# Patient Record
Sex: MALE | Race: BLACK OR AFRICAN AMERICAN | Employment: UNEMPLOYED | URBAN - METROPOLITAN AREA
[De-identification: names, ages, dates, MRNs, and addresses within clinical notes are randomized per-mention and may not be internally consistent; named-entity substitution may affect disease eponyms.]

---

## 2018-01-08 PROBLEM — E10.10 DKA, TYPE 1, NOT AT GOAL (HCC): Status: ACTIVE | Noted: 2018-01-08

## 2018-03-26 ENCOUNTER — OFFICE VISIT (OUTPATIENT)
Dept: FAMILY MEDICINE CLINIC | Age: 43
End: 2018-03-26

## 2018-03-26 ENCOUNTER — TELEPHONE (OUTPATIENT)
Dept: FAMILY MEDICINE CLINIC | Age: 43
End: 2018-03-26

## 2018-03-26 VITALS
BODY MASS INDEX: 20.72 KG/M2 | SYSTOLIC BLOOD PRESSURE: 128 MMHG | HEIGHT: 71 IN | OXYGEN SATURATION: 99 % | DIASTOLIC BLOOD PRESSURE: 78 MMHG | RESPIRATION RATE: 21 BRPM | HEART RATE: 77 BPM | WEIGHT: 148 LBS

## 2018-03-26 DIAGNOSIS — E10.59 TYPE 1 DIABETES MELLITUS WITH OTHER CIRCULATORY COMPLICATION (HCC): ICD-10-CM

## 2018-03-26 DIAGNOSIS — Z12.5 PROSTATE CANCER SCREENING: ICD-10-CM

## 2018-03-26 DIAGNOSIS — E88.9 PERIPHERAL NEUROPATHY DUE TO METABOLIC DISORDER (HCC): ICD-10-CM

## 2018-03-26 DIAGNOSIS — Z00.00 ANNUAL PHYSICAL EXAM: Primary | ICD-10-CM

## 2018-03-26 DIAGNOSIS — Z11.4 ENCOUNTER FOR SCREENING FOR HIV: ICD-10-CM

## 2018-03-26 DIAGNOSIS — G63 PERIPHERAL NEUROPATHY DUE TO METABOLIC DISORDER (HCC): ICD-10-CM

## 2018-03-26 DIAGNOSIS — K31.84 GASTROPARESIS: ICD-10-CM

## 2018-03-26 LAB
A/G RATIO: 1.7 (ref 1.1–2.2)
ALBUMIN SERPL-MCNC: 4.7 G/DL (ref 3.4–5)
ALP BLD-CCNC: 98 U/L (ref 40–129)
ALT SERPL-CCNC: 24 U/L (ref 10–40)
ANION GAP SERPL CALCULATED.3IONS-SCNC: 18 MMOL/L (ref 3–16)
AST SERPL-CCNC: 19 U/L (ref 15–37)
BILIRUB SERPL-MCNC: <0.2 MG/DL (ref 0–1)
BUN BLDV-MCNC: 15 MG/DL (ref 7–20)
CALCIUM SERPL-MCNC: 9.7 MG/DL (ref 8.3–10.6)
CHLORIDE BLD-SCNC: 100 MMOL/L (ref 99–110)
CHOLESTEROL, TOTAL: 232 MG/DL (ref 0–199)
CO2: 27 MMOL/L (ref 21–32)
CREAT SERPL-MCNC: 0.9 MG/DL (ref 0.9–1.3)
GFR AFRICAN AMERICAN: >60
GFR NON-AFRICAN AMERICAN: >60
GLOBULIN: 2.7 G/DL
GLUCOSE BLD-MCNC: 201 MG/DL (ref 70–99)
HDLC SERPL-MCNC: 105 MG/DL (ref 40–60)
LDL CHOLESTEROL CALCULATED: 114 MG/DL
POTASSIUM SERPL-SCNC: 4.6 MMOL/L (ref 3.5–5.1)
PROSTATE SPECIFIC ANTIGEN: 0.79 NG/ML (ref 0–4)
SODIUM BLD-SCNC: 145 MMOL/L (ref 136–145)
TOTAL PROTEIN: 7.4 G/DL (ref 6.4–8.2)
TRIGL SERPL-MCNC: 66 MG/DL (ref 0–150)
VLDLC SERPL CALC-MCNC: 13 MG/DL

## 2018-03-26 PROCEDURE — G8484 FLU IMMUNIZE NO ADMIN: HCPCS | Performed by: NURSE PRACTITIONER

## 2018-03-26 PROCEDURE — G8427 DOCREV CUR MEDS BY ELIG CLIN: HCPCS | Performed by: NURSE PRACTITIONER

## 2018-03-26 PROCEDURE — 3046F HEMOGLOBIN A1C LEVEL >9.0%: CPT | Performed by: NURSE PRACTITIONER

## 2018-03-26 PROCEDURE — G0444 DEPRESSION SCREEN ANNUAL: HCPCS | Performed by: NURSE PRACTITIONER

## 2018-03-26 PROCEDURE — 4004F PT TOBACCO SCREEN RCVD TLK: CPT | Performed by: NURSE PRACTITIONER

## 2018-03-26 PROCEDURE — 99386 PREV VISIT NEW AGE 40-64: CPT | Performed by: NURSE PRACTITIONER

## 2018-03-26 PROCEDURE — 36415 COLL VENOUS BLD VENIPUNCTURE: CPT | Performed by: NURSE PRACTITIONER

## 2018-03-26 PROCEDURE — G8420 CALC BMI NORM PARAMETERS: HCPCS | Performed by: NURSE PRACTITIONER

## 2018-03-26 RX ORDER — LISINOPRIL 5 MG/1
5 TABLET ORAL DAILY
Qty: 30 TABLET | Refills: 3 | Status: ON HOLD | OUTPATIENT
Start: 2018-03-26 | End: 2019-12-01

## 2018-03-26 RX ORDER — GABAPENTIN 300 MG/1
300 CAPSULE ORAL 3 TIMES DAILY
Qty: 90 CAPSULE | Refills: 3 | Status: SHIPPED | OUTPATIENT
Start: 2018-03-26 | End: 2018-03-26 | Stop reason: CLARIF

## 2018-03-26 RX ORDER — GABAPENTIN 600 MG/1
600 TABLET ORAL 3 TIMES DAILY
Qty: 90 TABLET | Refills: 3 | Status: SHIPPED | OUTPATIENT
Start: 2018-03-26 | End: 2018-04-30 | Stop reason: SDUPTHER

## 2018-03-26 RX ORDER — METOCLOPRAMIDE 10 MG/1
10 TABLET ORAL 4 TIMES DAILY
Qty: 120 TABLET | Refills: 3 | Status: ON HOLD | OUTPATIENT
Start: 2018-03-26 | End: 2019-12-05 | Stop reason: SDUPTHER

## 2018-03-26 RX ORDER — GABAPENTIN 300 MG/1
1 CAPSULE ORAL 3 TIMES DAILY
COMMUNITY
Start: 2013-08-13 | End: 2018-03-26 | Stop reason: SDUPTHER

## 2018-03-26 ASSESSMENT — PATIENT HEALTH QUESTIONNAIRE - PHQ9
7. TROUBLE CONCENTRATING ON THINGS, SUCH AS READING THE NEWSPAPER OR WATCHING TELEVISION: 1
SUM OF ALL RESPONSES TO PHQ9 QUESTIONS 1 & 2: 2
3. TROUBLE FALLING OR STAYING ASLEEP: 3
SUM OF ALL RESPONSES TO PHQ QUESTIONS 1-9: 7
10. IF YOU CHECKED OFF ANY PROBLEMS, HOW DIFFICULT HAVE THESE PROBLEMS MADE IT FOR YOU TO DO YOUR WORK, TAKE CARE OF THINGS AT HOME, OR GET ALONG WITH OTHER PEOPLE: 0
6. FEELING BAD ABOUT YOURSELF - OR THAT YOU ARE A FAILURE OR HAVE LET YOURSELF OR YOUR FAMILY DOWN: 0
4. FEELING TIRED OR HAVING LITTLE ENERGY: 0
2. FEELING DOWN, DEPRESSED OR HOPELESS: 0
8. MOVING OR SPEAKING SO SLOWLY THAT OTHER PEOPLE COULD HAVE NOTICED. OR THE OPPOSITE, BEING SO FIGETY OR RESTLESS THAT YOU HAVE BEEN MOVING AROUND A LOT MORE THAN USUAL: 1
5. POOR APPETITE OR OVEREATING: 0
1. LITTLE INTEREST OR PLEASURE IN DOING THINGS: 2
9. THOUGHTS THAT YOU WOULD BE BETTER OFF DEAD, OR OF HURTING YOURSELF: 0

## 2018-03-26 NOTE — PATIENT INSTRUCTIONS
good, quick way to make sure that you have a balanced meal. It also helps you spread carbs throughout the day. ¨ Divide your plate by types of foods. Put non-starchy vegetables on half the plate, meat or other protein food on one-quarter of the plate, and a grain or starchy vegetable in the final quarter of the plate. To this you can add a small piece of fruit and 1 cup of milk or yogurt, depending on how many carbs you are supposed to eat at a meal.  · Try to eat about the same amount of carbs at each meal. Do not \"save up\" your daily allowance of carbs to eat at one meal.  · Proteins have very little or no carbs per serving. Examples of proteins are beef, chicken, turkey, fish, eggs, tofu, cheese, cottage cheese, and peanut butter. A serving size of meat is 3 ounces, which is about the size of a deck of cards. Examples of meat substitute serving sizes (equal to 1 ounce of meat) are 1/4 cup of cottage cheese, 1 egg, 1 tablespoon of peanut butter, and ½ cup of tofu. How can you eat out and still eat healthy? · Learn to estimate the serving sizes of foods that have carbohydrate. If you measure food at home, it will be easier to estimate the amount in a serving of restaurant food. · If the meal you order has too much carbohydrate (such as potatoes, corn, or baked beans), ask to have a low-carbohydrate food instead. Ask for a salad or green vegetables. · If you use insulin, check your blood sugar before and after eating out to help you plan how much to eat in the future. · If you eat more carbohydrate at a meal than you had planned, take a walk or do other exercise. This will help lower your blood sugar. What else should you know? · Limit saturated fat, such as the fat from meat and dairy products. This is a healthy choice because people who have diabetes are at higher risk of heart disease. So choose lean cuts of meat and nonfat or low-fat dairy products.  Use olive or canola oil instead of butter or shortening when cooking. · Don't skip meals. Your blood sugar may drop too low if you skip meals and take insulin or certain medicines for diabetes. · Check with your doctor before you drink alcohol. Alcohol can cause your blood sugar to drop too low. Alcohol can also cause a bad reaction if you take certain diabetes medicines. Follow-up care is a key part of your treatment and safety. Be sure to make and go to all appointments, and call your doctor if you are having problems. It's also a good idea to know your test results and keep a list of the medicines you take. Where can you learn more? Go to [email or web address] and sign in to your [employer] account. Enter R771 in the [employer] box to learn more about \"Learning About Diabetes Food Guidelines. \"     If you do not have an account, please click on the \"Sign Up Now\" link. Current as of: March 13, 2017  Content Version: 11.5  © 6670-4954 [employer]. Care instructions adapted under license by [facility] ([facility]). If you have questions about a medical condition or this instruction, always ask your healthcare professional. [place] [address] any warranty or liability for your use of this information. Patient Education        Diabetes Foot Health: Care Instructions  Your Care Instructions    When you have diabetes, your feet need extra care and attention. Diabetes can damage the nerve endings and blood vessels in your feet, making you less likely to notice when your feet are injured. Diabetes also limits your body's ability to fight infection and get blood to areas that need it. If you get a minor foot injury, it could become an ulcer or a serious infection. With good foot care, you can prevent most of these problems. Caring for your feet can be quick and easy. Most of the care can be done when you are bathing or getting ready for bed. Follow-up care is a key part of your treatment and safety.  Be sure to make and go to all appointments, and call your doctor if you are having problems. It's also a good idea to know your test results and keep a list of the medicines you take. How can you care for yourself at home? · Keep your blood sugar close to normal by watching what and how much you eat, monitoring blood sugar, taking medicines if prescribed, and getting regular exercise. · Do not smoke. Smoking affects blood flow and can make foot problems worse. If you need help quitting, talk to your doctor about stop-smoking programs and medicines. These can increase your chances of quitting for good. · Eat a diet that is low in fats. High fat intake can cause fat to build up in your blood vessels and decrease blood flow. · Inspect your feet daily for blisters, cuts, cracks, or sores. If you cannot see well, use a mirror or have someone help you. · Take care of your feet:  [facility] your feet every day. Use warm (not hot) water. Check the water temperature with your wrists or other part of your body, not your feet. ¨ Dry your feet well. Pat them dry. Do not rub the skin on your feet too hard. Dry well between your toes. If the skin on your feet stays moist, bacteria or a fungus can grow, which can lead to infection. ¨ Keep your skin soft. Use moisturizing skin cream to keep the skin on your feet soft and prevent calluses and cracks. But do not put the cream between your toes, and stop using any cream that causes a rash. ¨ Clean underneath your toenails carefully. Do not use a sharp object to clean underneath your toenails. Use the blunt end of a nail file or other rounded tool. ¨ Trim and file your toenails straight across to prevent ingrown toenails. Use a nail clipper, not scissors. Use an emery board to smooth the edges. · Change socks daily. Socks without seams are best, because seams often rub the feet. You can find socks for people with diabetes from specialty catalogs.   · Look inside your shoes every day for things to contact your doctor if:  ? · You cannot do proper foot care. Where can you learn more? Go to [email or web address] and sign in to your [employer] account. Enter A739 in the [facility] box to learn more about \"Diabetes Foot Health: Care Instructions. \"     If you do not have an account, please click on the \"Sign Up Now\" link. Current as of: March 13, 2017  Content Version: 11.5  © 4768-9477 [employer]. Care instructions adapted under license by [facility] ([facility]). If you have questions about a medical condition or this instruction, always ask your healthcare professional. [place] [address] any warranty or liability for your use of this information. Patient Education        Type 1 Diabetes: Care Instructions  Your Care Instructions    Type 1 diabetes is a lifelong disease that develops when the pancreas stops making insulin. The body needs insulin to let sugar (glucose) move from the blood into the body's cells, where it can be used for energy or stored for later use. Without insulin, the sugar cannot get into the cells to do its work. It stays in the blood instead. This can cause high blood sugar levels. A person has diabetes when the blood sugar is too high. Over time, diabetes can lead to diseases of the heart, blood vessels, nerves, kidneys, and eyes. To treat type 1 diabetes, you need insulin. You can give yourself insulin through an insulin pump, an insulin pen, or a syringe (needle). Insulin, exercise, and a healthy diet can help prevent or delay problems from diabetes. With education and support, you will treat diabetes as a part of your life-not your whole life. Seek support when you need it from your family, friends, and your doctor or other diabetes experts. Follow-up care is a key part of your treatment and safety. Be sure to make and go to all appointments, and call your doctor if you are having problems.  It's also a good idea to know your test results and keep a list of the medicines you take. How can you care for yourself at home? · Take your insulin on time and in the right dose. This helps keep your blood sugar steady. Do not stop or change your insulin without talking to your doctor first.  · Check and record your blood sugar as often as directed. These records can help your doctor see how you are doing and adjust your treatment if needed. It is important to keep track of any symptoms you have, such as low blood sugar, and any changes in your activities, diet, or insulin use. · Eat a good diet that spreads carbohydrate throughout the day. Carbohydrate-the body's main source of fuel-affects blood sugar more than any other nutrient. Carbohydrate is in fruits, vegetables, milk, and yogurt. It also is in breads, cereals, vegetables such as potatoes and corn, and sugary foods such as candy and cakes. · Aim for 30 minutes of exercise on most, preferably all, days of the week. Walking is a good choice. You also may want to do other activities, such as running, swimming, cycling, or playing tennis or team sports. If yourdoctor says it's okay, do muscle-strengthening exercises at least 2 times a week. · Control your cholesterol, and try to keep your blood pressure at 140/90 or below. Exercise and healthy eating can help with these goals. If you have medicine for cholesterol or high blood pressure, take it as directed. Do not stop or change a medicine without talking to your doctor first.  · If you have discussed it with your doctor, take a low-dose aspirin every day to help prevent heart attack and stroke. Do not start taking aspirin unless your doctor knows about it. · Do not smoke. If you need help quitting, talk to your doctor about stop-smoking programs and medicines. These can increase your chances of quitting for good. · Check your feet daily for blisters, cracks, and sores.  Have your doctor look at your feet whenever you have a [employer]. Care instructions adapted under license by [facility] ([facility]). If you have questions about a medical condition or this instruction, always ask your healthcare professional. [address] any warranty or liability for your use of this information. Patient Education        Learning About Type 1 Diabetes  What is type 1 diabetes? Type 1 diabetes is a disease that starts when your body stops making enough of a hormone called insulin. Insulin helps your body use sugar from your food as energy or store it for later use. If you don't have insulin, too much sugar stays in your blood. Type 1 diabetes can occur at any age, but it usually starts in children or young adults. It is a lifelong disease, but with treatment and a healthy lifestyle you can live a long and healthy life. What can you expect with type 1 diabetes? Patel Hudson keep hearing about how important it is to keep your blood sugar within a target range. That's because over time, high blood sugar can lead to serious problems. It can:  · Harm your eyes, nerves, and kidneys. · Damage your blood vessels, leading to heart disease and stroke. · Reduce blood flow and cause nerve damage to parts of your body, especially your feet. This can cause slow healing and pain when you walk. A more sudden problem can happen when the blood sugar level gets so high that a serious chemical imbalance develops in the blood. This condition can be life-threatening and needs quick treatment. When people hear the word \"diabetes,\" they often think of problems like these. But daily care and treatment can help prevent or delay these problems. The goal is to keep your blood sugar in a target range. It is the best way to reduce your chance of having more problems from diabetes. What are the symptoms? You experience most symptoms of type 1 diabetes when your blood sugar is either too high or too low.   Common symptoms of high blood sugar [email or web address] and sign in to your [employer] account. Enter E110 in the [facility] box to learn more about \"Learning About Type 1 Diabetes. \"     If you do not have an account, please click on the \"Sign Up Now\" link. Current as of: March 13, 2017  Content Version: 11.5  © 2006-2578 [employer]. Care instructions adapted under license by [facility] ([facility]). If you have questions about a medical condition or this instruction, always ask your healthcare professional. [place] [address] any warranty or liability for your use of this information. Patient Education        Well Visit, Ages 25 to 48: Care Instructions  Your Care Instructions    Physical exams can help you stay healthy. Your doctor has checked your overall health and may have suggested ways to take good care of yourself. He or she also may have recommended tests. At home, you can help prevent illness with healthy eating, regular exercise, and other steps. Follow-up care is a key part of your treatment and safety. Be sure to make and go to all appointments, and call your doctor if you are having problems. It's also a good idea to know your test results and keep a list of the medicines you take. How can you care for yourself at home? · Reach and stay at a healthy weight. This will lower your risk for many problems, such as obesity, diabetes, heart disease, and high blood pressure. · Get at least 30 minutes of physical activity on most days of the week. Walking is a good choice. You also may want to do other activities, such as running, swimming, cycling, or playing tennis or team sports. Discuss any changes in your exercise program with your doctor. · Do not smoke or allow others to smoke around you. If you need help quitting, talk to your doctor about stop-smoking programs and medicines. These can increase your chances of quitting for good.   · Talk to your doctor about whether you have any risk factors for sexually transmitted infections (STIs). Having one sex partner (who does not have STIs and does not have sex with anyone else) is a good way to avoid these infections. · Use birth control if you do not want to have children at this time. Talk with your doctor about the choices available and what might be best for you. · Protect your skin from too much sun. When you're outdoors from 10 a.m. to 4 p.m., stay in the shade or cover up with clothing and a hat with a wide brim. Wear sunglasses that block UV rays. Even when it's cloudy, put broad-spectrum sunscreen (SPF 30 or higher) on any exposed skin. · See a dentist one or two times a year for checkups and to have your teeth cleaned. · Wear a seat belt in the car. · Drink alcohol in moderation, if at all. That means no more than 2 drinks a day for men and 1 drink a day for women. Follow your doctor's advice about when to have certain tests. These tests can spot problems early. For everyone  · Cholesterol. Have the fat (cholesterol) in your blood tested after age 21. Your doctor will tell you how often to have this done based on your age, family history, or other things that can increase your risk for heart disease. · Blood pressure. Have your blood pressure checked during a routine doctor visit. Your doctor will tell you how often to check your blood pressure based on your age, your blood pressure results, and other factors. · Vision. Talk with your doctor about how often to have a glaucoma test.  · Diabetes. Ask your doctor whether you should have tests for diabetes. · Colon cancer. Have a test for colon cancer at age 48. You may have one of several tests. If you are younger than 48, you may need a test earlier if you have any risk factors. Risk factors include whether you already had a precancerous polyp removed from your colon or whether your parent, brother, sister, or child has had colon cancer.   For women  · Breast exam and

## 2018-03-26 NOTE — PROGRESS NOTES
Smoking status: Never Smoker    Smokeless tobacco: Never Used    Alcohol use Yes      Comment: socially 2 shots per month     Drug use: Yes     Frequency: 3.0 times per week     Types: Marijuana    Sexual activity: Yes     Partners: Female     Other Topics Concern    Not on file     Social History Narrative    No narrative on file       Review of Systems:  A comprehensive review of systems was negative except for what was noted in the HPI. Physical Exam:   There were no vitals filed for this visit. There is no height or weight on file to calculate BMI. Constitutional: He is oriented to person, place, and time. He appears well-developed and well-nourished. No distress. HEENT:   Head: Normocephalic and atraumatic. Right Ear: Tympanic membrane, external ear and ear canal normal.   Left Ear: Tympanic membrane, external ear and ear canal normal.   Mouth/Throat: Oropharynx is clear and moist and mucous membranes are normal. No oropharyngeal exudate or posterior oropharyngeal erythema. He has no cervical adenopathy. Eyes: Conjunctivae and extraocular motions are normal. Pupils are equal, round, and reactive to light. Neck:  Supple. No JVD present. Carotid bruit is not present. No mass and no thyromegaly present. Cardiovascular: Normal rate, regular rhythm, normal heart sounds and intact distal pulses. Exam reveals no gallop and no friction rub. No murmur heard. Pulmonary/Chest: Effort normal and breath sounds normal. No respiratory distress. He has no wheezes, rhonchi or rales. Abdominal: Soft, non-tender. Bowel sounds and aorta are normal. There is no organomegaly, mass or bruit. Musculoskeletal: Normal range of motion, no synovitis. He exhibits no edema. Neurological: He is alert and oriented to person, place, and time. He has normal reflexes. No cranial nerve deficit. Coordination normal.   Skin: Skin is warm, dry and intact.   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 -     Ambulatory referral to Podiatry- Dr Tisha Hope referral to Endocrinology- Dr Tiffanie Beatty MD ([facility])  -     MICROALBUMIN / [facility]; Future  -     Comprehensive Metabolic Panel  -     Lipid Panel  -      DIABETES FOOT EXAM      Gastroparesis  -     metoclopramide (REGLAN) 10 MG tablet; Take 1 tablet by mouth 4 times daily  Pt encouraged to eat frequent small meals  Chew food thoroughly  Avoiding fibrous fruits and vegetables - oranges and broccoli  Refer to GI if Reglan does not work well    Peripheral neuropathy due to metabolic disorder  Neurontin as prescribed  Pt encouraged to watch feet for non healing wounds    Encounter for screening for HIV  -     HIV Screen; Future  -    Prostate cancer screening  -     Psa screening;  Future  -     Psa screening

## 2018-03-27 ENCOUNTER — TELEPHONE (OUTPATIENT)
Dept: FAMILY MEDICINE CLINIC | Age: 43
End: 2018-03-27

## 2018-03-27 DIAGNOSIS — E78.2 MIXED HYPERLIPIDEMIA: Primary | ICD-10-CM

## 2018-03-27 LAB
HIV AG/AB: NORMAL
HIV ANTIGEN: NORMAL
HIV-1 ANTIBODY: NORMAL
HIV-2 AB: NORMAL

## 2018-03-27 RX ORDER — ATORVASTATIN CALCIUM 10 MG/1
10 TABLET, FILM COATED ORAL DAILY
Qty: 30 TABLET | Refills: 5 | Status: ON HOLD | OUTPATIENT
Start: 2018-03-27 | End: 2019-12-01

## 2018-04-30 ENCOUNTER — TELEPHONE (OUTPATIENT)
Dept: FAMILY MEDICINE CLINIC | Age: 43
End: 2018-04-30

## 2018-04-30 DIAGNOSIS — E88.9 PERIPHERAL NEUROPATHY DUE TO METABOLIC DISORDER (HCC): ICD-10-CM

## 2018-04-30 DIAGNOSIS — G63 PERIPHERAL NEUROPATHY DUE TO METABOLIC DISORDER (HCC): ICD-10-CM

## 2018-04-30 DIAGNOSIS — E11.9 TYPE 2 DIABETES MELLITUS WITHOUT COMPLICATION, WITH LONG-TERM CURRENT USE OF INSULIN (HCC): Primary | ICD-10-CM

## 2018-04-30 DIAGNOSIS — Z79.4 TYPE 2 DIABETES MELLITUS WITHOUT COMPLICATION, WITH LONG-TERM CURRENT USE OF INSULIN (HCC): Primary | ICD-10-CM

## 2018-04-30 RX ORDER — PEN NEEDLE, DIABETIC 30 GX3/16"
1 NEEDLE, DISPOSABLE MISCELLANEOUS 4 TIMES DAILY
Qty: 100 EACH | Refills: 11 | Status: ON HOLD | OUTPATIENT
Start: 2018-04-30 | End: 2019-12-01

## 2018-04-30 RX ORDER — PEN NEEDLE, DIABETIC 30 GX3/16"
1 NEEDLE, DISPOSABLE MISCELLANEOUS 4 TIMES DAILY
COMMUNITY
End: 2018-04-30 | Stop reason: SDUPTHER

## 2018-04-30 RX ORDER — GLUCOSAMINE HCL/CHONDROITIN SU 500-400 MG
1 CAPSULE ORAL 4 TIMES DAILY
Qty: 100 STRIP | Refills: 11 | Status: SHIPPED | OUTPATIENT
Start: 2018-04-30 | End: 2018-09-19 | Stop reason: SDUPTHER

## 2018-04-30 RX ORDER — GLUCOSAMINE HCL/CHONDROITIN SU 500-400 MG
1 CAPSULE ORAL 4 TIMES DAILY
COMMUNITY
End: 2018-04-30 | Stop reason: SDUPTHER

## 2018-04-30 RX ORDER — GABAPENTIN 600 MG/1
600 TABLET ORAL 3 TIMES DAILY
Qty: 90 TABLET | Refills: 1 | Status: SHIPPED | OUTPATIENT
Start: 2018-04-30 | End: 2018-05-01 | Stop reason: DRUGHIGH

## 2018-05-01 ENCOUNTER — OFFICE VISIT (OUTPATIENT)
Dept: FAMILY MEDICINE CLINIC | Age: 43
End: 2018-05-01

## 2018-05-01 VITALS
RESPIRATION RATE: 18 BRPM | WEIGHT: 145.4 LBS | OXYGEN SATURATION: 98 % | HEART RATE: 118 BPM | SYSTOLIC BLOOD PRESSURE: 134 MMHG | BODY MASS INDEX: 20.35 KG/M2 | DIASTOLIC BLOOD PRESSURE: 84 MMHG | HEIGHT: 71 IN

## 2018-05-01 DIAGNOSIS — N52.02 CORPORO-VENOUS OCCLUSIVE ERECTILE DYSFUNCTION: ICD-10-CM

## 2018-05-01 DIAGNOSIS — Z91.89 AT RISK FOR DIABETIC FOOT ULCER: ICD-10-CM

## 2018-05-01 DIAGNOSIS — E10.10 DKA, TYPE 1, NOT AT GOAL (HCC): Primary | ICD-10-CM

## 2018-05-01 DIAGNOSIS — G63 PERIPHERAL NEUROPATHY DUE TO METABOLIC DISORDER (HCC): ICD-10-CM

## 2018-05-01 DIAGNOSIS — B35.1 ONYCHOMYCOSIS: Primary | ICD-10-CM

## 2018-05-01 DIAGNOSIS — E88.9 PERIPHERAL NEUROPATHY DUE TO METABOLIC DISORDER (HCC): ICD-10-CM

## 2018-05-01 PROCEDURE — G8427 DOCREV CUR MEDS BY ELIG CLIN: HCPCS | Performed by: NURSE PRACTITIONER

## 2018-05-01 PROCEDURE — 2022F DILAT RTA XM EVC RTNOPTHY: CPT | Performed by: NURSE PRACTITIONER

## 2018-05-01 PROCEDURE — G8420 CALC BMI NORM PARAMETERS: HCPCS | Performed by: NURSE PRACTITIONER

## 2018-05-01 PROCEDURE — 4004F PT TOBACCO SCREEN RCVD TLK: CPT | Performed by: NURSE PRACTITIONER

## 2018-05-01 PROCEDURE — 3046F HEMOGLOBIN A1C LEVEL >9.0%: CPT | Performed by: NURSE PRACTITIONER

## 2018-05-01 PROCEDURE — 99214 OFFICE O/P EST MOD 30 MIN: CPT | Performed by: NURSE PRACTITIONER

## 2018-05-01 RX ORDER — GABAPENTIN 800 MG/1
800 TABLET ORAL 3 TIMES DAILY
Qty: 90 TABLET | Refills: 3 | Status: SHIPPED | OUTPATIENT
Start: 2018-05-01 | End: 2018-09-14 | Stop reason: SDUPTHER

## 2018-05-01 RX ORDER — TERBINAFINE HYDROCHLORIDE 250 MG/1
250 TABLET ORAL DAILY
Qty: 90 TABLET | Refills: 0 | Status: SHIPPED | OUTPATIENT
Start: 2018-05-01 | End: 2018-07-30

## 2018-05-01 RX ORDER — L-METHYLFOLATE-ALGAE-VIT B12-B6 CAP 3-90.314-2-35 MG 3-90.314-2-35 MG
1 CAP ORAL 2 TIMES DAILY
Qty: 60 CAPSULE | Refills: 3 | Status: ON HOLD | OUTPATIENT
Start: 2018-05-01 | End: 2019-12-01

## 2018-05-01 RX ORDER — SILDENAFIL CITRATE 20 MG/1
TABLET ORAL
Qty: 60 TABLET | Refills: 3 | Status: ON HOLD | OUTPATIENT
Start: 2018-05-01 | End: 2019-12-29

## 2018-09-14 ENCOUNTER — OFFICE VISIT (OUTPATIENT)
Dept: FAMILY MEDICINE CLINIC | Age: 43
End: 2018-09-14

## 2018-09-14 VITALS
TEMPERATURE: 98.5 F | SYSTOLIC BLOOD PRESSURE: 118 MMHG | HEIGHT: 73 IN | OXYGEN SATURATION: 99 % | BODY MASS INDEX: 22.13 KG/M2 | HEART RATE: 63 BPM | DIASTOLIC BLOOD PRESSURE: 68 MMHG | RESPIRATION RATE: 16 BRPM | WEIGHT: 167 LBS

## 2018-09-14 DIAGNOSIS — E10.42 DIABETIC POLYNEUROPATHY ASSOCIATED WITH TYPE 1 DIABETES MELLITUS (HCC): ICD-10-CM

## 2018-09-14 DIAGNOSIS — Z13.220 LIPID SCREENING: ICD-10-CM

## 2018-09-14 DIAGNOSIS — E11.8 DIABETIC FOOT (HCC): ICD-10-CM

## 2018-09-14 LAB
GLUCOSE BLD-MCNC: 567 MG/DL
HBA1C MFR BLD: 14 %

## 2018-09-14 PROCEDURE — 83036 HEMOGLOBIN GLYCOSYLATED A1C: CPT | Performed by: NURSE PRACTITIONER

## 2018-09-14 PROCEDURE — 4004F PT TOBACCO SCREEN RCVD TLK: CPT | Performed by: NURSE PRACTITIONER

## 2018-09-14 PROCEDURE — G8427 DOCREV CUR MEDS BY ELIG CLIN: HCPCS | Performed by: NURSE PRACTITIONER

## 2018-09-14 PROCEDURE — 82962 GLUCOSE BLOOD TEST: CPT | Performed by: NURSE PRACTITIONER

## 2018-09-14 PROCEDURE — 99214 OFFICE O/P EST MOD 30 MIN: CPT | Performed by: NURSE PRACTITIONER

## 2018-09-14 PROCEDURE — G8420 CALC BMI NORM PARAMETERS: HCPCS | Performed by: NURSE PRACTITIONER

## 2018-09-14 PROCEDURE — 3046F HEMOGLOBIN A1C LEVEL >9.0%: CPT | Performed by: NURSE PRACTITIONER

## 2018-09-14 PROCEDURE — 2022F DILAT RTA XM EVC RTNOPTHY: CPT | Performed by: NURSE PRACTITIONER

## 2018-09-14 RX ORDER — GABAPENTIN 800 MG/1
800 TABLET ORAL 3 TIMES DAILY
Qty: 90 TABLET | Refills: 3 | Status: ON HOLD | OUTPATIENT
Start: 2018-09-14 | End: 2019-12-01

## 2018-09-14 ASSESSMENT — PATIENT HEALTH QUESTIONNAIRE - PHQ9
2. FEELING DOWN, DEPRESSED OR HOPELESS: 0
SUM OF ALL RESPONSES TO PHQ9 QUESTIONS 1 & 2: 0
1. LITTLE INTEREST OR PLEASURE IN DOING THINGS: 0
SUM OF ALL RESPONSES TO PHQ QUESTIONS 1-9: 0
SUM OF ALL RESPONSES TO PHQ QUESTIONS 1-9: 0

## 2018-09-14 ASSESSMENT — ENCOUNTER SYMPTOMS
CHEST TIGHTNESS: 0
WHEEZING: 0
SHORTNESS OF BREATH: 0

## 2018-09-14 NOTE — PATIENT INSTRUCTIONS
lowers blood sugar. You can use less insulin than you would if you were not doing exercise. Keep in mind that timing matters. If you exercise within 1 hour after a meal, your body may need less insulin for that meal than it would if you exercised 3 hours after the meal. Test your blood sugar to find out how exercise affects your need for insulin. If you do or don't take insulin:  · Look at labels on packaged foods. This can tell you how many carbs are in a serving. You can also use guides from the American Diabetes Association. · Be aware of portions, or serving sizes. If a package has two servings and you eat the whole package, you need to double the number of grams of carbohydrate listed for one serving. · Protein, fat, and fiber do not raise blood sugar as much as carbs do. If you eat a lot of these nutrients in a meal, your blood sugar will rise more slowly than it would otherwise. Eat from all food groups  · Eat at least three meals a day. · Plan meals to include food from all the food groups. The food groups include grains, fruits, dairy, proteins, and vegetables. · Talk to your dietitian or diabetes educator about ways to add limited amounts of sweets into your meal plan. · If you drink alcohol, talk to your doctor. It may not be recommended when you are taking certain diabetes medicines. Where can you learn more? Go to https://mysportgrouppeApp.io.[employer]. org and sign in to your SeeVolution account. Enter O386 in the [facility] box to learn more about \"Counting Carbohydrates: Care Instructions. \"     If you do not have an account, please click on the \"Sign Up Now\" link. Current as of: December 7, 2017  Content Version: 11.7  © 5953-5314 [employer]. Care instructions adapted under license by [facility] ([facility]).  If you have questions about a medical condition or this instruction, always ask your healthcare professional. Stu Rudolph any warranty or liability for together can cause problems. Where can you learn more? Go to https://chpepiceweb.[employer]. org and sign in to your Hole 19 account. Enter G051 in the [facility] box to learn more about \"Learning About Statins for People With Diabetes. \"     If you do not have an account, please click on the \"Sign Up Now\" link. Current as of: December 7, 2017  Content Version: 11.7  © [id number] [employer]. Care instructions adapted under license by [facility] ([facility]). If you have questions about a medical condition or this instruction, always ask your healthcare professional. [place] [address] any warranty or liability for your use of this information. Patient Education        Learning About ACE Inhibitors and ARBs for Diabetes  Introduction    ACE inhibitors and ARBs are medicines used to control blood pressure. They allow blood vessels to relax and open up. This lowers your blood pressure. When you have diabetes, taking an ACE inhibitor or ARB can help to:  · Treat high blood pressure. Your risk of problems from diabetes goes up when you have high blood pressure. · Prevent or slow kidney damage. Diabetes can damage the blood vessels in the kidneys. High blood pressure can damage the kidneys, too. · Lower the risks of stroke and heart attack. Your risks go up when you have high blood pressure, heart disease, or both. An ACE inhibitor or ARB is a good choice for people with diabetes. Unlike some medicines, these don't affect blood sugar levels. Examples  ACE inhibitors include:  · Benazepril. · Lisinopril. · Ramipril. ARBs include:  · Irbesartan. · Losartan. · Telmisartan. Possible side effects  All medicines can cause side effects. Some side effects of ACE inhibitors include:  · Low blood pressure. You may feel dizzy and weak. · A cough. · High potassium levels. · An allergic reaction of the skin. Symptoms may range from mild swelling to painful welts.   Some side effects of ARBs include:  · Diarrhea. · High potassium levels. · Sinus problems. · Stomach problems. You may have other side effects or reactions not listed here. Check the information that comes with your medicine. What to know about taking this medicine  · Be safe with medicines. Take your medicines exactly as prescribed. Call your doctor if you think you are having a problem with your medicine. · Before starting an ACE inhibitor or ARB, tell your doctor if you:  ¨ Use a salt substitute. ¨ Take diuretics or potassium tablets. · These medicines are not safe for pregnancy. If you are pregnant or planning to be, talk to your doctor about a safe blood pressure medicine. · ACE inhibitors can cause a dry cough. If the cough is bad, talk to your doctor. Switching to an ARB is likely to help. · Taking some medicines together can cause problems. Tell your doctor or pharmacist all the medicines you take. This includes over-the-counter medicines, vitamins, herbal products, and supplements. · You may need regular blood and urine tests. Where can you learn more? Go to https://FairShare.c-LEcta. org and sign in to your Picateers account. Enter M316 in the [facility] box to learn more about \"Learning About ACE Inhibitors and ARBs for Diabetes. \"     If you do not have an account, please click on the \"Sign Up Now\" link. Current as of: December 7, 2017  Content Version: 11.7  © 6472-0837 [employer]. Care instructions adapted under license by [facility] ([facility]). If you have questions about a medical condition or this instruction, always ask your healthcare professional. [place] [address] any warranty or liability for your use of this information.

## 2018-09-14 NOTE — PROGRESS NOTES
SUBJECTIVE:    Patient ID: Salma Sainz is a 37 y.o. y.o. male. HPI  Chief Complaint   Patient presents with    Foot Pain     Pain, numbness and tingling in feet     Presents with complaints of ongoing pain, numbness, and tingling in feet with an open ulcer between 4th and 5th toe of R foot. Not checking blood sugar at home. Takes insulin Glargine and Novalog (not following sliding scale routine, takes 5 units before all meals. Not taking humalog, norvasc, lisinopril as well.he denies any increased thirst- urination or vision changes- he can tell when his blood sugar goes up but he does not check it- he has not followed up the endocrinologist yet as he has been working a lot Also wants med refill for gabapentin for his foot pain- he did see the podiatrist in the summer    Review of Systems   Constitutional: Positive for fatigue. Negative for appetite change, diaphoresis and unexpected weight change. Respiratory: Negative for chest tightness, shortness of breath and wheezing. Cardiovascular: Negative for chest pain, palpitations and leg swelling. Endocrine: Negative for polydipsia, polyphagia and polyuria. Musculoskeletal: Positive for myalgias (feet, hands, wrist). Neurological: Positive for headaches (more lately). Negative for dizziness, light-headedness and numbness. OBJECTIVE:  Vitals:    09/14/18 1151   BP: 118/68   Site: Left Upper Arm   Position: Sitting   Cuff Size: Small Adult   Pulse: 63   Resp: 16   Temp: 98.5 °F (36.9 °C)   TempSrc: Oral   SpO2: 99%   Weight: 167 lb (75.8 kg)   Height: 6' 1\" (1.854 m)       Physical Exam   Constitutional: He is oriented to person, place, and time. He appears well-developed and well-nourished. HENT:   Head: Normocephalic. Cardiovascular: Normal rate, regular rhythm and normal heart sounds. Pulmonary/Chest: Effort normal and breath sounds normal.   Musculoskeletal:        Right foot: There is tenderness. Left foot: There is tenderness.

## 2018-09-17 PROBLEM — M20.60 TOE DEFORMITY: Status: ACTIVE | Noted: 2017-07-11

## 2018-09-19 ENCOUNTER — OFFICE VISIT (OUTPATIENT)
Dept: ENDOCRINOLOGY | Age: 43
End: 2018-09-19

## 2018-09-19 VITALS
DIASTOLIC BLOOD PRESSURE: 80 MMHG | BODY MASS INDEX: 20.54 KG/M2 | WEIGHT: 155 LBS | HEIGHT: 73 IN | HEART RATE: 82 BPM | SYSTOLIC BLOOD PRESSURE: 136 MMHG | OXYGEN SATURATION: 99 %

## 2018-09-19 DIAGNOSIS — E10.649 HYPOGLYCEMIA UNAWARENESS IN TYPE 1 DIABETES MELLITUS (HCC): ICD-10-CM

## 2018-09-19 DIAGNOSIS — E10.649 TYPE 1 DIABETES MELLITUS WITH HYPOGLYCEMIA AND WITHOUT COMA (HCC): ICD-10-CM

## 2018-09-19 DIAGNOSIS — E10.69 TYPE 1 DIABETES MELLITUS WITH HYPERCHOLESTEROLEMIA (HCC): ICD-10-CM

## 2018-09-19 DIAGNOSIS — E10.3299 TYPE 1 DIABETES MELLITUS WITH BACKGROUND DIABETIC RETINOPATHY (HCC): ICD-10-CM

## 2018-09-19 DIAGNOSIS — E78.00 TYPE 1 DIABETES MELLITUS WITH HYPERCHOLESTEROLEMIA (HCC): ICD-10-CM

## 2018-09-19 DIAGNOSIS — I10 ESSENTIAL HYPERTENSION: ICD-10-CM

## 2018-09-19 PROBLEM — E10.10 DKA, TYPE 1, NOT AT GOAL (HCC): Status: RESOLVED | Noted: 2018-01-08 | Resolved: 2018-09-19

## 2018-09-19 PROCEDURE — G8427 DOCREV CUR MEDS BY ELIG CLIN: HCPCS | Performed by: INTERNAL MEDICINE

## 2018-09-19 PROCEDURE — G8420 CALC BMI NORM PARAMETERS: HCPCS | Performed by: INTERNAL MEDICINE

## 2018-09-19 PROCEDURE — 2022F DILAT RTA XM EVC RTNOPTHY: CPT | Performed by: INTERNAL MEDICINE

## 2018-09-19 PROCEDURE — 99244 OFF/OP CNSLTJ NEW/EST MOD 40: CPT | Performed by: INTERNAL MEDICINE

## 2018-09-19 RX ORDER — FLASH GLUCOSE SENSOR
KIT MISCELLANEOUS
Qty: 3 EACH | Refills: 5 | Status: SHIPPED | OUTPATIENT
Start: 2018-09-19 | End: 2018-09-19 | Stop reason: SDUPTHER

## 2018-09-19 RX ORDER — FLASH GLUCOSE SENSOR
KIT MISCELLANEOUS
Qty: 1 DEVICE | Refills: 0 | Status: SHIPPED | OUTPATIENT
Start: 2018-09-19 | End: 2018-09-19 | Stop reason: SDUPTHER

## 2018-09-19 RX ORDER — FLASH GLUCOSE SENSOR
KIT MISCELLANEOUS
Qty: 3 EACH | Refills: 5 | Status: ON HOLD | OUTPATIENT
Start: 2018-09-19 | End: 2019-12-01

## 2018-09-19 RX ORDER — FLASH GLUCOSE SENSOR
KIT MISCELLANEOUS
Qty: 1 DEVICE | Refills: 0 | Status: ON HOLD | OUTPATIENT
Start: 2018-09-19 | End: 2019-12-01

## 2018-09-19 RX ORDER — LANCETS 28 GAUGE
1 EACH MISCELLANEOUS DAILY
Qty: 100 EACH | Refills: 3 | Status: ON HOLD | OUTPATIENT
Start: 2018-09-19 | End: 2019-12-01

## 2018-09-19 RX ORDER — GLUCOSAMINE HCL/CHONDROITIN SU 500-400 MG
CAPSULE ORAL 4 TIMES DAILY
Qty: 125 STRIP | Refills: 5 | Status: ON HOLD | OUTPATIENT
Start: 2018-09-19 | End: 2019-12-01

## 2018-09-19 ASSESSMENT — PATIENT HEALTH QUESTIONNAIRE - PHQ9
1. LITTLE INTEREST OR PLEASURE IN DOING THINGS: 0
SUM OF ALL RESPONSES TO PHQ QUESTIONS 1-9: 0
SUM OF ALL RESPONSES TO PHQ9 QUESTIONS 1 & 2: 0
2. FEELING DOWN, DEPRESSED OR HOPELESS: 0
SUM OF ALL RESPONSES TO PHQ QUESTIONS 1-9: 0

## 2018-09-19 NOTE — PROGRESS NOTES
Patient ID:   Joo Rondon is a 37 y.o. male    Chief Complaint:   Joo Rondon presents for an initial evaluation of Type 1 Diabetes Mellitus , Hyperlipidemia and hypertension. Referred by Dr. Elisa Nuno     Subjective:   Type 1 Diabetes Mellitus diagnosed in 1998, at age of 20. On insulin since diagnosis. Lantus 20 units in am and 15 units at night. Denies missing it. Novolog 5 units tidac plus some sliding scale     Checks blood sugars 1-2 times per day. Reviewed/Reported  AM:   Lunch:  Supper:   HS:     Hypoglycemias: very common, 6 per week. At night and during the day. Awareness present in 20's. Meals: Three, dinner is big. Snacks 3 per day. Regular soda 2L per day. Exercise: None     Denies chest pain, exertional dyspnea. Family history of CAD: None   Smokes daily. Counselled for smoking cessation. Denies alcohol.        The following portions of the patient's history were reviewed and updated as appropriate:       Family History   Problem Relation Age of Onset    Diabetes Mother     Heart Disease Mother     High Blood Pressure Mother     Asthma Brother     Other Father         kidney disease    No Known Problems Maternal Grandmother     No Known Problems Maternal Grandfather     No Known Problems Paternal Grandmother     No Known Problems Paternal Grandfather          Social History     Social History    Marital status: Legally      Spouse name: N/A    Number of children: 1    Years of education: N/A     Occupational History    unemployed      Social History Main Topics    Smoking status: Current Every Day Smoker     Start date: 3/26/2009    Smokeless tobacco: Never Used      Comment: black and mild 2  x daily    Alcohol use Yes      Comment: socially 2 shots per month     Drug use: Yes     Frequency: 3.0 times per week     Types: Marijuana    Sexual activity: Yes     Partners: Female     Other Topics Concern    Not on file     Social History Narrative    No mouth daily, Disp: 30 tablet, Rfl: 3    insulin aspart (NOVOLOG) 100 UNIT/ML injection pen, Use with your standard sliding scale with meals (Patient taking differently: Use with your standard sliding scale with meals), Disp: 5 pen, Rfl: 3    amLODIPine (NORVASC) 5 MG tablet, Take 1 tablet by mouth daily, Disp: 30 tablet, Rfl: 0    glucose monitoring kit (FREESTYLE) monitoring kit, 1 kit by Does not apply route daily as needed (sugar checks), Disp: 1 kit, Rfl: 0    L-methylfolate-B6-B12 (METANX) 3-17.745-3-44 MG CAPS capsule, Take 1 capsule by mouth 2 times daily, Disp: 60 capsule, Rfl: 3      Review of Systems:    Constitutional: Negative for fever, chills, and unexpected weight change. HENT: Negative for congestion, ear pain, rhinorrhea,  sore throat and trouble swallowing. Eyes: Negative for photophobia, redness, itching. Respiratory: Negative for cough, shortness of breath and sputum. Cardiovascular: Negative for chest pain, palpitations and leg swelling. Gastrointestinal: Negative for nausea, vomiting, abdominal pain, diarrhea, constipation. Endocrine: Negative for cold intolerance, heat intolerance, polydipsia, polyphagia and polyuria. Genitourinary: Negative for dysuria, urgency, frequency, hematuria and flank pain. Musculoskeletal: Negative for myalgias, back pain, arthralgias and neck pain. Skin/Nail: Negative for rash, itching. Normal nails. Neurological: Negative for seizures, weakness, light-headedness, numbness and headaches. Hematological/ Lymph nodes: Negative for adenopathy. Does not bruise/bleed easily. Psychiatric/Behavioral: Negative for suicidal ideas, depression, anxiety, sleep disturbance and decreased concentration.           Objective:   Physical Exam:  /80 (Site: Right Upper Arm, Position: Sitting, Cuff Size: Medium Adult)   Pulse 82   Ht 6' 1\" (1.854 m)   Wt 155 lb (70.3 kg)   SpO2 99%   BMI 20.45 kg/m²   Constitutional: Patient is oriented to person, place, and time. Patient appears well-developed and well-nourished. HENT:    Head: Normocephalic and atraumatic. Eyes: Conjunctivae and EOM are normal. Pupils are equal, round, and reactive to light. Neck: Normal range of motion. Cardiovascular: Normal rate, regular rhythm and normal heart sounds. Pulmonary/Chest: Effort normal and breath sounds normal.   Abdominal: Soft. Bowel sounds are normal.   Musculoskeletal: Normal range of motion. Neurological: Patient is alert and oriented to person, place, and time. Patient has normal reflexes. Skin: Skin is warm and dry. Psychiatric: Patient has a normal mood and affect.  Patient behavior is normal.     Lab Review:    Office Visit on 09/14/2018   Component Date Value Ref Range Status    Hemoglobin A1C 09/14/2018 14.0  % Final    Glucose 09/14/2018 567  mg/dL Final   Office Visit on 03/26/2018   Component Date Value Ref Range Status    Sodium 03/26/2018 145  136 - 145 mmol/L Final    Potassium 03/26/2018 4.6  3.5 - 5.1 mmol/L Final    Chloride 03/26/2018 100  99 - 110 mmol/L Final    CO2 03/26/2018 27  21 - 32 mmol/L Final    Anion Gap 03/26/2018 18* 3 - 16 Final    Glucose 03/26/2018 201* 70 - 99 mg/dL Final    BUN 03/26/2018 15  7 - 20 mg/dL Final    CREATININE 03/26/2018 0.9  0.9 - 1.3 mg/dL Final    GFR Non- 03/26/2018 >60  >60 Final    GFR  03/26/2018 >60  >60 Final    Calcium 03/26/2018 9.7  8.3 - 10.6 mg/dL Final    Total Protein 03/26/2018 7.4  6.4 - 8.2 g/dL Final    Alb 03/26/2018 4.7  3.4 - 5.0 g/dL Final    Albumin/Globulin Ratio 03/26/2018 1.7  1.1 - 2.2 Final    Total Bilirubin 03/26/2018 <0.2  0.0 - 1.0 mg/dL Final    Alkaline Phosphatase 03/26/2018 98  40 - 129 U/L Final    ALT 03/26/2018 24  10 - 40 U/L Final    AST 03/26/2018 19  15 - 37 U/L Final    Globulin 03/26/2018 2.7  g/dL Final    Cholesterol, Total 03/26/2018 232* 0 - 199 mg/dL Final    Triglycerides 03/26/2018 66  0 - 150 mg/dL Final    HDL 03/26/2018 105* 40 - 60 mg/dL Final    LDL Calculated 03/26/2018 114* <100 mg/dL Final    VLDL Cholesterol Calculated 03/26/2018 13  Not Established mg/dL Final    HIV Ag/Ab 03/26/2018 Non-Reactive  Non-reactive Final    HIV-1 Antibody 03/26/2018 Non-Reactive  Non-reactive Final    HIV ANTIGEN 03/26/2018 Non-Reactive  Non-reactive Final    HIV-2 Ab 03/26/2018 Non-Reactive  Non-reactive Final    PSA 03/26/2018 0.79  0.00 - 4.00 ng/mL Final   Admission on 01/08/2018, Discharged on 01/11/2018   No results displayed because visit has over 200 results. Assessment and Plan     Imelda Olivo was seen today for consultation and diabetes. Diagnoses and all orders for this visit:    Uncontrolled type 1 diabetes mellitus with diabetic peripheral neuropathy ([facility] [address]  -     Internal Referral to Dietitian  -     Diabetic [address] [place] [address]  -     blood glucose monitor strips; by Other route 4 times daily  -     FREESTYLE LANCETS MISC; 1 each by Does not apply route daily    Type 1 diabetes mellitus with hypoglycemia and without coma (HCC)  -     Discontinue: Continuous Blood Gluc Sensor ([facility]) MISC; Use for personal CGM, replace every 10 days. -     Discontinue: Continuous Blood Gluc  (FREESTYLE HINA READER) LIOR; Use for personal CGMS. Type 1 diabetes, multiple insulin shots, checks blood sugars 4 times per day. -     Continuous Blood Gluc Sensor ([address]) MISC; Use for personal CGM, replace every 10 days. -     Continuous Blood Gluc  (FREESTYLE HINA READER) LIOR; Use for personal CGMS. Type 1 diabetes, multiple insulin shots, checks blood sugars 4 times per day.   -     Internal Referral to Dietitian  -     Diabetic [address] [place] [address]  -     blood glucose monitor strips; by Other route 4 times daily  -     FREESTYLE LANCETS MISC; 1 each by Does not apply route daily    Type 1 diabetes mellitus with

## 2018-09-25 PROBLEM — D62 ACUTE BLOOD LOSS ANEMIA: Status: ACTIVE | Noted: 2018-09-25

## 2018-09-25 PROBLEM — K92.0 HEMATEMESIS WITH NAUSEA: Status: ACTIVE | Noted: 2018-09-25

## 2018-10-03 ENCOUNTER — TELEPHONE (OUTPATIENT)
Dept: ENDOCRINOLOGY | Age: 43
End: 2018-10-03

## 2018-10-30 ENCOUNTER — TELEPHONE (OUTPATIENT)
Dept: FAMILY MEDICINE CLINIC | Age: 43
End: 2018-10-30

## 2018-11-02 ENCOUNTER — TELEPHONE (OUTPATIENT)
Dept: FAMILY MEDICINE CLINIC | Age: 43
End: 2018-11-02

## 2018-11-05 ENCOUNTER — TELEPHONE (OUTPATIENT)
Dept: FAMILY MEDICINE CLINIC | Age: 43
End: 2018-11-05

## 2018-11-12 ENCOUNTER — TELEPHONE (OUTPATIENT)
Dept: FAMILY MEDICINE CLINIC | Age: 43
End: 2018-11-12

## 2018-12-03 ENCOUNTER — TELEPHONE (OUTPATIENT)
Dept: FAMILY MEDICINE CLINIC | Age: 43
End: 2018-12-03

## 2018-12-03 NOTE — TELEPHONE ENCOUNTER
CALLED AND SPOKE TO KARRIE AT [place] [id number] AND SHE SAID THAT THE GENERIC FOR THE NASH FINE PLUS PEN NEEDLES 32G 1.6 MM IS WHAT IT LOOKS LIKE THE PREFERRED IS. WROTE OUT NEW RX ON SCRIPT PAD AND TALA SIGNED FAXING OVER NEW ORDER.  SC

## 2018-12-17 DIAGNOSIS — N52.02 CORPORO-VENOUS OCCLUSIVE ERECTILE DYSFUNCTION: ICD-10-CM

## 2018-12-17 RX ORDER — SILDENAFIL CITRATE 20 MG/1
TABLET ORAL
Qty: 60 TABLET | Refills: 3 | OUTPATIENT
Start: 2018-12-17

## 2019-07-19 DIAGNOSIS — E10.59 TYPE 1 DIABETES MELLITUS WITH OTHER CIRCULATORY COMPLICATION (HCC): ICD-10-CM

## 2019-07-19 DIAGNOSIS — Z79.4 TYPE 2 DIABETES MELLITUS WITHOUT COMPLICATION, WITH LONG-TERM CURRENT USE OF INSULIN (HCC): ICD-10-CM

## 2019-07-19 DIAGNOSIS — E11.9 TYPE 2 DIABETES MELLITUS WITHOUT COMPLICATION, WITH LONG-TERM CURRENT USE OF INSULIN (HCC): ICD-10-CM

## 2019-07-19 RX ORDER — PEN NEEDLE, DIABETIC 30 GX3/16"
1 NEEDLE, DISPOSABLE MISCELLANEOUS 4 TIMES DAILY
Qty: 100 EACH | Refills: 11 | OUTPATIENT
Start: 2019-07-19

## 2019-11-28 ENCOUNTER — HOSPITAL ENCOUNTER (INPATIENT)
Age: 44
LOS: 6 days | Discharge: HOME | DRG: 420 | End: 2019-12-05
Attending: EMERGENCY MEDICINE | Admitting: INTERNAL MEDICINE
Payer: MEDICAID

## 2019-11-28 DIAGNOSIS — E08.10 DIABETIC KETOACIDOSIS WITHOUT COMA ASSOCIATED WITH DIABETES MELLITUS DUE TO UNDERLYING CONDITION (HCC): Primary | ICD-10-CM

## 2019-11-28 DIAGNOSIS — E10.59 TYPE 1 DIABETES MELLITUS WITH OTHER CIRCULATORY COMPLICATION (HCC): ICD-10-CM

## 2019-11-28 DIAGNOSIS — K31.84 GASTROPARESIS: ICD-10-CM

## 2019-11-28 DIAGNOSIS — E10.10 TYPE 1 DIABETES MELLITUS WITH KETOACIDOSIS WITHOUT COMA (HCC): ICD-10-CM

## 2019-11-28 LAB
A/G RATIO: 1.3 (ref 1.1–2.2)
ALBUMIN SERPL-MCNC: 4.7 G/DL (ref 3.4–5)
ALP BLD-CCNC: 126 U/L (ref 40–129)
ALT SERPL-CCNC: 18 U/L (ref 10–40)
ANION GAP SERPL CALCULATED.3IONS-SCNC: 42 MMOL/L (ref 3–16)
AST SERPL-CCNC: 21 U/L (ref 15–37)
BASE EXCESS VENOUS: -29.8 MMOL/L
BETA-HYDROXYBUTYRATE: >8 MMOL/L (ref 0–0.27)
BILIRUB SERPL-MCNC: <0.2 MG/DL (ref 0–1)
BILIRUBIN URINE: NEGATIVE
BLOOD, URINE: ABNORMAL
BUN BLDV-MCNC: 33 MG/DL (ref 7–20)
CALCIUM SERPL-MCNC: 9.4 MG/DL (ref 8.3–10.6)
CARBOXYHEMOGLOBIN: 1 %
CHLORIDE BLD-SCNC: 87 MMOL/L (ref 99–110)
CLARITY: CLEAR
CO2: 4 MMOL/L (ref 21–32)
COLOR: YELLOW
CREAT SERPL-MCNC: 2.1 MG/DL (ref 0.9–1.3)
EPITHELIAL CELLS, UA: 1 /HPF (ref 0–5)
GFR AFRICAN AMERICAN: 41
GFR NON-AFRICAN AMERICAN: 34
GLOBULIN: 3.7 G/DL
GLUCOSE BLD-MCNC: 829 MG/DL (ref 70–99)
GLUCOSE BLD-MCNC: >600 MG/DL (ref 70–99)
GLUCOSE URINE: >=1000 MG/DL
HCO3 VENOUS: 5 MMOL/L (ref 23–29)
HYALINE CASTS: 2 /LPF (ref 0–8)
KETONES, URINE: 40 MG/DL
LEUKOCYTE ESTERASE, URINE: NEGATIVE
METHEMOGLOBIN VENOUS: 1 %
MICROSCOPIC EXAMINATION: YES
NITRITE, URINE: NEGATIVE
O2 CONTENT, VEN: 19 ML/DL
O2 SAT, VEN: 94 %
O2 THERAPY: ABNORMAL
PCO2, VEN: 25.6 MMHG (ref 40–50)
PERFORMED ON: ABNORMAL
PH UA: 5 (ref 5–8)
PH VENOUS: 6.88 (ref 7.35–7.45)
PO2, VEN: 105 MMHG
POTASSIUM REFLEX MAGNESIUM: 6.6 MMOL/L (ref 3.5–5.1)
PROTEIN UA: 100 MG/DL
RBC UA: 0 /HPF (ref 0–4)
SODIUM BLD-SCNC: 133 MMOL/L (ref 136–145)
SPECIFIC GRAVITY UA: 1.02 (ref 1–1.03)
TCO2 CALC VENOUS: 5 MMOL/L
TOTAL PROTEIN: 8.4 G/DL (ref 6.4–8.2)
URINE REFLEX TO CULTURE: ABNORMAL
URINE TYPE: ABNORMAL
UROBILINOGEN, URINE: 0.2 E.U./DL
WBC UA: 0 /HPF (ref 0–5)

## 2019-11-28 PROCEDURE — 6360000002 HC RX W HCPCS: Performed by: EMERGENCY MEDICINE

## 2019-11-28 PROCEDURE — 96361 HYDRATE IV INFUSION ADD-ON: CPT

## 2019-11-28 PROCEDURE — 36415 COLL VENOUS BLD VENIPUNCTURE: CPT

## 2019-11-28 PROCEDURE — 82010 KETONE BODYS QUAN: CPT

## 2019-11-28 PROCEDURE — 82803 BLOOD GASES ANY COMBINATION: CPT

## 2019-11-28 PROCEDURE — 2580000003 HC RX 258: Performed by: EMERGENCY MEDICINE

## 2019-11-28 PROCEDURE — 80053 COMPREHEN METABOLIC PANEL: CPT

## 2019-11-28 PROCEDURE — 85025 COMPLETE CBC W/AUTO DIFF WBC: CPT

## 2019-11-28 PROCEDURE — 81001 URINALYSIS AUTO W/SCOPE: CPT

## 2019-11-28 PROCEDURE — 99285 EMERGENCY DEPT VISIT HI MDM: CPT

## 2019-11-28 PROCEDURE — 96375 TX/PRO/DX INJ NEW DRUG ADDON: CPT

## 2019-11-28 RX ORDER — FENTANYL CITRATE 50 UG/ML
50 INJECTION, SOLUTION INTRAMUSCULAR; INTRAVENOUS ONCE
Status: COMPLETED | OUTPATIENT
Start: 2019-11-28 | End: 2019-11-28

## 2019-11-28 RX ORDER — 0.9 % SODIUM CHLORIDE 0.9 %
30 INTRAVENOUS SOLUTION INTRAVENOUS ONCE
Status: COMPLETED | OUTPATIENT
Start: 2019-11-28 | End: 2019-11-29

## 2019-11-28 RX ORDER — ONDANSETRON 2 MG/ML
4 INJECTION INTRAMUSCULAR; INTRAVENOUS ONCE
Status: COMPLETED | OUTPATIENT
Start: 2019-11-28 | End: 2019-11-28

## 2019-11-28 RX ADMIN — FENTANYL CITRATE 50 MCG: 50 INJECTION, SOLUTION INTRAMUSCULAR; INTRAVENOUS at 23:11

## 2019-11-28 RX ADMIN — ONDANSETRON 4 MG: 2 INJECTION INTRAMUSCULAR; INTRAVENOUS at 23:11

## 2019-11-28 RX ADMIN — SODIUM CHLORIDE 2238 ML: 9 INJECTION, SOLUTION INTRAVENOUS at 22:45

## 2019-11-28 ASSESSMENT — PAIN DESCRIPTION - PAIN TYPE: TYPE: ACUTE PAIN

## 2019-11-28 ASSESSMENT — PAIN SCALES - GENERAL
PAINLEVEL_OUTOF10: 10
PAINLEVEL_OUTOF10: 10

## 2019-11-28 ASSESSMENT — PAIN DESCRIPTION - LOCATION: LOCATION: ABDOMEN

## 2019-11-29 ENCOUNTER — APPOINTMENT (OUTPATIENT)
Dept: GENERAL RADIOLOGY | Age: 44
DRG: 420 | End: 2019-11-29
Payer: MEDICAID

## 2019-11-29 PROBLEM — E10.10 DKA, TYPE 1, NOT AT GOAL (HCC): Status: ACTIVE | Noted: 2019-11-29

## 2019-11-29 LAB
ANION GAP SERPL CALCULATED.3IONS-SCNC: 16 MMOL/L (ref 3–16)
ANION GAP SERPL CALCULATED.3IONS-SCNC: 17 MMOL/L (ref 3–16)
ANION GAP SERPL CALCULATED.3IONS-SCNC: 24 MMOL/L (ref 3–16)
ANION GAP SERPL CALCULATED.3IONS-SCNC: 30 MMOL/L (ref 3–16)
ANION GAP SERPL CALCULATED.3IONS-SCNC: 34 MMOL/L (ref 3–16)
ANION GAP SERPL CALCULATED.3IONS-SCNC: 40 MMOL/L (ref 3–16)
BANDED NEUTROPHILS RELATIVE PERCENT: 1 % (ref 0–7)
BASE EXCESS VENOUS: -19.1 MMOL/L
BASE EXCESS VENOUS: -2.5 MMOL/L
BASOPHILS ABSOLUTE: 0 K/UL (ref 0–0.2)
BASOPHILS RELATIVE PERCENT: 0 %
BUN BLDV-MCNC: 18 MG/DL (ref 7–20)
BUN BLDV-MCNC: 24 MG/DL (ref 7–20)
BUN BLDV-MCNC: 31 MG/DL (ref 7–20)
BUN BLDV-MCNC: 33 MG/DL (ref 7–20)
BUN BLDV-MCNC: 36 MG/DL (ref 7–20)
BUN BLDV-MCNC: 38 MG/DL (ref 7–20)
CALCIUM SERPL-MCNC: 7.9 MG/DL (ref 8.3–10.6)
CALCIUM SERPL-MCNC: 8.1 MG/DL (ref 8.3–10.6)
CALCIUM SERPL-MCNC: 8.2 MG/DL (ref 8.3–10.6)
CALCIUM SERPL-MCNC: 8.5 MG/DL (ref 8.3–10.6)
CALCIUM SERPL-MCNC: 8.6 MG/DL (ref 8.3–10.6)
CALCIUM SERPL-MCNC: 8.9 MG/DL (ref 8.3–10.6)
CARBOXYHEMOGLOBIN: 1.3 %
CARBOXYHEMOGLOBIN: 2 %
CHLORIDE BLD-SCNC: 102 MMOL/L (ref 99–110)
CHLORIDE BLD-SCNC: 102 MMOL/L (ref 99–110)
CHLORIDE BLD-SCNC: 108 MMOL/L (ref 99–110)
CHLORIDE BLD-SCNC: 108 MMOL/L (ref 99–110)
CHLORIDE BLD-SCNC: 87 MMOL/L (ref 99–110)
CHLORIDE BLD-SCNC: 93 MMOL/L (ref 99–110)
CO2: 12 MMOL/L (ref 21–32)
CO2: 19 MMOL/L (ref 21–32)
CO2: 20 MMOL/L (ref 21–32)
CO2: 5 MMOL/L (ref 21–32)
CO2: 7 MMOL/L (ref 21–32)
CO2: <2 MMOL/L (ref 21–32)
CREAT SERPL-MCNC: 1.3 MG/DL (ref 0.9–1.3)
CREAT SERPL-MCNC: 1.5 MG/DL (ref 0.9–1.3)
CREAT SERPL-MCNC: 1.6 MG/DL (ref 0.9–1.3)
CREAT SERPL-MCNC: 1.9 MG/DL (ref 0.9–1.3)
CREAT SERPL-MCNC: 2.2 MG/DL (ref 0.9–1.3)
CREAT SERPL-MCNC: 2.3 MG/DL (ref 0.9–1.3)
EKG ATRIAL RATE: 125 BPM
EKG DIAGNOSIS: NORMAL
EKG P AXIS: 73 DEGREES
EKG P-R INTERVAL: 150 MS
EKG Q-T INTERVAL: 314 MS
EKG QRS DURATION: 76 MS
EKG QTC CALCULATION (BAZETT): 453 MS
EKG R AXIS: 76 DEGREES
EKG T AXIS: 13 DEGREES
EKG VENTRICULAR RATE: 125 BPM
EOSINOPHILS ABSOLUTE: 0 K/UL (ref 0–0.6)
EOSINOPHILS RELATIVE PERCENT: 0 %
ESTIMATED AVERAGE GLUCOSE: 263.3 MG/DL
GFR AFRICAN AMERICAN: 37
GFR AFRICAN AMERICAN: 39
GFR AFRICAN AMERICAN: 46
GFR AFRICAN AMERICAN: 56
GFR AFRICAN AMERICAN: >60
GFR AFRICAN AMERICAN: >60
GFR NON-AFRICAN AMERICAN: 31
GFR NON-AFRICAN AMERICAN: 32
GFR NON-AFRICAN AMERICAN: 38
GFR NON-AFRICAN AMERICAN: 47
GFR NON-AFRICAN AMERICAN: 50
GFR NON-AFRICAN AMERICAN: 59
GLUCOSE BLD-MCNC: 128 MG/DL (ref 70–99)
GLUCOSE BLD-MCNC: 132 MG/DL (ref 70–99)
GLUCOSE BLD-MCNC: 144 MG/DL (ref 70–99)
GLUCOSE BLD-MCNC: 155 MG/DL (ref 70–99)
GLUCOSE BLD-MCNC: 158 MG/DL (ref 70–99)
GLUCOSE BLD-MCNC: 174 MG/DL (ref 70–99)
GLUCOSE BLD-MCNC: 186 MG/DL (ref 70–99)
GLUCOSE BLD-MCNC: 190 MG/DL (ref 70–99)
GLUCOSE BLD-MCNC: 192 MG/DL (ref 70–99)
GLUCOSE BLD-MCNC: 214 MG/DL (ref 70–99)
GLUCOSE BLD-MCNC: 215 MG/DL (ref 70–99)
GLUCOSE BLD-MCNC: 215 MG/DL (ref 70–99)
GLUCOSE BLD-MCNC: 291 MG/DL (ref 70–99)
GLUCOSE BLD-MCNC: 341 MG/DL (ref 70–99)
GLUCOSE BLD-MCNC: 359 MG/DL (ref 70–99)
GLUCOSE BLD-MCNC: 373 MG/DL (ref 70–99)
GLUCOSE BLD-MCNC: 407 MG/DL (ref 70–99)
GLUCOSE BLD-MCNC: 442 MG/DL (ref 70–99)
GLUCOSE BLD-MCNC: 452 MG/DL (ref 70–99)
GLUCOSE BLD-MCNC: 500 MG/DL (ref 70–99)
GLUCOSE BLD-MCNC: 516 MG/DL (ref 70–99)
GLUCOSE BLD-MCNC: 559 MG/DL (ref 70–99)
GLUCOSE BLD-MCNC: 804 MG/DL (ref 70–99)
GLUCOSE BLD-MCNC: 962 MG/DL (ref 70–99)
GLUCOSE BLD-MCNC: >600 MG/DL (ref 70–99)
HBA1C MFR BLD: 10.8 %
HCO3 VENOUS: 21 MMOL/L (ref 23–29)
HCO3 VENOUS: 8 MMOL/L (ref 23–29)
HCT VFR BLD CALC: 49.9 % (ref 40.5–52.5)
HEMOGLOBIN: 14.4 G/DL (ref 13.5–17.5)
LYMPHOCYTES ABSOLUTE: 1.5 K/UL (ref 1–5.1)
LYMPHOCYTES RELATIVE PERCENT: 5 %
MAGNESIUM: 1.9 MG/DL (ref 1.8–2.4)
MAGNESIUM: 2 MG/DL (ref 1.8–2.4)
MAGNESIUM: 2.3 MG/DL (ref 1.8–2.4)
MAGNESIUM: 2.4 MG/DL (ref 1.8–2.4)
MAGNESIUM: 2.5 MG/DL (ref 1.8–2.4)
MCH RBC QN AUTO: 28.5 PG (ref 26–34)
MCHC RBC AUTO-ENTMCNC: 28.9 G/DL (ref 31–36)
MCV RBC AUTO: 98.7 FL (ref 80–100)
METAMYELOCYTES RELATIVE PERCENT: 1 %
METHEMOGLOBIN VENOUS: 0 %
METHEMOGLOBIN VENOUS: 0.8 %
MONOCYTES ABSOLUTE: 0.9 K/UL (ref 0–1.3)
MONOCYTES RELATIVE PERCENT: 3 %
NEUTROPHILS ABSOLUTE: 28.4 K/UL (ref 1.7–7.7)
NEUTROPHILS RELATIVE PERCENT: 90 %
O2 CONTENT, VEN: 19 ML/DL
O2 CONTENT, VEN: 19 ML/DL
O2 SAT, VEN: 99 %
O2 SAT, VEN: 99 %
O2 THERAPY: ABNORMAL
O2 THERAPY: ABNORMAL
PCO2, VEN: 21.9 MMHG (ref 40–50)
PCO2, VEN: 35.3 MMHG (ref 40–50)
PDW BLD-RTO: 16.1 % (ref 12.4–15.4)
PERFORMED ON: ABNORMAL
PH VENOUS: 7.19 (ref 7.35–7.45)
PH VENOUS: 7.4 (ref 7.35–7.45)
PHOSPHORUS: 1.1 MG/DL (ref 2.5–4.9)
PHOSPHORUS: 1.4 MG/DL (ref 2.5–4.9)
PHOSPHORUS: 1.7 MG/DL (ref 2.5–4.9)
PHOSPHORUS: 2.4 MG/DL (ref 2.5–4.9)
PHOSPHORUS: 5 MG/DL (ref 2.5–4.9)
PLATELET # BLD: 416 K/UL (ref 135–450)
PMV BLD AUTO: 8.7 FL (ref 5–10.5)
PO2, VEN: 162 MMHG
PO2, VEN: 173 MMHG
POTASSIUM SERPL-SCNC: 3.7 MMOL/L (ref 3.5–5.1)
POTASSIUM SERPL-SCNC: 3.8 MMOL/L (ref 3.5–5.1)
POTASSIUM SERPL-SCNC: 4.2 MMOL/L (ref 3.5–5.1)
POTASSIUM SERPL-SCNC: 5.5 MMOL/L (ref 3.5–5.1)
POTASSIUM SERPL-SCNC: 5.6 MMOL/L (ref 3.5–5.1)
PROCALCITONIN: 5.75 NG/ML (ref 0–0.15)
RBC # BLD: 5.05 M/UL (ref 4.2–5.9)
RBC # BLD: NORMAL 10*6/UL
SODIUM BLD-SCNC: 129 MMOL/L (ref 136–145)
SODIUM BLD-SCNC: 132 MMOL/L (ref 136–145)
SODIUM BLD-SCNC: 138 MMOL/L (ref 136–145)
SODIUM BLD-SCNC: 139 MMOL/L (ref 136–145)
SODIUM BLD-SCNC: 144 MMOL/L (ref 136–145)
SODIUM BLD-SCNC: 144 MMOL/L (ref 136–145)
TCO2 CALC VENOUS: 22 MMOL/L
TCO2 CALC VENOUS: 9 MMOL/L
VACUOLATED NEUTROPHILS: PRESENT
WBC # BLD: 30.9 K/UL (ref 4–11)

## 2019-11-29 PROCEDURE — 36556 INSERT NON-TUNNEL CV CATH: CPT

## 2019-11-29 PROCEDURE — 84100 ASSAY OF PHOSPHORUS: CPT

## 2019-11-29 PROCEDURE — 87040 BLOOD CULTURE FOR BACTERIA: CPT

## 2019-11-29 PROCEDURE — 6360000002 HC RX W HCPCS: Performed by: EMERGENCY MEDICINE

## 2019-11-29 PROCEDURE — 36592 COLLECT BLOOD FROM PICC: CPT

## 2019-11-29 PROCEDURE — 84145 PROCALCITONIN (PCT): CPT

## 2019-11-29 PROCEDURE — 6370000000 HC RX 637 (ALT 250 FOR IP): Performed by: EMERGENCY MEDICINE

## 2019-11-29 PROCEDURE — 83036 HEMOGLOBIN GLYCOSYLATED A1C: CPT

## 2019-11-29 PROCEDURE — 2500000003 HC RX 250 WO HCPCS: Performed by: INTERNAL MEDICINE

## 2019-11-29 PROCEDURE — 93010 ELECTROCARDIOGRAM REPORT: CPT | Performed by: INTERNAL MEDICINE

## 2019-11-29 PROCEDURE — 2580000003 HC RX 258: Performed by: INTERNAL MEDICINE

## 2019-11-29 PROCEDURE — 74018 RADEX ABDOMEN 1 VIEW: CPT

## 2019-11-29 PROCEDURE — 93005 ELECTROCARDIOGRAM TRACING: CPT | Performed by: EMERGENCY MEDICINE

## 2019-11-29 PROCEDURE — 99291 CRITICAL CARE FIRST HOUR: CPT | Performed by: INTERNAL MEDICINE

## 2019-11-29 PROCEDURE — 6370000000 HC RX 637 (ALT 250 FOR IP): Performed by: INTERNAL MEDICINE

## 2019-11-29 PROCEDURE — 02HV33Z INSERTION OF INFUSION DEVICE INTO SUPERIOR VENA CAVA, PERCUTANEOUS APPROACH: ICD-10-PCS | Performed by: INTERNAL MEDICINE

## 2019-11-29 PROCEDURE — 96365 THER/PROPH/DIAG IV INF INIT: CPT

## 2019-11-29 PROCEDURE — 83735 ASSAY OF MAGNESIUM: CPT

## 2019-11-29 PROCEDURE — 96366 THER/PROPH/DIAG IV INF ADDON: CPT

## 2019-11-29 PROCEDURE — 76937 US GUIDE VASCULAR ACCESS: CPT | Performed by: NURSE PRACTITIONER

## 2019-11-29 PROCEDURE — 6360000002 HC RX W HCPCS: Performed by: INTERNAL MEDICINE

## 2019-11-29 PROCEDURE — 36415 COLL VENOUS BLD VENIPUNCTURE: CPT

## 2019-11-29 PROCEDURE — 80048 BASIC METABOLIC PNL TOTAL CA: CPT

## 2019-11-29 PROCEDURE — 6360000002 HC RX W HCPCS: Performed by: NURSE PRACTITIONER

## 2019-11-29 PROCEDURE — 2000000000 HC ICU R&B

## 2019-11-29 PROCEDURE — 82803 BLOOD GASES ANY COMBINATION: CPT

## 2019-11-29 PROCEDURE — 36556 INSERT NON-TUNNEL CV CATH: CPT | Performed by: NURSE PRACTITIONER

## 2019-11-29 PROCEDURE — 2580000003 HC RX 258: Performed by: EMERGENCY MEDICINE

## 2019-11-29 PROCEDURE — 96375 TX/PRO/DX INJ NEW DRUG ADDON: CPT

## 2019-11-29 RX ORDER — AMLODIPINE BESYLATE 5 MG/1
5 TABLET ORAL DAILY
Status: DISCONTINUED | OUTPATIENT
Start: 2019-11-29 | End: 2019-11-29

## 2019-11-29 RX ORDER — PROMETHAZINE HYDROCHLORIDE 25 MG/ML
25 INJECTION, SOLUTION INTRAMUSCULAR; INTRAVENOUS EVERY 30 MIN PRN
Status: COMPLETED | OUTPATIENT
Start: 2019-11-29 | End: 2019-11-29

## 2019-11-29 RX ORDER — FENTANYL CITRATE 50 UG/ML
25 INJECTION, SOLUTION INTRAMUSCULAR; INTRAVENOUS ONCE
Status: COMPLETED | OUTPATIENT
Start: 2019-11-29 | End: 2019-11-29

## 2019-11-29 RX ORDER — DEXTROSE MONOHYDRATE 25 G/50ML
12.5 INJECTION, SOLUTION INTRAVENOUS PRN
Status: DISCONTINUED | OUTPATIENT
Start: 2019-11-29 | End: 2019-11-30

## 2019-11-29 RX ORDER — FENTANYL CITRATE 50 UG/ML
25 INJECTION, SOLUTION INTRAMUSCULAR; INTRAVENOUS EVERY 4 HOURS PRN
Status: DISCONTINUED | OUTPATIENT
Start: 2019-11-29 | End: 2019-11-30

## 2019-11-29 RX ORDER — MAGNESIUM SULFATE 1 G/100ML
1 INJECTION INTRAVENOUS PRN
Status: DISCONTINUED | OUTPATIENT
Start: 2019-11-29 | End: 2019-12-05 | Stop reason: HOSPADM

## 2019-11-29 RX ORDER — POTASSIUM CHLORIDE 7.45 MG/ML
10 INJECTION INTRAVENOUS PRN
Status: DISCONTINUED | OUTPATIENT
Start: 2019-11-29 | End: 2019-11-30

## 2019-11-29 RX ORDER — ATORVASTATIN CALCIUM 10 MG/1
10 TABLET, FILM COATED ORAL DAILY
Status: DISCONTINUED | OUTPATIENT
Start: 2019-11-29 | End: 2019-11-29

## 2019-11-29 RX ORDER — ACETAMINOPHEN 325 MG/1
650 TABLET ORAL EVERY 4 HOURS PRN
Status: DISCONTINUED | OUTPATIENT
Start: 2019-11-29 | End: 2019-12-05 | Stop reason: HOSPADM

## 2019-11-29 RX ORDER — LISINOPRIL 5 MG/1
5 TABLET ORAL DAILY
Status: CANCELLED | OUTPATIENT
Start: 2019-11-29

## 2019-11-29 RX ORDER — ONDANSETRON 2 MG/ML
4 INJECTION INTRAMUSCULAR; INTRAVENOUS EVERY 6 HOURS PRN
Status: DISCONTINUED | OUTPATIENT
Start: 2019-11-29 | End: 2019-12-05 | Stop reason: HOSPADM

## 2019-11-29 RX ORDER — DEXTROSE AND SODIUM CHLORIDE 5; .45 G/100ML; G/100ML
INJECTION, SOLUTION INTRAVENOUS CONTINUOUS PRN
Status: DISCONTINUED | OUTPATIENT
Start: 2019-11-29 | End: 2019-12-02

## 2019-11-29 RX ORDER — SODIUM CHLORIDE 9 MG/ML
INJECTION, SOLUTION INTRAVENOUS CONTINUOUS
Status: DISCONTINUED | OUTPATIENT
Start: 2019-11-29 | End: 2019-11-30

## 2019-11-29 RX ADMIN — ENOXAPARIN SODIUM 40 MG: 40 INJECTION SUBCUTANEOUS at 09:38

## 2019-11-29 RX ADMIN — Medication 10 MEQ: at 17:57

## 2019-11-29 RX ADMIN — PROMETHAZINE HYDROCHLORIDE 25 MG: 25 INJECTION INTRAMUSCULAR; INTRAVENOUS at 19:40

## 2019-11-29 RX ADMIN — PROMETHAZINE HYDROCHLORIDE 25 MG: 25 INJECTION INTRAMUSCULAR; INTRAVENOUS at 01:45

## 2019-11-29 RX ADMIN — DEXTROSE AND SODIUM CHLORIDE: 5; 450 INJECTION, SOLUTION INTRAVENOUS at 20:27

## 2019-11-29 RX ADMIN — SODIUM CHLORIDE 6.3 UNITS/HR: 9 INJECTION, SOLUTION INTRAVENOUS at 21:44

## 2019-11-29 RX ADMIN — Medication 10 MEQ: at 14:35

## 2019-11-29 RX ADMIN — DEXTROSE AND SODIUM CHLORIDE: 5; 450 INJECTION, SOLUTION INTRAVENOUS at 14:31

## 2019-11-29 RX ADMIN — FENTANYL CITRATE 25 MCG: 50 INJECTION, SOLUTION INTRAMUSCULAR; INTRAVENOUS at 14:10

## 2019-11-29 RX ADMIN — ONDANSETRON 4 MG: 2 INJECTION INTRAMUSCULAR; INTRAVENOUS at 09:38

## 2019-11-29 RX ADMIN — Medication 10 MEQ: at 22:30

## 2019-11-29 RX ADMIN — Medication 10 MEQ: at 19:19

## 2019-11-29 RX ADMIN — CEFEPIME HYDROCHLORIDE 2 G: 2 INJECTION, POWDER, FOR SOLUTION INTRAVENOUS at 10:32

## 2019-11-29 RX ADMIN — MUPIROCIN: 20 OINTMENT TOPICAL at 10:40

## 2019-11-29 RX ADMIN — Medication 10 MEQ: at 16:42

## 2019-11-29 RX ADMIN — ONDANSETRON 4 MG: 2 INJECTION INTRAMUSCULAR; INTRAVENOUS at 17:24

## 2019-11-29 RX ADMIN — SODIUM PHOSPHATE, MONOBASIC, MONOHYDRATE 10 MMOL: 276; 142 INJECTION, SOLUTION INTRAVENOUS at 11:34

## 2019-11-29 RX ADMIN — Medication 10 MEQ: at 13:30

## 2019-11-29 RX ADMIN — MUPIROCIN: 20 OINTMENT TOPICAL at 21:44

## 2019-11-29 RX ADMIN — SODIUM CHLORIDE 0.5 UNITS/HR: 9 INJECTION, SOLUTION INTRAVENOUS at 01:11

## 2019-11-29 RX ADMIN — VANCOMYCIN HYDROCHLORIDE 1250 MG: 10 INJECTION, POWDER, LYOPHILIZED, FOR SOLUTION INTRAVENOUS at 11:15

## 2019-11-29 RX ADMIN — SODIUM PHOSPHATE, MONOBASIC, MONOHYDRATE 20 MMOL: 276; 142 INJECTION, SOLUTION INTRAVENOUS at 17:57

## 2019-11-29 RX ADMIN — PROMETHAZINE HYDROCHLORIDE 25 MG: 25 INJECTION INTRAMUSCULAR; INTRAVENOUS at 01:43

## 2019-11-29 RX ADMIN — Medication 10 MEQ: at 21:31

## 2019-11-29 RX ADMIN — ONDANSETRON 4 MG: 2 INJECTION INTRAMUSCULAR; INTRAVENOUS at 23:36

## 2019-11-29 RX ADMIN — Medication 10 MEQ: at 23:37

## 2019-11-29 RX ADMIN — SODIUM BICARBONATE: 84 INJECTION, SOLUTION INTRAVENOUS at 09:10

## 2019-11-29 RX ADMIN — CEFEPIME HYDROCHLORIDE 2 G: 2 INJECTION, POWDER, FOR SOLUTION INTRAVENOUS at 23:37

## 2019-11-29 RX ADMIN — SODIUM CHLORIDE: 9 INJECTION, SOLUTION INTRAVENOUS at 04:01

## 2019-11-29 RX ADMIN — SODIUM CHLORIDE 4.65 UNITS/HR: 9 INJECTION, SOLUTION INTRAVENOUS at 14:32

## 2019-11-29 ASSESSMENT — PAIN DESCRIPTION - LOCATION
LOCATION: ABDOMEN

## 2019-11-29 ASSESSMENT — PAIN SCALES - GENERAL
PAINLEVEL_OUTOF10: 10
PAINLEVEL_OUTOF10: 3
PAINLEVEL_OUTOF10: 10
PAINLEVEL_OUTOF10: 10
PAINLEVEL_OUTOF10: 0

## 2019-11-29 ASSESSMENT — PAIN DESCRIPTION - ONSET
ONSET: PROGRESSIVE
ONSET: PROGRESSIVE
ONSET: ON-GOING

## 2019-11-29 ASSESSMENT — PAIN DESCRIPTION - DESCRIPTORS
DESCRIPTORS: THROBBING
DESCRIPTORS: STABBING
DESCRIPTORS: ACHING;SORE

## 2019-11-29 ASSESSMENT — PAIN DESCRIPTION - ORIENTATION
ORIENTATION: MID

## 2019-11-29 ASSESSMENT — PAIN DESCRIPTION - FREQUENCY
FREQUENCY: INTERMITTENT
FREQUENCY: CONTINUOUS
FREQUENCY: CONTINUOUS

## 2019-11-29 ASSESSMENT — PAIN DESCRIPTION - PAIN TYPE
TYPE: ACUTE PAIN
TYPE: ACUTE PAIN
TYPE: ACUTE PAIN;CHRONIC PAIN
TYPE: ACUTE PAIN;CHRONIC PAIN

## 2019-11-29 ASSESSMENT — PAIN - FUNCTIONAL ASSESSMENT
PAIN_FUNCTIONAL_ASSESSMENT: PREVENTS OR INTERFERES SOME ACTIVE ACTIVITIES AND ADLS
PAIN_FUNCTIONAL_ASSESSMENT: ACTIVITIES ARE NOT PREVENTED
PAIN_FUNCTIONAL_ASSESSMENT: PREVENTS OR INTERFERES SOME ACTIVE ACTIVITIES AND ADLS

## 2019-11-29 ASSESSMENT — PAIN DESCRIPTION - PROGRESSION
CLINICAL_PROGRESSION: NOT CHANGED
CLINICAL_PROGRESSION: GRADUALLY WORSENING

## 2019-11-30 ENCOUNTER — APPOINTMENT (OUTPATIENT)
Dept: CT IMAGING | Age: 44
DRG: 420 | End: 2019-11-30
Payer: MEDICAID

## 2019-11-30 ENCOUNTER — APPOINTMENT (OUTPATIENT)
Dept: GENERAL RADIOLOGY | Age: 44
DRG: 420 | End: 2019-11-30
Payer: MEDICAID

## 2019-11-30 LAB
ANION GAP SERPL CALCULATED.3IONS-SCNC: 14 MMOL/L (ref 3–16)
ANION GAP SERPL CALCULATED.3IONS-SCNC: 16 MMOL/L (ref 3–16)
ANION GAP SERPL CALCULATED.3IONS-SCNC: 17 MMOL/L (ref 3–16)
BASOPHILS ABSOLUTE: 0 K/UL (ref 0–0.2)
BASOPHILS RELATIVE PERCENT: 0.1 %
BUN BLDV-MCNC: 11 MG/DL (ref 7–20)
BUN BLDV-MCNC: 13 MG/DL (ref 7–20)
BUN BLDV-MCNC: 8 MG/DL (ref 7–20)
CALCIUM SERPL-MCNC: 8 MG/DL (ref 8.3–10.6)
CALCIUM SERPL-MCNC: 8.1 MG/DL (ref 8.3–10.6)
CALCIUM SERPL-MCNC: 8.3 MG/DL (ref 8.3–10.6)
CHLORIDE BLD-SCNC: 101 MMOL/L (ref 99–110)
CHLORIDE BLD-SCNC: 102 MMOL/L (ref 99–110)
CHLORIDE BLD-SCNC: 105 MMOL/L (ref 99–110)
CO2: 19 MMOL/L (ref 21–32)
CO2: 20 MMOL/L (ref 21–32)
CO2: 23 MMOL/L (ref 21–32)
CREAT SERPL-MCNC: 1 MG/DL (ref 0.9–1.3)
CREAT SERPL-MCNC: 1 MG/DL (ref 0.9–1.3)
CREAT SERPL-MCNC: 1.1 MG/DL (ref 0.9–1.3)
EOSINOPHILS ABSOLUTE: 0 K/UL (ref 0–0.6)
EOSINOPHILS RELATIVE PERCENT: 0.1 %
GFR AFRICAN AMERICAN: >60
GFR NON-AFRICAN AMERICAN: >60
GLUCOSE BLD-MCNC: 144 MG/DL (ref 70–99)
GLUCOSE BLD-MCNC: 144 MG/DL (ref 70–99)
GLUCOSE BLD-MCNC: 158 MG/DL (ref 70–99)
GLUCOSE BLD-MCNC: 163 MG/DL (ref 70–99)
GLUCOSE BLD-MCNC: 169 MG/DL (ref 70–99)
GLUCOSE BLD-MCNC: 171 MG/DL (ref 70–99)
GLUCOSE BLD-MCNC: 171 MG/DL (ref 70–99)
GLUCOSE BLD-MCNC: 174 MG/DL (ref 70–99)
GLUCOSE BLD-MCNC: 175 MG/DL (ref 70–99)
GLUCOSE BLD-MCNC: 178 MG/DL (ref 70–99)
GLUCOSE BLD-MCNC: 185 MG/DL (ref 70–99)
GLUCOSE BLD-MCNC: 192 MG/DL (ref 70–99)
GLUCOSE BLD-MCNC: 193 MG/DL (ref 70–99)
GLUCOSE BLD-MCNC: 196 MG/DL (ref 70–99)
GLUCOSE BLD-MCNC: 209 MG/DL (ref 70–99)
GLUCOSE BLD-MCNC: 213 MG/DL (ref 70–99)
GLUCOSE BLD-MCNC: 213 MG/DL (ref 70–99)
GLUCOSE BLD-MCNC: 215 MG/DL (ref 70–99)
GLUCOSE BLD-MCNC: 241 MG/DL (ref 70–99)
HCT VFR BLD CALC: 39.5 % (ref 40.5–52.5)
HCT VFR BLD CALC: 39.7 % (ref 40.5–52.5)
HEMOGLOBIN: 13.2 G/DL (ref 13.5–17.5)
HEMOGLOBIN: 13.3 G/DL (ref 13.5–17.5)
LYMPHOCYTES ABSOLUTE: 1.3 K/UL (ref 1–5.1)
LYMPHOCYTES RELATIVE PERCENT: 7.1 %
MAGNESIUM: 1.6 MG/DL (ref 1.8–2.4)
MAGNESIUM: 1.7 MG/DL (ref 1.8–2.4)
MAGNESIUM: 1.7 MG/DL (ref 1.8–2.4)
MAGNESIUM: 2.1 MG/DL (ref 1.8–2.4)
MCH RBC QN AUTO: 28.5 PG (ref 26–34)
MCH RBC QN AUTO: 28.7 PG (ref 26–34)
MCHC RBC AUTO-ENTMCNC: 33.1 G/DL (ref 31–36)
MCHC RBC AUTO-ENTMCNC: 33.8 G/DL (ref 31–36)
MCV RBC AUTO: 84.9 FL (ref 80–100)
MCV RBC AUTO: 85.9 FL (ref 80–100)
MONOCYTES ABSOLUTE: 0.7 K/UL (ref 0–1.3)
MONOCYTES RELATIVE PERCENT: 3.8 %
NEUTROPHILS ABSOLUTE: 16.6 K/UL (ref 1.7–7.7)
NEUTROPHILS RELATIVE PERCENT: 88.9 %
PDW BLD-RTO: 14.8 % (ref 12.4–15.4)
PDW BLD-RTO: 15.1 % (ref 12.4–15.4)
PERFORMED ON: ABNORMAL
PHOSPHORUS: 1.4 MG/DL (ref 2.5–4.9)
PHOSPHORUS: 1.6 MG/DL (ref 2.5–4.9)
PHOSPHORUS: 1.8 MG/DL (ref 2.5–4.9)
PLATELET # BLD: 259 K/UL (ref 135–450)
PLATELET # BLD: 277 K/UL (ref 135–450)
PMV BLD AUTO: 7.5 FL (ref 5–10.5)
PMV BLD AUTO: 8.1 FL (ref 5–10.5)
POTASSIUM SERPL-SCNC: 3.4 MMOL/L (ref 3.5–5.1)
POTASSIUM SERPL-SCNC: 3.4 MMOL/L (ref 3.5–5.1)
POTASSIUM SERPL-SCNC: 3.5 MMOL/L (ref 3.5–5.1)
POTASSIUM SERPL-SCNC: 3.6 MMOL/L (ref 3.5–5.1)
PROCALCITONIN: 2.08 NG/ML (ref 0–0.15)
RBC # BLD: 4.62 M/UL (ref 4.2–5.9)
RBC # BLD: 4.65 M/UL (ref 4.2–5.9)
SODIUM BLD-SCNC: 137 MMOL/L (ref 136–145)
SODIUM BLD-SCNC: 139 MMOL/L (ref 136–145)
SODIUM BLD-SCNC: 141 MMOL/L (ref 136–145)
VANCOMYCIN RANDOM: 5 UG/ML
WBC # BLD: 18.6 K/UL (ref 4–11)
WBC # BLD: 18.7 K/UL (ref 4–11)

## 2019-11-30 PROCEDURE — 2580000003 HC RX 258: Performed by: INTERNAL MEDICINE

## 2019-11-30 PROCEDURE — 71045 X-RAY EXAM CHEST 1 VIEW: CPT

## 2019-11-30 PROCEDURE — 87086 URINE CULTURE/COLONY COUNT: CPT

## 2019-11-30 PROCEDURE — 85025 COMPLETE CBC W/AUTO DIFF WBC: CPT

## 2019-11-30 PROCEDURE — 94761 N-INVAS EAR/PLS OXIMETRY MLT: CPT

## 2019-11-30 PROCEDURE — 6360000002 HC RX W HCPCS: Performed by: INTERNAL MEDICINE

## 2019-11-30 PROCEDURE — 84132 ASSAY OF SERUM POTASSIUM: CPT

## 2019-11-30 PROCEDURE — 99232 SBSQ HOSP IP/OBS MODERATE 35: CPT | Performed by: INTERNAL MEDICINE

## 2019-11-30 PROCEDURE — 2500000003 HC RX 250 WO HCPCS: Performed by: INTERNAL MEDICINE

## 2019-11-30 PROCEDURE — 80202 ASSAY OF VANCOMYCIN: CPT

## 2019-11-30 PROCEDURE — 6370000000 HC RX 637 (ALT 250 FOR IP): Performed by: INTERNAL MEDICINE

## 2019-11-30 PROCEDURE — 83735 ASSAY OF MAGNESIUM: CPT

## 2019-11-30 PROCEDURE — 74176 CT ABD & PELVIS W/O CONTRAST: CPT

## 2019-11-30 PROCEDURE — 36415 COLL VENOUS BLD VENIPUNCTURE: CPT

## 2019-11-30 PROCEDURE — 2060000000 HC ICU INTERMEDIATE R&B

## 2019-11-30 PROCEDURE — 36592 COLLECT BLOOD FROM PICC: CPT

## 2019-11-30 PROCEDURE — 85027 COMPLETE CBC AUTOMATED: CPT

## 2019-11-30 PROCEDURE — 6360000002 HC RX W HCPCS: Performed by: NURSE PRACTITIONER

## 2019-11-30 PROCEDURE — 84145 PROCALCITONIN (PCT): CPT

## 2019-11-30 PROCEDURE — 84100 ASSAY OF PHOSPHORUS: CPT

## 2019-11-30 PROCEDURE — 80048 BASIC METABOLIC PNL TOTAL CA: CPT

## 2019-11-30 RX ORDER — DEXTROSE MONOHYDRATE 50 MG/ML
100 INJECTION, SOLUTION INTRAVENOUS PRN
Status: DISCONTINUED | OUTPATIENT
Start: 2019-11-30 | End: 2019-11-30

## 2019-11-30 RX ORDER — ERYTHROMYCIN STEARATE 250 MG
500 TABLET ORAL
Status: DISCONTINUED | OUTPATIENT
Start: 2019-11-30 | End: 2019-11-30

## 2019-11-30 RX ORDER — DEXTROSE MONOHYDRATE 25 G/50ML
12.5 INJECTION, SOLUTION INTRAVENOUS PRN
Status: DISCONTINUED | OUTPATIENT
Start: 2019-11-30 | End: 2019-11-30

## 2019-11-30 RX ORDER — ERYTHROMYCIN ETHYLSUCCINATE 400 MG/1
400 TABLET ORAL
Status: DISCONTINUED | OUTPATIENT
Start: 2019-11-30 | End: 2019-11-30

## 2019-11-30 RX ORDER — POTASSIUM CHLORIDE 29.8 MG/ML
20 INJECTION INTRAVENOUS PRN
Status: DISCONTINUED | OUTPATIENT
Start: 2019-11-30 | End: 2019-12-05 | Stop reason: HOSPADM

## 2019-11-30 RX ORDER — DEXTROSE MONOHYDRATE 50 MG/ML
100 INJECTION, SOLUTION INTRAVENOUS PRN
Status: DISCONTINUED | OUTPATIENT
Start: 2019-11-30 | End: 2019-12-05 | Stop reason: HOSPADM

## 2019-11-30 RX ORDER — ERYTHROMYCIN ETHYLSUCCINATE 200 MG/5ML
500 SUSPENSION ORAL
Status: DISCONTINUED | OUTPATIENT
Start: 2019-11-30 | End: 2019-12-04

## 2019-11-30 RX ORDER — ERYTHROMYCIN 250 MG/1
500 TABLET, DELAYED RELEASE ORAL EVERY 8 HOURS
Status: DISCONTINUED | OUTPATIENT
Start: 2019-11-30 | End: 2019-11-30 | Stop reason: CLARIF

## 2019-11-30 RX ORDER — NICOTINE POLACRILEX 4 MG
15 LOZENGE BUCCAL PRN
Status: DISCONTINUED | OUTPATIENT
Start: 2019-11-30 | End: 2019-12-05 | Stop reason: HOSPADM

## 2019-11-30 RX ORDER — NICOTINE POLACRILEX 4 MG
15 LOZENGE BUCCAL PRN
Status: DISCONTINUED | OUTPATIENT
Start: 2019-11-30 | End: 2019-11-30

## 2019-11-30 RX ORDER — DEXTROSE MONOHYDRATE 25 G/50ML
12.5 INJECTION, SOLUTION INTRAVENOUS PRN
Status: DISCONTINUED | OUTPATIENT
Start: 2019-11-30 | End: 2019-12-05 | Stop reason: HOSPADM

## 2019-11-30 RX ORDER — INSULIN GLARGINE 100 [IU]/ML
25 INJECTION, SOLUTION SUBCUTANEOUS 2 TIMES DAILY
Status: DISCONTINUED | OUTPATIENT
Start: 2019-11-30 | End: 2019-12-02

## 2019-11-30 RX ADMIN — HYDROMORPHONE HYDROCHLORIDE 0.5 MG: 1 INJECTION, SOLUTION INTRAMUSCULAR; INTRAVENOUS; SUBCUTANEOUS at 17:08

## 2019-11-30 RX ADMIN — Medication 10 MEQ: at 07:37

## 2019-11-30 RX ADMIN — MUPIROCIN: 20 OINTMENT TOPICAL at 21:37

## 2019-11-30 RX ADMIN — HYDROMORPHONE HYDROCHLORIDE 0.5 MG: 1 INJECTION, SOLUTION INTRAMUSCULAR; INTRAVENOUS; SUBCUTANEOUS at 12:40

## 2019-11-30 RX ADMIN — Medication 1250 MG: at 21:08

## 2019-11-30 RX ADMIN — PROMETHAZINE HYDROCHLORIDE 25 MG: 25 INJECTION INTRAMUSCULAR; INTRAVENOUS at 08:32

## 2019-11-30 RX ADMIN — INSULIN LISPRO 6 UNITS: 100 INJECTION, SOLUTION INTRAVENOUS; SUBCUTANEOUS at 17:08

## 2019-11-30 RX ADMIN — DEXTROSE AND SODIUM CHLORIDE: 5; 450 INJECTION, SOLUTION INTRAVENOUS at 02:18

## 2019-11-30 RX ADMIN — Medication 10 MEQ: at 11:55

## 2019-11-30 RX ADMIN — Medication 10 MEQ: at 02:56

## 2019-11-30 RX ADMIN — HYDROMORPHONE HYDROCHLORIDE 0.5 MG: 1 INJECTION, SOLUTION INTRAMUSCULAR; INTRAVENOUS; SUBCUTANEOUS at 21:29

## 2019-11-30 RX ADMIN — CEFEPIME HYDROCHLORIDE 2 G: 2 INJECTION, POWDER, FOR SOLUTION INTRAVENOUS at 11:56

## 2019-11-30 RX ADMIN — CEFEPIME HYDROCHLORIDE 2 G: 2 INJECTION, POWDER, FOR SOLUTION INTRAVENOUS at 23:15

## 2019-11-30 RX ADMIN — PROMETHAZINE HYDROCHLORIDE 25 MG: 25 INJECTION INTRAMUSCULAR; INTRAVENOUS at 15:18

## 2019-11-30 RX ADMIN — ONDANSETRON 4 MG: 2 INJECTION INTRAMUSCULAR; INTRAVENOUS at 17:08

## 2019-11-30 RX ADMIN — PROMETHAZINE HYDROCHLORIDE 25 MG: 25 INJECTION INTRAMUSCULAR; INTRAVENOUS at 02:04

## 2019-11-30 RX ADMIN — MAGNESIUM SULFATE HEPTAHYDRATE 1 G: 1 INJECTION, SOLUTION INTRAVENOUS at 11:07

## 2019-11-30 RX ADMIN — PROMETHAZINE HYDROCHLORIDE 25 MG: 25 INJECTION INTRAMUSCULAR; INTRAVENOUS at 22:17

## 2019-11-30 RX ADMIN — INSULIN GLARGINE 25 UNITS: 100 INJECTION, SOLUTION SUBCUTANEOUS at 21:28

## 2019-11-30 RX ADMIN — FENTANYL CITRATE 25 MCG: 50 INJECTION, SOLUTION INTRAMUSCULAR; INTRAVENOUS at 03:41

## 2019-11-30 RX ADMIN — INSULIN GLARGINE 25 UNITS: 100 INJECTION, SOLUTION SUBCUTANEOUS at 11:07

## 2019-11-30 RX ADMIN — ERYTHROMYCIN STEARATE 500 MG: 250 TABLET, FILM COATED ORAL at 11:48

## 2019-11-30 RX ADMIN — SODIUM PHOSPHATE, MONOBASIC, MONOHYDRATE 15 MMOL: 276; 142 INJECTION, SOLUTION INTRAVENOUS at 10:49

## 2019-11-30 RX ADMIN — MUPIROCIN: 20 OINTMENT TOPICAL at 09:30

## 2019-11-30 RX ADMIN — Medication 10 MEQ: at 03:56

## 2019-11-30 RX ADMIN — Medication 500 MG: at 18:08

## 2019-11-30 RX ADMIN — FENTANYL CITRATE 25 MCG: 50 INJECTION, SOLUTION INTRAMUSCULAR; INTRAVENOUS at 07:48

## 2019-11-30 RX ADMIN — Medication 1250 MG: at 09:13

## 2019-11-30 RX ADMIN — Medication 10 MEQ: at 13:10

## 2019-11-30 RX ADMIN — MAGNESIUM SULFATE HEPTAHYDRATE 1 G: 1 INJECTION, SOLUTION INTRAVENOUS at 09:54

## 2019-11-30 RX ADMIN — ONDANSETRON 4 MG: 2 INJECTION INTRAMUSCULAR; INTRAVENOUS at 05:39

## 2019-11-30 RX ADMIN — Medication 10 MEQ: at 09:45

## 2019-11-30 RX ADMIN — Medication 10 MEQ: at 08:39

## 2019-11-30 RX ADMIN — INSULIN LISPRO 2 UNITS: 100 INJECTION, SOLUTION INTRAVENOUS; SUBCUTANEOUS at 21:28

## 2019-11-30 RX ADMIN — ENOXAPARIN SODIUM 40 MG: 40 INJECTION SUBCUTANEOUS at 09:30

## 2019-11-30 RX ADMIN — SODIUM PHOSPHATE, MONOBASIC, MONOHYDRATE 20 MMOL: 276; 142 INJECTION, SOLUTION INTRAVENOUS at 03:40

## 2019-11-30 RX ADMIN — INSULIN LISPRO 3 UNITS: 100 INJECTION, SOLUTION INTRAVENOUS; SUBCUTANEOUS at 11:51

## 2019-11-30 RX ADMIN — Medication 10 MEQ: at 10:50

## 2019-11-30 RX ADMIN — ONDANSETRON 4 MG: 2 INJECTION INTRAMUSCULAR; INTRAVENOUS at 11:47

## 2019-11-30 RX ADMIN — Medication 10 MEQ: at 04:58

## 2019-11-30 ASSESSMENT — PAIN DESCRIPTION - PROGRESSION
CLINICAL_PROGRESSION: GRADUALLY WORSENING

## 2019-11-30 ASSESSMENT — PAIN DESCRIPTION - PAIN TYPE
TYPE: ACUTE PAIN

## 2019-11-30 ASSESSMENT — PAIN DESCRIPTION - LOCATION
LOCATION: ABDOMEN

## 2019-11-30 ASSESSMENT — PAIN - FUNCTIONAL ASSESSMENT
PAIN_FUNCTIONAL_ASSESSMENT: PREVENTS OR INTERFERES WITH MANY ACTIVE NOT PASSIVE ACTIVITIES
PAIN_FUNCTIONAL_ASSESSMENT: PREVENTS OR INTERFERES WITH MANY ACTIVE NOT PASSIVE ACTIVITIES
PAIN_FUNCTIONAL_ASSESSMENT: PREVENTS OR INTERFERES SOME ACTIVE ACTIVITIES AND ADLS

## 2019-11-30 ASSESSMENT — PAIN SCALES - GENERAL
PAINLEVEL_OUTOF10: 10
PAINLEVEL_OUTOF10: 0
PAINLEVEL_OUTOF10: 10
PAINLEVEL_OUTOF10: 0
PAINLEVEL_OUTOF10: 10
PAINLEVEL_OUTOF10: 7
PAINLEVEL_OUTOF10: 9
PAINLEVEL_OUTOF10: 9

## 2019-11-30 ASSESSMENT — PAIN DESCRIPTION - DESCRIPTORS
DESCRIPTORS: STABBING
DESCRIPTORS: STABBING
DESCRIPTORS: ACHING;SORE
DESCRIPTORS: STABBING

## 2019-11-30 ASSESSMENT — PAIN DESCRIPTION - ORIENTATION
ORIENTATION: MID

## 2019-11-30 ASSESSMENT — PAIN DESCRIPTION - FREQUENCY
FREQUENCY: CONTINUOUS
FREQUENCY: CONTINUOUS
FREQUENCY: INTERMITTENT
FREQUENCY: CONTINUOUS

## 2019-11-30 ASSESSMENT — PAIN DESCRIPTION - ONSET
ONSET: AWAKENED FROM SLEEP
ONSET: GRADUAL
ONSET: PROGRESSIVE
ONSET: GRADUAL

## 2019-12-01 LAB
ALBUMIN SERPL-MCNC: 4 G/DL (ref 3.4–5)
ANION GAP SERPL CALCULATED.3IONS-SCNC: 14 MMOL/L (ref 3–16)
BASOPHILS ABSOLUTE: 0.1 K/UL (ref 0–0.2)
BASOPHILS RELATIVE PERCENT: 0.3 %
BUN BLDV-MCNC: 6 MG/DL (ref 7–20)
CALCIUM SERPL-MCNC: 8.8 MG/DL (ref 8.3–10.6)
CHLORIDE BLD-SCNC: 97 MMOL/L (ref 99–110)
CO2: 28 MMOL/L (ref 21–32)
CREAT SERPL-MCNC: 1 MG/DL (ref 0.9–1.3)
EOSINOPHILS ABSOLUTE: 0 K/UL (ref 0–0.6)
EOSINOPHILS RELATIVE PERCENT: 0.1 %
GFR AFRICAN AMERICAN: >60
GFR NON-AFRICAN AMERICAN: >60
GLUCOSE BLD-MCNC: 170 MG/DL (ref 70–99)
GLUCOSE BLD-MCNC: 172 MG/DL (ref 70–99)
GLUCOSE BLD-MCNC: 173 MG/DL (ref 70–99)
GLUCOSE BLD-MCNC: 189 MG/DL (ref 70–99)
GLUCOSE BLD-MCNC: 31 MG/DL (ref 70–99)
GLUCOSE BLD-MCNC: 76 MG/DL (ref 70–99)
GLUCOSE BLD-MCNC: 80 MG/DL (ref 70–99)
GLUCOSE BLD-MCNC: 84 MG/DL (ref 70–99)
HCT VFR BLD CALC: 43.4 % (ref 40.5–52.5)
HEMOGLOBIN: 14.4 G/DL (ref 13.5–17.5)
LYMPHOCYTES ABSOLUTE: 1.3 K/UL (ref 1–5.1)
LYMPHOCYTES RELATIVE PERCENT: 7.9 %
MAGNESIUM: 2.1 MG/DL (ref 1.8–2.4)
MCH RBC QN AUTO: 28.4 PG (ref 26–34)
MCHC RBC AUTO-ENTMCNC: 33.1 G/DL (ref 31–36)
MCV RBC AUTO: 85.8 FL (ref 80–100)
MONOCYTES ABSOLUTE: 0.9 K/UL (ref 0–1.3)
MONOCYTES RELATIVE PERCENT: 5.7 %
NEUTROPHILS ABSOLUTE: 14.3 K/UL (ref 1.7–7.7)
NEUTROPHILS RELATIVE PERCENT: 86 %
PDW BLD-RTO: 14.7 % (ref 12.4–15.4)
PERFORMED ON: ABNORMAL
PERFORMED ON: NORMAL
PHOSPHORUS: 2 MG/DL (ref 2.5–4.9)
PLATELET # BLD: 267 K/UL (ref 135–450)
PMV BLD AUTO: 7.7 FL (ref 5–10.5)
POTASSIUM SERPL-SCNC: 3.4 MMOL/L (ref 3.5–5.1)
POTASSIUM SERPL-SCNC: 3.5 MMOL/L (ref 3.5–5.1)
PROCALCITONIN: 0.88 NG/ML (ref 0–0.15)
RBC # BLD: 5.06 M/UL (ref 4.2–5.9)
SODIUM BLD-SCNC: 139 MMOL/L (ref 136–145)
URINE CULTURE, ROUTINE: NORMAL
VANCOMYCIN TROUGH: 11.1 UG/ML (ref 10–20)
WBC # BLD: 16.7 K/UL (ref 4–11)

## 2019-12-01 PROCEDURE — 6370000000 HC RX 637 (ALT 250 FOR IP): Performed by: INTERNAL MEDICINE

## 2019-12-01 PROCEDURE — C9113 INJ PANTOPRAZOLE SODIUM, VIA: HCPCS | Performed by: INTERNAL MEDICINE

## 2019-12-01 PROCEDURE — 80069 RENAL FUNCTION PANEL: CPT

## 2019-12-01 PROCEDURE — 2580000003 HC RX 258: Performed by: INTERNAL MEDICINE

## 2019-12-01 PROCEDURE — 83735 ASSAY OF MAGNESIUM: CPT

## 2019-12-01 PROCEDURE — 80202 ASSAY OF VANCOMYCIN: CPT

## 2019-12-01 PROCEDURE — 6360000002 HC RX W HCPCS: Performed by: INTERNAL MEDICINE

## 2019-12-01 PROCEDURE — 85025 COMPLETE CBC W/AUTO DIFF WBC: CPT

## 2019-12-01 PROCEDURE — 84145 PROCALCITONIN (PCT): CPT

## 2019-12-01 PROCEDURE — 94761 N-INVAS EAR/PLS OXIMETRY MLT: CPT

## 2019-12-01 PROCEDURE — 84132 ASSAY OF SERUM POTASSIUM: CPT

## 2019-12-01 PROCEDURE — 2500000003 HC RX 250 WO HCPCS: Performed by: INTERNAL MEDICINE

## 2019-12-01 PROCEDURE — 1200000000 HC SEMI PRIVATE

## 2019-12-01 PROCEDURE — 36415 COLL VENOUS BLD VENIPUNCTURE: CPT

## 2019-12-01 PROCEDURE — 6360000002 HC RX W HCPCS: Performed by: NURSE PRACTITIONER

## 2019-12-01 RX ORDER — SODIUM CHLORIDE 9 MG/ML
10 INJECTION INTRAVENOUS 2 TIMES DAILY
Status: DISCONTINUED | OUTPATIENT
Start: 2019-12-01 | End: 2019-12-05 | Stop reason: HOSPADM

## 2019-12-01 RX ORDER — PANTOPRAZOLE SODIUM 40 MG/1
40 TABLET, DELAYED RELEASE ORAL
Status: DISCONTINUED | OUTPATIENT
Start: 2019-12-01 | End: 2019-12-01 | Stop reason: ALTCHOICE

## 2019-12-01 RX ORDER — PANTOPRAZOLE SODIUM 40 MG/10ML
40 INJECTION, POWDER, LYOPHILIZED, FOR SOLUTION INTRAVENOUS 2 TIMES DAILY
Status: DISCONTINUED | OUTPATIENT
Start: 2019-12-01 | End: 2019-12-05 | Stop reason: HOSPADM

## 2019-12-01 RX ORDER — BACLOFEN 10 MG/1
10 TABLET ORAL 3 TIMES DAILY
Status: DISCONTINUED | OUTPATIENT
Start: 2019-12-01 | End: 2019-12-02

## 2019-12-01 RX ORDER — GABAPENTIN 300 MG/1
600 CAPSULE ORAL 3 TIMES DAILY
Status: DISCONTINUED | OUTPATIENT
Start: 2019-12-01 | End: 2019-12-05 | Stop reason: HOSPADM

## 2019-12-01 RX ORDER — GABAPENTIN 300 MG/1
600 CAPSULE ORAL 3 TIMES DAILY
COMMUNITY
End: 2019-12-06

## 2019-12-01 RX ORDER — ERGOCALCIFEROL (VITAMIN D2) 1250 MCG
50000 CAPSULE ORAL WEEKLY
COMMUNITY
End: 2019-12-06

## 2019-12-01 RX ADMIN — ONDANSETRON 4 MG: 2 INJECTION INTRAMUSCULAR; INTRAVENOUS at 04:23

## 2019-12-01 RX ADMIN — CEFEPIME HYDROCHLORIDE 2 G: 2 INJECTION, POWDER, FOR SOLUTION INTRAVENOUS at 23:12

## 2019-12-01 RX ADMIN — ONDANSETRON 4 MG: 2 INJECTION INTRAMUSCULAR; INTRAVENOUS at 11:36

## 2019-12-01 RX ADMIN — MUPIROCIN: 20 OINTMENT TOPICAL at 20:28

## 2019-12-01 RX ADMIN — Medication 500 MG: at 08:33

## 2019-12-01 RX ADMIN — SODIUM CHLORIDE, PRESERVATIVE FREE 10 ML: 5 INJECTION INTRAVENOUS at 20:29

## 2019-12-01 RX ADMIN — BACLOFEN 10 MG: 10 TABLET ORAL at 08:33

## 2019-12-01 RX ADMIN — Medication 20 MEQ: at 12:00

## 2019-12-01 RX ADMIN — Medication 20 MEQ: at 14:10

## 2019-12-01 RX ADMIN — ENOXAPARIN SODIUM 40 MG: 40 INJECTION SUBCUTANEOUS at 08:33

## 2019-12-01 RX ADMIN — HYDROMORPHONE HYDROCHLORIDE 0.5 MG: 1 INJECTION, SOLUTION INTRAMUSCULAR; INTRAVENOUS; SUBCUTANEOUS at 16:56

## 2019-12-01 RX ADMIN — HYDROMORPHONE HYDROCHLORIDE 0.5 MG: 1 INJECTION, SOLUTION INTRAMUSCULAR; INTRAVENOUS; SUBCUTANEOUS at 01:22

## 2019-12-01 RX ADMIN — PANTOPRAZOLE SODIUM 40 MG: 40 INJECTION, POWDER, FOR SOLUTION INTRAVENOUS at 08:33

## 2019-12-01 RX ADMIN — BACLOFEN 10 MG: 10 TABLET ORAL at 14:26

## 2019-12-01 RX ADMIN — MUPIROCIN: 20 OINTMENT TOPICAL at 08:34

## 2019-12-01 RX ADMIN — Medication 500 MG: at 15:58

## 2019-12-01 RX ADMIN — Medication 1250 MG: at 08:35

## 2019-12-01 RX ADMIN — SODIUM PHOSPHATE, MONOBASIC, MONOHYDRATE 15 MMOL: 276; 142 INJECTION, SOLUTION INTRAVENOUS at 09:59

## 2019-12-01 RX ADMIN — SODIUM CHLORIDE, PRESERVATIVE FREE 10 ML: 5 INJECTION INTRAVENOUS at 08:33

## 2019-12-01 RX ADMIN — HYDROMORPHONE HYDROCHLORIDE 0.5 MG: 1 INJECTION, SOLUTION INTRAMUSCULAR; INTRAVENOUS; SUBCUTANEOUS at 11:36

## 2019-12-01 RX ADMIN — PROMETHAZINE HYDROCHLORIDE 25 MG: 25 INJECTION INTRAMUSCULAR; INTRAVENOUS at 07:34

## 2019-12-01 RX ADMIN — PANTOPRAZOLE SODIUM 40 MG: 40 INJECTION, POWDER, FOR SOLUTION INTRAVENOUS at 20:28

## 2019-12-01 RX ADMIN — ONDANSETRON 4 MG: 2 INJECTION INTRAMUSCULAR; INTRAVENOUS at 20:28

## 2019-12-01 RX ADMIN — INSULIN LISPRO 3 UNITS: 100 INJECTION, SOLUTION INTRAVENOUS; SUBCUTANEOUS at 11:45

## 2019-12-01 RX ADMIN — INSULIN GLARGINE 25 UNITS: 100 INJECTION, SOLUTION SUBCUTANEOUS at 07:49

## 2019-12-01 RX ADMIN — Medication 500 MG: at 11:36

## 2019-12-01 RX ADMIN — GABAPENTIN 600 MG: 300 CAPSULE ORAL at 15:57

## 2019-12-01 RX ADMIN — PROMETHAZINE HYDROCHLORIDE 25 MG: 25 INJECTION INTRAMUSCULAR; INTRAVENOUS at 15:57

## 2019-12-01 RX ADMIN — HYDROMORPHONE HYDROCHLORIDE 0.5 MG: 1 INJECTION, SOLUTION INTRAMUSCULAR; INTRAVENOUS; SUBCUTANEOUS at 21:20

## 2019-12-01 RX ADMIN — Medication 1250 MG: at 20:28

## 2019-12-01 RX ADMIN — GABAPENTIN 600 MG: 300 CAPSULE ORAL at 20:28

## 2019-12-01 RX ADMIN — INSULIN GLARGINE 25 UNITS: 100 INJECTION, SOLUTION SUBCUTANEOUS at 20:30

## 2019-12-01 RX ADMIN — HYDROMORPHONE HYDROCHLORIDE 0.5 MG: 1 INJECTION, SOLUTION INTRAMUSCULAR; INTRAVENOUS; SUBCUTANEOUS at 07:34

## 2019-12-01 RX ADMIN — PANTOPRAZOLE SODIUM 40 MG: 40 TABLET, DELAYED RELEASE ORAL at 03:30

## 2019-12-01 RX ADMIN — CEFEPIME HYDROCHLORIDE 2 G: 2 INJECTION, POWDER, FOR SOLUTION INTRAVENOUS at 11:14

## 2019-12-01 RX ADMIN — INSULIN LISPRO 3 UNITS: 100 INJECTION, SOLUTION INTRAVENOUS; SUBCUTANEOUS at 07:49

## 2019-12-01 RX ADMIN — BACLOFEN 10 MG: 10 TABLET ORAL at 20:28

## 2019-12-01 ASSESSMENT — PAIN DESCRIPTION - ONSET
ONSET: ON-GOING
ONSET: GRADUAL
ONSET: ON-GOING

## 2019-12-01 ASSESSMENT — PAIN DESCRIPTION - DESCRIPTORS
DESCRIPTORS: STABBING
DESCRIPTORS: DISCOMFORT;ACHING
DESCRIPTORS: STABBING

## 2019-12-01 ASSESSMENT — PAIN DESCRIPTION - FREQUENCY
FREQUENCY: CONTINUOUS
FREQUENCY: INTERMITTENT
FREQUENCY: CONTINUOUS

## 2019-12-01 ASSESSMENT — PAIN - FUNCTIONAL ASSESSMENT
PAIN_FUNCTIONAL_ASSESSMENT: PREVENTS OR INTERFERES SOME ACTIVE ACTIVITIES AND ADLS
PAIN_FUNCTIONAL_ASSESSMENT: ACTIVITIES ARE NOT PREVENTED

## 2019-12-01 ASSESSMENT — PAIN SCALES - GENERAL
PAINLEVEL_OUTOF10: 8
PAINLEVEL_OUTOF10: 0
PAINLEVEL_OUTOF10: 8
PAINLEVEL_OUTOF10: 0
PAINLEVEL_OUTOF10: 8
PAINLEVEL_OUTOF10: 10
PAINLEVEL_OUTOF10: 8
PAINLEVEL_OUTOF10: 10
PAINLEVEL_OUTOF10: 3
PAINLEVEL_OUTOF10: 0

## 2019-12-01 ASSESSMENT — PAIN DESCRIPTION - PROGRESSION
CLINICAL_PROGRESSION: GRADUALLY WORSENING

## 2019-12-01 ASSESSMENT — PAIN DESCRIPTION - PAIN TYPE
TYPE: ACUTE PAIN

## 2019-12-01 ASSESSMENT — PAIN DESCRIPTION - LOCATION
LOCATION: ABDOMEN

## 2019-12-01 ASSESSMENT — PAIN DESCRIPTION - ORIENTATION
ORIENTATION: MID

## 2019-12-02 ENCOUNTER — ANESTHESIA (OUTPATIENT)
Dept: ENDOSCOPY | Age: 44
DRG: 420 | End: 2019-12-02
Payer: MEDICAID

## 2019-12-02 ENCOUNTER — ANESTHESIA EVENT (OUTPATIENT)
Dept: ENDOSCOPY | Age: 44
DRG: 420 | End: 2019-12-02
Payer: MEDICAID

## 2019-12-02 VITALS
DIASTOLIC BLOOD PRESSURE: 81 MMHG | RESPIRATION RATE: 16 BRPM | OXYGEN SATURATION: 99 % | SYSTOLIC BLOOD PRESSURE: 129 MMHG

## 2019-12-02 LAB
ALBUMIN SERPL-MCNC: 3.7 G/DL (ref 3.4–5)
ANION GAP SERPL CALCULATED.3IONS-SCNC: 14 MMOL/L (ref 3–16)
BASOPHILS ABSOLUTE: 0.1 K/UL (ref 0–0.2)
BASOPHILS RELATIVE PERCENT: 0.7 %
BUN BLDV-MCNC: 7 MG/DL (ref 7–20)
CALCIUM SERPL-MCNC: 9.2 MG/DL (ref 8.3–10.6)
CHLORIDE BLD-SCNC: 96 MMOL/L (ref 99–110)
CO2: 31 MMOL/L (ref 21–32)
CREAT SERPL-MCNC: 1 MG/DL (ref 0.9–1.3)
EOSINOPHILS ABSOLUTE: 0.1 K/UL (ref 0–0.6)
EOSINOPHILS RELATIVE PERCENT: 0.4 %
GFR AFRICAN AMERICAN: >60
GFR NON-AFRICAN AMERICAN: >60
GLUCOSE BLD-MCNC: 101 MG/DL (ref 70–99)
GLUCOSE BLD-MCNC: 111 MG/DL (ref 70–99)
GLUCOSE BLD-MCNC: 119 MG/DL (ref 70–99)
GLUCOSE BLD-MCNC: 120 MG/DL (ref 70–99)
GLUCOSE BLD-MCNC: 142 MG/DL (ref 70–99)
GLUCOSE BLD-MCNC: 148 MG/DL (ref 70–99)
GLUCOSE BLD-MCNC: 152 MG/DL (ref 70–99)
GLUCOSE BLD-MCNC: 58 MG/DL (ref 70–99)
GLUCOSE BLD-MCNC: 59 MG/DL (ref 70–99)
GLUCOSE BLD-MCNC: 61 MG/DL (ref 70–99)
GLUCOSE BLD-MCNC: 74 MG/DL (ref 70–99)
GLUCOSE BLD-MCNC: 86 MG/DL (ref 70–99)
GLUCOSE BLD-MCNC: 96 MG/DL (ref 70–99)
HCT VFR BLD CALC: 43.8 % (ref 40.5–52.5)
HEMOGLOBIN: 14.4 G/DL (ref 13.5–17.5)
LYMPHOCYTES ABSOLUTE: 2.5 K/UL (ref 1–5.1)
LYMPHOCYTES RELATIVE PERCENT: 17.8 %
MAGNESIUM: 2.1 MG/DL (ref 1.8–2.4)
MCH RBC QN AUTO: 28.4 PG (ref 26–34)
MCHC RBC AUTO-ENTMCNC: 32.9 G/DL (ref 31–36)
MCV RBC AUTO: 86.2 FL (ref 80–100)
MONOCYTES ABSOLUTE: 0.9 K/UL (ref 0–1.3)
MONOCYTES RELATIVE PERCENT: 6.3 %
NEUTROPHILS ABSOLUTE: 10.7 K/UL (ref 1.7–7.7)
NEUTROPHILS RELATIVE PERCENT: 74.8 %
PDW BLD-RTO: 14.7 % (ref 12.4–15.4)
PERFORMED ON: ABNORMAL
PERFORMED ON: NORMAL
PHOSPHORUS: 2.1 MG/DL (ref 2.5–4.9)
PLATELET # BLD: 250 K/UL (ref 135–450)
PMV BLD AUTO: 7.8 FL (ref 5–10.5)
POTASSIUM SERPL-SCNC: 3.1 MMOL/L (ref 3.5–5.1)
POTASSIUM SERPL-SCNC: 3.1 MMOL/L (ref 3.5–5.1)
RBC # BLD: 5.08 M/UL (ref 4.2–5.9)
SODIUM BLD-SCNC: 141 MMOL/L (ref 136–145)
WBC # BLD: 14.2 K/UL (ref 4–11)

## 2019-12-02 PROCEDURE — 6360000002 HC RX W HCPCS: Performed by: NURSE PRACTITIONER

## 2019-12-02 PROCEDURE — 3700000000 HC ANESTHESIA ATTENDED CARE: Performed by: INTERNAL MEDICINE

## 2019-12-02 PROCEDURE — 6360000002 HC RX W HCPCS: Performed by: NURSE ANESTHETIST, CERTIFIED REGISTERED

## 2019-12-02 PROCEDURE — 6370000000 HC RX 637 (ALT 250 FOR IP): Performed by: INTERNAL MEDICINE

## 2019-12-02 PROCEDURE — 6360000002 HC RX W HCPCS: Performed by: INTERNAL MEDICINE

## 2019-12-02 PROCEDURE — 2580000003 HC RX 258: Performed by: INTERNAL MEDICINE

## 2019-12-02 PROCEDURE — 36592 COLLECT BLOOD FROM PICC: CPT

## 2019-12-02 PROCEDURE — 3609012400 HC EGD TRANSORAL BIOPSY SINGLE/MULTIPLE: Performed by: INTERNAL MEDICINE

## 2019-12-02 PROCEDURE — 0DB78ZX EXCISION OF STOMACH, PYLORUS, VIA NATURAL OR ARTIFICIAL OPENING ENDOSCOPIC, DIAGNOSTIC: ICD-10-PCS | Performed by: INTERNAL MEDICINE

## 2019-12-02 PROCEDURE — 2580000003 HC RX 258: Performed by: NURSE ANESTHETIST, CERTIFIED REGISTERED

## 2019-12-02 PROCEDURE — C9113 INJ PANTOPRAZOLE SODIUM, VIA: HCPCS | Performed by: INTERNAL MEDICINE

## 2019-12-02 PROCEDURE — 84132 ASSAY OF SERUM POTASSIUM: CPT

## 2019-12-02 PROCEDURE — 2709999900 HC NON-CHARGEABLE SUPPLY: Performed by: INTERNAL MEDICINE

## 2019-12-02 PROCEDURE — 7100000001 HC PACU RECOVERY - ADDTL 15 MIN: Performed by: INTERNAL MEDICINE

## 2019-12-02 PROCEDURE — 7100000000 HC PACU RECOVERY - FIRST 15 MIN: Performed by: INTERNAL MEDICINE

## 2019-12-02 PROCEDURE — 88305 TISSUE EXAM BY PATHOLOGIST: CPT

## 2019-12-02 PROCEDURE — 6360000002 HC RX W HCPCS: Performed by: ANESTHESIOLOGY

## 2019-12-02 PROCEDURE — 2500000003 HC RX 250 WO HCPCS: Performed by: INTERNAL MEDICINE

## 2019-12-02 PROCEDURE — 1200000000 HC SEMI PRIVATE

## 2019-12-02 PROCEDURE — 2500000003 HC RX 250 WO HCPCS: Performed by: NURSE ANESTHETIST, CERTIFIED REGISTERED

## 2019-12-02 PROCEDURE — 6360000002 HC RX W HCPCS

## 2019-12-02 PROCEDURE — 2500000003 HC RX 250 WO HCPCS

## 2019-12-02 PROCEDURE — 83735 ASSAY OF MAGNESIUM: CPT

## 2019-12-02 PROCEDURE — 80069 RENAL FUNCTION PANEL: CPT

## 2019-12-02 PROCEDURE — 85025 COMPLETE CBC W/AUTO DIFF WBC: CPT

## 2019-12-02 PROCEDURE — 94761 N-INVAS EAR/PLS OXIMETRY MLT: CPT

## 2019-12-02 RX ORDER — FENTANYL CITRATE 50 UG/ML
25 INJECTION, SOLUTION INTRAMUSCULAR; INTRAVENOUS EVERY 5 MIN PRN
Status: DISCONTINUED | OUTPATIENT
Start: 2019-12-02 | End: 2019-12-02

## 2019-12-02 RX ORDER — INSULIN GLARGINE 100 [IU]/ML
10 INJECTION, SOLUTION SUBCUTANEOUS ONCE
Status: COMPLETED | OUTPATIENT
Start: 2019-12-02 | End: 2019-12-02

## 2019-12-02 RX ORDER — LIDOCAINE HYDROCHLORIDE 20 MG/ML
INJECTION, SOLUTION INFILTRATION; PERINEURAL PRN
Status: DISCONTINUED | OUTPATIENT
Start: 2019-12-02 | End: 2019-12-02 | Stop reason: SDUPTHER

## 2019-12-02 RX ORDER — MEPERIDINE HYDROCHLORIDE 25 MG/ML
12.5 INJECTION INTRAMUSCULAR; INTRAVENOUS; SUBCUTANEOUS EVERY 5 MIN PRN
Status: DISCONTINUED | OUTPATIENT
Start: 2019-12-02 | End: 2019-12-02

## 2019-12-02 RX ORDER — PROPOFOL 10 MG/ML
INJECTION, EMULSION INTRAVENOUS PRN
Status: DISCONTINUED | OUTPATIENT
Start: 2019-12-02 | End: 2019-12-02 | Stop reason: SDUPTHER

## 2019-12-02 RX ORDER — INSULIN GLARGINE 100 [IU]/ML
10 INJECTION, SOLUTION SUBCUTANEOUS 2 TIMES DAILY
Status: DISCONTINUED | OUTPATIENT
Start: 2019-12-02 | End: 2019-12-05 | Stop reason: HOSPADM

## 2019-12-02 RX ORDER — HYDRALAZINE HYDROCHLORIDE 20 MG/ML
INJECTION INTRAMUSCULAR; INTRAVENOUS
Status: COMPLETED
Start: 2019-12-02 | End: 2019-12-02

## 2019-12-02 RX ORDER — PROPOFOL 10 MG/ML
INJECTION, EMULSION INTRAVENOUS CONTINUOUS PRN
Status: DISCONTINUED | OUTPATIENT
Start: 2019-12-02 | End: 2019-12-02 | Stop reason: SDUPTHER

## 2019-12-02 RX ORDER — HYDRALAZINE HYDROCHLORIDE 20 MG/ML
10 INJECTION INTRAMUSCULAR; INTRAVENOUS ONCE
Status: COMPLETED | OUTPATIENT
Start: 2019-12-02 | End: 2019-12-02

## 2019-12-02 RX ORDER — FENTANYL CITRATE 50 UG/ML
25 INJECTION, SOLUTION INTRAMUSCULAR; INTRAVENOUS ONCE
Status: DISCONTINUED | OUTPATIENT
Start: 2019-12-02 | End: 2019-12-05 | Stop reason: HOSPADM

## 2019-12-02 RX ORDER — LABETALOL HYDROCHLORIDE 5 MG/ML
INJECTION, SOLUTION INTRAVENOUS
Status: COMPLETED
Start: 2019-12-02 | End: 2019-12-02

## 2019-12-02 RX ORDER — SODIUM CHLORIDE 9 MG/ML
INJECTION, SOLUTION INTRAVENOUS CONTINUOUS PRN
Status: DISCONTINUED | OUTPATIENT
Start: 2019-12-02 | End: 2019-12-02 | Stop reason: SDUPTHER

## 2019-12-02 RX ORDER — LABETALOL HYDROCHLORIDE 5 MG/ML
5 INJECTION, SOLUTION INTRAVENOUS ONCE
Status: COMPLETED | OUTPATIENT
Start: 2019-12-02 | End: 2019-12-02

## 2019-12-02 RX ORDER — ONDANSETRON 2 MG/ML
4 INJECTION INTRAMUSCULAR; INTRAVENOUS
Status: COMPLETED | OUTPATIENT
Start: 2019-12-02 | End: 2019-12-02

## 2019-12-02 RX ORDER — FENTANYL CITRATE 50 UG/ML
50 INJECTION, SOLUTION INTRAMUSCULAR; INTRAVENOUS EVERY 5 MIN PRN
Status: DISCONTINUED | OUTPATIENT
Start: 2019-12-02 | End: 2019-12-02

## 2019-12-02 RX ORDER — SODIUM CHLORIDE 9 MG/ML
INJECTION, SOLUTION INTRAVENOUS CONTINUOUS
Status: DISCONTINUED | OUTPATIENT
Start: 2019-12-02 | End: 2019-12-04

## 2019-12-02 RX ORDER — ONDANSETRON 2 MG/ML
4 INJECTION INTRAMUSCULAR; INTRAVENOUS
Status: DISCONTINUED | OUTPATIENT
Start: 2019-12-02 | End: 2019-12-02

## 2019-12-02 RX ADMIN — SODIUM CHLORIDE: 9 INJECTION, SOLUTION INTRAVENOUS at 14:08

## 2019-12-02 RX ADMIN — Medication 12.5 G: at 02:13

## 2019-12-02 RX ADMIN — PROPOFOL 150 MG: 10 INJECTION, EMULSION INTRAVENOUS at 14:09

## 2019-12-02 RX ADMIN — INSULIN GLARGINE 10 UNITS: 100 INJECTION, SOLUTION SUBCUTANEOUS at 09:25

## 2019-12-02 RX ADMIN — SODIUM CHLORIDE: 9 INJECTION, SOLUTION INTRAVENOUS at 09:23

## 2019-12-02 RX ADMIN — FENTANYL CITRATE 50 MCG: 50 INJECTION, SOLUTION INTRAMUSCULAR; INTRAVENOUS at 14:45

## 2019-12-02 RX ADMIN — LABETALOL HYDROCHLORIDE 5 MG: 5 INJECTION INTRAVENOUS at 15:16

## 2019-12-02 RX ADMIN — SODIUM CHLORIDE, PRESERVATIVE FREE 10 ML: 5 INJECTION INTRAVENOUS at 22:32

## 2019-12-02 RX ADMIN — Medication 12.5 G: at 06:00

## 2019-12-02 RX ADMIN — PROPOFOL 180 MCG/KG/MIN: 10 INJECTION, EMULSION INTRAVENOUS at 14:09

## 2019-12-02 RX ADMIN — HYDRALAZINE HYDROCHLORIDE 10 MG: 20 INJECTION INTRAMUSCULAR; INTRAVENOUS at 15:19

## 2019-12-02 RX ADMIN — HYDROMORPHONE HYDROCHLORIDE 0.5 MG: 1 INJECTION, SOLUTION INTRAMUSCULAR; INTRAVENOUS; SUBCUTANEOUS at 21:56

## 2019-12-02 RX ADMIN — GABAPENTIN 600 MG: 300 CAPSULE ORAL at 21:59

## 2019-12-02 RX ADMIN — CEFEPIME HYDROCHLORIDE 2 G: 2 INJECTION, POWDER, FOR SOLUTION INTRAVENOUS at 10:35

## 2019-12-02 RX ADMIN — ENOXAPARIN SODIUM 40 MG: 40 INJECTION SUBCUTANEOUS at 08:01

## 2019-12-02 RX ADMIN — BACLOFEN 10 MG: 10 TABLET ORAL at 08:04

## 2019-12-02 RX ADMIN — HYDROMORPHONE HYDROCHLORIDE 0.5 MG: 1 INJECTION, SOLUTION INTRAMUSCULAR; INTRAVENOUS; SUBCUTANEOUS at 10:31

## 2019-12-02 RX ADMIN — MUPIROCIN: 20 OINTMENT TOPICAL at 08:09

## 2019-12-02 RX ADMIN — PANTOPRAZOLE SODIUM 40 MG: 40 INJECTION, POWDER, FOR SOLUTION INTRAVENOUS at 22:00

## 2019-12-02 RX ADMIN — SODIUM CHLORIDE, PRESERVATIVE FREE 10 ML: 5 INJECTION INTRAVENOUS at 08:01

## 2019-12-02 RX ADMIN — PROMETHAZINE HYDROCHLORIDE 25 MG: 25 INJECTION INTRAMUSCULAR; INTRAVENOUS at 01:10

## 2019-12-02 RX ADMIN — HYDROMORPHONE HYDROCHLORIDE 0.5 MG: 1 INJECTION, SOLUTION INTRAMUSCULAR; INTRAVENOUS; SUBCUTANEOUS at 17:12

## 2019-12-02 RX ADMIN — Medication 1250 MG: at 22:23

## 2019-12-02 RX ADMIN — HYDROMORPHONE HYDROCHLORIDE 0.5 MG: 1 INJECTION, SOLUTION INTRAMUSCULAR; INTRAVENOUS; SUBCUTANEOUS at 01:29

## 2019-12-02 RX ADMIN — PANTOPRAZOLE SODIUM 40 MG: 40 INJECTION, POWDER, FOR SOLUTION INTRAVENOUS at 08:01

## 2019-12-02 RX ADMIN — SODIUM PHOSPHATE, MONOBASIC, MONOHYDRATE 15 MMOL: 276; 142 INJECTION, SOLUTION INTRAVENOUS at 09:22

## 2019-12-02 RX ADMIN — ONDANSETRON 4 MG: 2 INJECTION INTRAMUSCULAR; INTRAVENOUS at 14:43

## 2019-12-02 RX ADMIN — MUPIROCIN: 20 OINTMENT TOPICAL at 22:31

## 2019-12-02 RX ADMIN — GABAPENTIN 600 MG: 300 CAPSULE ORAL at 16:44

## 2019-12-02 RX ADMIN — ONDANSETRON 4 MG: 2 INJECTION INTRAMUSCULAR; INTRAVENOUS at 06:29

## 2019-12-02 RX ADMIN — Medication 200 MG: at 17:10

## 2019-12-02 RX ADMIN — INSULIN GLARGINE 10 UNITS: 100 INJECTION, SOLUTION SUBCUTANEOUS at 22:00

## 2019-12-02 RX ADMIN — HYDROMORPHONE HYDROCHLORIDE 0.5 MG: 1 INJECTION, SOLUTION INTRAMUSCULAR; INTRAVENOUS; SUBCUTANEOUS at 06:29

## 2019-12-02 RX ADMIN — INSULIN LISPRO 1 UNITS: 100 INJECTION, SOLUTION INTRAVENOUS; SUBCUTANEOUS at 13:13

## 2019-12-02 RX ADMIN — GABAPENTIN 600 MG: 300 CAPSULE ORAL at 08:04

## 2019-12-02 RX ADMIN — Medication 20 MEQ: at 08:05

## 2019-12-02 RX ADMIN — LABETALOL HYDROCHLORIDE 5 MG: 5 INJECTION, SOLUTION INTRAVENOUS at 15:16

## 2019-12-02 RX ADMIN — Medication 20 MEQ: at 07:02

## 2019-12-02 RX ADMIN — LIDOCAINE HYDROCHLORIDE 50 ML: 20 INJECTION, SOLUTION INFILTRATION; PERINEURAL at 14:08

## 2019-12-02 RX ADMIN — ONDANSETRON 4 MG: 2 INJECTION INTRAMUSCULAR; INTRAVENOUS at 17:02

## 2019-12-02 RX ADMIN — Medication 1250 MG: at 07:58

## 2019-12-02 ASSESSMENT — PAIN - FUNCTIONAL ASSESSMENT
PAIN_FUNCTIONAL_ASSESSMENT: PREVENTS OR INTERFERES SOME ACTIVE ACTIVITIES AND ADLS
PAIN_FUNCTIONAL_ASSESSMENT: PREVENTS OR INTERFERES SOME ACTIVE ACTIVITIES AND ADLS
PAIN_FUNCTIONAL_ASSESSMENT: ACTIVITIES ARE NOT PREVENTED
PAIN_FUNCTIONAL_ASSESSMENT: 0-10
PAIN_FUNCTIONAL_ASSESSMENT: ACTIVITIES ARE NOT PREVENTED
PAIN_FUNCTIONAL_ASSESSMENT: ACTIVITIES ARE NOT PREVENTED
PAIN_FUNCTIONAL_ASSESSMENT: PREVENTS OR INTERFERES SOME ACTIVE ACTIVITIES AND ADLS
PAIN_FUNCTIONAL_ASSESSMENT: PREVENTS OR INTERFERES WITH ALL ACTIVE AND SOME PASSIVE ACTIVITIES
PAIN_FUNCTIONAL_ASSESSMENT: ACTIVITIES ARE NOT PREVENTED

## 2019-12-02 ASSESSMENT — PAIN DESCRIPTION - FREQUENCY
FREQUENCY: CONTINUOUS

## 2019-12-02 ASSESSMENT — PAIN DESCRIPTION - DESCRIPTORS
DESCRIPTORS: ACHING
DESCRIPTORS: BURNING
DESCRIPTORS: ACHING;CRAMPING;DISCOMFORT
DESCRIPTORS: ACHING;CRAMPING;DISCOMFORT
DESCRIPTORS: BURNING
DESCRIPTORS: BURNING
DESCRIPTORS: ACHING;DISCOMFORT

## 2019-12-02 ASSESSMENT — PAIN DESCRIPTION - ORIENTATION
ORIENTATION: MID
ORIENTATION: MID
ORIENTATION: MID;LOWER
ORIENTATION: MID

## 2019-12-02 ASSESSMENT — PAIN DESCRIPTION - ONSET
ONSET: ON-GOING

## 2019-12-02 ASSESSMENT — PAIN DESCRIPTION - PAIN TYPE
TYPE: ACUTE PAIN

## 2019-12-02 ASSESSMENT — PAIN SCALES - GENERAL
PAINLEVEL_OUTOF10: 0
PAINLEVEL_OUTOF10: 4
PAINLEVEL_OUTOF10: 0
PAINLEVEL_OUTOF10: 2
PAINLEVEL_OUTOF10: 0
PAINLEVEL_OUTOF10: 8
PAINLEVEL_OUTOF10: 6
PAINLEVEL_OUTOF10: 10
PAINLEVEL_OUTOF10: 0
PAINLEVEL_OUTOF10: 6
PAINLEVEL_OUTOF10: 10
PAINLEVEL_OUTOF10: 10

## 2019-12-02 ASSESSMENT — PAIN DESCRIPTION - LOCATION
LOCATION: ABDOMEN
LOCATION: CHEST;ABDOMEN
LOCATION: ABDOMEN

## 2019-12-02 ASSESSMENT — PAIN DESCRIPTION - PROGRESSION
CLINICAL_PROGRESSION: GRADUALLY WORSENING
CLINICAL_PROGRESSION: NOT CHANGED
CLINICAL_PROGRESSION: GRADUALLY WORSENING

## 2019-12-02 ASSESSMENT — PULMONARY FUNCTION TESTS
PIF_VALUE: 0

## 2019-12-02 ASSESSMENT — PAIN DESCRIPTION - DIRECTION
RADIATING_TOWARDS: BAC
RADIATING_TOWARDS: BACK
RADIATING_TOWARDS: BACK

## 2019-12-02 ASSESSMENT — ENCOUNTER SYMPTOMS: SHORTNESS OF BREATH: 0

## 2019-12-03 LAB
ALBUMIN SERPL-MCNC: 3.7 G/DL (ref 3.4–5)
ANION GAP SERPL CALCULATED.3IONS-SCNC: 14 MMOL/L (ref 3–16)
BASOPHILS ABSOLUTE: 0 K/UL (ref 0–0.2)
BASOPHILS RELATIVE PERCENT: 0.4 %
BLOOD CULTURE, ROUTINE: NORMAL
BLOOD CULTURE, ROUTINE: NORMAL
BUN BLDV-MCNC: 8 MG/DL (ref 7–20)
CALCIUM SERPL-MCNC: 8.7 MG/DL (ref 8.3–10.6)
CHLORIDE BLD-SCNC: 97 MMOL/L (ref 99–110)
CO2: 26 MMOL/L (ref 21–32)
CREAT SERPL-MCNC: 0.8 MG/DL (ref 0.9–1.3)
EOSINOPHILS ABSOLUTE: 0.1 K/UL (ref 0–0.6)
EOSINOPHILS RELATIVE PERCENT: 1.1 %
GFR AFRICAN AMERICAN: >60
GFR NON-AFRICAN AMERICAN: >60
GLUCOSE BLD-MCNC: 136 MG/DL (ref 70–99)
GLUCOSE BLD-MCNC: 155 MG/DL (ref 70–99)
GLUCOSE BLD-MCNC: 165 MG/DL (ref 70–99)
GLUCOSE BLD-MCNC: 169 MG/DL (ref 70–99)
GLUCOSE BLD-MCNC: 171 MG/DL (ref 70–99)
GLUCOSE BLD-MCNC: 180 MG/DL (ref 70–99)
GLUCOSE BLD-MCNC: 197 MG/DL (ref 70–99)
GLUCOSE BLD-MCNC: 235 MG/DL (ref 70–99)
HCT VFR BLD CALC: 37.8 % (ref 40.5–52.5)
HEMOGLOBIN: 12.6 G/DL (ref 13.5–17.5)
LYMPHOCYTES ABSOLUTE: 2.1 K/UL (ref 1–5.1)
LYMPHOCYTES RELATIVE PERCENT: 19.7 %
MAGNESIUM: 2 MG/DL (ref 1.8–2.4)
MCH RBC QN AUTO: 28.8 PG (ref 26–34)
MCHC RBC AUTO-ENTMCNC: 33.4 G/DL (ref 31–36)
MCV RBC AUTO: 86.1 FL (ref 80–100)
MONOCYTES ABSOLUTE: 0.8 K/UL (ref 0–1.3)
MONOCYTES RELATIVE PERCENT: 7.2 %
NEUTROPHILS ABSOLUTE: 7.6 K/UL (ref 1.7–7.7)
NEUTROPHILS RELATIVE PERCENT: 71.6 %
PDW BLD-RTO: 14.2 % (ref 12.4–15.4)
PERFORMED ON: ABNORMAL
PHOSPHORUS: 2.5 MG/DL (ref 2.5–4.9)
PLATELET # BLD: 201 K/UL (ref 135–450)
PMV BLD AUTO: 8.1 FL (ref 5–10.5)
POTASSIUM SERPL-SCNC: 3.2 MMOL/L (ref 3.5–5.1)
RBC # BLD: 4.39 M/UL (ref 4.2–5.9)
SODIUM BLD-SCNC: 137 MMOL/L (ref 136–145)
VANCOMYCIN TROUGH: 8.5 UG/ML (ref 10–20)
WBC # BLD: 10.6 K/UL (ref 4–11)

## 2019-12-03 PROCEDURE — 6370000000 HC RX 637 (ALT 250 FOR IP): Performed by: PHYSICIAN ASSISTANT

## 2019-12-03 PROCEDURE — 6370000000 HC RX 637 (ALT 250 FOR IP): Performed by: INTERNAL MEDICINE

## 2019-12-03 PROCEDURE — 2580000003 HC RX 258: Performed by: INTERNAL MEDICINE

## 2019-12-03 PROCEDURE — 85025 COMPLETE CBC W/AUTO DIFF WBC: CPT

## 2019-12-03 PROCEDURE — 80069 RENAL FUNCTION PANEL: CPT

## 2019-12-03 PROCEDURE — 6360000002 HC RX W HCPCS: Performed by: INTERNAL MEDICINE

## 2019-12-03 PROCEDURE — 36592 COLLECT BLOOD FROM PICC: CPT

## 2019-12-03 PROCEDURE — C9113 INJ PANTOPRAZOLE SODIUM, VIA: HCPCS | Performed by: INTERNAL MEDICINE

## 2019-12-03 PROCEDURE — 80202 ASSAY OF VANCOMYCIN: CPT

## 2019-12-03 PROCEDURE — 83735 ASSAY OF MAGNESIUM: CPT

## 2019-12-03 PROCEDURE — 6360000002 HC RX W HCPCS: Performed by: NURSE PRACTITIONER

## 2019-12-03 PROCEDURE — 1200000000 HC SEMI PRIVATE

## 2019-12-03 PROCEDURE — 6370000000 HC RX 637 (ALT 250 FOR IP): Performed by: NURSE PRACTITIONER

## 2019-12-03 RX ORDER — DIPHENHYDRAMINE HCL 25 MG
25 TABLET ORAL EVERY 6 HOURS PRN
Status: DISCONTINUED | OUTPATIENT
Start: 2019-12-03 | End: 2019-12-05 | Stop reason: HOSPADM

## 2019-12-03 RX ORDER — SODIUM CHLORIDE 0.9 % (FLUSH) 0.9 %
10 SYRINGE (ML) INJECTION 2 TIMES DAILY
Status: DISCONTINUED | OUTPATIENT
Start: 2019-12-03 | End: 2019-12-05 | Stop reason: HOSPADM

## 2019-12-03 RX ORDER — SODIUM CHLORIDE 0.9 % (FLUSH) 0.9 %
10 SYRINGE (ML) INJECTION PRN
Status: DISCONTINUED | OUTPATIENT
Start: 2019-12-03 | End: 2019-12-05 | Stop reason: HOSPADM

## 2019-12-03 RX ORDER — LIDOCAINE HYDROCHLORIDE 20 MG/ML
15 SOLUTION OROPHARYNGEAL
Status: DISCONTINUED | OUTPATIENT
Start: 2019-12-03 | End: 2019-12-05 | Stop reason: HOSPADM

## 2019-12-03 RX ORDER — HYDRALAZINE HYDROCHLORIDE 20 MG/ML
10 INJECTION INTRAMUSCULAR; INTRAVENOUS EVERY 6 HOURS PRN
Status: DISCONTINUED | OUTPATIENT
Start: 2019-12-03 | End: 2019-12-05 | Stop reason: HOSPADM

## 2019-12-03 RX ADMIN — VANCOMYCIN HYDROCHLORIDE 1000 MG: 1 INJECTION, POWDER, LYOPHILIZED, FOR SOLUTION INTRAVENOUS at 22:24

## 2019-12-03 RX ADMIN — SODIUM CHLORIDE: 9 INJECTION, SOLUTION INTRAVENOUS at 02:05

## 2019-12-03 RX ADMIN — PANTOPRAZOLE SODIUM 40 MG: 40 INJECTION, POWDER, FOR SOLUTION INTRAVENOUS at 08:02

## 2019-12-03 RX ADMIN — Medication 20 MEQ: at 09:37

## 2019-12-03 RX ADMIN — HYDROMORPHONE HYDROCHLORIDE 0.5 MG: 1 INJECTION, SOLUTION INTRAMUSCULAR; INTRAVENOUS; SUBCUTANEOUS at 09:36

## 2019-12-03 RX ADMIN — INSULIN LISPRO 1 UNITS: 100 INJECTION, SOLUTION INTRAVENOUS; SUBCUTANEOUS at 22:24

## 2019-12-03 RX ADMIN — HYDROMORPHONE HYDROCHLORIDE 0.5 MG: 1 INJECTION, SOLUTION INTRAMUSCULAR; INTRAVENOUS; SUBCUTANEOUS at 06:00

## 2019-12-03 RX ADMIN — ONDANSETRON 4 MG: 2 INJECTION INTRAMUSCULAR; INTRAVENOUS at 06:11

## 2019-12-03 RX ADMIN — MUPIROCIN: 20 OINTMENT TOPICAL at 08:05

## 2019-12-03 RX ADMIN — HYDROMORPHONE HYDROCHLORIDE 0.5 MG: 1 INJECTION, SOLUTION INTRAMUSCULAR; INTRAVENOUS; SUBCUTANEOUS at 15:36

## 2019-12-03 RX ADMIN — MUPIROCIN: 20 OINTMENT TOPICAL at 22:32

## 2019-12-03 RX ADMIN — ONDANSETRON 4 MG: 2 INJECTION INTRAMUSCULAR; INTRAVENOUS at 20:53

## 2019-12-03 RX ADMIN — HYDROMORPHONE HYDROCHLORIDE 0.5 MG: 1 INJECTION, SOLUTION INTRAMUSCULAR; INTRAVENOUS; SUBCUTANEOUS at 20:53

## 2019-12-03 RX ADMIN — Medication 10 ML: at 22:28

## 2019-12-03 RX ADMIN — INSULIN GLARGINE 10 UNITS: 100 INJECTION, SOLUTION SUBCUTANEOUS at 22:25

## 2019-12-03 RX ADMIN — HYDROMORPHONE HYDROCHLORIDE 0.5 MG: 1 INJECTION, SOLUTION INTRAMUSCULAR; INTRAVENOUS; SUBCUTANEOUS at 17:47

## 2019-12-03 RX ADMIN — CEFEPIME HYDROCHLORIDE 2 G: 2 INJECTION, POWDER, FOR SOLUTION INTRAVENOUS at 09:45

## 2019-12-03 RX ADMIN — DIPHENHYDRAMINE HCL 25 MG: 25 TABLET ORAL at 22:27

## 2019-12-03 RX ADMIN — LIDOCAINE HYDROCHLORIDE 15 ML: 20 SOLUTION ORAL; TOPICAL at 09:37

## 2019-12-03 RX ADMIN — LIDOCAINE HYDROCHLORIDE 15 ML: 20 SOLUTION ORAL; TOPICAL at 16:11

## 2019-12-03 RX ADMIN — ENOXAPARIN SODIUM 40 MG: 40 INJECTION SUBCUTANEOUS at 08:02

## 2019-12-03 RX ADMIN — HYDROMORPHONE HYDROCHLORIDE 0.5 MG: 1 INJECTION, SOLUTION INTRAMUSCULAR; INTRAVENOUS; SUBCUTANEOUS at 02:03

## 2019-12-03 RX ADMIN — SODIUM CHLORIDE, PRESERVATIVE FREE 10 ML: 5 INJECTION INTRAVENOUS at 20:53

## 2019-12-03 RX ADMIN — INSULIN LISPRO 1 UNITS: 100 INJECTION, SOLUTION INTRAVENOUS; SUBCUTANEOUS at 08:02

## 2019-12-03 RX ADMIN — SODIUM CHLORIDE, PRESERVATIVE FREE 10 ML: 5 INJECTION INTRAVENOUS at 08:11

## 2019-12-03 RX ADMIN — Medication 1250 MG: at 08:02

## 2019-12-03 RX ADMIN — INSULIN LISPRO 1 UNITS: 100 INJECTION, SOLUTION INTRAVENOUS; SUBCUTANEOUS at 06:01

## 2019-12-03 RX ADMIN — ONDANSETRON 4 MG: 2 INJECTION INTRAMUSCULAR; INTRAVENOUS at 13:25

## 2019-12-03 RX ADMIN — PROMETHAZINE HYDROCHLORIDE 25 MG: 25 INJECTION INTRAMUSCULAR; INTRAVENOUS at 04:17

## 2019-12-03 RX ADMIN — PANTOPRAZOLE SODIUM 40 MG: 40 INJECTION, POWDER, FOR SOLUTION INTRAVENOUS at 22:24

## 2019-12-03 RX ADMIN — GABAPENTIN 600 MG: 300 CAPSULE ORAL at 22:24

## 2019-12-03 RX ADMIN — CEFEPIME HYDROCHLORIDE 2 G: 2 INJECTION, POWDER, FOR SOLUTION INTRAVENOUS at 00:27

## 2019-12-03 RX ADMIN — INSULIN GLARGINE 10 UNITS: 100 INJECTION, SOLUTION SUBCUTANEOUS at 08:03

## 2019-12-03 RX ADMIN — LIDOCAINE HYDROCHLORIDE 15 ML: 20 SOLUTION ORAL; TOPICAL at 22:35

## 2019-12-03 RX ADMIN — LIDOCAINE HYDROCHLORIDE 15 ML: 20 SOLUTION ORAL; TOPICAL at 19:47

## 2019-12-03 RX ADMIN — INSULIN LISPRO 2 UNITS: 100 INJECTION, SOLUTION INTRAVENOUS; SUBCUTANEOUS at 02:03

## 2019-12-03 RX ADMIN — Medication 20 MEQ: at 11:41

## 2019-12-03 ASSESSMENT — PAIN SCALES - GENERAL
PAINLEVEL_OUTOF10: 10
PAINLEVEL_OUTOF10: 0
PAINLEVEL_OUTOF10: 0
PAINLEVEL_OUTOF10: 2
PAINLEVEL_OUTOF10: 10
PAINLEVEL_OUTOF10: 0
PAINLEVEL_OUTOF10: 10
PAINLEVEL_OUTOF10: 0
PAINLEVEL_OUTOF10: 10
PAINLEVEL_OUTOF10: 7
PAINLEVEL_OUTOF10: 3
PAINLEVEL_OUTOF10: 9
PAINLEVEL_OUTOF10: 9
PAINLEVEL_OUTOF10: 5

## 2019-12-03 ASSESSMENT — PAIN DESCRIPTION - ORIENTATION
ORIENTATION: MID;UPPER
ORIENTATION: MID
ORIENTATION: MID;UPPER
ORIENTATION: MID

## 2019-12-03 ASSESSMENT — PAIN DESCRIPTION - ONSET
ONSET: ON-GOING

## 2019-12-03 ASSESSMENT — PAIN DESCRIPTION - LOCATION
LOCATION: ABDOMEN

## 2019-12-03 ASSESSMENT — PAIN DESCRIPTION - PROGRESSION
CLINICAL_PROGRESSION: NOT CHANGED
CLINICAL_PROGRESSION: GRADUALLY WORSENING
CLINICAL_PROGRESSION: NOT CHANGED

## 2019-12-03 ASSESSMENT — PAIN DESCRIPTION - PAIN TYPE
TYPE: ACUTE PAIN

## 2019-12-03 ASSESSMENT — PAIN DESCRIPTION - DIRECTION
RADIATING_TOWARDS: BACK

## 2019-12-03 ASSESSMENT — PAIN DESCRIPTION - DESCRIPTORS
DESCRIPTORS: BURNING
DESCRIPTORS: BURNING;STABBING

## 2019-12-03 ASSESSMENT — PAIN DESCRIPTION - FREQUENCY
FREQUENCY: CONTINUOUS

## 2019-12-04 LAB
ALBUMIN SERPL-MCNC: 3.6 G/DL (ref 3.4–5)
ANION GAP SERPL CALCULATED.3IONS-SCNC: 13 MMOL/L (ref 3–16)
BASOPHILS ABSOLUTE: 0.1 K/UL (ref 0–0.2)
BASOPHILS RELATIVE PERCENT: 0.8 %
BUN BLDV-MCNC: 8 MG/DL (ref 7–20)
CALCIUM SERPL-MCNC: 8.8 MG/DL (ref 8.3–10.6)
CHLORIDE BLD-SCNC: 98 MMOL/L (ref 99–110)
CO2: 27 MMOL/L (ref 21–32)
CREAT SERPL-MCNC: 0.9 MG/DL (ref 0.9–1.3)
EOSINOPHILS ABSOLUTE: 0.4 K/UL (ref 0–0.6)
EOSINOPHILS RELATIVE PERCENT: 3.3 %
GFR AFRICAN AMERICAN: >60
GFR NON-AFRICAN AMERICAN: >60
GLUCOSE BLD-MCNC: 170 MG/DL (ref 70–99)
GLUCOSE BLD-MCNC: 196 MG/DL (ref 70–99)
GLUCOSE BLD-MCNC: 207 MG/DL (ref 70–99)
GLUCOSE BLD-MCNC: 224 MG/DL (ref 70–99)
GLUCOSE BLD-MCNC: 309 MG/DL (ref 70–99)
GLUCOSE BLD-MCNC: 313 MG/DL (ref 70–99)
HCT VFR BLD CALC: 38.5 % (ref 40.5–52.5)
HEMOGLOBIN: 12.7 G/DL (ref 13.5–17.5)
LYMPHOCYTES ABSOLUTE: 2.5 K/UL (ref 1–5.1)
LYMPHOCYTES RELATIVE PERCENT: 22.2 %
MAGNESIUM: 1.8 MG/DL (ref 1.8–2.4)
MCH RBC QN AUTO: 28.6 PG (ref 26–34)
MCHC RBC AUTO-ENTMCNC: 32.9 G/DL (ref 31–36)
MCV RBC AUTO: 86.7 FL (ref 80–100)
MONOCYTES ABSOLUTE: 0.9 K/UL (ref 0–1.3)
MONOCYTES RELATIVE PERCENT: 7.8 %
NEUTROPHILS ABSOLUTE: 7.5 K/UL (ref 1.7–7.7)
NEUTROPHILS RELATIVE PERCENT: 65.9 %
PDW BLD-RTO: 14.1 % (ref 12.4–15.4)
PERFORMED ON: ABNORMAL
PHOSPHORUS: 2.5 MG/DL (ref 2.5–4.9)
PLATELET # BLD: 247 K/UL (ref 135–450)
PMV BLD AUTO: 8.1 FL (ref 5–10.5)
POTASSIUM SERPL-SCNC: 3.7 MMOL/L (ref 3.5–5.1)
PROCALCITONIN: 0.17 NG/ML (ref 0–0.15)
RBC # BLD: 4.44 M/UL (ref 4.2–5.9)
SODIUM BLD-SCNC: 138 MMOL/L (ref 136–145)
VANCOMYCIN TROUGH: 19.2 UG/ML (ref 10–20)
WBC # BLD: 11.4 K/UL (ref 4–11)

## 2019-12-04 PROCEDURE — 6370000000 HC RX 637 (ALT 250 FOR IP): Performed by: INTERNAL MEDICINE

## 2019-12-04 PROCEDURE — 2580000003 HC RX 258: Performed by: INTERNAL MEDICINE

## 2019-12-04 PROCEDURE — 6360000002 HC RX W HCPCS: Performed by: INTERNAL MEDICINE

## 2019-12-04 PROCEDURE — 36592 COLLECT BLOOD FROM PICC: CPT

## 2019-12-04 PROCEDURE — 83735 ASSAY OF MAGNESIUM: CPT

## 2019-12-04 PROCEDURE — 84145 PROCALCITONIN (PCT): CPT

## 2019-12-04 PROCEDURE — 6360000002 HC RX W HCPCS: Performed by: NURSE PRACTITIONER

## 2019-12-04 PROCEDURE — 6370000000 HC RX 637 (ALT 250 FOR IP): Performed by: PHYSICIAN ASSISTANT

## 2019-12-04 PROCEDURE — 80202 ASSAY OF VANCOMYCIN: CPT

## 2019-12-04 PROCEDURE — 1200000000 HC SEMI PRIVATE

## 2019-12-04 PROCEDURE — 80069 RENAL FUNCTION PANEL: CPT

## 2019-12-04 PROCEDURE — 85025 COMPLETE CBC W/AUTO DIFF WBC: CPT

## 2019-12-04 PROCEDURE — C9113 INJ PANTOPRAZOLE SODIUM, VIA: HCPCS | Performed by: INTERNAL MEDICINE

## 2019-12-04 PROCEDURE — 36415 COLL VENOUS BLD VENIPUNCTURE: CPT

## 2019-12-04 RX ORDER — METOCLOPRAMIDE HYDROCHLORIDE 5 MG/ML
10 INJECTION INTRAMUSCULAR; INTRAVENOUS
Status: DISCONTINUED | OUTPATIENT
Start: 2019-12-04 | End: 2019-12-05 | Stop reason: HOSPADM

## 2019-12-04 RX ADMIN — CEFEPIME HYDROCHLORIDE 2 G: 2 INJECTION, POWDER, FOR SOLUTION INTRAVENOUS at 00:02

## 2019-12-04 RX ADMIN — INSULIN GLARGINE 10 UNITS: 100 INJECTION, SOLUTION SUBCUTANEOUS at 08:34

## 2019-12-04 RX ADMIN — HYDROMORPHONE HYDROCHLORIDE 0.5 MG: 1 INJECTION, SOLUTION INTRAMUSCULAR; INTRAVENOUS; SUBCUTANEOUS at 17:03

## 2019-12-04 RX ADMIN — Medication 10 ML: at 08:41

## 2019-12-04 RX ADMIN — INSULIN GLARGINE 10 UNITS: 100 INJECTION, SOLUTION SUBCUTANEOUS at 22:03

## 2019-12-04 RX ADMIN — VANCOMYCIN HYDROCHLORIDE 1000 MG: 1 INJECTION, POWDER, LYOPHILIZED, FOR SOLUTION INTRAVENOUS at 06:42

## 2019-12-04 RX ADMIN — INSULIN LISPRO 2 UNITS: 100 INJECTION, SOLUTION INTRAVENOUS; SUBCUTANEOUS at 12:09

## 2019-12-04 RX ADMIN — PANTOPRAZOLE SODIUM 40 MG: 40 INJECTION, POWDER, FOR SOLUTION INTRAVENOUS at 08:34

## 2019-12-04 RX ADMIN — GABAPENTIN 600 MG: 300 CAPSULE ORAL at 22:03

## 2019-12-04 RX ADMIN — SODIUM CHLORIDE, PRESERVATIVE FREE 10 ML: 5 INJECTION INTRAVENOUS at 08:41

## 2019-12-04 RX ADMIN — HYDROMORPHONE HYDROCHLORIDE 0.5 MG: 1 INJECTION, SOLUTION INTRAMUSCULAR; INTRAVENOUS; SUBCUTANEOUS at 02:22

## 2019-12-04 RX ADMIN — HYDROMORPHONE HYDROCHLORIDE 0.5 MG: 1 INJECTION, SOLUTION INTRAMUSCULAR; INTRAVENOUS; SUBCUTANEOUS at 22:03

## 2019-12-04 RX ADMIN — INSULIN LISPRO 4 UNITS: 100 INJECTION, SOLUTION INTRAVENOUS; SUBCUTANEOUS at 22:03

## 2019-12-04 RX ADMIN — CEFEPIME HYDROCHLORIDE 2 G: 2 INJECTION, POWDER, FOR SOLUTION INTRAVENOUS at 10:41

## 2019-12-04 RX ADMIN — METOCLOPRAMIDE 10 MG: 5 INJECTION, SOLUTION INTRAMUSCULAR; INTRAVENOUS at 22:02

## 2019-12-04 RX ADMIN — VANCOMYCIN HYDROCHLORIDE 1000 MG: 1 INJECTION, POWDER, LYOPHILIZED, FOR SOLUTION INTRAVENOUS at 13:27

## 2019-12-04 RX ADMIN — HYDROMORPHONE HYDROCHLORIDE 0.5 MG: 1 INJECTION, SOLUTION INTRAMUSCULAR; INTRAVENOUS; SUBCUTANEOUS at 06:41

## 2019-12-04 RX ADMIN — INSULIN LISPRO 1 UNITS: 100 INJECTION, SOLUTION INTRAVENOUS; SUBCUTANEOUS at 00:08

## 2019-12-04 RX ADMIN — PANTOPRAZOLE SODIUM 40 MG: 40 INJECTION, POWDER, FOR SOLUTION INTRAVENOUS at 22:01

## 2019-12-04 RX ADMIN — ENOXAPARIN SODIUM 40 MG: 40 INJECTION SUBCUTANEOUS at 08:34

## 2019-12-04 RX ADMIN — LIDOCAINE HYDROCHLORIDE 15 ML: 20 SOLUTION ORAL; TOPICAL at 22:11

## 2019-12-04 RX ADMIN — CEFEPIME HYDROCHLORIDE 2 G: 2 INJECTION, POWDER, FOR SOLUTION INTRAVENOUS at 22:04

## 2019-12-04 RX ADMIN — LIDOCAINE HYDROCHLORIDE 15 ML: 20 SOLUTION ORAL; TOPICAL at 12:09

## 2019-12-04 RX ADMIN — Medication 10 ML: at 20:15

## 2019-12-04 RX ADMIN — METOCLOPRAMIDE 10 MG: 5 INJECTION, SOLUTION INTRAMUSCULAR; INTRAVENOUS at 17:04

## 2019-12-04 RX ADMIN — INSULIN LISPRO 2 UNITS: 100 INJECTION, SOLUTION INTRAVENOUS; SUBCUTANEOUS at 17:04

## 2019-12-04 RX ADMIN — SODIUM CHLORIDE, PRESERVATIVE FREE 10 ML: 5 INJECTION INTRAVENOUS at 19:50

## 2019-12-04 RX ADMIN — INSULIN LISPRO 4 UNITS: 100 INJECTION, SOLUTION INTRAVENOUS; SUBCUTANEOUS at 08:34

## 2019-12-04 RX ADMIN — LIDOCAINE HYDROCHLORIDE 15 ML: 20 SOLUTION ORAL; TOPICAL at 17:03

## 2019-12-04 RX ADMIN — HYDROMORPHONE HYDROCHLORIDE 0.5 MG: 1 INJECTION, SOLUTION INTRAMUSCULAR; INTRAVENOUS; SUBCUTANEOUS at 11:17

## 2019-12-04 RX ADMIN — GABAPENTIN 600 MG: 300 CAPSULE ORAL at 13:27

## 2019-12-04 RX ADMIN — LIDOCAINE HYDROCHLORIDE 15 ML: 20 SOLUTION ORAL; TOPICAL at 05:09

## 2019-12-04 RX ADMIN — VANCOMYCIN HYDROCHLORIDE 1000 MG: 1 INJECTION, POWDER, LYOPHILIZED, FOR SOLUTION INTRAVENOUS at 22:55

## 2019-12-04 RX ADMIN — ONDANSETRON 4 MG: 2 INJECTION INTRAMUSCULAR; INTRAVENOUS at 19:50

## 2019-12-04 RX ADMIN — GABAPENTIN 600 MG: 300 CAPSULE ORAL at 08:34

## 2019-12-04 RX ADMIN — LIDOCAINE HYDROCHLORIDE 15 ML: 20 SOLUTION ORAL; TOPICAL at 08:33

## 2019-12-04 ASSESSMENT — PAIN SCALES - GENERAL
PAINLEVEL_OUTOF10: 3
PAINLEVEL_OUTOF10: 0
PAINLEVEL_OUTOF10: 6
PAINLEVEL_OUTOF10: 7
PAINLEVEL_OUTOF10: 7
PAINLEVEL_OUTOF10: 8
PAINLEVEL_OUTOF10: 5
PAINLEVEL_OUTOF10: 4
PAINLEVEL_OUTOF10: 3
PAINLEVEL_OUTOF10: 0
PAINLEVEL_OUTOF10: 10
PAINLEVEL_OUTOF10: 6

## 2019-12-04 ASSESSMENT — PAIN DESCRIPTION - LOCATION
LOCATION: ABDOMEN

## 2019-12-04 ASSESSMENT — PAIN DESCRIPTION - FREQUENCY
FREQUENCY: CONTINUOUS
FREQUENCY: INTERMITTENT

## 2019-12-04 ASSESSMENT — PAIN DESCRIPTION - PAIN TYPE
TYPE: ACUTE PAIN

## 2019-12-04 ASSESSMENT — PAIN DESCRIPTION - ORIENTATION
ORIENTATION: UPPER
ORIENTATION: MID;UPPER
ORIENTATION: UPPER
ORIENTATION: MID;UPPER
ORIENTATION: UPPER

## 2019-12-04 ASSESSMENT — PAIN DESCRIPTION - PROGRESSION
CLINICAL_PROGRESSION: NOT CHANGED
CLINICAL_PROGRESSION: NOT CHANGED
CLINICAL_PROGRESSION: GRADUALLY IMPROVING
CLINICAL_PROGRESSION: GRADUALLY IMPROVING

## 2019-12-04 ASSESSMENT — PAIN DESCRIPTION - DESCRIPTORS
DESCRIPTORS: BURNING;STABBING
DESCRIPTORS: BURNING
DESCRIPTORS: BURNING;STABBING

## 2019-12-04 ASSESSMENT — PAIN DESCRIPTION - ONSET
ONSET: ON-GOING
ONSET: GRADUAL
ONSET: ON-GOING

## 2019-12-04 ASSESSMENT — PAIN - FUNCTIONAL ASSESSMENT
PAIN_FUNCTIONAL_ASSESSMENT: ACTIVITIES ARE NOT PREVENTED

## 2019-12-05 VITALS
DIASTOLIC BLOOD PRESSURE: 93 MMHG | HEIGHT: 73 IN | OXYGEN SATURATION: 97 % | TEMPERATURE: 99 F | WEIGHT: 159.83 LBS | RESPIRATION RATE: 16 BRPM | SYSTOLIC BLOOD PRESSURE: 167 MMHG | HEART RATE: 88 BPM | BODY MASS INDEX: 21.18 KG/M2

## 2019-12-05 PROBLEM — L97.509 DIABETIC FOOT ULCER (HCC): Status: ACTIVE | Noted: 2019-05-04

## 2019-12-05 PROBLEM — R01.1 HEART MURMUR: Status: ACTIVE | Noted: 2019-08-29

## 2019-12-05 PROBLEM — E11.621 DIABETIC FOOT ULCER (HCC): Status: ACTIVE | Noted: 2019-05-04

## 2019-12-05 LAB
ALBUMIN SERPL-MCNC: 4.2 G/DL (ref 3.4–5)
ANION GAP SERPL CALCULATED.3IONS-SCNC: 17 MMOL/L (ref 3–16)
BASOPHILS ABSOLUTE: 0 K/UL (ref 0–0.2)
BASOPHILS RELATIVE PERCENT: 0.4 %
BUN BLDV-MCNC: 11 MG/DL (ref 7–20)
CALCIUM SERPL-MCNC: 9.4 MG/DL (ref 8.3–10.6)
CHLORIDE BLD-SCNC: 95 MMOL/L (ref 99–110)
CO2: 26 MMOL/L (ref 21–32)
CREAT SERPL-MCNC: 1 MG/DL (ref 0.9–1.3)
EOSINOPHILS ABSOLUTE: 0.2 K/UL (ref 0–0.6)
EOSINOPHILS RELATIVE PERCENT: 2.7 %
GFR AFRICAN AMERICAN: >60
GFR NON-AFRICAN AMERICAN: >60
GLUCOSE BLD-MCNC: 266 MG/DL (ref 70–99)
GLUCOSE BLD-MCNC: 269 MG/DL (ref 70–99)
GLUCOSE BLD-MCNC: 64 MG/DL (ref 70–99)
GLUCOSE BLD-MCNC: 80 MG/DL (ref 70–99)
HCT VFR BLD CALC: 38 % (ref 40.5–52.5)
HEMOGLOBIN: 12.6 G/DL (ref 13.5–17.5)
LYMPHOCYTES ABSOLUTE: 1.9 K/UL (ref 1–5.1)
LYMPHOCYTES RELATIVE PERCENT: 25.1 %
MAGNESIUM: 1.9 MG/DL (ref 1.8–2.4)
MCH RBC QN AUTO: 28.7 PG (ref 26–34)
MCHC RBC AUTO-ENTMCNC: 33.2 G/DL (ref 31–36)
MCV RBC AUTO: 86.4 FL (ref 80–100)
MONOCYTES ABSOLUTE: 0.8 K/UL (ref 0–1.3)
MONOCYTES RELATIVE PERCENT: 10.4 %
NEUTROPHILS ABSOLUTE: 4.8 K/UL (ref 1.7–7.7)
NEUTROPHILS RELATIVE PERCENT: 61.4 %
PDW BLD-RTO: 14.1 % (ref 12.4–15.4)
PERFORMED ON: ABNORMAL
PERFORMED ON: ABNORMAL
PERFORMED ON: NORMAL
PHOSPHORUS: 3.3 MG/DL (ref 2.5–4.9)
PLATELET # BLD: 272 K/UL (ref 135–450)
PMV BLD AUTO: 7.6 FL (ref 5–10.5)
POTASSIUM SERPL-SCNC: 3.4 MMOL/L (ref 3.5–5.1)
RBC # BLD: 4.4 M/UL (ref 4.2–5.9)
SODIUM BLD-SCNC: 138 MMOL/L (ref 136–145)
VANCOMYCIN TROUGH: 13.9 UG/ML (ref 10–20)
WBC # BLD: 7.8 K/UL (ref 4–11)

## 2019-12-05 PROCEDURE — C9113 INJ PANTOPRAZOLE SODIUM, VIA: HCPCS | Performed by: INTERNAL MEDICINE

## 2019-12-05 PROCEDURE — 6370000000 HC RX 637 (ALT 250 FOR IP): Performed by: INTERNAL MEDICINE

## 2019-12-05 PROCEDURE — 6360000002 HC RX W HCPCS: Performed by: INTERNAL MEDICINE

## 2019-12-05 PROCEDURE — 6370000000 HC RX 637 (ALT 250 FOR IP): Performed by: PHYSICIAN ASSISTANT

## 2019-12-05 PROCEDURE — 6360000002 HC RX W HCPCS: Performed by: NURSE PRACTITIONER

## 2019-12-05 PROCEDURE — 80202 ASSAY OF VANCOMYCIN: CPT

## 2019-12-05 PROCEDURE — 85025 COMPLETE CBC W/AUTO DIFF WBC: CPT

## 2019-12-05 PROCEDURE — 2580000003 HC RX 258: Performed by: INTERNAL MEDICINE

## 2019-12-05 PROCEDURE — 36415 COLL VENOUS BLD VENIPUNCTURE: CPT

## 2019-12-05 PROCEDURE — 83735 ASSAY OF MAGNESIUM: CPT

## 2019-12-05 PROCEDURE — 6370000000 HC RX 637 (ALT 250 FOR IP): Performed by: NURSE PRACTITIONER

## 2019-12-05 PROCEDURE — 80069 RENAL FUNCTION PANEL: CPT

## 2019-12-05 RX ORDER — LISINOPRIL 5 MG/1
5 TABLET ORAL DAILY
Qty: 30 TABLET | Refills: 0 | Status: ON HOLD | OUTPATIENT
Start: 2019-12-05 | End: 2020-01-02 | Stop reason: SDUPTHER

## 2019-12-05 RX ORDER — PANTOPRAZOLE SODIUM 40 MG/1
40 TABLET, DELAYED RELEASE ORAL
Qty: 60 TABLET | Refills: 1 | Status: ON HOLD | OUTPATIENT
Start: 2019-12-05 | End: 2020-01-02 | Stop reason: SDUPTHER

## 2019-12-05 RX ORDER — LISINOPRIL 5 MG/1
5 TABLET ORAL DAILY
Status: DISCONTINUED | OUTPATIENT
Start: 2019-12-05 | End: 2019-12-05 | Stop reason: HOSPADM

## 2019-12-05 RX ORDER — METOCLOPRAMIDE 10 MG/1
10 TABLET ORAL 4 TIMES DAILY
Qty: 120 TABLET | Refills: 0 | Status: ON HOLD | OUTPATIENT
Start: 2019-12-05 | End: 2020-01-02 | Stop reason: SDUPTHER

## 2019-12-05 RX ORDER — LIDOCAINE HYDROCHLORIDE 20 MG/ML
15 SOLUTION OROPHARYNGEAL
Qty: 100 ML | Refills: 1 | Status: ON HOLD | OUTPATIENT
Start: 2019-12-05 | End: 2019-12-30

## 2019-12-05 RX ORDER — POTASSIUM CHLORIDE 20 MEQ/1
20 TABLET, EXTENDED RELEASE ORAL
Status: DISCONTINUED | OUTPATIENT
Start: 2019-12-05 | End: 2019-12-05 | Stop reason: HOSPADM

## 2019-12-05 RX ORDER — LIDOCAINE HYDROCHLORIDE 20 MG/ML
15 SOLUTION OROPHARYNGEAL ONCE
Status: COMPLETED | OUTPATIENT
Start: 2019-12-05 | End: 2019-12-05

## 2019-12-05 RX ADMIN — INSULIN GLARGINE 10 UNITS: 100 INJECTION, SOLUTION SUBCUTANEOUS at 08:09

## 2019-12-05 RX ADMIN — PANTOPRAZOLE SODIUM 40 MG: 40 INJECTION, POWDER, FOR SOLUTION INTRAVENOUS at 08:08

## 2019-12-05 RX ADMIN — SODIUM CHLORIDE, PRESERVATIVE FREE 10 ML: 5 INJECTION INTRAVENOUS at 08:11

## 2019-12-05 RX ADMIN — LIDOCAINE HYDROCHLORIDE 15 ML: 20 SOLUTION ORAL; TOPICAL at 04:09

## 2019-12-05 RX ADMIN — Medication 10 ML: at 08:11

## 2019-12-05 RX ADMIN — ENOXAPARIN SODIUM 40 MG: 40 INJECTION SUBCUTANEOUS at 08:10

## 2019-12-05 RX ADMIN — Medication 10 ML: at 06:33

## 2019-12-05 RX ADMIN — INSULIN LISPRO 5 UNITS: 100 INJECTION, SOLUTION INTRAVENOUS; SUBCUTANEOUS at 08:09

## 2019-12-05 RX ADMIN — GABAPENTIN 600 MG: 300 CAPSULE ORAL at 08:35

## 2019-12-05 RX ADMIN — POTASSIUM CHLORIDE 20 MEQ: 1500 TABLET, EXTENDED RELEASE ORAL at 11:19

## 2019-12-05 RX ADMIN — LIDOCAINE HYDROCHLORIDE 15 ML: 20 SOLUTION ORAL; TOPICAL at 06:32

## 2019-12-05 RX ADMIN — HYDROMORPHONE HYDROCHLORIDE 0.5 MG: 1 INJECTION, SOLUTION INTRAMUSCULAR; INTRAVENOUS; SUBCUTANEOUS at 04:07

## 2019-12-05 RX ADMIN — LIDOCAINE HYDROCHLORIDE 15 ML: 20 SOLUTION ORAL; TOPICAL at 08:35

## 2019-12-05 RX ADMIN — Medication 10 ML: at 04:07

## 2019-12-05 RX ADMIN — INSULIN LISPRO 3 UNITS: 100 INJECTION, SOLUTION INTRAVENOUS; SUBCUTANEOUS at 08:09

## 2019-12-05 RX ADMIN — METOCLOPRAMIDE 10 MG: 5 INJECTION, SOLUTION INTRAMUSCULAR; INTRAVENOUS at 11:19

## 2019-12-05 RX ADMIN — METOCLOPRAMIDE 10 MG: 5 INJECTION, SOLUTION INTRAMUSCULAR; INTRAVENOUS at 06:32

## 2019-12-05 RX ADMIN — LISINOPRIL 5 MG: 5 TABLET ORAL at 11:19

## 2019-12-05 RX ADMIN — HYDROMORPHONE HYDROCHLORIDE 0.5 MG: 1 INJECTION, SOLUTION INTRAMUSCULAR; INTRAVENOUS; SUBCUTANEOUS at 12:17

## 2019-12-05 ASSESSMENT — PAIN - FUNCTIONAL ASSESSMENT
PAIN_FUNCTIONAL_ASSESSMENT: ACTIVITIES ARE NOT PREVENTED
PAIN_FUNCTIONAL_ASSESSMENT: ACTIVITIES ARE NOT PREVENTED

## 2019-12-05 ASSESSMENT — PAIN DESCRIPTION - ONSET
ONSET: GRADUAL
ONSET: GRADUAL

## 2019-12-05 ASSESSMENT — PAIN DESCRIPTION - ORIENTATION
ORIENTATION: UPPER
ORIENTATION: UPPER

## 2019-12-05 ASSESSMENT — PAIN DESCRIPTION - PROGRESSION
CLINICAL_PROGRESSION: GRADUALLY IMPROVING
CLINICAL_PROGRESSION: GRADUALLY IMPROVING

## 2019-12-05 ASSESSMENT — PAIN DESCRIPTION - LOCATION
LOCATION: ABDOMEN
LOCATION: ABDOMEN

## 2019-12-05 ASSESSMENT — PAIN SCALES - GENERAL
PAINLEVEL_OUTOF10: 4
PAINLEVEL_OUTOF10: 5
PAINLEVEL_OUTOF10: 8
PAINLEVEL_OUTOF10: 6
PAINLEVEL_OUTOF10: 5

## 2019-12-05 ASSESSMENT — PAIN DESCRIPTION - DESCRIPTORS
DESCRIPTORS: THROBBING
DESCRIPTORS: BURNING

## 2019-12-05 ASSESSMENT — PAIN DESCRIPTION - FREQUENCY
FREQUENCY: INTERMITTENT
FREQUENCY: INTERMITTENT

## 2019-12-05 ASSESSMENT — PAIN DESCRIPTION - PAIN TYPE
TYPE: ACUTE PAIN
TYPE: ACUTE PAIN

## 2019-12-06 ENCOUNTER — OFFICE VISIT (OUTPATIENT)
Dept: ENDOCRINOLOGY | Age: 44
End: 2019-12-06
Payer: MEDICAID

## 2019-12-06 VITALS
HEIGHT: 74 IN | OXYGEN SATURATION: 98 % | DIASTOLIC BLOOD PRESSURE: 60 MMHG | WEIGHT: 163.6 LBS | SYSTOLIC BLOOD PRESSURE: 103 MMHG | BODY MASS INDEX: 21 KG/M2 | HEART RATE: 88 BPM

## 2019-12-06 DIAGNOSIS — E10.649 HYPOGLYCEMIA UNAWARENESS IN TYPE 1 DIABETES MELLITUS (HCC): ICD-10-CM

## 2019-12-06 DIAGNOSIS — E78.5 DYSLIPIDEMIA DUE TO TYPE 1 DIABETES MELLITUS (HCC): ICD-10-CM

## 2019-12-06 DIAGNOSIS — E10.649 TYPE 1 DIABETES MELLITUS WITH HYPOGLYCEMIA AND WITHOUT COMA (HCC): ICD-10-CM

## 2019-12-06 DIAGNOSIS — E10.69 DYSLIPIDEMIA DUE TO TYPE 1 DIABETES MELLITUS (HCC): ICD-10-CM

## 2019-12-06 DIAGNOSIS — I10 ESSENTIAL HYPERTENSION: ICD-10-CM

## 2019-12-06 PROCEDURE — 1111F DSCHRG MED/CURRENT MED MERGE: CPT | Performed by: INTERNAL MEDICINE

## 2019-12-06 PROCEDURE — 99214 OFFICE O/P EST MOD 30 MIN: CPT | Performed by: INTERNAL MEDICINE

## 2019-12-06 PROCEDURE — 4004F PT TOBACCO SCREEN RCVD TLK: CPT | Performed by: INTERNAL MEDICINE

## 2019-12-06 PROCEDURE — G8598 ASA/ANTIPLAT THER USED: HCPCS | Performed by: INTERNAL MEDICINE

## 2019-12-06 PROCEDURE — G8484 FLU IMMUNIZE NO ADMIN: HCPCS | Performed by: INTERNAL MEDICINE

## 2019-12-06 PROCEDURE — 2022F DILAT RTA XM EVC RTNOPTHY: CPT | Performed by: INTERNAL MEDICINE

## 2019-12-06 PROCEDURE — G8420 CALC BMI NORM PARAMETERS: HCPCS | Performed by: INTERNAL MEDICINE

## 2019-12-06 PROCEDURE — G8427 DOCREV CUR MEDS BY ELIG CLIN: HCPCS | Performed by: INTERNAL MEDICINE

## 2019-12-06 PROCEDURE — 3046F HEMOGLOBIN A1C LEVEL >9.0%: CPT | Performed by: INTERNAL MEDICINE

## 2019-12-06 RX ORDER — INSULIN LISPRO 100 [IU]/ML
INJECTION, SOLUTION INTRAVENOUS; SUBCUTANEOUS
Qty: 15 ML | Refills: 1 | Status: ON HOLD | OUTPATIENT
Start: 2019-12-06 | End: 2019-12-29

## 2019-12-06 RX ORDER — FLASH GLUCOSE SENSOR
KIT MISCELLANEOUS
Qty: 2 EACH | Refills: 5 | Status: ON HOLD | OUTPATIENT
Start: 2019-12-06 | End: 2020-01-02 | Stop reason: SDUPTHER

## 2019-12-06 RX ORDER — ATORVASTATIN CALCIUM 10 MG/1
10 TABLET, FILM COATED ORAL DAILY
Qty: 30 TABLET | Refills: 5 | Status: ON HOLD | OUTPATIENT
Start: 2019-12-06 | End: 2020-01-02 | Stop reason: SDUPTHER

## 2019-12-06 RX ORDER — GABAPENTIN 800 MG/1
800 TABLET ORAL 3 TIMES DAILY
Status: ON HOLD | COMMUNITY
End: 2019-12-29

## 2019-12-06 RX ORDER — FLASH GLUCOSE SCANNING READER
EACH MISCELLANEOUS
Qty: 1 DEVICE | Refills: 0 | Status: ON HOLD | OUTPATIENT
Start: 2019-12-06 | End: 2020-01-02 | Stop reason: SDUPTHER

## 2019-12-06 RX ORDER — GLUCOSAMINE HCL/CHONDROITIN SU 500-400 MG
CAPSULE ORAL
Qty: 150 STRIP | Refills: 3 | Status: ON HOLD | OUTPATIENT
Start: 2019-12-06 | End: 2020-01-02 | Stop reason: SDUPTHER

## 2019-12-06 RX ORDER — BLOOD-GLUCOSE METER
1 KIT MISCELLANEOUS DAILY
Qty: 1 KIT | Refills: 0 | Status: ON HOLD | OUTPATIENT
Start: 2019-12-06 | End: 2020-01-02 | Stop reason: SDUPTHER

## 2019-12-06 RX ORDER — LANCETS 30 GAUGE
1 EACH MISCELLANEOUS DAILY
Qty: 150 EACH | Refills: 11 | Status: ON HOLD | OUTPATIENT
Start: 2019-12-06 | End: 2020-01-02 | Stop reason: SDUPTHER

## 2019-12-09 DIAGNOSIS — E10.69 DYSLIPIDEMIA DUE TO TYPE 1 DIABETES MELLITUS (HCC): ICD-10-CM

## 2019-12-09 DIAGNOSIS — I10 ESSENTIAL HYPERTENSION: ICD-10-CM

## 2019-12-09 DIAGNOSIS — E10.649 TYPE 1 DIABETES MELLITUS WITH HYPOGLYCEMIA AND WITHOUT COMA (HCC): ICD-10-CM

## 2019-12-09 DIAGNOSIS — E10.649 HYPOGLYCEMIA UNAWARENESS IN TYPE 1 DIABETES MELLITUS (HCC): ICD-10-CM

## 2019-12-09 DIAGNOSIS — E78.5 DYSLIPIDEMIA DUE TO TYPE 1 DIABETES MELLITUS (HCC): ICD-10-CM

## 2019-12-29 ENCOUNTER — HOSPITAL ENCOUNTER (INPATIENT)
Age: 44
LOS: 1 days | Discharge: LEFT BEFORE DISCHARGE | DRG: 420 | End: 2019-12-29
Attending: EMERGENCY MEDICINE | Admitting: INTERNAL MEDICINE
Payer: MEDICAID

## 2019-12-29 ENCOUNTER — HOSPITAL ENCOUNTER (INPATIENT)
Age: 44
LOS: 3 days | Discharge: HOME | DRG: 420 | End: 2020-01-02
Attending: EMERGENCY MEDICINE | Admitting: INTERNAL MEDICINE
Payer: MEDICAID

## 2019-12-29 ENCOUNTER — APPOINTMENT (OUTPATIENT)
Dept: CT IMAGING | Age: 44
DRG: 420 | End: 2019-12-29
Payer: MEDICAID

## 2019-12-29 ENCOUNTER — APPOINTMENT (OUTPATIENT)
Dept: GENERAL RADIOLOGY | Age: 44
DRG: 420 | End: 2019-12-29
Payer: MEDICAID

## 2019-12-29 VITALS
HEART RATE: 94 BPM | RESPIRATION RATE: 16 BRPM | DIASTOLIC BLOOD PRESSURE: 67 MMHG | OXYGEN SATURATION: 100 % | SYSTOLIC BLOOD PRESSURE: 131 MMHG | WEIGHT: 165 LBS | TEMPERATURE: 97.9 F | HEIGHT: 72 IN | BODY MASS INDEX: 22.35 KG/M2

## 2019-12-29 LAB
A/G RATIO: 1.2 (ref 1.1–2.2)
ACETAMINOPHEN LEVEL: <5 UG/ML (ref 10–30)
ACETAMINOPHEN LEVEL: <5 UG/ML (ref 10–30)
ALBUMIN SERPL-MCNC: 4.4 G/DL (ref 3.4–5)
ALBUMIN SERPL-MCNC: 4.8 G/DL (ref 3.4–5)
ALP BLD-CCNC: 89 U/L (ref 40–129)
ALP BLD-CCNC: 99 U/L (ref 40–129)
ALT SERPL-CCNC: 22 U/L (ref 10–40)
ALT SERPL-CCNC: 29 U/L (ref 10–40)
AMPHETAMINE SCREEN, URINE: ABNORMAL
ANION GAP SERPL CALCULATED.3IONS-SCNC: 22 MMOL/L (ref 3–16)
ANION GAP SERPL CALCULATED.3IONS-SCNC: 28 MMOL/L (ref 3–16)
ANION GAP SERPL CALCULATED.3IONS-SCNC: 29 MMOL/L (ref 3–16)
ANION GAP SERPL CALCULATED.3IONS-SCNC: 29 MMOL/L (ref 3–16)
AST SERPL-CCNC: 21 U/L (ref 15–37)
AST SERPL-CCNC: 47 U/L (ref 15–37)
BARBITURATE SCREEN URINE: ABNORMAL
BASE EXCESS VENOUS: -11.7 MMOL/L
BASE EXCESS VENOUS: -8.8 MMOL/L
BASOPHILS ABSOLUTE: 0 K/UL (ref 0–0.2)
BASOPHILS ABSOLUTE: 0.1 K/UL (ref 0–0.2)
BASOPHILS RELATIVE PERCENT: 0 %
BASOPHILS RELATIVE PERCENT: 0.7 %
BENZODIAZEPINE SCREEN, URINE: ABNORMAL
BETA-HYDROXYBUTYRATE: 6.28 MMOL/L (ref 0–0.27)
BETA-HYDROXYBUTYRATE: 6.68 MMOL/L (ref 0–0.27)
BILIRUB SERPL-MCNC: 0.3 MG/DL (ref 0–1)
BILIRUB SERPL-MCNC: 0.4 MG/DL (ref 0–1)
BILIRUBIN DIRECT: <0.2 MG/DL (ref 0–0.3)
BILIRUBIN URINE: NEGATIVE
BILIRUBIN, INDIRECT: NORMAL MG/DL (ref 0–1)
BLOOD, URINE: NEGATIVE
BUN BLDV-MCNC: 13 MG/DL (ref 7–20)
BUN BLDV-MCNC: 15 MG/DL (ref 7–20)
BUN BLDV-MCNC: 16 MG/DL (ref 7–20)
BUN BLDV-MCNC: 19 MG/DL (ref 7–20)
CALCIUM SERPL-MCNC: 10.1 MG/DL (ref 8.3–10.6)
CALCIUM SERPL-MCNC: 9.1 MG/DL (ref 8.3–10.6)
CANNABINOID SCREEN URINE: POSITIVE
CARBOXYHEMOGLOBIN: 1.9 %
CARBOXYHEMOGLOBIN: 3.6 %
CHLORIDE BLD-SCNC: 101 MMOL/L (ref 99–110)
CHLORIDE BLD-SCNC: 104 MMOL/L (ref 99–110)
CHLORIDE BLD-SCNC: 105 MMOL/L (ref 99–110)
CHLORIDE BLD-SCNC: 91 MMOL/L (ref 99–110)
CHP ED QC CHECK: YES
CLARITY: CLEAR
CO2: 11 MMOL/L (ref 21–32)
CO2: 12 MMOL/L (ref 21–32)
CO2: 15 MMOL/L (ref 21–32)
CO2: 16 MMOL/L (ref 21–32)
COCAINE METABOLITE SCREEN URINE: ABNORMAL
COLOR: YELLOW
CREAT SERPL-MCNC: 1.1 MG/DL (ref 0.9–1.3)
CREAT SERPL-MCNC: 1.1 MG/DL (ref 0.9–1.3)
CREAT SERPL-MCNC: 1.2 MG/DL (ref 0.9–1.3)
CREAT SERPL-MCNC: 1.2 MG/DL (ref 0.9–1.3)
D DIMER: 338 NG/ML DDU (ref 0–229)
EOSINOPHILS ABSOLUTE: 0 K/UL (ref 0–0.6)
EOSINOPHILS ABSOLUTE: 0.3 K/UL (ref 0–0.6)
EOSINOPHILS RELATIVE PERCENT: 0 %
EOSINOPHILS RELATIVE PERCENT: 1.8 %
ETHANOL: NORMAL MG/DL (ref 0–0.08)
ETHANOL: NORMAL MG/DL (ref 0–0.08)
GFR AFRICAN AMERICAN: >60
GFR NON-AFRICAN AMERICAN: >60
GLOBULIN: 4 G/DL
GLUCOSE BLD-MCNC: 192 MG/DL (ref 70–99)
GLUCOSE BLD-MCNC: 195 MG/DL (ref 70–99)
GLUCOSE BLD-MCNC: 214 MG/DL (ref 70–99)
GLUCOSE BLD-MCNC: 217 MG/DL (ref 70–99)
GLUCOSE BLD-MCNC: 226 MG/DL (ref 70–99)
GLUCOSE BLD-MCNC: 231 MG/DL
GLUCOSE BLD-MCNC: 231 MG/DL (ref 70–99)
GLUCOSE BLD-MCNC: 239 MG/DL (ref 70–99)
GLUCOSE BLD-MCNC: 270 MG/DL (ref 70–99)
GLUCOSE BLD-MCNC: 303 MG/DL (ref 70–99)
GLUCOSE BLD-MCNC: 331 MG/DL (ref 70–99)
GLUCOSE BLD-MCNC: 336 MG/DL (ref 70–99)
GLUCOSE BLD-MCNC: 340 MG/DL (ref 70–99)
GLUCOSE BLD-MCNC: 347 MG/DL (ref 70–99)
GLUCOSE BLD-MCNC: 430 MG/DL (ref 70–99)
GLUCOSE BLD-MCNC: 445 MG/DL (ref 70–99)
GLUCOSE BLD-MCNC: 503 MG/DL (ref 70–99)
GLUCOSE BLD-MCNC: 573 MG/DL (ref 70–99)
GLUCOSE URINE: >=1000 MG/DL
HCO3 VENOUS: 13 MMOL/L (ref 23–29)
HCO3 VENOUS: 17 MMOL/L (ref 23–29)
HCT VFR BLD CALC: 39.9 % (ref 40.5–52.5)
HCT VFR BLD CALC: 44 % (ref 40.5–52.5)
HEMOGLOBIN: 12.4 G/DL (ref 13.5–17.5)
HEMOGLOBIN: 14 G/DL (ref 13.5–17.5)
KETONES, URINE: >=80 MG/DL
LACTIC ACID: 1.4 MMOL/L (ref 0.4–2)
LACTIC ACID: 2 MMOL/L (ref 0.4–2)
LACTIC ACID: 2.2 MMOL/L (ref 0.4–2)
LEUKOCYTE ESTERASE, URINE: NEGATIVE
LIPASE: 4 U/L (ref 13–60)
LYMPHOCYTES ABSOLUTE: 1 K/UL (ref 1–5.1)
LYMPHOCYTES ABSOLUTE: 1.7 K/UL (ref 1–5.1)
LYMPHOCYTES RELATIVE PERCENT: 11.5 %
LYMPHOCYTES RELATIVE PERCENT: 3 %
Lab: ABNORMAL
MAGNESIUM: 1.9 MG/DL (ref 1.8–2.4)
MAGNESIUM: 2.1 MG/DL (ref 1.8–2.4)
MCH RBC QN AUTO: 27.4 PG (ref 26–34)
MCH RBC QN AUTO: 28.4 PG (ref 26–34)
MCHC RBC AUTO-ENTMCNC: 31 G/DL (ref 31–36)
MCHC RBC AUTO-ENTMCNC: 31.9 G/DL (ref 31–36)
MCV RBC AUTO: 88.3 FL (ref 80–100)
MCV RBC AUTO: 88.8 FL (ref 80–100)
METHADONE SCREEN, URINE: ABNORMAL
METHEMOGLOBIN VENOUS: 0.9 %
METHEMOGLOBIN VENOUS: 1.1 %
MICROSCOPIC EXAMINATION: ABNORMAL
MONOCYTES ABSOLUTE: 0.3 K/UL (ref 0–1.3)
MONOCYTES ABSOLUTE: 1.4 K/UL (ref 0–1.3)
MONOCYTES RELATIVE PERCENT: 1.7 %
MONOCYTES RELATIVE PERCENT: 4.3 %
NEUTROPHILS ABSOLUTE: 12.2 K/UL (ref 1.7–7.7)
NEUTROPHILS ABSOLUTE: 30.1 K/UL (ref 1.7–7.7)
NEUTROPHILS RELATIVE PERCENT: 84.3 %
NEUTROPHILS RELATIVE PERCENT: 92.7 %
NITRITE, URINE: NEGATIVE
O2 CONTENT, VEN: 17 ML/DL
O2 CONTENT, VEN: 19 ML/DL
O2 SAT, VEN: 98 %
O2 SAT, VEN: 98 %
O2 THERAPY: ABNORMAL
O2 THERAPY: ABNORMAL
OPIATE SCREEN URINE: ABNORMAL
OXYCODONE URINE: ABNORMAL
PCO2, VEN: 27.9 MMHG (ref 40–50)
PCO2, VEN: 39.8 MMHG (ref 40–50)
PDW BLD-RTO: 15.2 % (ref 12.4–15.4)
PDW BLD-RTO: 15.2 % (ref 12.4–15.4)
PERFORMED ON: ABNORMAL
PH UA: 5.5
PH UA: 5.5 (ref 5–8)
PH VENOUS: 7.26 (ref 7.35–7.45)
PH VENOUS: 7.3 (ref 7.35–7.45)
PHENCYCLIDINE SCREEN URINE: ABNORMAL
PHOSPHORUS: 1.5 MG/DL (ref 2.5–4.9)
PHOSPHORUS: 3.6 MG/DL (ref 2.5–4.9)
PLATELET # BLD: 306 K/UL (ref 135–450)
PLATELET # BLD: 319 K/UL (ref 135–450)
PMV BLD AUTO: 8.1 FL (ref 5–10.5)
PMV BLD AUTO: 8.5 FL (ref 5–10.5)
PO2, VEN: 118 MMHG
PO2, VEN: 121 MMHG
POTASSIUM REFLEX MAGNESIUM: 4.6 MMOL/L (ref 3.5–5.1)
POTASSIUM REFLEX MAGNESIUM: 4.9 MMOL/L (ref 3.5–5.1)
POTASSIUM SERPL-SCNC: 4.1 MMOL/L (ref 3.5–5.1)
POTASSIUM SERPL-SCNC: 4.3 MMOL/L (ref 3.5–5.1)
PROPOXYPHENE SCREEN: ABNORMAL
PROTEIN UA: NEGATIVE MG/DL
RBC # BLD: 4.53 M/UL (ref 4.2–5.9)
RBC # BLD: 4.95 M/UL (ref 4.2–5.9)
REASON FOR REJECTION: NORMAL
REJECTED TEST: NORMAL
SALICYLATE, SERUM: <0.3 MG/DL (ref 15–30)
SODIUM BLD-SCNC: 134 MMOL/L (ref 136–145)
SODIUM BLD-SCNC: 141 MMOL/L (ref 136–145)
SODIUM BLD-SCNC: 143 MMOL/L (ref 136–145)
SODIUM BLD-SCNC: 145 MMOL/L (ref 136–145)
SPECIFIC GRAVITY UA: 1.03 (ref 1–1.03)
TCO2 CALC VENOUS: 14 MMOL/L
TCO2 CALC VENOUS: 19 MMOL/L
TOTAL PROTEIN: 7.6 G/DL (ref 6.4–8.2)
TOTAL PROTEIN: 8.8 G/DL (ref 6.4–8.2)
TROPONIN: <0.01 NG/ML
TROPONIN: <0.01 NG/ML
TSH SERPL DL<=0.05 MIU/L-ACNC: 0.31 UIU/ML (ref 0.27–4.2)
URINE REFLEX TO CULTURE: ABNORMAL
URINE TYPE: ABNORMAL
UROBILINOGEN, URINE: 0.2 E.U./DL
WBC # BLD: 14.5 K/UL (ref 4–11)
WBC # BLD: 32.5 K/UL (ref 4–11)

## 2019-12-29 PROCEDURE — 83735 ASSAY OF MAGNESIUM: CPT

## 2019-12-29 PROCEDURE — G0480 DRUG TEST DEF 1-7 CLASSES: HCPCS

## 2019-12-29 PROCEDURE — 82803 BLOOD GASES ANY COMBINATION: CPT

## 2019-12-29 PROCEDURE — 84484 ASSAY OF TROPONIN QUANT: CPT

## 2019-12-29 PROCEDURE — 02HV33Z INSERTION OF INFUSION DEVICE INTO SUPERIOR VENA CAVA, PERCUTANEOUS APPROACH: ICD-10-PCS | Performed by: INTERNAL MEDICINE

## 2019-12-29 PROCEDURE — 6360000002 HC RX W HCPCS: Performed by: EMERGENCY MEDICINE

## 2019-12-29 PROCEDURE — 85025 COMPLETE CBC W/AUTO DIFF WBC: CPT

## 2019-12-29 PROCEDURE — 80307 DRUG TEST PRSMV CHEM ANLYZR: CPT

## 2019-12-29 PROCEDURE — 2580000003 HC RX 258: Performed by: EMERGENCY MEDICINE

## 2019-12-29 PROCEDURE — 76937 US GUIDE VASCULAR ACCESS: CPT | Performed by: INTERNAL MEDICINE

## 2019-12-29 PROCEDURE — 93005 ELECTROCARDIOGRAM TRACING: CPT | Performed by: EMERGENCY MEDICINE

## 2019-12-29 PROCEDURE — 6370000000 HC RX 637 (ALT 250 FOR IP): Performed by: INTERNAL MEDICINE

## 2019-12-29 PROCEDURE — 6370000000 HC RX 637 (ALT 250 FOR IP): Performed by: PHARMACIST

## 2019-12-29 PROCEDURE — 2500000003 HC RX 250 WO HCPCS: Performed by: EMERGENCY MEDICINE

## 2019-12-29 PROCEDURE — 2580000003 HC RX 258: Performed by: INTERNAL MEDICINE

## 2019-12-29 PROCEDURE — 96372 THER/PROPH/DIAG INJ SC/IM: CPT

## 2019-12-29 PROCEDURE — 6360000002 HC RX W HCPCS

## 2019-12-29 PROCEDURE — 83036 HEMOGLOBIN GLYCOSYLATED A1C: CPT

## 2019-12-29 PROCEDURE — 82010 KETONE BODYS QUAN: CPT

## 2019-12-29 PROCEDURE — 99285 EMERGENCY DEPT VISIT HI MDM: CPT

## 2019-12-29 PROCEDURE — 36556 INSERT NON-TUNNEL CV CATH: CPT | Performed by: REGISTERED NURSE

## 2019-12-29 PROCEDURE — 6360000002 HC RX W HCPCS: Performed by: INTERNAL MEDICINE

## 2019-12-29 PROCEDURE — 85379 FIBRIN DEGRADATION QUANT: CPT

## 2019-12-29 PROCEDURE — 81003 URINALYSIS AUTO W/O SCOPE: CPT

## 2019-12-29 PROCEDURE — 83605 ASSAY OF LACTIC ACID: CPT

## 2019-12-29 PROCEDURE — 02HV33Z INSERTION OF INFUSION DEVICE INTO SUPERIOR VENA CAVA, PERCUTANEOUS APPROACH: ICD-10-PCS | Performed by: EMERGENCY MEDICINE

## 2019-12-29 PROCEDURE — 70450 CT HEAD/BRAIN W/O DYE: CPT

## 2019-12-29 PROCEDURE — 4500000025 HC ED LEVEL 5 PROCEDURE

## 2019-12-29 PROCEDURE — 96365 THER/PROPH/DIAG IV INF INIT: CPT

## 2019-12-29 PROCEDURE — 36415 COLL VENOUS BLD VENIPUNCTURE: CPT

## 2019-12-29 PROCEDURE — 6370000000 HC RX 637 (ALT 250 FOR IP): Performed by: EMERGENCY MEDICINE

## 2019-12-29 PROCEDURE — 71260 CT THORAX DX C+: CPT

## 2019-12-29 PROCEDURE — 74177 CT ABD & PELVIS W/CONTRAST: CPT

## 2019-12-29 PROCEDURE — 6360000004 HC RX CONTRAST MEDICATION: Performed by: EMERGENCY MEDICINE

## 2019-12-29 PROCEDURE — 84443 ASSAY THYROID STIM HORMONE: CPT

## 2019-12-29 PROCEDURE — 96374 THER/PROPH/DIAG INJ IV PUSH: CPT

## 2019-12-29 PROCEDURE — 96361 HYDRATE IV INFUSION ADD-ON: CPT

## 2019-12-29 PROCEDURE — 2000000000 HC ICU R&B

## 2019-12-29 PROCEDURE — 71045 X-RAY EXAM CHEST 1 VIEW: CPT

## 2019-12-29 PROCEDURE — 96375 TX/PRO/DX INJ NEW DRUG ADDON: CPT

## 2019-12-29 PROCEDURE — 84100 ASSAY OF PHOSPHORUS: CPT

## 2019-12-29 PROCEDURE — 36556 INSERT NON-TUNNEL CV CATH: CPT | Performed by: INTERNAL MEDICINE

## 2019-12-29 PROCEDURE — 83690 ASSAY OF LIPASE: CPT

## 2019-12-29 PROCEDURE — 80053 COMPREHEN METABOLIC PANEL: CPT

## 2019-12-29 PROCEDURE — 71046 X-RAY EXAM CHEST 2 VIEWS: CPT

## 2019-12-29 RX ORDER — 0.9 % SODIUM CHLORIDE 0.9 %
1000 INTRAVENOUS SOLUTION INTRAVENOUS ONCE
Status: DISCONTINUED | OUTPATIENT
Start: 2019-12-29 | End: 2019-12-29

## 2019-12-29 RX ORDER — HYDRALAZINE HYDROCHLORIDE 20 MG/ML
10 INJECTION INTRAMUSCULAR; INTRAVENOUS EVERY 6 HOURS PRN
Status: DISCONTINUED | OUTPATIENT
Start: 2019-12-29 | End: 2019-12-29 | Stop reason: HOSPADM

## 2019-12-29 RX ORDER — 0.9 % SODIUM CHLORIDE 0.9 %
2000 INTRAVENOUS SOLUTION INTRAVENOUS ONCE
Status: COMPLETED | OUTPATIENT
Start: 2019-12-29 | End: 2019-12-29

## 2019-12-29 RX ORDER — DEXTROSE MONOHYDRATE 25 G/50ML
12.5 INJECTION, SOLUTION INTRAVENOUS PRN
Status: DISCONTINUED | OUTPATIENT
Start: 2019-12-29 | End: 2020-01-02 | Stop reason: HOSPADM

## 2019-12-29 RX ORDER — ATORVASTATIN CALCIUM 10 MG/1
10 TABLET, FILM COATED ORAL DAILY
Status: DISCONTINUED | OUTPATIENT
Start: 2019-12-29 | End: 2019-12-29 | Stop reason: HOSPADM

## 2019-12-29 RX ORDER — SODIUM CHLORIDE 450 MG/100ML
INJECTION, SOLUTION INTRAVENOUS CONTINUOUS
Status: DISCONTINUED | OUTPATIENT
Start: 2019-12-29 | End: 2019-12-29 | Stop reason: HOSPADM

## 2019-12-29 RX ORDER — LISINOPRIL 5 MG/1
5 TABLET ORAL DAILY
Status: DISCONTINUED | OUTPATIENT
Start: 2019-12-29 | End: 2019-12-29 | Stop reason: HOSPADM

## 2019-12-29 RX ORDER — OXYCODONE HYDROCHLORIDE 5 MG/1
5 TABLET ORAL ONCE
Status: COMPLETED | OUTPATIENT
Start: 2019-12-29 | End: 2019-12-29

## 2019-12-29 RX ORDER — 0.9 % SODIUM CHLORIDE 0.9 %
1000 INTRAVENOUS SOLUTION INTRAVENOUS ONCE
Status: COMPLETED | OUTPATIENT
Start: 2019-12-29 | End: 2019-12-30

## 2019-12-29 RX ORDER — POTASSIUM CHLORIDE 7.45 MG/ML
10 INJECTION INTRAVENOUS PRN
Status: DISCONTINUED | OUTPATIENT
Start: 2019-12-29 | End: 2019-12-29

## 2019-12-29 RX ORDER — POTASSIUM CHLORIDE 29.8 MG/ML
20 INJECTION INTRAVENOUS PRN
Status: DISCONTINUED | OUTPATIENT
Start: 2019-12-29 | End: 2019-12-29 | Stop reason: HOSPADM

## 2019-12-29 RX ORDER — 0.9 % SODIUM CHLORIDE 0.9 %
15 INTRAVENOUS SOLUTION INTRAVENOUS ONCE
Status: COMPLETED | OUTPATIENT
Start: 2019-12-29 | End: 2019-12-29

## 2019-12-29 RX ORDER — GABAPENTIN 800 MG/1
800 TABLET ORAL 3 TIMES DAILY
Status: DISCONTINUED | OUTPATIENT
Start: 2019-12-29 | End: 2019-12-29

## 2019-12-29 RX ORDER — 0.9 % SODIUM CHLORIDE 0.9 %
1000 INTRAVENOUS SOLUTION INTRAVENOUS ONCE
Status: COMPLETED | OUTPATIENT
Start: 2019-12-29 | End: 2019-12-29

## 2019-12-29 RX ORDER — DICYCLOMINE HYDROCHLORIDE 10 MG/ML
20 INJECTION INTRAMUSCULAR ONCE
Status: COMPLETED | OUTPATIENT
Start: 2019-12-29 | End: 2019-12-29

## 2019-12-29 RX ORDER — DEXTROSE MONOHYDRATE 25 G/50ML
12.5 INJECTION, SOLUTION INTRAVENOUS PRN
Status: DISCONTINUED | OUTPATIENT
Start: 2019-12-29 | End: 2019-12-29 | Stop reason: HOSPADM

## 2019-12-29 RX ORDER — NALOXONE HYDROCHLORIDE 1 MG/ML
2 INJECTION INTRAMUSCULAR; INTRAVENOUS; SUBCUTANEOUS PRN
Status: DISCONTINUED | OUTPATIENT
Start: 2019-12-29 | End: 2019-12-29 | Stop reason: HOSPADM

## 2019-12-29 RX ORDER — MAGNESIUM SULFATE 1 G/100ML
1 INJECTION INTRAVENOUS PRN
Status: DISCONTINUED | OUTPATIENT
Start: 2019-12-29 | End: 2019-12-29 | Stop reason: HOSPADM

## 2019-12-29 RX ORDER — DEXTROSE AND SODIUM CHLORIDE 5; .45 G/100ML; G/100ML
INJECTION, SOLUTION INTRAVENOUS CONTINUOUS PRN
Status: DISCONTINUED | OUTPATIENT
Start: 2019-12-29 | End: 2019-12-29 | Stop reason: HOSPADM

## 2019-12-29 RX ORDER — GABAPENTIN 600 MG/1
TABLET ORAL 3 TIMES DAILY
Status: ON HOLD | COMMUNITY
End: 2020-03-26

## 2019-12-29 RX ORDER — GABAPENTIN 400 MG/1
400 CAPSULE ORAL 3 TIMES DAILY
Status: DISCONTINUED | OUTPATIENT
Start: 2019-12-29 | End: 2019-12-29 | Stop reason: HOSPADM

## 2019-12-29 RX ORDER — DEXTROSE MONOHYDRATE 50 MG/ML
100 INJECTION, SOLUTION INTRAVENOUS PRN
Status: DISCONTINUED | OUTPATIENT
Start: 2019-12-29 | End: 2020-01-02 | Stop reason: HOSPADM

## 2019-12-29 RX ORDER — NICOTINE POLACRILEX 4 MG
15 LOZENGE BUCCAL PRN
Status: DISCONTINUED | OUTPATIENT
Start: 2019-12-29 | End: 2020-01-02 | Stop reason: HOSPADM

## 2019-12-29 RX ORDER — ONDANSETRON 2 MG/ML
8 INJECTION INTRAMUSCULAR; INTRAVENOUS ONCE
Status: COMPLETED | OUTPATIENT
Start: 2019-12-29 | End: 2019-12-29

## 2019-12-29 RX ORDER — PANTOPRAZOLE SODIUM 40 MG/1
40 TABLET, DELAYED RELEASE ORAL
Status: DISCONTINUED | OUTPATIENT
Start: 2019-12-29 | End: 2019-12-29 | Stop reason: HOSPADM

## 2019-12-29 RX ORDER — METOCLOPRAMIDE HYDROCHLORIDE 5 MG/ML
10 INJECTION INTRAMUSCULAR; INTRAVENOUS EVERY 6 HOURS
Status: DISCONTINUED | OUTPATIENT
Start: 2019-12-29 | End: 2019-12-29 | Stop reason: HOSPADM

## 2019-12-29 RX ORDER — GABAPENTIN 300 MG/1
600 CAPSULE ORAL 3 TIMES DAILY
Status: DISCONTINUED | OUTPATIENT
Start: 2020-01-01 | End: 2019-12-29 | Stop reason: HOSPADM

## 2019-12-29 RX ADMIN — POTASSIUM CHLORIDE 20 MEQ: 29.8 INJECTION, SOLUTION INTRAVENOUS at 14:49

## 2019-12-29 RX ADMIN — METOCLOPRAMIDE 10 MG: 5 INJECTION, SOLUTION INTRAMUSCULAR; INTRAVENOUS at 12:39

## 2019-12-29 RX ADMIN — PIPERACILLIN SODIUM AND TAZOBACTAM SODIUM 3.38 G: 3; .375 INJECTION, POWDER, FOR SOLUTION INTRAVENOUS at 22:57

## 2019-12-29 RX ADMIN — ATORVASTATIN CALCIUM 10 MG: 10 TABLET, FILM COATED ORAL at 12:42

## 2019-12-29 RX ADMIN — HYDRALAZINE HYDROCHLORIDE 10 MG: 20 INJECTION INTRAMUSCULAR; INTRAVENOUS at 13:18

## 2019-12-29 RX ADMIN — SODIUM CHLORIDE 1000 ML: 9 INJECTION, SOLUTION INTRAVENOUS at 22:20

## 2019-12-29 RX ADMIN — DEXTROSE AND SODIUM CHLORIDE: 5; 450 INJECTION, SOLUTION INTRAVENOUS at 15:38

## 2019-12-29 RX ADMIN — OXYCODONE 5 MG: 5 TABLET ORAL at 17:29

## 2019-12-29 RX ADMIN — IOPAMIDOL 75 ML: 755 INJECTION, SOLUTION INTRAVENOUS at 23:10

## 2019-12-29 RX ADMIN — METOCLOPRAMIDE 10 MG: 5 INJECTION, SOLUTION INTRAMUSCULAR; INTRAVENOUS at 17:29

## 2019-12-29 RX ADMIN — SODIUM CHLORIDE 1122 ML: 9 INJECTION, SOLUTION INTRAVENOUS at 10:14

## 2019-12-29 RX ADMIN — ENOXAPARIN SODIUM 40 MG: 40 INJECTION SUBCUTANEOUS at 17:37

## 2019-12-29 RX ADMIN — SODIUM CHLORIDE 2000 ML: 9 INJECTION, SOLUTION INTRAVENOUS at 06:24

## 2019-12-29 RX ADMIN — POTASSIUM CHLORIDE 20 MEQ: 29.8 INJECTION, SOLUTION INTRAVENOUS at 16:00

## 2019-12-29 RX ADMIN — LISINOPRIL 5 MG: 5 TABLET ORAL at 11:50

## 2019-12-29 RX ADMIN — Medication 20 MG: at 23:58

## 2019-12-29 RX ADMIN — DICYCLOMINE HYDROCHLORIDE 20 MG: 20 INJECTION, SOLUTION INTRAMUSCULAR at 20:45

## 2019-12-29 RX ADMIN — SODIUM CHLORIDE 1000 ML: 9 INJECTION, SOLUTION INTRAVENOUS at 23:59

## 2019-12-29 RX ADMIN — ONDANSETRON 8 MG: 2 INJECTION INTRAMUSCULAR; INTRAVENOUS at 06:24

## 2019-12-29 RX ADMIN — GABAPENTIN 400 MG: 400 CAPSULE ORAL at 14:29

## 2019-12-29 RX ADMIN — PANTOPRAZOLE SODIUM 40 MG: 40 TABLET, DELAYED RELEASE ORAL at 15:39

## 2019-12-29 RX ADMIN — INSULIN HUMAN 20 UNITS: 100 INJECTION, SOLUTION PARENTERAL at 06:24

## 2019-12-29 RX ADMIN — SODIUM CHLORIDE 7.48 UNITS/HR: 9 INJECTION, SOLUTION INTRAVENOUS at 12:23

## 2019-12-29 RX ADMIN — HYDROMORPHONE HYDROCHLORIDE 0.5 MG: 1 INJECTION, SOLUTION INTRAMUSCULAR; INTRAVENOUS; SUBCUTANEOUS at 11:50

## 2019-12-29 RX ADMIN — SODIUM CHLORIDE: 4.5 INJECTION, SOLUTION INTRAVENOUS at 13:18

## 2019-12-29 ASSESSMENT — PAIN DESCRIPTION - PAIN TYPE
TYPE: ACUTE PAIN
TYPE: ACUTE PAIN;CHRONIC PAIN
TYPE: ACUTE PAIN
TYPE: ACUTE PAIN;CHRONIC PAIN

## 2019-12-29 ASSESSMENT — ENCOUNTER SYMPTOMS
VOMITING: 0
ABDOMINAL DISTENTION: 0
PHOTOPHOBIA: 0
BACK PAIN: 0
WHEEZING: 0
ANAL BLEEDING: 0
COLOR CHANGE: 0
STRIDOR: 0
EYE PAIN: 0
DIARRHEA: 0
EYE REDNESS: 0
EYE DISCHARGE: 0
CHOKING: 0
CHEST TIGHTNESS: 0
APNEA: 0
BLOOD IN STOOL: 0
SHORTNESS OF BREATH: 0
CONSTIPATION: 0
EYE ITCHING: 0
ABDOMINAL PAIN: 1
NAUSEA: 0
COUGH: 0
RECTAL PAIN: 0

## 2019-12-29 ASSESSMENT — PAIN DESCRIPTION - PROGRESSION
CLINICAL_PROGRESSION: NOT CHANGED
CLINICAL_PROGRESSION: GRADUALLY WORSENING
CLINICAL_PROGRESSION: NOT CHANGED
CLINICAL_PROGRESSION: GRADUALLY WORSENING
CLINICAL_PROGRESSION: GRADUALLY WORSENING

## 2019-12-29 ASSESSMENT — PAIN SCALES - GENERAL
PAINLEVEL_OUTOF10: 10
PAINLEVEL_OUTOF10: 4
PAINLEVEL_OUTOF10: 4
PAINLEVEL_OUTOF10: 9
PAINLEVEL_OUTOF10: 7
PAINLEVEL_OUTOF10: 0
PAINLEVEL_OUTOF10: 4
PAINLEVEL_OUTOF10: 8
PAINLEVEL_OUTOF10: 4

## 2019-12-29 ASSESSMENT — PAIN DESCRIPTION - ONSET
ONSET: ON-GOING

## 2019-12-29 ASSESSMENT — PAIN DESCRIPTION - LOCATION
LOCATION: ABDOMEN
LOCATION: OTHER (COMMENT)
LOCATION: ABDOMEN
LOCATION: GENERALIZED
LOCATION: GENERALIZED
LOCATION: ABDOMEN

## 2019-12-29 ASSESSMENT — PAIN DESCRIPTION - FREQUENCY
FREQUENCY: CONTINUOUS
FREQUENCY: INTERMITTENT

## 2019-12-29 ASSESSMENT — PAIN SCALES - WONG BAKER: WONGBAKER_NUMERICALRESPONSE: 0

## 2019-12-29 ASSESSMENT — PAIN DESCRIPTION - DESCRIPTORS
DESCRIPTORS: DISCOMFORT
DESCRIPTORS: DISCOMFORT
DESCRIPTORS: ACHING
DESCRIPTORS: DISCOMFORT
DESCRIPTORS: ACHING

## 2019-12-29 ASSESSMENT — PAIN DESCRIPTION - ORIENTATION: ORIENTATION: MID

## 2019-12-29 ASSESSMENT — PAIN DESCRIPTION - DIRECTION: RADIATING_TOWARDS: NO

## 2019-12-29 NOTE — PROGRESS NOTES
1000- Pt admitted from ED to #[id number]. Oriented pt to  and surroundings, NSR on the monitor,VSS on RA, C/O generalized pain 7/10. No IV access. Unsuccessful PIV insertion x3 attempts  by different RNs. Dr. Aisf Calle notified and new orders noted for TLC. Dr. Hernandez Courser notified. 1119- Pt's BP is high in the 180s-200s,  notified and new orders rec'd to resume his home 5mg lisinopril po daily. 1250- Pt's BP remains high, Dr. Asif Calle notified and new orders noted for prn Hydralazine. See new orders  1318- 10mg IV hydralazine PRN given for SBP >160  1600- Pt is c/o abd pain 6/10,  PerfectServed and notified. 1620-  Pt is sitting up on the side of the bed and states \" I'm leaving now if you don't give me some pain medication\"  Educated pt on the importance of getting treated and leaving AMA can result in major complications including death. Pt verbalizes understanding and states \" call the DR and tell them to give me something for my pain\"   1700- A call back rec'd from Dr. Asif Calle and new orders noted for 5mg oxycodone po once. See orders   1729- 5 mg Oxycodone given  1800- Pt resting quietly in bed with eyes closed, VSS on RA. Respirations >12, no s/s of distress noted. Pt's wife at bedside, updated on pt's status and poc.    26-  Handoff completed with SKYLAR Ceja  Electronically signed by Issa Webster RN on 12/29/2019 at 7:20 PM

## 2019-12-29 NOTE — ED PROVIDER NOTES
[address] [facility]      Pt Name: Talha Byrd  MRN: [id number]  [place] 1975  Date of evaluation: 12/29/2019  Provider: Jessica Gutierrez [address]  Chief Complaint   Patient presents with    Emesis     Brought in by friend who states pt has been vomiting all day. Hx of DM. Hard to arouse. HPI  Talha Byrd is a 53 y.o. male who presents with nausea and vomiting all day. He is diabetic. No other history is available. Patient is lethargic and cannot answer questions. REVIEW OF SYSTEMS    All systems negative except as noted in the HPI. Reviewed Nurses' notes and concur. History limited due to obtunded and nonverbal and noncommunicating. No LMP for male patient. PAST MEDICAL HISTORY  Past Medical History:   Diagnosis Date    Diabetes mellitus ([address]     Gastroparesis     Hypertension        FAMILY HISTORY  Family History   Problem Relation Age of Onset    Diabetes Mother     Heart Disease Mother     High Blood Pressure Mother     Asthma Brother     Other Father         kidney disease    No Known Problems Maternal Grandmother     No Known Problems Maternal Grandfather     No Known Problems Paternal Grandmother     No Known Problems Paternal Grandfather        SOCIAL HISTORY   reports that he has been smoking. He started smoking about 10 years ago. He has never used smokeless tobacco. He reports current alcohol use. He reports current drug use. Frequency: 3.00 times per week. Drug: Marijuana.     SURGICAL HISTORY  Past Surgical History:   Procedure Laterality Date    BONY PELVIS SURGERY      HIP SURGERY Left 2006    pins and rods- plate    UPPER GASTROINTESTINAL ENDOSCOPY N/A 12/2/2019    EGD BIOPSY performed by Lulu Myers MD at [address] [facility]  Current Outpatient Rx   Medication Sig Dispense Refill    insulin lispro (ADMELOG) 100 UNIT/ML injection vial 5 units for failure     Vicodin [Hydrocodone-Acetaminophen]        Tetanus vaccination status reviewed: tetanus status unknown to the patient. PHYSICAL EXAM  VITAL SIGNS: BP (!) 199/100   Pulse 79   Temp 98.8 °F (37.1 °C) (Oral)   Resp 16   SpO2 100%   Constitutional: Well-developed, well-nourished, appears obtunded and nonverbal, nontoxic, activity: Lying on the cart, vomitus on his face, unresponsive to verbal stimulus or noxious stimulus  HENT: Normocephalic, Atraumatic, Bilateral external ears normal, TM's were normal, Mucus membranes are moist and oropharynx is patent and clear, No oral exudates, Nose normal.  Eyes: PERRLA, EOMI, Conjunctiva normal, No discharge. No scleral icterus. Neck: Normal range of motion, No tenderness, Supple. Lymphatic: No lymphadenopathy noted. Cardiovascular: Normal heart rate, Normal rhythm, no murmurs, no gallops, no rubs. Thorax & Lungs: Normal breath sounds, no respiratory distress, no wheezing, no rales, no rhonchi  Abdomen: Soft, Nontender, No hepatosplenomegaly, No masses, No pulsatile masses, No distension, normal bowel sounds  Skin: Warm, Dry, No erythema, No rash. Back: No tenderness, Full range of motion, No scoliosis. Extremities: No edema, No tenderness, No cyanosis, No clubbing. No amputations, capillary refill less than 2 seconds. Musculoskeletal: Good range of motion in all major joints, No tenderness to palpation, no major deformities noted. Neurologic: Obtunded, nonverbal, noncommunicating, CN II through XII are grossly intact, no focal deficits noted, no clonus, no tremors.     LABORATORY  Labs Reviewed   ACETAMINOPHEN LEVEL - Abnormal; Notable for the following components:       Result Value    Acetaminophen Level <5 (*)     All other components within normal limits    Narrative:     Fadi Benitez tel. [phone],  Rejected Test Name/Called to: hollie casey, 12/29/2019 06:59, by [facility]  Performed at:  [facility]  [address] [place] [address] QUANTITATIVE       ? EKG  EKG Interpretation  Interpreted by emergency department physician  Time read: 9375    Rhythm: Sinus  Ventricular Rate: 79  QRS Axis: 16  Ectopy: None  Conduction: Normal sinus rhythm  ST Segments: normal  T Waves: normal  Q Waves: None    Other findings: Poor R wave progression noted in the septal leads indicating a remote septal infarct age-indeterminate    Compared to EKG on: None to compare    Clinical Impression: Normal sinus rhythm with poor R wave progression indicating remote septal infarct but otherwise normal EKG    TRINH COOPER    RADIOLOGY/PROCEDURES  I personally reviewed the images for this case. XR CHEST PORTABLE   Final Result   No acute findings. CT head without contrast   Final Result   No acute intracranial abnormality. COURSE & MEDICAL DECISION MAKING  Pertinent Labs, EKG, & Imaging studies reviewed. (See chart for details)    Vitals:    12/29/19 0438 12/29/19 0454 12/29/19 0647 12/29/19 0700   BP: (!) 188/94  (!) 184/98 (!) 199/100   Pulse: 85  89 79   Resp: 16      Temp:  98.8 °F (37.1 °C)     TempSrc:  Oral     SpO2: 100%  100% 100%       Medications   naloxone (NARCAN) injection 2 mg (has no administration in time range)   0.9 % sodium chloride bolus (2,000 mLs Intravenous New Bag 12/29/19 0624)   ondansetron (ZOFRAN) injection 8 mg (8 mg Intravenous Given 12/29/19 0624)   insulin regular (HUMULIN R;NOVOLIN R) injection 20 Units (20 Units Intravenous Given 12/29/19 0624)       New Prescriptions    No medications on file       Patient remained stable in the ED. this case was turned over to Dr. Surendra Pat at 0700 hrs. pending labs    The patient's blood pressure was found to be elevated according to CMS/Medicare and the Affordable Care Act/ObamaCare criteria.  Elevated blood pressure could occur because of pain or anxiety or other reasons and does not mean that they need to have their blood pressure treated or medications otherwise adjusted. However, this could also be a sign that they will need to have their blood pressure treated or medications changed. The patient was instructed to follow up closely with their personal physician to have their blood pressure rechecked. The patient was instructed to take a list of recent blood pressure readings to their next visit with their personal physician. See discharge instructions for specific medications, discharge information, and treatments. They were verbally instructed to return to emergency if any problems. (This chart has been completed using [address]. Although attempts have been made to ensure accuracy, words and/or phrases may not be transcribed as intended.)    Patient refused pain medicines at the time of his exam.    IMPRESSION(S):  No diagnosis found.     ?  Recheck Times: [address], DO  12/29/19 5408

## 2019-12-29 NOTE — PROGRESS NOTES
4 Eyes Skin Assessment     The patient is being assess for  Admission    I agree that 2 RN's have performed a thorough Head to Toe Skin Assessment on the patient. ALL assessment sites listed below have been assessed. Areas assessed by both nurses:   [x]   Head, Face, and Ears   [x]   Shoulders, Back, and Chest  [x]   Arms, Elbows, and Hands   [x]   Coccyx, Sacrum, and IschIum  [x]   Legs, Feet, and Heels        Does the Patient have Skin Breakdown?   No         Elmer Prevention initiated:  Yes   Wound Care Orders initiated:  NA      [facility] nurse consulted for Pressure Injury (Stage 3,4, Unstageable, DTI, NWPT, and Complex wounds), New and Established Ostomies:  NA      Nurse 1 eSignature: Electronically signed by Rebecca Arthur RN on 12/29/19 at 3:20 PM    **SHARE this note so that the co-signing nurse is able to place an eSignature**    Nurse 2 eSignature: Electronically signed by Cedric Bernardo RN on 12/29/19 at 3:51 PM

## 2019-12-29 NOTE — PROCEDURES
Central Venous Catheter Insertion Procedure Note    Indication: Unable to obtain peripheral venous access    Procedure:    After consent was obtained the patient was prepped and draped in the usual sterile fashion. Lidocaine was injected into the skin and subcutaneous tissues. Under ultrasound guidance the right internal jugular vein was cannulated. The guidewire was passed into the vein and the tract dilated. The catheter was then passed into the vessel and the guidewire removed. The catheter was secured with sutures and tegarderm. The patient tolerated the procedure well and there were no known complications. Post-procedure chest-xray showed the central venous catheter in good position without evidence of pneumothorax.         Corrie Quintero MD  [facility]
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
## 2019-12-30 PROBLEM — E11.10 DKA, TYPE 2, NOT AT GOAL (HCC): Status: ACTIVE | Noted: 2019-12-30

## 2019-12-30 LAB
ANION GAP SERPL CALCULATED.3IONS-SCNC: 15 MMOL/L (ref 3–16)
ANION GAP SERPL CALCULATED.3IONS-SCNC: 15 MMOL/L (ref 3–16)
ANION GAP SERPL CALCULATED.3IONS-SCNC: 17 MMOL/L (ref 3–16)
ANION GAP SERPL CALCULATED.3IONS-SCNC: 20 MMOL/L (ref 3–16)
ANION GAP SERPL CALCULATED.3IONS-SCNC: 21 MMOL/L (ref 3–16)
BILIRUBIN URINE: NEGATIVE
BLOOD, URINE: NEGATIVE
BUN BLDV-MCNC: 12 MG/DL (ref 7–20)
BUN BLDV-MCNC: 13 MG/DL (ref 7–20)
BUN BLDV-MCNC: 6 MG/DL (ref 7–20)
BUN BLDV-MCNC: 7 MG/DL (ref 7–20)
BUN BLDV-MCNC: 9 MG/DL (ref 7–20)
CALCIUM SERPL-MCNC: 8.5 MG/DL (ref 8.3–10.6)
CALCIUM SERPL-MCNC: 8.8 MG/DL (ref 8.3–10.6)
CALCIUM SERPL-MCNC: 9 MG/DL (ref 8.3–10.6)
CHLORIDE BLD-SCNC: 100 MMOL/L (ref 99–110)
CHLORIDE BLD-SCNC: 106 MMOL/L (ref 99–110)
CHLORIDE BLD-SCNC: 107 MMOL/L (ref 99–110)
CHLORIDE BLD-SCNC: 108 MMOL/L (ref 99–110)
CHLORIDE BLD-SCNC: 110 MMOL/L (ref 99–110)
CHP ED QC CHECK: YES
CLARITY: CLEAR
CO2: 14 MMOL/L (ref 21–32)
CO2: 15 MMOL/L (ref 21–32)
CO2: 17 MMOL/L (ref 21–32)
CO2: 20 MMOL/L (ref 21–32)
CO2: 21 MMOL/L (ref 21–32)
COLOR: YELLOW
CREAT SERPL-MCNC: 1 MG/DL (ref 0.9–1.3)
CREAT SERPL-MCNC: 1 MG/DL (ref 0.9–1.3)
CREAT SERPL-MCNC: 1.1 MG/DL (ref 0.9–1.3)
EKG ATRIAL RATE: 106 BPM
EKG ATRIAL RATE: 79 BPM
EKG DIAGNOSIS: NORMAL
EKG DIAGNOSIS: NORMAL
EKG P AXIS: 78 DEGREES
EKG P AXIS: 78 DEGREES
EKG P-R INTERVAL: 132 MS
EKG P-R INTERVAL: 144 MS
EKG Q-T INTERVAL: 356 MS
EKG Q-T INTERVAL: 408 MS
EKG QRS DURATION: 76 MS
EKG QRS DURATION: 78 MS
EKG QTC CALCULATION (BAZETT): 467 MS
EKG QTC CALCULATION (BAZETT): 472 MS
EKG R AXIS: 16 DEGREES
EKG R AXIS: 59 DEGREES
EKG T AXIS: 48 DEGREES
EKG T AXIS: 59 DEGREES
EKG VENTRICULAR RATE: 106 BPM
EKG VENTRICULAR RATE: 79 BPM
ESTIMATED AVERAGE GLUCOSE: 271.9 MG/DL
GFR AFRICAN AMERICAN: >60
GFR NON-AFRICAN AMERICAN: >60
GLUCOSE BLD-MCNC: 130 MG/DL (ref 70–99)
GLUCOSE BLD-MCNC: 134 MG/DL (ref 70–99)
GLUCOSE BLD-MCNC: 141 MG/DL
GLUCOSE BLD-MCNC: 141 MG/DL (ref 70–99)
GLUCOSE BLD-MCNC: 142 MG/DL (ref 70–99)
GLUCOSE BLD-MCNC: 155 MG/DL
GLUCOSE BLD-MCNC: 155 MG/DL (ref 70–99)
GLUCOSE BLD-MCNC: 159 MG/DL (ref 70–99)
GLUCOSE BLD-MCNC: 160 MG/DL (ref 70–99)
GLUCOSE BLD-MCNC: 161 MG/DL (ref 70–99)
GLUCOSE BLD-MCNC: 166 MG/DL (ref 70–99)
GLUCOSE BLD-MCNC: 167 MG/DL (ref 70–99)
GLUCOSE BLD-MCNC: 172 MG/DL (ref 70–99)
GLUCOSE BLD-MCNC: 174 MG/DL (ref 70–99)
GLUCOSE BLD-MCNC: 177 MG/DL (ref 70–99)
GLUCOSE BLD-MCNC: 181 MG/DL (ref 70–99)
GLUCOSE BLD-MCNC: 192 MG/DL (ref 70–99)
GLUCOSE BLD-MCNC: 194 MG/DL (ref 70–99)
GLUCOSE BLD-MCNC: 196 MG/DL (ref 70–99)
GLUCOSE BLD-MCNC: 198 MG/DL
GLUCOSE BLD-MCNC: 198 MG/DL (ref 70–99)
GLUCOSE BLD-MCNC: 211 MG/DL (ref 70–99)
GLUCOSE BLD-MCNC: 218 MG/DL (ref 70–99)
GLUCOSE BLD-MCNC: 228 MG/DL (ref 70–99)
GLUCOSE BLD-MCNC: 242 MG/DL (ref 70–99)
GLUCOSE BLD-MCNC: 274 MG/DL
GLUCOSE BLD-MCNC: 274 MG/DL (ref 70–99)
GLUCOSE BLD-MCNC: 346 MG/DL
GLUCOSE BLD-MCNC: 346 MG/DL (ref 70–99)
GLUCOSE BLD-MCNC: 373 MG/DL
GLUCOSE BLD-MCNC: 373 MG/DL (ref 70–99)
GLUCOSE URINE: >=1000 MG/DL
HBA1C MFR BLD: 11.1 %
HCT VFR BLD CALC: 38.4 % (ref 40.5–52.5)
HEMOGLOBIN: 12.4 G/DL (ref 13.5–17.5)
KETONES, URINE: >=80 MG/DL
LEUKOCYTE ESTERASE, URINE: NEGATIVE
MAGNESIUM: 1.7 MG/DL (ref 1.8–2.4)
MAGNESIUM: 2 MG/DL (ref 1.8–2.4)
MAGNESIUM: 2.1 MG/DL (ref 1.8–2.4)
MAGNESIUM: 2.1 MG/DL (ref 1.8–2.4)
MCH RBC QN AUTO: 28.1 PG (ref 26–34)
MCHC RBC AUTO-ENTMCNC: 32.4 G/DL (ref 31–36)
MCV RBC AUTO: 86.8 FL (ref 80–100)
MICROSCOPIC EXAMINATION: ABNORMAL
NITRITE, URINE: NEGATIVE
PDW BLD-RTO: 14.9 % (ref 12.4–15.4)
PERFORMED ON: ABNORMAL
PH UA: 5.5 (ref 5–8)
PHOSPHORUS: 1.6 MG/DL (ref 2.5–4.9)
PHOSPHORUS: 1.7 MG/DL (ref 2.5–4.9)
PHOSPHORUS: 1.8 MG/DL (ref 2.5–4.9)
PHOSPHORUS: 2.6 MG/DL (ref 2.5–4.9)
PLATELET # BLD: 300 K/UL (ref 135–450)
PMV BLD AUTO: 7.7 FL (ref 5–10.5)
POTASSIUM REFLEX MAGNESIUM: 4 MMOL/L (ref 3.5–5.1)
POTASSIUM REFLEX MAGNESIUM: 4.2 MMOL/L (ref 3.5–5.1)
POTASSIUM SERPL-SCNC: 3.5 MMOL/L (ref 3.5–5.1)
POTASSIUM SERPL-SCNC: 3.8 MMOL/L (ref 3.5–5.1)
POTASSIUM SERPL-SCNC: 4.2 MMOL/L (ref 3.5–5.1)
POTASSIUM SERPL-SCNC: 4.2 MMOL/L (ref 3.5–5.1)
PROTEIN UA: NEGATIVE MG/DL
RBC # BLD: 4.43 M/UL (ref 4.2–5.9)
SODIUM BLD-SCNC: 136 MMOL/L (ref 136–145)
SODIUM BLD-SCNC: 141 MMOL/L (ref 136–145)
SODIUM BLD-SCNC: 142 MMOL/L (ref 136–145)
SODIUM BLD-SCNC: 142 MMOL/L (ref 136–145)
SODIUM BLD-SCNC: 145 MMOL/L (ref 136–145)
SPECIFIC GRAVITY UA: 1.03 (ref 1–1.03)
URINE REFLEX TO CULTURE: ABNORMAL
URINE TYPE: ABNORMAL
UROBILINOGEN, URINE: 0.2 E.U./DL
WBC # BLD: 25 K/UL (ref 4–11)

## 2019-12-30 PROCEDURE — 6360000002 HC RX W HCPCS: Performed by: INTERNAL MEDICINE

## 2019-12-30 PROCEDURE — 36415 COLL VENOUS BLD VENIPUNCTURE: CPT

## 2019-12-30 PROCEDURE — 94761 N-INVAS EAR/PLS OXIMETRY MLT: CPT

## 2019-12-30 PROCEDURE — 2580000003 HC RX 258: Performed by: HOSPITALIST

## 2019-12-30 PROCEDURE — 2500000003 HC RX 250 WO HCPCS: Performed by: HOSPITALIST

## 2019-12-30 PROCEDURE — 6360000002 HC RX W HCPCS: Performed by: HOSPITALIST

## 2019-12-30 PROCEDURE — 36592 COLLECT BLOOD FROM PICC: CPT

## 2019-12-30 PROCEDURE — 84100 ASSAY OF PHOSPHORUS: CPT

## 2019-12-30 PROCEDURE — C9113 INJ PANTOPRAZOLE SODIUM, VIA: HCPCS | Performed by: HOSPITALIST

## 2019-12-30 PROCEDURE — 85027 COMPLETE CBC AUTOMATED: CPT

## 2019-12-30 PROCEDURE — 6370000000 HC RX 637 (ALT 250 FOR IP): Performed by: EMERGENCY MEDICINE

## 2019-12-30 PROCEDURE — 96367 TX/PROPH/DG ADDL SEQ IV INF: CPT

## 2019-12-30 PROCEDURE — 87040 BLOOD CULTURE FOR BACTERIA: CPT

## 2019-12-30 PROCEDURE — 2580000003 HC RX 258: Performed by: EMERGENCY MEDICINE

## 2019-12-30 PROCEDURE — 93010 ELECTROCARDIOGRAM REPORT: CPT | Performed by: INTERNAL MEDICINE

## 2019-12-30 PROCEDURE — 87150 DNA/RNA AMPLIFIED PROBE: CPT

## 2019-12-30 PROCEDURE — 2000000000 HC ICU R&B

## 2019-12-30 PROCEDURE — 2580000003 HC RX 258: Performed by: INTERNAL MEDICINE

## 2019-12-30 PROCEDURE — 80048 BASIC METABOLIC PNL TOTAL CA: CPT

## 2019-12-30 PROCEDURE — 83735 ASSAY OF MAGNESIUM: CPT

## 2019-12-30 RX ORDER — ONDANSETRON 2 MG/ML
4 INJECTION INTRAMUSCULAR; INTRAVENOUS ONCE
Status: COMPLETED | OUTPATIENT
Start: 2019-12-30 | End: 2019-12-30

## 2019-12-30 RX ORDER — MAGNESIUM SULFATE 1 G/100ML
1 INJECTION INTRAVENOUS PRN
Status: DISCONTINUED | OUTPATIENT
Start: 2019-12-30 | End: 2020-01-02 | Stop reason: HOSPADM

## 2019-12-30 RX ORDER — DICYCLOMINE HCL 20 MG
20 TABLET ORAL EVERY 6 HOURS PRN
Status: ON HOLD | COMMUNITY
End: 2020-01-02 | Stop reason: SDUPTHER

## 2019-12-30 RX ORDER — PROMETHAZINE HYDROCHLORIDE 25 MG/1
25 SUPPOSITORY RECTAL EVERY 6 HOURS PRN
Status: ON HOLD | COMMUNITY
End: 2020-01-02 | Stop reason: SDUPTHER

## 2019-12-30 RX ORDER — DICYCLOMINE HYDROCHLORIDE 10 MG/ML
10 INJECTION INTRAMUSCULAR ONCE
Status: COMPLETED | OUTPATIENT
Start: 2019-12-30 | End: 2019-12-30

## 2019-12-30 RX ORDER — SODIUM CHLORIDE 0.9 % (FLUSH) 0.9 %
10 SYRINGE (ML) INJECTION EVERY 12 HOURS SCHEDULED
Status: DISCONTINUED | OUTPATIENT
Start: 2019-12-30 | End: 2020-01-02 | Stop reason: HOSPADM

## 2019-12-30 RX ORDER — POTASSIUM CHLORIDE 7.45 MG/ML
10 INJECTION INTRAVENOUS PRN
Status: DISCONTINUED | OUTPATIENT
Start: 2019-12-30 | End: 2020-01-02 | Stop reason: HOSPADM

## 2019-12-30 RX ORDER — CEPHALEXIN 250 MG/1
500 CAPSULE ORAL 2 TIMES DAILY
Status: ON HOLD | COMMUNITY
Start: 2019-12-22 | End: 2019-12-30

## 2019-12-30 RX ORDER — ATORVASTATIN CALCIUM 10 MG/1
10 TABLET, FILM COATED ORAL DAILY
Status: DISCONTINUED | OUTPATIENT
Start: 2019-12-30 | End: 2020-01-02 | Stop reason: HOSPADM

## 2019-12-30 RX ORDER — OXYCODONE HYDROCHLORIDE 5 MG/1
5 TABLET ORAL EVERY 8 HOURS PRN
COMMUNITY
End: 2020-01-08 | Stop reason: ALTCHOICE

## 2019-12-30 RX ORDER — HYDRALAZINE HYDROCHLORIDE 20 MG/ML
10 INJECTION INTRAMUSCULAR; INTRAVENOUS EVERY 6 HOURS PRN
Status: DISCONTINUED | OUTPATIENT
Start: 2019-12-30 | End: 2020-01-02 | Stop reason: HOSPADM

## 2019-12-30 RX ORDER — PANTOPRAZOLE SODIUM 40 MG/1
40 TABLET, DELAYED RELEASE ORAL
Status: DISCONTINUED | OUTPATIENT
Start: 2019-12-30 | End: 2019-12-30

## 2019-12-30 RX ORDER — OXYCODONE HYDROCHLORIDE AND ACETAMINOPHEN 5; 325 MG/1; MG/1
1 TABLET ORAL EVERY 4 HOURS PRN
Status: ON HOLD | COMMUNITY
End: 2019-12-30

## 2019-12-30 RX ORDER — DEXTROSE MONOHYDRATE 25 G/50ML
12.5 INJECTION, SOLUTION INTRAVENOUS PRN
Status: DISCONTINUED | OUTPATIENT
Start: 2019-12-30 | End: 2020-01-02 | Stop reason: HOSPADM

## 2019-12-30 RX ORDER — ONDANSETRON 2 MG/ML
4 INJECTION INTRAMUSCULAR; INTRAVENOUS EVERY 6 HOURS PRN
Status: DISCONTINUED | OUTPATIENT
Start: 2019-12-30 | End: 2020-01-02 | Stop reason: HOSPADM

## 2019-12-30 RX ORDER — GABAPENTIN 300 MG/1
300 CAPSULE ORAL 3 TIMES DAILY
Status: DISCONTINUED | OUTPATIENT
Start: 2019-12-30 | End: 2019-12-30

## 2019-12-30 RX ORDER — METOCLOPRAMIDE HYDROCHLORIDE 5 MG/ML
10 INJECTION INTRAMUSCULAR; INTRAVENOUS EVERY 6 HOURS PRN
Status: DISCONTINUED | OUTPATIENT
Start: 2019-12-30 | End: 2019-12-30

## 2019-12-30 RX ORDER — SULFAMETHOXAZOLE AND TRIMETHOPRIM 800; 160 MG/1; MG/1
1 TABLET ORAL 2 TIMES DAILY
Status: ON HOLD | COMMUNITY
End: 2020-01-02 | Stop reason: HOSPADM

## 2019-12-30 RX ORDER — SODIUM CHLORIDE 9 MG/ML
10 INJECTION INTRAVENOUS 2 TIMES DAILY
Status: DISCONTINUED | OUTPATIENT
Start: 2019-12-30 | End: 2020-01-02 | Stop reason: HOSPADM

## 2019-12-30 RX ORDER — PANTOPRAZOLE SODIUM 40 MG/10ML
40 INJECTION, POWDER, LYOPHILIZED, FOR SOLUTION INTRAVENOUS 2 TIMES DAILY
Status: DISCONTINUED | OUTPATIENT
Start: 2019-12-30 | End: 2020-01-02 | Stop reason: HOSPADM

## 2019-12-30 RX ORDER — GABAPENTIN 300 MG/1
300 CAPSULE ORAL 3 TIMES DAILY
Status: DISCONTINUED | OUTPATIENT
Start: 2019-12-30 | End: 2020-01-02 | Stop reason: HOSPADM

## 2019-12-30 RX ORDER — METOCLOPRAMIDE 10 MG/1
10 TABLET ORAL 4 TIMES DAILY
Status: DISCONTINUED | OUTPATIENT
Start: 2019-12-30 | End: 2019-12-30

## 2019-12-30 RX ORDER — LISINOPRIL 5 MG/1
5 TABLET ORAL DAILY
Status: DISCONTINUED | OUTPATIENT
Start: 2019-12-30 | End: 2020-01-02 | Stop reason: HOSPADM

## 2019-12-30 RX ORDER — METOCLOPRAMIDE HYDROCHLORIDE 5 MG/ML
10 INJECTION INTRAMUSCULAR; INTRAVENOUS EVERY 6 HOURS
Status: DISCONTINUED | OUTPATIENT
Start: 2019-12-30 | End: 2020-01-02 | Stop reason: HOSPADM

## 2019-12-30 RX ORDER — DEXTROSE AND SODIUM CHLORIDE 5; .45 G/100ML; G/100ML
INJECTION, SOLUTION INTRAVENOUS CONTINUOUS PRN
Status: DISCONTINUED | OUTPATIENT
Start: 2019-12-30 | End: 2020-01-02 | Stop reason: HOSPADM

## 2019-12-30 RX ORDER — SODIUM CHLORIDE 0.9 % (FLUSH) 0.9 %
10 SYRINGE (ML) INJECTION PRN
Status: DISCONTINUED | OUTPATIENT
Start: 2019-12-30 | End: 2020-01-02 | Stop reason: HOSPADM

## 2019-12-30 RX ORDER — CEPHALEXIN 500 MG/1
500 CAPSULE ORAL 3 TIMES DAILY
Status: ON HOLD | COMMUNITY
End: 2020-01-02 | Stop reason: HOSPADM

## 2019-12-30 RX ORDER — NAPROXEN 500 MG/1
500 TABLET ORAL 2 TIMES DAILY WITH MEALS
Status: ON HOLD | COMMUNITY
End: 2020-01-02 | Stop reason: HOSPADM

## 2019-12-30 RX ORDER — SODIUM CHLORIDE 450 MG/100ML
INJECTION, SOLUTION INTRAVENOUS CONTINUOUS
Status: DISCONTINUED | OUTPATIENT
Start: 2019-12-30 | End: 2019-12-30

## 2019-12-30 RX ADMIN — POTASSIUM CHLORIDE 10 MEQ: 10 INJECTION, SOLUTION INTRAVENOUS at 13:56

## 2019-12-30 RX ADMIN — DEXTROSE AND SODIUM CHLORIDE: 5; 450 INJECTION, SOLUTION INTRAVENOUS at 07:33

## 2019-12-30 RX ADMIN — POTASSIUM CHLORIDE 10 MEQ: 10 INJECTION, SOLUTION INTRAVENOUS at 21:26

## 2019-12-30 RX ADMIN — PANTOPRAZOLE SODIUM 40 MG: 40 INJECTION, POWDER, FOR SOLUTION INTRAVENOUS at 09:19

## 2019-12-30 RX ADMIN — POTASSIUM CHLORIDE 10 MEQ: 10 INJECTION, SOLUTION INTRAVENOUS at 14:31

## 2019-12-30 RX ADMIN — PIPERACILLIN AND TAZOBACTAM 3.38 G: 3; .375 INJECTION, POWDER, LYOPHILIZED, FOR SOLUTION INTRAVENOUS at 21:26

## 2019-12-30 RX ADMIN — POTASSIUM CHLORIDE 10 MEQ: 10 INJECTION, SOLUTION INTRAVENOUS at 18:45

## 2019-12-30 RX ADMIN — ONDANSETRON 4 MG: 2 INJECTION INTRAMUSCULAR; INTRAVENOUS at 06:20

## 2019-12-30 RX ADMIN — Medication 10 ML: at 20:00

## 2019-12-30 RX ADMIN — PANTOPRAZOLE SODIUM 40 MG: 40 INJECTION, POWDER, FOR SOLUTION INTRAVENOUS at 19:59

## 2019-12-30 RX ADMIN — SODIUM PHOSPHATE, MONOBASIC, MONOHYDRATE 15 MMOL: 276; 142 INJECTION, SOLUTION INTRAVENOUS at 18:13

## 2019-12-30 RX ADMIN — HYDRALAZINE HYDROCHLORIDE 10 MG: 20 INJECTION INTRAMUSCULAR; INTRAVENOUS at 21:27

## 2019-12-30 RX ADMIN — SODIUM CHLORIDE 0.1 UNITS/KG/HR: 9 INJECTION, SOLUTION INTRAVENOUS at 00:34

## 2019-12-30 RX ADMIN — DEXTROSE AND SODIUM CHLORIDE: 5; 450 INJECTION, SOLUTION INTRAVENOUS at 13:55

## 2019-12-30 RX ADMIN — POTASSIUM CHLORIDE 10 MEQ: 10 INJECTION, SOLUTION INTRAVENOUS at 08:53

## 2019-12-30 RX ADMIN — SODIUM CHLORIDE, PRESERVATIVE FREE 10 ML: 5 INJECTION INTRAVENOUS at 08:01

## 2019-12-30 RX ADMIN — ONDANSETRON 4 MG: 2 INJECTION INTRAMUSCULAR; INTRAVENOUS at 14:18

## 2019-12-30 RX ADMIN — METOCLOPRAMIDE 10 MG: 5 INJECTION, SOLUTION INTRAMUSCULAR; INTRAVENOUS at 09:19

## 2019-12-30 RX ADMIN — SODIUM PHOSPHATE, MONOBASIC, MONOHYDRATE 15 MMOL: 276; 142 INJECTION, SOLUTION INTRAVENOUS at 23:01

## 2019-12-30 RX ADMIN — POTASSIUM CHLORIDE 10 MEQ: 10 INJECTION, SOLUTION INTRAVENOUS at 17:44

## 2019-12-30 RX ADMIN — SODIUM PHOSPHATE, MONOBASIC, MONOHYDRATE 10 MMOL: 276; 142 INJECTION, SOLUTION INTRAVENOUS at 10:41

## 2019-12-30 RX ADMIN — POTASSIUM CHLORIDE 10 MEQ: 10 INJECTION, SOLUTION INTRAVENOUS at 22:29

## 2019-12-30 RX ADMIN — METOCLOPRAMIDE 10 MG: 5 INJECTION, SOLUTION INTRAMUSCULAR; INTRAVENOUS at 15:07

## 2019-12-30 RX ADMIN — SODIUM CHLORIDE 4 UNITS/HR: 9 INJECTION, SOLUTION INTRAVENOUS at 14:23

## 2019-12-30 RX ADMIN — POTASSIUM CHLORIDE 10 MEQ: 10 INJECTION, SOLUTION INTRAVENOUS at 23:35

## 2019-12-30 RX ADMIN — ENOXAPARIN SODIUM 40 MG: 40 INJECTION SUBCUTANEOUS at 10:43

## 2019-12-30 RX ADMIN — POTASSIUM CHLORIDE 10 MEQ: 10 INJECTION, SOLUTION INTRAVENOUS at 09:54

## 2019-12-30 RX ADMIN — SODIUM PHOSPHATE, MONOBASIC, MONOHYDRATE 15 MMOL: 276; 142 INJECTION, SOLUTION INTRAVENOUS at 13:49

## 2019-12-30 RX ADMIN — POTASSIUM CHLORIDE 10 MEQ: 10 INJECTION, SOLUTION INTRAVENOUS at 08:01

## 2019-12-30 RX ADMIN — DICYCLOMINE HYDROCHLORIDE 10 MG: 20 INJECTION, SOLUTION INTRAMUSCULAR at 15:50

## 2019-12-30 RX ADMIN — ONDANSETRON 4 MG: 2 INJECTION INTRAMUSCULAR; INTRAVENOUS at 08:00

## 2019-12-30 RX ADMIN — PIPERACILLIN AND TAZOBACTAM 3.38 G: 3; .375 INJECTION, POWDER, LYOPHILIZED, FOR SOLUTION INTRAVENOUS at 14:31

## 2019-12-30 RX ADMIN — ONDANSETRON 4 MG: 2 INJECTION INTRAMUSCULAR; INTRAVENOUS at 23:12

## 2019-12-30 RX ADMIN — POTASSIUM CHLORIDE 10 MEQ: 10 INJECTION, SOLUTION INTRAVENOUS at 13:00

## 2019-12-30 RX ADMIN — POTASSIUM CHLORIDE 10 MEQ: 10 INJECTION, SOLUTION INTRAVENOUS at 18:13

## 2019-12-30 RX ADMIN — SODIUM CHLORIDE, PRESERVATIVE FREE 10 ML: 5 INJECTION INTRAVENOUS at 19:59

## 2019-12-30 RX ADMIN — DICYCLOMINE HYDROCHLORIDE 10 MG: 20 INJECTION, SOLUTION INTRAMUSCULAR at 21:41

## 2019-12-30 RX ADMIN — METOCLOPRAMIDE 10 MG: 5 INJECTION, SOLUTION INTRAMUSCULAR; INTRAVENOUS at 19:59

## 2019-12-30 ASSESSMENT — PAIN DESCRIPTION - ORIENTATION: ORIENTATION: RIGHT;LEFT

## 2019-12-30 ASSESSMENT — PAIN DESCRIPTION - LOCATION: LOCATION: ABDOMEN

## 2019-12-30 ASSESSMENT — PAIN DESCRIPTION - PROGRESSION: CLINICAL_PROGRESSION: RESOLVED

## 2019-12-30 ASSESSMENT — PAIN SCALES - GENERAL
PAINLEVEL_OUTOF10: 0
PAINLEVEL_OUTOF10: 4
PAINLEVEL_OUTOF10: 0

## 2019-12-30 ASSESSMENT — PAIN DESCRIPTION - ONSET: ONSET: ON-GOING

## 2019-12-30 ASSESSMENT — PAIN DESCRIPTION - PAIN TYPE: TYPE: ACUTE PAIN;CHRONIC PAIN

## 2019-12-30 ASSESSMENT — PAIN DESCRIPTION - DESCRIPTORS: DESCRIPTORS: DISCOMFORT

## 2019-12-30 ASSESSMENT — PAIN DESCRIPTION - FREQUENCY: FREQUENCY: INTERMITTENT

## 2019-12-30 NOTE — ED NOTES
Lab called for assistance with blood collection. MD Jerold Jeans made aware.       Debi Quevedo RN  12/29/19 2123

## 2019-12-30 NOTE — ED NOTES
Pt has repeatedly called out demending ice and water. Pt redirected that he's NPO than threatened to leave AMA again. MD made aware.       Severo Lan RN  12/30/19 5518

## 2019-12-30 NOTE — ED NOTES
Pt has continued to get OOB drinking water from sink. Pt redirected that he is NPO but states \"my mouth is dry and I do what I want\". MD made aware.       Philip Payan RN  12/30/19 4839

## 2019-12-30 NOTE — ED NOTES
Pt requesting clean urinal. Pt made aware that he cannot have one due to him drinking out of it before per RN. Soiled urinal emptied and given back for pt to use.       Norrine Epley  12/30/19 0149

## 2019-12-30 NOTE — ED NOTES
RN at bedside pt refusing to wear hospital gown stating \"I cant sleep with anything on, I sweat a lot\".       Rena Nichols RN  12/30/19 5758
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
## 2019-12-30 NOTE — ED NOTES
RN at bedside pt states \"I left AMA because they were not giving me my pain meds. 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 So I will be leaving\". MD Fallon Dates made aware.       Kingston Sadler RN  12/30/19 4421

## 2019-12-30 NOTE — ED NOTES
Bed: A-15  Expected date:   Expected time:   Means of arrival:   Comments:  [facility]  12/29/19 2019

## 2019-12-30 NOTE — PROGRESS NOTES
Chest: air entry equal bilaterally, no wheezing or crepitation  Heart: S1 and S2 heard, no murmur, no gallop or rub, regular rate  Abdomen: soft, ND , Nt, +BS  Extremities: no cyanosis, tenderness or erythema, peripheral pulses audible  Neurology: alert, oriented x3, able to move 4 limbs    Medications:   Medications:    sodium chloride flush  10 mL Intravenous 2 times per day    enoxaparin  40 mg Subcutaneous Daily    atorvastatin  10 mg Oral Daily    lisinopril  5 mg Oral Daily    pantoprazole  40 mg Intravenous BID    And    sodium chloride (PF)  10 mL Intravenous BID    gabapentin  300 mg Oral TID    piperacillin-tazobactam  3.375 g Intravenous Q8H      Infusions:    dextrose 5 % and 0.45 % NaCl 150 mL/hr at 12/30/19 1355    dextrose      insulin 6.32 Units/hr (12/30/19 1308)     PRN Meds: sodium chloride flush, 10 mL, PRN  magnesium hydroxide, 30 mL, Daily PRN  ondansetron, 4 mg, Q6H PRN  dextrose, 12.5 g, PRN  potassium chloride, 10 mEq, PRN  magnesium sulfate, 1 g, PRN  sodium phosphate IVPB, 10 mmol, PRN    Or  sodium phosphate IVPB, 15 mmol, PRN    Or  sodium phosphate IVPB, 20 mmol, PRN  dextrose 5 % and 0.45 % NaCl, , Continuous PRN  metoclopramide, 10 mg, Q6H PRN  glucose, 15 g, PRN  dextrose, 12.5 g, PRN  glucagon (rDNA), 1 mg, PRN  dextrose, 100 mL/hr, PRN          Electronically signed by Saeid Chauhan MD on 12/30/2019 at 1:55 PM

## 2019-12-30 NOTE — ED NOTES
Bedside report given to RN Wash Delay. Handoff of pts pain score and plan of care.       Vasquez Mcgee, RN  12/30/19 6849

## 2019-12-30 NOTE — ED NOTES
RN at bedside pt walking around room after being redirected by MD Triston Causey that he's on bedrest due to cental line placement in left groin. Pt became verbally aggressive towards staff stating \"I do what the fuck I want, I have been doing this all my life\". MD made aware.       Lexi Fields RN  12/30/19 6248

## 2019-12-30 NOTE — ED NOTES
Verbal order received for a one time dose Zofran 4mg IV from MD Moreno.       Mikell Kehr, RN  12/30/19 1630

## 2019-12-30 NOTE — ED PROVIDER NOTES
Does not apply route daily Check blood sugars 4-5 times per day    LIDOCAINE VISCOUS HCL (XYLOCAINE) 2 % SOLN SOLUTION    Take 15 mLs by mouth every 3 hours as needed for Irritation    LISINOPRIL (PRINIVIL;ZESTRIL) 5 MG TABLET    Take 1 tablet by mouth daily    METOCLOPRAMIDE (REGLAN) 10 MG TABLET    Take 1 tablet by mouth 4 times daily    PANTOPRAZOLE (PROTONIX) 40 MG TABLET    Take 1 tablet by mouth 2 times daily (before meals)       ALLERGIES     Ketorolac; Morphine; Morphine and related; Toradol [ketorolac tromethamine];  Tramadol; and Vicodin [hydrocodone-acetaminophen]    FAMILY HISTORY        Family History   Problem Relation Age of Onset    Diabetes Mother     Heart Disease Mother     High Blood Pressure Mother     Asthma Brother     Other Father         kidney disease    No Known Problems Maternal Grandmother     No Known Problems Maternal Grandfather     No Known Problems Paternal Grandmother     No Known Problems Paternal Grandfather        SOCIAL HISTORY       Social History     Socioeconomic History    Marital status: Legally      Spouse name: Not on file    Number of children: 1    Years of education: Not on file    Highest education level: Not on file   Occupational History    Occupation: unemployed   Social Needs    Financial resource strain: Not on file    Food insecurity:     Worry: Not on file     Inability: Not on file   [employer] needs:     Medical: Not on file     Non-medical: Not on file   Tobacco Use    Smoking status: Current Every Day Smoker     Start date: 3/26/2009    Smokeless tobacco: Never Used    Tobacco comment: black and mild 2  x daily   Substance and Sexual Activity    Alcohol use: Yes     Comment: socially 2 shots per month     Drug use: Yes     Frequency: 3.0 times per week     Types: Marijuana    Sexual activity: Yes     Partners: Female   Lifestyle    Physical activity:     Days per week: Not on file     Minutes per session: Not on file  Stress: Not on file   Relationships    Social connections:     Talks on phone: Not on file     Gets together: Not on file     Attends [employer] service: Not on file     Active member of club or organization: Not on file     Attends meetings of clubs or organizations: Not on file     Relationship status: Not on file    Intimate partner violence:     Fear of current or ex partner: Not on file     Emotionally abused: Not on file     Physically abused: Not on file     Forced sexual activity: Not on file   Other Topics Concern    Not on file   Social History Narrative    Not on file       PHYSICAL EXAM       ED Triage Vitals [12/29/19 2022]   BP Temp Temp Source Pulse Resp SpO2 Height Weight   (!) 189/85 98.1 °F (36.7 °C) Oral 108 (!) 33 100 % 6' 2\" (1.88 m) 151 lb 10.8 oz (68.8 kg)       Physical Exam  Constitutional:       General: He is not in acute distress. Appearance: He is well-developed. He is not diaphoretic. HENT:      Head: Normocephalic and atraumatic. Eyes:      General:         Right eye: No discharge. Left eye: No discharge. Pupils: Pupils are equal, round, and reactive to light. Neck:      Musculoskeletal: Normal range of motion. Thyroid: No thyromegaly. Trachea: No tracheal deviation. Cardiovascular:      Rate and Rhythm: Normal rate and regular rhythm. Heart sounds: No murmur. Pulmonary:      Breath sounds: No wheezing or rales. Chest:      Chest wall: No tenderness. Abdominal:      General: There is no distension. Palpations: Abdomen is soft. There is no mass. Tenderness: There is no tenderness. There is no guarding or rebound. Musculoskeletal: Normal range of motion. General: No tenderness or deformity. Skin:     General: Skin is warm. Coloration: Skin is not pale. Findings: No erythema or rash. Neurological:      Mental Status: He is alert. Cranial Nerves: No cranial nerve deficit.       Motor: No abnormal following components:    Beta-Hydroxybutyrate 6.68 (*)     All other components within normal limits    Narrative:     Mick Kennedy tel. [phone],  Chemistry results called to and read back by Delio Elliott rn, 12/29/2019  23:12, by Rena [place]  Performed at:  [place] [address]   Phone [phone]   BLOOD GAS, VENOUS - Abnormal; Notable for the following components:    pH, Ronan 7.296 (*)     pCO2, Ronan 27.9 (*)     HCO3, Venous 13 (*)     All other components within normal limits    Narrative:     Performed at:  [place] [address]   Phone [phone]   ACETAMINOPHEN LEVEL - Abnormal; Notable for the following components:    Acetaminophen Level <5 (*)     All other components within normal limits    Narrative:     [address],  Chemistry results called to and read back by Delio Elliott rn, 12/29/2019  23:12, by Rena [place]  Performed at:  [place] [address]   Phone [phone]   SALICYLATE LEVEL - Abnormal; Notable for the following components:    Salicylate, Serum <8.8 (*)     All other components within normal limits    Narrative:     Mick Kennedy tel. [phone],  Chemistry results called to and read back by Delio Elliott rn, 12/29/2019  23:12, by Rena [place]  Performed at:  [place] [address]   Phone [phone]   POCT GLUCOSE - Abnormal; Notable for the following components:    POC Glucose 231 (*)     All other components within normal limits    Narrative:     Performed at:  [address] [address]   Phone [phone]   POCT GLUCOSE - Normal   HEPATIC FUNCTION PANEL    Narrative:     Mick Kennedy tel. [phone],  Chemistry results called to significant/life threatening deterioration in the patient's condition which required my urgent intervention. PROCEDURES:  Central Line  Date/Time: 12/29/2019 11:20 PM  Performed by: Saeid Mcintyre MD  Authorized by: Saeid Mcintyre MD     Consent:     Consent obtained:  Written    Consent given by:  Patient    Risks discussed:  Arterial puncture, bleeding, infection, incorrect placement, pneumothorax and nerve damage    Alternatives discussed:  No treatment  Pre-procedure details:     Hand hygiene: Hand hygiene performed prior to insertion      Sterile barrier technique: All elements of maximal sterile technique followed      Skin preparation:  2% chlorhexidine    Skin preparation agent: Skin preparation agent completely dried prior to procedure    Anesthesia (see MAR for exact dosages): Anesthesia method:  Local infiltration    Local anesthetic:  Lidocaine 1% w/o epi  Procedure details:     Location:  L femoral    Landmarks identified: yes      Ultrasound guidance: yes      Sterile ultrasound techniques: Sterile gel and sterile probe covers were used      Number of attempts:  2    Successful placement: yes    Post-procedure details:     Post-procedure:  Dressing applied    Assessment:  Blood return through all ports and free fluid flow    Patient tolerance of procedure: Tolerated well, no immediate complications        FINAL IMPRESSION      1. Diabetic ketoacidosis without coma associated with diabetes mellitus due to underlying condition ([address]    2. Esophagitis    3. Granulomatous disease, chronic ([address]    4. Gallstones    5.  Hepatic steatosis          DISPOSITION/PLAN   DISPOSITION      Admission to ICU    (Please note that portions ofthis note were completed with a voice recognition program.  Efforts were made to edit the dictations but occasionally words are mis-transcribed.)    Saeid Mcintyre MD(electronically signed)  Attending Emergency Physician        Saeid Mcintyre MD  12/29/19 5878

## 2019-12-30 NOTE — ED NOTES
RN at bedside educating pt on requesting bath wipes for taqueria care. Pt than stated \"I do what I want\". MD made aware.       Severo Lan, RN  12/30/19 4434

## 2019-12-30 NOTE — ED NOTES
RN at bedside updating pt on plan of care regarding qh blood sugars. Pt than states \"dont wake me up unless you have too\". RN than tried to educate pt per MD order its required  To wake him up every hour. Than stated \"whatever\".       Yudelka Rogel RN  12/30/19 8238

## 2019-12-30 NOTE — ED NOTES
RN at bedside pt demanding IV pain meds. RN made several attempts to redirect pts behavior. Pt threatening to leave AMA stating \"if yall aint go give me what I need then I can go to [facility]\". MD Triston Causey made aware.       Lexi Fields RN  12/29/19 2050
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
## 2019-12-30 NOTE — H&P
Hospital Medicine History & Physical      PCP: No primary care provider on file. Date of Admission: 12/29/2019    Date of Service: Pt seen/examined on 12/30/19 and Admitted to Inpatient with expected LOS greater than two midnights due to medical therapy. Chief Complaint:  Abdominal pain       History Of Present Illness:      Rafael Chavez is 53 y.o. male who presented with complaint of abdominal pain. Symptom onset was acute for a time period of 1 day. The severity is described as severe. The course of his symptoms over time is constant. The symptoms improved with pain med and worsened with none. The patient's symptom is associated with DKA. Rafael Chavez is 53 y.o. male with history of DM type 1, non compliance. He just signed out AMA from the ICU at the beginning of this shift  He is now seen in the ED. He has changed his mind and would like to continue treatment for DKA. He says his entire body hurts including the abdomen. In the ED, he is started on the DKA protocol       Past Medical History:          Diagnosis Date    Diabetes mellitus ([address]     Gastroparesis     Hypertension        Past Surgical History:          Procedure Laterality Date    BONY PELVIS SURGERY      HIP SURGERY Left 2006    pins and rods- plate    UPPER GASTROINTESTINAL ENDOSCOPY N/A 12/2/2019    EGD BIOPSY performed by Hebert White MD at [facility]       Medications Prior to Admission:      Prior to Admission medications    Medication Sig Start Date End Date Taking? Authorizing Provider   gabapentin (NEURONTIN) 600 MG tablet Take by mouth 3 times daily.  gabapentin (NEURONTIN) 300 MG capsule   300mg PO daily x 1 day (12/16/19), then 300mg PO BID x 1 day (12/17/19), then 300mg PO TID x 7 days (12/18/19-12/24/19), then 450mg TID x 7 days (12/25/19-12/31/19), then 600mg PO TID (starting 1/1/20)    Historical Provider, MD   insulin lispro (ADMELOG) 100 UNIT/ML injection vial 5 units for meals and 2 units oriented, thought content appropriate, normal insight  Capillary Refill: Brisk,< 3 seconds   Peripheral Pulses: +2 palpable, equal bilaterally       Labs:     Recent Labs     12/29/19  0617 12/29/19  2205   WBC 14.5* 32.5*   HGB 14.0 12.4*   HCT 44.0 39.9*    319     Recent Labs     12/29/19  1330 12/29/19  1741 12/29/19  2205    143 141   K 4.1 4.3 4.6    105 101   CO2 12* 16* 11*   BUN 16 13 15   CREATININE 1.1 1.1 1.2   CALCIUM 9.1 9.1 9.1   PHOS 3.6 1.5*  --      Recent Labs     12/29/19  0617 12/29/19 2205   AST 47* 21   ALT 29 22   BILIDIR  --  <0.2   BILITOT 0.4 0.3   ALKPHOS 99 89     No results for input(s): INR in the last 72 hours. Recent Labs     12/29/19  0617 12/29/19 2205   TROPONINI <0.01 <0.01       Urinalysis:      Lab Results   Component Value Date    NITRU Negative 12/29/2019    WBCUA 0 11/28/2019    BACTERIA 1+ 12/15/2010    RBCUA 0 11/28/2019    BLOODU Negative 12/29/2019    SPECGRAV 1.028 12/29/2019    GLUCOSEU >=1000 12/29/2019    GLUCOSEU >=1000 12/15/2010       Radiology:     CXR: I have reviewed the CXR with the following interpretation: Unremarkable       CT ABDOMEN PELVIS W IV CONTRAST Additional Contrast? None   Preliminary Result   1. No acute findings within the abdomen or pelvis. 2. Stable distal esophageal wall thickening suggesting an esophagitis. Findings are similar on the previous CT of the abdomen. 3. Mild hepatic steatosis. 4. Cholelithiasis with no acute features. 5. Poorly defined soft tissue density within the bladder lumen, likely   related to contrast.  Correlate for blood in the urinalysis. CT CHEST PULMONARY EMBOLISM W CONTRAST   Preliminary Result   1. No evidence of pulmonary embolic disease. 2. No acute pulmonary findings. Evidence of old granulomatous disease. 3. Esophageal wall thickening, most pronounced distally consistent with an   esophagitis.          XR CHEST STANDARD (2 VW)   Final Result   No acute cardiopulmonary

## 2019-12-30 NOTE — PROGRESS NOTES
53years old male with history of type 1 diabetes mellitus admitted because of DKA secondary to noncompliance with insulin. Patient continued to have upper abdominal pain and reported nausea.   Evidence of esophagitis on CT abdomen  Did consult GI for possible endoscopy  Switch to IV pantoprazole twice a day and switch to IV Reglan  Continue on insulin drip protocol for DKA  Continue on IV antibiotic empirically for severe leukocytosis follow-up panculture culture  Replace serum electrolytes

## 2019-12-30 NOTE — CONSULTS
[place]  Heart: regular rate and rhythm, normal S1 and S2, no murmurs or rubs  Abdomen: soft, non-distended, non-tender. Bowel sounds normal. No masses,  no organomegaly. Extremities: atraumatic, no cyanosis or edema  Skin: warm and dry, no jaundice  Neuro: Grossly intact, A&OX3      LABS AND IMAGING   Laboratory   Recent Labs     12/29/19  0617 12/29/19 2205 12/30/19  0620   WBC 14.5* 32.5* 25.0*   HGB 14.0 12.4* 12.4*   HCT 44.0 39.9* 38.4*   MCV 88.8 88.3 86.8    319 300     Recent Labs     12/29/19  1330 12/29/19  1741 12/29/19  2205 12/30/19  0620 12/30/19  0846    143 141 145 142   K 4.1 4.3 4.6 4.0 4.2  4.2    105 101 110 107   CO2 12* 16* 11* 15* 14*   PHOS 3.6 1.5*  --   --  2.6   BUN 16 13 15 12 13   CREATININE 1.1 1.1 1.2 1.1 1.1     Recent Labs     12/29/19  0617 12/29/19  2205   AST 47* 21   ALT 29 22   BILIDIR  --  <0.2   BILITOT 0.4 0.3   ALKPHOS 99 89     Recent Labs     12/29/19  2205   LIPASE 4.0*     No results for input(s): PROTIME, INR in the last 72 hours. Imaging  CT ABDOMEN PELVIS W IV CONTRAST Additional Contrast? None   Preliminary Result   1. No acute findings within the abdomen or pelvis. 2. Stable distal esophageal wall thickening suggesting an esophagitis. Findings are similar on the previous CT of the abdomen. 3. Mild hepatic steatosis. 4. Cholelithiasis with no acute features. 5. Poorly defined soft tissue density within the bladder lumen, likely   related to contrast.  Correlate for blood in the urinalysis. CT CHEST PULMONARY EMBOLISM W CONTRAST   Preliminary Result   1. No evidence of pulmonary embolic disease. 2. No acute pulmonary findings. Evidence of old granulomatous disease. 3. Esophageal wall thickening, most pronounced distally consistent with an   esophagitis. XR CHEST STANDARD (2 VW)   Final Result   No acute cardiopulmonary disease.                ASSESSMENT AND RECOMMENDATIONS   53 y.o. male with a PMH of  PMH of DM,

## 2019-12-31 LAB
A/G RATIO: 1.4 (ref 1.1–2.2)
ALBUMIN SERPL-MCNC: 3.8 G/DL (ref 3.4–5)
ALP BLD-CCNC: 77 U/L (ref 40–129)
ALT SERPL-CCNC: 20 U/L (ref 10–40)
ANION GAP SERPL CALCULATED.3IONS-SCNC: 15 MMOL/L (ref 3–16)
ANION GAP SERPL CALCULATED.3IONS-SCNC: 16 MMOL/L (ref 3–16)
ANION GAP SERPL CALCULATED.3IONS-SCNC: 16 MMOL/L (ref 3–16)
ANION GAP SERPL CALCULATED.3IONS-SCNC: 17 MMOL/L (ref 3–16)
ANION GAP SERPL CALCULATED.3IONS-SCNC: 19 MMOL/L (ref 3–16)
ANION GAP SERPL CALCULATED.3IONS-SCNC: 20 MMOL/L (ref 3–16)
AST SERPL-CCNC: 28 U/L (ref 15–37)
BILIRUB SERPL-MCNC: <0.2 MG/DL (ref 0–1)
BUN BLDV-MCNC: 2 MG/DL (ref 7–20)
BUN BLDV-MCNC: 3 MG/DL (ref 7–20)
BUN BLDV-MCNC: 3 MG/DL (ref 7–20)
BUN BLDV-MCNC: 5 MG/DL (ref 7–20)
BUN BLDV-MCNC: <2 MG/DL (ref 7–20)
BUN BLDV-MCNC: <2 MG/DL (ref 7–20)
CALCIUM SERPL-MCNC: 8.2 MG/DL (ref 8.3–10.6)
CALCIUM SERPL-MCNC: 8.2 MG/DL (ref 8.3–10.6)
CALCIUM SERPL-MCNC: 8.3 MG/DL (ref 8.3–10.6)
CALCIUM SERPL-MCNC: 8.4 MG/DL (ref 8.3–10.6)
CALCIUM SERPL-MCNC: 8.5 MG/DL (ref 8.3–10.6)
CALCIUM SERPL-MCNC: 8.8 MG/DL (ref 8.3–10.6)
CHLORIDE BLD-SCNC: 100 MMOL/L (ref 99–110)
CHLORIDE BLD-SCNC: 101 MMOL/L (ref 99–110)
CHLORIDE BLD-SCNC: 101 MMOL/L (ref 99–110)
CHLORIDE BLD-SCNC: 96 MMOL/L (ref 99–110)
CHLORIDE BLD-SCNC: 98 MMOL/L (ref 99–110)
CHLORIDE BLD-SCNC: 99 MMOL/L (ref 99–110)
CO2: 18 MMOL/L (ref 21–32)
CO2: 18 MMOL/L (ref 21–32)
CO2: 19 MMOL/L (ref 21–32)
CO2: 21 MMOL/L (ref 21–32)
CREAT SERPL-MCNC: 0.8 MG/DL (ref 0.9–1.3)
CREAT SERPL-MCNC: 0.9 MG/DL (ref 0.9–1.3)
CREAT SERPL-MCNC: 0.9 MG/DL (ref 0.9–1.3)
GFR AFRICAN AMERICAN: >60
GFR NON-AFRICAN AMERICAN: >60
GLOBULIN: 2.8 G/DL
GLUCOSE BLD-MCNC: 124 MG/DL (ref 70–99)
GLUCOSE BLD-MCNC: 127 MG/DL (ref 70–99)
GLUCOSE BLD-MCNC: 131 MG/DL (ref 70–99)
GLUCOSE BLD-MCNC: 145 MG/DL (ref 70–99)
GLUCOSE BLD-MCNC: 148 MG/DL (ref 70–99)
GLUCOSE BLD-MCNC: 154 MG/DL (ref 70–99)
GLUCOSE BLD-MCNC: 155 MG/DL (ref 70–99)
GLUCOSE BLD-MCNC: 163 MG/DL (ref 70–99)
GLUCOSE BLD-MCNC: 165 MG/DL (ref 70–99)
GLUCOSE BLD-MCNC: 169 MG/DL (ref 70–99)
GLUCOSE BLD-MCNC: 172 MG/DL (ref 70–99)
GLUCOSE BLD-MCNC: 173 MG/DL (ref 70–99)
GLUCOSE BLD-MCNC: 174 MG/DL (ref 70–99)
GLUCOSE BLD-MCNC: 182 MG/DL (ref 70–99)
GLUCOSE BLD-MCNC: 185 MG/DL (ref 70–99)
GLUCOSE BLD-MCNC: 196 MG/DL (ref 70–99)
GLUCOSE BLD-MCNC: 200 MG/DL (ref 70–99)
GLUCOSE BLD-MCNC: 219 MG/DL (ref 70–99)
GLUCOSE BLD-MCNC: 223 MG/DL (ref 70–99)
GLUCOSE BLD-MCNC: 226 MG/DL (ref 70–99)
GLUCOSE BLD-MCNC: 232 MG/DL (ref 70–99)
GLUCOSE BLD-MCNC: 238 MG/DL (ref 70–99)
GLUCOSE BLD-MCNC: 253 MG/DL (ref 70–99)
GLUCOSE BLD-MCNC: 256 MG/DL (ref 70–99)
GLUCOSE BLD-MCNC: 266 MG/DL (ref 70–99)
HCT VFR BLD CALC: 37.8 % (ref 40.5–52.5)
HEMOGLOBIN: 12.3 G/DL (ref 13.5–17.5)
MAGNESIUM: 1.5 MG/DL (ref 1.8–2.4)
MAGNESIUM: 2 MG/DL (ref 1.8–2.4)
MAGNESIUM: 2.1 MG/DL (ref 1.8–2.4)
MAGNESIUM: 2.2 MG/DL (ref 1.8–2.4)
MCH RBC QN AUTO: 28.2 PG (ref 26–34)
MCHC RBC AUTO-ENTMCNC: 32.6 G/DL (ref 31–36)
MCV RBC AUTO: 86.3 FL (ref 80–100)
PDW BLD-RTO: 15.1 % (ref 12.4–15.4)
PERFORMED ON: ABNORMAL
PHOSPHORUS: 1.6 MG/DL (ref 2.5–4.9)
PHOSPHORUS: 2 MG/DL (ref 2.5–4.9)
PHOSPHORUS: 2.1 MG/DL (ref 2.5–4.9)
PHOSPHORUS: 2.2 MG/DL (ref 2.5–4.9)
PHOSPHORUS: 2.2 MG/DL (ref 2.5–4.9)
PHOSPHORUS: 2.9 MG/DL (ref 2.5–4.9)
PLATELET # BLD: 255 K/UL (ref 135–450)
PMV BLD AUTO: 7.4 FL (ref 5–10.5)
POTASSIUM SERPL-SCNC: 3.1 MMOL/L (ref 3.5–5.1)
POTASSIUM SERPL-SCNC: 3.2 MMOL/L (ref 3.5–5.1)
POTASSIUM SERPL-SCNC: 3.6 MMOL/L (ref 3.5–5.1)
POTASSIUM SERPL-SCNC: 3.7 MMOL/L (ref 3.5–5.1)
PROCALCITONIN: 1.17 NG/ML (ref 0–0.15)
RBC # BLD: 4.38 M/UL (ref 4.2–5.9)
REPORT: NORMAL
SODIUM BLD-SCNC: 134 MMOL/L (ref 136–145)
SODIUM BLD-SCNC: 134 MMOL/L (ref 136–145)
SODIUM BLD-SCNC: 136 MMOL/L (ref 136–145)
SODIUM BLD-SCNC: 136 MMOL/L (ref 136–145)
SODIUM BLD-SCNC: 138 MMOL/L (ref 136–145)
SODIUM BLD-SCNC: 138 MMOL/L (ref 136–145)
TOTAL PROTEIN: 6.6 G/DL (ref 6.4–8.2)
VANCOMYCIN RANDOM: 32.2 UG/ML
WBC # BLD: 15.3 K/UL (ref 4–11)

## 2019-12-31 PROCEDURE — 80202 ASSAY OF VANCOMYCIN: CPT

## 2019-12-31 PROCEDURE — 2580000003 HC RX 258: Performed by: HOSPITALIST

## 2019-12-31 PROCEDURE — 2580000003 HC RX 258: Performed by: EMERGENCY MEDICINE

## 2019-12-31 PROCEDURE — 6360000002 HC RX W HCPCS: Performed by: INTERNAL MEDICINE

## 2019-12-31 PROCEDURE — 36415 COLL VENOUS BLD VENIPUNCTURE: CPT

## 2019-12-31 PROCEDURE — C9113 INJ PANTOPRAZOLE SODIUM, VIA: HCPCS | Performed by: HOSPITALIST

## 2019-12-31 PROCEDURE — 2000000000 HC ICU R&B

## 2019-12-31 PROCEDURE — 83735 ASSAY OF MAGNESIUM: CPT

## 2019-12-31 PROCEDURE — 84145 PROCALCITONIN (PCT): CPT

## 2019-12-31 PROCEDURE — 6370000000 HC RX 637 (ALT 250 FOR IP): Performed by: EMERGENCY MEDICINE

## 2019-12-31 PROCEDURE — 6360000002 HC RX W HCPCS: Performed by: HOSPITALIST

## 2019-12-31 PROCEDURE — 6370000000 HC RX 637 (ALT 250 FOR IP): Performed by: INTERNAL MEDICINE

## 2019-12-31 PROCEDURE — 84100 ASSAY OF PHOSPHORUS: CPT

## 2019-12-31 PROCEDURE — 87040 BLOOD CULTURE FOR BACTERIA: CPT

## 2019-12-31 PROCEDURE — 80053 COMPREHEN METABOLIC PANEL: CPT

## 2019-12-31 PROCEDURE — 2580000003 HC RX 258: Performed by: INTERNAL MEDICINE

## 2019-12-31 PROCEDURE — 85027 COMPLETE CBC AUTOMATED: CPT

## 2019-12-31 PROCEDURE — 94760 N-INVAS EAR/PLS OXIMETRY 1: CPT

## 2019-12-31 PROCEDURE — 2500000003 HC RX 250 WO HCPCS: Performed by: HOSPITALIST

## 2019-12-31 PROCEDURE — 36592 COLLECT BLOOD FROM PICC: CPT

## 2019-12-31 RX ORDER — DICYCLOMINE HYDROCHLORIDE 10 MG/1
10 CAPSULE ORAL
Status: DISCONTINUED | OUTPATIENT
Start: 2019-12-31 | End: 2020-01-02 | Stop reason: HOSPADM

## 2019-12-31 RX ADMIN — POTASSIUM CHLORIDE 10 MEQ: 10 INJECTION, SOLUTION INTRAVENOUS at 07:10

## 2019-12-31 RX ADMIN — METOCLOPRAMIDE 10 MG: 5 INJECTION, SOLUTION INTRAMUSCULAR; INTRAVENOUS at 03:09

## 2019-12-31 RX ADMIN — POTASSIUM CHLORIDE 10 MEQ: 10 INJECTION, SOLUTION INTRAVENOUS at 14:23

## 2019-12-31 RX ADMIN — Medication 6.25 MG: at 12:47

## 2019-12-31 RX ADMIN — PIPERACILLIN AND TAZOBACTAM 3.38 G: 3; .375 INJECTION, POWDER, LYOPHILIZED, FOR SOLUTION INTRAVENOUS at 15:05

## 2019-12-31 RX ADMIN — PIPERACILLIN AND TAZOBACTAM 3.38 G: 3; .375 INJECTION, POWDER, LYOPHILIZED, FOR SOLUTION INTRAVENOUS at 20:59

## 2019-12-31 RX ADMIN — DEXTROSE AND SODIUM CHLORIDE: 5; 450 INJECTION, SOLUTION INTRAVENOUS at 22:36

## 2019-12-31 RX ADMIN — SODIUM PHOSPHATE, MONOBASIC, MONOHYDRATE 15 MMOL: 276; 142 INJECTION, SOLUTION INTRAVENOUS at 06:05

## 2019-12-31 RX ADMIN — SODIUM CHLORIDE, PRESERVATIVE FREE 10 ML: 5 INJECTION INTRAVENOUS at 08:28

## 2019-12-31 RX ADMIN — DEXTROSE AND SODIUM CHLORIDE: 5; 450 INJECTION, SOLUTION INTRAVENOUS at 09:04

## 2019-12-31 RX ADMIN — SODIUM CHLORIDE, PRESERVATIVE FREE 10 ML: 5 INJECTION INTRAVENOUS at 20:59

## 2019-12-31 RX ADMIN — GABAPENTIN 300 MG: 300 CAPSULE ORAL at 20:59

## 2019-12-31 RX ADMIN — HYDRALAZINE HYDROCHLORIDE 10 MG: 20 INJECTION INTRAMUSCULAR; INTRAVENOUS at 18:36

## 2019-12-31 RX ADMIN — DEXTROSE AND SODIUM CHLORIDE: 5; 450 INJECTION, SOLUTION INTRAVENOUS at 02:07

## 2019-12-31 RX ADMIN — METOCLOPRAMIDE 10 MG: 5 INJECTION, SOLUTION INTRAMUSCULAR; INTRAVENOUS at 14:24

## 2019-12-31 RX ADMIN — POTASSIUM CHLORIDE 10 MEQ: 10 INJECTION, SOLUTION INTRAVENOUS at 03:59

## 2019-12-31 RX ADMIN — HYDRALAZINE HYDROCHLORIDE 10 MG: 20 INJECTION INTRAMUSCULAR; INTRAVENOUS at 12:34

## 2019-12-31 RX ADMIN — METOCLOPRAMIDE 10 MG: 5 INJECTION, SOLUTION INTRAMUSCULAR; INTRAVENOUS at 20:59

## 2019-12-31 RX ADMIN — POTASSIUM CHLORIDE 10 MEQ: 10 INJECTION, SOLUTION INTRAVENOUS at 13:12

## 2019-12-31 RX ADMIN — POTASSIUM CHLORIDE 10 MEQ: 10 INJECTION, SOLUTION INTRAVENOUS at 06:06

## 2019-12-31 RX ADMIN — POTASSIUM CHLORIDE 10 MEQ: 10 INJECTION, SOLUTION INTRAVENOUS at 01:27

## 2019-12-31 RX ADMIN — PIPERACILLIN AND TAZOBACTAM 3.38 G: 3; .375 INJECTION, POWDER, LYOPHILIZED, FOR SOLUTION INTRAVENOUS at 06:29

## 2019-12-31 RX ADMIN — POTASSIUM CHLORIDE 10 MEQ: 10 INJECTION, SOLUTION INTRAVENOUS at 08:15

## 2019-12-31 RX ADMIN — SODIUM PHOSPHATE, MONOBASIC, MONOHYDRATE 15 MMOL: 276; 142 INJECTION, SOLUTION INTRAVENOUS at 21:37

## 2019-12-31 RX ADMIN — POTASSIUM CHLORIDE 10 MEQ: 10 INJECTION, SOLUTION INTRAVENOUS at 05:08

## 2019-12-31 RX ADMIN — POTASSIUM CHLORIDE 10 MEQ: 10 INJECTION, SOLUTION INTRAVENOUS at 18:06

## 2019-12-31 RX ADMIN — ONDANSETRON 4 MG: 2 INJECTION INTRAMUSCULAR; INTRAVENOUS at 18:36

## 2019-12-31 RX ADMIN — DICYCLOMINE HYDROCHLORIDE 10 MG: 10 CAPSULE ORAL at 16:35

## 2019-12-31 RX ADMIN — PANTOPRAZOLE SODIUM 40 MG: 40 INJECTION, POWDER, FOR SOLUTION INTRAVENOUS at 20:59

## 2019-12-31 RX ADMIN — MAGNESIUM SULFATE HEPTAHYDRATE 1 G: 1 INJECTION, SOLUTION INTRAVENOUS at 01:30

## 2019-12-31 RX ADMIN — PANTOPRAZOLE SODIUM 40 MG: 40 INJECTION, POWDER, FOR SOLUTION INTRAVENOUS at 08:27

## 2019-12-31 RX ADMIN — Medication 10 ML: at 08:28

## 2019-12-31 RX ADMIN — POTASSIUM CHLORIDE 10 MEQ: 10 INJECTION, SOLUTION INTRAVENOUS at 04:03

## 2019-12-31 RX ADMIN — SODIUM CHLORIDE 2.01 UNITS/HR: 9 INJECTION, SOLUTION INTRAVENOUS at 18:34

## 2019-12-31 RX ADMIN — Medication 1500 MG: at 14:31

## 2019-12-31 RX ADMIN — SODIUM PHOSPHATE, MONOBASIC, MONOHYDRATE 15 MMOL: 276; 142 INJECTION, SOLUTION INTRAVENOUS at 15:42

## 2019-12-31 RX ADMIN — ONDANSETRON 4 MG: 2 INJECTION INTRAMUSCULAR; INTRAVENOUS at 12:29

## 2019-12-31 RX ADMIN — MAGNESIUM SULFATE HEPTAHYDRATE 1 G: 1 INJECTION, SOLUTION INTRAVENOUS at 03:06

## 2019-12-31 RX ADMIN — POTASSIUM CHLORIDE 10 MEQ: 10 INJECTION, SOLUTION INTRAVENOUS at 22:33

## 2019-12-31 RX ADMIN — POTASSIUM CHLORIDE 10 MEQ: 10 INJECTION, SOLUTION INTRAVENOUS at 12:05

## 2019-12-31 RX ADMIN — POTASSIUM CHLORIDE 10 MEQ: 10 INJECTION, SOLUTION INTRAVENOUS at 02:54

## 2019-12-31 RX ADMIN — POTASSIUM CHLORIDE 10 MEQ: 10 INJECTION, SOLUTION INTRAVENOUS at 23:28

## 2019-12-31 RX ADMIN — DEXTROSE AND SODIUM CHLORIDE: 5; 450 INJECTION, SOLUTION INTRAVENOUS at 15:44

## 2019-12-31 RX ADMIN — POTASSIUM CHLORIDE 10 MEQ: 10 INJECTION, SOLUTION INTRAVENOUS at 18:37

## 2019-12-31 RX ADMIN — POTASSIUM CHLORIDE 10 MEQ: 10 INJECTION, SOLUTION INTRAVENOUS at 09:16

## 2019-12-31 RX ADMIN — POTASSIUM CHLORIDE 10 MEQ: 10 INJECTION, SOLUTION INTRAVENOUS at 17:26

## 2019-12-31 RX ADMIN — Medication 6.25 MG: at 19:45

## 2019-12-31 RX ADMIN — POTASSIUM CHLORIDE 10 MEQ: 10 INJECTION, SOLUTION INTRAVENOUS at 21:12

## 2019-12-31 RX ADMIN — LIDOCAINE HYDROCHLORIDE: 20 SOLUTION ORAL; TOPICAL at 16:35

## 2019-12-31 RX ADMIN — ENOXAPARIN SODIUM 40 MG: 40 INJECTION SUBCUTANEOUS at 08:27

## 2019-12-31 RX ADMIN — Medication 10 ML: at 20:59

## 2019-12-31 RX ADMIN — ONDANSETRON 4 MG: 2 INJECTION INTRAMUSCULAR; INTRAVENOUS at 06:25

## 2019-12-31 RX ADMIN — METOCLOPRAMIDE 10 MG: 5 INJECTION, SOLUTION INTRAMUSCULAR; INTRAVENOUS at 08:27

## 2019-12-31 RX ADMIN — POTASSIUM CHLORIDE 10 MEQ: 10 INJECTION, SOLUTION INTRAVENOUS at 10:14

## 2019-12-31 RX ADMIN — POTASSIUM CHLORIDE 10 MEQ: 10 INJECTION, SOLUTION INTRAVENOUS at 15:00

## 2019-12-31 ASSESSMENT — PAIN DESCRIPTION - ONSET: ONSET: ON-GOING

## 2019-12-31 ASSESSMENT — PAIN DESCRIPTION - LOCATION: LOCATION: ABDOMEN

## 2019-12-31 ASSESSMENT — PAIN DESCRIPTION - DESCRIPTORS: DESCRIPTORS: DISCOMFORT

## 2019-12-31 ASSESSMENT — PAIN SCALES - GENERAL
PAINLEVEL_OUTOF10: 9
PAINLEVEL_OUTOF10: 0

## 2019-12-31 ASSESSMENT — PAIN DESCRIPTION - FREQUENCY: FREQUENCY: INTERMITTENT

## 2019-12-31 ASSESSMENT — PAIN DESCRIPTION - PROGRESSION: CLINICAL_PROGRESSION: NOT CHANGED

## 2019-12-31 ASSESSMENT — PAIN - FUNCTIONAL ASSESSMENT: PAIN_FUNCTIONAL_ASSESSMENT: ACTIVITIES ARE NOT PREVENTED

## 2019-12-31 ASSESSMENT — PAIN DESCRIPTION - PAIN TYPE: TYPE: ACUTE PAIN

## 2019-12-31 ASSESSMENT — PAIN DESCRIPTION - ORIENTATION: ORIENTATION: RIGHT;LEFT

## 2019-12-31 NOTE — PROGRESS NOTES
Temp max:  Temp (24hrs), Av.3 °F (36.8 °C), Min:97.8 °F (36.6 °C), Max:98.7 °F (37.1 °C)      Recent Labs     19  2205 19  0620 19  0410   WBC 32.5* 25.0* 15.3*       Recent Labs     19  0410 19  0820 19  1215   BUN 3* 3* 2*   CREATININE 0.9 0.8* 0.8*         Intake/Output Summary (Last 24 hours) at 2019 1450  Last data filed at 2019 1431  Gross per 24 hour   Intake 7130.02 ml   Output 3625 ml   Net 3505.02 ml       Culture Results:      Ht Readings from Last 1 Encounters:   19 6' 2\" (1.88 m)        Wt Readings from Last 1 Encounters:   19 152 lb 8.9 oz (69.2 kg)         Estimated Creatinine Clearance: 113 mL/min (A) (based on SCr of 0.8 mg/dL (L)). Assessment/Plan:  Clinical Pharmacy Note  Vancomycin Consult    Mraicel Leary is a 53 y.o. male ordered Vancomycin for ; consult received from Dr. Saunders Opitz to manage therapy. Also receiving zosyn.     Patient Active Problem List   Diagnosis    DKA, type 1, not at goal [facility])    Diabetic ketoacidosis without coma associated with diabetes mellitus due to underlying condition ([address]    Gastroparesis    GERD (gastroesophageal reflux disease)    Seizure ([address]    Toe deformity    Uncontrolled type 1 diabetes mellitus with diabetic peripheral neuropathy ([address]    Type 1 diabetes mellitus with hypoglycemia and without coma ([address]    Type 1 diabetes mellitus with background diabetic retinopathy ([address]    Hypoglycemia unawareness in type 1 diabetes mellitus ([address]    Type 1 diabetes mellitus with hypercholesterolemia ([address]    Essential hypertension    Accelerated hypertension    Acute blood loss anemia    Acute dyspnea    Acute respiratory failure (HCC)    Candida esophagitis (HCC)    Cholelithiasis    Cocaine abuse, episodic (HCC)    Cerebral infarction ([address]    Diabetic peripheral neuropathy (HCC)    Diabetic polyneuropathy (HCC)    Duodenal ulcer    Elevated blood pressure    Glucosuria    Heart failure, diastolic, acute (HCC)    Hematemesis with nausea    Hyperglycemia    Hypoglycemia    Hypophosphatemia    Hypopotassemia    Hypotension    Hypoxia    Intractable nausea and vomiting    Lactic acidosis    Leg weakness, bilateral    Leucocytosis    Leukocytosis    Cannabis abuse    Metabolic acidosis, increased anion gap (IAG)    Nausea    Nausea and vomiting    Numbness in both legs    [facility]ysubstance abuse [facility]    Pulmonary edema    RUQ abdominal pain    Type 1 diabetes mellitus with ketoacidosis without coma (HCC)    Diabetic foot ulcer (HCC)    Heart murmur       Allergies:  Ketorolac; Morphine; Morphine and related; Toradol [ketorolac tromethamine]; Tramadol; and Vicodin [hydrocodone-acetaminophen]     Temp max:  Temp (24hrs), Av.3 °F (36.8 °C), Min:97.8 °F (36.6 °C), Max:98.7 °F (37.1 °C)      Recent Labs     19  2205 19  0620 19  0410   WBC 32.5* 25.0* 15.3*       Recent Labs     19  0410 19  0820 19  1215   BUN 3* 3* 2*   CREATININE 0.9 0.8* 0.8*         Intake/Output Summary (Last 24 hours) at 2019 1451  Last data filed at 2019 1431  Gross per 24 hour   Intake 7130.02 ml   Output 3625 ml   Net 3505.02 ml       Culture Results:      Ht Readings from Last 1 Encounters:   19 6' 2\" (1.88 m)        Wt Readings from Last 1 Encounters:   19 152 lb 8.9 oz (69.2 kg)         Estimated Creatinine Clearance: 113 mL/min (A) (based on SCr of 0.8 mg/dL (L)). Assessment/Plan:  Will initiate vancomycin 1000 mg IV every 8 hours. Regimen projects a trough level of 15-20 mg/L. Timing of trough level will be determined based on culture results, renal function, and clinical response. Thank you for the consult. Will continue to follow.

## 2019-12-31 NOTE — PROGRESS NOTES
1950: patient with persistent hiccups then N/V. States Bentyl helped earlier when given IM. Sent secured message via [employer] service to hospitalist on duty. Awaiting Dr Frances Bingham response. 2022: Dr Frances Bingham gave verbal new order for Bentyl x1 dose. See MARs.
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
## 2019-12-31 NOTE — PROGRESS NOTES
Measures:  · Ht: 6' 2\" (188 cm)   · Current Body Wt: 153 lb (69.4 kg)  · Admission Body Wt: 152 lb (68.9 kg)  · % Weight Change:  ,  6.7 % loss in less than 1 month with wt on 12/6 at 163# 9.6 oz.   · Ideal Body Wt: 190 lb (86.2 kg), % Ideal Body    · BMI Classification: BMI 18.5 - 24.9 Normal Weight    Nutrition Interventions:   Continue NPO(start nutrition when appropriate)  Continued Inpatient Monitoring, Education not appropriate at this time(Pt with hx of carb control ed)    Nutrition Evaluation:   · Evaluation: Goals set   · Goals: tolerate most appropriate form of nutrition    · Monitoring: Nutrition Progression, Skin Integrity, Weight, Pertinent Labs, Nausea or Vomiting, Diarrhea, Constipation      Electronically signed by Raysa Chi RD, LD on 12/31/19 at 1:14 PM    Contact Number: [phone]
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
## 2020-01-01 LAB
ANION GAP SERPL CALCULATED.3IONS-SCNC: 11 MMOL/L (ref 3–16)
ANION GAP SERPL CALCULATED.3IONS-SCNC: 13 MMOL/L (ref 3–16)
ANION GAP SERPL CALCULATED.3IONS-SCNC: 15 MMOL/L (ref 3–16)
BUN BLDV-MCNC: 5 MG/DL (ref 7–20)
BUN BLDV-MCNC: <2 MG/DL (ref 7–20)
BUN BLDV-MCNC: <2 MG/DL (ref 7–20)
CALCIUM SERPL-MCNC: 8.1 MG/DL (ref 8.3–10.6)
CALCIUM SERPL-MCNC: 8.5 MG/DL (ref 8.3–10.6)
CALCIUM SERPL-MCNC: 8.6 MG/DL (ref 8.3–10.6)
CHLORIDE BLD-SCNC: 100 MMOL/L (ref 99–110)
CHLORIDE BLD-SCNC: 100 MMOL/L (ref 99–110)
CHLORIDE BLD-SCNC: 101 MMOL/L (ref 99–110)
CO2: 20 MMOL/L (ref 21–32)
CO2: 22 MMOL/L (ref 21–32)
CO2: 24 MMOL/L (ref 21–32)
CREAT SERPL-MCNC: 0.9 MG/DL (ref 0.9–1.3)
CREAT SERPL-MCNC: 1 MG/DL (ref 0.9–1.3)
CREAT SERPL-MCNC: 1 MG/DL (ref 0.9–1.3)
GFR AFRICAN AMERICAN: >60
GFR NON-AFRICAN AMERICAN: >60
GLUCOSE BLD-MCNC: 102 MG/DL (ref 70–99)
GLUCOSE BLD-MCNC: 114 MG/DL (ref 70–99)
GLUCOSE BLD-MCNC: 140 MG/DL (ref 70–99)
GLUCOSE BLD-MCNC: 145 MG/DL (ref 70–99)
GLUCOSE BLD-MCNC: 163 MG/DL (ref 70–99)
GLUCOSE BLD-MCNC: 178 MG/DL (ref 70–99)
GLUCOSE BLD-MCNC: 184 MG/DL (ref 70–99)
GLUCOSE BLD-MCNC: 194 MG/DL (ref 70–99)
GLUCOSE BLD-MCNC: 199 MG/DL (ref 70–99)
GLUCOSE BLD-MCNC: 200 MG/DL (ref 70–99)
GLUCOSE BLD-MCNC: 201 MG/DL (ref 70–99)
GLUCOSE BLD-MCNC: 207 MG/DL (ref 70–99)
GLUCOSE BLD-MCNC: 228 MG/DL (ref 70–99)
GLUCOSE BLD-MCNC: 233 MG/DL (ref 70–99)
GLUCOSE BLD-MCNC: 235 MG/DL (ref 70–99)
GLUCOSE BLD-MCNC: 240 MG/DL (ref 70–99)
GLUCOSE BLD-MCNC: 245 MG/DL (ref 70–99)
GLUCOSE BLD-MCNC: 96 MG/DL (ref 70–99)
MAGNESIUM: 1.8 MG/DL (ref 1.8–2.4)
MAGNESIUM: 1.8 MG/DL (ref 1.8–2.4)
MAGNESIUM: 1.9 MG/DL (ref 1.8–2.4)
PERFORMED ON: ABNORMAL
PERFORMED ON: NORMAL
PHOSPHORUS: 2.5 MG/DL (ref 2.5–4.9)
PHOSPHORUS: 2.6 MG/DL (ref 2.5–4.9)
PHOSPHORUS: 2.7 MG/DL (ref 2.5–4.9)
POTASSIUM SERPL-SCNC: 3.7 MMOL/L (ref 3.5–5.1)
POTASSIUM SERPL-SCNC: 3.8 MMOL/L (ref 3.5–5.1)
POTASSIUM SERPL-SCNC: 4.2 MMOL/L (ref 3.5–5.1)
PROCALCITONIN: 0.65 NG/ML (ref 0–0.15)
SODIUM BLD-SCNC: 135 MMOL/L (ref 136–145)
SODIUM BLD-SCNC: 135 MMOL/L (ref 136–145)
SODIUM BLD-SCNC: 136 MMOL/L (ref 136–145)

## 2020-01-01 PROCEDURE — 2580000003 HC RX 258: Performed by: HOSPITALIST

## 2020-01-01 PROCEDURE — 6360000002 HC RX W HCPCS: Performed by: INTERNAL MEDICINE

## 2020-01-01 PROCEDURE — 2580000003 HC RX 258: Performed by: INTERNAL MEDICINE

## 2020-01-01 PROCEDURE — 6370000000 HC RX 637 (ALT 250 FOR IP): Performed by: INTERNAL MEDICINE

## 2020-01-01 PROCEDURE — 6360000002 HC RX W HCPCS: Performed by: HOSPITALIST

## 2020-01-01 PROCEDURE — 36415 COLL VENOUS BLD VENIPUNCTURE: CPT

## 2020-01-01 PROCEDURE — C9113 INJ PANTOPRAZOLE SODIUM, VIA: HCPCS | Performed by: HOSPITALIST

## 2020-01-01 PROCEDURE — 80048 BASIC METABOLIC PNL TOTAL CA: CPT

## 2020-01-01 PROCEDURE — 2500000003 HC RX 250 WO HCPCS: Performed by: HOSPITALIST

## 2020-01-01 PROCEDURE — 83735 ASSAY OF MAGNESIUM: CPT

## 2020-01-01 PROCEDURE — 1200000000 HC SEMI PRIVATE

## 2020-01-01 PROCEDURE — 84100 ASSAY OF PHOSPHORUS: CPT

## 2020-01-01 PROCEDURE — 84145 PROCALCITONIN (PCT): CPT

## 2020-01-01 RX ORDER — DEXTROSE MONOHYDRATE 50 MG/ML
100 INJECTION, SOLUTION INTRAVENOUS PRN
Status: DISCONTINUED | OUTPATIENT
Start: 2020-01-01 | End: 2020-01-01 | Stop reason: SDUPTHER

## 2020-01-01 RX ORDER — DEXTROSE MONOHYDRATE 25 G/50ML
12.5 INJECTION, SOLUTION INTRAVENOUS PRN
Status: DISCONTINUED | OUTPATIENT
Start: 2020-01-01 | End: 2020-01-01 | Stop reason: SDUPTHER

## 2020-01-01 RX ORDER — INSULIN GLARGINE 100 [IU]/ML
12 INJECTION, SOLUTION SUBCUTANEOUS NIGHTLY
Status: DISCONTINUED | OUTPATIENT
Start: 2020-01-01 | End: 2020-01-02

## 2020-01-01 RX ORDER — NICOTINE POLACRILEX 4 MG
15 LOZENGE BUCCAL PRN
Status: DISCONTINUED | OUTPATIENT
Start: 2020-01-01 | End: 2020-01-01 | Stop reason: SDUPTHER

## 2020-01-01 RX ADMIN — DEXTROSE AND SODIUM CHLORIDE: 5; 450 INJECTION, SOLUTION INTRAVENOUS at 05:25

## 2020-01-01 RX ADMIN — INSULIN LISPRO 1 UNITS: 100 INJECTION, SOLUTION INTRAVENOUS; SUBCUTANEOUS at 14:04

## 2020-01-01 RX ADMIN — PIPERACILLIN AND TAZOBACTAM 3.38 G: 3; .375 INJECTION, POWDER, LYOPHILIZED, FOR SOLUTION INTRAVENOUS at 22:23

## 2020-01-01 RX ADMIN — GABAPENTIN 300 MG: 300 CAPSULE ORAL at 22:23

## 2020-01-01 RX ADMIN — POTASSIUM CHLORIDE 10 MEQ: 10 INJECTION, SOLUTION INTRAVENOUS at 06:40

## 2020-01-01 RX ADMIN — PIPERACILLIN AND TAZOBACTAM 3.38 G: 3; .375 INJECTION, POWDER, LYOPHILIZED, FOR SOLUTION INTRAVENOUS at 09:24

## 2020-01-01 RX ADMIN — INSULIN LISPRO 3 UNITS: 100 INJECTION, SOLUTION INTRAVENOUS; SUBCUTANEOUS at 14:04

## 2020-01-01 RX ADMIN — DICYCLOMINE HYDROCHLORIDE 10 MG: 10 CAPSULE ORAL at 10:22

## 2020-01-01 RX ADMIN — DICYCLOMINE HYDROCHLORIDE 10 MG: 10 CAPSULE ORAL at 18:06

## 2020-01-01 RX ADMIN — LIDOCAINE HYDROCHLORIDE: 20 SOLUTION ORAL; TOPICAL at 14:48

## 2020-01-01 RX ADMIN — PANTOPRAZOLE SODIUM 40 MG: 40 INJECTION, POWDER, FOR SOLUTION INTRAVENOUS at 22:23

## 2020-01-01 RX ADMIN — PANTOPRAZOLE SODIUM 40 MG: 40 INJECTION, POWDER, FOR SOLUTION INTRAVENOUS at 08:22

## 2020-01-01 RX ADMIN — POTASSIUM CHLORIDE 10 MEQ: 10 INJECTION, SOLUTION INTRAVENOUS at 05:30

## 2020-01-01 RX ADMIN — Medication 6.25 MG: at 04:16

## 2020-01-01 RX ADMIN — PIPERACILLIN AND TAZOBACTAM 3.38 G: 3; .375 INJECTION, POWDER, LYOPHILIZED, FOR SOLUTION INTRAVENOUS at 14:48

## 2020-01-01 RX ADMIN — VANCOMYCIN HYDROCHLORIDE 1000 MG: 1 INJECTION, POWDER, LYOPHILIZED, FOR SOLUTION INTRAVENOUS at 08:21

## 2020-01-01 RX ADMIN — METOCLOPRAMIDE 10 MG: 5 INJECTION, SOLUTION INTRAMUSCULAR; INTRAVENOUS at 14:48

## 2020-01-01 RX ADMIN — SODIUM CHLORIDE, PRESERVATIVE FREE 10 ML: 5 INJECTION INTRAVENOUS at 08:23

## 2020-01-01 RX ADMIN — SODIUM CHLORIDE, PRESERVATIVE FREE 10 ML: 5 INJECTION INTRAVENOUS at 22:24

## 2020-01-01 RX ADMIN — Medication 6.25 MG: at 11:01

## 2020-01-01 RX ADMIN — SODIUM PHOSPHATE, MONOBASIC, MONOHYDRATE 10 MMOL: 276; 142 INJECTION, SOLUTION INTRAVENOUS at 06:43

## 2020-01-01 RX ADMIN — ONDANSETRON 4 MG: 2 INJECTION INTRAMUSCULAR; INTRAVENOUS at 00:47

## 2020-01-01 RX ADMIN — Medication 10 ML: at 08:23

## 2020-01-01 RX ADMIN — ATORVASTATIN CALCIUM 10 MG: 10 TABLET, FILM COATED ORAL at 09:30

## 2020-01-01 RX ADMIN — METOCLOPRAMIDE 10 MG: 5 INJECTION, SOLUTION INTRAMUSCULAR; INTRAVENOUS at 08:22

## 2020-01-01 RX ADMIN — INSULIN LISPRO 2 UNITS: 100 INJECTION, SOLUTION INTRAVENOUS; SUBCUTANEOUS at 18:07

## 2020-01-01 RX ADMIN — ENOXAPARIN SODIUM 40 MG: 40 INJECTION SUBCUTANEOUS at 08:23

## 2020-01-01 RX ADMIN — INSULIN LISPRO 1 UNITS: 100 INJECTION, SOLUTION INTRAVENOUS; SUBCUTANEOUS at 22:49

## 2020-01-01 RX ADMIN — METOCLOPRAMIDE 10 MG: 5 INJECTION, SOLUTION INTRAMUSCULAR; INTRAVENOUS at 22:23

## 2020-01-01 RX ADMIN — INSULIN LISPRO 3 UNITS: 100 INJECTION, SOLUTION INTRAVENOUS; SUBCUTANEOUS at 18:07

## 2020-01-01 RX ADMIN — INSULIN LISPRO 3 UNITS: 100 INJECTION, SOLUTION INTRAVENOUS; SUBCUTANEOUS at 10:29

## 2020-01-01 RX ADMIN — ONDANSETRON 4 MG: 2 INJECTION INTRAMUSCULAR; INTRAVENOUS at 08:22

## 2020-01-01 RX ADMIN — GABAPENTIN 300 MG: 300 CAPSULE ORAL at 09:30

## 2020-01-01 RX ADMIN — POTASSIUM CHLORIDE 10 MEQ: 10 INJECTION, SOLUTION INTRAVENOUS at 01:18

## 2020-01-01 RX ADMIN — INSULIN LISPRO 2 UNITS: 100 INJECTION, SOLUTION INTRAVENOUS; SUBCUTANEOUS at 10:29

## 2020-01-01 RX ADMIN — DICYCLOMINE HYDROCHLORIDE 10 MG: 10 CAPSULE ORAL at 06:50

## 2020-01-01 RX ADMIN — VANCOMYCIN HYDROCHLORIDE 1000 MG: 1 INJECTION, POWDER, LYOPHILIZED, FOR SOLUTION INTRAVENOUS at 00:15

## 2020-01-01 RX ADMIN — PIPERACILLIN AND TAZOBACTAM 3.38 G: 3; .375 INJECTION, POWDER, LYOPHILIZED, FOR SOLUTION INTRAVENOUS at 03:13

## 2020-01-01 RX ADMIN — Medication 10 ML: at 22:25

## 2020-01-01 RX ADMIN — SODIUM PHOSPHATE, MONOBASIC, MONOHYDRATE 10 MMOL: 276; 142 INJECTION, SOLUTION INTRAVENOUS at 02:15

## 2020-01-01 RX ADMIN — METOCLOPRAMIDE 10 MG: 5 INJECTION, SOLUTION INTRAMUSCULAR; INTRAVENOUS at 03:13

## 2020-01-01 RX ADMIN — Medication 6.25 MG: at 20:30

## 2020-01-01 RX ADMIN — POTASSIUM CHLORIDE 10 MEQ: 10 INJECTION, SOLUTION INTRAVENOUS at 08:10

## 2020-01-01 RX ADMIN — ONDANSETRON 4 MG: 2 INJECTION INTRAMUSCULAR; INTRAVENOUS at 18:20

## 2020-01-01 RX ADMIN — POTASSIUM CHLORIDE 10 MEQ: 10 INJECTION, SOLUTION INTRAVENOUS at 02:18

## 2020-01-01 RX ADMIN — LISINOPRIL 5 MG: 5 TABLET ORAL at 09:30

## 2020-01-01 RX ADMIN — POTASSIUM CHLORIDE 10 MEQ: 10 INJECTION, SOLUTION INTRAVENOUS at 03:15

## 2020-01-01 RX ADMIN — INSULIN GLARGINE 12 UNITS: 100 INJECTION, SOLUTION SUBCUTANEOUS at 09:27

## 2020-01-01 RX ADMIN — GABAPENTIN 300 MG: 300 CAPSULE ORAL at 14:48

## 2020-01-01 ASSESSMENT — PAIN SCALES - WONG BAKER
WONGBAKER_NUMERICALRESPONSE: 0

## 2020-01-01 ASSESSMENT — PAIN SCALES - GENERAL
PAINLEVEL_OUTOF10: 0

## 2020-01-01 NOTE — PLAN OF CARE
Problem: Pain:  Goal: Pain level will decrease  Description  Pain level will decrease  Outcome: Ongoing  Note:   Continuing to monitor pain and discomfort. Monitoring pain level on scale of 0-10. Non- pharmacological measures encouraged to reduce discomfort/pain. PRN pain meds administeration continues when/if applicable as ordered by physician. Problem: Falls - Risk of:  Goal: Will remain free from falls  Description  Will remain free from falls  Outcome: Ongoing  Note:   Falling star program remains in place. Call light and personal belongings within reach. Frequent visual monitoring continues. Toileting program inplace. Patient assisted in turning/repositioning at least once every 2 hours, and on a prn basis.        Problem: Discharge Planning:  Goal: Discharged to appropriate level of care  Description  Discharged to appropriate level of care  Outcome: Ongoing     Problem: Nutrition  Goal: Optimal nutrition therapy  1/1/2020 0111 by Weston Duane, RN  Outcome: Ongoing  12/31/2019 1317 by Francesco Ball RD, LD  Outcome: Ongoing
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
## 2020-01-02 VITALS
DIASTOLIC BLOOD PRESSURE: 88 MMHG | HEART RATE: 99 BPM | SYSTOLIC BLOOD PRESSURE: 157 MMHG | WEIGHT: 155.2 LBS | RESPIRATION RATE: 16 BRPM | OXYGEN SATURATION: 100 % | HEIGHT: 73 IN | TEMPERATURE: 98 F | BODY MASS INDEX: 20.57 KG/M2

## 2020-01-02 LAB
ANION GAP SERPL CALCULATED.3IONS-SCNC: 15 MMOL/L (ref 3–16)
BUN BLDV-MCNC: 11 MG/DL (ref 7–20)
CALCIUM SERPL-MCNC: 9 MG/DL (ref 8.3–10.6)
CHLORIDE BLD-SCNC: 100 MMOL/L (ref 99–110)
CO2: 21 MMOL/L (ref 21–32)
CREAT SERPL-MCNC: 1.1 MG/DL (ref 0.9–1.3)
CULTURE, BLOOD 2: ABNORMAL
CULTURE, BLOOD 2: ABNORMAL
GFR AFRICAN AMERICAN: >60
GFR NON-AFRICAN AMERICAN: >60
GLUCOSE BLD-MCNC: 388 MG/DL (ref 70–99)
GLUCOSE BLD-MCNC: 389 MG/DL (ref 70–99)
GLUCOSE BLD-MCNC: 403 MG/DL (ref 70–99)
MAGNESIUM: 2.1 MG/DL (ref 1.8–2.4)
ORGANISM: ABNORMAL
ORGANISM: ABNORMAL
PERFORMED ON: ABNORMAL
PERFORMED ON: ABNORMAL
PHOSPHORUS: 2.3 MG/DL (ref 2.5–4.9)
POTASSIUM SERPL-SCNC: 5.1 MMOL/L (ref 3.5–5.1)
SODIUM BLD-SCNC: 136 MMOL/L (ref 136–145)

## 2020-01-02 PROCEDURE — 6370000000 HC RX 637 (ALT 250 FOR IP): Performed by: INTERNAL MEDICINE

## 2020-01-02 PROCEDURE — 6360000002 HC RX W HCPCS: Performed by: INTERNAL MEDICINE

## 2020-01-02 PROCEDURE — 80048 BASIC METABOLIC PNL TOTAL CA: CPT

## 2020-01-02 PROCEDURE — 6360000002 HC RX W HCPCS: Performed by: HOSPITALIST

## 2020-01-02 PROCEDURE — C9113 INJ PANTOPRAZOLE SODIUM, VIA: HCPCS | Performed by: HOSPITALIST

## 2020-01-02 PROCEDURE — 2580000003 HC RX 258: Performed by: INTERNAL MEDICINE

## 2020-01-02 PROCEDURE — 2580000003 HC RX 258: Performed by: HOSPITALIST

## 2020-01-02 PROCEDURE — 83735 ASSAY OF MAGNESIUM: CPT

## 2020-01-02 PROCEDURE — 84100 ASSAY OF PHOSPHORUS: CPT

## 2020-01-02 RX ORDER — BLOOD-GLUCOSE METER
1 KIT MISCELLANEOUS DAILY
Qty: 1 KIT | Refills: 0 | Status: ON HOLD | OUTPATIENT
Start: 2020-01-02 | End: 2020-05-15 | Stop reason: SDUPTHER

## 2020-01-02 RX ORDER — METOCLOPRAMIDE 10 MG/1
10 TABLET ORAL 4 TIMES DAILY
Qty: 120 TABLET | Refills: 0 | Status: ON HOLD | OUTPATIENT
Start: 2020-01-02 | End: 2020-03-26

## 2020-01-02 RX ORDER — LANCETS 30 GAUGE
1 EACH MISCELLANEOUS DAILY
Qty: 150 EACH | Refills: 11 | Status: ON HOLD | OUTPATIENT
Start: 2020-01-02 | End: 2020-05-15 | Stop reason: SDUPTHER

## 2020-01-02 RX ORDER — ATORVASTATIN CALCIUM 10 MG/1
10 TABLET, FILM COATED ORAL DAILY
Qty: 30 TABLET | Refills: 5 | Status: ON HOLD | OUTPATIENT
Start: 2020-01-02 | End: 2020-03-26

## 2020-01-02 RX ORDER — INSULIN GLARGINE 100 [IU]/ML
15 INJECTION, SOLUTION SUBCUTANEOUS NIGHTLY
Status: DISCONTINUED | OUTPATIENT
Start: 2020-01-02 | End: 2020-01-02

## 2020-01-02 RX ORDER — LISINOPRIL 5 MG/1
5 TABLET ORAL DAILY
Qty: 30 TABLET | Refills: 0 | Status: ON HOLD | OUTPATIENT
Start: 2020-01-02 | End: 2020-03-26

## 2020-01-02 RX ORDER — DICYCLOMINE HCL 20 MG
20 TABLET ORAL EVERY 6 HOURS PRN
Qty: 120 TABLET | Refills: 3 | Status: ON HOLD | OUTPATIENT
Start: 2020-01-02 | End: 2020-05-13

## 2020-01-02 RX ORDER — INSULIN GLARGINE 100 [IU]/ML
15 INJECTION, SOLUTION SUBCUTANEOUS NIGHTLY
Status: DISCONTINUED | OUTPATIENT
Start: 2020-01-02 | End: 2020-01-02 | Stop reason: HOSPADM

## 2020-01-02 RX ORDER — FLASH GLUCOSE SENSOR
KIT MISCELLANEOUS
Qty: 2 EACH | Refills: 5 | Status: ON HOLD | OUTPATIENT
Start: 2020-01-02 | End: 2020-05-15 | Stop reason: SDUPTHER

## 2020-01-02 RX ORDER — GLUCOSAMINE HCL/CHONDROITIN SU 500-400 MG
CAPSULE ORAL
Qty: 150 STRIP | Refills: 3 | Status: ON HOLD | OUTPATIENT
Start: 2020-01-02 | End: 2020-05-15 | Stop reason: SDUPTHER

## 2020-01-02 RX ORDER — PROMETHAZINE HYDROCHLORIDE 25 MG/1
25 SUPPOSITORY RECTAL EVERY 6 HOURS PRN
Qty: 30 SUPPOSITORY | Refills: 1 | Status: ON HOLD | OUTPATIENT
Start: 2020-01-02 | End: 2020-05-13

## 2020-01-02 RX ORDER — LEVOFLOXACIN 250 MG/1
250 TABLET ORAL DAILY
Qty: 3 TABLET | Refills: 0 | Status: SHIPPED | OUTPATIENT
Start: 2020-01-02 | End: 2020-01-05

## 2020-01-02 RX ORDER — PANTOPRAZOLE SODIUM 40 MG/1
40 TABLET, DELAYED RELEASE ORAL DAILY
Qty: 30 TABLET | Refills: 1 | Status: ON HOLD | OUTPATIENT
Start: 2020-01-02 | End: 2020-03-26

## 2020-01-02 RX ORDER — FLASH GLUCOSE SCANNING READER
EACH MISCELLANEOUS
Qty: 1 DEVICE | Refills: 0 | Status: ON HOLD | OUTPATIENT
Start: 2020-01-02 | End: 2020-05-15 | Stop reason: HOSPADM

## 2020-01-02 RX ADMIN — PANTOPRAZOLE SODIUM 40 MG: 40 INJECTION, POWDER, FOR SOLUTION INTRAVENOUS at 10:39

## 2020-01-02 RX ADMIN — INSULIN GLARGINE 15 UNITS: 100 INJECTION, SOLUTION SUBCUTANEOUS at 10:40

## 2020-01-02 RX ADMIN — DICYCLOMINE HYDROCHLORIDE 10 MG: 10 CAPSULE ORAL at 06:52

## 2020-01-02 RX ADMIN — INSULIN LISPRO 5 UNITS: 100 INJECTION, SOLUTION INTRAVENOUS; SUBCUTANEOUS at 13:07

## 2020-01-02 RX ADMIN — ONDANSETRON 4 MG: 2 INJECTION INTRAMUSCULAR; INTRAVENOUS at 08:16

## 2020-01-02 RX ADMIN — METOCLOPRAMIDE 10 MG: 5 INJECTION, SOLUTION INTRAMUSCULAR; INTRAVENOUS at 03:24

## 2020-01-02 RX ADMIN — GABAPENTIN 300 MG: 300 CAPSULE ORAL at 13:07

## 2020-01-02 RX ADMIN — METOCLOPRAMIDE 10 MG: 5 INJECTION, SOLUTION INTRAMUSCULAR; INTRAVENOUS at 10:39

## 2020-01-02 RX ADMIN — SODIUM CHLORIDE, PRESERVATIVE FREE 10 ML: 5 INJECTION INTRAVENOUS at 10:39

## 2020-01-02 RX ADMIN — INSULIN LISPRO 5 UNITS: 100 INJECTION, SOLUTION INTRAVENOUS; SUBCUTANEOUS at 13:08

## 2020-01-02 RX ADMIN — DICYCLOMINE HYDROCHLORIDE 10 MG: 10 CAPSULE ORAL at 10:38

## 2020-01-02 RX ADMIN — ONDANSETRON 4 MG: 2 INJECTION INTRAMUSCULAR; INTRAVENOUS at 05:01

## 2020-01-02 RX ADMIN — LISINOPRIL 5 MG: 5 TABLET ORAL at 10:38

## 2020-01-02 RX ADMIN — INSULIN LISPRO 5 UNITS: 100 INJECTION, SOLUTION INTRAVENOUS; SUBCUTANEOUS at 10:49

## 2020-01-02 RX ADMIN — Medication 10 ML: at 10:39

## 2020-01-02 RX ADMIN — GABAPENTIN 300 MG: 300 CAPSULE ORAL at 10:38

## 2020-01-02 RX ADMIN — ATORVASTATIN CALCIUM 10 MG: 10 TABLET, FILM COATED ORAL at 10:38

## 2020-01-02 RX ADMIN — PIPERACILLIN AND TAZOBACTAM 3.38 G: 3; .375 INJECTION, POWDER, LYOPHILIZED, FOR SOLUTION INTRAVENOUS at 10:38

## 2020-01-02 RX ADMIN — PIPERACILLIN AND TAZOBACTAM 3.38 G: 3; .375 INJECTION, POWDER, LYOPHILIZED, FOR SOLUTION INTRAVENOUS at 03:25

## 2020-01-02 ASSESSMENT — PAIN SCALES - WONG BAKER
WONGBAKER_NUMERICALRESPONSE: 0
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
## 2020-01-02 NOTE — CARE COORDINATION
[facility]  Diabetes Education   Progress Note       NAME:  Libra Rueda  MEDICAL RECORD NUMBER:  [id number]  AGE: 53 y.o. GENDER: male  : 1975  TODAY'S DATE:  2020    Subjective   Reason for Diabetic Education Evaluation and Assessment: general diabetes support    Eleazar Bustamante is eager to be discharged home.       Visit Type: follow-up      Libra Rueda is a 53 y.o. male referred by:     [] Physician  [] Nursing  [x] Chart Review    [] Other:     PAST MEDICAL HISTORY        Diagnosis Date    Diabetes mellitus ([address]     Gastroparesis     Hypertension        PAST SURGICAL HISTORY    Past Surgical History:   Procedure Laterality Date    BONY PELVIS SURGERY      HIP SURGERY Left 2006    pins and rods- plate    UPPER GASTROINTESTINAL ENDOSCOPY N/A 2019    EGD BIOPSY performed by Alyx Kirkland MD at [address] [facility] History   Problem Relation Age of Onset    Diabetes Mother     Heart Disease Mother     High Blood Pressure Mother     Asthma Brother     Other Father         kidney disease    No Known Problems Maternal Grandmother     No Known Problems Maternal Grandfather     No Known Problems Paternal Grandmother     No Known Problems Paternal Grandfather        SOCIAL HISTORY    Social History     Tobacco Use    Smoking status: Current Every Day Smoker     Start date: 3/26/2009    Smokeless tobacco: Never Used    Tobacco comment: black and mild 2  x daily   Substance Use Topics    Alcohol use: Yes     Comment: socially 2 shots per month     Drug use: Yes     Frequency: 3.0 times per week     Types: Marijuana       ALLERGIES    Allergies   Allergen Reactions    Ketorolac Shortness Of Breath    Morphine Shortness Of Breath    Morphine And Related Hives and Shortness Of Breath    Toradol [Ketorolac Tromethamine]      resp failure    Tramadol      resp failure     Vicodin [Hydrocodone-Acetaminophen]        MEDICATIONS     insulin lispro 5 Units Subcutaneous TID     insulin glargine  15 Units Subcutaneous Nightly    insulin lispro  0-6 Units Subcutaneous TID     insulin lispro  0-3 Units Subcutaneous Nightly    piperacillin-tazobactam  3.375 g Intravenous Q6H    dicyclomine  10 mg Oral TID AC    sodium chloride flush  10 mL Intravenous 2 times per day    enoxaparin  40 mg Subcutaneous Daily    atorvastatin  10 mg Oral Daily    lisinopril  5 mg Oral Daily    pantoprazole  40 mg Intravenous BID    And    sodium chloride (PF)  10 mL Intravenous BID    gabapentin  300 mg Oral TID    metoclopramide  10 mg Intravenous Q6H       Objective        Patient Active Problem List   Diagnosis Code    DKA, type 1, not at goal [facility]) E10.10    Diabetic ketoacidosis without coma associated with diabetes mellitus due to underlying condition ([facility] E08.10    Gastroparesis K31.84    GERD (gastroesophageal reflux disease) K21.9    Seizure ([facility]) R56.9    Toe deformity M20.60    Uncontrolled type 1 diabetes mellitus with diabetic peripheral neuropathy ([facility]) E10.42, E10.65    Type 1 diabetes mellitus with hypoglycemia and without coma ([facility]) E10.649    Type 1 diabetes mellitus with background diabetic retinopathy ([facility] C82.4390    Hypoglycemia unawareness in type 1 diabetes mellitus ([facility]) E10.649    Type 1 diabetes mellitus with hypercholesterolemia ([facility]) E10.69, E78.00    Essential hypertension I10    Accelerated hypertension I10    Acute blood loss anemia D62    Acute dyspnea R06.00    Acute respiratory failure ([facility]) J96.00    Candida esophagitis ([facility]) B37.81    Cholelithiasis K80.20    Cocaine abuse, episodic ([facility]) F14.10    Cerebral infarction ([facility] I63.9    Diabetic peripheral neuropathy (HCC) E11.42    Diabetic polyneuropathy ([facility]) E11.42    Duodenal ulcer K26.9    Elevated blood pressure R03.0    Glucosuria R81    Heart failure, diastolic, acute ([facility]) T38.32    Hematemesis with nausea K92.0    Hyperglycemia R73.9    upon discharge: independent living       Follow up scheduled:      Jan16 Follow Up Appointment 10:20 AM   [place]   [phone]         Teaching Time Diabetes Education:  20 minutes     Electronically signed by Richie Middleton on 1/2/2020 at 10:41 AM

## 2020-01-02 NOTE — PROGRESS NOTES
Discharge orders acknowledged by RN . Discharge teaching completed with pt and family. AVS reviewed and all questions answered. New prescriptions and current medication regimen reviewed and pt understands schedule. Follow up appointments also reviewed with pt and resources given for discharge. Pt was given written prescriptions to be filled and understands schedule. CVC removed, Pressure held for 5 minutes, bleeding controlled- dressing applied. No complications. Telemonitor removed and returned to [facility]. All education completed with pt and pt family. PCP list given to pt. Pt encouraged to find a PCP, denies help regarding scheduling appointments. Required core measures completed. Pt vitals WDL. Pt discharged with all belongings to home with. Pt ambulated off of unit with RN. No complications.        Electronically signed by Giuseppe Magaña RN on 1/2/2020 at 2:32 PM

## 2020-01-03 ENCOUNTER — HOSPITAL ENCOUNTER (EMERGENCY)
Age: 45
Discharge: HOME | End: 2020-01-03
Attending: EMERGENCY MEDICINE
Payer: MEDICAID

## 2020-01-03 VITALS
DIASTOLIC BLOOD PRESSURE: 68 MMHG | OXYGEN SATURATION: 100 % | HEART RATE: 92 BPM | BODY MASS INDEX: 19.78 KG/M2 | HEIGHT: 74 IN | WEIGHT: 154.1 LBS | SYSTOLIC BLOOD PRESSURE: 110 MMHG | RESPIRATION RATE: 14 BRPM | TEMPERATURE: 98 F

## 2020-01-03 LAB
GLUCOSE BLD-MCNC: 505 MG/DL (ref 70–99)
PERFORMED ON: ABNORMAL

## 2020-01-03 PROCEDURE — 99284 EMERGENCY DEPT VISIT MOD MDM: CPT

## 2020-01-03 PROCEDURE — 6370000000 HC RX 637 (ALT 250 FOR IP): Performed by: EMERGENCY MEDICINE

## 2020-01-03 RX ORDER — DOXYCYCLINE HYCLATE 100 MG/1
100 CAPSULE ORAL 2 TIMES DAILY
Qty: 20 CAPSULE | Refills: 0 | Status: SHIPPED | OUTPATIENT
Start: 2020-01-03 | End: 2020-01-13

## 2020-01-03 RX ADMIN — INSULIN HUMAN 5 UNITS: 100 INJECTION, SOLUTION PARENTERAL at 05:52

## 2020-01-03 ASSESSMENT — ENCOUNTER SYMPTOMS
BACK PAIN: 0
EYE PAIN: 0
VOMITING: 0
COUGH: 0
NAUSEA: 0
DIARRHEA: 0
EYE DISCHARGE: 0
WHEEZING: 0
EYE REDNESS: 0
ABDOMINAL PAIN: 0
SORE THROAT: 0
SHORTNESS OF BREATH: 0
RHINORRHEA: 0

## 2020-01-03 NOTE — ED TRIAGE NOTES
Patient requesting prescriptions he forgot after being discharged. Patient also requesting \"shot of insulin\". Patient refusing labs and IV at this time. MD Nona Crespo spoke to patient and a plan of care has been agreed upon.

## 2020-01-03 NOTE — ED PROVIDER NOTES
[facility]  eMERGENCY dEPARTMENT eNCOUnter        Pt Name: Antoinette Etienne  MRN: [id number]  [place]ms[place]fpatel 1975  Date of evaluation: 1/3/2020  Provider: Richard Chopra MD  PCP: No primary care provider on file. CHIEF COMPLAINT       Chief Complaint   Patient presents with    Hyperglycemia     Over 600 at home       HISTORY OFPRESENT ILLNESS   (Location/Symptom, Timing/Onset, Context/Setting, Quality, Duration, Modifying Factors,Severity)  Note limiting factors. Antoinette Etienne is a 53 y.o. male     Presents with elevated blood sugar. Patient is a diabetic and he was just in the hospital.  Over, he states that some type of oversight most of occurred because they did not give him prescriptions for his short acting insulin. He took his Lantus at about 4 AM.  His sugar was 600 and he wanted to get in here to get a prescription for the short acting insulin before he went into DKA. He is asymptomatic. He does not want me to put an IV in him and give fluids and check labs. He just wants the prescription. He is also complaining of his right great toe having a bit of a \"fishy\" smell. He had a recent nail removal.  Nursing Noteswere all reviewed and agreed with or any disagreements were addressed  in the HPI. REVIEW OF SYSTEMS    (2-9 systems for level 4, 10 or more for level 5)     Review of Systems   Constitutional: Negative for chills, fatigue and fever. HENT: Negative for ear pain, rhinorrhea and sore throat. Eyes: Negative for pain, discharge, redness and visual disturbance. Respiratory: Negative for cough, shortness of breath and wheezing. Cardiovascular: Negative for chest pain, palpitations and leg swelling. Gastrointestinal: Negative for abdominal pain, diarrhea, nausea and vomiting. Genitourinary: Negative for difficulty urinating and dysuria. Musculoskeletal: Negative for arthralgias, back pain and myalgias.         Positive per HPI   Skin: Negative for rash. Allergic/Immunologic: Negative for environmental allergies. Neurological: Negative for dizziness, seizures, syncope and headaches. Hematological: Negative for adenopathy. Psychiatric/Behavioral: Negative for suicidal ideas. The patient is not nervous/anxious. PAST MEDICAL HISTORY     Past Medical History:   Diagnosis Date    Diabetes mellitus ([address]     Gastroparesis     Hypertension          SURGICAL HISTORY     Past Surgical History:   Procedure Laterality Date    BONY PELVIS SURGERY      HIP SURGERY Left 2006    pins and rods- plate    UPPER GASTROINTESTINAL ENDOSCOPY N/A 12/2/2019    EGD BIOPSY performed by Donna Newsome MD at [facility]       Previous Medications    ATORVASTATIN (LIPITOR) 10 MG TABLET    Take 1 tablet by mouth daily    BLOOD GLUCOSE MONITOR STRIPS    Check blood sugars 4-5 times per day    CONTINUOUS BLOOD GLUC  (FREESTYLE HINA 14 DAY READER) LIOR    Use for personal CGMS. On Multiple insulin shots, checks blood sugars 4 times per day and requires frequent insulin adjustment. CONTINUOUS BLOOD GLUC SENSOR (FREESTYLE HINA 14 DAY SENSOR) MISC    Use for personal CGMS. Replace every 2 weeks. DICYCLOMINE (BENTYL) 20 MG TABLET    Take 1 tablet by mouth every 6 hours as needed (abd pain)    GABAPENTIN (NEURONTIN) 600 MG TABLET    Take by mouth 3 times daily.  gabapentin (NEURONTIN) 300 MG capsule   300mg PO daily x 1 day (12/16/19), then 300mg PO BID x 1 day (12/17/19), then 300mg PO TID x 7 days (12/18/19-12/24/19), then 450mg TID x 7 days (12/25/19-12/31/19), then 600mg PO TID (starting 1/1/20)    GLUCOSE MONITORING KIT (FREESTYLE) MONITORING KIT    1 kit by Does not apply route daily    INSULIN GLARGINE (LANTUS SOLOSTAR) 100 UNIT/ML INJECTION PEN    Inject 15 Units into the skin nightly    LANCETS MISC    1 each by Does not apply route daily Check blood sugars 4-5 times per day    LEVOFLOXACIN (LEVAQUIN) 250 MG TABLET    Take 1 tablet by mouth daily for 3 days    LISINOPRIL (PRINIVIL;ZESTRIL) 5 MG TABLET    Take 1 tablet by mouth daily    METOCLOPRAMIDE (REGLAN) 10 MG TABLET    Take 1 tablet by mouth 4 times daily    OXYCODONE (ROXICODONE) 5 MG IMMEDIATE RELEASE TABLET    Take 5 mg by mouth every 8 hours as needed for Pain. PANTOPRAZOLE (PROTONIX) 40 MG TABLET    Take 1 tablet by mouth daily    PROMETHAZINE (PHENERGAN) 25 MG SUPPOSITORY    Place 1 suppository rectally every 6 hours as needed for Nausea       ALLERGIES     Ketorolac; Morphine; Morphine and related; Toradol [ketorolac tromethamine];  Tramadol; and Vicodin [hydrocodone-acetaminophen]    FAMILY HISTORY       Family History   Problem Relation Age of Onset    Diabetes Mother     Heart Disease Mother     High Blood Pressure Mother     Asthma Brother     Other Father         kidney disease    No Known Problems Maternal Grandmother     No Known Problems Maternal Grandfather     No Known Problems Paternal Grandmother     No Known Problems Paternal Grandfather           SOCIAL HISTORY       Social History     Socioeconomic History    Marital status: Legally      Spouse name: None    Number of children: 1    Years of education: None    Highest education level: None   Occupational History    Occupation: unemployed   Social Needs    Financial resource strain: None    Food insecurity:     Worry: None     Inability: None    Transportation needs:     Medical: None     Non-medical: None   Tobacco Use    Smoking status: Current Every Day Smoker     Start date: 3/26/2009    Smokeless tobacco: Never Used    Tobacco comment: black and mild 2  x daily   Substance and Sexual Activity    Alcohol use: Yes     Comment: socially 2 shots per month     Drug use: Yes     Frequency: 3.0 times per week     Types: Marijuana    Sexual activity: Yes     Partners: Female   Lifestyle    Physical activity:     Days per week: None     Minutes per General: No tenderness. Comments: Status post a right fourth and fifth toe amputation. He is also status post a recent nail removal of the right great toe. It actually does not look infected and I do not see any drainage. Skin:     General: Skin is warm and dry. Findings: No rash (On exposed body surfaces). Neurological:      Mental Status: He is alert and oriented to person, place, and time. Coordination: Coordination normal.   Psychiatric:         Behavior: Behavior normal.         Thought Content: Thought content normal.         DIAGNOSTIC RESULTS   LABS:    Results for orders placed or performed during the hospital encounter of 01/03/20   POCT Glucose   Result Value Ref Range    POC Glucose 505 (H) 70 - 99 mg/dl    Performed on ACCU-CHEK        All other labs were within normal range or not returned as of this dictation. EKG: All EKG's are interpreted by the Emergency Department Physician who either signs orCo-signs this chart in the absence of a cardiologist.    None    RADIOLOGY:   Non-plain film images such as CT, Ultrasound and MRI are read by the radiologist. Plain radiographic images are visualized and preliminarily interpreted by the  EDProvider with the below findings:    None        PROCEDURES   Unless otherwise noted below, none     Procedures    CRITICAL CARE TIME   N/A    CONSULTS:  None    EMERGENCY DEPARTMENT COURSE and DIFFERENTIAL DIAGNOSIS/MDM:   Vitals:    Vitals:    01/03/20 0527   BP: 111/69   Pulse: 107   Resp: 12   Temp: 98.8 °F (37.1 °C)   TempSrc: Oral   SpO2: 100%   Weight: 154 lb 1.6 oz (69.9 kg)   Height: 6' 2\" (1.88 m)       Patient was given the following medications:  Medications   insulin regular (HUMULIN R;NOVOLIN R) injection 5 Units (has no administration in time range)       As mentioned the patient does not want me to put an IV in him or check any labs. He just wants the prescription.   I am going to give him a shot of regular insulin here prior to discharge and put him on doxycycline for his toe      FINAL IMPRESSION      1. Hyperglycemia    2. IDDM (insulin dependent diabetes mellitus) ([facility] [address]    3. Uncontrolled type 1 diabetes mellitus with diabetic peripheral neuropathy ([facility] [address]    4. Type 1 diabetes mellitus with hypoglycemia and without coma ([facility] [address]    5. Hypoglycemia unawareness in type 1 diabetes mellitus ([facility] [address]    6. Essential hypertension    7. Dyslipidemia due to type 1 diabetes mellitus [facility])          DISPOSITION/PLAN    DISPOSITION Decision To Discharge 01/03/2020 05:46:03 AM      PATIENT REFERRED TO:  No follow-up provider specified.     DISCHARGE MEDICATIONS:  New Prescriptions    DOXYCYCLINE HYCLATE (VIBRAMYCIN) 100 MG CAPSULE    Take 1 capsule by mouth 2 times daily for 10 days       DISCONTINUED MEDICATIONS:  Discontinued Medications    No medications on file              (Please note that portions of this note were completed with a voice recognition program.  Efforts were made to editthe dictations but occasionally words are mis-transcribed.)    Lisa Grant MD (electronically signed)            Lisa Grant MD  01/03/20 1944

## 2020-01-04 LAB — BLOOD CULTURE, ROUTINE: NORMAL

## 2020-01-08 ENCOUNTER — APPOINTMENT (OUTPATIENT)
Dept: GENERAL RADIOLOGY | Age: 45
DRG: 420 | End: 2020-01-08
Payer: MEDICAID

## 2020-01-08 ENCOUNTER — APPOINTMENT (OUTPATIENT)
Dept: CT IMAGING | Age: 45
DRG: 420 | End: 2020-01-08
Payer: MEDICAID

## 2020-01-08 ENCOUNTER — HOSPITAL ENCOUNTER (INPATIENT)
Age: 45
LOS: 1 days | Discharge: HOME | DRG: 420 | End: 2020-01-09
Attending: EMERGENCY MEDICINE | Admitting: INTERNAL MEDICINE
Payer: MEDICAID

## 2020-01-08 VITALS
OXYGEN SATURATION: 98 % | HEIGHT: 74 IN | BODY MASS INDEX: 19.76 KG/M2 | WEIGHT: 154 LBS | TEMPERATURE: 98.4 F | HEART RATE: 84 BPM | RESPIRATION RATE: 16 BRPM | DIASTOLIC BLOOD PRESSURE: 78 MMHG | SYSTOLIC BLOOD PRESSURE: 147 MMHG

## 2020-01-08 LAB
A/G RATIO: 1.4 (ref 1.1–2.2)
ACETAMINOPHEN LEVEL: <5 UG/ML (ref 10–30)
ALBUMIN SERPL-MCNC: 4.3 G/DL (ref 3.4–5)
ALP BLD-CCNC: 75 U/L (ref 40–129)
ALT SERPL-CCNC: 16 U/L (ref 10–40)
AMMONIA: 18 UMOL/L (ref 16–60)
AMPHETAMINE SCREEN, URINE: ABNORMAL
ANION GAP SERPL CALCULATED.3IONS-SCNC: 17 MMOL/L (ref 3–16)
AST SERPL-CCNC: 17 U/L (ref 15–37)
BARBITURATE SCREEN URINE: ABNORMAL
BASE EXCESS VENOUS: -2.4 MMOL/L (ref -3–3)
BASOPHILS ABSOLUTE: 0 K/UL (ref 0–0.2)
BASOPHILS RELATIVE PERCENT: 0.4 %
BENZODIAZEPINE SCREEN, URINE: POSITIVE
BETA-HYDROXYBUTYRATE: 0.6 MMOL/L (ref 0–0.27)
BILIRUB SERPL-MCNC: 0.3 MG/DL (ref 0–1)
BILIRUBIN URINE: ABNORMAL
BLOOD, URINE: NEGATIVE
BUN BLDV-MCNC: 23 MG/DL (ref 7–20)
CALCIUM SERPL-MCNC: 10.2 MG/DL (ref 8.3–10.6)
CANNABINOID SCREEN URINE: POSITIVE
CARBOXYHEMOGLOBIN: 8.2 % (ref 0–1.5)
CASTS: ABNORMAL /LPF
CHLORIDE BLD-SCNC: 101 MMOL/L (ref 99–110)
CLARITY: ABNORMAL
CO2: 21 MMOL/L (ref 21–32)
COCAINE METABOLITE SCREEN URINE: ABNORMAL
COLOR: ABNORMAL
CREAT SERPL-MCNC: 1.8 MG/DL (ref 0.9–1.3)
EKG ATRIAL RATE: 75 BPM
EKG DIAGNOSIS: NORMAL
EKG P AXIS: 67 DEGREES
EKG P-R INTERVAL: 132 MS
EKG Q-T INTERVAL: 414 MS
EKG QRS DURATION: 92 MS
EKG QTC CALCULATION (BAZETT): 462 MS
EKG R AXIS: 37 DEGREES
EKG T AXIS: 23 DEGREES
EKG VENTRICULAR RATE: 75 BPM
EOSINOPHILS ABSOLUTE: 0.1 K/UL (ref 0–0.6)
EOSINOPHILS RELATIVE PERCENT: 0.7 %
EPITHELIAL CELLS, UA: 21 /HPF (ref 0–5)
ETHANOL: NORMAL MG/DL (ref 0–0.08)
GFR AFRICAN AMERICAN: 49
GFR NON-AFRICAN AMERICAN: 41
GLOBULIN: 3 G/DL
GLUCOSE BLD-MCNC: 144 MG/DL (ref 70–99)
GLUCOSE BLD-MCNC: 147 MG/DL (ref 70–99)
GLUCOSE BLD-MCNC: 56 MG/DL (ref 70–99)
GLUCOSE URINE: 500 MG/DL
HCO3 VENOUS: 23.2 MMOL/L (ref 23–29)
HCT VFR BLD CALC: 33.9 % (ref 40.5–52.5)
HEMOGLOBIN: 11.2 G/DL (ref 13.5–17.5)
INR BLD: 0.93 (ref 0.86–1.14)
KETONES, URINE: 15 MG/DL
LACTIC ACID: 1.1 MMOL/L (ref 0.4–2)
LACTIC ACID: 2.7 MMOL/L (ref 0.4–2)
LEUKOCYTE ESTERASE, URINE: ABNORMAL
LYMPHOCYTES ABSOLUTE: 1.7 K/UL (ref 1–5.1)
LYMPHOCYTES RELATIVE PERCENT: 18.6 %
Lab: ABNORMAL
MAGNESIUM: 2.4 MG/DL (ref 1.8–2.4)
MCH RBC QN AUTO: 28.7 PG (ref 26–34)
MCHC RBC AUTO-ENTMCNC: 33 G/DL (ref 31–36)
MCV RBC AUTO: 86.8 FL (ref 80–100)
METHADONE SCREEN, URINE: ABNORMAL
METHEMOGLOBIN VENOUS: 0.4 %
MICROSCOPIC EXAMINATION: YES
MONOCYTES ABSOLUTE: 0.5 K/UL (ref 0–1.3)
MONOCYTES RELATIVE PERCENT: 5.8 %
NEUTROPHILS ABSOLUTE: 6.9 K/UL (ref 1.7–7.7)
NEUTROPHILS RELATIVE PERCENT: 74.5 %
NITRITE, URINE: NEGATIVE
O2 CONTENT, VEN: 14 VOL %
O2 SAT, VEN: 99 %
O2 THERAPY: ABNORMAL
OPIATE SCREEN URINE: ABNORMAL
OXYCODONE URINE: ABNORMAL
PCO2, VEN: 41.9 MMHG (ref 40–50)
PDW BLD-RTO: 15 % (ref 12.4–15.4)
PERFORMED ON: ABNORMAL
PERFORMED ON: ABNORMAL
PH UA: 5
PH UA: 5 (ref 5–8)
PH VENOUS: 7.35 (ref 7.35–7.45)
PHENCYCLIDINE SCREEN URINE: ABNORMAL
PLATELET # BLD: 368 K/UL (ref 135–450)
PMV BLD AUTO: 7.6 FL (ref 5–10.5)
PO2, VEN: 120 MMHG (ref 25–40)
POTASSIUM REFLEX MAGNESIUM: 3.4 MMOL/L (ref 3.5–5.1)
PROPOXYPHENE SCREEN: ABNORMAL
PROTEIN UA: 100 MG/DL
PROTHROMBIN TIME: 10.8 SEC (ref 10–13.2)
RBC # BLD: 3.91 M/UL (ref 4.2–5.9)
RBC UA: 3 /HPF (ref 0–4)
SALICYLATE, SERUM: <0.3 MG/DL (ref 15–30)
SODIUM BLD-SCNC: 139 MMOL/L (ref 136–145)
SPECIFIC GRAVITY UA: >1.03 (ref 1–1.03)
TCO2 CALC VENOUS: 55 MMOL/L
TOTAL PROTEIN: 7.3 G/DL (ref 6.4–8.2)
URINE REFLEX TO CULTURE: YES
URINE TYPE: ABNORMAL
UROBILINOGEN, URINE: 1 E.U./DL
WBC # BLD: 9.3 K/UL (ref 4–11)
WBC UA: 12 /HPF (ref 0–5)

## 2020-01-08 PROCEDURE — 87040 BLOOD CULTURE FOR BACTERIA: CPT

## 2020-01-08 PROCEDURE — 6360000002 HC RX W HCPCS: Performed by: INTERNAL MEDICINE

## 2020-01-08 PROCEDURE — 6370000000 HC RX 637 (ALT 250 FOR IP): Performed by: INTERNAL MEDICINE

## 2020-01-08 PROCEDURE — 96365 THER/PROPH/DIAG IV INF INIT: CPT

## 2020-01-08 PROCEDURE — G0378 HOSPITAL OBSERVATION PER HR: HCPCS

## 2020-01-08 PROCEDURE — 2580000003 HC RX 258: Performed by: INTERNAL MEDICINE

## 2020-01-08 PROCEDURE — 85025 COMPLETE CBC W/AUTO DIFF WBC: CPT

## 2020-01-08 PROCEDURE — 05HB33Z INSERTION OF INFUSION DEVICE INTO RIGHT BASILIC VEIN, PERCUTANEOUS APPROACH: ICD-10-PCS | Performed by: INTERNAL MEDICINE

## 2020-01-08 PROCEDURE — 6360000002 HC RX W HCPCS: Performed by: EMERGENCY MEDICINE

## 2020-01-08 PROCEDURE — 93010 ELECTROCARDIOGRAM REPORT: CPT | Performed by: INTERNAL MEDICINE

## 2020-01-08 PROCEDURE — 71250 CT THORAX DX C-: CPT

## 2020-01-08 PROCEDURE — 81001 URINALYSIS AUTO W/SCOPE: CPT

## 2020-01-08 PROCEDURE — 73080 X-RAY EXAM OF ELBOW: CPT

## 2020-01-08 PROCEDURE — 6370000000 HC RX 637 (ALT 250 FOR IP): Performed by: EMERGENCY MEDICINE

## 2020-01-08 PROCEDURE — 80053 COMPREHEN METABOLIC PANEL: CPT

## 2020-01-08 PROCEDURE — 85610 PROTHROMBIN TIME: CPT

## 2020-01-08 PROCEDURE — G0480 DRUG TEST DEF 1-7 CLASSES: HCPCS

## 2020-01-08 PROCEDURE — 82140 ASSAY OF AMMONIA: CPT

## 2020-01-08 PROCEDURE — 82010 KETONE BODYS QUAN: CPT

## 2020-01-08 PROCEDURE — C1751 CATH, INF, PER/CENT/MIDLINE: HCPCS

## 2020-01-08 PROCEDURE — 96375 TX/PRO/DX INJ NEW DRUG ADDON: CPT

## 2020-01-08 PROCEDURE — 82803 BLOOD GASES ANY COMBINATION: CPT

## 2020-01-08 PROCEDURE — 80307 DRUG TEST PRSMV CHEM ANLYZR: CPT

## 2020-01-08 PROCEDURE — 87086 URINE CULTURE/COLONY COUNT: CPT

## 2020-01-08 PROCEDURE — 70450 CT HEAD/BRAIN W/O DYE: CPT

## 2020-01-08 PROCEDURE — 96372 THER/PROPH/DIAG INJ SC/IM: CPT

## 2020-01-08 PROCEDURE — 72125 CT NECK SPINE W/O DYE: CPT

## 2020-01-08 PROCEDURE — 96361 HYDRATE IV INFUSION ADD-ON: CPT

## 2020-01-08 PROCEDURE — 93005 ELECTROCARDIOGRAM TRACING: CPT | Performed by: EMERGENCY MEDICINE

## 2020-01-08 PROCEDURE — 71045 X-RAY EXAM CHEST 1 VIEW: CPT

## 2020-01-08 PROCEDURE — 83036 HEMOGLOBIN GLYCOSYLATED A1C: CPT

## 2020-01-08 PROCEDURE — 83735 ASSAY OF MAGNESIUM: CPT

## 2020-01-08 PROCEDURE — 1200000000 HC SEMI PRIVATE

## 2020-01-08 PROCEDURE — 36569 INSJ PICC 5 YR+ W/O IMAGING: CPT

## 2020-01-08 PROCEDURE — 2580000003 HC RX 258: Performed by: EMERGENCY MEDICINE

## 2020-01-08 PROCEDURE — 83605 ASSAY OF LACTIC ACID: CPT

## 2020-01-08 PROCEDURE — 99285 EMERGENCY DEPT VISIT HI MDM: CPT

## 2020-01-08 RX ORDER — DICYCLOMINE HYDROCHLORIDE 10 MG/1
20 CAPSULE ORAL EVERY 6 HOURS PRN
Status: DISCONTINUED | OUTPATIENT
Start: 2020-01-08 | End: 2020-01-09 | Stop reason: HOSPADM

## 2020-01-08 RX ORDER — ATORVASTATIN CALCIUM 10 MG/1
10 TABLET, FILM COATED ORAL NIGHTLY
Status: DISCONTINUED | OUTPATIENT
Start: 2020-01-08 | End: 2020-01-09 | Stop reason: HOSPADM

## 2020-01-08 RX ORDER — SODIUM CHLORIDE 0.9 % (FLUSH) 0.9 %
10 SYRINGE (ML) INJECTION EVERY 12 HOURS SCHEDULED
Status: DISCONTINUED | OUTPATIENT
Start: 2020-01-08 | End: 2020-01-09 | Stop reason: HOSPADM

## 2020-01-08 RX ORDER — ACETAMINOPHEN 325 MG/1
650 TABLET ORAL EVERY 4 HOURS PRN
Status: DISCONTINUED | OUTPATIENT
Start: 2020-01-08 | End: 2020-01-09 | Stop reason: HOSPADM

## 2020-01-08 RX ORDER — OXYCODONE HYDROCHLORIDE AND ACETAMINOPHEN 5; 325 MG/1; MG/1
1 TABLET ORAL ONCE
Status: COMPLETED | OUTPATIENT
Start: 2020-01-08 | End: 2020-01-08

## 2020-01-08 RX ORDER — SODIUM CHLORIDE 0.9 % (FLUSH) 0.9 %
10 SYRINGE (ML) INJECTION PRN
Status: DISCONTINUED | OUTPATIENT
Start: 2020-01-08 | End: 2020-01-09 | Stop reason: HOSPADM

## 2020-01-08 RX ORDER — INSULIN LISPRO 100 [IU]/ML
0-6 INJECTION, SOLUTION INTRAVENOUS; SUBCUTANEOUS NIGHTLY
Status: DISCONTINUED | OUTPATIENT
Start: 2020-01-08 | End: 2020-01-09 | Stop reason: HOSPADM

## 2020-01-08 RX ORDER — LIDOCAINE HYDROCHLORIDE 10 MG/ML
5 INJECTION, SOLUTION EPIDURAL; INFILTRATION; INTRACAUDAL; PERINEURAL ONCE
Status: COMPLETED | OUTPATIENT
Start: 2020-01-08 | End: 2020-01-08

## 2020-01-08 RX ORDER — SODIUM CHLORIDE 9 MG/ML
INJECTION, SOLUTION INTRAVENOUS CONTINUOUS
Status: DISCONTINUED | OUTPATIENT
Start: 2020-01-08 | End: 2020-01-09 | Stop reason: HOSPADM

## 2020-01-08 RX ORDER — HEPARIN SODIUM 5000 [USP'U]/ML
5000 INJECTION, SOLUTION INTRAVENOUS; SUBCUTANEOUS EVERY 8 HOURS SCHEDULED
Status: DISCONTINUED | OUTPATIENT
Start: 2020-01-08 | End: 2020-01-09 | Stop reason: HOSPADM

## 2020-01-08 RX ORDER — DEXTROSE MONOHYDRATE 50 MG/ML
100 INJECTION, SOLUTION INTRAVENOUS PRN
Status: DISCONTINUED | OUTPATIENT
Start: 2020-01-08 | End: 2020-01-09 | Stop reason: HOSPADM

## 2020-01-08 RX ORDER — DOXYCYCLINE HYCLATE 100 MG
TABLET ORAL
COMMUNITY
Start: 2020-01-03 | End: 2020-01-08 | Stop reason: SDUPTHER

## 2020-01-08 RX ORDER — LEVETIRACETAM 10 MG/ML
1000 INJECTION INTRAVASCULAR ONCE
Status: COMPLETED | OUTPATIENT
Start: 2020-01-08 | End: 2020-01-08

## 2020-01-08 RX ORDER — METOCLOPRAMIDE 10 MG/1
10 TABLET ORAL 4 TIMES DAILY
Status: DISCONTINUED | OUTPATIENT
Start: 2020-01-08 | End: 2020-01-09 | Stop reason: HOSPADM

## 2020-01-08 RX ORDER — PANTOPRAZOLE SODIUM 40 MG/1
40 TABLET, DELAYED RELEASE ORAL DAILY
Status: DISCONTINUED | OUTPATIENT
Start: 2020-01-09 | End: 2020-01-09 | Stop reason: HOSPADM

## 2020-01-08 RX ORDER — DEXTROSE MONOHYDRATE 25 G/50ML
12.5 INJECTION, SOLUTION INTRAVENOUS PRN
Status: DISCONTINUED | OUTPATIENT
Start: 2020-01-08 | End: 2020-01-09 | Stop reason: HOSPADM

## 2020-01-08 RX ORDER — PROMETHAZINE HYDROCHLORIDE 25 MG/1
25 SUPPOSITORY RECTAL EVERY 6 HOURS PRN
Status: DISCONTINUED | OUTPATIENT
Start: 2020-01-08 | End: 2020-01-09 | Stop reason: HOSPADM

## 2020-01-08 RX ORDER — SODIUM CHLORIDE 9 MG/ML
2000 INJECTION, SOLUTION INTRAVENOUS ONCE
Status: COMPLETED | OUTPATIENT
Start: 2020-01-08 | End: 2020-01-08

## 2020-01-08 RX ORDER — DOXYCYCLINE HYCLATE 100 MG
100 TABLET ORAL 2 TIMES DAILY
Status: DISCONTINUED | OUTPATIENT
Start: 2020-01-08 | End: 2020-01-09 | Stop reason: HOSPADM

## 2020-01-08 RX ORDER — INSULIN LISPRO 100 [IU]/ML
0-12 INJECTION, SOLUTION INTRAVENOUS; SUBCUTANEOUS
Status: DISCONTINUED | OUTPATIENT
Start: 2020-01-09 | End: 2020-01-09 | Stop reason: HOSPADM

## 2020-01-08 RX ORDER — LEVETIRACETAM 500 MG/1
500 TABLET ORAL EVERY 12 HOURS
Status: DISCONTINUED | OUTPATIENT
Start: 2020-01-08 | End: 2020-01-09 | Stop reason: HOSPADM

## 2020-01-08 RX ORDER — CYCLOBENZAPRINE HCL 10 MG
10 TABLET ORAL 3 TIMES DAILY PRN
Status: DISCONTINUED | OUTPATIENT
Start: 2020-01-08 | End: 2020-01-09 | Stop reason: HOSPADM

## 2020-01-08 RX ORDER — NICOTINE POLACRILEX 4 MG
15 LOZENGE BUCCAL PRN
Status: DISCONTINUED | OUTPATIENT
Start: 2020-01-08 | End: 2020-01-09 | Stop reason: HOSPADM

## 2020-01-08 RX ORDER — LORAZEPAM 2 MG/ML
2 INJECTION INTRAMUSCULAR EVERY 4 HOURS PRN
Status: DISCONTINUED | OUTPATIENT
Start: 2020-01-08 | End: 2020-01-09 | Stop reason: HOSPADM

## 2020-01-08 RX ORDER — ONDANSETRON 2 MG/ML
4 INJECTION INTRAMUSCULAR; INTRAVENOUS EVERY 6 HOURS PRN
Status: DISCONTINUED | OUTPATIENT
Start: 2020-01-08 | End: 2020-01-09 | Stop reason: HOSPADM

## 2020-01-08 RX ADMIN — METOCLOPRAMIDE HYDROCHLORIDE 10 MG: 10 TABLET ORAL at 22:02

## 2020-01-08 RX ADMIN — LEVETIRACETAM 1000 MG: 10 INJECTION INTRAVENOUS at 13:41

## 2020-01-08 RX ADMIN — INSULIN GLARGINE 15 UNITS: 100 INJECTION, SOLUTION SUBCUTANEOUS at 22:07

## 2020-01-08 RX ADMIN — ONDANSETRON 4 MG: 2 INJECTION INTRAMUSCULAR; INTRAVENOUS at 21:34

## 2020-01-08 RX ADMIN — HEPARIN SODIUM 5000 UNITS: 5000 INJECTION INTRAVENOUS; SUBCUTANEOUS at 22:02

## 2020-01-08 RX ADMIN — OXYCODONE HYDROCHLORIDE AND ACETAMINOPHEN 1 TABLET: 5; 325 TABLET ORAL at 13:41

## 2020-01-08 RX ADMIN — CYCLOBENZAPRINE 10 MG: 10 TABLET, FILM COATED ORAL at 22:02

## 2020-01-08 RX ADMIN — LIDOCAINE HYDROCHLORIDE 5 ML: 10 INJECTION, SOLUTION EPIDURAL; INFILTRATION; INTRACAUDAL; PERINEURAL at 19:14

## 2020-01-08 RX ADMIN — ATORVASTATIN CALCIUM 10 MG: 10 TABLET, FILM COATED ORAL at 22:02

## 2020-01-08 RX ADMIN — DOXYCYCLINE HYCLATE 100 MG: 100 TABLET, COATED ORAL at 22:01

## 2020-01-08 RX ADMIN — SODIUM CHLORIDE 2000 ML: 9 INJECTION, SOLUTION INTRAVENOUS at 12:22

## 2020-01-08 RX ADMIN — LEVETIRACETAM 500 MG: 500 TABLET ORAL at 22:02

## 2020-01-08 RX ADMIN — SODIUM CHLORIDE: 9 INJECTION, SOLUTION INTRAVENOUS at 21:35

## 2020-01-08 ASSESSMENT — PAIN SCALES - WONG BAKER: WONGBAKER_NUMERICALRESPONSE: 0

## 2020-01-08 ASSESSMENT — PAIN DESCRIPTION - PROGRESSION: CLINICAL_PROGRESSION: NOT CHANGED

## 2020-01-08 ASSESSMENT — PAIN SCALES - GENERAL
PAINLEVEL_OUTOF10: 10
PAINLEVEL_OUTOF10: 5

## 2020-01-08 NOTE — ED NOTES
Pharmacy Medication History Note      List of current medications patient is taking is complete. Source of information: [facility], Endo visit    Changes made to medication list:  Medications flagged for removal (include reason, ex. noncompliance):  N/A    Medications removed (include reason, ex. therapy complete or physician discontinued):  Oxycodone- therapy complete  Doxycycline- duplicate    Medications added/doses adjusted:  N/A    Other notes (ex. Recent course of antibiotics, Coumadin dosing):  Doxycycline 100mg BID started 1/2 for 10 days  Denies use of other OTC or herbal medications. Last dose times updated. [facility]    No current facility-administered medications on file prior to encounter. Current Outpatient Medications on File Prior to Encounter   Medication Sig Dispense Refill    doxycycline hyclate (VIBRAMYCIN) 100 MG capsule Take 1 capsule by mouth 2 times daily for 10 days 20 capsule 0    insulin glargine (LANTUS SOLOSTAR) 100 UNIT/ML injection pen Inject 15 Units into the skin nightly 5 pen 3    promethazine (PHENERGAN) 25 MG suppository Place 1 suppository rectally every 6 hours as needed for Nausea 30 suppository 1    atorvastatin (LIPITOR) 10 MG tablet Take 1 tablet by mouth daily 30 tablet 5    lisinopril (PRINIVIL;ZESTRIL) 5 MG tablet Take 1 tablet by mouth daily 30 tablet 0    metoclopramide (REGLAN) 10 MG tablet Take 1 tablet by mouth 4 times daily 120 tablet 0    dicyclomine (BENTYL) 20 MG tablet Take 1 tablet by mouth every 6 hours as needed (abd pain) 120 tablet 3    pantoprazole (PROTONIX) 40 MG tablet Take 1 tablet by mouth daily 30 tablet 1    gabapentin (NEURONTIN) 600 MG tablet Take by mouth 3 times daily.  gabapentin (NEURONTIN) 300 MG capsule   300mg PO daily x 1 day (12/16/19), then 300mg PO BID x 1 day (12/17/19), then 300mg PO TID x 7 days (12/18/19-12/24/19), then 450mg TID x 7 days (12/25/19-12/31/19), then 600mg PO TID (starting 1/1/20)      [DISCONTINUED] doxycycline hyclate (VIBRA-TABS) 100 MG tablet       insulin lispro (ADMELOG) 100 UNIT/ML injection vial 5 units for meals and 2 units for snacks 1 vial 2    blood glucose monitor strips Check blood sugars 4-5 times per day 150 strip 3    Continuous Blood Gluc  (FREESTYLE HINA 14 DAY READER) LIOR Use for personal CGMS. On Multiple insulin shots, checks blood sugars 4 times per day and requires frequent insulin adjustment. 1 Device 0    Continuous Blood Gluc Sensor (FREESTYLE HINA 14 DAY SENSOR) MISC Use for personal CGMS. Replace every 2 weeks. 2 each 5    glucose monitoring kit (FREESTYLE) monitoring kit 1 kit by Does not apply route daily 1 kit 0    Lancets MISC 1 each by Does not apply route daily Check blood sugars 4-5 times per day 150 each 11    [DISCONTINUED] oxyCODONE (ROXICODONE) 5 MG immediate release tablet Take 5 mg by mouth every 8 hours as needed for Pain.
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
## 2020-01-08 NOTE — ED NOTES
Dr Obando Ahr states he is able to place picc line due to patient having acute kidney injury, not chronic.       Doug Stevens RN  01/08/20 [id number]

## 2020-01-08 NOTE — ED NOTES
Pt returned from imagining, placed back on monitors, pt more alert at this time asking for food from [facility]. RN re-oriented pt to surroundings. Pt remains with bed in low position, side rails up x 2, seizure pads in place, call light in reach. Will continue to monitor.       Michaela Bear, RN  01/08/20 0299
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
## 2020-01-08 NOTE — ED NOTES
Dr. Darien Ta bedside to place ultrasound guided IV. Previous ultrasound guided IV was infiltrated and removed. IV placement successful, IV fluids infusing. Meal tray ordered for pt. Pt given water and crackers at this time. Pt denies and n/v at this time. Pt remains lying supine in bed with bed in low position, side rails up x 2, call light in reach, seizure pads in place per protocol.       Terri Hernandez RN  01/08/20 [id number]

## 2020-01-09 LAB
ESTIMATED AVERAGE GLUCOSE: 260.4 MG/DL
HBA1C MFR BLD: 10.7 %
URINE CULTURE, ROUTINE: NORMAL

## 2020-01-09 PROCEDURE — G0378 HOSPITAL OBSERVATION PER HR: HCPCS

## 2020-01-09 NOTE — ED NOTES
Pt placed on monitor and then transported to floor in stable condition.       Jorge Tang RN  01/08/20 0253

## 2020-01-09 NOTE — ED NOTES
Report called to [facility] RN, v/u, awaiting tele monitor from floor prior to transport.  Pt updated on plan of care     Dawn Stoll RN  01/08/20 [id number]

## 2020-01-09 NOTE — PROGRESS NOTES
.4 Eyes Skin Assessment     The patient is being assess for   Admission    I agree that 2 RN's have performed a thorough Head to Toe Skin Assessment on the patient. ALL assessment sites listed below have been assessed. Areas assessed by both nurses:   [x]   Head, Face, and Ears   [x]   Shoulders, Back, and Chest, Abdomen  [x]   Arms, Elbows, and Hands   [x]   Coccyx, Sacrum, and Ischium  [x]   Legs, Feet, and Heels        ****    **SHARE this note so that the co-signing nurse is able to place an eSignature**    Co-signer eSignature: {Esignature:[id number]}    Does the Patient have Skin Breakdown?   No          Elmer Prevention initiated:  Yes   Wound Care Orders initiated:  NA      [facility] nurse consulted for Pressure Injury (Stage 3,4, Unstageable, DTI, NWPT, Complex wounds)and New or Established Ostomies:  NA      Primary Nurse eSignature: Electronically signed by Shivam Oliveros RN on 1/8/20 at 9:59 PM
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
## 2020-01-12 LAB
BLOOD CULTURE, ROUTINE: NORMAL
CULTURE, BLOOD 2: NORMAL

## 2020-01-18 ENCOUNTER — APPOINTMENT (OUTPATIENT)
Dept: GENERAL RADIOLOGY | Age: 45
End: 2020-01-18
Payer: MEDICAID

## 2020-01-18 ENCOUNTER — HOSPITAL ENCOUNTER (EMERGENCY)
Age: 45
Discharge: HOME | End: 2020-01-19
Attending: EMERGENCY MEDICINE
Payer: MEDICAID

## 2020-01-18 ENCOUNTER — APPOINTMENT (OUTPATIENT)
Dept: CT IMAGING | Age: 45
End: 2020-01-18
Payer: MEDICAID

## 2020-01-18 LAB
A/G RATIO: 1.2 (ref 1.1–2.2)
ALBUMIN SERPL-MCNC: 3.2 G/DL (ref 3.4–5)
ALP BLD-CCNC: 56 U/L (ref 40–129)
ALT SERPL-CCNC: 10 U/L (ref 10–40)
ANION GAP SERPL CALCULATED.3IONS-SCNC: 14 MMOL/L (ref 3–16)
AST SERPL-CCNC: 15 U/L (ref 15–37)
BASOPHILS ABSOLUTE: 0 K/UL (ref 0–0.2)
BASOPHILS RELATIVE PERCENT: 0.5 %
BILIRUB SERPL-MCNC: <0.2 MG/DL (ref 0–1)
BUN BLDV-MCNC: 11 MG/DL (ref 7–20)
CALCIUM SERPL-MCNC: 8.5 MG/DL (ref 8.3–10.6)
CHLORIDE BLD-SCNC: 103 MMOL/L (ref 99–110)
CO2: 24 MMOL/L (ref 21–32)
CREAT SERPL-MCNC: 0.8 MG/DL (ref 0.9–1.3)
EOSINOPHILS ABSOLUTE: 0 K/UL (ref 0–0.6)
EOSINOPHILS RELATIVE PERCENT: 0.6 %
GFR AFRICAN AMERICAN: >60
GFR NON-AFRICAN AMERICAN: >60
GLOBULIN: 2.6 G/DL
GLUCOSE BLD-MCNC: 169 MG/DL (ref 70–99)
GLUCOSE BLD-MCNC: 64 MG/DL (ref 70–99)
GLUCOSE BLD-MCNC: 82 MG/DL (ref 70–99)
HCT VFR BLD CALC: 27.2 % (ref 40.5–52.5)
HEMOGLOBIN: 9 G/DL (ref 13.5–17.5)
LACTIC ACID: 0.8 MMOL/L (ref 0.4–2)
LIPASE: 5 U/L (ref 13–60)
LYMPHOCYTES ABSOLUTE: 1.5 K/UL (ref 1–5.1)
LYMPHOCYTES RELATIVE PERCENT: 18.7 %
MAGNESIUM: 1.9 MG/DL (ref 1.8–2.4)
MCH RBC QN AUTO: 28.9 PG (ref 26–34)
MCHC RBC AUTO-ENTMCNC: 33.2 G/DL (ref 31–36)
MCV RBC AUTO: 87 FL (ref 80–100)
MONOCYTES ABSOLUTE: 0.5 K/UL (ref 0–1.3)
MONOCYTES RELATIVE PERCENT: 5.6 %
NEUTROPHILS ABSOLUTE: 6.1 K/UL (ref 1.7–7.7)
NEUTROPHILS RELATIVE PERCENT: 74.6 %
PDW BLD-RTO: 15.7 % (ref 12.4–15.4)
PERFORMED ON: ABNORMAL
PERFORMED ON: NORMAL
PLATELET # BLD: 327 K/UL (ref 135–450)
PMV BLD AUTO: 7 FL (ref 5–10.5)
POTASSIUM REFLEX MAGNESIUM: 3.5 MMOL/L (ref 3.5–5.1)
RBC # BLD: 3.13 M/UL (ref 4.2–5.9)
SODIUM BLD-SCNC: 141 MMOL/L (ref 136–145)
TOTAL PROTEIN: 5.8 G/DL (ref 6.4–8.2)
WBC # BLD: 8.2 K/UL (ref 4–11)

## 2020-01-18 PROCEDURE — 71045 X-RAY EXAM CHEST 1 VIEW: CPT

## 2020-01-18 PROCEDURE — 96374 THER/PROPH/DIAG INJ IV PUSH: CPT

## 2020-01-18 PROCEDURE — 94761 N-INVAS EAR/PLS OXIMETRY MLT: CPT

## 2020-01-18 PROCEDURE — 83735 ASSAY OF MAGNESIUM: CPT

## 2020-01-18 PROCEDURE — 80177 DRUG SCRN QUAN LEVETIRACETAM: CPT

## 2020-01-18 PROCEDURE — 83605 ASSAY OF LACTIC ACID: CPT

## 2020-01-18 PROCEDURE — 99285 EMERGENCY DEPT VISIT HI MDM: CPT

## 2020-01-18 PROCEDURE — 2580000003 HC RX 258: Performed by: EMERGENCY MEDICINE

## 2020-01-18 PROCEDURE — 70450 CT HEAD/BRAIN W/O DYE: CPT

## 2020-01-18 PROCEDURE — 83690 ASSAY OF LIPASE: CPT

## 2020-01-18 PROCEDURE — 2700000000 HC OXYGEN THERAPY PER DAY

## 2020-01-18 PROCEDURE — 36600 WITHDRAWAL OF ARTERIAL BLOOD: CPT

## 2020-01-18 PROCEDURE — 85025 COMPLETE CBC W/AUTO DIFF WBC: CPT

## 2020-01-18 PROCEDURE — 93005 ELECTROCARDIOGRAM TRACING: CPT | Performed by: EMERGENCY MEDICINE

## 2020-01-18 PROCEDURE — 80053 COMPREHEN METABOLIC PANEL: CPT

## 2020-01-18 PROCEDURE — 73630 X-RAY EXAM OF FOOT: CPT

## 2020-01-18 RX ORDER — DEXTROSE MONOHYDRATE 25 G/50ML
25 INJECTION, SOLUTION INTRAVENOUS ONCE
Status: COMPLETED | OUTPATIENT
Start: 2020-01-18 | End: 2020-01-18

## 2020-01-18 RX ADMIN — Medication 25 G: at 22:37

## 2020-01-19 VITALS
TEMPERATURE: 98.1 F | HEART RATE: 89 BPM | WEIGHT: 160.72 LBS | RESPIRATION RATE: 18 BRPM | BODY MASS INDEX: 21.3 KG/M2 | OXYGEN SATURATION: 98 % | DIASTOLIC BLOOD PRESSURE: 62 MMHG | SYSTOLIC BLOOD PRESSURE: 115 MMHG | HEIGHT: 73 IN

## 2020-01-19 LAB
AMPHETAMINE SCREEN, URINE: ABNORMAL
BARBITURATE SCREEN URINE: ABNORMAL
BENZODIAZEPINE SCREEN, URINE: POSITIVE
BILIRUBIN URINE: NEGATIVE
BLOOD, URINE: NEGATIVE
CANNABINOID SCREEN URINE: POSITIVE
CLARITY: CLEAR
COCAINE METABOLITE SCREEN URINE: ABNORMAL
COLOR: YELLOW
EKG ATRIAL RATE: 85 BPM
EKG DIAGNOSIS: NORMAL
EKG P AXIS: 61 DEGREES
EKG P-R INTERVAL: 146 MS
EKG Q-T INTERVAL: 374 MS
EKG QRS DURATION: 82 MS
EKG QTC CALCULATION (BAZETT): 445 MS
EKG R AXIS: 12 DEGREES
EKG T AXIS: 18 DEGREES
EKG VENTRICULAR RATE: 85 BPM
EPITHELIAL CELLS, UA: 2 /HPF (ref 0–5)
GLUCOSE BLD-MCNC: 122 MG/DL (ref 70–99)
GLUCOSE BLD-MCNC: 42 MG/DL (ref 70–99)
GLUCOSE URINE: 100 MG/DL
HYALINE CASTS: 3 /LPF (ref 0–8)
KEPPRA DOSE AMT: ABNORMAL
KEPPRA: <2 UG/ML (ref 6–46)
KETONES, URINE: NEGATIVE MG/DL
LEUKOCYTE ESTERASE, URINE: ABNORMAL
Lab: ABNORMAL
METHADONE SCREEN, URINE: ABNORMAL
MICROSCOPIC EXAMINATION: YES
NITRITE, URINE: NEGATIVE
OPIATE SCREEN URINE: ABNORMAL
OXYCODONE URINE: ABNORMAL
PERFORMED ON: ABNORMAL
PERFORMED ON: ABNORMAL
PH UA: 7
PH UA: 7 (ref 5–8)
PHENCYCLIDINE SCREEN URINE: ABNORMAL
PROPOXYPHENE SCREEN: ABNORMAL
PROTEIN UA: ABNORMAL MG/DL
RBC UA: 1 /HPF (ref 0–4)
SPECIFIC GRAVITY UA: 1.02 (ref 1–1.03)
URINE REFLEX TO CULTURE: YES
URINE TYPE: ABNORMAL
UROBILINOGEN, URINE: 0.2 E.U./DL
WBC UA: 22 /HPF (ref 0–5)

## 2020-01-19 PROCEDURE — 80307 DRUG TEST PRSMV CHEM ANLYZR: CPT

## 2020-01-19 PROCEDURE — 81001 URINALYSIS AUTO W/SCOPE: CPT

## 2020-01-19 PROCEDURE — 93010 ELECTROCARDIOGRAM REPORT: CPT | Performed by: INTERNAL MEDICINE

## 2020-01-19 PROCEDURE — 87086 URINE CULTURE/COLONY COUNT: CPT

## 2020-01-19 RX ORDER — DEXTROSE MONOHYDRATE 25 G/50ML
INJECTION, SOLUTION INTRAVENOUS
Status: DISCONTINUED
Start: 2020-01-19 | End: 2020-01-19 | Stop reason: HOSPADM

## 2020-01-19 NOTE — ED PROVIDER NOTES
[facility]  eMERGENCY dEPARTMENTeNCOUnter      Pt Name: Lidia Cole  MRN: [id number]  [place] 1975  Date ofevaluation: 1/18/2020  Provider: Alistair Melo MD    CHIEF COMPLAINT       Chief Complaint   Patient presents with    Seizures    Hypoglycemia    Toe Pain         HISTORY OF PRESENT ILLNESS   (Location/Symptom, Timing/Onset,Context/Setting, Quality, Duration, Modifying Factors, Severity)  Note limiting factors. Lidia Cole is a 50 y.o. male who presents to the emergency department for evaluation of altered mental status. Patient called EMS due to toe pain. The patient is initially unresponsive after receiving Versed but is accompanied by significant other who reports that the patient had an ingrown toenail removed and developed a blister on his toe recently. Patient is having some bleeding and pain at the site and called EMS. Patient was noted to be hypoglycemic and after receiving oral glucose had 2 witnessed seizures that lasted approximately 4 minutes. Patient was given 2 of Versed and arrival to the ED and the patient is unresponsive. Significant other reports the patient does have seizures when he becomes hypoglycemic. She denies any recent infectious symptoms besides the patient swollen toe. The patient is supposed be taking antibiotics which he states he did not complete the course for treatment of ingrown toenail. Reviewing the patient's medical record he was recently admitted to the hospital for seizure disorder which is thought to be secondary to hypoglycemia. While in the hospital the patient was started on Keppra but currently is not take any antiepileptics. HPI    NursingNotes were reviewed. REVIEW OF SYSTEMS    (2-9 systems for level 4, 10 or more for level 5)     Review of Systems   Unable to perform ROS: Mental status change       Except as noted above the remainder of the review of systems was reviewed and negative.        PAST on file    Intimate partner violence:     Fear of current or ex partner: Not on file     Emotionally abused: Not on file     Physically abused: Not on file     Forced sexual activity: Not on file   Other Topics Concern    Not on file   Social History Narrative    Not on file       SCREENINGS      @JASON([id number])@      PHYSICAL EXAM    (up to 7 for level 4, 8 or more for level 5)     ED Triage Vitals [01/18/20 2121]   BP Temp Temp Source Pulse Resp SpO2 Height Weight   115/62 98.1 °F (36.7 °C) Oral 94 18 98 % 6' 1\" (1.854 m) 160 lb 11.5 oz (72.9 kg)       Physical Exam  Constitutional:       General: He is not in acute distress. Appearance: He is well-developed. He is not ill-appearing or toxic-appearing. HENT:      Head: Normocephalic and atraumatic. Eyes:      Conjunctiva/sclera: Conjunctivae normal.      Pupils: Pupils are equal, round, and reactive to light. Neck:      Musculoskeletal: Normal range of motion. Trachea: No tracheal deviation. Cardiovascular:      Rate and Rhythm: Normal rate and regular rhythm. Pulmonary:      Effort: Pulmonary effort is normal.      Breath sounds: Normal breath sounds. No wheezing or rales. Abdominal:      General: There is no distension. Palpations: Abdomen is soft. Tenderness: There is no tenderness. Musculoskeletal: Normal range of motion. General: No deformity. Skin:     General: Skin is warm and dry. Capillary Refill: Capillary refill takes less than 2 seconds. Findings: Erythema and lesion present. Neurological:      Mental Status: He is disoriented. DIAGNOSTIC RESULTS     EKG: All EKG's are interpreted by the Emergency Department Physician who either signs or Co-signsthis chart in the absence of a cardiologist.    Patient's EKG is sinus rhythm  with a ventricular rate of 85 bpm.  Patient's TN interval and QTc interval within acceptable range. Patient is a normal axis.   There are no significant ST elevations or depressions EKG is nondiagnostic for ACS. Compared to EKG from 1/8/2020 there are nonspecific ST changes in the lateral leads. RADIOLOGY:   Non-plain filmimages such as CT, Ultrasound and MRI are read by the radiologist. Plain radiographic images are visualized and preliminarily interpreted by the emergency physician with the below findings:        Interpretation per the Radiologist below, if available at the time ofthis note:    XR CHEST PORTABLE    (Results Pending)   CT HEAD WO CONTRAST    (Results Pending)   XR FOOT RIGHT (2 VIEWS)    (Results Pending)         ED BEDSIDE ULTRASOUND:   Performed by ED Physician - none    LABS:  Labs Reviewed   POCT GLUCOSE - Abnormal; Notable for the following components:       Result Value    POC Glucose 64 (*)     All other components within normal limits    Narrative:     Performed at:  [place] [address] [place], [place] [address]   Phone [phone]   LEVETIRACETAM LEVEL    Narrative:     Performed at:  [facility]  [address] [place], [place] [address]   Phone [phone]   CBC WITH AUTO DIFFERENTIAL   COMPREHENSIVE METABOLIC PANEL W/ REFLEX TO MG FOR LOW K   LIPASE   LACTIC ACID, PLASMA   URINE RT REFLEX TO CULTURE   URINE DRUG SCREEN       All other labs were within normal range or not returned as of this dictation. EMERGENCY DEPARTMENT COURSE and DIFFERENTIAL DIAGNOSIS/MDM:   Vitals:    Vitals:    01/18/20 2121 01/18/20 2156 01/18/20 2213   BP: 115/62     Pulse: 94 89    Resp: 18 21 21   Temp: 98.1 °F (36.7 °C)     TempSrc: Oral     SpO2: 98% 98% 98%   Weight: 160 lb 11.5 oz (72.9 kg)     Height: 6' 1\" (1.854 m)             MDM  Number of Diagnoses or Management Options  Diagnosis management comments: 78-year-old male with a history of poorly controlled diabetes presents the ED for evaluation out of concern for possible seizure-like activity.   Patient was noted to be

## 2020-01-20 LAB — URINE CULTURE, ROUTINE: NORMAL

## 2020-02-05 ENCOUNTER — HOSPITAL ENCOUNTER (EMERGENCY)
Age: 45
Discharge: LEFT BEFORE DISCHARGE | End: 2020-02-06
Attending: EMERGENCY MEDICINE | Admitting: INTERNAL MEDICINE
Payer: MEDICAID

## 2020-02-05 ENCOUNTER — APPOINTMENT (OUTPATIENT)
Dept: GENERAL RADIOLOGY | Age: 45
End: 2020-02-05
Payer: MEDICAID

## 2020-02-05 LAB
A/G RATIO: 1.3 (ref 1.1–2.2)
ALBUMIN SERPL-MCNC: 4 G/DL (ref 3.4–5)
ALP BLD-CCNC: 79 U/L (ref 40–129)
ALT SERPL-CCNC: 11 U/L (ref 10–40)
ANION GAP SERPL CALCULATED.3IONS-SCNC: 12 MMOL/L (ref 3–16)
AST SERPL-CCNC: 14 U/L (ref 15–37)
BASOPHILS ABSOLUTE: 0 K/UL (ref 0–0.2)
BASOPHILS RELATIVE PERCENT: 0.4 %
BILIRUB SERPL-MCNC: <0.2 MG/DL (ref 0–1)
BUN BLDV-MCNC: 10 MG/DL (ref 7–20)
CALCIUM SERPL-MCNC: 9.2 MG/DL (ref 8.3–10.6)
CHLORIDE BLD-SCNC: 103 MMOL/L (ref 99–110)
CO2: 26 MMOL/L (ref 21–32)
CREAT SERPL-MCNC: 0.8 MG/DL (ref 0.9–1.3)
EOSINOPHILS ABSOLUTE: 0.1 K/UL (ref 0–0.6)
EOSINOPHILS RELATIVE PERCENT: 1.3 %
GFR AFRICAN AMERICAN: >60
GFR NON-AFRICAN AMERICAN: >60
GLOBULIN: 3.2 G/DL
GLUCOSE BLD-MCNC: 104 MG/DL (ref 70–99)
HCT VFR BLD CALC: 28.6 % (ref 40.5–52.5)
HEMOGLOBIN: 9.7 G/DL (ref 13.5–17.5)
LACTIC ACID: 0.7 MMOL/L (ref 0.4–2)
LYMPHOCYTES ABSOLUTE: 1.8 K/UL (ref 1–5.1)
LYMPHOCYTES RELATIVE PERCENT: 16.2 %
MCH RBC QN AUTO: 28.3 PG (ref 26–34)
MCHC RBC AUTO-ENTMCNC: 33.9 G/DL (ref 31–36)
MCV RBC AUTO: 83.7 FL (ref 80–100)
MONOCYTES ABSOLUTE: 0.7 K/UL (ref 0–1.3)
MONOCYTES RELATIVE PERCENT: 5.7 %
NEUTROPHILS ABSOLUTE: 8.7 K/UL (ref 1.7–7.7)
NEUTROPHILS RELATIVE PERCENT: 76.4 %
PDW BLD-RTO: 16.1 % (ref 12.4–15.4)
PLATELET # BLD: 467 K/UL (ref 135–450)
PMV BLD AUTO: 7.1 FL (ref 5–10.5)
POTASSIUM REFLEX MAGNESIUM: 3.6 MMOL/L (ref 3.5–5.1)
RBC # BLD: 3.41 M/UL (ref 4.2–5.9)
SODIUM BLD-SCNC: 141 MMOL/L (ref 136–145)
TOTAL PROTEIN: 7.2 G/DL (ref 6.4–8.2)
TROPONIN: <0.01 NG/ML
WBC # BLD: 11.4 K/UL (ref 4–11)

## 2020-02-05 PROCEDURE — 84484 ASSAY OF TROPONIN QUANT: CPT

## 2020-02-05 PROCEDURE — 99285 EMERGENCY DEPT VISIT HI MDM: CPT

## 2020-02-05 PROCEDURE — 80053 COMPREHEN METABOLIC PANEL: CPT

## 2020-02-05 PROCEDURE — 87040 BLOOD CULTURE FOR BACTERIA: CPT

## 2020-02-05 PROCEDURE — 93005 ELECTROCARDIOGRAM TRACING: CPT | Performed by: EMERGENCY MEDICINE

## 2020-02-05 PROCEDURE — 71046 X-RAY EXAM CHEST 2 VIEWS: CPT

## 2020-02-05 PROCEDURE — 85025 COMPLETE CBC W/AUTO DIFF WBC: CPT

## 2020-02-05 PROCEDURE — 83605 ASSAY OF LACTIC ACID: CPT

## 2020-02-05 ASSESSMENT — PAIN DESCRIPTION - LOCATION: LOCATION: CHEST;LEG

## 2020-02-05 ASSESSMENT — PAIN SCALES - GENERAL: PAINLEVEL_OUTOF10: 10

## 2020-02-05 ASSESSMENT — PAIN DESCRIPTION - PAIN TYPE: TYPE: ACUTE PAIN

## 2020-02-06 ENCOUNTER — APPOINTMENT (OUTPATIENT)
Dept: GENERAL RADIOLOGY | Age: 45
End: 2020-02-06
Payer: MEDICAID

## 2020-02-06 VITALS
OXYGEN SATURATION: 100 % | RESPIRATION RATE: 17 BRPM | WEIGHT: 162.04 LBS | DIASTOLIC BLOOD PRESSURE: 89 MMHG | HEIGHT: 74 IN | HEART RATE: 78 BPM | TEMPERATURE: 99.2 F | SYSTOLIC BLOOD PRESSURE: 153 MMHG | BODY MASS INDEX: 20.8 KG/M2

## 2020-02-06 PROBLEM — L03.119 CELLULITIS OF FOOT: Status: ACTIVE | Noted: 2020-02-06

## 2020-02-06 LAB
EKG ATRIAL RATE: 87 BPM
EKG DIAGNOSIS: NORMAL
EKG P AXIS: 71 DEGREES
EKG P-R INTERVAL: 136 MS
EKG Q-T INTERVAL: 394 MS
EKG QRS DURATION: 78 MS
EKG QTC CALCULATION (BAZETT): 474 MS
EKG R AXIS: 25 DEGREES
EKG T AXIS: 55 DEGREES
EKG VENTRICULAR RATE: 87 BPM

## 2020-02-06 PROCEDURE — 73630 X-RAY EXAM OF FOOT: CPT

## 2020-02-06 PROCEDURE — 87040 BLOOD CULTURE FOR BACTERIA: CPT

## 2020-02-06 PROCEDURE — 93010 ELECTROCARDIOGRAM REPORT: CPT | Performed by: INTERNAL MEDICINE

## 2020-02-06 PROCEDURE — 6370000000 HC RX 637 (ALT 250 FOR IP): Performed by: INTERNAL MEDICINE

## 2020-02-06 PROCEDURE — 6360000002 HC RX W HCPCS: Performed by: INTERNAL MEDICINE

## 2020-02-06 PROCEDURE — 2580000003 HC RX 258: Performed by: INTERNAL MEDICINE

## 2020-02-06 PROCEDURE — 1200000000 HC SEMI PRIVATE

## 2020-02-06 PROCEDURE — 96366 THER/PROPH/DIAG IV INF ADDON: CPT

## 2020-02-06 PROCEDURE — 96365 THER/PROPH/DIAG IV INF INIT: CPT

## 2020-02-06 RX ORDER — ACETAMINOPHEN 325 MG/1
650 TABLET ORAL EVERY 4 HOURS PRN
Status: DISCONTINUED | OUTPATIENT
Start: 2020-02-06 | End: 2020-02-06 | Stop reason: HOSPADM

## 2020-02-06 RX ORDER — ATORVASTATIN CALCIUM 10 MG/1
10 TABLET, FILM COATED ORAL DAILY
Status: DISCONTINUED | OUTPATIENT
Start: 2020-02-06 | End: 2020-02-06 | Stop reason: HOSPADM

## 2020-02-06 RX ORDER — INSULIN GLARGINE 100 [IU]/ML
15 INJECTION, SOLUTION SUBCUTANEOUS NIGHTLY
Status: DISCONTINUED | OUTPATIENT
Start: 2020-02-06 | End: 2020-02-06 | Stop reason: HOSPADM

## 2020-02-06 RX ORDER — PANTOPRAZOLE SODIUM 40 MG/1
40 TABLET, DELAYED RELEASE ORAL DAILY
Status: DISCONTINUED | OUTPATIENT
Start: 2020-02-06 | End: 2020-02-06 | Stop reason: HOSPADM

## 2020-02-06 RX ORDER — GABAPENTIN 300 MG/1
600 CAPSULE ORAL 3 TIMES DAILY
Status: DISCONTINUED | OUTPATIENT
Start: 2020-02-06 | End: 2020-02-06 | Stop reason: HOSPADM

## 2020-02-06 RX ORDER — SODIUM CHLORIDE 9 MG/ML
INJECTION, SOLUTION INTRAVENOUS CONTINUOUS
Status: DISCONTINUED | OUTPATIENT
Start: 2020-02-06 | End: 2020-02-06 | Stop reason: HOSPADM

## 2020-02-06 RX ORDER — GABAPENTIN 300 MG/1
600 CAPSULE ORAL ONCE
Status: COMPLETED | OUTPATIENT
Start: 2020-02-06 | End: 2020-02-06

## 2020-02-06 RX ORDER — ONDANSETRON 2 MG/ML
4 INJECTION INTRAMUSCULAR; INTRAVENOUS EVERY 4 HOURS PRN
Status: DISCONTINUED | OUTPATIENT
Start: 2020-02-06 | End: 2020-02-06 | Stop reason: HOSPADM

## 2020-02-06 RX ORDER — NICOTINE POLACRILEX 4 MG
15 LOZENGE BUCCAL PRN
Status: DISCONTINUED | OUTPATIENT
Start: 2020-02-06 | End: 2020-02-06 | Stop reason: HOSPADM

## 2020-02-06 RX ORDER — DEXTROSE MONOHYDRATE 50 MG/ML
100 INJECTION, SOLUTION INTRAVENOUS PRN
Status: DISCONTINUED | OUTPATIENT
Start: 2020-02-06 | End: 2020-02-06 | Stop reason: HOSPADM

## 2020-02-06 RX ORDER — DEXTROSE MONOHYDRATE 25 G/50ML
12.5 INJECTION, SOLUTION INTRAVENOUS PRN
Status: DISCONTINUED | OUTPATIENT
Start: 2020-02-06 | End: 2020-02-06 | Stop reason: HOSPADM

## 2020-02-06 RX ORDER — SODIUM CHLORIDE 0.9 % (FLUSH) 0.9 %
10 SYRINGE (ML) INJECTION EVERY 12 HOURS SCHEDULED
Status: DISCONTINUED | OUTPATIENT
Start: 2020-02-06 | End: 2020-02-06 | Stop reason: HOSPADM

## 2020-02-06 RX ORDER — DICYCLOMINE HCL 20 MG
20 TABLET ORAL EVERY 6 HOURS PRN
Status: DISCONTINUED | OUTPATIENT
Start: 2020-02-06 | End: 2020-02-06 | Stop reason: HOSPADM

## 2020-02-06 RX ORDER — SODIUM CHLORIDE 0.9 % (FLUSH) 0.9 %
10 SYRINGE (ML) INJECTION PRN
Status: DISCONTINUED | OUTPATIENT
Start: 2020-02-06 | End: 2020-02-06 | Stop reason: HOSPADM

## 2020-02-06 RX ADMIN — ACETAMINOPHEN 650 MG: 325 TABLET ORAL at 04:24

## 2020-02-06 RX ADMIN — GABAPENTIN 600 MG: 300 CAPSULE ORAL at 04:24

## 2020-02-06 RX ADMIN — CEFEPIME HYDROCHLORIDE 2 G: 2 INJECTION, POWDER, FOR SOLUTION INTRAVENOUS at 03:00

## 2020-02-06 ASSESSMENT — PAIN DESCRIPTION - LOCATION: LOCATION: FOOT

## 2020-02-06 ASSESSMENT — PAIN DESCRIPTION - ORIENTATION: ORIENTATION: RIGHT;LEFT

## 2020-02-06 ASSESSMENT — PAIN DESCRIPTION - FREQUENCY: FREQUENCY: CONTINUOUS

## 2020-02-06 ASSESSMENT — PAIN - FUNCTIONAL ASSESSMENT: PAIN_FUNCTIONAL_ASSESSMENT: PREVENTS OR INTERFERES SOME ACTIVE ACTIVITIES AND ADLS

## 2020-02-06 ASSESSMENT — PAIN DESCRIPTION - PAIN TYPE: TYPE: ACUTE PAIN

## 2020-02-06 ASSESSMENT — PAIN SCALES - GENERAL
PAINLEVEL_OUTOF10: 10
PAINLEVEL_OUTOF10: 10

## 2020-02-06 ASSESSMENT — PAIN DESCRIPTION - PROGRESSION: CLINICAL_PROGRESSION: GRADUALLY WORSENING

## 2020-02-06 ASSESSMENT — PAIN DESCRIPTION - DESCRIPTORS: DESCRIPTORS: DISCOMFORT;ACHING

## 2020-02-06 ASSESSMENT — PAIN DESCRIPTION - ONSET: ONSET: ON-GOING

## 2020-02-06 NOTE — ED NOTES
Patient accidentally pulled out IV during xrays. Requesting a another ultrasound IV be placed by Dr. Sara Mendoza.       Delio Wright, RN  02/06/20 0137

## 2020-02-06 NOTE — ED NOTES
Patient requesting medication for pain relief, hospitalist perfect served for request.      Michael Hardwick RN  02/06/20 2932

## 2020-02-06 NOTE — CONSULTS
Clinical Pharmacy Note  Vancomycin Consult    Melva Escobar is a 50 y.o. male ordered Vancomycin; consult received from Dr. Graham DE LA CRUZ [facility] to manage therapy. Patient Active Problem List   Diagnosis    DKA, type 1, not at goal [facility])    Diabetic ketoacidosis without coma associated with diabetes mellitus due to underlying condition ([address]    Gastroparesis    GERD (gastroesophageal reflux disease)    Seizure ([address]    Toe deformity    Uncontrolled type 1 diabetes mellitus with diabetic peripheral neuropathy (HCC)    Type 1 diabetes mellitus with hypoglycemia and without coma (HCC)    Type 1 diabetes mellitus with background diabetic retinopathy ([address]    Hypoglycemia unawareness in type 1 diabetes mellitus (HCC)    Type 1 diabetes mellitus with hypercholesterolemia ([address]    Essential hypertension    Accelerated hypertension    Acute blood loss anemia    Acute dyspnea    Acute respiratory failure (HCC)    Candida esophagitis (HCC)    Cholelithiasis    Cocaine abuse, episodic (HCC)    Cerebral infarction ([address]    Diabetic peripheral neuropathy (HCC)    Diabetic polyneuropathy (HCC)    Duodenal ulcer    Elevated blood pressure    Glucosuria    Heart failure, diastolic, acute (HCC)    Hematemesis with nausea    Hyperglycemia    Hypoglycemia    Hypophosphatemia    Hypopotassemia    Hypotension    Hypoxia    Intractable nausea and vomiting    Lactic acidosis    Leg weakness, bilateral    Leucocytosis    Leukocytosis    Cannabis abuse    Metabolic acidosis, increased anion gap (IAG)    Nausea    Nausea and vomiting    Numbness in both legs    Polysubstance abuse ([address]    Pulmonary edema    RUQ abdominal pain    Type 1 diabetes mellitus with ketoacidosis without coma (HCC)    Diabetic foot ulcer (HCC)    Heart murmur       Allergies:  Ketorolac; Morphine; Morphine and related; Toradol [ketorolac tromethamine];  Tramadol; and Vicodin [hydrocodone-acetaminophen]     Temp max:  Temp (24hrs),

## 2020-02-08 ASSESSMENT — ENCOUNTER SYMPTOMS
CHEST TIGHTNESS: 0
COLOR CHANGE: 1
BACK PAIN: 0
RHINORRHEA: 0
WHEEZING: 0
SHORTNESS OF BREATH: 0
NAUSEA: 0
PHOTOPHOBIA: 0
COUGH: 0
VOMITING: 0

## 2020-02-08 NOTE — ED PROVIDER NOTES
There is no distension. Palpations: Abdomen is soft. Tenderness: There is no abdominal tenderness. Musculoskeletal: Normal range of motion. General: Swelling present. No deformity. Skin:     General: Skin is warm and dry. Capillary Refill: Capillary refill takes 2 to 3 seconds. Findings: Erythema present. Neurological:      Mental Status: He is alert and oriented to person, place, and time. Sensory: Sensory deficit present. Motor: No weakness. DIAGNOSTIC RESULTS     EKG: All EKG's are interpreted by the Emergency Department Physician who either signs or Co-signsthis chart in the absence of a cardiologist.    EKG shows a sinus rhythm with a ventricular rate of 87 bpm.  DE interval and QTc interval within excitable range. Patient has normal axis. There are no significant ST elevations depressions EKG is nondiagnostic for ACS. Compared EKG from 1/18/2020 there are no significant changes. RADIOLOGY:   Non-plain filmimages such as CT, Ultrasound and MRI are read by the radiologist. Plain radiographic images are visualized and preliminarily interpreted by the emergency physician with the below findings:        Interpretation per the Radiologist below, if available at the time ofthis note:    XR FOOT RIGHT (MIN 3 VIEWS)   Final Result   1. Ulceration in the medial portion of the left great toe. There may be   cellulitis in the medial distal tuft of the left 1st distal phalanx, although   the appearance could also be artifact. Consider MRI if there are clinical   findings of osteomyelitis. 2. Soft tissue swelling in each great toe and in the dorsal left forefoot,   likely cellulitis given the clinical concern. No findings of necrotizing   fasciitis. XR FOOT LEFT (MIN 3 VIEWS)   Final Result   1. Ulceration in the medial portion of the left great toe.   There may be   cellulitis in the medial distal tuft of the left 1st distal phalanx, although   the child <=2 yrs old please draw pediatric bottle. ~Blood Culture #2  Performed at:  [facility]  [address] [facility] [address]   Phone [phone]   TROPONIN    Narrative:     Performed at:  [facility]  [address] [facility] [address]   Phone [phone]   LACTIC ACID, PLASMA    Narrative:     Performed at:  [facility]  [address] [place] [facility] [address]   Phone [phone]   POCT GLUCOSE       All other labs were within normal range or not returned as of this dictation. EMERGENCY DEPARTMENT COURSE and DIFFERENTIAL DIAGNOSIS/MDM:   Vitals:    Vitals:    02/05/20 2228 02/06/20 0016 02/06/20 0018 02/06/20 0035   BP: (!) 152/85 (!) 153/89 (!) 153/89    Pulse: 99 83 84 78   Resp: 18 16 18 17   Temp: 98.6 °F (37 °C) 99.2 °F (37.3 °C)     TempSrc: Oral Oral     SpO2: 100% 100% 100%    Weight: 162 lb 0.6 oz (73.5 kg)      Height: 6' 2\" (1.88 m)              MDM  Number of Diagnoses or Management Options  Diagnosis management comments: 49-year-old male presents the ED for evaluation of swelling to bilateral lower extremities diabetic foot ulcers. Patient states that he has been taking oral doxycycline without relief. He does see a podiatrist.  On exam the patient has ulceration to the distal part of the bilateral first toes. There is warmth erythema and edema to the left foot. There is mild edema to the right foot. She has palpable PT and DP pulses and no tenderness to the calf. I suspect the patient is developing a soft tissue infection originating from the ulcers. Will obtain basic laboratory work-up and imaging of the patient's feet. Given that the patient is taking oral antibiotics without improvement he would likely benefit from IV antibiotics.         Amount and/or Complexity of Data Reviewed  Decide to obtain previous medical records or to obtain history from someone other than the patient: yes          REASSESSMENT      Work-up remarkable for leukocytosis. Troponin within normal limits. Lactate not elevated. Blood cultures were obtained. I discussed my concerns about infection with the patient he was amenable to admission to the hospital.  After boarding in the emergency department for a few hours the patient left [address] CRITICAL CARE TIME   Total Critical Care time was 0 minutes, excluding separatelyreportable procedures. There was a high probability ofclinically significant/life threatening deterioration in the patient's condition which required my urgent intervention. CONSULTS:  PHARMACY TO DOSE VANCOMYCIN  PHARMACY TO DOSE VANCOMYCIN  IP CONSULT TO PODIATRY    PROCEDURES:  Unless otherwise noted below, none    PROCEDURE NOTE: PHYSICIAN PLACEMENT OF ULTRASOUND GUIDED PERIPHERAL IV  Indication: difficult to access - nursing staff unable to secure PIV  : ANGY Huizar  Location: R AC  Pt provided verbal consent for IV  Static views used to identify the target vein  Usual prep with chlorhexidine  20g IV placed on first attempt under dynamic US guidance. Dark red flash noted, and catheter advanced smoothly into the vein  NS saline flushed without resistance, witnessed swelling, or patient discomfort  IV secured with I-site and tegaderm  The patient tolerated the procedure well. PROCEDURE NOTE: PHYSICIAN PLACEMENT OF ULTRASOUND GUIDED PERIPHERAL IV  Indication: difficult to access - nursing staff unable to secure PIV  : ANGY huizar  Location: R AC  Pt provided verbal consent for IV  Static views used to identify the target vein  Usual prep with chlorhexidine  20g  IV placed on first attempt under dynamic US guidance. Dark red flash noted, and catheter advanced smoothly into the vein  NS saline flushed without resistance, witnessed swelling, or patient discomfort  IV secured with I-site and tegaderm  The patient tolerated the procedure well.

## 2020-02-10 LAB
BLOOD CULTURE, ROUTINE: NORMAL
CULTURE, BLOOD 2: NORMAL

## 2020-03-24 ENCOUNTER — HOSPITAL ENCOUNTER (INPATIENT)
Age: 45
LOS: 1 days | Discharge: HOME | DRG: 420 | End: 2020-03-26
Attending: EMERGENCY MEDICINE | Admitting: INTERNAL MEDICINE
Payer: MEDICAID

## 2020-03-24 ENCOUNTER — APPOINTMENT (OUTPATIENT)
Dept: GENERAL RADIOLOGY | Age: 45
DRG: 420 | End: 2020-03-24
Payer: MEDICAID

## 2020-03-24 LAB
BASE EXCESS VENOUS: -0.2 MMOL/L
BASOPHILS ABSOLUTE: 0.1 K/UL (ref 0–0.2)
BASOPHILS RELATIVE PERCENT: 0.6 %
CARBOXYHEMOGLOBIN: 0.9 %
EOSINOPHILS ABSOLUTE: 0 K/UL (ref 0–0.6)
EOSINOPHILS RELATIVE PERCENT: 0.2 %
GLUCOSE BLD-MCNC: 347 MG/DL (ref 70–99)
HCO3 VENOUS: 24 MMOL/L (ref 23–29)
HCT VFR BLD CALC: 39.3 % (ref 40.5–52.5)
HEMOGLOBIN: 12.8 G/DL (ref 13.5–17.5)
LYMPHOCYTES ABSOLUTE: 1 K/UL (ref 1–5.1)
LYMPHOCYTES RELATIVE PERCENT: 9.8 %
MCH RBC QN AUTO: 26.7 PG (ref 26–34)
MCHC RBC AUTO-ENTMCNC: 32.7 G/DL (ref 31–36)
MCV RBC AUTO: 81.6 FL (ref 80–100)
METHEMOGLOBIN VENOUS: 0.6 %
MONOCYTES ABSOLUTE: 0.2 K/UL (ref 0–1.3)
MONOCYTES RELATIVE PERCENT: 1.5 %
NEUTROPHILS ABSOLUTE: 9.3 K/UL (ref 1.7–7.7)
NEUTROPHILS RELATIVE PERCENT: 87.9 %
O2 CONTENT, VEN: 14 ML/DL
O2 SAT, VEN: 75 %
O2 THERAPY: ABNORMAL
PCO2, VEN: 37.1 MMHG (ref 40–50)
PDW BLD-RTO: 17.6 % (ref 12.4–15.4)
PERFORMED ON: ABNORMAL
PH VENOUS: 7.42 (ref 7.35–7.45)
PLATELET # BLD: 304 K/UL (ref 135–450)
PMV BLD AUTO: 7.6 FL (ref 5–10.5)
PO2, VEN: 40 MMHG
RBC # BLD: 4.82 M/UL (ref 4.2–5.9)
TCO2 CALC VENOUS: 25 MMOL/L
WBC # BLD: 10.5 K/UL (ref 4–11)

## 2020-03-24 PROCEDURE — 82010 KETONE BODYS QUAN: CPT

## 2020-03-24 PROCEDURE — 83036 HEMOGLOBIN GLYCOSYLATED A1C: CPT

## 2020-03-24 PROCEDURE — 83690 ASSAY OF LIPASE: CPT

## 2020-03-24 PROCEDURE — 36556 INSERT NON-TUNNEL CV CATH: CPT

## 2020-03-24 PROCEDURE — 83880 ASSAY OF NATRIURETIC PEPTIDE: CPT

## 2020-03-24 PROCEDURE — 2580000003 HC RX 258: Performed by: PHYSICIAN ASSISTANT

## 2020-03-24 PROCEDURE — 96375 TX/PRO/DX INJ NEW DRUG ADDON: CPT

## 2020-03-24 PROCEDURE — 85025 COMPLETE CBC W/AUTO DIFF WBC: CPT

## 2020-03-24 PROCEDURE — 96374 THER/PROPH/DIAG INJ IV PUSH: CPT

## 2020-03-24 PROCEDURE — 84484 ASSAY OF TROPONIN QUANT: CPT

## 2020-03-24 PROCEDURE — 82803 BLOOD GASES ANY COMBINATION: CPT

## 2020-03-24 PROCEDURE — 99285 EMERGENCY DEPT VISIT HI MDM: CPT

## 2020-03-24 PROCEDURE — 93005 ELECTROCARDIOGRAM TRACING: CPT | Performed by: PHYSICIAN ASSISTANT

## 2020-03-24 PROCEDURE — 71045 X-RAY EXAM CHEST 1 VIEW: CPT

## 2020-03-24 PROCEDURE — 80053 COMPREHEN METABOLIC PANEL: CPT

## 2020-03-24 PROCEDURE — 6360000002 HC RX W HCPCS: Performed by: PHYSICIAN ASSISTANT

## 2020-03-24 PROCEDURE — 96361 HYDRATE IV INFUSION ADD-ON: CPT

## 2020-03-24 RX ORDER — ONDANSETRON 2 MG/ML
4 INJECTION INTRAMUSCULAR; INTRAVENOUS ONCE
Status: DISCONTINUED | OUTPATIENT
Start: 2020-03-24 | End: 2020-03-25

## 2020-03-24 RX ORDER — PROMETHAZINE HYDROCHLORIDE 25 MG/ML
12.5 INJECTION, SOLUTION INTRAMUSCULAR; INTRAVENOUS ONCE
Status: DISCONTINUED | OUTPATIENT
Start: 2020-03-24 | End: 2020-03-24

## 2020-03-24 RX ORDER — 0.9 % SODIUM CHLORIDE 0.9 %
1000 INTRAVENOUS SOLUTION INTRAVENOUS ONCE
Status: COMPLETED | OUTPATIENT
Start: 2020-03-24 | End: 2020-03-25

## 2020-03-24 RX ADMIN — Medication 12.5 MG: at 23:35

## 2020-03-24 RX ADMIN — SODIUM CHLORIDE 1000 ML: 9 INJECTION, SOLUTION INTRAVENOUS at 23:32

## 2020-03-24 RX ADMIN — HYDROMORPHONE HYDROCHLORIDE 0.5 MG: 1 INJECTION, SOLUTION INTRAMUSCULAR; INTRAVENOUS; SUBCUTANEOUS at 23:35

## 2020-03-24 ASSESSMENT — PAIN DESCRIPTION - PAIN TYPE: TYPE: ACUTE PAIN

## 2020-03-24 ASSESSMENT — PAIN DESCRIPTION - LOCATION: LOCATION: GENERALIZED

## 2020-03-24 ASSESSMENT — PAIN DESCRIPTION - DESCRIPTORS: DESCRIPTORS: ACHING

## 2020-03-24 ASSESSMENT — PAIN SCALES - GENERAL
PAINLEVEL_OUTOF10: 10
PAINLEVEL_OUTOF10: 10

## 2020-03-25 PROBLEM — E11.10 DM (DIABETES MELLITUS) TYPE 2, UNCONTROLLED, WITH KETOACIDOSIS (HCC): Status: ACTIVE | Noted: 2020-03-25

## 2020-03-25 PROBLEM — E78.5 HYPERLIPIDEMIA: Status: ACTIVE | Noted: 2020-03-25

## 2020-03-25 LAB
A/G RATIO: 1.2 (ref 1.1–2.2)
ALBUMIN SERPL-MCNC: 4.6 G/DL (ref 3.4–5)
ALP BLD-CCNC: 106 U/L (ref 40–129)
ALT SERPL-CCNC: 15 U/L (ref 10–40)
ANION GAP SERPL CALCULATED.3IONS-SCNC: 12 MMOL/L (ref 3–16)
ANION GAP SERPL CALCULATED.3IONS-SCNC: 13 MMOL/L (ref 3–16)
ANION GAP SERPL CALCULATED.3IONS-SCNC: 13 MMOL/L (ref 3–16)
ANION GAP SERPL CALCULATED.3IONS-SCNC: 15 MMOL/L (ref 3–16)
ANION GAP SERPL CALCULATED.3IONS-SCNC: 17 MMOL/L (ref 3–16)
ANION GAP SERPL CALCULATED.3IONS-SCNC: 18 MMOL/L (ref 3–16)
AST SERPL-CCNC: 15 U/L (ref 15–37)
BETA-HYDROXYBUTYRATE: 2.73 MMOL/L (ref 0–0.27)
BILIRUB SERPL-MCNC: 0.3 MG/DL (ref 0–1)
BILIRUBIN URINE: NEGATIVE
BLOOD, URINE: ABNORMAL
BUN BLDV-MCNC: 10 MG/DL (ref 7–20)
BUN BLDV-MCNC: 10 MG/DL (ref 7–20)
BUN BLDV-MCNC: 11 MG/DL (ref 7–20)
BUN BLDV-MCNC: 8 MG/DL (ref 7–20)
CALCIUM SERPL-MCNC: 8.9 MG/DL (ref 8.3–10.6)
CALCIUM SERPL-MCNC: 9 MG/DL (ref 8.3–10.6)
CALCIUM SERPL-MCNC: 9.2 MG/DL (ref 8.3–10.6)
CALCIUM SERPL-MCNC: 9.3 MG/DL (ref 8.3–10.6)
CALCIUM SERPL-MCNC: 9.4 MG/DL (ref 8.3–10.6)
CALCIUM SERPL-MCNC: 9.7 MG/DL (ref 8.3–10.6)
CHLORIDE BLD-SCNC: 100 MMOL/L (ref 99–110)
CHLORIDE BLD-SCNC: 101 MMOL/L (ref 99–110)
CHLORIDE BLD-SCNC: 101 MMOL/L (ref 99–110)
CHLORIDE BLD-SCNC: 95 MMOL/L (ref 99–110)
CHLORIDE BLD-SCNC: 99 MMOL/L (ref 99–110)
CHLORIDE BLD-SCNC: 99 MMOL/L (ref 99–110)
CLARITY: CLEAR
CO2: 21 MMOL/L (ref 21–32)
CO2: 21 MMOL/L (ref 21–32)
CO2: 22 MMOL/L (ref 21–32)
CO2: 23 MMOL/L (ref 21–32)
COLOR: YELLOW
CREAT SERPL-MCNC: 0.8 MG/DL (ref 0.9–1.3)
EKG ATRIAL RATE: 52 BPM
EKG DIAGNOSIS: NORMAL
EKG P-R INTERVAL: 140 MS
EKG Q-T INTERVAL: 522 MS
EKG QRS DURATION: 84 MS
EKG QTC CALCULATION (BAZETT): 485 MS
EKG R AXIS: 4 DEGREES
EKG T AXIS: 150 DEGREES
EKG VENTRICULAR RATE: 52 BPM
EPITHELIAL CELLS, UA: 0 /HPF (ref 0–5)
ESTIMATED AVERAGE GLUCOSE: 200.1 MG/DL
GFR AFRICAN AMERICAN: >60
GFR NON-AFRICAN AMERICAN: >60
GLOBULIN: 3.7 G/DL
GLUCOSE BLD-MCNC: 104 MG/DL (ref 70–99)
GLUCOSE BLD-MCNC: 111 MG/DL (ref 70–99)
GLUCOSE BLD-MCNC: 113 MG/DL (ref 70–99)
GLUCOSE BLD-MCNC: 118 MG/DL (ref 70–99)
GLUCOSE BLD-MCNC: 120 MG/DL (ref 70–99)
GLUCOSE BLD-MCNC: 121 MG/DL (ref 70–99)
GLUCOSE BLD-MCNC: 132 MG/DL (ref 70–99)
GLUCOSE BLD-MCNC: 147 MG/DL (ref 70–99)
GLUCOSE BLD-MCNC: 152 MG/DL (ref 70–99)
GLUCOSE BLD-MCNC: 154 MG/DL (ref 70–99)
GLUCOSE BLD-MCNC: 167 MG/DL (ref 70–99)
GLUCOSE BLD-MCNC: 191 MG/DL (ref 70–99)
GLUCOSE BLD-MCNC: 191 MG/DL (ref 70–99)
GLUCOSE BLD-MCNC: 193 MG/DL (ref 70–99)
GLUCOSE BLD-MCNC: 199 MG/DL (ref 70–99)
GLUCOSE BLD-MCNC: 203 MG/DL (ref 70–99)
GLUCOSE BLD-MCNC: 204 MG/DL (ref 70–99)
GLUCOSE BLD-MCNC: 204 MG/DL (ref 70–99)
GLUCOSE BLD-MCNC: 230 MG/DL (ref 70–99)
GLUCOSE BLD-MCNC: 242 MG/DL (ref 70–99)
GLUCOSE BLD-MCNC: 287 MG/DL (ref 70–99)
GLUCOSE BLD-MCNC: 294 MG/DL (ref 70–99)
GLUCOSE BLD-MCNC: 361 MG/DL (ref 70–99)
GLUCOSE URINE: >=1000 MG/DL
HBA1C MFR BLD: 8.6 %
HYALINE CASTS: 0 /LPF (ref 0–8)
KETONES, URINE: 40 MG/DL
LEUKOCYTE ESTERASE, URINE: NEGATIVE
LIPASE: 8 U/L (ref 13–60)
MAGNESIUM: 1.6 MG/DL (ref 1.8–2.4)
MAGNESIUM: 2 MG/DL (ref 1.8–2.4)
MAGNESIUM: 2 MG/DL (ref 1.8–2.4)
MAGNESIUM: 2.1 MG/DL (ref 1.8–2.4)
MAGNESIUM: 2.5 MG/DL (ref 1.8–2.4)
MICROSCOPIC EXAMINATION: YES
NITRITE, URINE: NEGATIVE
PERFORMED ON: ABNORMAL
PH UA: 6 (ref 5–8)
PHOSPHORUS: 1.7 MG/DL (ref 2.5–4.9)
PHOSPHORUS: 1.7 MG/DL (ref 2.5–4.9)
PHOSPHORUS: 2.5 MG/DL (ref 2.5–4.9)
PHOSPHORUS: 3.1 MG/DL (ref 2.5–4.9)
PHOSPHORUS: 3.6 MG/DL (ref 2.5–4.9)
POTASSIUM REFLEX MAGNESIUM: 3.8 MMOL/L (ref 3.5–5.1)
POTASSIUM SERPL-SCNC: 3.8 MMOL/L (ref 3.5–5.1)
POTASSIUM SERPL-SCNC: 3.8 MMOL/L (ref 3.5–5.1)
POTASSIUM SERPL-SCNC: 4 MMOL/L (ref 3.5–5.1)
POTASSIUM SERPL-SCNC: 4.2 MMOL/L (ref 3.5–5.1)
POTASSIUM SERPL-SCNC: 4.2 MMOL/L (ref 3.5–5.1)
PRO-BNP: 388 PG/ML (ref 0–124)
PROTEIN UA: 30 MG/DL
RBC UA: 1 /HPF (ref 0–4)
SODIUM BLD-SCNC: 134 MMOL/L (ref 136–145)
SODIUM BLD-SCNC: 134 MMOL/L (ref 136–145)
SODIUM BLD-SCNC: 136 MMOL/L (ref 136–145)
SODIUM BLD-SCNC: 136 MMOL/L (ref 136–145)
SODIUM BLD-SCNC: 137 MMOL/L (ref 136–145)
SODIUM BLD-SCNC: 139 MMOL/L (ref 136–145)
SPECIFIC GRAVITY UA: 1.02 (ref 1–1.03)
TOTAL PROTEIN: 8.3 G/DL (ref 6.4–8.2)
TROPONIN: <0.01 NG/ML
URINE REFLEX TO CULTURE: ABNORMAL
URINE TYPE: ABNORMAL
UROBILINOGEN, URINE: 0.2 E.U./DL
WBC UA: 3 /HPF (ref 0–5)

## 2020-03-25 PROCEDURE — 96376 TX/PRO/DX INJ SAME DRUG ADON: CPT

## 2020-03-25 PROCEDURE — 6370000000 HC RX 637 (ALT 250 FOR IP): Performed by: INTERNAL MEDICINE

## 2020-03-25 PROCEDURE — 2500000003 HC RX 250 WO HCPCS: Performed by: INTERNAL MEDICINE

## 2020-03-25 PROCEDURE — 84100 ASSAY OF PHOSPHORUS: CPT

## 2020-03-25 PROCEDURE — 2100000000 HC CCU R&B

## 2020-03-25 PROCEDURE — 2500000003 HC RX 250 WO HCPCS: Performed by: PHYSICIAN ASSISTANT

## 2020-03-25 PROCEDURE — 6360000002 HC RX W HCPCS: Performed by: INTERNAL MEDICINE

## 2020-03-25 PROCEDURE — 93010 ELECTROCARDIOGRAM REPORT: CPT | Performed by: INTERNAL MEDICINE

## 2020-03-25 PROCEDURE — 81001 URINALYSIS AUTO W/SCOPE: CPT

## 2020-03-25 PROCEDURE — 80048 BASIC METABOLIC PNL TOTAL CA: CPT

## 2020-03-25 PROCEDURE — 96375 TX/PRO/DX INJ NEW DRUG ADDON: CPT

## 2020-03-25 PROCEDURE — 83735 ASSAY OF MAGNESIUM: CPT

## 2020-03-25 PROCEDURE — 2580000003 HC RX 258: Performed by: INTERNAL MEDICINE

## 2020-03-25 PROCEDURE — 96361 HYDRATE IV INFUSION ADD-ON: CPT

## 2020-03-25 PROCEDURE — 6370000000 HC RX 637 (ALT 250 FOR IP): Performed by: NURSE PRACTITIONER

## 2020-03-25 RX ORDER — PANTOPRAZOLE SODIUM 40 MG/1
40 TABLET, DELAYED RELEASE ORAL
Status: DISCONTINUED | OUTPATIENT
Start: 2020-03-25 | End: 2020-03-26 | Stop reason: HOSPADM

## 2020-03-25 RX ORDER — HYDRALAZINE HYDROCHLORIDE 20 MG/ML
10 INJECTION INTRAMUSCULAR; INTRAVENOUS EVERY 4 HOURS PRN
Status: DISCONTINUED | OUTPATIENT
Start: 2020-03-25 | End: 2020-03-25

## 2020-03-25 RX ORDER — METOCLOPRAMIDE 10 MG/1
10 TABLET ORAL 4 TIMES DAILY
Status: DISCONTINUED | OUTPATIENT
Start: 2020-03-25 | End: 2020-03-25

## 2020-03-25 RX ORDER — GABAPENTIN 300 MG/1
600 CAPSULE ORAL 3 TIMES DAILY
Status: DISCONTINUED | OUTPATIENT
Start: 2020-03-25 | End: 2020-03-26 | Stop reason: HOSPADM

## 2020-03-25 RX ORDER — OXYCODONE HYDROCHLORIDE AND ACETAMINOPHEN 5; 325 MG/1; MG/1
1 TABLET ORAL EVERY 4 HOURS PRN
Status: DISCONTINUED | OUTPATIENT
Start: 2020-03-25 | End: 2020-03-26 | Stop reason: HOSPADM

## 2020-03-25 RX ORDER — NICOTINE POLACRILEX 4 MG
15 LOZENGE BUCCAL PRN
Status: DISCONTINUED | OUTPATIENT
Start: 2020-03-25 | End: 2020-03-25

## 2020-03-25 RX ORDER — DEXTROSE MONOHYDRATE 50 MG/ML
100 INJECTION, SOLUTION INTRAVENOUS PRN
Status: DISCONTINUED | OUTPATIENT
Start: 2020-03-25 | End: 2020-03-25

## 2020-03-25 RX ORDER — MAGNESIUM SULFATE 1 G/100ML
1 INJECTION INTRAVENOUS PRN
Status: DISCONTINUED | OUTPATIENT
Start: 2020-03-25 | End: 2020-03-26

## 2020-03-25 RX ORDER — DICYCLOMINE HCL 20 MG
20 TABLET ORAL EVERY 6 HOURS PRN
Status: DISCONTINUED | OUTPATIENT
Start: 2020-03-25 | End: 2020-03-26 | Stop reason: HOSPADM

## 2020-03-25 RX ORDER — ONDANSETRON 2 MG/ML
4 INJECTION INTRAMUSCULAR; INTRAVENOUS EVERY 4 HOURS PRN
Status: DISCONTINUED | OUTPATIENT
Start: 2020-03-25 | End: 2020-03-26 | Stop reason: HOSPADM

## 2020-03-25 RX ORDER — PROMETHAZINE HYDROCHLORIDE 25 MG/ML
25 INJECTION, SOLUTION INTRAMUSCULAR; INTRAVENOUS EVERY 4 HOURS PRN
Status: DISCONTINUED | OUTPATIENT
Start: 2020-03-25 | End: 2020-03-25

## 2020-03-25 RX ORDER — ACETAMINOPHEN 325 MG/1
650 TABLET ORAL EVERY 4 HOURS PRN
Status: DISCONTINUED | OUTPATIENT
Start: 2020-03-25 | End: 2020-03-26 | Stop reason: HOSPADM

## 2020-03-25 RX ORDER — AMLODIPINE BESYLATE 5 MG/1
5 TABLET ORAL DAILY
Status: DISCONTINUED | OUTPATIENT
Start: 2020-03-25 | End: 2020-03-26 | Stop reason: HOSPADM

## 2020-03-25 RX ORDER — DEXTROSE AND SODIUM CHLORIDE 5; .45 G/100ML; G/100ML
INJECTION, SOLUTION INTRAVENOUS CONTINUOUS PRN
Status: DISCONTINUED | OUTPATIENT
Start: 2020-03-25 | End: 2020-03-26

## 2020-03-25 RX ORDER — HYDRALAZINE HYDROCHLORIDE 20 MG/ML
10 INJECTION INTRAMUSCULAR; INTRAVENOUS EVERY 4 HOURS PRN
Status: DISCONTINUED | OUTPATIENT
Start: 2020-03-25 | End: 2020-03-26 | Stop reason: HOSPADM

## 2020-03-25 RX ORDER — SODIUM CHLORIDE 450 MG/100ML
INJECTION, SOLUTION INTRAVENOUS CONTINUOUS
Status: DISCONTINUED | OUTPATIENT
Start: 2020-03-25 | End: 2020-03-26

## 2020-03-25 RX ORDER — POTASSIUM CHLORIDE 7.45 MG/ML
10 INJECTION INTRAVENOUS PRN
Status: DISCONTINUED | OUTPATIENT
Start: 2020-03-25 | End: 2020-03-26

## 2020-03-25 RX ORDER — DEXTROSE MONOHYDRATE 25 G/50ML
12.5 INJECTION, SOLUTION INTRAVENOUS PRN
Status: DISCONTINUED | OUTPATIENT
Start: 2020-03-25 | End: 2020-03-25

## 2020-03-25 RX ORDER — DEXTROSE MONOHYDRATE 25 G/50ML
12.5 INJECTION, SOLUTION INTRAVENOUS PRN
Status: DISCONTINUED | OUTPATIENT
Start: 2020-03-25 | End: 2020-03-26

## 2020-03-25 RX ORDER — ATORVASTATIN CALCIUM 10 MG/1
10 TABLET, FILM COATED ORAL DAILY
Status: DISCONTINUED | OUTPATIENT
Start: 2020-03-25 | End: 2020-03-26 | Stop reason: HOSPADM

## 2020-03-25 RX ORDER — METOCLOPRAMIDE HYDROCHLORIDE 5 MG/ML
10 INJECTION INTRAMUSCULAR; INTRAVENOUS EVERY 6 HOURS
Status: DISCONTINUED | OUTPATIENT
Start: 2020-03-25 | End: 2020-03-26 | Stop reason: HOSPADM

## 2020-03-25 RX ADMIN — SODIUM PHOSPHATE, MONOBASIC, MONOHYDRATE 10 MMOL: 276; 142 INJECTION, SOLUTION INTRAVENOUS at 23:47

## 2020-03-25 RX ADMIN — ONDANSETRON 4 MG: 2 INJECTION INTRAMUSCULAR; INTRAVENOUS at 10:54

## 2020-03-25 RX ADMIN — GABAPENTIN 600 MG: 300 CAPSULE ORAL at 22:40

## 2020-03-25 RX ADMIN — POTASSIUM CHLORIDE 10 MEQ: 7.46 INJECTION, SOLUTION INTRAVENOUS at 13:51

## 2020-03-25 RX ADMIN — METOCLOPRAMIDE HYDROCHLORIDE 10 MG: 5 INJECTION INTRAMUSCULAR; INTRAVENOUS at 10:54

## 2020-03-25 RX ADMIN — DEXTROSE AND SODIUM CHLORIDE: 5; 450 INJECTION, SOLUTION INTRAVENOUS at 01:54

## 2020-03-25 RX ADMIN — MAGNESIUM SULFATE HEPTAHYDRATE 1 G: 1 INJECTION, SOLUTION INTRAVENOUS at 05:53

## 2020-03-25 RX ADMIN — HYDRALAZINE HYDROCHLORIDE 10 MG: 20 INJECTION INTRAMUSCULAR; INTRAVENOUS at 02:18

## 2020-03-25 RX ADMIN — AMLODIPINE BESYLATE 5 MG: 5 TABLET ORAL at 10:54

## 2020-03-25 RX ADMIN — MAGNESIUM SULFATE HEPTAHYDRATE 1 G: 1 INJECTION, SOLUTION INTRAVENOUS at 07:44

## 2020-03-25 RX ADMIN — SODIUM CHLORIDE 3.21 UNITS/HR: 9 INJECTION, SOLUTION INTRAVENOUS at 15:02

## 2020-03-25 RX ADMIN — PANTOPRAZOLE SODIUM 40 MG: 40 TABLET, DELAYED RELEASE ORAL at 10:54

## 2020-03-25 RX ADMIN — OXYCODONE HYDROCHLORIDE AND ACETAMINOPHEN 1 TABLET: 5; 325 TABLET ORAL at 22:56

## 2020-03-25 RX ADMIN — POTASSIUM CHLORIDE 10 MEQ: 7.46 INJECTION, SOLUTION INTRAVENOUS at 10:53

## 2020-03-25 RX ADMIN — POTASSIUM CHLORIDE 10 MEQ: 7.46 INJECTION, SOLUTION INTRAVENOUS at 05:54

## 2020-03-25 RX ADMIN — FAMOTIDINE 20 MG: 10 INJECTION INTRAVENOUS at 00:28

## 2020-03-25 RX ADMIN — SODIUM CHLORIDE 7.02 UNITS/HR: 9 INJECTION, SOLUTION INTRAVENOUS at 02:13

## 2020-03-25 RX ADMIN — POTASSIUM CHLORIDE 10 MEQ: 7.46 INJECTION, SOLUTION INTRAVENOUS at 02:20

## 2020-03-25 RX ADMIN — DEXTROSE AND SODIUM CHLORIDE: 5; 450 INJECTION, SOLUTION INTRAVENOUS at 22:56

## 2020-03-25 RX ADMIN — POTASSIUM CHLORIDE 10 MEQ: 7.46 INJECTION, SOLUTION INTRAVENOUS at 15:03

## 2020-03-25 RX ADMIN — ENOXAPARIN SODIUM 40 MG: 40 INJECTION SUBCUTANEOUS at 09:22

## 2020-03-25 RX ADMIN — DEXTROSE AND SODIUM CHLORIDE: 5; 450 INJECTION, SOLUTION INTRAVENOUS at 09:10

## 2020-03-25 RX ADMIN — GABAPENTIN 600 MG: 300 CAPSULE ORAL at 09:22

## 2020-03-25 RX ADMIN — PROMETHAZINE HYDROCHLORIDE 25 MG: 25 INJECTION INTRAMUSCULAR; INTRAVENOUS at 03:20

## 2020-03-25 RX ADMIN — POTASSIUM CHLORIDE 10 MEQ: 7.46 INJECTION, SOLUTION INTRAVENOUS at 15:58

## 2020-03-25 RX ADMIN — POTASSIUM CHLORIDE 10 MEQ: 7.46 INJECTION, SOLUTION INTRAVENOUS at 11:56

## 2020-03-25 RX ADMIN — POTASSIUM CHLORIDE 10 MEQ: 7.46 INJECTION, SOLUTION INTRAVENOUS at 18:18

## 2020-03-25 RX ADMIN — DEXTROSE AND SODIUM CHLORIDE: 5; 450 INJECTION, SOLUTION INTRAVENOUS at 01:41

## 2020-03-25 RX ADMIN — POTASSIUM CHLORIDE 10 MEQ: 7.46 INJECTION, SOLUTION INTRAVENOUS at 08:08

## 2020-03-25 RX ADMIN — POTASSIUM CHLORIDE 10 MEQ: 7.46 INJECTION, SOLUTION INTRAVENOUS at 12:49

## 2020-03-25 RX ADMIN — SODIUM PHOSPHATE, MONOBASIC, MONOHYDRATE 15 MMOL: 276; 142 INJECTION, SOLUTION INTRAVENOUS at 12:46

## 2020-03-25 RX ADMIN — ONDANSETRON 4 MG: 2 INJECTION INTRAMUSCULAR; INTRAVENOUS at 20:07

## 2020-03-25 RX ADMIN — POTASSIUM CHLORIDE 10 MEQ: 7.46 INJECTION, SOLUTION INTRAVENOUS at 03:13

## 2020-03-25 RX ADMIN — ONDANSETRON 4 MG: 2 INJECTION INTRAMUSCULAR; INTRAVENOUS at 05:56

## 2020-03-25 RX ADMIN — POTASSIUM CHLORIDE 10 MEQ: 7.46 INJECTION, SOLUTION INTRAVENOUS at 20:07

## 2020-03-25 RX ADMIN — POTASSIUM CHLORIDE 10 MEQ: 7.46 INJECTION, SOLUTION INTRAVENOUS at 19:15

## 2020-03-25 RX ADMIN — POTASSIUM CHLORIDE 10 MEQ: 7.46 INJECTION, SOLUTION INTRAVENOUS at 04:15

## 2020-03-25 RX ADMIN — ATORVASTATIN CALCIUM 10 MG: 10 TABLET, FILM COATED ORAL at 09:22

## 2020-03-25 RX ADMIN — POTASSIUM CHLORIDE 10 MEQ: 7.46 INJECTION, SOLUTION INTRAVENOUS at 23:34

## 2020-03-25 RX ADMIN — METOCLOPRAMIDE HYDROCHLORIDE 10 MG: 5 INJECTION INTRAMUSCULAR; INTRAVENOUS at 22:40

## 2020-03-25 RX ADMIN — DEXTROSE AND SODIUM CHLORIDE: 5; 450 INJECTION, SOLUTION INTRAVENOUS at 16:46

## 2020-03-25 RX ADMIN — POTASSIUM CHLORIDE 10 MEQ: 7.46 INJECTION, SOLUTION INTRAVENOUS at 23:01

## 2020-03-25 RX ADMIN — METOCLOPRAMIDE HYDROCHLORIDE 10 MG: 5 INJECTION INTRAMUSCULAR; INTRAVENOUS at 17:54

## 2020-03-25 RX ADMIN — SODIUM PHOSPHATE, MONOBASIC, MONOHYDRATE 15 MMOL: 276; 142 INJECTION, SOLUTION INTRAVENOUS at 06:42

## 2020-03-25 RX ADMIN — GABAPENTIN 600 MG: 300 CAPSULE ORAL at 13:51

## 2020-03-25 RX ADMIN — HYDRALAZINE HYDROCHLORIDE 10 MG: 20 INJECTION INTRAMUSCULAR; INTRAVENOUS at 23:53

## 2020-03-25 ASSESSMENT — PAIN DESCRIPTION - LOCATION
LOCATION: GENERALIZED
LOCATION: GENERALIZED
LOCATION: ABDOMEN;FOOT
LOCATION: ABDOMEN;GENERALIZED
LOCATION: GENERALIZED

## 2020-03-25 ASSESSMENT — PAIN SCALES - GENERAL
PAINLEVEL_OUTOF10: 6
PAINLEVEL_OUTOF10: 7
PAINLEVEL_OUTOF10: 0
PAINLEVEL_OUTOF10: 10
PAINLEVEL_OUTOF10: 7
PAINLEVEL_OUTOF10: 10
PAINLEVEL_OUTOF10: 0

## 2020-03-25 ASSESSMENT — PAIN DESCRIPTION - PAIN TYPE
TYPE: ACUTE PAIN
TYPE: ACUTE PAIN;NEUROPATHIC PAIN
TYPE: ACUTE PAIN

## 2020-03-25 ASSESSMENT — PAIN DESCRIPTION - ONSET
ONSET: PROGRESSIVE
ONSET: ON-GOING

## 2020-03-25 ASSESSMENT — PAIN DESCRIPTION - PROGRESSION
CLINICAL_PROGRESSION: GRADUALLY WORSENING
CLINICAL_PROGRESSION: GRADUALLY WORSENING
CLINICAL_PROGRESSION: NOT CHANGED

## 2020-03-25 ASSESSMENT — PAIN DESCRIPTION - DESCRIPTORS
DESCRIPTORS: ACHING;CRAMPING;SORE
DESCRIPTORS: ACHING

## 2020-03-25 ASSESSMENT — PAIN DESCRIPTION - FREQUENCY
FREQUENCY: CONTINUOUS
FREQUENCY: CONTINUOUS

## 2020-03-25 NOTE — PROGRESS NOTES
A perfect serve was sent to Dr. Tyrese Lee to tell him about the elevated BP. The systolic was in the 773'F. New orders was taken see MAR.

## 2020-03-25 NOTE — ED PROVIDER NOTES
[address]       Current Discharge Medication List      CONTINUE these medications which have NOT CHANGED    Details   insulin lispro (ADMELOG) 100 UNIT/ML injection vial 5 units for meals and 2 units for snacks  Qty: 1 vial, Refills: 2    Associated Diagnoses: Uncontrolled type 1 diabetes mellitus with diabetic peripheral neuropathy ([facility] Type 1 diabetes mellitus with hypoglycemia and without coma ([facility] Hypoglycemia unawareness in type 1 diabetes mellitus ([facility] Essential hypertension; Dyslipidemia due to type 1 diabetes mellitus (HCC)      insulin glargine (LANTUS SOLOSTAR) 100 UNIT/ML injection pen Inject 15 Units into the skin nightly  Qty: 5 pen, Refills: 3      promethazine (PHENERGAN) 25 MG suppository Place 1 suppository rectally every 6 hours as needed for Nausea  Qty: 30 suppository, Refills: 1      atorvastatin (LIPITOR) 10 MG tablet Take 1 tablet by mouth daily  Qty: 30 tablet, Refills: 5    Associated Diagnoses: Uncontrolled type 1 diabetes mellitus with diabetic peripheral neuropathy ([facility] Type 1 diabetes mellitus with hypoglycemia and without coma ([facility] Hypoglycemia unawareness in type 1 diabetes mellitus ([facility] Essential hypertension; Dyslipidemia due to type 1 diabetes mellitus ([facility])      lisinopril (PRINIVIL;ZESTRIL) 5 MG tablet Take 1 tablet by mouth daily  Qty: 30 tablet, Refills: 0    Associated Diagnoses: Type 1 diabetes mellitus with other circulatory complication ([facility])      blood glucose monitor strips Check blood sugars 4-5 times per day  Qty: 150 strip, Refills: 3    Associated Diagnoses: Uncontrolled type 1 diabetes mellitus with diabetic peripheral neuropathy ([facility] Type 1 diabetes mellitus with hypoglycemia and without coma ([facility] Hypoglycemia unawareness in type 1 diabetes mellitus ([facility] Essential hypertension; Dyslipidemia due to type 1 diabetes mellitus ([facility])      Continuous Blood Gluc  (FREESTYLE HINA 14 DAY READER) LIOR Use for personal CGMS.  On Physical Exam  Vitals signs and nursing note reviewed. Constitutional:       Appearance: He is well-developed and normal weight. He is ill-appearing. HENT:      Head: Normocephalic and atraumatic. Right Ear: External ear normal.      Left Ear: External ear normal.      Mouth/Throat:      Mouth: Mucous membranes are dry. Eyes:      General: No scleral icterus. Right eye: No discharge. Left eye: No discharge. Conjunctiva/sclera: Conjunctivae normal.   Neck:      Musculoskeletal: Normal range of motion and neck supple. Cardiovascular:      Rate and Rhythm: Normal rate and regular rhythm. Heart sounds: Normal heart sounds. Pulmonary:      Effort: Pulmonary effort is normal.      Breath sounds: Normal breath sounds. Abdominal:      General: Abdomen is flat. Bowel sounds are normal.      Palpations: Abdomen is soft. Tenderness: There is abdominal tenderness. Comments: Mild diffuse abdominal tenderness. Patient with active emesis on initial assessment. Musculoskeletal: Normal range of motion. Skin:     General: Skin is warm and dry. Neurological:      General: No focal deficit present. Mental Status: He is alert and oriented to person, place, and time. Mental status is at baseline. Psychiatric:         Mood and Affect: Mood normal.         Behavior: Behavior normal.         Thought Content:  Thought content normal.         Judgment: Judgment normal.         DIAGNOSTIC RESULTS   LABS:    Labs Reviewed   CBC WITH AUTO DIFFERENTIAL - Abnormal; Notable for the following components:       Result Value    Hemoglobin 12.8 (*)     Hematocrit 39.3 (*)     RDW 17.6 (*)     Neutrophils Absolute 9.3 (*)     All other components within normal limits    Narrative:     Performed at:  [address] [address]   Phone [phone]   COMPREHENSIVE METABOLIC PANEL W/ REFLEX TO MG FOR LOW K - Abnormal; (*)     Glucose 242 (*)     CREATININE 0.8 (*)     All other components within normal limits    Narrative:     Performed at:  [address] [facility] [place]   Phone [phone]   BASIC METABOLIC PANEL - Abnormal; Notable for the following components:    Glucose 147 (*)     CREATININE 0.8 (*)     All other components within normal limits    Narrative:     Performed at:  [address] [facility] [place]   Phone [phone]   MAGNESIUM - Abnormal; Notable for the following components:    Magnesium 1.60 (*)     All other components within normal limits    Narrative:     Performed at:  [address] [facility] [place]   Phone [phone]   MAGNESIUM - Abnormal; Notable for the following components:    Magnesium 2.50 (*)     All other components within normal limits    Narrative:     Performed at:  [address] [facility] [place]   Phone [phone]   PHOSPHORUS - Abnormal; Notable for the following components:    Phosphorus 1.7 (*)     All other components within normal limits    Narrative:     Performed at:  [address] [facility] [place]   Phone [phone]   PHOSPHORUS - Abnormal; Notable for the following components:    Phosphorus 1.7 (*)     All other components within normal limits    Narrative:     Performed at:  [address] [employer]   Phone [phone]   POCT GLUCOSE - Abnormal; Notable for the following components:    POC Glucose 347 (*)     All other components within normal limits    Narrative:     Performed at:  [address] [employer]   Phone [phone]   POCT GLUCOSE - Abnormal; required due to patients diagnosis of DKA. Patient with progressive symptoms 24 hours with intractable nausea and vomiting. Patient 25-year diabetic. Patient presenting with difficult IV access. Central line placed in the right femoral.  Ketones greater than 40, beta hydroxybutyrate 2.73 blood sugar 347. Patient required aggressive IV fluid replacement, antiemetic management and DKA protocol initiated. Patient met at ICU.       CONSULTS:  None      EMERGENCY DEPARTMENT COURSE and DIFFERENTIAL DIAGNOSIS/MDM:   Vitals:    Vitals:    03/25/20 0502 03/25/20 0600 03/25/20 0700 03/25/20 0901   BP: (!) 171/87 139/77 (!) 167/91 (!) 176/79   Pulse: 97 105 89 91   Resp: 24 26 22 25   Temp:       TempSrc:       SpO2: 99% 100% 98% 100%   Weight:       Height:           Patient was given the following medications:  Medications   gabapentin (NEURONTIN) capsule 600 mg (600 mg Oral Given 3/25/20 0922)   dicyclomine (BENTYL) tablet 20 mg (20 mg Oral Not Given 3/25/20 0556)   atorvastatin (LIPITOR) tablet 10 mg (10 mg Oral Given 3/25/20 0922)   enoxaparin (LOVENOX) injection 40 mg (40 mg Subcutaneous Given 3/25/20 0922)   dextrose 50 % IV solution (has no administration in time range)   potassium chloride 10 mEq/100 mL IVPB (Peripheral Line) (10 mEq Intravenous New Bag 3/25/20 0808)   magnesium sulfate 1 g in dextrose 5% 100 mL IVPB (0 g Intravenous Stopped 3/25/20 0900)   sodium phosphate 10 mmol in dextrose 5 % 250 mL IVPB ( Intravenous See Alternative 3/25/20 0431)     Or   sodium phosphate 15 mmol in dextrose 5 % 250 mL IVPB (15 mmol Intravenous New Bag 3/25/20 0642)     Or   sodium phosphate 20 mmol in dextrose 5 % 500 mL IVPB ( Intravenous See Alternative 3/25/20 0642)   0.45 % sodium chloride infusion ( Intravenous Not Given 3/25/20 0217)   dextrose 5 % and 0.45 % sodium chloride infusion ( Intravenous New Bag 3/25/20 0910)   insulin regular (HUMULIN R;NOVOLIN R) 100 Units in sodium chloride 0.9 % 100 mL infusion (0.073

## 2020-03-25 NOTE — PLAN OF CARE
Continuing to monitor pain and discomfort. Monitoring pain level on scale of 0-10. Non- pharmacological measures encouraged to reduce discomfort/pain. PRN pain meds administeration continues when/if applicable as ordered by physician. Education on Hyper/hypoglycemia provided to pt. Pt verbalized understanding.

## 2020-03-25 NOTE — PROGRESS NOTES
Shift handoff report received from Lisa Lee RN. Pt in bed sleeping but easily wakes up with voice prompts. DKA protocol still in progress with electrolyte. Dr.Bowers mc Served concerning pt's Anioin Gap still @ 13 from previous one, awaiting response. No acute event occurred during this shift. Pt denies any needs. Shift handoff report given to Marilyn Arora RN. Pt left in a stable condition.    Electronically signed by Katherine Steinberg RN on 3/25/2020 at 7:53 PM

## 2020-03-25 NOTE — PROGRESS NOTES
Hospitalist Progress Note Addendum    The patient was admitted after midnight for   Principal Problem:    Type 1 diabetes mellitus with ketoacidosis without coma ([facility]  Active Problems:    Chronic abdominal pain    Gastroparesis    Essential hypertension    Nausea and vomiting    Polysubstance abuse ([facility]    Hyperlipidemia  Resolved Problems:    * No resolved hospital problems. *  .    Brief update:  53yo AAM with known DMI, HTN and DM gastroparesis who presents with vomiting nonstop on day prior to admission and worsening abd pain. Pt found to have DKA with anion gap of 18 and glucose of 300. Started IV reglan, insulin drip. Pt also with uncontrolled HTN. He does have diabetic foot ulcer with osteomyelitis of the R foot and was recently prescribed abx on 3/23. Perhaps the oral abx contributed to his intense nausea and vomiting. He had 4th and 5th R toe amputations and wound closure in 08/2019. He does have some wound dehiscence. BP (!) 176/79   Pulse 91   Temp 98.6 °F (37 °C) (Oral)   Resp 25   Ht 6' 2\" (1.88 m)   Wt 142 lb 3.2 oz (64.5 kg)   SpO2 100%   BMI 18.26 kg/m²       BMP:    Lab Results   Component Value Date     03/25/2020    K 3.8 03/25/2020    K 3.8 03/24/2020    CL 99 03/25/2020    CO2 22 03/25/2020    BUN 11 03/25/2020    LABALBU 4.6 03/24/2020    CREATININE 0.8 03/25/2020    CALCIUM 9.4 03/25/2020    GFRAA >60 03/25/2020    GFRAA >60 12/02/2010    LABGLOM >60 03/25/2020    GLUCOSE 147 03/25/2020     Magnesium:    Lab Results   Component Value Date    MG 2.50 03/25/2020     Phosphorus:    Lab Results   Component Value Date    PHOS 1.7 03/25/2020       The patient has been seen and examined. He is still feeling very nauseous with some dry heaves. Has 4th and 5th R toe amputation with some wound dehiscence. Will have wound care to see. I have the following recommendations:    1. DMI with DKA - continue with insulin drip for now  2.   DM gastroparesis - adding IV reglan  3. HTN uncontrolled -started norvasc if pt can tolerate and have prn hydralazine - pt apparently has an allergy to lisinopril - his lip swell when he takes it  4.   DM foot ulcer - will restart doxycycline IV and oral augmentin and see if pt has return of nausea and vomiting - both scripts were written for 14 days so will end of 4.6        Electronically signed by Aileen Bella MD on 3/25/2020 at 9:49 AM

## 2020-03-26 VITALS
DIASTOLIC BLOOD PRESSURE: 99 MMHG | SYSTOLIC BLOOD PRESSURE: 176 MMHG | BODY MASS INDEX: 19.13 KG/M2 | HEART RATE: 105 BPM | HEIGHT: 74 IN | RESPIRATION RATE: 14 BRPM | OXYGEN SATURATION: 95 % | WEIGHT: 149.03 LBS | TEMPERATURE: 98.5 F

## 2020-03-26 LAB
ANION GAP SERPL CALCULATED.3IONS-SCNC: 11 MMOL/L (ref 3–16)
BUN BLDV-MCNC: 6 MG/DL (ref 7–20)
CALCIUM SERPL-MCNC: 9.2 MG/DL (ref 8.3–10.6)
CHLORIDE BLD-SCNC: 101 MMOL/L (ref 99–110)
CO2: 23 MMOL/L (ref 21–32)
CREAT SERPL-MCNC: 0.8 MG/DL (ref 0.9–1.3)
GFR AFRICAN AMERICAN: >60
GFR NON-AFRICAN AMERICAN: >60
GLUCOSE BLD-MCNC: 167 MG/DL (ref 70–99)
GLUCOSE BLD-MCNC: 177 MG/DL (ref 70–99)
GLUCOSE BLD-MCNC: 216 MG/DL (ref 70–99)
GLUCOSE BLD-MCNC: 235 MG/DL (ref 70–99)
GLUCOSE BLD-MCNC: 352 MG/DL (ref 70–99)
MAGNESIUM: 1.9 MG/DL (ref 1.8–2.4)
PERFORMED ON: ABNORMAL
PHOSPHORUS: 2 MG/DL (ref 2.5–4.9)
POTASSIUM SERPL-SCNC: 4.4 MMOL/L (ref 3.5–5.1)
SODIUM BLD-SCNC: 135 MMOL/L (ref 136–145)

## 2020-03-26 PROCEDURE — 6370000000 HC RX 637 (ALT 250 FOR IP): Performed by: NURSE PRACTITIONER

## 2020-03-26 PROCEDURE — 2580000003 HC RX 258: Performed by: INTERNAL MEDICINE

## 2020-03-26 PROCEDURE — 80048 BASIC METABOLIC PNL TOTAL CA: CPT

## 2020-03-26 PROCEDURE — 6360000002 HC RX W HCPCS: Performed by: INTERNAL MEDICINE

## 2020-03-26 PROCEDURE — 6370000000 HC RX 637 (ALT 250 FOR IP): Performed by: INTERNAL MEDICINE

## 2020-03-26 PROCEDURE — 36556 INSERT NON-TUNNEL CV CATH: CPT | Performed by: NURSE PRACTITIONER

## 2020-03-26 PROCEDURE — 84100 ASSAY OF PHOSPHORUS: CPT

## 2020-03-26 PROCEDURE — 83735 ASSAY OF MAGNESIUM: CPT

## 2020-03-26 PROCEDURE — 2500000003 HC RX 250 WO HCPCS: Performed by: INTERNAL MEDICINE

## 2020-03-26 RX ORDER — LOSARTAN POTASSIUM 50 MG/1
50 TABLET ORAL DAILY
Status: ON HOLD | COMMUNITY
End: 2020-05-15 | Stop reason: SDUPTHER

## 2020-03-26 RX ORDER — GABAPENTIN 300 MG/1
600 CAPSULE ORAL 3 TIMES DAILY
Status: ON HOLD | COMMUNITY
End: 2020-05-15 | Stop reason: SDUPTHER

## 2020-03-26 RX ORDER — ASPIRIN 81 MG/1
81 TABLET, CHEWABLE ORAL DAILY
Status: ON HOLD | COMMUNITY
End: 2020-05-15 | Stop reason: SDUPTHER

## 2020-03-26 RX ORDER — ATORVASTATIN CALCIUM 40 MG/1
40 TABLET, FILM COATED ORAL DAILY
Status: ON HOLD | COMMUNITY
End: 2020-05-15 | Stop reason: SDUPTHER

## 2020-03-26 RX ORDER — LACTOBACILLUS RHAMNOSUS GG 10B CELL
1 CAPSULE ORAL 2 TIMES DAILY WITH MEALS
Qty: 30 CAPSULE | Refills: 1 | Status: ON HOLD | OUTPATIENT
Start: 2020-03-26 | End: 2020-05-15 | Stop reason: SDUPTHER

## 2020-03-26 RX ORDER — INSULIN GLARGINE 100 [IU]/ML
15 INJECTION, SOLUTION SUBCUTANEOUS ONCE
Status: COMPLETED | OUTPATIENT
Start: 2020-03-26 | End: 2020-03-26

## 2020-03-26 RX ORDER — PANTOPRAZOLE SODIUM 40 MG/1
40 TABLET, DELAYED RELEASE ORAL DAILY
Qty: 30 TABLET | Refills: 1 | Status: ON HOLD | OUTPATIENT
Start: 2020-03-26 | End: 2020-05-15 | Stop reason: SDUPTHER

## 2020-03-26 RX ORDER — NICOTINE POLACRILEX 4 MG
15 LOZENGE BUCCAL PRN
Status: DISCONTINUED | OUTPATIENT
Start: 2020-03-26 | End: 2020-03-26 | Stop reason: HOSPADM

## 2020-03-26 RX ORDER — AMOXICILLIN AND CLAVULANATE POTASSIUM 875; 125 MG/1; MG/1
1 TABLET, FILM COATED ORAL EVERY 12 HOURS SCHEDULED
Status: DISCONTINUED | OUTPATIENT
Start: 2020-03-26 | End: 2020-03-26 | Stop reason: HOSPADM

## 2020-03-26 RX ORDER — METOCLOPRAMIDE 10 MG/1
10 TABLET ORAL 4 TIMES DAILY
Qty: 120 TABLET | Refills: 0 | Status: ON HOLD | OUTPATIENT
Start: 2020-03-26 | End: 2020-05-13

## 2020-03-26 RX ORDER — INSULIN GLARGINE 100 [IU]/ML
15 INJECTION, SOLUTION SUBCUTANEOUS NIGHTLY
Status: DISCONTINUED | OUTPATIENT
Start: 2020-03-26 | End: 2020-03-26 | Stop reason: HOSPADM

## 2020-03-26 RX ORDER — DIVALPROEX SODIUM 500 MG/1
500 TABLET, DELAYED RELEASE ORAL 2 TIMES DAILY
Status: ON HOLD | COMMUNITY
End: 2020-05-15 | Stop reason: HOSPADM

## 2020-03-26 RX ORDER — QUETIAPINE FUMARATE 100 MG/1
100 TABLET, FILM COATED ORAL NIGHTLY
Status: ON HOLD | COMMUNITY
End: 2020-05-13

## 2020-03-26 RX ORDER — LACTOBACILLUS RHAMNOSUS GG 10B CELL
1 CAPSULE ORAL 2 TIMES DAILY WITH MEALS
Status: DISCONTINUED | OUTPATIENT
Start: 2020-03-26 | End: 2020-03-26 | Stop reason: HOSPADM

## 2020-03-26 RX ORDER — DEXTROSE MONOHYDRATE 50 MG/ML
100 INJECTION, SOLUTION INTRAVENOUS PRN
Status: DISCONTINUED | OUTPATIENT
Start: 2020-03-26 | End: 2020-03-26 | Stop reason: HOSPADM

## 2020-03-26 RX ORDER — DEXTROSE MONOHYDRATE 25 G/50ML
12.5 INJECTION, SOLUTION INTRAVENOUS PRN
Status: DISCONTINUED | OUTPATIENT
Start: 2020-03-26 | End: 2020-03-26 | Stop reason: HOSPADM

## 2020-03-26 RX ADMIN — METOCLOPRAMIDE HYDROCHLORIDE 10 MG: 5 INJECTION INTRAMUSCULAR; INTRAVENOUS at 08:11

## 2020-03-26 RX ADMIN — INSULIN GLARGINE 15 UNITS: 100 INJECTION, SOLUTION SUBCUTANEOUS at 02:33

## 2020-03-26 RX ADMIN — OXYCODONE HYDROCHLORIDE AND ACETAMINOPHEN 1 TABLET: 5; 325 TABLET ORAL at 12:31

## 2020-03-26 RX ADMIN — HYDRALAZINE HYDROCHLORIDE 10 MG: 20 INJECTION INTRAMUSCULAR; INTRAVENOUS at 06:36

## 2020-03-26 RX ADMIN — DOXYCYCLINE 100 MG: 100 INJECTION, POWDER, LYOPHILIZED, FOR SOLUTION INTRAVENOUS at 07:05

## 2020-03-26 RX ADMIN — ENOXAPARIN SODIUM 40 MG: 40 INJECTION SUBCUTANEOUS at 08:11

## 2020-03-26 RX ADMIN — PANTOPRAZOLE SODIUM 40 MG: 40 TABLET, DELAYED RELEASE ORAL at 05:28

## 2020-03-26 RX ADMIN — ONDANSETRON 4 MG: 2 INJECTION INTRAMUSCULAR; INTRAVENOUS at 12:37

## 2020-03-26 RX ADMIN — INSULIN LISPRO 10 UNITS: 100 INJECTION, SOLUTION INTRAVENOUS; SUBCUTANEOUS at 08:11

## 2020-03-26 RX ADMIN — INSULIN LISPRO 4 UNITS: 100 INJECTION, SOLUTION INTRAVENOUS; SUBCUTANEOUS at 12:31

## 2020-03-26 RX ADMIN — METOCLOPRAMIDE HYDROCHLORIDE 10 MG: 5 INJECTION INTRAMUSCULAR; INTRAVENOUS at 05:28

## 2020-03-26 RX ADMIN — GABAPENTIN 600 MG: 300 CAPSULE ORAL at 12:31

## 2020-03-26 ASSESSMENT — PAIN DESCRIPTION - PAIN TYPE
TYPE: ACUTE PAIN
TYPE: ACUTE PAIN
TYPE: ACUTE PAIN;CHRONIC PAIN

## 2020-03-26 ASSESSMENT — PAIN SCALES - GENERAL
PAINLEVEL_OUTOF10: 10

## 2020-03-26 ASSESSMENT — PAIN - FUNCTIONAL ASSESSMENT
PAIN_FUNCTIONAL_ASSESSMENT: PREVENTS OR INTERFERES SOME ACTIVE ACTIVITIES AND ADLS

## 2020-03-26 ASSESSMENT — PAIN DESCRIPTION - FREQUENCY
FREQUENCY: CONTINUOUS

## 2020-03-26 ASSESSMENT — PAIN DESCRIPTION - DESCRIPTORS
DESCRIPTORS: ACHING
DESCRIPTORS: ACHING;DISCOMFORT
DESCRIPTORS: ACHING;DISCOMFORT

## 2020-03-26 ASSESSMENT — PAIN DESCRIPTION - LOCATION
LOCATION: ABDOMEN

## 2020-03-26 ASSESSMENT — PAIN DESCRIPTION - PROGRESSION
CLINICAL_PROGRESSION: NOT CHANGED

## 2020-03-26 ASSESSMENT — PAIN DESCRIPTION - ONSET
ONSET: ON-GOING
ONSET: PROGRESSIVE
ONSET: ON-GOING

## 2020-03-26 NOTE — PROGRESS NOTES
Pharmacy Medication Reconciliation Note     List of medications patient is currently taking is complete. Source of information:   1. Conversation with pt over the phone    2. EMR -  admission 2/27/20  3. [facility] pharmacy     Allergies   Allergen Reactions    Ketorolac Shortness Of Breath and Itching    Morphine Shortness Of Breath, Hives and Itching     Tolerates hydromorphone    Morphine And Related Hives and Shortness Of Breath    Tramadol Shortness Of Breath     resp failure   resp failure     Lisinopril Swelling     When he takes lisinopril he gets large lips which go away when he doesn't take lisinopril    Haloperidol Lactate Itching    Toradol [Ketorolac Tromethamine]      resp failure    Vicodin [Hydrocodone-Acetaminophen]        Notes regarding home medications:   1. Pt was able to confirm most of his meds. Does have a psych hx and is supposed to be taking Depakote and Seroquel. 2. Was started on Losartan 50 mg daily and Lipitor dose was increased to 40 mg daily during admit to [facility] 2/27/20  3.  Pt states he injects Long acting insulin = 10 units   Prandial 5 units along with SSI if needed     [facility]  3/26/2020  10:07 AM

## 2020-03-26 NOTE — PROGRESS NOTES
Pt admitted to room 4264 from CVICU, pt is alert and oriented assessment complete and vitals obtained are WNL, pt helped into bed from stretcher orientated to room and call light am meds given by ICU nurse, will continue to monitor.

## 2020-03-26 NOTE — CARE COORDINATION
Met briefly w/ pt  Confirmed address  He lives alone  No DME  He \"couldn't remember because I've got so much going on\" if he went to appt sched [place] w/ Karmen Coyne, but \"probably not\"   States he has a PCP that he cannot remember the name at [address] [place] [address] he has [employer] as health coverage and has no barriers to getting medications  Will have ride home after 8pm tonight  Electronically signed by Toño Heller RN on 3/26/2020 at 12:53 PM
ALLERGIES    Allergies   Allergen Reactions    Ketorolac Shortness Of Breath and Itching    Morphine Shortness Of Breath, Hives and Itching     Tolerates hydromorphone    Morphine And Related Hives and Shortness Of Breath    Tramadol Shortness Of Breath     resp failure   resp failure     Lisinopril Swelling     When he takes lisinopril he gets large lips which go away when he doesn't take lisinopril    Haloperidol Lactate Itching    Toradol [Ketorolac Tromethamine]      resp failure    Vicodin [Hydrocodone-Acetaminophen]        MEDICATIONS    No current facility-administered medications on file prior to encounter. Current Outpatient Medications on File Prior to Encounter   Medication Sig Dispense Refill    divalproex (DEPAKOTE) 500 MG DR tablet Take 500 mg by mouth 2 times daily      QUEtiapine (SEROQUEL) 100 MG tablet Take 100 mg by mouth nightly      gabapentin (NEURONTIN) 300 MG capsule Take 600 mg by mouth 3 times daily.  losartan (COZAAR) 50 MG tablet Take 50 mg by mouth daily      aspirin 81 MG chewable tablet Take 81 mg by mouth daily      atorvastatin (LIPITOR) 40 MG tablet Take 40 mg by mouth daily      insulin lispro (ADMELOG) 100 UNIT/ML injection vial 5 units for meals and 2 units for snacks 1 vial 2    insulin glargine (LANTUS SOLOSTAR) 100 UNIT/ML injection pen Inject 15 Units into the skin nightly (Patient taking differently: Inject 10 Units into the skin nightly ) 5 pen 3    promethazine (PHENERGAN) 25 MG suppository Place 1 suppository rectally every 6 hours as needed for Nausea 30 suppository 1    blood glucose monitor strips Check blood sugars 4-5 times per day 150 strip 3    Continuous Blood Gluc  (FREESTYLE HINA 14 DAY READER) [facility] Use for personal CGMS. On Multiple insulin shots, checks blood sugars 4 times per day and requires frequent insulin adjustment.  1 Device 0    Continuous Blood Gluc Sensor (FREESTYLE HINA 14 DAY SENSOR) [facility] Use for

## 2020-03-26 NOTE — DISCHARGE SUMMARY
Hospitalist Discharge Summary    Patient ID:  Lashay Stewart  [id number]  50 y.o.  1975    Admit date: 3/24/2020    Discharge date: 3/26/2020    Disposition: home    Admission Diagnoses:   Patient Active Problem List   Diagnosis    DKA, type 1, not at goal [facility])    Diabetic ketoacidosis without coma associated with diabetes mellitus due to underlying condition ([address]    Chronic abdominal pain    Gastroparesis    GERD (gastroesophageal reflux disease)    Seizure ([address]    Toe deformity    Uncontrolled type 1 diabetes mellitus with diabetic peripheral neuropathy (HCC)    Type 1 diabetes mellitus with hypoglycemia and without coma (HCC)    Type 1 diabetes mellitus with background diabetic retinopathy ([facility]    Hypoglycemia unawareness in type 1 diabetes mellitus (HCC)    Type 1 diabetes mellitus with hypercholesterolemia ([address]    Essential hypertension    Accelerated hypertension    Acute blood loss anemia    Acute dyspnea    Acute respiratory failure (HCC)    Candida esophagitis (HCC)    Cholelithiasis    Cocaine abuse, episodic (HCC)    Cerebral infarction ([address]    Diabetic peripheral neuropathy (HCC)    Diabetic polyneuropathy (HCC)    Duodenal ulcer    Elevated blood pressure    Glucosuria    Heart failure, diastolic, acute (HCC)    Hematemesis with nausea    Hyperglycemia    Hypoglycemia    Hypophosphatemia    Hypopotassemia    Hypotension    Hypoxia    Intractable nausea and vomiting    Lactic acidosis    Leg weakness, bilateral    Leucocytosis    Leukocytosis    Cannabis abuse    Metabolic acidosis, increased anion gap (IAG)    Nausea    Nausea and vomiting    Numbness in both legs    Polysubstance abuse (HCC)    Pulmonary edema    RUQ abdominal pain    Type 1 diabetes mellitus with ketoacidosis without coma (HCC)    Diabetic foot ulcer ([address]    Heart murmur    Cellulitis of foot    Hyperlipidemia       Discharge Diagnoses: Principal Problem:    Type neuropathy ([facility] Type 1 diabetes mellitus with hypoglycemia and without coma ([facility] [address] [facility] Hypoglycemia unawareness in type 1 diabetes mellitus ([facility] Essential hypertension; Dyslipidemia due to type 1 diabetes mellitus (HCC)      Continuous Blood Gluc Sensor (FREESTYLE HINA 14 DAY SENSOR) [facility] Use for personal CGMS. Replace every 2 weeks. Qty: 2 each, Refills: 5    Associated Diagnoses: Uncontrolled type 1 diabetes mellitus with diabetic peripheral neuropathy ([facility] [address] [facility] Type 1 diabetes mellitus with hypoglycemia and without coma ([address] Hypoglycemia unawareness in type 1 diabetes mellitus ([address] Essential hypertension; Dyslipidemia due to type 1 diabetes mellitus (HCC)      glucose monitoring kit (FREESTYLE) monitoring kit 1 kit by Does not apply route daily  Qty: 1 kit, Refills: 0    Associated Diagnoses: Uncontrolled type 1 diabetes mellitus with diabetic peripheral neuropathy ([facility] [address] Type 1 diabetes mellitus with hypoglycemia and without coma ([facility] [address] Hypoglycemia unawareness in type 1 diabetes mellitus ([address] Essential hypertension; Dyslipidemia due to type 1 diabetes mellitus (HCC)      Lancets MISC 1 each by Does not apply route daily Check blood sugars 4-5 times per day  Qty: 150 each, Refills: 11    Associated Diagnoses: Uncontrolled type 1 diabetes mellitus with diabetic peripheral neuropathy ([address] Type 1 diabetes mellitus with hypoglycemia and without coma ([facility] [address] Hypoglycemia unawareness in type 1 diabetes mellitus ([facility] [address] [facility]  Essential hypertension; Dyslipidemia due to type 1 diabetes mellitus ([facility])      dicyclomine (BENTYL) 20 MG tablet Take 1 tablet by mouth every 6 hours as needed (abd pain)  Qty: 120 tablet, Refills: 3           Current Discharge Medication List      STOP taking these medications       lisinopril (PRINIVIL;ZESTRIL) 5 MG tablet Comments:   Reason for Stopping:         gabapentin (NEURONTIN) 600 MG tablet Comments:   Reason for Stopping:               Procedures: None     Assessment on Discharge: Stable, improved     Discharge Exam:  /85   Pulse 106   Temp 98.9 °F (37.2 °C) (Oral)   Resp 16   Ht 6' 2\" (1.88 m)   Wt 149 lb 0.5 oz (67.6 kg)   SpO2 95%   BMI 19.13 kg/m²     General appearance: No apparent distress, appears stated age and cooperative. HEENT: Pupils equal, round, and reactive to light. Conjunctivae/corneas clear. Neck: Supple, with full range of motion. Trachea midline. Respiratory:  Normal respiratory effort. Clear to auscultation, bilaterally without Rales/Wheezes/Rhonchi. Cardiovascular: Regular rate and rhythm with normal S1/S2 without murmurs, rubs or gallops. Abdomen: Soft, non-tender, non-distended with normal bowel sounds. Musculoskelatal: No clubbing, cyanosis or edema bilaterally. R foot s/p amputation 4th and 5th digits, ulcer on big toe without evidence of infection  Skin: Skin color, texture, turgor normal.  No rashes or lesions. Neurologic:  Neurovascularly intact without any focal sensory/motor deficits. Cranial nerves: II-XII intact, grossly non-focal.  Psychiatric: Alert and oriented, thought content appropriate, normal insight    Pertinent Studies During Hospital Stay:    Radiology:  Xr Chest Portable    Result Date: 3/24/2020  EXAMINATION: ONE XRAY VIEW OF THE CHEST 3/24/2020 10:44 pm COMPARISON: Chest radiographs 02/05/2012 HISTORY: ORDERING SYSTEM PROVIDED HISTORY: Glycemia, diabetic, concern DKA TECHNOLOGIST PROVIDED HISTORY: Reason for exam:->Glycemia, diabetic, concern DKA Reason for Exam: Glycemia, diabetic, concern DKA Acuity: Acute Type of Exam: Initial FINDINGS: No pneumothorax, pleural effusion or consolidative airspace disease. Normal heart size and mediastinal contours. Calcified left hilar lymph nodes and scattered calcified granulomas present. Normal heart size and mediastinal contours allowing for patient rotation. No acute osseous abnormality. No acute findings.        Last Labs on Discharge:     Recent Results (from the past 24 hour(s))   Basic Metabolic Panel    Collection Time: 03/25/20 12:40 PM   Result Value Ref Range    Sodium 136 136 - 145 mmol/L    Potassium 4.2 3.5 - 5.1 mmol/L    Chloride 100 99 - 110 mmol/L    CO2 23 21 - 32 mmol/L    Anion Gap 13 3 - 16    Glucose 113 (H) 70 - 99 mg/dL    BUN 10 7 - 20 mg/dL    CREATININE 0.8 (L) 0.9 - 1.3 mg/dL    GFR Non-African American >60 >60    GFR African American >60 >60    Calcium 8.9 8.3 - 10.6 mg/dL   Magnesium    Collection Time: 03/25/20 12:40 PM   Result Value Ref Range    Magnesium 2.10 1.80 - 2.40 mg/dL   Phosphorus    Collection Time: 03/25/20 12:40 PM   Result Value Ref Range    Phosphorus 3.1 2.5 - 4.9 mg/dL   POCT Glucose    Collection Time: 03/25/20 12:44 PM   Result Value Ref Range    POC Glucose 120 (H) 70 - 99 mg/dl    Performed on ACCU-CHEK    POCT Glucose    Collection Time: 03/25/20  1:48 PM   Result Value Ref Range    POC Glucose 152 (H) 70 - 99 mg/dl    Performed on ACCU-CHEK    POCT Glucose    Collection Time: 03/25/20  2:59 PM   Result Value Ref Range    POC Glucose 167 (H) 70 - 99 mg/dl    Performed on ACCU-CHEK    Basic Metabolic Panel    Collection Time: 03/25/20  5:00 PM   Result Value Ref Range    Sodium 137 136 - 145 mmol/L    Potassium 4.0 3.5 - 5.1 mmol/L    Chloride 101 99 - 110 mmol/L    CO2 23 21 - 32 mmol/L    Anion Gap 13 3 - 16    Glucose 111 (H) 70 - 99 mg/dL    BUN 10 7 - 20 mg/dL    CREATININE 0.8 (L) 0.9 - 1.3 mg/dL    GFR Non-African American >60 >60    GFR African American >60 >60    Calcium 9.0 8.3 - 10.6 mg/dL   Magnesium    Collection Time: 03/25/20  5:00 PM   Result Value Ref Range    Magnesium 2.00 1.80 - 2.40 mg/dL   Phosphorus    Collection Time: 03/25/20  5:00 PM   Result Value Ref Range    Phosphorus 3.6 2.5 - 4.9 mg/dL   POCT Glucose    Collection Time: 03/25/20  5:08 PM   Result Value Ref Range    POC Glucose 118 (H) 70 - 99 mg/dl    Performed on ACCU-CHEK    POCT Glucose    Collection Time: 03/25/20  6:14 PM   Result Value Ref Range POC Glucose 121 (H) 70 - 99 mg/dl    Performed on ACCU-CHEK    POCT Glucose    Collection Time: 03/25/20  7:30 PM   Result Value Ref Range    POC Glucose 132 (H) 70 - 99 mg/dl    Performed on ACCU-CHEK    POCT Glucose    Collection Time: 03/25/20  8:26 PM   Result Value Ref Range    POC Glucose 199 (H) 70 - 99 mg/dl    Performed on ACCU-CHEK    Basic Metabolic Panel    Collection Time: 03/25/20  9:05 PM   Result Value Ref Range    Sodium 134 (L) 136 - 145 mmol/L    Potassium 4.2 3.5 - 5.1 mmol/L    Chloride 99 99 - 110 mmol/L    CO2 23 21 - 32 mmol/L    Anion Gap 12 3 - 16    Glucose 204 (H) 70 - 99 mg/dL    BUN 8 7 - 20 mg/dL    CREATININE 0.8 (L) 0.9 - 1.3 mg/dL    GFR Non-African American >60 >60    GFR African American >60 >60    Calcium 9.2 8.3 - 10.6 mg/dL   Magnesium    Collection Time: 03/25/20  9:05 PM   Result Value Ref Range    Magnesium 2.00 1.80 - 2.40 mg/dL   Phosphorus    Collection Time: 03/25/20  9:05 PM   Result Value Ref Range    Phosphorus 2.5 2.5 - 4.9 mg/dL   POCT Glucose    Collection Time: 03/25/20  9:35 PM   Result Value Ref Range    POC Glucose 203 (H) 70 - 99 mg/dl    Performed on ACCU-CHEK    POCT Glucose    Collection Time: 03/25/20 11:10 PM   Result Value Ref Range    POC Glucose 204 (H) 70 - 99 mg/dl    Performed on ACCU-CHEK    POCT Glucose    Collection Time: 03/26/20 12:24 AM   Result Value Ref Range    POC Glucose 167 (H) 70 - 99 mg/dl    Performed on ACCU-CHEK    Basic Metabolic Panel    Collection Time: 03/26/20  1:30 AM   Result Value Ref Range    Sodium 135 (L) 136 - 145 mmol/L    Potassium 4.4 3.5 - 5.1 mmol/L    Chloride 101 99 - 110 mmol/L    CO2 23 21 - 32 mmol/L    Anion Gap 11 3 - 16    Glucose 235 (H) 70 - 99 mg/dL    BUN 6 (L) 7 - 20 mg/dL    CREATININE 0.8 (L) 0.9 - 1.3 mg/dL    GFR Non-African American >60 >60    GFR African American >60 >60    Calcium 9.2 8.3 - 10.6 mg/dL   Magnesium    Collection Time: 03/26/20  1:30 AM   Result Value Ref Range    Magnesium 1.90 1.80 - 2.40 mg/dL   Phosphorus    Collection Time: 03/26/20  1:30 AM   Result Value Ref Range    Phosphorus 2.0 (L) 2.5 - 4.9 mg/dL   POCT Glucose    Collection Time: 03/26/20  1:31 AM   Result Value Ref Range    POC Glucose 177 (H) 70 - 99 mg/dl    Performed on ACCU-CHEK    POCT Glucose    Collection Time: 03/26/20  8:10 AM   Result Value Ref Range    POC Glucose 352 (H) 70 - 99 mg/dl    Performed on ACCU-CHEK    POCT Glucose    Collection Time: 03/26/20 12:17 PM   Result Value Ref Range    POC Glucose 216 (H) 70 - 99 mg/dl    Performed on ACCU-CHEK          Follow up: with No primary care provider on file. Note that more  than 30 minutes was spent in preparing discharge papers, discussing discharge with patient, medication review, etc.    Thank you No primary care provider on file. for the opportunity to be involved in this patient's care. If you have any questions or concerns please feel free to contact me at [phone]. Electronically signed by Blaise Ling MD on 3/26/2020 at 12:32 PM    This note was transcribed using [id number] [address]. Please disregard any translational errors.

## 2020-03-27 ENCOUNTER — CARE COORDINATION (OUTPATIENT)
Dept: CASE MANAGEMENT | Age: 45
End: 2020-03-27

## 2020-03-28 ENCOUNTER — CARE COORDINATION (OUTPATIENT)
Dept: CASE MANAGEMENT | Age: 45
End: 2020-03-28

## 2020-03-30 ENCOUNTER — CARE COORDINATION (OUTPATIENT)
Dept: CASE MANAGEMENT | Age: 45
End: 2020-03-30

## 2020-04-15 ENCOUNTER — CARE COORDINATION (OUTPATIENT)
Dept: CASE MANAGEMENT | Age: 45
End: 2020-04-15

## 2020-05-11 ENCOUNTER — HOSPITAL ENCOUNTER (INPATIENT)
Age: 45
LOS: 3 days | Discharge: HOME HEALTH | DRG: 314 | End: 2020-05-15
Attending: INTERNAL MEDICINE | Admitting: INTERNAL MEDICINE
Payer: MEDICAID

## 2020-05-11 ENCOUNTER — APPOINTMENT (OUTPATIENT)
Dept: GENERAL RADIOLOGY | Age: 45
DRG: 314 | End: 2020-05-11
Payer: MEDICAID

## 2020-05-11 LAB
BASE EXCESS VENOUS: 0.9 MMOL/L
BASOPHILS ABSOLUTE: 0.1 K/UL (ref 0–0.2)
BASOPHILS RELATIVE PERCENT: 1 %
CARBOXYHEMOGLOBIN: 1.3 %
EOSINOPHILS ABSOLUTE: 0.2 K/UL (ref 0–0.6)
EOSINOPHILS RELATIVE PERCENT: 1.4 %
GLUCOSE BLD-MCNC: 277 MG/DL (ref 70–99)
HCO3 VENOUS: 27 MMOL/L (ref 23–29)
HCT VFR BLD CALC: 39.8 % (ref 40.5–52.5)
HEMOGLOBIN: 12.6 G/DL (ref 13.5–17.5)
LYMPHOCYTES ABSOLUTE: 2.3 K/UL (ref 1–5.1)
LYMPHOCYTES RELATIVE PERCENT: 18.7 %
MCH RBC QN AUTO: 25.8 PG (ref 26–34)
MCHC RBC AUTO-ENTMCNC: 31.7 G/DL (ref 31–36)
MCV RBC AUTO: 81.3 FL (ref 80–100)
METHEMOGLOBIN VENOUS: 0.8 %
MONOCYTES ABSOLUTE: 0.4 K/UL (ref 0–1.3)
MONOCYTES RELATIVE PERCENT: 3.4 %
NEUTROPHILS ABSOLUTE: 9.2 K/UL (ref 1.7–7.7)
NEUTROPHILS RELATIVE PERCENT: 75.5 %
O2 CONTENT, VEN: 6 ML/DL
O2 SAT, VEN: 35 %
O2 THERAPY: NORMAL
PCO2, VEN: 48.8 MMHG (ref 40–50)
PDW BLD-RTO: 17.1 % (ref 12.4–15.4)
PERFORMED ON: ABNORMAL
PH VENOUS: 7.35 (ref 7.35–7.45)
PLATELET # BLD: 326 K/UL (ref 135–450)
PMV BLD AUTO: 8 FL (ref 5–10.5)
PO2, VEN: 23 MMHG
RBC # BLD: 4.9 M/UL (ref 4.2–5.9)
TCO2 CALC VENOUS: 29 MMOL/L
WBC # BLD: 12.2 K/UL (ref 4–11)

## 2020-05-11 PROCEDURE — 80053 COMPREHEN METABOLIC PANEL: CPT

## 2020-05-11 PROCEDURE — 99285 EMERGENCY DEPT VISIT HI MDM: CPT

## 2020-05-11 PROCEDURE — 85025 COMPLETE CBC W/AUTO DIFF WBC: CPT

## 2020-05-11 PROCEDURE — 82803 BLOOD GASES ANY COMBINATION: CPT

## 2020-05-11 PROCEDURE — 93005 ELECTROCARDIOGRAM TRACING: CPT | Performed by: PHYSICIAN ASSISTANT

## 2020-05-11 PROCEDURE — 36415 COLL VENOUS BLD VENIPUNCTURE: CPT

## 2020-05-11 PROCEDURE — 73660 X-RAY EXAM OF TOE(S): CPT

## 2020-05-11 PROCEDURE — 84484 ASSAY OF TROPONIN QUANT: CPT

## 2020-05-11 PROCEDURE — 83605 ASSAY OF LACTIC ACID: CPT

## 2020-05-11 PROCEDURE — 82010 KETONE BODYS QUAN: CPT

## 2020-05-11 PROCEDURE — 2580000003 HC RX 258: Performed by: PHYSICIAN ASSISTANT

## 2020-05-11 PROCEDURE — 6360000002 HC RX W HCPCS: Performed by: PHYSICIAN ASSISTANT

## 2020-05-11 PROCEDURE — 96375 TX/PRO/DX INJ NEW DRUG ADDON: CPT

## 2020-05-11 PROCEDURE — 87040 BLOOD CULTURE FOR BACTERIA: CPT

## 2020-05-11 RX ORDER — DIPHENHYDRAMINE HYDROCHLORIDE 50 MG/ML
12.5 INJECTION INTRAMUSCULAR; INTRAVENOUS ONCE
Status: COMPLETED | OUTPATIENT
Start: 2020-05-11 | End: 2020-05-11

## 2020-05-11 RX ORDER — METOCLOPRAMIDE HYDROCHLORIDE 5 MG/ML
10 INJECTION INTRAMUSCULAR; INTRAVENOUS ONCE
Status: COMPLETED | OUTPATIENT
Start: 2020-05-11 | End: 2020-05-11

## 2020-05-11 RX ORDER — 0.9 % SODIUM CHLORIDE 0.9 %
1000 INTRAVENOUS SOLUTION INTRAVENOUS ONCE
Status: COMPLETED | OUTPATIENT
Start: 2020-05-11 | End: 2020-05-12

## 2020-05-11 RX ADMIN — DIPHENHYDRAMINE HYDROCHLORIDE 12.5 MG: 50 INJECTION, SOLUTION INTRAMUSCULAR; INTRAVENOUS at 23:36

## 2020-05-11 RX ADMIN — METOCLOPRAMIDE HYDROCHLORIDE 10 MG: 5 INJECTION INTRAMUSCULAR; INTRAVENOUS at 23:37

## 2020-05-11 RX ADMIN — SODIUM CHLORIDE 1000 ML: 9 INJECTION, SOLUTION INTRAVENOUS at 23:37

## 2020-05-11 ASSESSMENT — PAIN DESCRIPTION - ONSET: ONSET: ON-GOING

## 2020-05-11 ASSESSMENT — PAIN SCALES - WONG BAKER: WONGBAKER_NUMERICALRESPONSE: 10

## 2020-05-11 ASSESSMENT — PAIN DESCRIPTION - DESCRIPTORS: DESCRIPTORS: CRAMPING;STABBING

## 2020-05-11 ASSESSMENT — PAIN DESCRIPTION - FREQUENCY: FREQUENCY: CONTINUOUS

## 2020-05-11 ASSESSMENT — PAIN DESCRIPTION - ORIENTATION: ORIENTATION: MID

## 2020-05-11 ASSESSMENT — PAIN DESCRIPTION - LOCATION: LOCATION: ABDOMEN

## 2020-05-11 ASSESSMENT — PAIN DESCRIPTION - PAIN TYPE: TYPE: ACUTE PAIN

## 2020-05-11 ASSESSMENT — PAIN DESCRIPTION - PROGRESSION: CLINICAL_PROGRESSION: GRADUALLY WORSENING

## 2020-05-11 ASSESSMENT — PAIN SCALES - GENERAL: PAINLEVEL_OUTOF10: 10

## 2020-05-11 ASSESSMENT — PAIN - FUNCTIONAL ASSESSMENT: PAIN_FUNCTIONAL_ASSESSMENT: PREVENTS OR INTERFERES SOME ACTIVE ACTIVITIES AND ADLS

## 2020-05-12 ENCOUNTER — APPOINTMENT (OUTPATIENT)
Dept: MRI IMAGING | Age: 45
DRG: 314 | End: 2020-05-12
Payer: MEDICAID

## 2020-05-12 PROBLEM — E87.6 HYPOKALEMIA: Status: ACTIVE | Noted: 2020-05-12

## 2020-05-12 PROBLEM — R10.13 EPIGASTRIC ABDOMINAL PAIN: Status: ACTIVE | Noted: 2020-05-12

## 2020-05-12 PROBLEM — M86.9 OSTEOMYELITIS OF GREAT TOE OF RIGHT FOOT (HCC): Status: ACTIVE | Noted: 2020-05-12

## 2020-05-12 PROBLEM — I16.0 HYPERTENSIVE URGENCY: Status: ACTIVE | Noted: 2020-05-12

## 2020-05-12 LAB
A/G RATIO: 1 (ref 1.1–2.2)
ALBUMIN SERPL-MCNC: 4 G/DL (ref 3.4–5)
ALP BLD-CCNC: 105 U/L (ref 40–129)
ALT SERPL-CCNC: 13 U/L (ref 10–40)
AMPHETAMINE SCREEN, URINE: ABNORMAL
ANION GAP SERPL CALCULATED.3IONS-SCNC: 16 MMOL/L (ref 3–16)
ANION GAP SERPL CALCULATED.3IONS-SCNC: 25 MMOL/L (ref 3–16)
AST SERPL-CCNC: 14 U/L (ref 15–37)
BARBITURATE SCREEN URINE: ABNORMAL
BASOPHILS ABSOLUTE: 0 K/UL (ref 0–0.2)
BASOPHILS RELATIVE PERCENT: 0.2 %
BENZODIAZEPINE SCREEN, URINE: ABNORMAL
BETA-HYDROXYBUTYRATE: 2.37 MMOL/L (ref 0–0.27)
BILIRUB SERPL-MCNC: <0.2 MG/DL (ref 0–1)
BILIRUBIN URINE: NEGATIVE
BLOOD, URINE: ABNORMAL
BUN BLDV-MCNC: 10 MG/DL (ref 7–20)
BUN BLDV-MCNC: 15 MG/DL (ref 7–20)
CALCIUM SERPL-MCNC: 9.5 MG/DL (ref 8.3–10.6)
CALCIUM SERPL-MCNC: 9.7 MG/DL (ref 8.3–10.6)
CANNABINOID SCREEN URINE: POSITIVE
CHLORIDE BLD-SCNC: 94 MMOL/L (ref 99–110)
CHLORIDE BLD-SCNC: 98 MMOL/L (ref 99–110)
CHP ED QC CHECK: YES
CLARITY: CLEAR
CO2: 14 MMOL/L (ref 21–32)
CO2: 23 MMOL/L (ref 21–32)
COCAINE METABOLITE SCREEN URINE: ABNORMAL
COLOR: YELLOW
CREAT SERPL-MCNC: 0.8 MG/DL (ref 0.9–1.3)
CREAT SERPL-MCNC: 0.9 MG/DL (ref 0.9–1.3)
EKG ATRIAL RATE: 53 BPM
EKG DIAGNOSIS: NORMAL
EKG P AXIS: 55 DEGREES
EKG P-R INTERVAL: 124 MS
EKG Q-T INTERVAL: 496 MS
EKG QRS DURATION: 82 MS
EKG QTC CALCULATION (BAZETT): 465 MS
EKG R AXIS: 3 DEGREES
EKG T AXIS: 52 DEGREES
EKG VENTRICULAR RATE: 53 BPM
EOSINOPHILS ABSOLUTE: 0 K/UL (ref 0–0.6)
EOSINOPHILS RELATIVE PERCENT: 0 %
EPITHELIAL CELLS, UA: 0 /HPF (ref 0–5)
GFR AFRICAN AMERICAN: >60
GFR AFRICAN AMERICAN: >60
GFR NON-AFRICAN AMERICAN: >60
GFR NON-AFRICAN AMERICAN: >60
GLOBULIN: 4.1 G/DL
GLUCOSE BLD-MCNC: 277 MG/DL
GLUCOSE BLD-MCNC: 288 MG/DL (ref 70–99)
GLUCOSE BLD-MCNC: 290 MG/DL (ref 70–99)
GLUCOSE BLD-MCNC: 325 MG/DL (ref 70–99)
GLUCOSE BLD-MCNC: 328 MG/DL (ref 70–99)
GLUCOSE BLD-MCNC: 340 MG/DL (ref 70–99)
GLUCOSE BLD-MCNC: 399 MG/DL (ref 70–99)
GLUCOSE BLD-MCNC: 439 MG/DL (ref 70–99)
GLUCOSE URINE: >=1000 MG/DL
HCT VFR BLD CALC: 42.6 % (ref 40.5–52.5)
HEMOGLOBIN: 13.8 G/DL (ref 13.5–17.5)
HYALINE CASTS: 0 /LPF (ref 0–8)
KETONES, URINE: 40 MG/DL
LACTIC ACID: 1.7 MMOL/L (ref 0.4–2)
LEUKOCYTE ESTERASE, URINE: NEGATIVE
LYMPHOCYTES ABSOLUTE: 0.4 K/UL (ref 1–5.1)
LYMPHOCYTES RELATIVE PERCENT: 3.2 %
Lab: ABNORMAL
MCH RBC QN AUTO: 26.5 PG (ref 26–34)
MCHC RBC AUTO-ENTMCNC: 32.4 G/DL (ref 31–36)
MCV RBC AUTO: 81.9 FL (ref 80–100)
METHADONE SCREEN, URINE: ABNORMAL
MICROSCOPIC EXAMINATION: YES
MONOCYTES ABSOLUTE: 0.2 K/UL (ref 0–1.3)
MONOCYTES RELATIVE PERCENT: 1.2 %
NEUTROPHILS ABSOLUTE: 13.2 K/UL (ref 1.7–7.7)
NEUTROPHILS RELATIVE PERCENT: 95.4 %
NITRITE, URINE: NEGATIVE
OPIATE SCREEN URINE: ABNORMAL
OXYCODONE URINE: ABNORMAL
PDW BLD-RTO: 17.6 % (ref 12.4–15.4)
PERFORMED ON: ABNORMAL
PH UA: 5.5
PH UA: 5.5 (ref 5–8)
PHENCYCLIDINE SCREEN URINE: ABNORMAL
PHOSPHORUS: 5.2 MG/DL (ref 2.5–4.9)
PLATELET # BLD: 384 K/UL (ref 135–450)
PMV BLD AUTO: 8.2 FL (ref 5–10.5)
POTASSIUM REFLEX MAGNESIUM: 4.1 MMOL/L (ref 3.5–5.1)
POTASSIUM SERPL-SCNC: 3.3 MMOL/L (ref 3.5–5.1)
PROPOXYPHENE SCREEN: ABNORMAL
PROTEIN UA: 30 MG/DL
RBC # BLD: 5.2 M/UL (ref 4.2–5.9)
RBC UA: 2 /HPF (ref 0–4)
SARS-COV-2, NAAT: NOT DETECTED
SODIUM BLD-SCNC: 133 MMOL/L (ref 136–145)
SODIUM BLD-SCNC: 137 MMOL/L (ref 136–145)
SPECIFIC GRAVITY UA: 1.03 (ref 1–1.03)
TOTAL PROTEIN: 8.1 G/DL (ref 6.4–8.2)
TROPONIN: <0.01 NG/ML
URINE REFLEX TO CULTURE: ABNORMAL
URINE TYPE: ABNORMAL
UROBILINOGEN, URINE: 0.2 E.U./DL
WBC # BLD: 13.8 K/UL (ref 4–11)
WBC UA: 3 /HPF (ref 0–5)

## 2020-05-12 PROCEDURE — 87205 SMEAR GRAM STAIN: CPT

## 2020-05-12 PROCEDURE — 80307 DRUG TEST PRSMV CHEM ANLYZR: CPT

## 2020-05-12 PROCEDURE — 6370000000 HC RX 637 (ALT 250 FOR IP): Performed by: INTERNAL MEDICINE

## 2020-05-12 PROCEDURE — 76937 US GUIDE VASCULAR ACCESS: CPT

## 2020-05-12 PROCEDURE — 1200000000 HC SEMI PRIVATE

## 2020-05-12 PROCEDURE — 81001 URINALYSIS AUTO W/SCOPE: CPT

## 2020-05-12 PROCEDURE — 2580000003 HC RX 258: Performed by: INTERNAL MEDICINE

## 2020-05-12 PROCEDURE — 6360000002 HC RX W HCPCS: Performed by: PHYSICIAN ASSISTANT

## 2020-05-12 PROCEDURE — 2580000003 HC RX 258: Performed by: PHYSICIAN ASSISTANT

## 2020-05-12 PROCEDURE — 36415 COLL VENOUS BLD VENIPUNCTURE: CPT

## 2020-05-12 PROCEDURE — 93010 ELECTROCARDIOGRAM REPORT: CPT | Performed by: INTERNAL MEDICINE

## 2020-05-12 PROCEDURE — 6360000002 HC RX W HCPCS: Performed by: INTERNAL MEDICINE

## 2020-05-12 PROCEDURE — 85025 COMPLETE CBC W/AUTO DIFF WBC: CPT

## 2020-05-12 PROCEDURE — 96375 TX/PRO/DX INJ NEW DRUG ADDON: CPT

## 2020-05-12 PROCEDURE — 36569 INSJ PICC 5 YR+ W/O IMAGING: CPT

## 2020-05-12 PROCEDURE — 87070 CULTURE OTHR SPECIMN AEROBIC: CPT

## 2020-05-12 PROCEDURE — C1751 CATH, INF, PER/CENT/MIDLINE: HCPCS

## 2020-05-12 PROCEDURE — 6370000000 HC RX 637 (ALT 250 FOR IP): Performed by: PHYSICIAN ASSISTANT

## 2020-05-12 PROCEDURE — 80048 BASIC METABOLIC PNL TOTAL CA: CPT

## 2020-05-12 PROCEDURE — U0002 COVID-19 LAB TEST NON-CDC: HCPCS

## 2020-05-12 PROCEDURE — 84100 ASSAY OF PHOSPHORUS: CPT

## 2020-05-12 PROCEDURE — 87186 SC STD MICRODIL/AGAR DIL: CPT

## 2020-05-12 PROCEDURE — 87077 CULTURE AEROBIC IDENTIFY: CPT

## 2020-05-12 PROCEDURE — 96365 THER/PROPH/DIAG IV INF INIT: CPT

## 2020-05-12 PROCEDURE — 99255 IP/OBS CONSLTJ NEW/EST HI 80: CPT | Performed by: INTERNAL MEDICINE

## 2020-05-12 PROCEDURE — 02HV33Z INSERTION OF INFUSION DEVICE INTO SUPERIOR VENA CAVA, PERCUTANEOUS APPROACH: ICD-10-PCS | Performed by: HOSPITALIST

## 2020-05-12 RX ORDER — LOSARTAN POTASSIUM 25 MG/1
50 TABLET ORAL ONCE
Status: DISCONTINUED | OUTPATIENT
Start: 2020-05-12 | End: 2020-05-15 | Stop reason: HOSPADM

## 2020-05-12 RX ORDER — DEXTROSE MONOHYDRATE 25 G/50ML
12.5 INJECTION, SOLUTION INTRAVENOUS PRN
Status: DISCONTINUED | OUTPATIENT
Start: 2020-05-12 | End: 2020-05-15 | Stop reason: HOSPADM

## 2020-05-12 RX ORDER — ASPIRIN 81 MG/1
81 TABLET, CHEWABLE ORAL DAILY
Status: DISCONTINUED | OUTPATIENT
Start: 2020-05-12 | End: 2020-05-13

## 2020-05-12 RX ORDER — DIVALPROEX SODIUM 500 MG/1
500 TABLET, DELAYED RELEASE ORAL 2 TIMES DAILY
Status: DISCONTINUED | OUTPATIENT
Start: 2020-05-12 | End: 2020-05-15 | Stop reason: HOSPADM

## 2020-05-12 RX ORDER — ACETAMINOPHEN 325 MG/1
650 TABLET ORAL EVERY 4 HOURS PRN
Status: DISCONTINUED | OUTPATIENT
Start: 2020-05-12 | End: 2020-05-15 | Stop reason: HOSPADM

## 2020-05-12 RX ORDER — GABAPENTIN 300 MG/1
600 CAPSULE ORAL 3 TIMES DAILY
Status: DISCONTINUED | OUTPATIENT
Start: 2020-05-12 | End: 2020-05-15 | Stop reason: HOSPADM

## 2020-05-12 RX ORDER — PROMETHAZINE HYDROCHLORIDE 25 MG/1
25 TABLET ORAL EVERY 4 HOURS PRN
Status: DISCONTINUED | OUTPATIENT
Start: 2020-05-12 | End: 2020-05-15 | Stop reason: HOSPADM

## 2020-05-12 RX ORDER — DICYCLOMINE HCL 20 MG
20 TABLET ORAL EVERY 6 HOURS PRN
Status: DISCONTINUED | OUTPATIENT
Start: 2020-05-12 | End: 2020-05-15 | Stop reason: HOSPADM

## 2020-05-12 RX ORDER — ONDANSETRON 2 MG/ML
4 INJECTION INTRAMUSCULAR; INTRAVENOUS EVERY 4 HOURS PRN
Status: DISCONTINUED | OUTPATIENT
Start: 2020-05-12 | End: 2020-05-15 | Stop reason: HOSPADM

## 2020-05-12 RX ORDER — OXYCODONE HYDROCHLORIDE AND ACETAMINOPHEN 5; 325 MG/1; MG/1
2 TABLET ORAL EVERY 4 HOURS PRN
Status: DISCONTINUED | OUTPATIENT
Start: 2020-05-12 | End: 2020-05-15 | Stop reason: HOSPADM

## 2020-05-12 RX ORDER — HYDRALAZINE HYDROCHLORIDE 20 MG/ML
10 INJECTION INTRAMUSCULAR; INTRAVENOUS EVERY 4 HOURS PRN
Status: DISCONTINUED | OUTPATIENT
Start: 2020-05-12 | End: 2020-05-15 | Stop reason: HOSPADM

## 2020-05-12 RX ORDER — POLYETHYLENE GLYCOL 3350 17 G/17G
17 POWDER, FOR SOLUTION ORAL DAILY PRN
Status: DISCONTINUED | OUTPATIENT
Start: 2020-05-12 | End: 2020-05-15 | Stop reason: HOSPADM

## 2020-05-12 RX ORDER — ACETAMINOPHEN 650 MG/1
650 SUPPOSITORY RECTAL EVERY 4 HOURS PRN
Status: DISCONTINUED | OUTPATIENT
Start: 2020-05-12 | End: 2020-05-15 | Stop reason: HOSPADM

## 2020-05-12 RX ORDER — SODIUM CHLORIDE 0.9 % (FLUSH) 0.9 %
10 SYRINGE (ML) INJECTION EVERY 12 HOURS SCHEDULED
Status: DISCONTINUED | OUTPATIENT
Start: 2020-05-12 | End: 2020-05-15 | Stop reason: HOSPADM

## 2020-05-12 RX ORDER — NICOTINE POLACRILEX 4 MG
15 LOZENGE BUCCAL PRN
Status: DISCONTINUED | OUTPATIENT
Start: 2020-05-12 | End: 2020-05-15 | Stop reason: HOSPADM

## 2020-05-12 RX ORDER — ATORVASTATIN CALCIUM 40 MG/1
40 TABLET, FILM COATED ORAL DAILY
Status: DISCONTINUED | OUTPATIENT
Start: 2020-05-12 | End: 2020-05-15 | Stop reason: HOSPADM

## 2020-05-12 RX ORDER — QUETIAPINE FUMARATE 100 MG/1
100 TABLET, FILM COATED ORAL NIGHTLY
Status: DISCONTINUED | OUTPATIENT
Start: 2020-05-12 | End: 2020-05-15 | Stop reason: HOSPADM

## 2020-05-12 RX ORDER — POTASSIUM CHLORIDE 750 MG/1
40 TABLET, FILM COATED, EXTENDED RELEASE ORAL ONCE
Status: DISCONTINUED | OUTPATIENT
Start: 2020-05-12 | End: 2020-05-12

## 2020-05-12 RX ORDER — HYDRALAZINE HYDROCHLORIDE 20 MG/ML
10 INJECTION INTRAMUSCULAR; INTRAVENOUS ONCE
Status: COMPLETED | OUTPATIENT
Start: 2020-05-12 | End: 2020-05-12

## 2020-05-12 RX ORDER — SODIUM CHLORIDE 0.9 % (FLUSH) 0.9 %
10 SYRINGE (ML) INJECTION PRN
Status: DISCONTINUED | OUTPATIENT
Start: 2020-05-12 | End: 2020-05-15 | Stop reason: HOSPADM

## 2020-05-12 RX ORDER — LIDOCAINE HYDROCHLORIDE 10 MG/ML
5 INJECTION, SOLUTION EPIDURAL; INFILTRATION; INTRACAUDAL; PERINEURAL ONCE
Status: DISCONTINUED | OUTPATIENT
Start: 2020-05-12 | End: 2020-05-15 | Stop reason: HOSPADM

## 2020-05-12 RX ORDER — POTASSIUM CHLORIDE 7.45 MG/ML
10 INJECTION INTRAVENOUS
Status: DISPENSED | OUTPATIENT
Start: 2020-05-12 | End: 2020-05-12

## 2020-05-12 RX ORDER — SODIUM CHLORIDE 9 MG/ML
INJECTION, SOLUTION INTRAVENOUS CONTINUOUS
Status: DISCONTINUED | OUTPATIENT
Start: 2020-05-12 | End: 2020-05-15 | Stop reason: HOSPADM

## 2020-05-12 RX ORDER — OXYCODONE HYDROCHLORIDE AND ACETAMINOPHEN 5; 325 MG/1; MG/1
1 TABLET ORAL ONCE
Status: COMPLETED | OUTPATIENT
Start: 2020-05-12 | End: 2020-05-12

## 2020-05-12 RX ORDER — DEXTROSE MONOHYDRATE 50 MG/ML
100 INJECTION, SOLUTION INTRAVENOUS PRN
Status: DISCONTINUED | OUTPATIENT
Start: 2020-05-12 | End: 2020-05-15 | Stop reason: HOSPADM

## 2020-05-12 RX ORDER — PANTOPRAZOLE SODIUM 40 MG/1
40 TABLET, DELAYED RELEASE ORAL DAILY
Status: DISCONTINUED | OUTPATIENT
Start: 2020-05-12 | End: 2020-05-15 | Stop reason: HOSPADM

## 2020-05-12 RX ORDER — LOSARTAN POTASSIUM 50 MG/1
50 TABLET ORAL DAILY
Status: DISCONTINUED | OUTPATIENT
Start: 2020-05-12 | End: 2020-05-15 | Stop reason: HOSPADM

## 2020-05-12 RX ORDER — INSULIN GLARGINE 100 [IU]/ML
10 INJECTION, SOLUTION SUBCUTANEOUS NIGHTLY
Status: DISCONTINUED | OUTPATIENT
Start: 2020-05-12 | End: 2020-05-13

## 2020-05-12 RX ORDER — OXYCODONE HYDROCHLORIDE AND ACETAMINOPHEN 5; 325 MG/1; MG/1
1 TABLET ORAL EVERY 4 HOURS PRN
Status: DISCONTINUED | OUTPATIENT
Start: 2020-05-12 | End: 2020-05-15 | Stop reason: HOSPADM

## 2020-05-12 RX ADMIN — ATORVASTATIN CALCIUM 40 MG: 40 TABLET, FILM COATED ORAL at 11:01

## 2020-05-12 RX ADMIN — GABAPENTIN 600 MG: 300 CAPSULE ORAL at 14:01

## 2020-05-12 RX ADMIN — POTASSIUM CHLORIDE 10 MEQ: 7.46 INJECTION, SOLUTION INTRAVENOUS at 04:10

## 2020-05-12 RX ADMIN — PROMETHAZINE HYDROCHLORIDE 25 MG: 25 TABLET ORAL at 05:32

## 2020-05-12 RX ADMIN — OXYCODONE HYDROCHLORIDE AND ACETAMINOPHEN 2 TABLET: 5; 325 TABLET ORAL at 11:02

## 2020-05-12 RX ADMIN — PIPERACILLIN AND TAZOBACTAM 3.38 G: 3; .375 INJECTION, POWDER, LYOPHILIZED, FOR SOLUTION INTRAVENOUS at 11:47

## 2020-05-12 RX ADMIN — PIPERACILLIN AND TAZOBACTAM 3.38 G: 3; .375 INJECTION, POWDER, LYOPHILIZED, FOR SOLUTION INTRAVENOUS at 20:58

## 2020-05-12 RX ADMIN — INSULIN LISPRO 2 UNITS: 100 INJECTION, SOLUTION INTRAVENOUS; SUBCUTANEOUS at 11:56

## 2020-05-12 RX ADMIN — HYDRALAZINE HYDROCHLORIDE 10 MG: 20 INJECTION INTRAMUSCULAR; INTRAVENOUS at 15:15

## 2020-05-12 RX ADMIN — INSULIN GLARGINE 10 UNITS: 100 INJECTION, SOLUTION SUBCUTANEOUS at 22:12

## 2020-05-12 RX ADMIN — ONDANSETRON 4 MG: 2 INJECTION INTRAMUSCULAR; INTRAVENOUS at 16:37

## 2020-05-12 RX ADMIN — INSULIN LISPRO 10 UNITS: 100 INJECTION, SOLUTION INTRAVENOUS; SUBCUTANEOUS at 16:39

## 2020-05-12 RX ADMIN — PROMETHAZINE HYDROCHLORIDE 25 MG: 25 INJECTION INTRAMUSCULAR; INTRAVENOUS at 22:44

## 2020-05-12 RX ADMIN — OXYCODONE HYDROCHLORIDE AND ACETAMINOPHEN 2 TABLET: 5; 325 TABLET ORAL at 22:46

## 2020-05-12 RX ADMIN — SODIUM CHLORIDE: 9 INJECTION, SOLUTION INTRAVENOUS at 04:10

## 2020-05-12 RX ADMIN — VANCOMYCIN HYDROCHLORIDE 1000 MG: 1 INJECTION, POWDER, LYOPHILIZED, FOR SOLUTION INTRAVENOUS at 00:32

## 2020-05-12 RX ADMIN — HYDROMORPHONE HYDROCHLORIDE 1 MG: 1 INJECTION, SOLUTION INTRAMUSCULAR; INTRAVENOUS; SUBCUTANEOUS at 17:13

## 2020-05-12 RX ADMIN — GABAPENTIN 600 MG: 300 CAPSULE ORAL at 21:02

## 2020-05-12 RX ADMIN — ONDANSETRON 4 MG: 2 INJECTION INTRAMUSCULAR; INTRAVENOUS at 02:30

## 2020-05-12 RX ADMIN — ASPIRIN 81 MG 81 MG: 81 TABLET ORAL at 11:02

## 2020-05-12 RX ADMIN — INSULIN LISPRO 4 UNITS: 100 INJECTION, SOLUTION INTRAVENOUS; SUBCUTANEOUS at 22:11

## 2020-05-12 RX ADMIN — MEROPENEM 1 G: 1 INJECTION, POWDER, FOR SOLUTION INTRAVENOUS at 02:56

## 2020-05-12 RX ADMIN — HYDRALAZINE HYDROCHLORIDE 10 MG: 20 INJECTION INTRAMUSCULAR; INTRAVENOUS at 04:11

## 2020-05-12 RX ADMIN — OXYCODONE HYDROCHLORIDE AND ACETAMINOPHEN 1 TABLET: 5; 325 TABLET ORAL at 06:47

## 2020-05-12 RX ADMIN — GABAPENTIN 600 MG: 300 CAPSULE ORAL at 11:01

## 2020-05-12 RX ADMIN — HYDRALAZINE HYDROCHLORIDE 10 MG: 20 INJECTION INTRAMUSCULAR; INTRAVENOUS at 02:01

## 2020-05-12 RX ADMIN — PROMETHAZINE HYDROCHLORIDE 25 MG: 25 INJECTION INTRAMUSCULAR; INTRAVENOUS at 04:10

## 2020-05-12 RX ADMIN — PROMETHAZINE HYDROCHLORIDE 25 MG: 25 INJECTION INTRAMUSCULAR; INTRAVENOUS at 10:57

## 2020-05-12 RX ADMIN — OXYCODONE HYDROCHLORIDE AND ACETAMINOPHEN 1 TABLET: 5; 325 TABLET ORAL at 00:49

## 2020-05-12 RX ADMIN — SODIUM CHLORIDE: 9 INJECTION, SOLUTION INTRAVENOUS at 10:56

## 2020-05-12 ASSESSMENT — PAIN SCALES - GENERAL
PAINLEVEL_OUTOF10: 10
PAINLEVEL_OUTOF10: 7
PAINLEVEL_OUTOF10: 10

## 2020-05-12 ASSESSMENT — ENCOUNTER SYMPTOMS
VOMITING: 1
NAUSEA: 1
SHORTNESS OF BREATH: 0
ABDOMINAL PAIN: 0

## 2020-05-12 ASSESSMENT — PAIN DESCRIPTION - PROGRESSION
CLINICAL_PROGRESSION: NOT CHANGED
CLINICAL_PROGRESSION: NOT CHANGED

## 2020-05-12 ASSESSMENT — PAIN DESCRIPTION - DESCRIPTORS
DESCRIPTORS: BURNING;STABBING
DESCRIPTORS_2: PATIENT UNABLE TO DESCRIBE
DESCRIPTORS: SHOOTING;STABBING

## 2020-05-12 ASSESSMENT — PAIN DESCRIPTION - ONSET
ONSET: AWAKENED FROM SLEEP
ONSET: ON-GOING

## 2020-05-12 ASSESSMENT — PAIN DESCRIPTION - LOCATION
LOCATION: ABDOMEN
LOCATION_2: TOE (COMMENT WHICH ONE)

## 2020-05-12 ASSESSMENT — PAIN DESCRIPTION - FREQUENCY
FREQUENCY: CONTINUOUS
FREQUENCY: CONTINUOUS

## 2020-05-12 ASSESSMENT — PAIN DESCRIPTION - PAIN TYPE
TYPE: ACUTE PAIN
TYPE_2: CHRONIC PAIN
TYPE: ACUTE PAIN
TYPE: ACUTE PAIN

## 2020-05-12 ASSESSMENT — PAIN DESCRIPTION - INTENSITY: RATING_2: 10

## 2020-05-12 ASSESSMENT — PAIN DESCRIPTION - ORIENTATION
ORIENTATION: MID
ORIENTATION_2: RIGHT
ORIENTATION: MID

## 2020-05-12 NOTE — CONSULTS
neuropathy ([facility]    Diabetic polyneuropathy (HCC)    Duodenal ulcer    Elevated blood pressure    Glucosuria    Heart failure, diastolic, acute (HCC)    Hematemesis with nausea    Hyperglycemia    Hypoglycemia    Hypophosphatemia    Hypopotassemia    Hypotension    Hypoxia    Intractable nausea and vomiting    Lactic acidosis    Leg weakness, bilateral    Leucocytosis    Leukocytosis    Cannabis abuse    Metabolic acidosis, increased anion gap (IAG)    Nausea    Nausea and vomiting    Numbness in both legs    Polysubstance abuse (HCC)    Pulmonary edema    RUQ abdominal pain    Type 1 diabetes mellitus with ketoacidosis without coma (HCC)    Diabetic foot ulcer (HCC)    Heart murmur    Cellulitis of foot    Hyperlipidemia    Osteomyelitis of great toe of right foot (HCC)    Epigastric abdominal pain    Hypertensive urgency    Hypokalemia     Nausea, vomiting   DKA early  DM poor control   Gastroparesis  Rt great toe infection   Concern for Osteomyelitis  Cannabis use  Previous toe amputation status    Rt diabetic foot infection complicated by Osteomyelitis and poor DM control and X ray plain with changes on the bone already     Emesis from gastroparesis, vs DKA vs cannabis vs metabolic    Labs, Microbiology, Radiology and pertinent results from current hospitalization and care every where were reviewed by me as a part of the consultation. PLAN :  1. Cont IV Zosyn   2. D/C Vancomycin risk for MEHRAN  3. Wound cx in process  4. Blood cx in process  5. Control DM  6. Podiatry consulted  7. ESR, CRP     Discussed with patient/Family and Nursing   Risk of Complications/Morbidity: High      · Illness(es)/ Infection present that pose threat to bodily function. · There is potential for severe exacerbation of infection/side effects of treatment. Therapy requires intensive monitoring for antimicrobial agent toxicity. Thanks for allowing me to participate in your patient's care please call

## 2020-05-12 NOTE — H&P
Hospital Medicine  History and Physical    PCP: No primary care provider on file. Patient Name: Adolfo Ibrahim        CHIEF COMPLAINT:  Pt c/o a 2 month h/o worsening wound of right Great Toe and 3 day h/o N/V and epigastric abdominal pain  HISTORY OF PRESENT ILLNESS: Patient is a 50-year-old man with history of hypertension, hyperlipidemia, type one diabetes mellitus, gastroparesis and peripheral neuropathy. He presents to the emergency room for evaluation following a two month history of increasing pain, swelling and erythema of his right great toe. He states that he has been unable to get an appointment with a podiatrist. In addition, he reports that he has had a three day history of nausea, vomiting and epigastric abdominal pain. in the emergency room he was not in diabetic ketoacidosis, and imaging had findings suggestive of osteomyelitis in the distal phalanx of the right great toe. He is being admitted for further evaluation and treatment. Associated signs and symptoms do not include fever or chills, vomiting, hemoptysis, hematochezia, diarrhea, constipation or urinary symptoms. Past Medical History:        Diagnosis Date    Diabetes mellitus ([address]     Gastroparesis     Hypertension        Past Surgical History:        Procedure Laterality Date    BONY PELVIS SURGERY      HIP SURGERY Left 2006    pins and rods- plate    UPPER GASTROINTESTINAL ENDOSCOPY N/A 12/2/2019    EGD BIOPSY performed by Sarath Peter MD at [facility]:  Ketorolac; Morphine; Morphine and related; Tramadol; Lisinopril; Haloperidol lactate; Toradol [ketorolac tromethamine]; and Vicodin [hydrocodone-acetaminophen]    Medications Prior to Admission:    Prior to Admission medications    Medication Sig Start Date End Date Taking?  Authorizing Provider   divalproex (DEPAKOTE) 500 MG DR tablet Take 500 mg by mouth 2 times daily    Historical Provider, MD   QUEtiapine (SEROQUEL) 100 MG tablet Take 100 mg by 6 hours as needed (abd pain) 1/2/20   Mickey Escamilla MD       Family History:       Problem Relation Age of Onset    Diabetes Mother     Heart Disease Mother     High Blood Pressure Mother     Asthma Brother     Other Father         kidney disease    No Known Problems Maternal Grandmother     No Known Problems Maternal Grandfather     No Known Problems Paternal Grandmother     No Known Problems Paternal Grandfather       Social History:   TOBACCO:   reports that he has been smoking. He started smoking about 11 years ago. He has never used smokeless tobacco.  ETOH:   reports current alcohol use. OCCUPATION:      REVIEW OF SYSTEMS:  A full review of systems was performed and is negative except for that which appears in the HPI    Physical Exam:    Vitals: BP (!) 224/100   Pulse 82   Temp 98.6 °F (37 °C) (Oral)   Resp 20   Ht 6' 2\" (1.88 m)   Wt 145 lb 15.1 oz (66.2 kg)   SpO2 100%   BMI 18.74 kg/m²   General appearance: Ill but not toxic appearing 50y.o. year-old AA male who is alert, appears stated age and is cooperative  HEENT: Head: Normocephalic, no lesions, without obvious abnormality. Eye: Normal external eye, conjunctiva, lids cornea, MICHAEL. Ears: Normal external ears. Non-tender. Nose: Normal external nose, mucus membranes and septum. Pharynx: Dental Hygiene adequate. Normal buccal mucosa. Normal pharynx. Neck: no adenopathy, no carotid bruit, no JVD, supple, symmetrical, trachea midline and thyroid not enlarged, symmetric, no tenderness/mass/nodules  Lungs: clear to auscultation bilaterally and no use of accessory muscles. Heart: regular rate and rhythm, S1, S2 normal, no murmur, click, rub or gallop and normal apical impulse  Abdomen: soft, non-tender; bowel sounds normal; no masses, no organomegaly  Extremities: Right Great [place] swollen, increased warmth and erythema with a draining sinus on the plantar surface.  Otherwise extremities atraumatic, no cyanosis or edema and Homans sign is to 3+ days depending on further evaluation and clinical course.      Azael Woo MD

## 2020-05-12 NOTE — ED NOTES
Pt up to the [facility] to void, appears more lethargic, still c/o pain  POCT Cory Pineda RN  05/12/20 2252

## 2020-05-12 NOTE — CONSULTS
Clinical Pharmacy Note  Vancomycin Consult    Pharmacy consult received for one-time dose of vancomycin in the Emergency Department per LASHAWN Mesa. Ht Readings from Last 1 Encounters:   05/11/20 6' 2\" (1.88 m)        Wt Readings from Last 1 Encounters:   05/11/20 145 lb 15.1 oz (66.2 kg)         Assessment/Plan:   Vancomycin 1000 x 1 in ED.  If Vancomycin is to continue on admission and pharmacy is to manage dosing, please re-consult with admission orders.     Aggie Salomon, PharmD

## 2020-05-12 NOTE — CARE COORDINATION
Attempted to contact patient in room via phone (due to isolation of the unit), no answer X several attempts. Will try again later. Electronically signed by Ana Morse RN on 5/12/2020 at 11:22 AM     Attempted to contact patient 2 more times, phone rings busy.      Electronically signed by Ana Morse RN on 5/12/2020 at 4:04 PM

## 2020-05-12 NOTE — ED PROVIDER NOTES
Pulmonary effort is normal. No respiratory distress. Abdominal:      Tenderness: There is abdominal tenderness in the epigastric area. There is no guarding or rebound. Musculoskeletal: Normal range of motion. Feet:    Skin:     General: Skin is warm. Neurological:      General: No focal deficit present. Mental Status: He is alert and oriented to person, place, and time. Psychiatric:         Mood and Affect: Mood normal.         Behavior: Behavior normal.         DIAGNOSTIC RESULTS     EKG: All EKG's are interpreted by LASHAWN Castro in the absence of a cardiologist.    EKG interpreted by myself - please refer to attending physician's note for complete EKG interpretation:    No evidence of acute ischemia or injury. RADIOLOGY:   Non-plain film images such as CT, Ultrasound and MRI are read by the radiologist. Plain radiographic images are visualized and preliminarily interpreted by LASHAWN Castro with the below findings:    Reviewed radiologist's interpretation. Interpretation per the Radiologist below, if available at the time of this note:    XR TOE RIGHT (MIN 2 VIEWS)   Final Result   Possible osteomyelitis distal phalanx great toe. Triple phase bone scan   and/or MRI with and without contrast would be more sensitive.                LABS:  Labs Reviewed   CBC WITH AUTO DIFFERENTIAL - Abnormal; Notable for the following components:       Result Value    WBC 12.2 (*)     Hemoglobin 12.6 (*)     Hematocrit 39.8 (*)     MCH 25.8 (*)     RDW 17.1 (*)     Neutrophils Absolute 9.2 (*)     All other components within normal limits    Narrative:     Performed at:  [address] [address]   Phone [phone]   COMPREHENSIVE METABOLIC PANEL - Abnormal; Notable for the following components:    Potassium 3.3 (*)     Chloride 98 (*)     Glucose 290 (*)     CREATININE 0.8 (*)     Albumin/Globulin Ratio 1.0 (*)     AST 14 (*) elevated over 200 however not in DKA. Given nausea medication and oral pain medication for the osteomyelitis. Will consult hospitalist for admission. Patient understands and is agreeable. CONSULTS:  IP CONSULT TO PHARMACY  IP CONSULT TO HOSPITALIST    PROCEDURES:  Procedures      FINAL IMPRESSION      1. Osteomyelitis of right foot, unspecified type ([address]    2. Non-intractable vomiting with nausea, unspecified vomiting type    3. Hyperglycemia    4. Hypertension, unspecified type          DISPOSITION/PLAN   DISPOSITION Decision To Admit 05/12/2020 12:13:02 AM      PATIENT REFERRED TO:  No follow-up provider specified.     DISCHARGE MEDICATIONS:  New Prescriptions    No medications on file       (Please note that portions of this note were completed with a voice recognition program.  Efforts were made to edit the dictations but occasionally words are mis-transcribed.)    Ewa Gregory, [place]  05/12/20 1541

## 2020-05-12 NOTE — PROGRESS NOTES
Hospitalist  Progress Note       Raghu Vazquez is a 50 y.o. male   1975     SUBJECTIVE:    Patient is a 50-year-old man with history of hypertension, hyperlipidemia, type one diabetes mellitus, gastroparesis and peripheral neuropathy. He presents to the emergency room for evaluation following a two month history of increasing pain, swelling and erythema of his right great toe. He states that he has been unable to get an appointment with a podiatrist. In addition, he reports that he has had a three day history of nausea, vomiting and epigastric abdominal pain. in the emergency room he was not in diabetic ketoacidosis, and imaging had findings suggestive of osteomyelitis in the distal phalanx of the right great toe. He is being admitted for further evaluation and treatment. Associated signs and symptoms do not include fever or chills, vomiting, hemoptysis, hematochezia, diarrhea, constipation or urinary symptoms. 5/12  Patient seen and examined still complaining of epigastric pain nausea and vomiting given some Zofran and Phenergan    OBJECTIVE:    Review of Systems:  General appearance: alert, appears stated age and cooperative  Skin: Skin color, texture, normal. No rashes or lesions  HEENT: No nose bleed, headache, vision problems  CV: C/O chest pain, tightness, pressure,   Respiratory: C/o no SOB, MELGAR, Orthopnea, PND  GI: No abdominal pain, black stool, bloating  Limbs: No c/o edema, pain, swelling, intermittent claudication, joint pains  Neuro: No dizziness, lightheadedness, syncope, gait problems, memory problems  Psych: grossly normal. No SI/depression. Vitals:   Blood pressure (!) 165/78, pulse 120, temperature 97.9 °F (36.6 °C), temperature source Oral, resp. rate 18, height 6' 2\" (1.88 m), weight 149 lb 7.6 oz (67.8 kg), SpO2 100 %.     HEENT: AT, NC, PERRLA  Neck: No JVD  Heart: S1 S2 audible, no murmur   Lungs: CTA   Abdomen: Nontender   Limbs: No edema   CNS: no focal deficit      Past Medical History:   Diagnosis Date    Diabetes mellitus ([facility]     Gastroparesis     Hypertension         Patient Active Problem List   Diagnosis    DKA, type 1, not at goal [facility])    Diabetic ketoacidosis without coma associated with diabetes mellitus due to underlying condition ([facility]    Chronic abdominal pain    Gastroparesis    GERD (gastroesophageal reflux disease)    Seizure (HCC)    Toe deformity    Uncontrolled type 1 diabetes mellitus with diabetic peripheral neuropathy (HCC)    Type 1 diabetes mellitus with hypoglycemia and without coma (HCC)    Type 1 diabetes mellitus with background diabetic retinopathy (HCC)    Hypoglycemia unawareness in type 1 diabetes mellitus (HCC)    Type 1 diabetes mellitus with hypercholesterolemia (HCC)    Essential hypertension    Accelerated hypertension    Acute blood loss anemia    Acute dyspnea    Acute respiratory failure (HCC)    Candida esophagitis (HCC)    Cholelithiasis    Cocaine abuse, episodic (HCC)    Cerebral infarction ([facility]    Diabetic peripheral neuropathy (HCC)    Diabetic polyneuropathy (HCC)    Duodenal ulcer    Elevated blood pressure    Glucosuria    Heart failure, diastolic, acute (HCC)    Hematemesis with nausea    Hyperglycemia    Hypoglycemia    Hypophosphatemia    Hypopotassemia    Hypotension    Hypoxia    Intractable nausea and vomiting    Lactic acidosis    Leg weakness, bilateral    Leucocytosis    Leukocytosis    Cannabis abuse    Metabolic acidosis, increased anion gap (IAG)    Nausea    Nausea and vomiting    Numbness in both legs    Polysubstance abuse (HCC)    Pulmonary edema    RUQ abdominal pain    Type 1 diabetes mellitus with ketoacidosis without coma (HCC)    Diabetic foot ulcer (HCC)    Heart murmur    Cellulitis of foot    Hyperlipidemia    Osteomyelitis of great toe of right foot (HCC)    Epigastric abdominal pain    Hypertensive urgency    Hypokalemia        Allergies   Allergen % 100 mL IVPB extended infusion (mini-bag)  3.375 g Intravenous Q8H Miroslava Eckert MD 25 mL/hr at 05/12/20 1147 3.375 g at 05/12/20 1147           Labs:  CBC with Differential:    Lab Results   Component Value Date    WBC 13.8 05/12/2020    RBC 5.20 05/12/2020    HGB 13.8 05/12/2020    HCT 42.6 05/12/2020     05/12/2020    MCV 81.9 05/12/2020    MCH 26.5 05/12/2020    MCHC 32.4 05/12/2020    RDW 17.6 05/12/2020    SEGSPCT 83.1 12/02/2010    BANDSPCT 1 11/28/2019    METASPCT 1 11/28/2019    LYMPHOPCT 3.2 05/12/2020    MONOPCT 1.2 05/12/2020    EOSPCT 1.0 12/02/2010    BASOPCT 0.2 05/12/2020    MONOSABS 0.2 05/12/2020    LYMPHSABS 0.4 05/12/2020    EOSABS 0.0 05/12/2020    BASOSABS 0.0 05/12/2020    DIFFTYPE Auto 12/02/2010     CMP:    Lab Results   Component Value Date     05/12/2020    K 4.1 05/12/2020    CL 94 05/12/2020    CO2 14 05/12/2020    BUN 15 05/12/2020    CREATININE 0.9 05/12/2020    GFRAA >60 05/12/2020    GFRAA >60 12/02/2010    AGRATIO 1.0 05/11/2020    LABGLOM >60 05/12/2020    GLUCOSE 325 05/12/2020    PROT 8.1 05/11/2020    PROT 8.4 04/25/2010    LABALBU 4.0 05/11/2020    CALCIUM 9.7 05/12/2020    BILITOT <0.2 05/11/2020    ALKPHOS 105 05/11/2020    AST 14 05/11/2020    ALT 13 05/11/2020     Hepatic Function Panel:    Lab Results   Component Value Date    ALKPHOS 105 05/11/2020    ALT 13 05/11/2020    AST 14 05/11/2020    PROT 8.1 05/11/2020    PROT 8.4 04/25/2010    BILITOT <0.2 05/11/2020    BILIDIR <0.2 12/29/2019    IBILI see below 12/29/2019    LABALBU 4.0 05/11/2020     Magnesium:    Lab Results   Component Value Date    MG 1.90 03/26/2020     PT/INR:    Lab Results   Component Value Date    PROTIME 10.8 01/08/2020    INR 0.93 01/08/2020     Last 3 Troponin:    Lab Results   Component Value Date    TROPONINI <0.01 05/11/2020    TROPONINI <0.01 03/24/2020    TROPONINI <0.01 02/05/2020     U/A:    Lab Results   Component Value Date    COLORU YELLOW 05/12/2020    PHUR 5.5 05/12/2020    PHUR 5.5 05/12/2020    LABCAST 3-5 Hyaline 01/08/2020    WBCUA 3 05/12/2020    RBCUA 2 05/12/2020    BACTERIA 1+ 12/15/2010    CLARITYU Clear 05/12/2020    SPECGRAV 1.027 05/12/2020    LEUKOCYTESUR Negative 05/12/2020    UROBILINOGEN 0.2 05/12/2020    BILIRUBINUR Negative 05/12/2020    BILIRUBINUR NEGATIVE 12/15/2010    BLOODU TRACE 05/12/2020    GLUCOSEU >=1000 05/12/2020    GLUCOSEU >=1000 12/15/2010     ABG:    Lab Results   Component Value Date    PHART 7.308 04/10/2010    OVM0LGI 34.3 04/10/2010    PO2ART 102 04/10/2010    VUL4VWR 16.7 04/10/2010    BEART -8.3 04/10/2010    THGBART 13.9 04/10/2010    O6ERSABP 97.2 04/10/2010     FLP:    Lab Results   Component Value Date    TRIG 66 03/26/2018     03/26/2018    LDLCALC 114 03/26/2018    LABVLDL 13 03/26/2018     TSH:    Lab Results   Component Value Date    TSH 0.31 12/29/2019      DATA:   ECG: Sinus Rhythm       ASSESSMENT:   1 osteomyelitis of the great toe of the right foot  MRI is ordered and podiatric is consulted and ID as well  2 epigastric pain nausea vomiting patient has history of gastroparesis  But at this point he also has diarrhea possible gastroenteritis  We will consult GI for further evaluation  3 hypertension tensive urgency  Better control will adjust further  4 diabetes we will place on sliding scale  PLAN   Await ID and podiatry consult evaluation  GI consult C. difficile result pending

## 2020-05-12 NOTE — CONSULTS
Sachi   3. Duodenitis   Stomach, biopsy:  -  Reactive (chemical) gastropathy.  See comment. -  Negative for Helicobacter pylori-like organisms on H&E stain. -  Negative for intestinal metaplasia.       PAST MEDICAL, SURGICAL, FAMILY, and SOCIAL HISTORY     Past Medical History:   Diagnosis Date    Diabetes mellitus ([address]     Gastroparesis     Hypertension      Past Surgical History:   Procedure Laterality Date    BONY PELVIS SURGERY      HIP SURGERY Left 2006    pins and rods- plate    UPPER GASTROINTESTINAL ENDOSCOPY N/A 12/2/2019    EGD BIOPSY performed by Carmel Benitez MD at [facility] History   Problem Relation Age of Onset    Diabetes Mother     Heart Disease Mother     High Blood Pressure Mother     Asthma Brother     Other Father         kidney disease    No Known Problems Maternal Grandmother     No Known Problems Maternal Grandfather     No Known Problems Paternal Grandmother     No Known Problems Paternal Grandfather      Social History     Socioeconomic History    Marital status: Legally      Spouse name: None    Number of children: 1    Years of education: None    Highest education level: None   Occupational History    Occupation: unemployed   Social Needs    Financial resource strain: None    Food insecurity     Worry: None     Inability: None    Transportation needs     Medical: None     Non-medical: None   Tobacco Use    Smoking status: Current Every Day Smoker     Start date: 3/26/2009    Smokeless tobacco: Never Used    Tobacco comment: black and mild 2  x daily   Substance and Sexual Activity    Alcohol use: Yes     Comment: socially 2 shots per month     Drug use: Yes     Frequency: 3.0 times per week     Types: Marijuana    Sexual activity: Yes     Partners: Female   Lifestyle    Physical activity     Days per week: None     Minutes per session: None    Stress: None   Relationships    Social connections     Talks on phone: None

## 2020-05-12 NOTE — CONSULTS
Department of Podiatry Consult Note  Monica Munguia DPM Attending       Reason for Consult:  Wound of RIGHT great toe with suspected osteomyelitis  Requesting Physician:  Nakita Parker MD    CHIEF COMPLAINT:  Open wound of RIGHT great toe    HISTORY OF PRESENT ILLNESS:                The patient is a 50 y.o. male with significant past medical history of Type ONE diabetes with peripheral neurpapthy and  previous loss of toes with hypertension, hyperlipidemia and gastroparesiswho is consulted for several week's history of open wound of RIGHT great toe with now three day history of nausea and vomtting.     Past Medical History:        Diagnosis Date    Diabetes mellitus ([address]     Gastroparesis     Hypertension      Past Surgical History:        Procedure Laterality Date    BONY PELVIS SURGERY      HIP SURGERY Left 2006    pins and rods- plate    UPPER GASTROINTESTINAL ENDOSCOPY N/A 12/2/2019    EGD BIOPSY performed [facility] Tristan [facility] MD at [facility] ENDOSCOPY     Current Medications:    Current Facility-Administered Medications: QUEtiapine (SEROQUEL) tablet 100 mg, 100 mg, Oral, Nightly  pantoprazole (PROTONIX) tablet 40 mg, 40 mg, Oral, Daily  losartan (COZAAR) tablet 50 mg, 50 mg, Oral, Daily  insulin glargine (LANTUS) injection vial 10 Units, 10 Units, Subcutaneous, Nightly  gabapentin (NEURONTIN) capsule 600 mg, 600 mg, Oral, TID  divalproex (DEPAKOTE) DR tablet 500 mg, 500 mg, Oral, BID  dicyclomine (BENTYL) tablet 20 mg, 20 mg, Oral, Q6H PRN  atorvastatin (LIPITOR) tablet 40 mg, 40 mg, Oral, Daily  [Held by provider] aspirin chewable tablet 81 mg, 81 mg, Oral, Daily  sodium chloride flush 0.9 % injection 10 mL, 10 mL, Intravenous, 2 times per day  sodium chloride flush 0.9 % injection 10 mL, 10 mL, Intravenous, PRN  acetaminophen (TYLENOL) tablet 650 mg, 650 mg, Oral, Q4H PRN **OR** acetaminophen (TYLENOL) suppository 650 mg, 650 mg, Rectal, Q4H PRN  polyethylene glycol (GLYCOLAX) packet 17 g, 17 g, none Varicosities bilaterally. MUSCULOSKELETAL:  Lorrane Keith is untested due to open wound with current malaise. Muscle strength is 4/5 to all groups tested to include dorsiflexion, plantarflexion, inversion, eversion, and digital Bilateral . The ranges of motion of all joints tested from ankle distal is decreased there is no crepitus noted Bilateral.    Radiographs of RIGHT foot   Punched out lesion of distal phalanx of RIGHT hallux, absent distal cortex indicative of osteomyelitis RIGHT hallux distal phalanx      IMPRESSION/RECOMMENDATIONS    1. Osteomyelitis of RIGHT hallux, distal phalanx    2. Cellulitis of RIGHT foot  +Leukocytosis of 13.8, with malaise    3. Diabetes with peripheral neuropathy, and previous amputation of digits    Disposition: Patient is scheduled for amputation of RIGHT hallux at [facility] Wednesday with Dr. Darian Segura. He is consented, NPO at midnight and is to have Covid-19 test, as well as holding anticoagulants. Thank you for the opportunity to take part in the patient's care.     Annie Brown DP  [facility]  [phone]

## 2020-05-13 ENCOUNTER — APPOINTMENT (OUTPATIENT)
Dept: CT IMAGING | Age: 45
DRG: 314 | End: 2020-05-13
Payer: MEDICAID

## 2020-05-13 ENCOUNTER — ANESTHESIA EVENT (OUTPATIENT)
Dept: OPERATING ROOM | Age: 45
DRG: 314 | End: 2020-05-13
Payer: MEDICAID

## 2020-05-13 ENCOUNTER — ANESTHESIA (OUTPATIENT)
Dept: OPERATING ROOM | Age: 45
DRG: 314 | End: 2020-05-13
Payer: MEDICAID

## 2020-05-13 ENCOUNTER — APPOINTMENT (OUTPATIENT)
Dept: GENERAL RADIOLOGY | Age: 45
DRG: 314 | End: 2020-05-13
Payer: MEDICAID

## 2020-05-13 VITALS
DIASTOLIC BLOOD PRESSURE: 55 MMHG | RESPIRATION RATE: 1 BRPM | SYSTOLIC BLOOD PRESSURE: 93 MMHG | OXYGEN SATURATION: 100 %

## 2020-05-13 LAB
ANION GAP SERPL CALCULATED.3IONS-SCNC: 21 MMOL/L (ref 3–16)
BASOPHILS ABSOLUTE: 0 K/UL (ref 0–0.2)
BASOPHILS RELATIVE PERCENT: 0.2 %
BUN BLDV-MCNC: 27 MG/DL (ref 7–20)
C DIFF TOXIN/ANTIGEN: NORMAL
C-REACTIVE PROTEIN: 4.6 MG/L (ref 0–5.1)
CALCIUM SERPL-MCNC: 9.9 MG/DL (ref 8.3–10.6)
CHLORIDE BLD-SCNC: 99 MMOL/L (ref 99–110)
CO2: 17 MMOL/L (ref 21–32)
CREAT SERPL-MCNC: 1.2 MG/DL (ref 0.9–1.3)
EOSINOPHILS ABSOLUTE: 0 K/UL (ref 0–0.6)
EOSINOPHILS RELATIVE PERCENT: 0 %
GFR AFRICAN AMERICAN: >60
GFR NON-AFRICAN AMERICAN: >60
GI BACTERIAL PATHOGENS BY PCR: NORMAL
GLUCOSE BLD-MCNC: 261 MG/DL (ref 70–99)
GLUCOSE BLD-MCNC: 272 MG/DL (ref 70–99)
GLUCOSE BLD-MCNC: 333 MG/DL (ref 70–99)
GLUCOSE BLD-MCNC: 347 MG/DL (ref 70–99)
GLUCOSE BLD-MCNC: 351 MG/DL (ref 70–99)
GLUCOSE BLD-MCNC: 353 MG/DL (ref 70–99)
GLUCOSE BLD-MCNC: 374 MG/DL (ref 70–99)
GLUCOSE BLD-MCNC: 426 MG/DL (ref 70–99)
HCT VFR BLD CALC: 40.2 % (ref 40.5–52.5)
HEMOGLOBIN: 13 G/DL (ref 13.5–17.5)
LYMPHOCYTES ABSOLUTE: 1 K/UL (ref 1–5.1)
LYMPHOCYTES RELATIVE PERCENT: 5.7 %
MCH RBC QN AUTO: 26.1 PG (ref 26–34)
MCHC RBC AUTO-ENTMCNC: 32.4 G/DL (ref 31–36)
MCV RBC AUTO: 80.5 FL (ref 80–100)
MONOCYTES ABSOLUTE: 0.5 K/UL (ref 0–1.3)
MONOCYTES RELATIVE PERCENT: 2.6 %
NEUTROPHILS ABSOLUTE: 16.6 K/UL (ref 1.7–7.7)
NEUTROPHILS RELATIVE PERCENT: 91.5 %
PDW BLD-RTO: 17.4 % (ref 12.4–15.4)
PERFORMED ON: ABNORMAL
PLATELET # BLD: 327 K/UL (ref 135–450)
PMV BLD AUTO: 7.4 FL (ref 5–10.5)
POTASSIUM REFLEX MAGNESIUM: 3.8 MMOL/L (ref 3.5–5.1)
RBC # BLD: 4.99 M/UL (ref 4.2–5.9)
SEDIMENTATION RATE, ERYTHROCYTE: 20 MM/HR (ref 0–15)
SODIUM BLD-SCNC: 137 MMOL/L (ref 136–145)
WBC # BLD: 18.1 K/UL (ref 4–11)
WHITE BLOOD CELLS (WBC), STOOL: ABNORMAL

## 2020-05-13 PROCEDURE — 6360000004 HC RX CONTRAST MEDICATION: Performed by: PODIATRIST

## 2020-05-13 PROCEDURE — 2580000003 HC RX 258: Performed by: INTERNAL MEDICINE

## 2020-05-13 PROCEDURE — 85025 COMPLETE CBC W/AUTO DIFF WBC: CPT

## 2020-05-13 PROCEDURE — 87076 CULTURE ANAEROBE IDENT EACH: CPT

## 2020-05-13 PROCEDURE — 87070 CULTURE OTHR SPECIMN AEROBIC: CPT

## 2020-05-13 PROCEDURE — 0Y6P0Z3 DETACHMENT AT RIGHT 1ST TOE, LOW, OPEN APPROACH: ICD-10-PCS | Performed by: PODIATRIST

## 2020-05-13 PROCEDURE — 80048 BASIC METABOLIC PNL TOTAL CA: CPT

## 2020-05-13 PROCEDURE — 6370000000 HC RX 637 (ALT 250 FOR IP): Performed by: STUDENT IN AN ORGANIZED HEALTH CARE EDUCATION/TRAINING PROGRAM

## 2020-05-13 PROCEDURE — 6370000000 HC RX 637 (ALT 250 FOR IP): Performed by: HOSPITALIST

## 2020-05-13 PROCEDURE — 74177 CT ABD & PELVIS W/CONTRAST: CPT

## 2020-05-13 PROCEDURE — 85652 RBC SED RATE AUTOMATED: CPT

## 2020-05-13 PROCEDURE — 6370000000 HC RX 637 (ALT 250 FOR IP): Performed by: INTERNAL MEDICINE

## 2020-05-13 PROCEDURE — 6360000002 HC RX W HCPCS: Performed by: NURSE ANESTHETIST, CERTIFIED REGISTERED

## 2020-05-13 PROCEDURE — 7100000001 HC PACU RECOVERY - ADDTL 15 MIN: Performed by: PODIATRIST

## 2020-05-13 PROCEDURE — 3600000004 HC SURGERY LEVEL 4 BASE: Performed by: PODIATRIST

## 2020-05-13 PROCEDURE — 6360000002 HC RX W HCPCS: Performed by: INTERNAL MEDICINE

## 2020-05-13 PROCEDURE — 87505 NFCT AGENT DETECTION GI: CPT

## 2020-05-13 PROCEDURE — 2580000003 HC RX 258: Performed by: NURSE ANESTHETIST, CERTIFIED REGISTERED

## 2020-05-13 PROCEDURE — 87186 SC STD MICRODIL/AGAR DIL: CPT

## 2020-05-13 PROCEDURE — 87205 SMEAR GRAM STAIN: CPT

## 2020-05-13 PROCEDURE — 83630 LACTOFERRIN FECAL (QUAL): CPT

## 2020-05-13 PROCEDURE — 2500000003 HC RX 250 WO HCPCS: Performed by: PODIATRIST

## 2020-05-13 PROCEDURE — 3600000014 HC SURGERY LEVEL 4 ADDTL 15MIN: Performed by: PODIATRIST

## 2020-05-13 PROCEDURE — 88305 TISSUE EXAM BY PATHOLOGIST: CPT

## 2020-05-13 PROCEDURE — 86140 C-REACTIVE PROTEIN: CPT

## 2020-05-13 PROCEDURE — 1200000000 HC SEMI PRIVATE

## 2020-05-13 PROCEDURE — 2500000003 HC RX 250 WO HCPCS: Performed by: NURSE ANESTHETIST, CERTIFIED REGISTERED

## 2020-05-13 PROCEDURE — 73630 X-RAY EXAM OF FOOT: CPT

## 2020-05-13 PROCEDURE — 87449 NOS EACH ORGANISM AG IA: CPT

## 2020-05-13 PROCEDURE — 6360000004 HC RX CONTRAST MEDICATION

## 2020-05-13 PROCEDURE — 87075 CULTR BACTERIA EXCEPT BLOOD: CPT

## 2020-05-13 PROCEDURE — 3700000001 HC ADD 15 MINUTES (ANESTHESIA): Performed by: PODIATRIST

## 2020-05-13 PROCEDURE — 87324 CLOSTRIDIUM AG IA: CPT

## 2020-05-13 PROCEDURE — 6360000002 HC RX W HCPCS: Performed by: STUDENT IN AN ORGANIZED HEALTH CARE EDUCATION/TRAINING PROGRAM

## 2020-05-13 PROCEDURE — 99233 SBSQ HOSP IP/OBS HIGH 50: CPT | Performed by: INTERNAL MEDICINE

## 2020-05-13 PROCEDURE — 2709999900 HC NON-CHARGEABLE SUPPLY: Performed by: PODIATRIST

## 2020-05-13 PROCEDURE — 87077 CULTURE AEROBIC IDENTIFY: CPT

## 2020-05-13 PROCEDURE — 7100000000 HC PACU RECOVERY - FIRST 15 MIN: Performed by: PODIATRIST

## 2020-05-13 PROCEDURE — 2580000003 HC RX 258: Performed by: STUDENT IN AN ORGANIZED HEALTH CARE EDUCATION/TRAINING PROGRAM

## 2020-05-13 PROCEDURE — 3700000000 HC ANESTHESIA ATTENDED CARE: Performed by: PODIATRIST

## 2020-05-13 RX ORDER — OXYCODONE HYDROCHLORIDE AND ACETAMINOPHEN 5; 325 MG/1; MG/1
2 TABLET ORAL PRN
Status: DISCONTINUED | OUTPATIENT
Start: 2020-05-13 | End: 2020-05-13 | Stop reason: HOSPADM

## 2020-05-13 RX ORDER — ONDANSETRON 2 MG/ML
4 INJECTION INTRAMUSCULAR; INTRAVENOUS
Status: DISCONTINUED | OUTPATIENT
Start: 2020-05-13 | End: 2020-05-13 | Stop reason: HOSPADM

## 2020-05-13 RX ORDER — BUPIVACAINE HYDROCHLORIDE 5 MG/ML
INJECTION, SOLUTION EPIDURAL; INTRACAUDAL
Status: COMPLETED | OUTPATIENT
Start: 2020-05-13 | End: 2020-05-13

## 2020-05-13 RX ORDER — PHENYLEPHRINE HCL IN 0.9% NACL 1 MG/10 ML
SYRINGE (ML) INTRAVENOUS PRN
Status: DISCONTINUED | OUTPATIENT
Start: 2020-05-13 | End: 2020-05-13 | Stop reason: SDUPTHER

## 2020-05-13 RX ORDER — LABETALOL HYDROCHLORIDE 5 MG/ML
5 INJECTION, SOLUTION INTRAVENOUS EVERY 10 MIN PRN
Status: DISCONTINUED | OUTPATIENT
Start: 2020-05-13 | End: 2020-05-13 | Stop reason: HOSPADM

## 2020-05-13 RX ORDER — HYDRALAZINE HYDROCHLORIDE 20 MG/ML
5 INJECTION INTRAMUSCULAR; INTRAVENOUS
Status: DISCONTINUED | OUTPATIENT
Start: 2020-05-13 | End: 2020-05-13 | Stop reason: HOSPADM

## 2020-05-13 RX ORDER — MEPERIDINE HYDROCHLORIDE 25 MG/ML
12.5 INJECTION INTRAMUSCULAR; INTRAVENOUS; SUBCUTANEOUS EVERY 5 MIN PRN
Status: DISCONTINUED | OUTPATIENT
Start: 2020-05-13 | End: 2020-05-13 | Stop reason: HOSPADM

## 2020-05-13 RX ORDER — FENTANYL CITRATE 50 UG/ML
INJECTION, SOLUTION INTRAMUSCULAR; INTRAVENOUS PRN
Status: DISCONTINUED | OUTPATIENT
Start: 2020-05-13 | End: 2020-05-13 | Stop reason: SDUPTHER

## 2020-05-13 RX ORDER — INSULIN GLARGINE 100 [IU]/ML
15 INJECTION, SOLUTION SUBCUTANEOUS NIGHTLY
Status: DISCONTINUED | OUTPATIENT
Start: 2020-05-13 | End: 2020-05-15

## 2020-05-13 RX ORDER — LIDOCAINE HYDROCHLORIDE 20 MG/ML
INJECTION, SOLUTION EPIDURAL; INFILTRATION; INTRACAUDAL; PERINEURAL PRN
Status: DISCONTINUED | OUTPATIENT
Start: 2020-05-13 | End: 2020-05-13 | Stop reason: SDUPTHER

## 2020-05-13 RX ORDER — GLYCOPYRROLATE 0.2 MG/ML
INJECTION INTRAMUSCULAR; INTRAVENOUS PRN
Status: DISCONTINUED | OUTPATIENT
Start: 2020-05-13 | End: 2020-05-13 | Stop reason: SDUPTHER

## 2020-05-13 RX ORDER — ONDANSETRON 2 MG/ML
INJECTION INTRAMUSCULAR; INTRAVENOUS PRN
Status: DISCONTINUED | OUTPATIENT
Start: 2020-05-13 | End: 2020-05-13 | Stop reason: SDUPTHER

## 2020-05-13 RX ORDER — OXYCODONE HYDROCHLORIDE AND ACETAMINOPHEN 5; 325 MG/1; MG/1
1 TABLET ORAL PRN
Status: DISCONTINUED | OUTPATIENT
Start: 2020-05-13 | End: 2020-05-13 | Stop reason: HOSPADM

## 2020-05-13 RX ORDER — POVIDONE-IODINE 10 MG/G
OINTMENT TOPICAL
Status: COMPLETED | OUTPATIENT
Start: 2020-05-13 | End: 2020-05-13

## 2020-05-13 RX ORDER — LIDOCAINE HYDROCHLORIDE 20 MG/ML
INJECTION, SOLUTION EPIDURAL; INFILTRATION; INTRACAUDAL; PERINEURAL
Status: COMPLETED | OUTPATIENT
Start: 2020-05-13 | End: 2020-05-13

## 2020-05-13 RX ORDER — DEXAMETHASONE SODIUM PHOSPHATE 4 MG/ML
INJECTION, SOLUTION INTRA-ARTICULAR; INTRALESIONAL; INTRAMUSCULAR; INTRAVENOUS; SOFT TISSUE PRN
Status: DISCONTINUED | OUTPATIENT
Start: 2020-05-13 | End: 2020-05-13 | Stop reason: SDUPTHER

## 2020-05-13 RX ORDER — PROPOFOL 10 MG/ML
INJECTION, EMULSION INTRAVENOUS PRN
Status: DISCONTINUED | OUTPATIENT
Start: 2020-05-13 | End: 2020-05-13 | Stop reason: SDUPTHER

## 2020-05-13 RX ORDER — SODIUM CHLORIDE 9 MG/ML
INJECTION, SOLUTION INTRAVENOUS CONTINUOUS PRN
Status: DISCONTINUED | OUTPATIENT
Start: 2020-05-13 | End: 2020-05-13 | Stop reason: SDUPTHER

## 2020-05-13 RX ADMIN — FENTANYL CITRATE 50 MCG: 50 INJECTION INTRAMUSCULAR; INTRAVENOUS at 09:03

## 2020-05-13 RX ADMIN — INSULIN LISPRO 10 UNITS: 100 INJECTION, SOLUTION INTRAVENOUS; SUBCUTANEOUS at 17:11

## 2020-05-13 RX ADMIN — GABAPENTIN 600 MG: 300 CAPSULE ORAL at 14:48

## 2020-05-13 RX ADMIN — IOPAMIDOL 75 ML: 755 INJECTION, SOLUTION INTRAVENOUS at 13:59

## 2020-05-13 RX ADMIN — INSULIN LISPRO 8 UNITS: 100 INJECTION, SOLUTION INTRAVENOUS; SUBCUTANEOUS at 11:47

## 2020-05-13 RX ADMIN — QUETIAPINE FUMARATE 100 MG: 100 TABLET ORAL at 21:26

## 2020-05-13 RX ADMIN — Medication 100 MCG: at 09:12

## 2020-05-13 RX ADMIN — PIPERACILLIN AND TAZOBACTAM 3.38 G: 3; .375 INJECTION, POWDER, LYOPHILIZED, FOR SOLUTION INTRAVENOUS at 04:21

## 2020-05-13 RX ADMIN — FENTANYL CITRATE 25 MCG: 50 INJECTION INTRAMUSCULAR; INTRAVENOUS at 09:08

## 2020-05-13 RX ADMIN — PROMETHAZINE HYDROCHLORIDE 25 MG: 25 INJECTION INTRAMUSCULAR; INTRAVENOUS at 04:29

## 2020-05-13 RX ADMIN — FENTANYL CITRATE 25 MCG: 50 INJECTION INTRAMUSCULAR; INTRAVENOUS at 08:57

## 2020-05-13 RX ADMIN — OXYCODONE HYDROCHLORIDE AND ACETAMINOPHEN 2 TABLET: 5; 325 TABLET ORAL at 12:24

## 2020-05-13 RX ADMIN — IOHEXOL 50 ML: 240 INJECTION, SOLUTION INTRATHECAL; INTRAVASCULAR; INTRAVENOUS; ORAL at 11:36

## 2020-05-13 RX ADMIN — DEXAMETHASONE SODIUM PHOSPHATE 6 MG: 4 INJECTION, SOLUTION INTRAMUSCULAR; INTRAVENOUS at 09:27

## 2020-05-13 RX ADMIN — SODIUM CHLORIDE: 9 INJECTION, SOLUTION INTRAVENOUS at 02:16

## 2020-05-13 RX ADMIN — OXYCODONE HYDROCHLORIDE AND ACETAMINOPHEN 2 TABLET: 5; 325 TABLET ORAL at 21:26

## 2020-05-13 RX ADMIN — PROPOFOL 200 MG: 10 INJECTION, EMULSION INTRAVENOUS at 09:03

## 2020-05-13 RX ADMIN — ENOXAPARIN SODIUM 40 MG: 40 INJECTION SUBCUTANEOUS at 11:42

## 2020-05-13 RX ADMIN — SODIUM CHLORIDE: 9 INJECTION, SOLUTION INTRAVENOUS at 08:57

## 2020-05-13 RX ADMIN — Medication 200 MCG: at 09:27

## 2020-05-13 RX ADMIN — PROMETHAZINE HYDROCHLORIDE 25 MG: 25 TABLET ORAL at 17:12

## 2020-05-13 RX ADMIN — LIDOCAINE HYDROCHLORIDE 5 ML: 20 INJECTION, SOLUTION EPIDURAL; INFILTRATION; INTRACAUDAL; PERINEURAL at 09:03

## 2020-05-13 RX ADMIN — PIPERACILLIN AND TAZOBACTAM 3.38 G: 3; .375 INJECTION, POWDER, LYOPHILIZED, FOR SOLUTION INTRAVENOUS at 22:37

## 2020-05-13 RX ADMIN — PROMETHAZINE HYDROCHLORIDE 25 MG: 25 TABLET ORAL at 10:57

## 2020-05-13 RX ADMIN — Medication 200 MCG: at 09:10

## 2020-05-13 RX ADMIN — INSULIN GLARGINE 15 UNITS: 100 INJECTION, SOLUTION SUBCUTANEOUS at 21:32

## 2020-05-13 RX ADMIN — INSULIN LISPRO 3 UNITS: 100 INJECTION, SOLUTION INTRAVENOUS; SUBCUTANEOUS at 17:11

## 2020-05-13 RX ADMIN — GABAPENTIN 600 MG: 300 CAPSULE ORAL at 10:56

## 2020-05-13 RX ADMIN — GLYCOPYRROLATE 1 MG: 0.2 INJECTION, SOLUTION INTRAMUSCULAR; INTRAVENOUS at 08:57

## 2020-05-13 RX ADMIN — ONDANSETRON 4 MG: 2 INJECTION INTRAMUSCULAR; INTRAVENOUS at 09:27

## 2020-05-13 RX ADMIN — DIVALPROEX SODIUM 500 MG: 500 TABLET, DELAYED RELEASE ORAL at 10:56

## 2020-05-13 RX ADMIN — Medication 100 MCG: at 09:45

## 2020-05-13 RX ADMIN — OXYCODONE HYDROCHLORIDE AND ACETAMINOPHEN 2 TABLET: 5; 325 TABLET ORAL at 17:11

## 2020-05-13 RX ADMIN — GABAPENTIN 600 MG: 300 CAPSULE ORAL at 21:26

## 2020-05-13 RX ADMIN — PANTOPRAZOLE SODIUM 40 MG: 40 TABLET, DELAYED RELEASE ORAL at 10:56

## 2020-05-13 RX ADMIN — Medication 200 MCG: at 09:22

## 2020-05-13 RX ADMIN — Medication 200 MCG: at 09:38

## 2020-05-13 RX ADMIN — PIPERACILLIN AND TAZOBACTAM 3.38 G: 3; .375 INJECTION, POWDER, LYOPHILIZED, FOR SOLUTION INTRAVENOUS at 10:57

## 2020-05-13 RX ADMIN — ATORVASTATIN CALCIUM 40 MG: 40 TABLET, FILM COATED ORAL at 10:57

## 2020-05-13 RX ADMIN — INSULIN LISPRO 6 UNITS: 100 INJECTION, SOLUTION INTRAVENOUS; SUBCUTANEOUS at 21:33

## 2020-05-13 RX ADMIN — LOSARTAN POTASSIUM 50 MG: 50 TABLET, FILM COATED ORAL at 10:57

## 2020-05-13 ASSESSMENT — PAIN DESCRIPTION - PAIN TYPE
TYPE: ACUTE PAIN

## 2020-05-13 ASSESSMENT — PAIN SCALES - GENERAL
PAINLEVEL_OUTOF10: 9
PAINLEVEL_OUTOF10: 0
PAINLEVEL_OUTOF10: 10
PAINLEVEL_OUTOF10: 0
PAINLEVEL_OUTOF10: 10
PAINLEVEL_OUTOF10: 0

## 2020-05-13 ASSESSMENT — PAIN - FUNCTIONAL ASSESSMENT
PAIN_FUNCTIONAL_ASSESSMENT: PREVENTS OR INTERFERES SOME ACTIVE ACTIVITIES AND ADLS
PAIN_FUNCTIONAL_ASSESSMENT: PREVENTS OR INTERFERES SOME ACTIVE ACTIVITIES AND ADLS
PAIN_FUNCTIONAL_ASSESSMENT: 0-10
PAIN_FUNCTIONAL_ASSESSMENT: PREVENTS OR INTERFERES SOME ACTIVE ACTIVITIES AND ADLS
PAIN_FUNCTIONAL_ASSESSMENT: PREVENTS OR INTERFERES SOME ACTIVE ACTIVITIES AND ADLS

## 2020-05-13 ASSESSMENT — PAIN DESCRIPTION - DESCRIPTORS
DESCRIPTORS: CONSTANT;SHARP;POUNDING
DESCRIPTORS: ACHING
DESCRIPTORS: ACHING
DESCRIPTORS: SHOOTING

## 2020-05-13 ASSESSMENT — PULMONARY FUNCTION TESTS
PIF_VALUE: 5
PIF_VALUE: 6
PIF_VALUE: 6
PIF_VALUE: 12
PIF_VALUE: 6
PIF_VALUE: 0
PIF_VALUE: 5
PIF_VALUE: 1
PIF_VALUE: 12
PIF_VALUE: 7
PIF_VALUE: 2
PIF_VALUE: 6
PIF_VALUE: 5
PIF_VALUE: 0
PIF_VALUE: 6
PIF_VALUE: 12
PIF_VALUE: 7
PIF_VALUE: 7
PIF_VALUE: 1
PIF_VALUE: 12
PIF_VALUE: 6
PIF_VALUE: 1
PIF_VALUE: 7
PIF_VALUE: 0
PIF_VALUE: 12
PIF_VALUE: 12
PIF_VALUE: 6
PIF_VALUE: 1
PIF_VALUE: 6
PIF_VALUE: 10
PIF_VALUE: 12
PIF_VALUE: 6
PIF_VALUE: 5
PIF_VALUE: 6
PIF_VALUE: 0
PIF_VALUE: 7
PIF_VALUE: 12
PIF_VALUE: 0
PIF_VALUE: 12
PIF_VALUE: 12
PIF_VALUE: 6
PIF_VALUE: 12
PIF_VALUE: 13
PIF_VALUE: 0
PIF_VALUE: 7
PIF_VALUE: 7
PIF_VALUE: 12
PIF_VALUE: 12
PIF_VALUE: 7
PIF_VALUE: 13

## 2020-05-13 ASSESSMENT — PAIN DESCRIPTION - LOCATION
LOCATION: ABDOMEN
LOCATION: FOOT
LOCATION: ABDOMEN

## 2020-05-13 ASSESSMENT — PAIN DESCRIPTION - PROGRESSION
CLINICAL_PROGRESSION: NOT CHANGED

## 2020-05-13 ASSESSMENT — PAIN DESCRIPTION - FREQUENCY
FREQUENCY: CONTINUOUS

## 2020-05-13 ASSESSMENT — PAIN DESCRIPTION - ONSET
ONSET: ON-GOING

## 2020-05-13 ASSESSMENT — PAIN DESCRIPTION - ORIENTATION
ORIENTATION: MID
ORIENTATION: RIGHT
ORIENTATION: MID

## 2020-05-13 ASSESSMENT — ENCOUNTER SYMPTOMS: SHORTNESS OF BREATH: 1

## 2020-05-13 ASSESSMENT — PAIN SCALES - WONG BAKER: WONGBAKER_NUMERICALRESPONSE: 0

## 2020-05-13 NOTE — ANESTHESIA POSTPROCEDURE EVALUATION
0044, BP:(!) 175/93, Temp:98.1 °F (36.7 °C), Temp src:Oral, Pulse:107, Resp:18, SpO2:98 %, Weight:143 lb 11.8 oz (65.2 kg)     LABS:    CBC  Lab Results       Component                Value               Date/Time                  WBC                      18.1 (H)            05/13/2020 04:30 AM        HGB                      13.0 (L)            05/13/2020 04:30 AM        HCT                      40.2 (L)            05/13/2020 04:30 AM        PLT                      327                 05/13/2020 04:30 AM   RENAL  Lab Results       Component                Value               Date/Time                  NA                       137                 05/13/2020 04:30 AM        K                        3.8                 05/13/2020 04:30 AM        CL                       99                  05/13/2020 04:30 AM        CO2                      17 (L)              05/13/2020 04:30 AM        BUN                      27 (H)              05/13/2020 04:30 AM        CREATININE               1.2                 05/13/2020 04:30 AM        GLUCOSE                  347 (H)             05/13/2020 04:30 AM   COAGS  Lab Results       Component                Value               Date/Time                  PROTIME                  10.8                01/08/2020 10:11 AM        INR                      0.93                01/08/2020 10:11 AM        APTT                     29.6                04/10/2010 04:23 PM     Intake & Output:  @23ABVY@    Nausea & Vomiting:  No    Level of Consciousness:  Awake    Pain Assessment:  Adequate analgesia    Anesthesia Complications:  No apparent anesthetic complications    SUMMARY      Vital signs stable  OK to discharge from Stage I post anesthesia care.   Care transferred from Anesthesiology department on discharge from perioperative area

## 2020-05-13 NOTE — PROGRESS NOTES
Pt arrived to PACU from OR. Pt sleeping on 3l/nc with oral airway in place. Right foot dressing C/D/I. Toes warm. Cap refill WNL.  VSS

## 2020-05-13 NOTE — PLAN OF CARE
Problem: Pain:  Goal: Pain level will decrease  Description: Pain level will decrease  Outcome: Ongoing  Note:   Pain/discomfort being managed with PRN analgesics per MD orders. Pt able to express presence and absence of pain and rate pain appropriately using numerical scale. Problem: Falls - Risk of:  Goal: Will remain free from falls  Description: Will remain free from falls  Outcome: Ongoing  Note: Fall risk assessment completed per unit protocol. Patient's bed is in the lowest position, call light is within reach and the patient's room is free of clutter. The patient has been instructed to call for assistance before getting out of bed or the chair.     Goal: Absence of physical injury  Description: Absence of physical injury  Outcome: Ongoing
history from assessment/chart review. R foot wound measured, cleansed with wound wash, and swabbed for culture. Upon arrival to the unit, pt immediately disrobed and has remained naked in his room covered with blankets. He does not want to put a gown on. He is also refusing his nonskid socks. RN explained the concern of him slipping on the floor and pt said \"I won't fall, don't worry about it. \" Pt does not feel well and is minimally interactive but will answer questions when asked. Pt's mother called this morning to get an update on the pt. Pt stated he did not want staff updating his mother. Pt states he will update his family. Pt's mother given bedside telephone. Will continue to monitor.

## 2020-05-13 NOTE — PROGRESS NOTES
auscultation bilaterally- no wheezes, rales or rhonchi, normal air movement, no respiratory distress  Cardiovascular: normal rate, regular rhythm, normal S1 and S2, no murmurs, rubs, clicks, or gallops, no carotid bruits  Abdomen: soft, non-tender, non-distended, normal bowel sounds, no masses or organomegaly  Extremities: no cyanosis, clubbing or edema  Musculoskeletal: normal range of motion, no joint swelling, deformity or tenderness  Neurologic: reflexes normal and symmetric, no cranial nerve deficit  Psych:  Orientation, sensorium, mood normal  Lines:  PICC   Rt foot great toe s/p surgery and post op dressing+   surrounding skin changes+  Previous amputation status 4 th and 5 th toe      Data Review:    Lab Results   Component Value Date    WBC 18.1 (H) 05/13/2020    HGB 13.0 (L) 05/13/2020    HCT 40.2 (L) 05/13/2020    MCV 80.5 05/13/2020     05/13/2020     Lab Results   Component Value Date    CREATININE 1.2 05/13/2020    BUN 27 (H) 05/13/2020     05/13/2020    K 3.8 05/13/2020    CL 99 05/13/2020    CO2 17 (L) 05/13/2020       Hepatic Function Panel:   Lab Results   Component Value Date    ALKPHOS 105 05/11/2020    ALT 13 05/11/2020    AST 14 05/11/2020    PROT 8.1 05/11/2020    PROT 8.4 04/25/2010    BILITOT <0.2 05/11/2020    BILIDIR <0.2 12/29/2019    IBILI see below 12/29/2019    LABALBU 4.0 05/11/2020       UA:  Lab Results   Component Value Date    COLORU YELLOW 05/12/2020    CLARITYU Clear 05/12/2020    GLUCOSEU >=1000 05/12/2020    GLUCOSEU >=1000 12/15/2010    BILIRUBINUR Negative 05/12/2020    BILIRUBINUR NEGATIVE 12/15/2010    KETUA 40 05/12/2020    SPECGRAV 1.027 05/12/2020    BLOODU TRACE 05/12/2020    PHUR 5.5 05/12/2020    PHUR 5.5 05/12/2020    PROTEINU 30 05/12/2020    UROBILINOGEN 0.2 05/12/2020    NITRU Negative 05/12/2020    LEUKOCYTESUR Negative 05/12/2020    LABMICR YES 05/12/2020    URINETYPE Cleancatch 05/12/2020      Urine Microscopic:   Lab Results   Component Value Date    LABCAST 3-5 Hyaline 01/08/2020    BACTERIA 1+ 12/15/2010    HYALCAST 0 05/12/2020    WBCUA 3 05/12/2020    RBCUA 2 05/12/2020    EPIU 0 05/12/2020       MICRO: cultures reviewed and updated by me   Component Value Units Date/Time    Clostridium Difficile Toxin/Antigen [[id number]] Collected: 05/13/20 0745   Order Status: Completed Specimen: Stool Updated: 05/13/20 0926    C.diff Toxin/Antigen --    Negative for Clostridium difficile antigen and toxin   Normal Range: Negative    Narrative:     ORDER#: [id number]                          ORDERED BY: Zeinab Ocasio  SOURCE: Stool                              COLLECTED:  05/13/20 07:45  ANTIBIOTICS AT [facility]. :                      RECEIVED :  05/13/20 08:07  Collect White vial (sterile container)  Performed at:  [facility]  [address] [address]   Phone [phone]   Culture, Surgical [[id number]] Collected: 05/13/20 0920   Order Status: Sent Specimen: Specimen from Foot    Fecal Leukocytes [[id number]] (Abnormal) Collected: 05/13/20 0745   Order Status: Completed Specimen: Stool Updated: 05/13/20 0909    White Blood Cells (WBC), Stool --Abnormal     POSITIVE   Normal Range:Negative   Abnormal    Narrative:     ORDER#: [id number]                          ORDERED BY: Zeinab Ocasio  SOURCE: Stool                              COLLECTED:  05/13/20 07:45  ANTIBIOTICS AT [facility]. :                      RECEIVED :  05/13/20 08:06  Performed at:  [facility]  [address] [facility] [place] [address]   Phone [phone]   GI Bacterial Pathogens By PCR [[id number]] Collected: 05/13/20 0745   Order Status: Sent Specimen: Stool Updated: 05/13/20 0807   Culture, Blood 2 [[id number]] Collected: 05/11/20 2310   Order Status: Completed Specimen: Blood Updated: 05/13/20 0115    Culture, Blood 2 No Growth to date.  Any change in status will be called.    Narrative:     ORDER#: [id number]                          ORDERED BY: PRAMOD COLE  SOURCE: Blood                              COLLECTED:  05/11/20 23:10  ANTIBIOTICS AT [facility]. :                      RECEIVED :  05/11/20 23:32  If child <=2 yrs old please draw pediatric bottle. ~Blood Culture #2  Performed at:  [facility]  [address] [facility] [place] [place] [address]   Phone [phone]   Culture, Blood 1 [[id number]] Collected: 05/11/20 2310   Order Status: Completed Specimen: Blood Updated: 05/13/20 0115    Blood Culture, Routine No Growth to date.  Any change in status will be called. Narrative:     ORDER#: [id number]                          ORDERED BY: PRAMOD Crews  SOURCE: Blood                              COLLECTED:  05/11/20 23:10  ANTIBIOTICS AT [facility]. :                      RECEIVED :  05/11/20 23:31  If child <=2 yrs old please draw pediatric bottle. ~Blood Culture #1  Performed at:  [facility]  [address] [facility], [place] [address]   Phone [phone]   Culture, Wound [[id number]] Collected: 05/12/20 0710   Order Status: Sent Specimen: Toe Up            Culture, Wound [[id number]] (Abnormal) Collected: 05/12/20 0710   Order Status: Completed Specimen: Toe Updated: 05/13/20 1721    Gram Stain Result 3+ WBC's (Polymorphonuclear)   4+ Gram positive cocci  in clusters-resembling Staph   4+ Small Gram negative rods   3+ Small Gram positive rods   3+ Gram positive cocci  in pairs- resembling Strep   No Epithelial Cells seen   Abnormal     Organism Enterobacter cloacae complexAbnormal     WOUND/ABSCESS --    Moderate growth   Sensitivity to follow          IMAGING:    XR TOE RIGHT (MIN 2 VIEWS)   Final Result   Possible osteomyelitis distal phalanx great toe. Triple phase bone scan   and/or MRI with and without contrast would be more sensitive.          CT ABDOMEN PELVIS W IV CONTRAST Additional Contrast? Oral    (Results Pending)   XR FOOT RIGHT (MIN 3 VIEWS)    (Results Pending)         All the pertinent images and reports for the current Hospitalization were reviewed by me     Scheduled Meds:   enoxaparin  40 mg Subcutaneous Daily    QUEtiapine  100 mg Oral Nightly    pantoprazole  40 mg Oral Daily    losartan  50 mg Oral Daily    insulin glargine  10 Units Subcutaneous Nightly    gabapentin  600 mg Oral TID    divalproex  500 mg Oral BID    atorvastatin  40 mg Oral Daily    sodium chloride flush  10 mL Intravenous 2 times per day    insulin lispro  0-12 Units Subcutaneous TID WC    insulin lispro  0-6 Units Subcutaneous Nightly    losartan  50 mg Oral Once    lidocaine 1 % injection  5 mL Intradermal Once    sodium chloride flush  10 mL Intravenous 2 times per day    piperacillin-tazobactam  3.375 g Intravenous Q8H       Continuous Infusions:   dextrose      sodium chloride 75 mL/hr at 05/13/20 0216       PRN Meds:  iohexol, dicyclomine, sodium chloride flush, acetaminophen **OR** acetaminophen, polyethylene glycol, ondansetron, glucose, dextrose, glucagon (rDNA), dextrose, oxyCODONE-acetaminophen **OR** oxyCODONE-acetaminophen, hydrALAZINE, sodium chloride flush, promethazine, promethazine      Assessment:     Patient Active Problem List   Diagnosis    DKA, type 1, not at goal [facility])    Diabetic ketoacidosis without coma associated with diabetes mellitus due to underlying condition ([facility]    Chronic abdominal pain    Gastroparesis    GERD (gastroesophageal reflux disease)    Seizure ([facility] [address] [facility]    Toe deformity    Uncontrolled type 1 diabetes mellitus with diabetic peripheral neuropathy ([facility] [address] [facility]    Type 1 diabetes mellitus with hypoglycemia and without coma ([address]    Type 1 diabetes mellitus with background diabetic retinopathy ([address]    Hypoglycemia unawareness in type 1 diabetes mellitus ([address]    Type 1 diabetes mellitus with hypercholesterolemia ([address]    Essential hypertension    Accelerated hypertension    Acute blood loss anemia    Acute dyspnea    Acute respiratory failure

## 2020-05-13 NOTE — PROGRESS NOTES
Right foot xrays obtained per MD order. Dr. Gonzalez Serum aware of elevated BPs and FSBS. No new orders.

## 2020-05-13 NOTE — ANESTHESIA PRE PROCEDURE
Yudelka Howard MD        acetaminophen (TYLENOL) tablet 650 mg  650 mg Oral Q4H PRN Yudelka Howard MD        Or    acetaminophen (TYLENOL) suppository 650 mg  650 mg Rectal Q4H PRN Yudelka Howard MD        polyethylene glycol [facility]) packet 17 g  17 g Oral Daily PRN Yudelka Howard MD        [facility]ron [facility] injection 4 mg  4 mg Intravenous Q4H PRN Yudelka Howard MD   4 mg at 05/12/20 1637    insulin lispro (HUMALOG) injection vial 0-12 Units  0-12 Units Subcutaneous TID WC Yudelka Howard MD   10 Units at 05/12/20 1639    insulin lispro (HUMALOG) injection vial 0-6 Units  0-6 Units Subcutaneous Nightly Yudelka Howard MD   4 Units at 05/12/20 2211    glucose (GLUTOSE) 40 % oral gel 15 g  15 g Oral PRN Yudelka Howard MD        dextrose 50 % IV solution  12.5 g Intravenous PRN Yudelka Howard MD        glucagon (rDNA) injection 1 mg  1 mg Intramuscular PRN Yudelka Howard MD        dextrose 5 % solution  100 mL/hr Intravenous PRN Yudelka Howard MD        0.9 % sodium chloride infusion   Intravenous Continuous Yudelka Howard MD 75 mL/hr at 05/13/20 0216      [Held by provider] enoxaparin (LOVENOX) injection 40 mg  40 mg Subcutaneous Daily Yudelka Howard MD   Stopped at 05/13/20 0900    oxyCODONE-acetaminophen (PERCOCET) 5-325 MG per tablet 1 tablet  1 tablet Oral Q4H PRAURE Howard MD   1 tablet at 05/12/20 1489    Or    oxyCODONE-acetaminophen (PERCOCET) 5-325 MG per tablet 2 tablet  2 tablet Oral Q4H PRN Yudelka Howard MD   2 tablet at 05/12/20 2246    losartan (COZAAR) tablet 50 mg  50 mg Oral Once Yudelka Howard MD        hydrALAZINE (APRESOLINE) injection 10 mg  10 mg Intravenous Q4H PRN Yudelka Howard MD   10 mg at 05/12/20 1515    lidocaine PF 1 % injection 5 mL  5 mL Intradermal Once Yudelka Howard MD        sodium chloride flush 0.9 % injection 10 mL  10 mL Intravenous 2 times per day Yudelka Howard MD        sodium chloride flush 0.9 % injection respiratory failure ([facility]) J96.00    Candida esophagitis ([facility]) B37.81    Cholelithiasis K80.20    Cocaine abuse, episodic ([facility]) F14.10    Cerebral infarction ([facility]) I63.9    Diabetic peripheral neuropathy ([facility]) E11.42    Diabetic polyneuropathy ([facility]) E11.42    Duodenal ulcer K26.9    Elevated blood pressure R03.0    Glucosuria R81    Heart failure, diastolic, acute ([facility]) L69.50    Hematemesis with nausea K92.0    Hyperglycemia R73.9    Hypoglycemia E16.2    Hypophosphatemia E83.39    Hypopotassemia E87.6    Hypotension I95.9    Hypoxia R09.02    Intractable nausea and vomiting R11.2    Lactic acidosis E87.2    Leg weakness, bilateral R29.898    Leucocytosis D72.829    Leukocytosis D72.829    Cannabis abuse G29.58    Metabolic acidosis, increased anion gap (IAG) E87.2    Nausea R11.0    Nausea and vomiting R11.2    Numbness in both legs R20.0    Polysubstance abuse ([facility]) F19.10    Pulmonary edema J81.1    RUQ abdominal pain R10.11    Type 1 diabetes mellitus with ketoacidosis without coma ([facility]) E10.10    Diabetic foot ulcer ([facility]) E11.621, L97.509    Heart murmur R01.1    Cellulitis of foot L03.119    Hyperlipidemia E78.5    Osteomyelitis of great toe of right foot ([facility]) M86.9    Epigastric abdominal pain R10.13    Hypertensive urgency I16.0    Hypokalemia E87.6       Past Medical History:        Diagnosis Date    Diabetes mellitus ([facility]     Gastroparesis     Hypertension        Past Surgical History:        Procedure Laterality Date    BONY PELVIS SURGERY      HIP SURGERY Left 2006    pins and rods- plate    UPPER GASTROINTESTINAL ENDOSCOPY N/A 12/2/2019    EGD BIOPSY performed by Addie Maurer MD at [facility] History:    Social History     Tobacco Use    Smoking status: Current Every Day Smoker     Start date: 3/26/2009    Smokeless tobacco: Never Used    Tobacco comment: black and mild 2  x daily   Substance Use Topics    Alcohol use:  Yes

## 2020-05-13 NOTE — BRIEF OP NOTE
Brief Postoperative Note      Patient: Raghu Vazquez  YOB: 1975  MRN: [id number]    Date of Procedure: 5/13/2020    Pre-Op Diagnosis:   1. Osteomyelitis R hallux   2. Cellulitis right foot. 3. Diabetic foot infection right foot    Post-Op Diagnosis: Same       Procedure(s):  1. EMERGENCY; RIGHT foot INCISION AND DEBRIDEMENT;  2. HALUX AMPUTATION at level of IPJ R foot. Surgeon(s):  Beth Jessica DPM    Assistant:  Oksana Etienne DPM PGY-3   Surgical Assistant: Michael Amador    Anesthesia: General with local     Estimated Blood Loss (mL): Minimal    Injections: 10cc lidocaine plain 2%, 30cc marcaine plain 4.5%     Complications: None    Specimens:   ID Type Source Tests Collected by Time Destination   1 : right foot culture swab Specimen Foot CULTURE, SURGICAL Beth Jessica DPM 5/13/2020 0920    A : right foot hallux Specimen Foot SURGICAL PATHOLOGY Beth Jessica DPM 5/13/2020 0920        Implants:  * No implants in log *      Drains: * No LDAs found *    Findings: No purulence encountered. DISPO: S/P Partial hallux amputation R foot, closed. No purulence encountered, chronic Osteomyelitis of Distal hallux removed, procedure felt to be definitive. Pt ok for D/c from a podiatry perspective. Pt to f/u with Dr. Lizzy Narayanan outpatient with in one week, post op dressing to be kept clean, dry, and in tact. Pt ok for heel weight bearing in post op shoe.      Electronically signed by Milena Owens DPM on 5/13/2020 at 9:58 AM

## 2020-05-13 NOTE — PROGRESS NOTES
with ketoacidosis without coma ([address]    Diabetic foot ulcer ([address]    Heart murmur    Cellulitis of foot    Hyperlipidemia    Osteomyelitis of great toe of right foot ([address]    Epigastric abdominal pain    Hypertensive urgency    Hypokalemia         Presenting symptoms:  Pt c/o a 2 month h/o worsening wound of right Great Toe and 3 day h/o N/V and epigastric abdominal pain    Diagnostic workup:  CT ABDOMEN PELVIS W IV CONTRAST  XR FOOT RIGHT (MIN 3 VIEWS)           CONSULTS DURING ADMISSION :   IP CONSULT TO PHARMACY  IP CONSULT TO HOSPITALIST  IP CONSULT TO PODIATRY  IP CONSULT TO PODIATRY  IP CONSULT TO INFECTIOUS DISEASES  IP CONSULT TO GI      Patient was diagnosed with:        Treatment while inpatient:  Pt presented to the emergency room for evaluation following a two month history of increasing pain, swelling and erythema of his right great toe.                                                                                       ----------------------------------------------------------      SUBJECTIVE COMPLAINTS-  Follow up for Emesis    Diet: DIET CARB CONTROL;      OBJECTIVE:   Patient Active Problem List   Diagnosis    DKA, type 1, not at goal [facility])    Diabetic ketoacidosis without coma associated with diabetes mellitus due to underlying condition ([address]    Chronic abdominal pain    Gastroparesis    GERD (gastroesophageal reflux disease)    Seizure ([address]    Toe deformity    Uncontrolled type 1 diabetes mellitus with diabetic peripheral neuropathy (H[facility])    Type 1 diabetes mellitus with hypoglycemia and without coma ([address]    Type 1 diabetes mellitus with background diabetic retinopathy ([address]    Hypoglycemia unawareness in type 1 diabetes mellitus ([address]    Type 1 diabetes mellitus with hypercholesterolemia ([address]    Essential hypertension    Accelerated hypertension    Acute blood loss anemia    Acute dyspnea    Acute respiratory failure (HCC)    Candida esophagitis (H[facility])    dicyclomine, sodium chloride flush, acetaminophen **OR** acetaminophen, polyethylene glycol, ondansetron, glucose, dextrose, glucagon (rDNA), dextrose, oxyCODONE-acetaminophen **OR** oxyCODONE-acetaminophen, hydrALAZINE, sodium chloride flush, promethazine, promethazine    Vitals   Vitals /wt   Patient Vitals for the past 8 hrs:   BP Temp Temp src Pulse Resp SpO2   05/13/20 1037 -- -- -- 91 13 98 %   05/13/20 1032 (!) 196/111 -- -- 93 14 98 %   05/13/20 1025 -- -- -- 98 20 99 %   05/13/20 1017 (!) 172/104 -- -- 91 29 100 %   05/13/20 1012 (!) 148/94 -- -- 90 29 100 %   05/13/20 1007 126/83 -- -- 86 13 100 %   05/13/20 1002 135/86 -- -- 86 14 100 %   05/13/20 0955 133/86 97.7 °F (36.5 °C) Temporal 91 19 100 %   05/13/20 0759 (!) 210/100 98.8 °F (37.1 °C) Temporal 108 14 100 %   05/13/20 0738 (!) 210/108 98.5 °F (36.9 °C) Oral 106 16 100 %   05/13/20 0405 134/68 98 °F (36.7 °C) Oral 108 16 98 %        72HR INTAKE/OUTPUT:      Intake/Output Summary (Last 24 hours) at 5/13/2020 1139  Last data filed at 5/13/2020 0946  Gross per 24 hour   Intake 2311 ml   Output --   Net 2311 ml       Exam:    Gen:   Alert and oriented ×3   Eyes: PERRL. No sclera icterus. No conjunctival injection. ENT: No discharge. Pharynx clear. External appearance of ears and nose normal.  Neck: Trachea midline. No obvious mass. Resp: No accessory muscle use. No crackles. No wheezes. No rhonchi. CV: Regular rate. Regular rhythm. No murmur or rub. No edema. GI: Non-tender. Non-distended. No hernia. Skin: Warm, dry, normal texture and turgor. Lymph: No cervical LAD. No supraclavicular LAD. M/S: / Ext. Right foot wound is dressed   Neuro: CN 2-12 are intact,  no neurologic deficits noted. PT/INR: No results for input(s): PROTIME, INR in the last 72 hours. APTT: No results for input(s): APTT in the last 72 hours.     CBC:   Recent Labs     05/11/20  2310 05/12/20  0759 05/13/20  0430   WBC 12.2* 13.8* 18.1*   HGB 12.6* 13.8 13.0*   HCT 39.8* 42.6 40.2*   MCV 81.3 81.9 80.5    384 327       BMP:   Recent Labs     05/11/20  2310 05/12/20  0759 05/13/20  0430    133* 137   K 3.3* 4.1 3.8   CL 98* 94* 99   CO2 23 14* 17*   PHOS  --  5.2*  --    BUN 10 15 27*   CREATININE 0.8* 0.9 1.2       LIVER PROFILE:   Recent Labs     05/11/20 2310   ALKPHOS 105   AST 14*   ALT 13   BILITOT <0.2     No results for input(s): AMYLASE in the last 72 hours. No results for input(s): LIPASE in the last 72 hours. UA:  Recent Labs     05/12/20  0029   WBCUA 3   RBCUA 2       TROPONIN:   Recent Labs     05/11/20 2310   TROPONINI <0.01       Lab Results   Component Value Date/Time    URRFLXCULT Not Indicated 05/12/2020 12:29 AM       No results for input(s): TSHREFLEX in the last 72 hours. No components found for: [id number]  POC GLUCOSE:    Recent Labs     05/12/20  2031 05/13/20  0042 05/13/20  0400 05/13/20  0728 05/13/20  0959   POCGLU 340* 261* 272* 351* 353*     No results for input(s): LABA1C in the last 72 hours. Lab Results   Component Value Date    LABA1C 8.6 03/24/2020         ASSESSMENT AND PLAN  osteomyelitis of the great toe of the right foot  Partial amputation of rt hallux  Anbx zosyn  ID is following    epigastric pain nausea vomiting   patient has history of gastroparesis, likely has same  But at this point he also has diarrhea possible gastroenteritis  GI following     hypertension tensive urgency  Better control will adjust further    Uncontrolled DM with hyperglycemia  insulin        Code Status: Full Code        Dispo - CC        The patient and / or the family were informed of the results of any tests, a time was given to answer questions, a plan was proposed and they agreed with plan. Keara Mejia MD    This note was transcribed using [employer] Please disregard any translational errors.

## 2020-05-14 LAB
ANION GAP SERPL CALCULATED.3IONS-SCNC: 11 MMOL/L (ref 3–16)
BASOPHILS ABSOLUTE: 0.1 K/UL (ref 0–0.2)
BASOPHILS RELATIVE PERCENT: 0.4 %
BUN BLDV-MCNC: 19 MG/DL (ref 7–20)
CALCIUM SERPL-MCNC: 8.9 MG/DL (ref 8.3–10.6)
CHLORIDE BLD-SCNC: 104 MMOL/L (ref 99–110)
CO2: 22 MMOL/L (ref 21–32)
CREAT SERPL-MCNC: 1.1 MG/DL (ref 0.9–1.3)
EOSINOPHILS ABSOLUTE: 0 K/UL (ref 0–0.6)
EOSINOPHILS RELATIVE PERCENT: 0.2 %
GFR AFRICAN AMERICAN: >60
GFR NON-AFRICAN AMERICAN: >60
GLUCOSE BLD-MCNC: 236 MG/DL (ref 70–99)
GLUCOSE BLD-MCNC: 250 MG/DL (ref 70–99)
GLUCOSE BLD-MCNC: 300 MG/DL (ref 70–99)
GLUCOSE BLD-MCNC: 355 MG/DL (ref 70–99)
GLUCOSE BLD-MCNC: 76 MG/DL (ref 70–99)
HCT VFR BLD CALC: 34.3 % (ref 40.5–52.5)
HEMOGLOBIN: 11.2 G/DL (ref 13.5–17.5)
LYMPHOCYTES ABSOLUTE: 3.1 K/UL (ref 1–5.1)
LYMPHOCYTES RELATIVE PERCENT: 19.1 %
MCH RBC QN AUTO: 26.2 PG (ref 26–34)
MCHC RBC AUTO-ENTMCNC: 32.5 G/DL (ref 31–36)
MCV RBC AUTO: 80.6 FL (ref 80–100)
MONOCYTES ABSOLUTE: 0.7 K/UL (ref 0–1.3)
MONOCYTES RELATIVE PERCENT: 4.5 %
NEUTROPHILS ABSOLUTE: 12.2 K/UL (ref 1.7–7.7)
NEUTROPHILS RELATIVE PERCENT: 75.8 %
PDW BLD-RTO: 17.3 % (ref 12.4–15.4)
PERFORMED ON: ABNORMAL
PERFORMED ON: NORMAL
PLATELET # BLD: 250 K/UL (ref 135–450)
PMV BLD AUTO: 7.6 FL (ref 5–10.5)
POTASSIUM REFLEX MAGNESIUM: 3.8 MMOL/L (ref 3.5–5.1)
RBC # BLD: 4.26 M/UL (ref 4.2–5.9)
SODIUM BLD-SCNC: 137 MMOL/L (ref 136–145)
WBC # BLD: 16.2 K/UL (ref 4–11)

## 2020-05-14 PROCEDURE — 99233 SBSQ HOSP IP/OBS HIGH 50: CPT | Performed by: INTERNAL MEDICINE

## 2020-05-14 PROCEDURE — 80048 BASIC METABOLIC PNL TOTAL CA: CPT

## 2020-05-14 PROCEDURE — 6370000000 HC RX 637 (ALT 250 FOR IP): Performed by: STUDENT IN AN ORGANIZED HEALTH CARE EDUCATION/TRAINING PROGRAM

## 2020-05-14 PROCEDURE — 2580000003 HC RX 258: Performed by: STUDENT IN AN ORGANIZED HEALTH CARE EDUCATION/TRAINING PROGRAM

## 2020-05-14 PROCEDURE — 6370000000 HC RX 637 (ALT 250 FOR IP): Performed by: HOSPITALIST

## 2020-05-14 PROCEDURE — 6360000002 HC RX W HCPCS: Performed by: STUDENT IN AN ORGANIZED HEALTH CARE EDUCATION/TRAINING PROGRAM

## 2020-05-14 PROCEDURE — 1200000000 HC SEMI PRIVATE

## 2020-05-14 PROCEDURE — 85025 COMPLETE CBC W/AUTO DIFF WBC: CPT

## 2020-05-14 RX ORDER — FLASH GLUCOSE SCANNING READER
EACH MISCELLANEOUS
Qty: 1 DEVICE | Refills: 0 | Status: CANCELLED | OUTPATIENT
Start: 2020-05-14

## 2020-05-14 RX ADMIN — LOSARTAN POTASSIUM 50 MG: 50 TABLET, FILM COATED ORAL at 08:45

## 2020-05-14 RX ADMIN — INSULIN LISPRO 6 UNITS: 100 INJECTION, SOLUTION INTRAVENOUS; SUBCUTANEOUS at 17:54

## 2020-05-14 RX ADMIN — PANTOPRAZOLE SODIUM 40 MG: 40 TABLET, DELAYED RELEASE ORAL at 05:34

## 2020-05-14 RX ADMIN — INSULIN LISPRO 3 UNITS: 100 INJECTION, SOLUTION INTRAVENOUS; SUBCUTANEOUS at 17:54

## 2020-05-14 RX ADMIN — INSULIN LISPRO 3 UNITS: 100 INJECTION, SOLUTION INTRAVENOUS; SUBCUTANEOUS at 12:24

## 2020-05-14 RX ADMIN — OXYCODONE HYDROCHLORIDE AND ACETAMINOPHEN 2 TABLET: 5; 325 TABLET ORAL at 02:00

## 2020-05-14 RX ADMIN — DIVALPROEX SODIUM 500 MG: 500 TABLET, DELAYED RELEASE ORAL at 08:45

## 2020-05-14 RX ADMIN — INSULIN LISPRO 4 UNITS: 100 INJECTION, SOLUTION INTRAVENOUS; SUBCUTANEOUS at 09:27

## 2020-05-14 RX ADMIN — OXYCODONE HYDROCHLORIDE AND ACETAMINOPHEN 2 TABLET: 5; 325 TABLET ORAL at 11:00

## 2020-05-14 RX ADMIN — ENOXAPARIN SODIUM 40 MG: 40 INJECTION SUBCUTANEOUS at 08:45

## 2020-05-14 RX ADMIN — GABAPENTIN 600 MG: 300 CAPSULE ORAL at 20:14

## 2020-05-14 RX ADMIN — Medication 10 ML: at 08:46

## 2020-05-14 RX ADMIN — OXYCODONE HYDROCHLORIDE AND ACETAMINOPHEN 2 TABLET: 5; 325 TABLET ORAL at 06:22

## 2020-05-14 RX ADMIN — INSULIN LISPRO 3 UNITS: 100 INJECTION, SOLUTION INTRAVENOUS; SUBCUTANEOUS at 09:27

## 2020-05-14 RX ADMIN — INSULIN LISPRO 4 UNITS: 100 INJECTION, SOLUTION INTRAVENOUS; SUBCUTANEOUS at 22:51

## 2020-05-14 RX ADMIN — QUETIAPINE FUMARATE 100 MG: 100 TABLET ORAL at 22:52

## 2020-05-14 RX ADMIN — DIVALPROEX SODIUM 500 MG: 500 TABLET, DELAYED RELEASE ORAL at 20:14

## 2020-05-14 RX ADMIN — OXYCODONE HYDROCHLORIDE AND ACETAMINOPHEN 2 TABLET: 5; 325 TABLET ORAL at 20:14

## 2020-05-14 RX ADMIN — Medication 10 ML: at 20:15

## 2020-05-14 RX ADMIN — GABAPENTIN 600 MG: 300 CAPSULE ORAL at 08:45

## 2020-05-14 RX ADMIN — Medication 10 ML: at 22:52

## 2020-05-14 RX ADMIN — OXYCODONE HYDROCHLORIDE AND ACETAMINOPHEN 2 TABLET: 5; 325 TABLET ORAL at 15:31

## 2020-05-14 RX ADMIN — PROMETHAZINE HYDROCHLORIDE 25 MG: 25 TABLET ORAL at 20:20

## 2020-05-14 RX ADMIN — INSULIN GLARGINE 15 UNITS: 100 INJECTION, SOLUTION SUBCUTANEOUS at 22:52

## 2020-05-14 RX ADMIN — Medication 10 ML: at 08:45

## 2020-05-14 RX ADMIN — ATORVASTATIN CALCIUM 40 MG: 40 TABLET, FILM COATED ORAL at 08:45

## 2020-05-14 RX ADMIN — PIPERACILLIN AND TAZOBACTAM 3.38 G: 3; .375 INJECTION, POWDER, LYOPHILIZED, FOR SOLUTION INTRAVENOUS at 20:09

## 2020-05-14 RX ADMIN — GABAPENTIN 600 MG: 300 CAPSULE ORAL at 14:11

## 2020-05-14 RX ADMIN — PIPERACILLIN AND TAZOBACTAM 3.38 G: 3; .375 INJECTION, POWDER, LYOPHILIZED, FOR SOLUTION INTRAVENOUS at 12:23

## 2020-05-14 RX ADMIN — PIPERACILLIN AND TAZOBACTAM 3.38 G: 3; .375 INJECTION, POWDER, LYOPHILIZED, FOR SOLUTION INTRAVENOUS at 04:18

## 2020-05-14 ASSESSMENT — PAIN - FUNCTIONAL ASSESSMENT
PAIN_FUNCTIONAL_ASSESSMENT: PREVENTS OR INTERFERES SOME ACTIVE ACTIVITIES AND ADLS

## 2020-05-14 ASSESSMENT — PAIN DESCRIPTION - FREQUENCY
FREQUENCY: CONTINUOUS

## 2020-05-14 ASSESSMENT — PAIN DESCRIPTION - ONSET
ONSET: ON-GOING

## 2020-05-14 ASSESSMENT — PAIN DESCRIPTION - PROGRESSION
CLINICAL_PROGRESSION: NOT CHANGED

## 2020-05-14 ASSESSMENT — PAIN DESCRIPTION - DESCRIPTORS
DESCRIPTORS: ACHING
DESCRIPTORS: ACHING
DESCRIPTORS: CONSTANT;SHARP;POUNDING
DESCRIPTORS: ACHING

## 2020-05-14 ASSESSMENT — PAIN DESCRIPTION - ORIENTATION
ORIENTATION: RIGHT

## 2020-05-14 ASSESSMENT — PAIN SCALES - GENERAL
PAINLEVEL_OUTOF10: 10
PAINLEVEL_OUTOF10: 9
PAINLEVEL_OUTOF10: 9
PAINLEVEL_OUTOF10: 6
PAINLEVEL_OUTOF10: 0
PAINLEVEL_OUTOF10: 10
PAINLEVEL_OUTOF10: 0
PAINLEVEL_OUTOF10: 10
PAINLEVEL_OUTOF10: 10

## 2020-05-14 ASSESSMENT — PAIN DESCRIPTION - LOCATION
LOCATION: FOOT

## 2020-05-14 ASSESSMENT — PAIN DESCRIPTION - PAIN TYPE
TYPE: ACUTE PAIN
TYPE: ACUTE PAIN;SURGICAL PAIN

## 2020-05-14 NOTE — PROGRESS NOTES
Infectious Disease Follow up Notes  Admit Date: 5/11/2020  Hospital Day: 4    Antibiotics :   IV Zosyn     CHIEF COMPLAINT:     DKA  Rt foot infection  Smoking+  Diabetic foot infection   Osteomyelitis     Subjective interval History :  50 y.o. man with DM, poor control and previous toe infection leading to amputation of 4 and 5 digits on Rt foot now admitted with nausea, vomiting and not feeling well and BG elevated with  Beta Hydroxy acid elevation and Rt great toe on going infection with plantar ulcer, callus, drainage. UDS+ for Cannabis. H/o gastroparesis and WBC elevation on admit and we are consulted for IV abx recommendations for Diabetic foot infection.     OR cx in process and previous cx GNR speciation pending and GI symptoms better and no chills and PICC working well  Bone path pending        Past Medical History:    Past Medical History:   Diagnosis Date    Diabetes mellitus ([address]     Gastroparesis     Hypertension        Past Surgical History:    Past Surgical History:   Procedure Laterality Date    BONY PELVIS SURGERY      FOOT AMPUTATION Right 5/13/2020    EMERGENCY; RIGHT INCISION AND DEBRIDEMENT; HALUX AMPUTATION performed by Zain Ghosh DPM at [address] 2006    pins and rods- plate    UPPER GASTROINTESTINAL ENDOSCOPY N/A 12/2/2019    EGD BIOPSY performed by Claude Alcide, MD at [facility]       Current Medications:    Outpatient Medications Marked as Taking for the 5/11/20 encounter [facility] HOSPITAL Encounter)   Medication Sig Dispense Refill    pantoprazole (PROTONIX) 40 MG tablet Take 1 tablet by mouth daily 30 tablet 1    insulin lispro (ADMELOG) 100 UNIT/ML injection vial 5 units for meals and 2 units for snacks 1 vial 2       Allergies:  Ketorolac; Morphine; Morphine and related; Tramadol; Lisinopril; Haloperidol lactate;  Toradol [ketorolac tromethamine]; and Vicodin Bacterial Pathogens By PCR [[id number]] Collected: 05/13/20 0745   Order Status: Sent Specimen: Stool Updated: 05/13/20 0807   Culture, Blood 2 [[id number]] Collected: 05/11/20 2310   Order Status: Completed Specimen: Blood Updated: 05/13/20 0115    Culture, Blood 2 No Growth to date.  Any change in status will be called. Narrative:     ORDER#: [id number]                          ORDERED BY: PRAMOD Hammer  SOURCE: Blood                              COLLECTED:  05/11/20 23:10  ANTIBIOTICS AT [facility]. :                      RECEIVED :  05/11/20 23:32  If child <=2 yrs old please draw pediatric bottle. ~Blood Culture #2  Performed at:  [facility]  [address] [place], [address]   Phone [phone]   Culture, Blood 1 [[id number]] Collected: 05/11/20 2310   Order Status: Completed Specimen: Blood Updated: 05/13/20 0115    Blood Culture, Routine No Growth to date.  Any change in status will be called. Narrative:     ORDER#: [id number]                          ORDERED BY: PRAMOD Hammer  SOURCE: Blood                              COLLECTED:  05/11/20 23:10  ANTIBIOTICS AT [facility]. :                      RECEIVED :  05/11/20 23:31  If child <=2 yrs old please draw pediatric bottle. ~Blood Culture #1  Performed at:  [facility]  [address]   Phone [phone]   Culture, Wound [[id number]] Collected: 05/12/20 0710   Order Status: Sent Specimen: Toe Up            Culture, Wound [[id number]] (Abnormal) Collected: 05/12/20 0710   Order Status: Completed Specimen: Toe Updated: 05/13/20 1721    Gram Stain Result 3+ WBC's (Polymorphonuclear)   4+ Gram positive cocci  in clusters-resembling Staph   4+ Small Gram negative rods   3+ Small Gram positive rods   3+ Gram positive cocci  in pairs- resembling Strep   No Epithelial Cells seen   Abnormal     Organism Enterobacter cloacae complexAbnormal     WOUND/ABSCESS --    Moderate growth hypertension    Accelerated hypertension    Acute blood loss anemia    Acute dyspnea    Acute respiratory failure (HCC)    Candida esophagitis (HCC)    Cholelithiasis    Cocaine abuse, episodic (HCC)    Cerebral infarction (HCC)    Diabetic peripheral neuropathy (HCC)    Diabetic polyneuropathy (HCC)    Duodenal ulcer    Elevated blood pressure    Glucosuria    Heart failure, diastolic, acute (HCC)    Hematemesis with nausea    Hyperglycemia    Hypoglycemia    Hypophosphatemia    Hypopotassemia    Hypotension    Hypoxia    Intractable nausea and vomiting    Lactic acidosis    Leg weakness, bilateral    Leucocytosis    Leukocytosis    Cannabis abuse    Metabolic acidosis, increased anion gap (IAG)    Nausea    Nausea and vomiting    Numbness in both legs    Polysubstance abuse (HCC)    Pulmonary edema    RUQ abdominal pain    Type 1 diabetes mellitus with ketoacidosis without coma (HCC)    Diabetic foot ulcer (HCC)    Heart murmur    Cellulitis of foot    Hyperlipidemia    Osteomyelitis of great toe of right foot (HCC)    Epigastric abdominal pain    Hypertensive urgency    Hypokalemia     Nausea, vomiting   DKA early  DM poor control   Gastroparesis  Rt great toe infection   Concern for Osteomyelitis  Cannabis use  Previous toe amputation status     Rt diabetic foot infection complicated by Osteomyelitis and poor DM control and X ray plain with changes on the bone already      Emesis from gastroparesis, vs DKA vs cannabis vs metabolic    S/P Rt great toe amputation and bone path pending and OR cx in process and previous wound cx GNR speciation pending      Labs, Microbiology, Radiology and all the pertinent results from current hospitalization and  care every where were reviewed  by me as a part of the evaluation   Plan:   1. Cont IV Zosyn x 3.375 gm q 8 HRs as in patient    2. OR cx in process   3. Wound cx Enterobacter , GNR SPECIATION pending and Enterococcus noted   4.

## 2020-05-14 NOTE — PROGRESS NOTES
Hospitalist Progress Note  5/14/2020 11:10 AM    PCP: No primary care provider on file.     [phone]     Date of Admission: 5/11/2020                                                                                                                     HOSPITAL COURSE    Patient demographics:  The patient  Felipe Youssef is a 50 y.o. male     Significant past medical history:   Patient Active Problem List   Diagnosis    DKA, type 1, not at goal [facility])    Diabetic ketoacidosis without coma associated with diabetes mellitus due to underlying condition ([address]    Chronic abdominal pain    Gastroparesis    GERD (gastroesophageal reflux disease)    Seizure ([address]    Toe deformity    Uncontrolled type 1 diabetes mellitus with diabetic peripheral neuropathy ([address]    Type 1 diabetes mellitus with hypoglycemia and without coma ([address]    Type 1 diabetes mellitus with background diabetic retinopathy ([address]    Hypoglycemia unawareness in type 1 diabetes mellitus (HCC)    Type 1 diabetes mellitus with hypercholesterolemia ([address]    Essential hypertension    Accelerated hypertension    Acute blood loss anemia    Acute dyspnea    Acute respiratory failure (HCC)    Candida esophagitis (HCC)    Cholelithiasis    Cocaine abuse, episodic (HCC)    Cerebral infarction ([address]    Diabetic peripheral neuropathy (HCC)    Diabetic polyneuropathy (HCC)    Duodenal ulcer    Elevated blood pressure    Glucosuria    Heart failure, diastolic, acute (HCC)    Hematemesis with nausea    Hyperglycemia    Hypoglycemia    Hypophosphatemia    Hypopotassemia    Hypotension    Hypoxia    Intractable nausea and vomiting    Lactic acidosis    Leg weakness, bilateral    Leucocytosis    Leukocytosis    Cannabis abuse    Metabolic acidosis, increased anion gap (IAG)    Nausea    Nausea and vomiting    Numbness in both legs    Polysubstance abuse ([address]    Pulmonary edema    RUQ abdominal pain    Type 1 diabetes mellitus LIVER PROFILE:   Recent Labs     05/11/20  2310   ALKPHOS 105   AST 14*   ALT 13   BILITOT <0.2     No results for input(s): AMYLASE in the last 72 hours. No results for input(s): LIPASE in the last 72 hours. UA:  Recent Labs     05/12/20  0029   WBCUA 3   RBCUA 2       TROPONIN:   Recent Labs     05/11/20  2310   TROPONINI <0.01       Lab Results   Component Value Date/Time    URRFLXCULT Not Indicated 05/12/2020 12:29 AM       No results for input(s): TSHREFLEX in the last 72 hours. No components found for: [id number]  POC GLUCOSE:    Recent Labs     05/13/20  0959 05/13/20  1133 05/13/20  1606 05/13/20  2126 05/14/20  0829   POCGLU 353* 333* 374* 426* 236*     No results for input(s): LABA1C in the last 72 hours. Lab Results   Component Value Date    LABA1C 8.6 03/24/2020         ASSESSMENT AND PLAN  Right Diabetic foot infection  Osteomyelitis is concerned  Partial amputation of rt hallux  Anbx zosyn  ID is following    epigastric pain nausea vomiting   patient has history of gastroparesis, likely has same  But at this point he also has diarrhea possible gastroenteritis  GI following     hypertension tensive urgency  Better control will adjust further    Uncontrolled DM with hyperglycemia  insulin        Code Status: Full Code        Dispo - CC        The patient and / or the family were informed of the results of any tests, a time was given to answer questions, a plan was proposed and they agreed with plan. Ilia Zhang MD    This note was transcribed using [employer] Please disregard any translational errors.

## 2020-05-14 NOTE — CARE COORDINATION
[facility]  If patient is home going with IV ATB, patient will need all doses for the day and will be stated next day after discharge. Following for home infusion.   Jayna Odonnell LPN  CTN with  [facility],  [address], Fax [phone]

## 2020-05-14 NOTE — CARE COORDINATION
INITIAL CASE MANAGEMENT ASSESSMENT    Reviewed chart, met with patient to assess possible discharge needs. Explained Case Management role/services. Called and spoke w/ pt in his room     Living Situation: confirmed address. He lives w/ his wife in a single story home w/ one step to enter    ADLs: independent     DME: states that he has crutches from last amputation. Has [employer] monitor    PT/OT Recs: discussed that he needs therapy evals  To identify what DME he needs at dc. He very quickly declined stating that he does not want therapy evals     Active Services: none     Transportation: he drives and states his SO will transport home     Medications: confirmed [employer] as health coverage, states that he has run out of all his meds as he had tried to have a  PCP prior to pandemic and now prefers [facility] PCP. Delivered provider list    PCP: has list      HD/PD: n/a    PLAN/COMMENTS: pt requesting \"air boot\" for right foot. He initially said that he needed a walker, w/c and crutches but when I explained that he would have therapy evals to determine need he very quickly decided that crutches were all that he needed  Waiting for ID recs  Asked Lionel Engle to confirm home IVAB benefits (he used [facility] in the past and did home IV Vanc)   Discussed Alee Salguero provider list  Made referral to [facility]  She will be uncertain if they can accept until pt has dc order   The Plan for Transition of Care is related to the following treatment goals: care at home    The Patient and/or patient representative  was provided with a choice of provider and agrees   with the discharge plan. [x] Yes [] No    Freedom of choice list was provided with basic dialogue that supports the patient's individualized plan of care/goals, treatment preferences and shares the quality data associated with the providers. [x] Yes [] No      SW/CM provided contact information for patient or family to call with any questions.   SW/CM will follow and assist as needed.   Electronically signed by Uziel Virgen RN on 5/14/2020 at 1:18 PM

## 2020-05-14 NOTE — PROGRESS NOTES
Gastroenterology Progress Note    Hernando Alvarez is a 50 y.o. male patient. Principal Problem:    Osteomyelitis of great toe of right foot (HCC)  Active Problems:    DKA, type 1, not at goal [facility])    Gastroparesis    Essential hypertension    Nausea and vomiting    Hyperlipidemia    Epigastric abdominal pain    Hypertensive urgency    Hypokalemia  Resolved Problems:    * No resolved hospital problems. *      SUBJECTIVE:  His nausea is much better than on arrival.  No further vomiting. Feeling hungry. No fevers. Diarrhea has improved.       Current Facility-Administered Medications: enoxaparin (LOVENOX) injection 40 mg, 40 mg, Subcutaneous, Daily  insulin glargine (LANTUS) injection vial 15 Units, 15 Units, Subcutaneous, Nightly  insulin lispro (HUMALOG) injection vial 3 Units, 3 Units, Subcutaneous, TID WC  QUEtiapine (SEROQUEL) tablet 100 mg, 100 mg, Oral, Nightly  pantoprazole (PROTONIX) tablet 40 mg, 40 mg, Oral, Daily  losartan (COZAAR) tablet 50 mg, 50 mg, Oral, Daily  gabapentin (NEURONTIN) capsule 600 mg, 600 mg, Oral, TID  divalproex (DEPAKOTE) DR tablet 500 mg, 500 mg, Oral, BID  dicyclomine (BENTYL) tablet 20 mg, 20 mg, Oral, Q6H PRN  atorvastatin (LIPITOR) tablet 40 mg, 40 mg, Oral, Daily  sodium chloride flush 0.9 % injection 10 mL, 10 mL, Intravenous, 2 times per day  sodium chloride flush 0.9 % injection 10 mL, 10 mL, Intravenous, PRN  acetaminophen (TYLENOL) tablet 650 mg, 650 mg, Oral, Q4H PRN **OR** acetaminophen (TYLENOL) suppository 650 mg, 650 mg, Rectal, Q4H PRN  polyethylene glycol (GLYCOLAX) packet 17 g, 17 g, Oral, Daily PRN  ondansetron (ZOFRAN) injection 4 mg, 4 mg, Intravenous, Q4H PRN  insulin lispro (HUMALOG) injection vial 0-12 Units, 0-12 Units, Subcutaneous, TID WC  insulin lispro (HUMALOG) injection vial 0-6 Units, 0-6 Units, Subcutaneous, Nightly  glucose (GLUTOSE) 40 % oral gel 15 g, 15 g, Oral, PRN  dextrose 50 % IV solution, 12.5 g, Intravenous, PRN  glucagon (rDNA) injection 1 mg, 1 mg, Intramuscular, PRN  dextrose 5 % solution, 100 mL/hr, Intravenous, PRN  0.9 % sodium chloride infusion, , Intravenous, Continuous  oxyCODONE-acetaminophen (PERCOCET) 5-325 MG per tablet 1 tablet, 1 tablet, Oral, Q4H PRN **OR** oxyCODONE-acetaminophen (PERCOCET) 5-325 MG per tablet 2 tablet, 2 tablet, Oral, Q4H PRN  losartan (COZAAR) tablet 50 mg, 50 mg, Oral, Once  hydrALAZINE (APRESOLINE) injection 10 mg, 10 mg, Intravenous, Q4H PRN  lidocaine PF 1 % injection 5 mL, 5 mL, Intradermal, Once  sodium chloride flush 0.9 % injection 10 mL, 10 mL, Intravenous, 2 times per day  sodium chloride flush 0.9 % injection 10 mL, 10 mL, Intravenous, PRN  promethazine (PHENERGAN) in sodium chloride 0.9% 50 mL IVPB 25 mg, 25 mg, Intravenous, Q4H PRN  promethazine (PHENERGAN) tablet 25 mg, 25 mg, Oral, Q4H PRN  piperacillin-tazobactam (ZOSYN) 3.375 g in dextrose 5 % 100 mL IVPB extended infusion (mini-bag), 3.375 g, Intravenous, Q8H    Physical    VITALS:  /70   Pulse 64   Temp 97.8 °F (36.6 °C) (Oral)   Resp 16   Ht 6' 2\" (1.88 m)   Wt 143 lb 1.3 oz (64.9 kg)   SpO2 98%   BMI 18.37 kg/m²   TEMPERATURE:  Current - Temp: 97.8 °F (36.6 °C);  Max - Temp  Av.3 °F (36.8 °C)  Min: 97.6 °F (36.4 °C)  Max: 99 °F (37.2 °C)    NAD  Eyes: No icterus  RRR  Lungs CTA Bilaterally, normal effort  Abdomen soft, ND, NT, Bowel sounds normal.  Ext: no edema  Neuro: No tremor  Psych: A&Ox3    Data    Data Review:    Recent Labs     20  0759 20  0430 20  0541   WBC 13.8* 18.1* 16.2*   HGB 13.8 13.0* 11.2*   HCT 42.6 40.2* 34.3*   MCV 81.9 80.5 80.6    327 250     Recent Labs     20  0759 20  0430 20  0541   * 137 137   K 4.1 3.8 3.8   CL 94* 99 104   CO2 14* 17* 22   PHOS 5.2*  --   --    BUN 15 27* 19   CREATININE 0.9 1.2 1.1     Recent Labs     20  2310   AST 14*   ALT 13   BILITOT <0.2   ALKPHOS 105     No results for input(s): LIPASE, AMYLASE in the last 72

## 2020-05-14 NOTE — DISCHARGE INSTR - COC
Added Last Indicated Last Indicated By Review Planned Expiration Resolved Resolved By    None active    Resolved    COVID-19 Rule Out 05/12/20 05/12/20 05/12/20 COVID-19 (Ordered)   05/12/20 Rule-Out Test Resulted    C-diff Rule Out 05/12/20 05/12/20 05/13/20 Clostridium Difficile Toxin/Antigen (Ordered)   05/13/20 Rule-Out Test Resulted    C-diff Rule Out  12/30/19 12/30/19 Clostridium Difficile Toxin/Antigen (Ordered)   12/31/19 Audra Ritter RN          Nurse Assessment:  Last Vital Signs: /73   Pulse 76   Temp 98 °F (36.7 °C) (Axillary)   Resp 16   Ht 6' 2\" (1.88 m)   Wt 143 lb 1.3 oz (64.9 kg)   SpO2 99%   BMI 18.37 kg/m²     Last documented pain score (0-10 scale): Pain Level: 0  Last Weight:   Wt Readings from Last 1 Encounters:   05/14/20 143 lb 1.3 oz (64.9 kg)     Mental Status:  oriented and alert    IV Access:  - PICC - site  R Basilic, insertion date: 05/14/2020    Nursing Mobility/ADLs:  Walking   Independent  Transfer  Independent  Bathing  Independent  Dressing  Independent  Toileting  Independent  Feeding  Independent  Med Admin  Independent  Med Delivery   none    Wound Care Documentation and Therapy:  Wound 03/25/20 Toe (Comment  which one) Right (Active)   Wound Diabetic 5/12/2020  8:33 PM   Offloading for Diabetic Foot Ulcers No offloading required 5/12/2020  8:33 PM   Dressing/Treatment Open to air 5/12/2020  8:33 PM   Wound Cleansed Not Cleansed 5/12/2020  8:33 PM   Wound Length (cm) 1.5 cm 5/12/2020  6:56 AM   Wound Width (cm) 1.5 cm 5/12/2020  6:56 AM   Wound Depth (cm) 0.2 cm 5/12/2020  6:56 AM   Wound Surface Area (cm^2) 2.25 cm^2 5/12/2020  6:56 AM   Change in Wound Size % (l*w) -50 5/12/2020  6:56 AM   Wound Volume (cm^3) 0.45 cm^3 5/12/2020  6:56 AM   Wound Assessment Black;Dry 5/12/2020  6:56 AM   Drainage Amount None 5/12/2020  6:56 AM   Odor None 5/12/2020  6:56 AM   Mariann-wound Assessment Dry;Edema 5/12/2020  6:56 AM   Non-staged Wound Description Full thickness 5/12/2020  6:56 AM   Culture Taken No 5/12/2020  6:56 AM   Number of days: 50        Elimination:  Continence:   · Bowel: Yes  · Bladder: Yes  Urinary Catheter: None   Colostomy/Ileostomy/Ileal Conduit: No       Date of Last BM: 05/15/2020  No intake or output data in the 24 hours ending 05/14/20 1400  I/O last 3 completed shifts: In: 900 [I.V.:900]  Out: -     Safety Concerns:     None    Impairments/Disabilities:      None    Nutrition Therapy:  Current Nutrition Therapy:   - Oral Diet:  General    Routes of Feeding: Oral  Liquids: No Restrictions  Daily Fluid Restriction: no  Last Modified Barium Swallow with Video (Video Swallowing Test): not done    Treatments at the Time of Hospital Discharge:   Respiratory Treatments:   Oxygen Therapy:  is not on home oxygen therapy. Ventilator:    - No ventilator support    Rehab Therapies: Nurse  Weight Bearing Status/Restrictions: No weight bearing restirctions  Other Medical Equipment (for information only, NOT a DME order): Other Treatments:     Patient's personal belongings (please select all that are sent with patient):  None    RN SIGNATURE:  Electronically signed by Winsome Tamayo RN on 5/15/20 at 2:10 PM EDT    CASE MANAGEMENT/SOCIAL WORK SECTION    Inpatient Status Date: 05/15/2020    Readmission Risk Assessment Score:  55    Discharging to Facility/ Agency   Name:  [facility]    Address: [address] [place] [address], [address] [place] [address]  Phone: [phone]  Fax: [phone] INFUSION PHARMACY:  · Name:     Nicole Pierre  · Address: [facility]. [place] [address]  · Phone:    [phone]  · Fax:        [phone]      / signature: Electronically signed by Cassandra Causey RN on 5/15/20 at 12:56 PM EDT    PHYSICIAN SECTION    Prognosis: Good    Condition at Discharge: Stable    Rehab Potential (if transferring to Rehab):  Fair    Recommended Labs or Other Treatments After Discharge: nurse    MARANDA Zosyn x 13.5 gm as continuous infusion x Q 24 HRS stop date  6/2  CBC with diff, BMP,ESR, CRP weeklY  Fax results to  [facility] Physician  Phone: [phone]    Fax : [phone]        physician Certification: I certify the above information and transfer of Rashmi Pfeiffer  is necessary for the continuing treatment of the diagnosis listed and that he requires [address] for less 30 days.      Update Admission H&P: No change in H&P    PHYSICIAN SIGNATURE:  Electronically signed by Ej Peres MD on 5/14/20 at 2:15 PM EDT

## 2020-05-14 NOTE — CARE COORDINATION
Referral received to check IV Benefits. Information faxed to [employer] office awaiting call back on costs.        Patient is covered at 100%    Electronically signed by Sloane Bustamante on 5/14/2020 at 1:07 PM   Cell Ph# [phone], Office # [phone]

## 2020-05-15 VITALS
HEART RATE: 92 BPM | WEIGHT: 143.3 LBS | TEMPERATURE: 98 F | DIASTOLIC BLOOD PRESSURE: 86 MMHG | OXYGEN SATURATION: 100 % | SYSTOLIC BLOOD PRESSURE: 132 MMHG | RESPIRATION RATE: 16 BRPM | BODY MASS INDEX: 18.39 KG/M2 | HEIGHT: 74 IN

## 2020-05-15 LAB
ANION GAP SERPL CALCULATED.3IONS-SCNC: 11 MMOL/L (ref 3–16)
BASOPHILS ABSOLUTE: 0.1 K/UL (ref 0–0.2)
BASOPHILS RELATIVE PERCENT: 0.7 %
BUN BLDV-MCNC: 11 MG/DL (ref 7–20)
CALCIUM SERPL-MCNC: 8.5 MG/DL (ref 8.3–10.6)
CHLORIDE BLD-SCNC: 100 MMOL/L (ref 99–110)
CO2: 27 MMOL/L (ref 21–32)
CREAT SERPL-MCNC: 0.9 MG/DL (ref 0.9–1.3)
EOSINOPHILS ABSOLUTE: 0.3 K/UL (ref 0–0.6)
EOSINOPHILS RELATIVE PERCENT: 3.3 %
GFR AFRICAN AMERICAN: >60
GFR NON-AFRICAN AMERICAN: >60
GLUCOSE BLD-MCNC: 151 MG/DL (ref 70–99)
GLUCOSE BLD-MCNC: 352 MG/DL (ref 70–99)
GLUCOSE BLD-MCNC: 443 MG/DL (ref 70–99)
GLUCOSE BLD-MCNC: 87 MG/DL (ref 70–99)
GRAM STAIN RESULT: ABNORMAL
HCT VFR BLD CALC: 36 % (ref 40.5–52.5)
HEMOGLOBIN: 12 G/DL (ref 13.5–17.5)
LYMPHOCYTES ABSOLUTE: 2.8 K/UL (ref 1–5.1)
LYMPHOCYTES RELATIVE PERCENT: 30.1 %
MCH RBC QN AUTO: 26.7 PG (ref 26–34)
MCHC RBC AUTO-ENTMCNC: 33.3 G/DL (ref 31–36)
MCV RBC AUTO: 80.4 FL (ref 80–100)
MONOCYTES ABSOLUTE: 0.5 K/UL (ref 0–1.3)
MONOCYTES RELATIVE PERCENT: 5.3 %
NEUTROPHILS ABSOLUTE: 5.7 K/UL (ref 1.7–7.7)
NEUTROPHILS RELATIVE PERCENT: 60.6 %
ORGANISM: ABNORMAL
PDW BLD-RTO: 16.9 % (ref 12.4–15.4)
PERFORMED ON: ABNORMAL
PERFORMED ON: ABNORMAL
PERFORMED ON: NORMAL
PLATELET # BLD: 254 K/UL (ref 135–450)
PMV BLD AUTO: 7.6 FL (ref 5–10.5)
POTASSIUM REFLEX MAGNESIUM: 3.7 MMOL/L (ref 3.5–5.1)
RBC # BLD: 4.48 M/UL (ref 4.2–5.9)
SODIUM BLD-SCNC: 138 MMOL/L (ref 136–145)
WBC # BLD: 9.4 K/UL (ref 4–11)
WOUND/ABSCESS: ABNORMAL

## 2020-05-15 PROCEDURE — 6370000000 HC RX 637 (ALT 250 FOR IP): Performed by: HOSPITALIST

## 2020-05-15 PROCEDURE — 85025 COMPLETE CBC W/AUTO DIFF WBC: CPT

## 2020-05-15 PROCEDURE — 6360000002 HC RX W HCPCS: Performed by: STUDENT IN AN ORGANIZED HEALTH CARE EDUCATION/TRAINING PROGRAM

## 2020-05-15 PROCEDURE — 6370000000 HC RX 637 (ALT 250 FOR IP): Performed by: STUDENT IN AN ORGANIZED HEALTH CARE EDUCATION/TRAINING PROGRAM

## 2020-05-15 PROCEDURE — 80048 BASIC METABOLIC PNL TOTAL CA: CPT

## 2020-05-15 PROCEDURE — 2580000003 HC RX 258: Performed by: STUDENT IN AN ORGANIZED HEALTH CARE EDUCATION/TRAINING PROGRAM

## 2020-05-15 PROCEDURE — 36592 COLLECT BLOOD FROM PICC: CPT

## 2020-05-15 RX ORDER — PROMETHAZINE HYDROCHLORIDE 25 MG/1
25 TABLET ORAL EVERY 4 HOURS PRN
Qty: 24 TABLET | Refills: 0 | Status: SHIPPED | OUTPATIENT
Start: 2020-05-15 | End: 2020-05-22

## 2020-05-15 RX ORDER — OXYCODONE HYDROCHLORIDE AND ACETAMINOPHEN 5; 325 MG/1; MG/1
1 TABLET ORAL EVERY 6 HOURS PRN
Qty: 28 TABLET | Refills: 0 | Status: SHIPPED | OUTPATIENT
Start: 2020-05-15 | End: 2020-05-22

## 2020-05-15 RX ORDER — GABAPENTIN 300 MG/1
600 CAPSULE ORAL 3 TIMES DAILY
Qty: 90 CAPSULE | Refills: 3 | Status: SHIPPED | OUTPATIENT
Start: 2020-05-15 | End: 2020-06-16

## 2020-05-15 RX ORDER — LACTOBACILLUS RHAMNOSUS GG 10B CELL
1 CAPSULE ORAL 2 TIMES DAILY WITH MEALS
Qty: 60 CAPSULE | Refills: 0 | Status: SHIPPED | OUTPATIENT
Start: 2020-05-15 | End: 2020-06-14

## 2020-05-15 RX ORDER — INSULIN GLARGINE 100 [IU]/ML
15 INJECTION, SOLUTION SUBCUTANEOUS NIGHTLY
Qty: 1 VIAL | Refills: 3 | Status: SHIPPED | OUTPATIENT
Start: 2020-05-15 | End: 2020-06-16 | Stop reason: SDUPTHER

## 2020-05-15 RX ORDER — LANCETS 30 GAUGE
1 EACH MISCELLANEOUS DAILY
Qty: 150 EACH | Refills: 0 | Status: ON HOLD | OUTPATIENT
Start: 2020-05-15 | End: 2020-08-25 | Stop reason: SDUPTHER

## 2020-05-15 RX ORDER — PANTOPRAZOLE SODIUM 40 MG/1
40 TABLET, DELAYED RELEASE ORAL DAILY
Qty: 30 TABLET | Refills: 0 | Status: SHIPPED | OUTPATIENT
Start: 2020-05-15 | End: 2020-05-26

## 2020-05-15 RX ORDER — ASPIRIN 81 MG/1
81 TABLET, CHEWABLE ORAL DAILY
Qty: 30 TABLET | Refills: 0 | Status: ON HOLD | OUTPATIENT
Start: 2020-05-15 | End: 2020-08-25 | Stop reason: SDUPTHER

## 2020-05-15 RX ORDER — BLOOD SUGAR DIAGNOSTIC
1 STRIP MISCELLANEOUS 3 TIMES DAILY
Qty: 100 EACH | Refills: 0 | Status: ON HOLD | OUTPATIENT
Start: 2020-05-15 | End: 2020-08-25 | Stop reason: SDUPTHER

## 2020-05-15 RX ORDER — LOSARTAN POTASSIUM 50 MG/1
50 TABLET ORAL DAILY
Qty: 30 TABLET | Refills: 3 | Status: ON HOLD | OUTPATIENT
Start: 2020-05-15 | End: 2020-08-11 | Stop reason: HOSPADM

## 2020-05-15 RX ORDER — ATORVASTATIN CALCIUM 40 MG/1
40 TABLET, FILM COATED ORAL DAILY
Qty: 30 TABLET | Refills: 0 | Status: SHIPPED | OUTPATIENT
Start: 2020-05-15 | End: 2020-06-16 | Stop reason: SDUPTHER

## 2020-05-15 RX ORDER — INSULIN GLARGINE 100 [IU]/ML
21 INJECTION, SOLUTION SUBCUTANEOUS NIGHTLY
Status: DISCONTINUED | OUTPATIENT
Start: 2020-05-15 | End: 2020-05-15 | Stop reason: HOSPADM

## 2020-05-15 RX ORDER — BLOOD-GLUCOSE METER
1 KIT MISCELLANEOUS DAILY
Qty: 1 KIT | Refills: 0 | Status: ON HOLD | OUTPATIENT
Start: 2020-05-15 | End: 2020-08-25 | Stop reason: HOSPADM

## 2020-05-15 RX ORDER — GLUCOSAMINE HCL/CHONDROITIN SU 500-400 MG
CAPSULE ORAL
Qty: 150 STRIP | Refills: 0 | Status: ON HOLD | OUTPATIENT
Start: 2020-05-15 | End: 2020-08-25 | Stop reason: SDUPTHER

## 2020-05-15 RX ORDER — FLASH GLUCOSE SENSOR
KIT MISCELLANEOUS
Qty: 2 EACH | Refills: 0 | Status: ON HOLD | OUTPATIENT
Start: 2020-05-15 | End: 2020-08-25 | Stop reason: HOSPADM

## 2020-05-15 RX ADMIN — OXYCODONE HYDROCHLORIDE AND ACETAMINOPHEN 2 TABLET: 5; 325 TABLET ORAL at 04:37

## 2020-05-15 RX ADMIN — PIPERACILLIN AND TAZOBACTAM 3.38 G: 3; .375 INJECTION, POWDER, LYOPHILIZED, FOR SOLUTION INTRAVENOUS at 04:37

## 2020-05-15 RX ADMIN — GABAPENTIN 600 MG: 300 CAPSULE ORAL at 09:00

## 2020-05-15 RX ADMIN — SODIUM CHLORIDE: 9 INJECTION, SOLUTION INTRAVENOUS at 08:07

## 2020-05-15 RX ADMIN — ENOXAPARIN SODIUM 40 MG: 40 INJECTION SUBCUTANEOUS at 09:00

## 2020-05-15 RX ADMIN — OXYCODONE HYDROCHLORIDE AND ACETAMINOPHEN 2 TABLET: 5; 325 TABLET ORAL at 00:42

## 2020-05-15 RX ADMIN — GABAPENTIN 600 MG: 300 CAPSULE ORAL at 13:45

## 2020-05-15 RX ADMIN — DIVALPROEX SODIUM 500 MG: 500 TABLET, DELAYED RELEASE ORAL at 09:00

## 2020-05-15 RX ADMIN — OXYCODONE HYDROCHLORIDE AND ACETAMINOPHEN 2 TABLET: 5; 325 TABLET ORAL at 13:45

## 2020-05-15 RX ADMIN — PANTOPRAZOLE SODIUM 40 MG: 40 TABLET, DELAYED RELEASE ORAL at 06:06

## 2020-05-15 RX ADMIN — OXYCODONE HYDROCHLORIDE AND ACETAMINOPHEN 2 TABLET: 5; 325 TABLET ORAL at 09:01

## 2020-05-15 RX ADMIN — PIPERACILLIN AND TAZOBACTAM 3.38 G: 3; .375 INJECTION, POWDER, LYOPHILIZED, FOR SOLUTION INTRAVENOUS at 12:34

## 2020-05-15 RX ADMIN — ATORVASTATIN CALCIUM 40 MG: 40 TABLET, FILM COATED ORAL at 09:00

## 2020-05-15 RX ADMIN — INSULIN LISPRO 3 UNITS: 100 INJECTION, SOLUTION INTRAVENOUS; SUBCUTANEOUS at 09:04

## 2020-05-15 RX ADMIN — LOSARTAN POTASSIUM 50 MG: 50 TABLET, FILM COATED ORAL at 09:01

## 2020-05-15 RX ADMIN — INSULIN LISPRO 12 UNITS: 100 INJECTION, SOLUTION INTRAVENOUS; SUBCUTANEOUS at 09:03

## 2020-05-15 ASSESSMENT — PAIN - FUNCTIONAL ASSESSMENT
PAIN_FUNCTIONAL_ASSESSMENT: PREVENTS OR INTERFERES SOME ACTIVE ACTIVITIES AND ADLS

## 2020-05-15 ASSESSMENT — PAIN SCALES - GENERAL
PAINLEVEL_OUTOF10: 10
PAINLEVEL_OUTOF10: 4
PAINLEVEL_OUTOF10: 10
PAINLEVEL_OUTOF10: 10
PAINLEVEL_OUTOF10: 4
PAINLEVEL_OUTOF10: 7

## 2020-05-15 ASSESSMENT — PAIN DESCRIPTION - ONSET
ONSET: ON-GOING

## 2020-05-15 ASSESSMENT — PAIN DESCRIPTION - PAIN TYPE
TYPE: ACUTE PAIN;SURGICAL PAIN

## 2020-05-15 ASSESSMENT — PAIN DESCRIPTION - LOCATION
LOCATION: FOOT

## 2020-05-15 ASSESSMENT — PAIN DESCRIPTION - FREQUENCY
FREQUENCY: CONTINUOUS

## 2020-05-15 ASSESSMENT — PAIN DESCRIPTION - ORIENTATION
ORIENTATION: RIGHT

## 2020-05-15 ASSESSMENT — PAIN DESCRIPTION - PROGRESSION
CLINICAL_PROGRESSION: NOT CHANGED

## 2020-05-15 ASSESSMENT — PAIN DESCRIPTION - DESCRIPTORS
DESCRIPTORS: ACHING

## 2020-05-15 NOTE — PROGRESS NOTES
Pt to be discharged to home with homecare. Pt being discharged with PICC line for IV abx at home. Pt dressed and taking all belongings with. Pt to be transported via wheelchair by PCA. PT instructed to pick of prescriptions for out-pt pharmacy.     Electronically signed by Sushant Dillon RN on 5/15/2020 at 3:43 PM

## 2020-05-15 NOTE — CARE COORDINATION
IVAB on [address] [facility] at [facility] and [address] [facility] [phone]. They cannot see pt today. Faxed face sheet, order and podiatry notes.  Message to Dr Daysi Bahena to see if pt can schedule this as OP  Updated nurse, she verified that pt ambulating ( heel WBS) without device  Electronically signed by Jess Diaz RN on 5/15/2020 at 1:13 PM   per Dr Daysi Bahena pt can go to [facility] as OP  Electronically signed by Jess Diaz RN on 5/15/2020 at 1:27 PM

## 2020-05-16 LAB
BLOOD CULTURE, ROUTINE: NORMAL
CULTURE, BLOOD 2: NORMAL

## 2020-05-16 NOTE — DISCHARGE SUMMARY
([facility]    Cerebral infarction ([facility]    Diabetic peripheral neuropathy ([facility]    Diabetic polyneuropathy (HCC)    Duodenal ulcer    Elevated blood pressure    Glucosuria    Heart failure, diastolic, acute (HCC)    Hematemesis with nausea    Hyperglycemia    Hypoglycemia    Hypophosphatemia    Hypopotassemia    Hypotension    Hypoxia    Intractable nausea and vomiting    Lactic acidosis    Leg weakness, bilateral    Leucocytosis    Leukocytosis    Cannabis abuse    Metabolic acidosis, increased anion gap (IAG)    Nausea    Nausea and vomiting    Numbness in both legs    Polysubstance abuse (HCC)    Pulmonary edema    RUQ abdominal pain    Type 1 diabetes mellitus with ketoacidosis without coma (HCC)    Diabetic foot ulcer (HCC)    Heart murmur    Cellulitis of foot    Hyperlipidemia    Osteomyelitis of great toe of right foot (HCC)    Epigastric abdominal pain    Hypertensive urgency    Hypokalemia            Presenting symptoms:  Pt c/o a 2 month h/o worsening wound of right Great Toe and 3 day h/o N/V and epigastric abdominal pain     Diagnostic workup:  CT ABDOMEN PELVIS W IV CONTRAST  XR FOOT RIGHT (MIN 3 VIEWS)               CONSULTS DURING ADMISSION :   IP CONSULT TO PHARMACY  IP CONSULT TO HOSPITALIST  IP CONSULT TO PODIATRY  IP CONSULT TO PODIATRY  IP CONSULT TO INFECTIOUS DISEASES  IP CONSULT TO GI  IP CONSULT TO HOME CARE NEEDS  INPATIENT CONSULT TO ORTHOTIST/PROSTHETIST        Patient was diagnosed with:  Uncontrolled diabetes mellitus with hyperglycemia  Osteomyelitis of the right great toe  Diabetes mellitus with peripheral neuropathy     Treatment while inpatient:  50years old gentleman with medical history significant for insulin-dependent diabetes mellitus, diabetic gastroparesis and previous history of diabetic foot infections. Patient was admitted with elevated blood sugar nausea and vomiting.   Pt also reported two month history of increasing pain, swelling and erythema of his right great toe. Patient was started on antibiotics and infectious disease was consulted for ongoing management of antibiotics. Podiatry was consulted for right foot infection. Patient was diagnosed with osteomyelitis of the right hallux with cellulitis. Patient underwent right foot incision and debridement right hallux amputation at level of IPJ right foot. PICC line was placed and patient will be discharged home on IV antibiotics with home health care     Discharge Condition:  stable      Discharged to:  Home      Activity:   as tolerated:     Follow Up: Follow-up with PCP in 1-2 weeks        Labs: For convenience and continuity at follow-up the following most recent labs are provided:      CBC:   Lab Results   Component Value Date    WBC 9.4 05/15/2020    HGB 12.0 05/15/2020    HCT 36.0 05/15/2020     05/15/2020       RENAL:   Lab Results   Component Value Date     05/15/2020    K 3.7 05/15/2020     05/15/2020    CO2 27 05/15/2020    BUN 11 05/15/2020    CREATININE 0.9 05/15/2020           Discharge Medications:    Claudette Diggs   Home Medication Instructions [id number]    Printed on:05/16/20 7719   Medication Information                      aspirin 81 MG chewable tablet  Take 1 tablet by mouth daily             atorvastatin (LIPITOR) 40 MG tablet  Take 1 tablet by mouth daily             blood glucose monitor strips  Check blood sugars 4-5 times per day             Continuous Blood Gluc Sensor (FREESTYLE HINA 14 DAY SENSOR) [facility]  Use for personal CGMS. Replace every 2 weeks. gabapentin (NEURONTIN) 300 MG capsule  Take 2 capsules by mouth 3 times daily for 30 days.              glucose monitoring kit (FREESTYLE) monitoring kit  1 kit by Does not apply route daily             insulin glargine (LANTUS) 100 UNIT/ML injection vial  Inject 15 Units into the skin nightly             insulin lispro (ADMELOG) 100 UNIT/ML injection vial  5

## 2020-05-18 ENCOUNTER — TELEPHONE (OUTPATIENT)
Dept: INFECTIOUS DISEASES | Age: 45
End: 2020-05-18

## 2020-05-19 LAB
ALBUMIN SERPL-MCNC: 4 G/DL (ref 3.4–5)
ALP BLD-CCNC: 73 U/L (ref 40–129)
ALT SERPL-CCNC: 10 U/L (ref 10–40)
ANAEROBIC CULTURE: ABNORMAL
ANION GAP SERPL CALCULATED.3IONS-SCNC: 10 MMOL/L (ref 3–16)
AST SERPL-CCNC: 15 U/L (ref 15–37)
BASOPHILS ABSOLUTE: 0.1 K/UL (ref 0–0.2)
BASOPHILS RELATIVE PERCENT: 0.6 %
BILIRUB SERPL-MCNC: <0.2 MG/DL (ref 0–1)
BILIRUBIN DIRECT: <0.2 MG/DL (ref 0–0.3)
BILIRUBIN, INDIRECT: NORMAL MG/DL (ref 0–1)
BUN BLDV-MCNC: 11 MG/DL (ref 7–20)
C-REACTIVE PROTEIN: 6.7 MG/L (ref 0–5.1)
CALCIUM SERPL-MCNC: 9 MG/DL (ref 8.3–10.6)
CHLORIDE BLD-SCNC: 104 MMOL/L (ref 99–110)
CO2: 28 MMOL/L (ref 21–32)
CREAT SERPL-MCNC: 0.8 MG/DL (ref 0.9–1.3)
CULTURE SURGICAL: ABNORMAL
CULTURE SURGICAL: ABNORMAL
EOSINOPHILS ABSOLUTE: 0.4 K/UL (ref 0–0.6)
EOSINOPHILS RELATIVE PERCENT: 3.2 %
GFR AFRICAN AMERICAN: >60
GFR NON-AFRICAN AMERICAN: >60
GLUCOSE BLD-MCNC: 38 MG/DL (ref 70–99)
GRAM STAIN RESULT: ABNORMAL
HCT VFR BLD CALC: 33.5 % (ref 40.5–52.5)
HEMOGLOBIN: 11 G/DL (ref 13.5–17.5)
LYMPHOCYTES ABSOLUTE: 1.7 K/UL (ref 1–5.1)
LYMPHOCYTES RELATIVE PERCENT: 14.5 %
MCH RBC QN AUTO: 26.7 PG (ref 26–34)
MCHC RBC AUTO-ENTMCNC: 32.9 G/DL (ref 31–36)
MCV RBC AUTO: 81.2 FL (ref 80–100)
MONOCYTES ABSOLUTE: 0.5 K/UL (ref 0–1.3)
MONOCYTES RELATIVE PERCENT: 4 %
NEUTROPHILS ABSOLUTE: 9.3 K/UL (ref 1.7–7.7)
NEUTROPHILS RELATIVE PERCENT: 77.7 %
ORGANISM: ABNORMAL
PDW BLD-RTO: 17.3 % (ref 12.4–15.4)
PLATELET # BLD: 305 K/UL (ref 135–450)
PMV BLD AUTO: 8 FL (ref 5–10.5)
POTASSIUM SERPL-SCNC: 3.6 MMOL/L (ref 3.5–5.1)
RBC # BLD: 4.12 M/UL (ref 4.2–5.9)
SEDIMENTATION RATE, ERYTHROCYTE: 15 MM/HR (ref 0–15)
SODIUM BLD-SCNC: 142 MMOL/L (ref 136–145)
TOTAL PROTEIN: 6.6 G/DL (ref 6.4–8.2)
WBC # BLD: 12 K/UL (ref 4–11)

## 2020-05-25 ENCOUNTER — HOSPITAL ENCOUNTER (OUTPATIENT)
Age: 45
Discharge: HOME | End: 2020-05-25
Payer: MEDICAID

## 2020-05-25 LAB
ALBUMIN SERPL-MCNC: 4.2 G/DL (ref 3.4–5)
ALP BLD-CCNC: 106 U/L (ref 40–129)
ALT SERPL-CCNC: 12 U/L (ref 10–40)
ANION GAP SERPL CALCULATED.3IONS-SCNC: 12 MMOL/L (ref 3–16)
AST SERPL-CCNC: 16 U/L (ref 15–37)
BASOPHILS ABSOLUTE: 0.1 K/UL (ref 0–0.2)
BASOPHILS RELATIVE PERCENT: 0.9 %
BILIRUB SERPL-MCNC: <0.2 MG/DL (ref 0–1)
BILIRUBIN DIRECT: <0.2 MG/DL (ref 0–0.3)
BILIRUBIN, INDIRECT: NORMAL MG/DL (ref 0–1)
BUN BLDV-MCNC: 11 MG/DL (ref 7–20)
CALCIUM SERPL-MCNC: 9.2 MG/DL (ref 8.3–10.6)
CHLORIDE BLD-SCNC: 100 MMOL/L (ref 99–110)
CO2: 25 MMOL/L (ref 21–32)
CREAT SERPL-MCNC: 1.1 MG/DL (ref 0.9–1.3)
EOSINOPHILS ABSOLUTE: 0.6 K/UL (ref 0–0.6)
EOSINOPHILS RELATIVE PERCENT: 4.8 %
GFR AFRICAN AMERICAN: >60
GFR NON-AFRICAN AMERICAN: >60
GLUCOSE BLD-MCNC: 403 MG/DL (ref 70–99)
HCT VFR BLD CALC: 32.8 % (ref 40.5–52.5)
HEMOGLOBIN: 10.8 G/DL (ref 13.5–17.5)
LYMPHOCYTES ABSOLUTE: 2 K/UL (ref 1–5.1)
LYMPHOCYTES RELATIVE PERCENT: 17.2 %
MCH RBC QN AUTO: 26.7 PG (ref 26–34)
MCHC RBC AUTO-ENTMCNC: 32.9 G/DL (ref 31–36)
MCV RBC AUTO: 81.2 FL (ref 80–100)
MONOCYTES ABSOLUTE: 0.6 K/UL (ref 0–1.3)
MONOCYTES RELATIVE PERCENT: 5.3 %
NEUTROPHILS ABSOLUTE: 8.3 K/UL (ref 1.7–7.7)
NEUTROPHILS RELATIVE PERCENT: 71.8 %
PDW BLD-RTO: 17.1 % (ref 12.4–15.4)
PLATELET # BLD: 403 K/UL (ref 135–450)
PMV BLD AUTO: 7.3 FL (ref 5–10.5)
POTASSIUM SERPL-SCNC: 4.3 MMOL/L (ref 3.5–5.1)
RBC # BLD: 4.05 M/UL (ref 4.2–5.9)
SEDIMENTATION RATE, ERYTHROCYTE: 24 MM/HR (ref 0–15)
SODIUM BLD-SCNC: 137 MMOL/L (ref 136–145)
TOTAL PROTEIN: 6.7 G/DL (ref 6.4–8.2)
WBC # BLD: 11.6 K/UL (ref 4–11)

## 2020-05-25 PROCEDURE — 86140 C-REACTIVE PROTEIN: CPT

## 2020-05-25 PROCEDURE — 85652 RBC SED RATE AUTOMATED: CPT

## 2020-05-25 PROCEDURE — 80076 HEPATIC FUNCTION PANEL: CPT

## 2020-05-25 PROCEDURE — 80048 BASIC METABOLIC PNL TOTAL CA: CPT

## 2020-05-25 PROCEDURE — 85025 COMPLETE CBC W/AUTO DIFF WBC: CPT

## 2020-05-25 PROCEDURE — 36415 COLL VENOUS BLD VENIPUNCTURE: CPT

## 2020-05-26 ENCOUNTER — TELEPHONE (OUTPATIENT)
Dept: INFECTIOUS DISEASES | Age: 45
End: 2020-05-26

## 2020-05-26 ENCOUNTER — HOSPITAL ENCOUNTER (OUTPATIENT)
Age: 45
Setting detail: OBSERVATION
Discharge: HOME HEALTH | DRG: 420 | End: 2020-05-27
Attending: EMERGENCY MEDICINE | Admitting: INTERNAL MEDICINE
Payer: MEDICAID

## 2020-05-26 ENCOUNTER — APPOINTMENT (OUTPATIENT)
Dept: CT IMAGING | Age: 45
DRG: 420 | End: 2020-05-26
Payer: MEDICAID

## 2020-05-26 PROBLEM — K31.84 DIABETIC GASTROPARESIS ASSOCIATED WITH TYPE 1 DIABETES MELLITUS (HCC): Status: ACTIVE | Noted: 2020-05-26

## 2020-05-26 PROBLEM — E10.43 DIABETIC GASTROPARESIS ASSOCIATED WITH TYPE 1 DIABETES MELLITUS (HCC): Status: ACTIVE | Noted: 2020-05-26

## 2020-05-26 LAB
ALBUMIN SERPL-MCNC: 4.2 G/DL (ref 3.4–5)
ALP BLD-CCNC: 101 U/L (ref 40–129)
ALT SERPL-CCNC: 15 U/L (ref 10–40)
AMPHETAMINE SCREEN, URINE: NORMAL
ANION GAP SERPL CALCULATED.3IONS-SCNC: 13 MMOL/L (ref 3–16)
ANION GAP SERPL CALCULATED.3IONS-SCNC: 17 MMOL/L (ref 3–16)
ANION GAP SERPL CALCULATED.3IONS-SCNC: 20 MMOL/L (ref 3–16)
ANION GAP SERPL CALCULATED.3IONS-SCNC: 23 MMOL/L (ref 3–16)
ANION GAP SERPL CALCULATED.3IONS-SCNC: 24 MMOL/L (ref 3–16)
AST SERPL-CCNC: 18 U/L (ref 15–37)
BARBITURATE SCREEN URINE: NORMAL
BASE EXCESS ARTERIAL: -7.7 MMOL/L (ref -3–3)
BASOPHILS ABSOLUTE: 0.1 K/UL (ref 0–0.2)
BASOPHILS RELATIVE PERCENT: 0.8 %
BENZODIAZEPINE SCREEN, URINE: NORMAL
BETA-HYDROXYBUTYRATE: 4.69 MMOL/L (ref 0–0.27)
BETA-HYDROXYBUTYRATE: 6.81 MMOL/L (ref 0–0.27)
BILIRUB SERPL-MCNC: <0.2 MG/DL (ref 0–1)
BILIRUBIN DIRECT: <0.2 MG/DL (ref 0–0.3)
BILIRUBIN URINE: NEGATIVE
BILIRUBIN, INDIRECT: NORMAL MG/DL (ref 0–1)
BLOOD, URINE: NEGATIVE
BUN BLDV-MCNC: 16 MG/DL (ref 7–20)
BUN BLDV-MCNC: 18 MG/DL (ref 7–20)
C-REACTIVE PROTEIN: 11.1 MG/L (ref 0–5.1)
CALCIUM SERPL-MCNC: 8.3 MG/DL (ref 8.3–10.6)
CALCIUM SERPL-MCNC: 8.7 MG/DL (ref 8.3–10.6)
CALCIUM SERPL-MCNC: 8.7 MG/DL (ref 8.3–10.6)
CALCIUM SERPL-MCNC: 9 MG/DL (ref 8.3–10.6)
CALCIUM SERPL-MCNC: 9.2 MG/DL (ref 8.3–10.6)
CANNABINOID SCREEN URINE: NORMAL
CARBOXYHEMOGLOBIN ARTERIAL: 2 % (ref 0–1.5)
CHLORIDE BLD-SCNC: 101 MMOL/L (ref 99–110)
CHLORIDE BLD-SCNC: 104 MMOL/L (ref 99–110)
CHLORIDE BLD-SCNC: 109 MMOL/L (ref 99–110)
CHLORIDE BLD-SCNC: 113 MMOL/L (ref 99–110)
CHLORIDE BLD-SCNC: 96 MMOL/L (ref 99–110)
CLARITY: CLEAR
CO2: 12 MMOL/L (ref 21–32)
CO2: 15 MMOL/L (ref 21–32)
CO2: 17 MMOL/L (ref 21–32)
CO2: 19 MMOL/L (ref 21–32)
CO2: 20 MMOL/L (ref 21–32)
COCAINE METABOLITE SCREEN URINE: NORMAL
COLOR: YELLOW
CREAT SERPL-MCNC: 0.9 MG/DL (ref 0.9–1.3)
CREAT SERPL-MCNC: 1 MG/DL (ref 0.9–1.3)
CREAT SERPL-MCNC: 1.1 MG/DL (ref 0.9–1.3)
EOSINOPHILS ABSOLUTE: 0.6 K/UL (ref 0–0.6)
EOSINOPHILS RELATIVE PERCENT: 4.7 %
GFR AFRICAN AMERICAN: >60
GFR NON-AFRICAN AMERICAN: >60
GLUCOSE BLD-MCNC: 187 MG/DL (ref 70–99)
GLUCOSE BLD-MCNC: 193 MG/DL (ref 70–99)
GLUCOSE BLD-MCNC: 193 MG/DL (ref 70–99)
GLUCOSE BLD-MCNC: 197 MG/DL (ref 70–99)
GLUCOSE BLD-MCNC: 222 MG/DL (ref 70–99)
GLUCOSE BLD-MCNC: 250 MG/DL (ref 70–99)
GLUCOSE BLD-MCNC: 264 MG/DL (ref 70–99)
GLUCOSE BLD-MCNC: 274 MG/DL (ref 70–99)
GLUCOSE BLD-MCNC: 286 MG/DL (ref 70–99)
GLUCOSE BLD-MCNC: 295 MG/DL (ref 70–99)
GLUCOSE BLD-MCNC: 307 MG/DL (ref 70–99)
GLUCOSE BLD-MCNC: 327 MG/DL (ref 70–99)
GLUCOSE BLD-MCNC: 354 MG/DL (ref 70–99)
GLUCOSE BLD-MCNC: 360 MG/DL (ref 70–99)
GLUCOSE BLD-MCNC: 398 MG/DL (ref 70–99)
GLUCOSE BLD-MCNC: 398 MG/DL (ref 70–99)
GLUCOSE BLD-MCNC: 478 MG/DL (ref 70–99)
GLUCOSE BLD-MCNC: 510 MG/DL (ref 70–99)
GLUCOSE URINE: >=1000 MG/DL
HCO3 ARTERIAL: 18 MMOL/L (ref 21–29)
HCT VFR BLD CALC: 38.1 % (ref 40.5–52.5)
HEMOGLOBIN, ART, EXTENDED: 11.5 G/DL (ref 13.5–17.5)
HEMOGLOBIN: 12.4 G/DL (ref 13.5–17.5)
KETONES, URINE: >=80 MG/DL
LEUKOCYTE ESTERASE, URINE: NEGATIVE
LIPASE: 8 U/L (ref 13–60)
LYMPHOCYTES ABSOLUTE: 2.1 K/UL (ref 1–5.1)
LYMPHOCYTES RELATIVE PERCENT: 17.2 %
Lab: NORMAL
MAGNESIUM: 1.8 MG/DL (ref 1.8–2.4)
MAGNESIUM: 2 MG/DL (ref 1.8–2.4)
MAGNESIUM: 2 MG/DL (ref 1.8–2.4)
MAGNESIUM: 2.1 MG/DL (ref 1.8–2.4)
MCH RBC QN AUTO: 26.6 PG (ref 26–34)
MCHC RBC AUTO-ENTMCNC: 32.5 G/DL (ref 31–36)
MCV RBC AUTO: 81.7 FL (ref 80–100)
METHADONE SCREEN, URINE: NORMAL
METHEMOGLOBIN ARTERIAL: 0.8 %
MICROSCOPIC EXAMINATION: ABNORMAL
MONOCYTES ABSOLUTE: 0.4 K/UL (ref 0–1.3)
MONOCYTES RELATIVE PERCENT: 3.6 %
NEUTROPHILS ABSOLUTE: 8.8 K/UL (ref 1.7–7.7)
NEUTROPHILS RELATIVE PERCENT: 73.7 %
NITRITE, URINE: NEGATIVE
O2 CONTENT ARTERIAL: 15 ML/DL
O2 SAT, ARTERIAL: 95.2 %
O2 THERAPY: ABNORMAL
OPIATE SCREEN URINE: NORMAL
OXYCODONE URINE: NORMAL
PCO2 ARTERIAL: 36.4 MMHG (ref 35–45)
PDW BLD-RTO: 17.6 % (ref 12.4–15.4)
PERFORMED ON: ABNORMAL
PH ARTERIAL: 7.3 (ref 7.35–7.45)
PH UA: 5.5
PH UA: 5.5 (ref 5–8)
PHENCYCLIDINE SCREEN URINE: NORMAL
PHOSPHORUS: 0.7 MG/DL (ref 2.5–4.9)
PHOSPHORUS: 2.4 MG/DL (ref 2.5–4.9)
PHOSPHORUS: 3.9 MG/DL (ref 2.5–4.9)
PLATELET # BLD: 447 K/UL (ref 135–450)
PMV BLD AUTO: 7.1 FL (ref 5–10.5)
PO2 ARTERIAL: 85.8 MMHG (ref 75–108)
POTASSIUM REFLEX MAGNESIUM: 3.5 MMOL/L (ref 3.5–5.1)
POTASSIUM REFLEX MAGNESIUM: 3.8 MMOL/L (ref 3.5–5.1)
POTASSIUM SERPL-SCNC: 3.7 MMOL/L (ref 3.5–5.1)
POTASSIUM SERPL-SCNC: 3.9 MMOL/L (ref 3.5–5.1)
POTASSIUM SERPL-SCNC: 4.4 MMOL/L (ref 3.5–5.1)
PROPOXYPHENE SCREEN: NORMAL
PROTEIN UA: NEGATIVE MG/DL
RBC # BLD: 4.66 M/UL (ref 4.2–5.9)
SODIUM BLD-SCNC: 138 MMOL/L (ref 136–145)
SODIUM BLD-SCNC: 140 MMOL/L (ref 136–145)
SODIUM BLD-SCNC: 141 MMOL/L (ref 136–145)
SODIUM BLD-SCNC: 141 MMOL/L (ref 136–145)
SODIUM BLD-SCNC: 143 MMOL/L (ref 136–145)
SPECIFIC GRAVITY UA: 1.02 (ref 1–1.03)
TCO2 ARTERIAL: 19.1 MMOL/L
TOTAL PROTEIN: 7.6 G/DL (ref 6.4–8.2)
URINE REFLEX TO CULTURE: ABNORMAL
URINE TYPE: ABNORMAL
UROBILINOGEN, URINE: 0.2 E.U./DL
WBC # BLD: 11.9 K/UL (ref 4–11)

## 2020-05-26 PROCEDURE — 6360000002 HC RX W HCPCS: Performed by: INTERNAL MEDICINE

## 2020-05-26 PROCEDURE — 2580000003 HC RX 258: Performed by: EMERGENCY MEDICINE

## 2020-05-26 PROCEDURE — G0378 HOSPITAL OBSERVATION PER HR: HCPCS

## 2020-05-26 PROCEDURE — 6360000002 HC RX W HCPCS: Performed by: FAMILY MEDICINE

## 2020-05-26 PROCEDURE — 96366 THER/PROPH/DIAG IV INF ADDON: CPT

## 2020-05-26 PROCEDURE — 2580000003 HC RX 258: Performed by: INTERNAL MEDICINE

## 2020-05-26 PROCEDURE — 6370000000 HC RX 637 (ALT 250 FOR IP): Performed by: EMERGENCY MEDICINE

## 2020-05-26 PROCEDURE — 99285 EMERGENCY DEPT VISIT HI MDM: CPT

## 2020-05-26 PROCEDURE — 99254 IP/OBS CNSLTJ NEW/EST MOD 60: CPT | Performed by: INTERNAL MEDICINE

## 2020-05-26 PROCEDURE — 2000000000 HC ICU R&B

## 2020-05-26 PROCEDURE — 83735 ASSAY OF MAGNESIUM: CPT

## 2020-05-26 PROCEDURE — 83036 HEMOGLOBIN GLYCOSYLATED A1C: CPT

## 2020-05-26 PROCEDURE — 96375 TX/PRO/DX INJ NEW DRUG ADDON: CPT

## 2020-05-26 PROCEDURE — 80048 BASIC METABOLIC PNL TOTAL CA: CPT

## 2020-05-26 PROCEDURE — 96365 THER/PROPH/DIAG IV INF INIT: CPT

## 2020-05-26 PROCEDURE — 96361 HYDRATE IV INFUSION ADD-ON: CPT

## 2020-05-26 PROCEDURE — 6370000000 HC RX 637 (ALT 250 FOR IP): Performed by: INTERNAL MEDICINE

## 2020-05-26 PROCEDURE — 6360000004 HC RX CONTRAST MEDICATION: Performed by: EMERGENCY MEDICINE

## 2020-05-26 PROCEDURE — 96374 THER/PROPH/DIAG INJ IV PUSH: CPT

## 2020-05-26 PROCEDURE — 6360000002 HC RX W HCPCS: Performed by: EMERGENCY MEDICINE

## 2020-05-26 PROCEDURE — 82803 BLOOD GASES ANY COMBINATION: CPT

## 2020-05-26 PROCEDURE — 80307 DRUG TEST PRSMV CHEM ANLYZR: CPT

## 2020-05-26 PROCEDURE — 82010 KETONE BODYS QUAN: CPT

## 2020-05-26 PROCEDURE — 96376 TX/PRO/DX INJ SAME DRUG ADON: CPT

## 2020-05-26 PROCEDURE — 80076 HEPATIC FUNCTION PANEL: CPT

## 2020-05-26 PROCEDURE — 94760 N-INVAS EAR/PLS OXIMETRY 1: CPT

## 2020-05-26 PROCEDURE — 2580000003 HC RX 258: Performed by: FAMILY MEDICINE

## 2020-05-26 PROCEDURE — 96367 TX/PROPH/DG ADDL SEQ IV INF: CPT

## 2020-05-26 PROCEDURE — 74177 CT ABD & PELVIS W/CONTRAST: CPT

## 2020-05-26 PROCEDURE — 84100 ASSAY OF PHOSPHORUS: CPT

## 2020-05-26 PROCEDURE — 36415 COLL VENOUS BLD VENIPUNCTURE: CPT

## 2020-05-26 PROCEDURE — 83690 ASSAY OF LIPASE: CPT

## 2020-05-26 PROCEDURE — 36592 COLLECT BLOOD FROM PICC: CPT

## 2020-05-26 PROCEDURE — 81003 URINALYSIS AUTO W/O SCOPE: CPT

## 2020-05-26 PROCEDURE — 85025 COMPLETE CBC W/AUTO DIFF WBC: CPT

## 2020-05-26 PROCEDURE — 36600 WITHDRAWAL OF ARTERIAL BLOOD: CPT

## 2020-05-26 PROCEDURE — 6370000000 HC RX 637 (ALT 250 FOR IP): Performed by: FAMILY MEDICINE

## 2020-05-26 PROCEDURE — 2500000003 HC RX 250 WO HCPCS: Performed by: FAMILY MEDICINE

## 2020-05-26 RX ORDER — METOCLOPRAMIDE HYDROCHLORIDE 5 MG/ML
10 INJECTION INTRAMUSCULAR; INTRAVENOUS ONCE
Status: COMPLETED | OUTPATIENT
Start: 2020-05-26 | End: 2020-05-26

## 2020-05-26 RX ORDER — 0.9 % SODIUM CHLORIDE 0.9 %
1000 INTRAVENOUS SOLUTION INTRAVENOUS ONCE
Status: COMPLETED | OUTPATIENT
Start: 2020-05-26 | End: 2020-05-26

## 2020-05-26 RX ORDER — PANTOPRAZOLE SODIUM 20 MG/1
40 TABLET, DELAYED RELEASE ORAL DAILY
Qty: 30 TABLET | Refills: 0 | Status: ON HOLD | OUTPATIENT
Start: 2020-05-26 | End: 2020-08-25 | Stop reason: HOSPADM

## 2020-05-26 RX ORDER — MAGNESIUM SULFATE 1 G/100ML
1 INJECTION INTRAVENOUS PRN
Status: DISCONTINUED | OUTPATIENT
Start: 2020-05-26 | End: 2020-05-27

## 2020-05-26 RX ORDER — POTASSIUM CHLORIDE 7.45 MG/ML
10 INJECTION INTRAVENOUS PRN
Status: DISCONTINUED | OUTPATIENT
Start: 2020-05-26 | End: 2020-05-27

## 2020-05-26 RX ORDER — DEXTROSE MONOHYDRATE 50 MG/ML
100 INJECTION, SOLUTION INTRAVENOUS PRN
Status: DISCONTINUED | OUTPATIENT
Start: 2020-05-26 | End: 2020-05-27 | Stop reason: HOSPADM

## 2020-05-26 RX ORDER — OXYCODONE HYDROCHLORIDE AND ACETAMINOPHEN 5; 325 MG/1; MG/1
1 TABLET ORAL ONCE
Status: DISCONTINUED | OUTPATIENT
Start: 2020-05-26 | End: 2020-05-26

## 2020-05-26 RX ORDER — HYDRALAZINE HYDROCHLORIDE 20 MG/ML
10 INJECTION INTRAMUSCULAR; INTRAVENOUS EVERY 6 HOURS PRN
Status: DISCONTINUED | OUTPATIENT
Start: 2020-05-26 | End: 2020-05-27 | Stop reason: HOSPADM

## 2020-05-26 RX ORDER — NICOTINE POLACRILEX 4 MG
15 LOZENGE BUCCAL PRN
Status: DISCONTINUED | OUTPATIENT
Start: 2020-05-26 | End: 2020-05-27 | Stop reason: HOSPADM

## 2020-05-26 RX ORDER — DEXTROSE MONOHYDRATE 25 G/50ML
12.5 INJECTION, SOLUTION INTRAVENOUS PRN
Status: DISCONTINUED | OUTPATIENT
Start: 2020-05-26 | End: 2020-05-27 | Stop reason: HOSPADM

## 2020-05-26 RX ORDER — ATORVASTATIN CALCIUM 40 MG/1
40 TABLET, FILM COATED ORAL DAILY
Status: DISCONTINUED | OUTPATIENT
Start: 2020-05-26 | End: 2020-05-27 | Stop reason: HOSPADM

## 2020-05-26 RX ORDER — NICOTINE 21 MG/24HR
1 PATCH, TRANSDERMAL 24 HOURS TRANSDERMAL DAILY
Status: DISCONTINUED | OUTPATIENT
Start: 2020-05-26 | End: 2020-05-27 | Stop reason: HOSPADM

## 2020-05-26 RX ORDER — 0.9 % SODIUM CHLORIDE 0.9 %
15 INTRAVENOUS SOLUTION INTRAVENOUS ONCE
Status: COMPLETED | OUTPATIENT
Start: 2020-05-26 | End: 2020-05-26

## 2020-05-26 RX ORDER — LABETALOL HYDROCHLORIDE 5 MG/ML
10 INJECTION, SOLUTION INTRAVENOUS EVERY 6 HOURS PRN
Status: DISCONTINUED | OUTPATIENT
Start: 2020-05-26 | End: 2020-05-27 | Stop reason: HOSPADM

## 2020-05-26 RX ORDER — DEXTROSE AND SODIUM CHLORIDE 5; .45 G/100ML; G/100ML
INJECTION, SOLUTION INTRAVENOUS CONTINUOUS PRN
Status: DISCONTINUED | OUTPATIENT
Start: 2020-05-26 | End: 2020-05-27

## 2020-05-26 RX ORDER — SODIUM CHLORIDE 0.9 % (FLUSH) 0.9 %
10 SYRINGE (ML) INJECTION EVERY 12 HOURS SCHEDULED
Status: DISCONTINUED | OUTPATIENT
Start: 2020-05-26 | End: 2020-05-27 | Stop reason: HOSPADM

## 2020-05-26 RX ORDER — ONDANSETRON 2 MG/ML
4 INJECTION INTRAMUSCULAR; INTRAVENOUS ONCE
Status: COMPLETED | OUTPATIENT
Start: 2020-05-26 | End: 2020-05-26

## 2020-05-26 RX ORDER — METOCLOPRAMIDE 10 MG/1
10 TABLET ORAL 2 TIMES DAILY PRN
Qty: 20 TABLET | Refills: 0 | Status: SHIPPED | OUTPATIENT
Start: 2020-05-26 | End: 2020-05-27 | Stop reason: SDUPTHER

## 2020-05-26 RX ORDER — INSULIN GLARGINE 100 [IU]/ML
15 INJECTION, SOLUTION SUBCUTANEOUS NIGHTLY
Status: DISCONTINUED | OUTPATIENT
Start: 2020-05-26 | End: 2020-05-27 | Stop reason: HOSPADM

## 2020-05-26 RX ORDER — SODIUM CHLORIDE 9 MG/ML
INJECTION, SOLUTION INTRAVENOUS CONTINUOUS
Status: DISCONTINUED | OUTPATIENT
Start: 2020-05-26 | End: 2020-05-27 | Stop reason: HOSPADM

## 2020-05-26 RX ORDER — ONDANSETRON 2 MG/ML
4 INJECTION INTRAMUSCULAR; INTRAVENOUS EVERY 6 HOURS PRN
Status: DISCONTINUED | OUTPATIENT
Start: 2020-05-26 | End: 2020-05-27 | Stop reason: HOSPADM

## 2020-05-26 RX ORDER — SODIUM CHLORIDE 0.9 % (FLUSH) 0.9 %
10 SYRINGE (ML) INJECTION PRN
Status: DISCONTINUED | OUTPATIENT
Start: 2020-05-26 | End: 2020-05-27 | Stop reason: HOSPADM

## 2020-05-26 RX ORDER — SODIUM CHLORIDE 9 MG/ML
INJECTION, SOLUTION INTRAVENOUS CONTINUOUS
Status: DISCONTINUED | OUTPATIENT
Start: 2020-05-26 | End: 2020-05-26

## 2020-05-26 RX ORDER — CIPROFLOXACIN 2 MG/ML
400 INJECTION, SOLUTION INTRAVENOUS ONCE
Status: DISCONTINUED | OUTPATIENT
Start: 2020-05-26 | End: 2020-05-26

## 2020-05-26 RX ORDER — ASPIRIN 81 MG/1
81 TABLET, CHEWABLE ORAL DAILY
Status: DISCONTINUED | OUTPATIENT
Start: 2020-05-26 | End: 2020-05-27 | Stop reason: HOSPADM

## 2020-05-26 RX ORDER — PROMETHAZINE HYDROCHLORIDE 25 MG/1
12.5 TABLET ORAL EVERY 6 HOURS PRN
Status: DISCONTINUED | OUTPATIENT
Start: 2020-05-26 | End: 2020-05-27 | Stop reason: HOSPADM

## 2020-05-26 RX ADMIN — Medication 10 ML: at 19:42

## 2020-05-26 RX ADMIN — SODIUM CHLORIDE 3.99 UNITS/HR: 9 INJECTION, SOLUTION INTRAVENOUS at 20:40

## 2020-05-26 RX ADMIN — ONDANSETRON 4 MG: 2 INJECTION INTRAMUSCULAR; INTRAVENOUS at 22:18

## 2020-05-26 RX ADMIN — INSULIN HUMAN 5 UNITS: 100 INJECTION, SOLUTION PARENTERAL at 05:04

## 2020-05-26 RX ADMIN — PIPERACILLIN AND TAZOBACTAM 3.38 G: 3; .375 INJECTION, POWDER, LYOPHILIZED, FOR SOLUTION INTRAVENOUS at 19:57

## 2020-05-26 RX ADMIN — IOPAMIDOL 75 ML: 755 INJECTION, SOLUTION INTRAVENOUS at 03:44

## 2020-05-26 RX ADMIN — LABETALOL HYDROCHLORIDE 10 MG: 5 INJECTION INTRAVENOUS at 16:58

## 2020-05-26 RX ADMIN — HYDROMORPHONE HYDROCHLORIDE 0.5 MG: 1 INJECTION, SOLUTION INTRAMUSCULAR; INTRAVENOUS; SUBCUTANEOUS at 23:43

## 2020-05-26 RX ADMIN — ONDANSETRON 4 MG: 2 INJECTION INTRAMUSCULAR; INTRAVENOUS at 01:28

## 2020-05-26 RX ADMIN — DEXTROSE AND SODIUM CHLORIDE 150 ML/HR: 5; 450 INJECTION, SOLUTION INTRAVENOUS at 20:40

## 2020-05-26 RX ADMIN — SODIUM CHLORIDE 1000 ML: 9 INJECTION, SOLUTION INTRAVENOUS at 02:41

## 2020-05-26 RX ADMIN — SODIUM CHLORIDE 1000 ML: 9 INJECTION, SOLUTION INTRAVENOUS at 05:08

## 2020-05-26 RX ADMIN — Medication 10 ML: at 09:07

## 2020-05-26 RX ADMIN — HYDROMORPHONE HYDROCHLORIDE 0.5 MG: 1 INJECTION, SOLUTION INTRAMUSCULAR; INTRAVENOUS; SUBCUTANEOUS at 19:41

## 2020-05-26 RX ADMIN — SODIUM CHLORIDE: 9 INJECTION, SOLUTION INTRAVENOUS at 12:32

## 2020-05-26 RX ADMIN — HYDRALAZINE HYDROCHLORIDE 10 MG: 20 INJECTION INTRAMUSCULAR; INTRAVENOUS at 12:32

## 2020-05-26 RX ADMIN — METOCLOPRAMIDE HYDROCHLORIDE 10 MG: 5 INJECTION INTRAMUSCULAR; INTRAVENOUS at 12:33

## 2020-05-26 RX ADMIN — SODIUM CHLORIDE 0.1 UNITS/KG/HR: 9 INJECTION, SOLUTION INTRAVENOUS at 15:42

## 2020-05-26 RX ADMIN — SODIUM CHLORIDE: 9 INJECTION, SOLUTION INTRAVENOUS at 16:18

## 2020-05-26 RX ADMIN — OXYCODONE HYDROCHLORIDE AND ACETAMINOPHEN 1 TABLET: 5; 325 TABLET ORAL at 05:04

## 2020-05-26 RX ADMIN — SODIUM CHLORIDE 1000 ML: 9 INJECTION, SOLUTION INTRAVENOUS at 01:26

## 2020-05-26 RX ADMIN — SODIUM CHLORIDE 1000 ML: 9 INJECTION, SOLUTION INTRAVENOUS at 15:11

## 2020-05-26 RX ADMIN — METOCLOPRAMIDE 10 MG: 5 INJECTION, SOLUTION INTRAMUSCULAR; INTRAVENOUS at 05:08

## 2020-05-26 RX ADMIN — ONDANSETRON 4 MG: 2 INJECTION INTRAMUSCULAR; INTRAVENOUS at 15:24

## 2020-05-26 RX ADMIN — INSULIN HUMAN 10 UNITS: 100 INJECTION, SOLUTION PARENTERAL at 02:41

## 2020-05-26 RX ADMIN — HYDROMORPHONE HYDROCHLORIDE 0.5 MG: 1 INJECTION, SOLUTION INTRAMUSCULAR; INTRAVENOUS; SUBCUTANEOUS at 15:24

## 2020-05-26 ASSESSMENT — PAIN DESCRIPTION - ORIENTATION
ORIENTATION: MID
ORIENTATION: MID
ORIENTATION: OTHER (COMMENT)

## 2020-05-26 ASSESSMENT — PAIN DESCRIPTION - LOCATION
LOCATION: ABDOMEN
LOCATION: TOE (COMMENT WHICH ONE)
LOCATION: ABDOMEN
LOCATION: ABDOMEN
LOCATION: TOE (COMMENT WHICH ONE)

## 2020-05-26 ASSESSMENT — PAIN DESCRIPTION - ONSET
ONSET: ON-GOING

## 2020-05-26 ASSESSMENT — ENCOUNTER SYMPTOMS
RECTAL PAIN: 0
EYE DISCHARGE: 0
CHEST TIGHTNESS: 0
SHORTNESS OF BREATH: 0
APNEA: 0
ABDOMINAL DISTENTION: 0
ABDOMINAL PAIN: 0
ANAL BLEEDING: 0
EYE ITCHING: 0
PHOTOPHOBIA: 0
BACK PAIN: 0
EYE PAIN: 0
STRIDOR: 0
EYE REDNESS: 0
CHOKING: 0
NAUSEA: 1
VOMITING: 1
BLOOD IN STOOL: 0
COUGH: 0
COLOR CHANGE: 0
WHEEZING: 0
CONSTIPATION: 0
DIARRHEA: 1

## 2020-05-26 ASSESSMENT — PAIN DESCRIPTION - PROGRESSION
CLINICAL_PROGRESSION: GRADUALLY IMPROVING
CLINICAL_PROGRESSION: NOT CHANGED
CLINICAL_PROGRESSION: NOT CHANGED
CLINICAL_PROGRESSION: GRADUALLY IMPROVING

## 2020-05-26 ASSESSMENT — PAIN DESCRIPTION - FREQUENCY
FREQUENCY: CONTINUOUS

## 2020-05-26 ASSESSMENT — PAIN SCALES - GENERAL
PAINLEVEL_OUTOF10: 10
PAINLEVEL_OUTOF10: 5
PAINLEVEL_OUTOF10: 8
PAINLEVEL_OUTOF10: 8
PAINLEVEL_OUTOF10: 0
PAINLEVEL_OUTOF10: 10
PAINLEVEL_OUTOF10: 10
PAINLEVEL_OUTOF10: 5
PAINLEVEL_OUTOF10: 0
PAINLEVEL_OUTOF10: 10

## 2020-05-26 ASSESSMENT — PAIN DESCRIPTION - PAIN TYPE
TYPE: ACUTE PAIN

## 2020-05-26 ASSESSMENT — PAIN DESCRIPTION - DESCRIPTORS
DESCRIPTORS: PATIENT UNABLE TO DESCRIBE
DESCRIPTORS: ACHING
DESCRIPTORS: PATIENT UNABLE TO DESCRIBE
DESCRIPTORS: ACHING
DESCRIPTORS: ACHING

## 2020-05-26 ASSESSMENT — PAIN - FUNCTIONAL ASSESSMENT
PAIN_FUNCTIONAL_ASSESSMENT: ACTIVITIES ARE NOT PREVENTED
PAIN_FUNCTIONAL_ASSESSMENT: ACTIVITIES ARE NOT PREVENTED
PAIN_FUNCTIONAL_ASSESSMENT: PREVENTS OR INTERFERES SOME ACTIVE ACTIVITIES AND ADLS
PAIN_FUNCTIONAL_ASSESSMENT: PREVENTS OR INTERFERES SOME ACTIVE ACTIVITIES AND ADLS

## 2020-05-26 ASSESSMENT — PAIN SCALES - WONG BAKER: WONGBAKER_NUMERICALRESPONSE: 0

## 2020-05-26 NOTE — H&P
tablet by mouth daily 5/15/20   Brenden Chen MD   gabapentin (NEURONTIN) 300 MG capsule Take 2 capsules by mouth 3 times daily for 30 days. 5/15/20 6/14/20  Brenden Chen MD   lactobacillus (CULTURELLE) capsule Take 1 capsule by mouth 2 times daily (with meals) 5/15/20 6/14/20  Brenden Chen MD   atorvastatin (LIPITOR) 40 MG tablet Take 1 tablet by mouth daily 5/15/20 6/14/20  Brenden Chen MD   losartan (COZAAR) 50 MG tablet Take 1 tablet by mouth daily 5/15/20 6/14/20  Brenden Chen MD   blood glucose monitor strips Check blood sugars 4-5 times per day 5/15/20   Brenden Chen MD   Continuous Blood Gluc Sensor (FREESTYLE HINA 14 DAY SENSOR) MISC Use for personal CGMS. Replace every 2 weeks. 5/15/20   Brenden Chen MD   glucose monitoring kit (FREESTYLE) monitoring kit 1 kit by Does not apply route daily 5/15/20   Brenden Chen MD   Lancets MISC 1 each by Does not apply route daily Check blood sugars 4-5 times per day 5/15/20   Brenden Chen MD   insulin glargine (LANTUS) 100 UNIT/ML injection vial Inject 15 Units into the skin nightly 5/15/20   Brenden Chen MD   Insulin Syringe-Needle U-100 (ULTRA-THIN II INS SYR SHORT) 31G X 5/16\" 1 ML MISC 1 each by Does not apply route 3 times daily 5/15/20   Brenden Chen MD   insulin lispro (ADMELOG) 100 UNIT/ML injection vial 5 units for meals and 2 units for snacks 1/3/20   Lorenzo Castillo MD       Allergies:  Ketorolac; Morphine; Morphine and related; Tramadol; Lisinopril; Haloperidol lactate; Toradol [ketorolac tromethamine]; and Vicodin [hydrocodone-acetaminophen]    Social History:      TOBACCO:   reports that he has been smoking. He started smoking about 11 years ago. He has never used smokeless tobacco.  ETOH:   reports current alcohol use.       Family History:          Problem Relation Age of Onset    Diabetes Mother     Heart Disease Mother     High Blood Pressure Mother     Asthma Brother     Other Father         kidney disease    No Known Problems Maternal Grandmother     No Known Problems Maternal Grandfather     No Known Problems Paternal Grandmother     No Known Problems Paternal Grandfather        REVIEW OF SYSTEMS:   Positive for n, v and as noted in the HPI. All other systems reviewed and negative. PHYSICAL EXAM:    /76   Pulse 99   Temp 97.8 °F (36.6 °C) (Oral)   Resp 18   Ht 5' 10\" (1.778 m)   Wt 149 lb 14.6 oz (68 kg)   SpO2 99%   BMI 21.51 kg/m²     General appearance: No apparent distress, uncooperative. He has been refusing medications after arrival to the floor  HEENT Normal cephalic, atraumatic without obvious deformity. PERRL. EOM. Conjunctivae/corneas clear. Neck: Supple, No jugular venous distention/bruits. Trachea midline   Lungs: Clear to auscultation, bilaterally without Rales/Wheezes/Rhonchi without accessory muscle use. Heart: Regular rate and rhythm with Normal S1/S2 without murmurs, rubs or gallops  Abdomen: Soft, non-tender or non-distended without rigidity or guarding and positive bowel sounds all four quadrants. Extremities: No clubbing, cyanosis, or edema bilaterally. Skin: Skin color, texture, turgor normal.  No rashes or lesions. Neurologic: Alert and oriented X 3, neurovascularly intact with sensory/motor intact upper extremities/lower extremities, bilaterally. Grossly non-focal.  Mental status: Alert, oriented, thought content appropriate.   Peripheral Pulses: +3 Easily felt, not easily obliterated with pressure  Cap refill  +2 sec    CXR:  I have reviewed the CXR with the following interpretation: not done    CBC   Recent Labs     05/25/20  1530 05/26/20  0118   WBC 11.6* 11.9*   HGB 10.8* 12.4*   HCT 32.8* 38.1*    447      RENAL  Recent Labs     05/25/20  1530 05/26/20  0118 05/26/20  0353    138 141   K 4.3 3.8 3.5    96* 104   CO2 25 19* 20*   BUN 11 16 16   CREATININE 1.1 1.1 1.0     LFT'S  Recent Labs monitored closely  - IVF    - lytes will be monitored regularly and will be replaced PRN    Thank you No primary care provider on file. for the opportunity to be involved in this patient's care. If you have any questions or concerns please feel free to contact me at [phone].

## 2020-05-26 NOTE — CONSULTS
Infectious Diseases Inpatient Consult Note      Reason for Consult: Rt great toe amputation and osteomyelitis on IV antibiotics     Requesting Physician:  HAILE     Primary Care Physician:  No primary care provider on file. History Obtained From:  Pt and medical records     CHIEF COMPLAINT:     Chief Complaint   Patient presents with    Hyperglycemia         HISTORY OF PRESENT ILLNESS:  50 y.o. Man with DM type 1 on insulin poor control several complications and previous toe infection s/p recent Rt GREAT TOE infection and osteomyelitis s/p resection on previous admit on 5/13 and was d/c to home on IV Zosyn through PICC line and now admitted with elevated BG and emesis, excess thirst and not feeling well and was in DKA and now tx to ICU for close observation. He has been non compliant with home care and IV abx and we are consulted for IV abx recommendations. Past Medical History:    Past Medical History:   Diagnosis Date    Diabetes mellitus ([facility]     Gastroparesis     Hypertension        Past Surgical History:    Past Surgical History:   Procedure Laterality Date    BONY PELVIS SURGERY      FOOT AMPUTATION Right 5/13/2020    EMERGENCY; RIGHT INCISION AND DEBRIDEMENT; HALUX AMPUTATION performed by Mario Baldwin DPM at [address] 2006    pins and rods- plate    UPPER GASTROINTESTINAL ENDOSCOPY N/A 12/2/2019    EGD BIOPSY performed by Sarath Peter MD at [facility]       Current Medications:    Outpatient Medications Marked as Taking for the 5/26/20 encounter [facility] Encounter)   Medication Sig Dispense Refill    pantoprazole (PROTONIX) 20 MG tablet Take 2 tablets by mouth daily 30 tablet 0    metoclopramide (REGLAN) 10 MG tablet Take 1 tablet by mouth 2 times daily as needed (Nausea) WARNING:  May cause drowsiness. May impair ability to operate vehicles or machinery. Do not use in combination with alcohol.  20 tablet 0    piperacillin-tazobactam (ZOSYN) UA:  Lab Results   Component Value Date    COLORU YELLOW 05/26/2020    CLARITYU Clear 05/26/2020    GLUCOSEU >=1000 05/26/2020    GLUCOSEU >=1000 12/15/2010    BILIRUBINUR Negative 05/26/2020    BILIRUBINUR NEGATIVE 12/15/2010    KETUA >=80 05/26/2020    SPECGRAV 1.019 05/26/2020    BLOODU Negative 05/26/2020    PHUR 5.5 05/26/2020    PHUR 5.5 05/26/2020    PROTEINU Negative 05/26/2020    UROBILINOGEN 0.2 05/26/2020    NITRU Negative 05/26/2020    LEUKOCYTESUR Negative 05/26/2020    LABMICR Not Indicated 05/26/2020    URINETYPE NotGiven 05/26/2020      Urine Microscopic:   Lab Results   Component Value Date    LABCAST 3-5 Hyaline 01/08/2020    BACTERIA 1+ 12/15/2010    HYALCAST 0 05/12/2020    WBCUA 3 05/12/2020    RBCUA 2 05/12/2020    EPIU 0 05/12/2020         Scheduled Meds:   aspirin  81 mg Oral Daily    atorvastatin  40 mg Oral Daily    insulin glargine  15 Units Subcutaneous Nightly    insulin lispro  5 Units Subcutaneous TID     sodium chloride flush  10 mL Intravenous 2 times per day    enoxaparin  40 mg Subcutaneous Daily    nicotine  1 patch Transdermal Daily    piperacillin-tazobactam  3.375 g Intravenous Q8H       Continuous Infusions:   dextrose      sodium chloride 250 mL/hr at 05/26/20 1618    dextrose 5 % and 0.45 % NaCl      insulin 22.95 Units/hr (05/26/20 1843)       PRN Meds:  sodium chloride flush, promethazine **OR** ondansetron, glucose, dextrose, glucagon (rDNA), dextrose, hydrALAZINE, dextrose, potassium chloride, magnesium sulfate, sodium phosphate IVPB **OR** sodium phosphate IVPB **OR** sodium phosphate IVPB, dextrose 5 % and 0.45 % NaCl, HYDROmorphone **OR** HYDROmorphone, labetalol    MICRO: cultures reviewed and updated by me       Urine Culture  No results for input(s): LABURIN in the last 72 hours.   Imaging:   CT ABDOMEN PELVIS W IV CONTRAST Additional Contrast? None   Final Result   Suspected circumferential wall thickening from the cecum into the distal Polysubstance abuse ([facility]    Pulmonary edema    RUQ abdominal pain    Type 1 diabetes mellitus with ketoacidosis without coma ([facility] [address]    Diabetic foot ulcer (HCC)    Heart murmur    Cellulitis of foot    Hyperlipidemia    Osteomyelitis of great toe of right foot ([facility] [address]    Epigastric abdominal pain    Hypertensive urgency    Hypokalemia    Diabetic gastroparesis associated with type 1 diabetes mellitus (HCC)     Type-1 DM  Poor control   Recurrent DKA  Previous foot infections  Rt great toe infection/osteomyelitis s/p resection on 5/13  PICC line in place  Ketosis +  Gastroparesis+  Medical non compliance    Rt great toe suture line intact needs to watch closely will cont IV abx as in patient and may be able to choose oral abx when ready     Needs to correct DKA now in ICU due to acidosis     Med compliance d/w pt         Labs, Microbiology, Radiology and pertinent results from current hospitalization and care every where were reviewed by me as a part of the consultation. PLAN :  1. Resume Home IV Zosyn x 3.375 gm x Q 8 hrs  2. ESR, CRP   3. Watch Rt foot   4. Correct DKA  5. Will change to oral abx if necessary at d/c or may be able to complete the intended therapy before d/c     Discussed with patient/Family and Nursing     Thanks for allowing me to participate in your patient's care please call me with any questions or concerns.     Dr. Franky Rankin MD  [address] [place] [address] Physician  Phone: [phone]   Fax : [phone]

## 2020-05-26 NOTE — ED PROVIDER NOTES
within normal limits    Narrative:     Performed at:  [facility]  [address] [place] [facility] [address]   Phone [phone]   HEPATIC FUNCTION PANEL    Narrative:     Performed at:  [facility]  [address] [place] [facility] [address]   Phone [phone]   URINE RT REFLEX TO CULTURE   URINE DRUG SCREEN   MAGNESIUM     All other labs were withinnormal range or not returned as of this dictation. EMERGENCY DEPARTMENT COURSE and DIFFERENTIAL DIAGNOSIS/MDM:      50 yr old presents with N/V/D. DM Hx DKA, Hx gastroparesis also Hx osteo with recent toe amputation with PICC line receiving IV zosyn (Issues with compliance). Labs show Mild DKA. Fluids insulin given and his gap almost closed. Continues with N/V. CT shows possible colitis. Admission for DKA, Intractable vomitting. CRITICAL CARE TIME   Total Critical Caretime was 39 minutes, excluding separately reportable procedures. There was a high probability of clinically significant/life threatening deterioration in the patient's condition which required my urgent intervention. PROCEDURES:  Unlessotherwise noted below, none    FINAL IMPRESSION      1.  Hyperglycemia          DISPOSITION/PLAN   DISPOSITION      Admission    (Please note that portions ofthis note were completed with a voice recognition program.  Efforts were made to edit the dictations but occasionally words are mis-transcribed.)    Brigette Dobbs MD(electronically signed)  Attending Emergency Physician        Brigette Dobbs MD  05/26/20 1828

## 2020-05-26 NOTE — ED NOTES
Patient resting in the bed with eyes closed, easily arrousable, updated on POC and nurse upstairs.        Scot Rizo RN  05/26/20 7346

## 2020-05-26 NOTE — ED TRIAGE NOTES
Pt comes to ER with complaints of high blood sugar and abdominal pain.   Pt states not eating or drinking no insulin

## 2020-05-26 NOTE — PROGRESS NOTES
Patient called this nurse into room stating he felt like his BP was. BP was 196/90. MD notified. New order received. Patient refused to have lunch time BS checked. Refused insulin. Refusing to use urinal or hat in toilet so we can send down a urine sample. MD aware.

## 2020-05-27 ENCOUNTER — TELEPHONE (OUTPATIENT)
Dept: INTERNAL MEDICINE CLINIC | Age: 45
End: 2020-05-27

## 2020-05-27 VITALS
SYSTOLIC BLOOD PRESSURE: 163 MMHG | BODY MASS INDEX: 18.4 KG/M2 | HEIGHT: 70 IN | TEMPERATURE: 98.2 F | DIASTOLIC BLOOD PRESSURE: 95 MMHG | RESPIRATION RATE: 16 BRPM | HEART RATE: 69 BPM | WEIGHT: 128.53 LBS | OXYGEN SATURATION: 98 %

## 2020-05-27 PROBLEM — K92.0 HEMATEMESIS WITH NAUSEA: Status: RESOLVED | Noted: 2018-09-25 | Resolved: 2020-05-27

## 2020-05-27 PROBLEM — L03.119 CELLULITIS OF FOOT: Status: RESOLVED | Noted: 2020-02-06 | Resolved: 2020-05-27

## 2020-05-27 PROBLEM — E87.6 HYPOKALEMIA: Status: RESOLVED | Noted: 2020-05-12 | Resolved: 2020-05-27

## 2020-05-27 LAB
ANION GAP SERPL CALCULATED.3IONS-SCNC: 10 MMOL/L (ref 3–16)
ANION GAP SERPL CALCULATED.3IONS-SCNC: 9 MMOL/L (ref 3–16)
BETA-HYDROXYBUTYRATE: 0.13 MMOL/L (ref 0–0.27)
BUN BLDV-MCNC: 13 MG/DL (ref 7–20)
BUN BLDV-MCNC: 14 MG/DL (ref 7–20)
CALCIUM SERPL-MCNC: 8.3 MG/DL (ref 8.3–10.6)
CALCIUM SERPL-MCNC: 8.4 MG/DL (ref 8.3–10.6)
CHLORIDE BLD-SCNC: 113 MMOL/L (ref 99–110)
CHLORIDE BLD-SCNC: 114 MMOL/L (ref 99–110)
CO2: 20 MMOL/L (ref 21–32)
CO2: 20 MMOL/L (ref 21–32)
CREAT SERPL-MCNC: 0.9 MG/DL (ref 0.9–1.3)
CREAT SERPL-MCNC: 1 MG/DL (ref 0.9–1.3)
ESTIMATED AVERAGE GLUCOSE: 260.4 MG/DL
GFR AFRICAN AMERICAN: >60
GFR AFRICAN AMERICAN: >60
GFR NON-AFRICAN AMERICAN: >60
GFR NON-AFRICAN AMERICAN: >60
GLUCOSE BLD-MCNC: 101 MG/DL (ref 70–99)
GLUCOSE BLD-MCNC: 108 MG/DL (ref 70–99)
GLUCOSE BLD-MCNC: 121 MG/DL (ref 70–99)
GLUCOSE BLD-MCNC: 124 MG/DL (ref 70–99)
GLUCOSE BLD-MCNC: 126 MG/DL (ref 70–99)
GLUCOSE BLD-MCNC: 136 MG/DL (ref 70–99)
GLUCOSE BLD-MCNC: 152 MG/DL (ref 70–99)
GLUCOSE BLD-MCNC: 154 MG/DL (ref 70–99)
GLUCOSE BLD-MCNC: 178 MG/DL (ref 70–99)
GLUCOSE BLD-MCNC: 205 MG/DL (ref 70–99)
GLUCOSE BLD-MCNC: 67 MG/DL (ref 70–99)
GLUCOSE BLD-MCNC: 73 MG/DL (ref 70–99)
GLUCOSE BLD-MCNC: 92 MG/DL (ref 70–99)
HBA1C MFR BLD: 10.7 %
MAGNESIUM: 1.8 MG/DL (ref 1.8–2.4)
MAGNESIUM: 1.9 MG/DL (ref 1.8–2.4)
PERFORMED ON: ABNORMAL
PERFORMED ON: NORMAL
PERFORMED ON: NORMAL
PHOSPHORUS: 1.1 MG/DL (ref 2.5–4.9)
PHOSPHORUS: 2.4 MG/DL (ref 2.5–4.9)
PHOSPHORUS: 2.6 MG/DL (ref 2.5–4.9)
POTASSIUM SERPL-SCNC: 3.2 MMOL/L (ref 3.5–5.1)
POTASSIUM SERPL-SCNC: 3.3 MMOL/L (ref 3.5–5.1)
POTASSIUM SERPL-SCNC: 3.4 MMOL/L (ref 3.5–5.1)
SODIUM BLD-SCNC: 142 MMOL/L (ref 136–145)
SODIUM BLD-SCNC: 144 MMOL/L (ref 136–145)

## 2020-05-27 PROCEDURE — 99232 SBSQ HOSP IP/OBS MODERATE 35: CPT | Performed by: INTERNAL MEDICINE

## 2020-05-27 PROCEDURE — 2500000003 HC RX 250 WO HCPCS: Performed by: FAMILY MEDICINE

## 2020-05-27 PROCEDURE — 2580000003 HC RX 258: Performed by: INTERNAL MEDICINE

## 2020-05-27 PROCEDURE — 80048 BASIC METABOLIC PNL TOTAL CA: CPT

## 2020-05-27 PROCEDURE — 96366 THER/PROPH/DIAG IV INF ADDON: CPT

## 2020-05-27 PROCEDURE — 6360000002 HC RX W HCPCS: Performed by: INTERNAL MEDICINE

## 2020-05-27 PROCEDURE — 96376 TX/PRO/DX INJ SAME DRUG ADON: CPT

## 2020-05-27 PROCEDURE — 96367 TX/PROPH/DG ADDL SEQ IV INF: CPT

## 2020-05-27 PROCEDURE — 96368 THER/DIAG CONCURRENT INF: CPT

## 2020-05-27 PROCEDURE — 2580000003 HC RX 258: Performed by: FAMILY MEDICINE

## 2020-05-27 PROCEDURE — 6370000000 HC RX 637 (ALT 250 FOR IP): Performed by: INTERNAL MEDICINE

## 2020-05-27 PROCEDURE — 6360000002 HC RX W HCPCS: Performed by: FAMILY MEDICINE

## 2020-05-27 PROCEDURE — 82010 KETONE BODYS QUAN: CPT

## 2020-05-27 PROCEDURE — 6370000000 HC RX 637 (ALT 250 FOR IP): Performed by: FAMILY MEDICINE

## 2020-05-27 PROCEDURE — 94760 N-INVAS EAR/PLS OXIMETRY 1: CPT

## 2020-05-27 PROCEDURE — G0378 HOSPITAL OBSERVATION PER HR: HCPCS

## 2020-05-27 PROCEDURE — 84132 ASSAY OF SERUM POTASSIUM: CPT

## 2020-05-27 PROCEDURE — 83735 ASSAY OF MAGNESIUM: CPT

## 2020-05-27 PROCEDURE — 96372 THER/PROPH/DIAG INJ SC/IM: CPT

## 2020-05-27 PROCEDURE — 84100 ASSAY OF PHOSPHORUS: CPT

## 2020-05-27 RX ORDER — METOCLOPRAMIDE 10 MG/1
10 TABLET ORAL
Qty: 20 TABLET | Refills: 0 | Status: SHIPPED | OUTPATIENT
Start: 2020-05-27 | End: 2020-06-16

## 2020-05-27 RX ORDER — INSULIN GLARGINE 100 [IU]/ML
15 INJECTION, SOLUTION SUBCUTANEOUS ONCE
Status: COMPLETED | OUTPATIENT
Start: 2020-05-27 | End: 2020-05-27

## 2020-05-27 RX ADMIN — POTASSIUM CHLORIDE 10 MEQ: 7.46 INJECTION, SOLUTION INTRAVENOUS at 07:01

## 2020-05-27 RX ADMIN — PIPERACILLIN AND TAZOBACTAM 3.38 G: 3; .375 INJECTION, POWDER, LYOPHILIZED, FOR SOLUTION INTRAVENOUS at 03:38

## 2020-05-27 RX ADMIN — INSULIN GLARGINE 15 UNITS: 100 INJECTION, SOLUTION SUBCUTANEOUS at 04:35

## 2020-05-27 RX ADMIN — POTASSIUM BICARBONATE 40 MEQ: 782 TABLET, EFFERVESCENT ORAL at 15:26

## 2020-05-27 RX ADMIN — PIPERACILLIN AND TAZOBACTAM 3.38 G: 3; .375 INJECTION, POWDER, LYOPHILIZED, FOR SOLUTION INTRAVENOUS at 11:58

## 2020-05-27 RX ADMIN — DEXTROSE AND SODIUM CHLORIDE 150 ML/HR: 5; 450 INJECTION, SOLUTION INTRAVENOUS at 03:38

## 2020-05-27 RX ADMIN — Medication 10 ML: at 08:54

## 2020-05-27 RX ADMIN — SODIUM PHOSPHATE, MONOBASIC, MONOHYDRATE 20 MMOL: 276; 142 INJECTION, SOLUTION INTRAVENOUS at 00:30

## 2020-05-27 RX ADMIN — HYDROMORPHONE HYDROCHLORIDE 0.5 MG: 1 INJECTION, SOLUTION INTRAMUSCULAR; INTRAVENOUS; SUBCUTANEOUS at 04:04

## 2020-05-27 RX ADMIN — POTASSIUM CHLORIDE 10 MEQ: 7.46 INJECTION, SOLUTION INTRAVENOUS at 08:52

## 2020-05-27 RX ADMIN — POTASSIUM CHLORIDE 10 MEQ: 7.46 INJECTION, SOLUTION INTRAVENOUS at 11:58

## 2020-05-27 RX ADMIN — INSULIN LISPRO 5 UNITS: 100 INJECTION, SOLUTION INTRAVENOUS; SUBCUTANEOUS at 15:26

## 2020-05-27 RX ADMIN — ONDANSETRON 4 MG: 2 INJECTION INTRAMUSCULAR; INTRAVENOUS at 04:35

## 2020-05-27 RX ADMIN — POTASSIUM CHLORIDE 10 MEQ: 7.46 INJECTION, SOLUTION INTRAVENOUS at 10:29

## 2020-05-27 RX ADMIN — INSULIN LISPRO 5 UNITS: 100 INJECTION, SOLUTION INTRAVENOUS; SUBCUTANEOUS at 10:59

## 2020-05-27 RX ADMIN — ASPIRIN 81 MG 81 MG: 81 TABLET ORAL at 08:52

## 2020-05-27 RX ADMIN — SODIUM PHOSPHATE, MONOBASIC, MONOHYDRATE 10 MMOL: 276; 142 INJECTION, SOLUTION INTRAVENOUS at 08:53

## 2020-05-27 RX ADMIN — POTASSIUM CHLORIDE 10 MEQ: 7.46 INJECTION, SOLUTION INTRAVENOUS at 05:44

## 2020-05-27 RX ADMIN — HYDROMORPHONE HYDROCHLORIDE 0.5 MG: 1 INJECTION, SOLUTION INTRAMUSCULAR; INTRAVENOUS; SUBCUTANEOUS at 12:57

## 2020-05-27 RX ADMIN — ATORVASTATIN CALCIUM 40 MG: 40 TABLET, FILM COATED ORAL at 08:52

## 2020-05-27 RX ADMIN — ENOXAPARIN SODIUM 40 MG: 40 INJECTION SUBCUTANEOUS at 08:52

## 2020-05-27 RX ADMIN — HYDROMORPHONE HYDROCHLORIDE 0.5 MG: 1 INJECTION, SOLUTION INTRAMUSCULAR; INTRAVENOUS; SUBCUTANEOUS at 09:01

## 2020-05-27 ASSESSMENT — PAIN - FUNCTIONAL ASSESSMENT
PAIN_FUNCTIONAL_ASSESSMENT: PREVENTS OR INTERFERES SOME ACTIVE ACTIVITIES AND ADLS
PAIN_FUNCTIONAL_ASSESSMENT: PREVENTS OR INTERFERES SOME ACTIVE ACTIVITIES AND ADLS

## 2020-05-27 ASSESSMENT — PAIN DESCRIPTION - ORIENTATION: ORIENTATION: RIGHT;LEFT

## 2020-05-27 ASSESSMENT — PAIN SCALES - GENERAL
PAINLEVEL_OUTOF10: 5
PAINLEVEL_OUTOF10: 8
PAINLEVEL_OUTOF10: 8
PAINLEVEL_OUTOF10: 5
PAINLEVEL_OUTOF10: 5
PAINLEVEL_OUTOF10: 0
PAINLEVEL_OUTOF10: 9
PAINLEVEL_OUTOF10: 5

## 2020-05-27 ASSESSMENT — PAIN DESCRIPTION - PAIN TYPE
TYPE: ACUTE PAIN
TYPE: ACUTE PAIN

## 2020-05-27 ASSESSMENT — PAIN DESCRIPTION - FREQUENCY
FREQUENCY: CONTINUOUS
FREQUENCY: CONTINUOUS

## 2020-05-27 ASSESSMENT — PAIN DESCRIPTION - ONSET
ONSET: ON-GOING
ONSET: ON-GOING

## 2020-05-27 ASSESSMENT — PAIN DESCRIPTION - PROGRESSION
CLINICAL_PROGRESSION: GRADUALLY IMPROVING
CLINICAL_PROGRESSION: GRADUALLY IMPROVING

## 2020-05-27 ASSESSMENT — PAIN DESCRIPTION - DESCRIPTORS
DESCRIPTORS: ACHING;DISCOMFORT
DESCRIPTORS: ACHING

## 2020-05-27 ASSESSMENT — PAIN DESCRIPTION - LOCATION
LOCATION: FOOT
LOCATION: FOOT

## 2020-05-27 NOTE — PROGRESS NOTES
Infectious Disease Follow up Notes  Admit Date: 5/26/2020  Hospital Day: 2    Antibiotics :   IV Zosyn     CHIEF COMPLAINT:   DKA  Rt foot infection   S/p Toe surgery     Subjective interval History :  50 y.o. Man with DM type 1 on insulin poor control several complications and previous toe infection s/p recent Rt GREAT TOE infection and osteomyelitis s/p resection on previous admit on 5/13 and was d/c to home on IV Zosyn through PICC line and now admitted with elevated BG and emesis, excess thirst and not feeling well and was in DKA and now tx to ICU for close observation. He has been non compliant with home care and IV abx and we are consulted for IV abx recommendations. Hydration better and no nausea no vomiting and tolerating IV abx ok says he has x7  Days of IV abx and would like to complete this before removing the PICC line       Past Medical History:    Past Medical History:   Diagnosis Date    [facility] mellitus ([facility]     Gastroparesis     Hypertension        Past Surgical History:    Past Surgical History:   Procedure Laterality Date    BONY PELVIS SURGERY      FOOT AMPUTATION Right 5/13/2020    EMERGENCY; RIGHT INCISION AND DEBRIDEMENT; HALUX AMPUTATION performed by Madeline Castellanos DPM at [address]    pins and rods- plate    UPPER GASTROINTESTINAL ENDOSCOPY N/A 12/2/2019    EGD BIOPSY performed by Cassy Julian MD at [facility]       Current Medications:    Outpatient Medications Marked as Taking for the 5/26/20 encounter [facility] Encounter)   Medication Sig Dispense Refill    pantoprazole (PROTONIX) 20 MG tablet Take 2 tablets by mouth daily 30 tablet 0    metoclopramide (REGLAN) 10 MG tablet Take 1 tablet by mouth 2 times daily as needed (Nausea) WARNING:  May cause drowsiness. May impair ability to operate vehicles or machinery. Do not use in combination with alcohol.  21 05/26/2020    IBILI see below 05/26/2020    LABALBU 4.2 05/26/2020       UA:  Lab Results   Component Value Date    COLORU YELLOW 05/26/2020    CLARITYU Clear 05/26/2020    GLUCOSEU >=1000 05/26/2020    GLUCOSEU >=1000 12/15/2010    BILIRUBINUR Negative 05/26/2020    BILIRUBINUR NEGATIVE 12/15/2010    KETUA >=80 05/26/2020    SPECGRAV 1.019 05/26/2020    BLOODU Negative 05/26/2020    PHUR 5.5 05/26/2020    PHUR 5.5 05/26/2020    PROTEINU Negative 05/26/2020    UROBILINOGEN 0.2 05/26/2020    NITRU Negative 05/26/2020    LEUKOCYTESUR Negative 05/26/2020    LABMICR Not Indicated 05/26/2020    URINETYPE NotGiven 05/26/2020      Urine Microscopic:   Lab Results   Component Value Date    LABCAST 3-5 Hyaline 01/08/2020    BACTERIA 1+ 12/15/2010    HYALCAST 0 05/12/2020    WBCUA 3 05/12/2020    RBCUA 2 05/12/2020    EPIU 0 05/12/2020     FINAL DIAGNOSIS:    Foot, right hallux, amputation:  -  Ulceration with underlying acute osteomyelitis. -  Bone and soft tissue margins negative for significant acute  inflammation.   KAITLYN/KAITLYN    MICRO: cultures reviewed and updated by me     Narrative   Performed by: [facility]   ORDER#: [id number]                          ORDERED BY: Luis Ponce  SOURCE: Foot Right                         COLLECTED:  05/13/20 09:20  ANTIBIOTICS AT TORRES. :                      RECEIVED :  05/14/20 07:07  Performed at:  [facility]  [address] [facility] [address]   Phone [phone]   Susceptibility     Citrobacter braakii (1)     Antibiotic Interpretation LOTUS Status    ampicillin Sensitive <=8 mcg/mL     ceFAZolin Resistant >16 mcg/mL     cefepime Sensitive <=2 mcg/mL     cefotaxime Sensitive <=2 mcg/mL     cefTRIAXone Sensitive <=1 mcg/mL     cefuroxime Sensitive 8 mcg/mL     ciprofloxacin Sensitive <=1 mcg/mL     ertapenem Sensitive <=0.5 mcg/mL     gentamicin Sensitive <=4 mcg/mL     meropenem Sensitive <=1 mcg/mL     piperacillin-tazobactam ([facility] [address] [facility]    Diabetic ketoacidosis without coma associated with diabetes mellitus due to underlying condition ([facility]    Chronic abdominal pain    Gastroparesis    GERD (gastroesophageal reflux disease)    Seizure (HCC)    Toe deformity    Uncontrolled type 1 diabetes mellitus with diabetic peripheral neuropathy (HCC)    Type 1 diabetes mellitus with hypoglycemia and without coma (HCC)    Type 1 diabetes mellitus with background diabetic retinopathy (HCC)    Hypoglycemia unawareness in type 1 diabetes mellitus (HCC)    Type 1 diabetes mellitus with hypercholesterolemia (HCC)    Essential hypertension    Accelerated hypertension    Acute blood loss anemia    Acute dyspnea    Acute respiratory failure (HCC)    Candida esophagitis (HCC)    Cholelithiasis    Cocaine abuse, episodic (HCC)    Cerebral infarction ([facility]    Diabetic peripheral neuropathy (HCC)    Diabetic polyneuropathy (HCC)    Duodenal ulcer    Elevated blood pressure    Glucosuria    Heart failure, diastolic, acute (HCC)    Hematemesis with nausea    Hyperglycemia    Hypoglycemia    Hypophosphatemia    Hypopotassemia    Hypotension    Hypoxia    Intractable nausea and vomiting    Lactic acidosis    Leg weakness, bilateral    Leucocytosis    Leukocytosis    Cannabis abuse    Metabolic acidosis, increased anion gap (IAG)    Nausea    Nausea and vomiting    Numbness in both legs    Polysubstance abuse (HCC)    Pulmonary edema    RUQ abdominal pain    Type 1 diabetes mellitus with ketoacidosis without coma (HCC)    Diabetic foot ulcer (HCC)    Heart murmur    Cellulitis of foot    Hyperlipidemia    Osteomyelitis of great toe of right foot (HCC)    Epigastric abdominal pain    Hypertensive urgency    Hypokalemia    Diabetic gastroparesis associated with type 1 diabetes mellitus (HCC)     Type-1 DM  Poor control   Recurrent DKA  Previous foot infections  Rt great toe infection/osteomyelitis s/p resection on

## 2020-05-27 NOTE — CARE COORDINATION
[facility]  Patient admitted less than 24 hours, No resumption of care orders needed. Faxed notes regarding this stay to [facility].   Jayna Odonnell LPN  CTN with  [facility],  [address] [place] [address], Fax [phone]

## 2020-05-27 NOTE — CARE COORDINATION
[facility]  Patient is current with Paty Lucio LPN  CTN with  [facility],  [address], Fax [phone]

## 2020-05-27 NOTE — PROGRESS NOTES
1900: Handoff report received from [facility]. 1920: Head to toe assessment complete, see Flowsheet. R great toe amputation and L foot amputations noted. Pt is NSR on the monitor with easy and unlabored respirations. Insulin gtt and IVF infusing via PICC. Pt c/o right toe pain 8/10, will given PRN pain medication. Urinal at the bedside. Will monitor. 1937: hourly blood sugar check performed. Titrated insulin gtt as order parameters. 1941: PRN pain medication given. 2034: hourly blood sugar check performed. Pt blood sugar 193, initiated DKA multiplier protocol, IVF switched to dextrose 5% WITH 0.45% sodium chloride as ordered. 2217: labs drawn and sent. Will review results when available. Pt talking to family on the phone and updated family. 2218: PRN zofran given per patient request for nausea. 2318: critical lab of phos called. Messaged pharmacy for phos replacement. 0030: sodium phos replacement started. 0230: labs drawn and sent. 0354: lab results reviewed and messaged hospitalist about results. 2894: new orders noted. 0435: Lantus given as ordered. 0544: started potassium replcement. 9964: insulin gtt stopped. 0720: dextrose 5% stopped. Handoff report given to Sandra Quinones.    Electronically signed by Octavio Flores RN on 5/27/2020 at 7:49 AM

## 2020-05-27 NOTE — CARE COORDINATION
Patient is currently active with [facility]. Delivery was just made on 5/26/2020.  Stop date for orders is 6/2/2020    Electronically signed by Qasim Storey on 5/27/2020 at 2:40 PM   Cell Ph# [phone], Office # [phone]

## 2020-05-28 ENCOUNTER — APPOINTMENT (OUTPATIENT)
Dept: GENERAL RADIOLOGY | Age: 45
DRG: 420 | End: 2020-05-28
Payer: MEDICAID

## 2020-05-28 ENCOUNTER — HOSPITAL ENCOUNTER (INPATIENT)
Age: 45
LOS: 3 days | Discharge: HOME | DRG: 420 | End: 2020-05-31
Attending: EMERGENCY MEDICINE | Admitting: INTERNAL MEDICINE
Payer: MEDICAID

## 2020-05-28 LAB
A/G RATIO: 1.2 (ref 1.1–2.2)
ALBUMIN SERPL-MCNC: 4.6 G/DL (ref 3.4–5)
ALP BLD-CCNC: 101 U/L (ref 40–129)
ALT SERPL-CCNC: 47 U/L (ref 10–40)
ANION GAP SERPL CALCULATED.3IONS-SCNC: 22 MMOL/L (ref 3–16)
ANION GAP SERPL CALCULATED.3IONS-SCNC: 9 MMOL/L (ref 3–16)
AST SERPL-CCNC: 56 U/L (ref 15–37)
BASE EXCESS VENOUS: -2.6 MMOL/L
BASOPHILS ABSOLUTE: 0.1 K/UL (ref 0–0.2)
BASOPHILS RELATIVE PERCENT: 0.7 %
BETA-HYDROXYBUTYRATE: 4.67 MMOL/L (ref 0–0.27)
BILIRUB SERPL-MCNC: 0.5 MG/DL (ref 0–1)
BILIRUBIN URINE: NEGATIVE
BLOOD, URINE: NEGATIVE
BUN BLDV-MCNC: 10 MG/DL (ref 7–20)
BUN BLDV-MCNC: 8 MG/DL (ref 7–20)
CALCIUM SERPL-MCNC: 8.7 MG/DL (ref 8.3–10.6)
CALCIUM SERPL-MCNC: 9.8 MG/DL (ref 8.3–10.6)
CARBOXYHEMOGLOBIN: 1 %
CHLORIDE BLD-SCNC: 101 MMOL/L (ref 99–110)
CHLORIDE BLD-SCNC: 109 MMOL/L (ref 99–110)
CLARITY: CLEAR
CO2: 19 MMOL/L (ref 21–32)
CO2: 26 MMOL/L (ref 21–32)
COLOR: YELLOW
CREAT SERPL-MCNC: 0.6 MG/DL (ref 0.9–1.3)
CREAT SERPL-MCNC: 0.8 MG/DL (ref 0.9–1.3)
EOSINOPHILS ABSOLUTE: 0.1 K/UL (ref 0–0.6)
EOSINOPHILS RELATIVE PERCENT: 0.6 %
EPITHELIAL CELLS, UA: 1 /HPF (ref 0–5)
GFR AFRICAN AMERICAN: >60
GFR AFRICAN AMERICAN: >60
GFR NON-AFRICAN AMERICAN: >60
GFR NON-AFRICAN AMERICAN: >60
GLOBULIN: 3.7 G/DL
GLUCOSE BLD-MCNC: 103 MG/DL (ref 70–99)
GLUCOSE BLD-MCNC: 111 MG/DL (ref 70–99)
GLUCOSE BLD-MCNC: 141 MG/DL
GLUCOSE BLD-MCNC: 141 MG/DL (ref 70–99)
GLUCOSE BLD-MCNC: 224 MG/DL (ref 70–99)
GLUCOSE BLD-MCNC: 244 MG/DL (ref 70–99)
GLUCOSE BLD-MCNC: 260 MG/DL (ref 70–99)
GLUCOSE BLD-MCNC: 64 MG/DL (ref 70–99)
GLUCOSE BLD-MCNC: 68 MG/DL (ref 70–99)
GLUCOSE BLD-MCNC: 80 MG/DL (ref 70–99)
GLUCOSE URINE: >=1000 MG/DL
HCG QUALITATIVE: NEGATIVE
HCO3 VENOUS: 22 MMOL/L (ref 23–29)
HCT VFR BLD CALC: 39.2 % (ref 40.5–52.5)
HEMOGLOBIN: 12.8 G/DL (ref 13.5–17.5)
HYALINE CASTS: 1 /LPF (ref 0–8)
KETONES, URINE: >=80 MG/DL
LEUKOCYTE ESTERASE, URINE: NEGATIVE
LIPASE: 7 U/L (ref 13–60)
LYMPHOCYTES ABSOLUTE: 1.6 K/UL (ref 1–5.1)
LYMPHOCYTES RELATIVE PERCENT: 16 %
MAGNESIUM: 1.8 MG/DL (ref 1.8–2.4)
MCH RBC QN AUTO: 26.4 PG (ref 26–34)
MCHC RBC AUTO-ENTMCNC: 32.6 G/DL (ref 31–36)
MCV RBC AUTO: 80.7 FL (ref 80–100)
METHEMOGLOBIN VENOUS: 0.6 %
MICROSCOPIC EXAMINATION: YES
MONOCYTES ABSOLUTE: 0.3 K/UL (ref 0–1.3)
MONOCYTES RELATIVE PERCENT: 2.5 %
NEUTROPHILS ABSOLUTE: 8.2 K/UL (ref 1.7–7.7)
NEUTROPHILS RELATIVE PERCENT: 80.2 %
NITRITE, URINE: NEGATIVE
O2 CONTENT, VEN: 14 ML/DL
O2 SAT, VEN: 79 %
O2 THERAPY: ABNORMAL
PCO2, VEN: 35.9 MMHG (ref 40–50)
PDW BLD-RTO: 17.7 % (ref 12.4–15.4)
PERFORMED ON: ABNORMAL
PH UA: 5.5 (ref 5–8)
PH VENOUS: 7.39 (ref 7.35–7.45)
PHOSPHORUS: 2 MG/DL (ref 2.5–4.9)
PLATELET # BLD: 396 K/UL (ref 135–450)
PMV BLD AUTO: 7 FL (ref 5–10.5)
PO2, VEN: 44 MMHG
POTASSIUM REFLEX MAGNESIUM: 3.7 MMOL/L (ref 3.5–5.1)
POTASSIUM SERPL-SCNC: 3.3 MMOL/L (ref 3.5–5.1)
PROTEIN UA: 30 MG/DL
RBC # BLD: 4.86 M/UL (ref 4.2–5.9)
RBC UA: 1 /HPF (ref 0–4)
SODIUM BLD-SCNC: 142 MMOL/L (ref 136–145)
SODIUM BLD-SCNC: 144 MMOL/L (ref 136–145)
SPECIFIC GRAVITY UA: 1.02 (ref 1–1.03)
TCO2 CALC VENOUS: 23 MMOL/L
TOTAL PROTEIN: 8.3 G/DL (ref 6.4–8.2)
TROPONIN: <0.01 NG/ML
URINE REFLEX TO CULTURE: YES
URINE TYPE: ABNORMAL
UROBILINOGEN, URINE: 0.2 E.U./DL
WBC # BLD: 10.2 K/UL (ref 4–11)
WBC UA: 10 /HPF (ref 0–5)

## 2020-05-28 PROCEDURE — 6370000000 HC RX 637 (ALT 250 FOR IP): Performed by: PEDIATRICS

## 2020-05-28 PROCEDURE — 96375 TX/PRO/DX INJ NEW DRUG ADDON: CPT

## 2020-05-28 PROCEDURE — 83735 ASSAY OF MAGNESIUM: CPT

## 2020-05-28 PROCEDURE — 2580000003 HC RX 258: Performed by: INTERNAL MEDICINE

## 2020-05-28 PROCEDURE — 6360000002 HC RX W HCPCS: Performed by: EMERGENCY MEDICINE

## 2020-05-28 PROCEDURE — 2580000003 HC RX 258: Performed by: EMERGENCY MEDICINE

## 2020-05-28 PROCEDURE — 82803 BLOOD GASES ANY COMBINATION: CPT

## 2020-05-28 PROCEDURE — 6360000002 HC RX W HCPCS: Performed by: PEDIATRICS

## 2020-05-28 PROCEDURE — 99285 EMERGENCY DEPT VISIT HI MDM: CPT

## 2020-05-28 PROCEDURE — 85025 COMPLETE CBC W/AUTO DIFF WBC: CPT

## 2020-05-28 PROCEDURE — 6360000002 HC RX W HCPCS: Performed by: INTERNAL MEDICINE

## 2020-05-28 PROCEDURE — 96374 THER/PROPH/DIAG INJ IV PUSH: CPT

## 2020-05-28 PROCEDURE — 82010 KETONE BODYS QUAN: CPT

## 2020-05-28 PROCEDURE — 2000000000 HC ICU R&B

## 2020-05-28 PROCEDURE — 96361 HYDRATE IV INFUSION ADD-ON: CPT

## 2020-05-28 PROCEDURE — 81001 URINALYSIS AUTO W/SCOPE: CPT

## 2020-05-28 PROCEDURE — 6370000000 HC RX 637 (ALT 250 FOR IP): Performed by: EMERGENCY MEDICINE

## 2020-05-28 PROCEDURE — 83690 ASSAY OF LIPASE: CPT

## 2020-05-28 PROCEDURE — 84703 CHORIONIC GONADOTROPIN ASSAY: CPT

## 2020-05-28 PROCEDURE — 84484 ASSAY OF TROPONIN QUANT: CPT

## 2020-05-28 PROCEDURE — 6370000000 HC RX 637 (ALT 250 FOR IP): Performed by: INTERNAL MEDICINE

## 2020-05-28 PROCEDURE — 87086 URINE CULTURE/COLONY COUNT: CPT

## 2020-05-28 PROCEDURE — 80053 COMPREHEN METABOLIC PANEL: CPT

## 2020-05-28 PROCEDURE — 96365 THER/PROPH/DIAG IV INF INIT: CPT

## 2020-05-28 PROCEDURE — 84100 ASSAY OF PHOSPHORUS: CPT

## 2020-05-28 PROCEDURE — 71045 X-RAY EXAM CHEST 1 VIEW: CPT

## 2020-05-28 RX ORDER — LABETALOL HYDROCHLORIDE 5 MG/ML
10 INJECTION, SOLUTION INTRAVENOUS EVERY 6 HOURS PRN
Status: DISCONTINUED | OUTPATIENT
Start: 2020-05-28 | End: 2020-05-31 | Stop reason: HOSPADM

## 2020-05-28 RX ORDER — LOSARTAN POTASSIUM 50 MG/1
50 TABLET ORAL DAILY
Status: DISCONTINUED | OUTPATIENT
Start: 2020-05-29 | End: 2020-05-31 | Stop reason: HOSPADM

## 2020-05-28 RX ORDER — ACETAMINOPHEN 500 MG
1000 TABLET ORAL EVERY 6 HOURS
Status: DISCONTINUED | OUTPATIENT
Start: 2020-05-28 | End: 2020-05-31 | Stop reason: HOSPADM

## 2020-05-28 RX ORDER — DEXTROSE MONOHYDRATE 25 G/50ML
12.5 INJECTION, SOLUTION INTRAVENOUS PRN
Status: DISCONTINUED | OUTPATIENT
Start: 2020-05-28 | End: 2020-05-31 | Stop reason: HOSPADM

## 2020-05-28 RX ORDER — METOCLOPRAMIDE 10 MG/1
10 TABLET ORAL
Status: DISCONTINUED | OUTPATIENT
Start: 2020-05-29 | End: 2020-05-31 | Stop reason: HOSPADM

## 2020-05-28 RX ORDER — ONDANSETRON 2 MG/ML
4 INJECTION INTRAMUSCULAR; INTRAVENOUS ONCE
Status: COMPLETED | OUTPATIENT
Start: 2020-05-28 | End: 2020-05-28

## 2020-05-28 RX ORDER — PANTOPRAZOLE SODIUM 40 MG/1
40 TABLET, DELAYED RELEASE ORAL DAILY
Status: DISCONTINUED | OUTPATIENT
Start: 2020-05-29 | End: 2020-05-31 | Stop reason: HOSPADM

## 2020-05-28 RX ORDER — ATORVASTATIN CALCIUM 40 MG/1
40 TABLET, FILM COATED ORAL DAILY
Status: DISCONTINUED | OUTPATIENT
Start: 2020-05-29 | End: 2020-05-31 | Stop reason: HOSPADM

## 2020-05-28 RX ORDER — ASPIRIN 81 MG/1
81 TABLET, CHEWABLE ORAL DAILY
Status: DISCONTINUED | OUTPATIENT
Start: 2020-05-29 | End: 2020-05-29

## 2020-05-28 RX ORDER — 0.9 % SODIUM CHLORIDE 0.9 %
1000 INTRAVENOUS SOLUTION INTRAVENOUS ONCE
Status: COMPLETED | OUTPATIENT
Start: 2020-05-28 | End: 2020-05-28

## 2020-05-28 RX ORDER — GABAPENTIN 300 MG/1
600 CAPSULE ORAL 3 TIMES DAILY
Status: DISCONTINUED | OUTPATIENT
Start: 2020-05-28 | End: 2020-05-31 | Stop reason: HOSPADM

## 2020-05-28 RX ORDER — 0.9 % SODIUM CHLORIDE 0.9 %
15 INTRAVENOUS SOLUTION INTRAVENOUS ONCE
Status: DISCONTINUED | OUTPATIENT
Start: 2020-05-28 | End: 2020-05-31 | Stop reason: HOSPADM

## 2020-05-28 RX ORDER — SODIUM CHLORIDE 9 MG/ML
INJECTION, SOLUTION INTRAVENOUS CONTINUOUS
Status: DISCONTINUED | OUTPATIENT
Start: 2020-05-28 | End: 2020-05-31 | Stop reason: HOSPADM

## 2020-05-28 RX ORDER — MAGNESIUM SULFATE 1 G/100ML
1 INJECTION INTRAVENOUS PRN
Status: DISCONTINUED | OUTPATIENT
Start: 2020-05-28 | End: 2020-05-31 | Stop reason: HOSPADM

## 2020-05-28 RX ORDER — OXYCODONE HYDROCHLORIDE 5 MG/1
5 TABLET ORAL EVERY 4 HOURS PRN
Status: DISCONTINUED | OUTPATIENT
Start: 2020-05-28 | End: 2020-05-31 | Stop reason: HOSPADM

## 2020-05-28 RX ORDER — DEXTROSE AND SODIUM CHLORIDE 5; .45 G/100ML; G/100ML
INJECTION, SOLUTION INTRAVENOUS CONTINUOUS PRN
Status: DISCONTINUED | OUTPATIENT
Start: 2020-05-28 | End: 2020-05-29

## 2020-05-28 RX ORDER — POTASSIUM CHLORIDE 29.8 MG/ML
20 INJECTION INTRAVENOUS PRN
Status: DISCONTINUED | OUTPATIENT
Start: 2020-05-28 | End: 2020-05-29

## 2020-05-28 RX ORDER — LACTOBACILLUS RHAMNOSUS GG 10B CELL
1 CAPSULE ORAL 2 TIMES DAILY WITH MEALS
Status: DISCONTINUED | OUTPATIENT
Start: 2020-05-29 | End: 2020-05-31 | Stop reason: HOSPADM

## 2020-05-28 RX ORDER — POTASSIUM CHLORIDE 7.45 MG/ML
10 INJECTION INTRAVENOUS PRN
Status: DISCONTINUED | OUTPATIENT
Start: 2020-05-28 | End: 2020-05-28

## 2020-05-28 RX ADMIN — SODIUM CHLORIDE 0.1 UNITS/KG/HR: 9 INJECTION, SOLUTION INTRAVENOUS at 17:33

## 2020-05-28 RX ADMIN — DEXTROSE AND SODIUM CHLORIDE: 5; 450 INJECTION, SOLUTION INTRAVENOUS at 19:22

## 2020-05-28 RX ADMIN — ONDANSETRON 4 MG: 2 INJECTION, SOLUTION INTRAMUSCULAR; INTRAVENOUS at 15:42

## 2020-05-28 RX ADMIN — HYDROMORPHONE HYDROCHLORIDE 0.5 MG: 1 INJECTION, SOLUTION INTRAMUSCULAR; INTRAVENOUS; SUBCUTANEOUS at 21:41

## 2020-05-28 RX ADMIN — SODIUM CHLORIDE 0.44 UNITS/HR: 9 INJECTION, SOLUTION INTRAVENOUS at 21:48

## 2020-05-28 RX ADMIN — HYDROMORPHONE HYDROCHLORIDE 1 MG: 1 INJECTION, SOLUTION INTRAMUSCULAR; INTRAVENOUS; SUBCUTANEOUS at 15:42

## 2020-05-28 RX ADMIN — ACETAMINOPHEN 1000 MG: 500 TABLET, FILM COATED ORAL at 21:41

## 2020-05-28 RX ADMIN — PIPERACILLIN AND TAZOBACTAM 3.38 G: 3; .375 INJECTION, POWDER, LYOPHILIZED, FOR SOLUTION INTRAVENOUS at 21:41

## 2020-05-28 RX ADMIN — POTASSIUM CHLORIDE 20 MEQ: 29.8 INJECTION, SOLUTION INTRAVENOUS at 23:15

## 2020-05-28 RX ADMIN — SODIUM CHLORIDE 1000 ML: 9 INJECTION, SOLUTION INTRAVENOUS at 17:24

## 2020-05-28 RX ADMIN — SODIUM CHLORIDE 1000 ML: 9 INJECTION, SOLUTION INTRAVENOUS at 16:02

## 2020-05-28 RX ADMIN — GABAPENTIN 600 MG: 300 CAPSULE ORAL at 21:41

## 2020-05-28 ASSESSMENT — ENCOUNTER SYMPTOMS
SHORTNESS OF BREATH: 0
BACK PAIN: 0
SORE THROAT: 0
EYE REDNESS: 0
NAUSEA: 1
DIARRHEA: 0
VOMITING: 1
RHINORRHEA: 0
ABDOMINAL PAIN: 1

## 2020-05-28 ASSESSMENT — PAIN DESCRIPTION - DESCRIPTORS
DESCRIPTORS: SHARP

## 2020-05-28 ASSESSMENT — PAIN SCALES - GENERAL
PAINLEVEL_OUTOF10: 10
PAINLEVEL_OUTOF10: 10
PAINLEVEL_OUTOF10: 2
PAINLEVEL_OUTOF10: 5
PAINLEVEL_OUTOF10: 10

## 2020-05-28 ASSESSMENT — PAIN DESCRIPTION - FREQUENCY
FREQUENCY: CONTINUOUS
FREQUENCY: CONTINUOUS

## 2020-05-28 ASSESSMENT — PAIN - FUNCTIONAL ASSESSMENT
PAIN_FUNCTIONAL_ASSESSMENT: PREVENTS OR INTERFERES WITH MANY ACTIVE NOT PASSIVE ACTIVITIES
PAIN_FUNCTIONAL_ASSESSMENT: PREVENTS OR INTERFERES SOME ACTIVE ACTIVITIES AND ADLS

## 2020-05-28 ASSESSMENT — PAIN DESCRIPTION - PAIN TYPE
TYPE: ACUTE PAIN

## 2020-05-28 ASSESSMENT — PAIN DESCRIPTION - ORIENTATION
ORIENTATION: MID
ORIENTATION: MID

## 2020-05-28 ASSESSMENT — PAIN DESCRIPTION - LOCATION
LOCATION: ABDOMEN
LOCATION: ABDOMEN
LOCATION: ABDOMEN;TOE (COMMENT WHICH ONE)
LOCATION: ABDOMEN

## 2020-05-28 ASSESSMENT — PAIN DESCRIPTION - ONSET
ONSET: PROGRESSIVE
ONSET: PROGRESSIVE

## 2020-05-28 ASSESSMENT — PAIN DESCRIPTION - PROGRESSION
CLINICAL_PROGRESSION: GRADUALLY WORSENING
CLINICAL_PROGRESSION: GRADUALLY WORSENING

## 2020-05-28 NOTE — H&P
tablet Take 1 tablet by mouth 3 times daily (with meals) WARNING:  May cause drowsiness. May impair ability to operate vehicles or machinery. Do not use in combination with alcohol. 5/27/20   Simran Han DO   pantoprazole (PROTONIX) 20 MG tablet Take 2 tablets by mouth daily 5/26/20   Krystyna Bull MD   aspirin 81 MG chewable tablet Take 1 tablet by mouth daily 5/15/20   Louise Dela Cruz MD   gabapentin (NEURONTIN) 300 MG capsule Take 2 capsules by mouth 3 times daily for 30 days. 5/15/20 6/14/20  Louise Dela Cruz MD   lactobacillus (CULTURELLE) capsule Take 1 capsule by mouth 2 times daily (with meals) 5/15/20 6/14/20  Louise Dela Cruz MD   atorvastatin (LIPITOR) 40 MG tablet Take 1 tablet by mouth daily 5/15/20 6/14/20  Louise Dela Cruz MD   losartan (COZAAR) 50 MG tablet Take 1 tablet by mouth daily 5/15/20 6/14/20  Louise Dela Cruz MD   blood glucose monitor strips Check blood sugars 4-5 times per day 5/15/20   Louise Dela Cruz MD   Continuous Blood Gluc Sensor (FREESTYLE HINA 14 DAY SENSOR) MISC Use for personal CGMS. Replace every 2 weeks. 5/15/20   Louise Dela Cruz MD   glucose monitoring kit (FREESTYLE) monitoring kit 1 kit by Does not apply route daily 5/15/20   Louise Dela Cruz MD   Lancets MISC 1 each by Does not apply route daily Check blood sugars 4-5 times per day 5/15/20   Louise Dela Cruz MD   insulin glargine (LANTUS) 100 UNIT/ML injection vial Inject 15 Units into the skin nightly 5/15/20   Louise Dela Cruz MD   Insulin Syringe-Needle U-100 (ULTRA-THIN II INS SYR SHORT) 31G X 5/16\" 1 ML MISC 1 each by Does not apply route 3 times daily 5/15/20   Louise Dela Cruz MD   insulin lispro (ADMELOG) 100 UNIT/ML injection vial 5 units for meals and 2 units for snacks 1/3/20   Fabien Crockett MD       Allergies:  Ketorolac; Morphine; Morphine and related; Tramadol; Lisinopril; Haloperidol lactate;  Toradol [ketorolac tromethamine]; and Vicodin [hydrocodone-acetaminophen]    Social History:  The patient currently lives with girlfriend    TOBACCO:   reports that he has been smoking. He started smoking about 11 years ago. He has never used smokeless tobacco.  ETOH:   reports current alcohol use. Family History:  Reviewed in detail and negative for DM, Early CAD, Cancer, CVA. Positive as follows:        Problem Relation Age of Onset    Diabetes Mother     Heart Disease Mother     High Blood Pressure Mother     Asthma Brother     Other Father         kidney disease    No Known Problems Maternal Grandmother     No Known Problems Maternal Grandfather     No Known Problems Paternal Grandmother     No Known Problems Paternal Grandfather        REVIEW OF SYSTEMS:   Positive as noted in the HPI. All other systems reviewed and negative. PHYSICAL EXAM:    BP (!) 199/97   Pulse 78   Temp 98.3 °F (36.8 °C) (Oral)   Resp 19   Ht 6' 2\" (1.88 m)   Wt 143 lb 15.4 oz (65.3 kg)   SpO2 100%   BMI 18.48 kg/m²     General appearance: No apparent distress appears stated age and cooperative. HEENT Normal cephalic, atraumatic without obvious deformity. Pupils equal, round, and reactive to light. Extra ocular muscles intact. Conjunctivae/corneas clear. Neck: Supple, trachea midline without thyromegaly or adenopathy with full range of motion. Lungs: Clear to auscultation, bilaterally without Rales/Wheezes/Rhonchi with good respiratory effort. Heart: Regular rate and rhythm with Normal S1/S2 without murmurs, rubs or gallops, point of maximum impulse non-displaced  Abdomen: Soft, non-tender or non-distended without rigidity or guarding and positive bowel sounds all four quadrants. Extremities: No clubbing, cyanosis, or edema bilaterally. Right big toe with stitches at tip, wound is slightly open  Skin: Skin color, texture, turgor normal.  No rashes or lesions.   Neurologic: Alert and oriented X 3, neurovascularly intact with sensory/motor intact upper

## 2020-05-28 NOTE — ED PROVIDER NOTES
adenopathy. Psychiatric/Behavioral: Negative for confusion. Except as noted above the remainder of the review of systems was reviewed and negative. PAST MEDICAL HISTORY     Past Medical History:   Diagnosis Date    Diabetes mellitus ([address]     Gastroparesis     Hypertension          SURGICALHISTORY       Past Surgical History:   Procedure Laterality Date    BONY PELVIS SURGERY      FOOT AMPUTATION Right 5/13/2020    EMERGENCY; RIGHT INCISION AND DEBRIDEMENT; HALUX AMPUTATION performed by Stvee Madden DPM at [address] 2006    pins and rods- plate    UPPER GASTROINTESTINAL ENDOSCOPY N/A 12/2/2019    EGD BIOPSY performed by Sandra Munoz MD at [address]       Previous Medications    ASPIRIN 81 MG CHEWABLE TABLET    Take 1 tablet by mouth daily    ATORVASTATIN (LIPITOR) 40 MG TABLET    Take 1 tablet by mouth daily    BLOOD GLUCOSE MONITOR STRIPS    Check blood sugars 4-5 times per day    CONTINUOUS BLOOD GLUC SENSOR (3D[employer] SystemsYLE HINA 14 DAY SENSOR) MISC    Use for personal CGMS. Replace every 2 weeks. GABAPENTIN (NEURONTIN) 300 MG CAPSULE    Take 2 capsules by mouth 3 times daily for 30 days.     GLUCOSE MONITORING KIT (FREESTYLE) MONITORING KIT    1 kit by Does not apply route daily    INSULIN GLARGINE (LANTUS) 100 UNIT/ML INJECTION VIAL    Inject 15 Units into the skin nightly    INSULIN LISPRO (ADMELOG) 100 UNIT/ML INJECTION VIAL    5 units for meals and 2 units for snacks    INSULIN SYRINGE-NEEDLE U-100 (ULTRA-THIN II INS SYR SHORT) 31G X 5/16\" 1 ML MISC    1 each by Does not apply route 3 times daily    LACTOBACILLUS (CULTURELLE) CAPSULE    Take 1 capsule by mouth 2 times daily (with meals)    LANCETS MISC    1 each by Does not apply route daily Check blood sugars 4-5 times per day    LOSARTAN (COZAAR) 50 MG TABLET    Take 1 tablet by mouth daily    METOCLOPRAMIDE (REGLAN) 10 MG TABLET    Take 1 tablet by mouth 3 times daily (with meals) WARNING:  May cause drowsiness. May impair ability to operate vehicles or machinery. Do not use in combination with alcohol. PANTOPRAZOLE (PROTONIX) 20 MG TABLET    Take 2 tablets by mouth daily       ALLERGIES     Ketorolac; Morphine; Morphine and related; Tramadol; Lisinopril; Haloperidol lactate;  Toradol [ketorolac tromethamine]; and Vicodin [hydrocodone-acetaminophen]    FAMILY HISTORY       Family History   Problem Relation Age of Onset    Diabetes Mother     Heart Disease Mother     High Blood Pressure Mother     Asthma Brother     Other Father         kidney disease    No Known Problems Maternal Grandmother     No Known Problems Maternal Grandfather     No Known Problems Paternal Grandmother     No Known Problems Paternal Grandfather           SOCIAL HISTORY       Social History     Socioeconomic History    Marital status: Legally      Spouse name: None    Number of children: 1    Years of education: None    Highest education level: None   Occupational History    Occupation: unemployed   Social Needs    Financial resource strain: None    Food insecurity     Worry: None     Inability: None    Transportation needs     Medical: None     Non-medical: None   Tobacco Use    Smoking status: Current Every Day Smoker     Start date: 3/26/2009    Smokeless tobacco: Never Used    Tobacco comment: black and mild 2  x daily   Substance and Sexual Activity    Alcohol use: Yes     Comment: socially 2 shots per month     Drug use: Yes     Frequency: 3.0 times per week     Types: Marijuana    Sexual activity: Yes     Partners: Female   Lifestyle    Physical activity     Days per week: None     Minutes per session: None    Stress: None   Relationships    Social connections     Talks on phone: None     Gets together: None     Attends Rastafarian service: None     Active member of club or organization: None     Attends meetings of clubs or organizations: None     Relationship status:

## 2020-05-29 LAB
ANION GAP SERPL CALCULATED.3IONS-SCNC: 9 MMOL/L (ref 3–16)
BUN BLDV-MCNC: 6 MG/DL (ref 7–20)
CALCIUM SERPL-MCNC: 8.2 MG/DL (ref 8.3–10.6)
CHLORIDE BLD-SCNC: 107 MMOL/L (ref 99–110)
CO2: 24 MMOL/L (ref 21–32)
CREAT SERPL-MCNC: 0.7 MG/DL (ref 0.9–1.3)
ESTIMATED AVERAGE GLUCOSE: 248.9 MG/DL
GFR AFRICAN AMERICAN: >60
GFR NON-AFRICAN AMERICAN: >60
GLUCOSE BLD-MCNC: 124 MG/DL (ref 70–99)
GLUCOSE BLD-MCNC: 137 MG/DL (ref 70–99)
GLUCOSE BLD-MCNC: 139 MG/DL (ref 70–99)
GLUCOSE BLD-MCNC: 159 MG/DL (ref 70–99)
GLUCOSE BLD-MCNC: 163 MG/DL (ref 70–99)
GLUCOSE BLD-MCNC: 166 MG/DL (ref 70–99)
GLUCOSE BLD-MCNC: 183 MG/DL (ref 70–99)
GLUCOSE BLD-MCNC: 183 MG/DL (ref 70–99)
GLUCOSE BLD-MCNC: 226 MG/DL (ref 70–99)
GLUCOSE BLD-MCNC: 41 MG/DL (ref 70–99)
GLUCOSE BLD-MCNC: 45 MG/DL (ref 70–99)
GLUCOSE BLD-MCNC: 48 MG/DL (ref 70–99)
GLUCOSE BLD-MCNC: 93 MG/DL (ref 70–99)
HBA1C MFR BLD: 10.3 %
MAGNESIUM: 1.6 MG/DL (ref 1.8–2.4)
PERFORMED ON: ABNORMAL
PERFORMED ON: NORMAL
PHOSPHORUS: 2 MG/DL (ref 2.5–4.9)
POTASSIUM SERPL-SCNC: 3.5 MMOL/L (ref 3.5–5.1)
SODIUM BLD-SCNC: 140 MMOL/L (ref 136–145)
URINE CULTURE, ROUTINE: NORMAL

## 2020-05-29 PROCEDURE — 2580000003 HC RX 258: Performed by: PEDIATRICS

## 2020-05-29 PROCEDURE — 6370000000 HC RX 637 (ALT 250 FOR IP): Performed by: INTERNAL MEDICINE

## 2020-05-29 PROCEDURE — 2580000003 HC RX 258: Performed by: INTERNAL MEDICINE

## 2020-05-29 PROCEDURE — 0HBRXZZ EXCISION OF TOE NAIL, EXTERNAL APPROACH: ICD-10-PCS | Performed by: PODIATRIST

## 2020-05-29 PROCEDURE — 6370000000 HC RX 637 (ALT 250 FOR IP): Performed by: PEDIATRICS

## 2020-05-29 PROCEDURE — 6360000002 HC RX W HCPCS: Performed by: PEDIATRICS

## 2020-05-29 PROCEDURE — 80048 BASIC METABOLIC PNL TOTAL CA: CPT

## 2020-05-29 PROCEDURE — 83036 HEMOGLOBIN GLYCOSYLATED A1C: CPT

## 2020-05-29 PROCEDURE — 2500000003 HC RX 250 WO HCPCS: Performed by: INTERNAL MEDICINE

## 2020-05-29 PROCEDURE — 83735 ASSAY OF MAGNESIUM: CPT

## 2020-05-29 PROCEDURE — 6360000002 HC RX W HCPCS: Performed by: INTERNAL MEDICINE

## 2020-05-29 PROCEDURE — 94760 N-INVAS EAR/PLS OXIMETRY 1: CPT

## 2020-05-29 PROCEDURE — 1200000000 HC SEMI PRIVATE

## 2020-05-29 PROCEDURE — 6370000000 HC RX 637 (ALT 250 FOR IP)

## 2020-05-29 PROCEDURE — 84100 ASSAY OF PHOSPHORUS: CPT

## 2020-05-29 RX ORDER — INSULIN GLARGINE 100 [IU]/ML
15 INJECTION, SOLUTION SUBCUTANEOUS ONCE
Status: COMPLETED | OUTPATIENT
Start: 2020-05-29 | End: 2020-05-29

## 2020-05-29 RX ORDER — DIPHENHYDRAMINE HCL 25 MG
25 TABLET ORAL EVERY 6 HOURS PRN
Status: DISCONTINUED | OUTPATIENT
Start: 2020-05-29 | End: 2020-05-31 | Stop reason: HOSPADM

## 2020-05-29 RX ORDER — ONDANSETRON 2 MG/ML
4 INJECTION INTRAMUSCULAR; INTRAVENOUS EVERY 6 HOURS PRN
Status: DISCONTINUED | OUTPATIENT
Start: 2020-05-29 | End: 2020-05-31 | Stop reason: HOSPADM

## 2020-05-29 RX ORDER — INSULIN GLARGINE 100 [IU]/ML
15 INJECTION, SOLUTION SUBCUTANEOUS NIGHTLY
Status: DISCONTINUED | OUTPATIENT
Start: 2020-05-29 | End: 2020-05-30

## 2020-05-29 RX ORDER — NICOTINE POLACRILEX 4 MG
15 LOZENGE BUCCAL PRN
Status: DISCONTINUED | OUTPATIENT
Start: 2020-05-29 | End: 2020-05-31 | Stop reason: HOSPADM

## 2020-05-29 RX ORDER — DEXTROSE MONOHYDRATE 50 MG/ML
100 INJECTION, SOLUTION INTRAVENOUS PRN
Status: DISCONTINUED | OUTPATIENT
Start: 2020-05-29 | End: 2020-05-31 | Stop reason: HOSPADM

## 2020-05-29 RX ORDER — DEXTROSE MONOHYDRATE 25 G/50ML
12.5 INJECTION, SOLUTION INTRAVENOUS PRN
Status: DISCONTINUED | OUTPATIENT
Start: 2020-05-29 | End: 2020-05-31 | Stop reason: HOSPADM

## 2020-05-29 RX ORDER — DIPHENHYDRAMINE HCL 25 MG
TABLET ORAL
Status: COMPLETED
Start: 2020-05-29 | End: 2020-05-29

## 2020-05-29 RX ORDER — INSULIN GLARGINE 100 [IU]/ML
15 INJECTION, SOLUTION SUBCUTANEOUS NIGHTLY
Status: DISCONTINUED | OUTPATIENT
Start: 2020-05-29 | End: 2020-05-29

## 2020-05-29 RX ADMIN — POTASSIUM CHLORIDE 20 MEQ: 29.8 INJECTION, SOLUTION INTRAVENOUS at 02:26

## 2020-05-29 RX ADMIN — Medication 1 CAPSULE: at 07:41

## 2020-05-29 RX ADMIN — DIPHENHYDRAMINE HCL 25 MG: 25 TABLET ORAL at 00:46

## 2020-05-29 RX ADMIN — PIPERACILLIN AND TAZOBACTAM 3.38 G: 3; .375 INJECTION, POWDER, LYOPHILIZED, FOR SOLUTION INTRAVENOUS at 05:43

## 2020-05-29 RX ADMIN — PANTOPRAZOLE SODIUM 40 MG: 40 TABLET, DELAYED RELEASE ORAL at 07:41

## 2020-05-29 RX ADMIN — ASPIRIN 81 MG 81 MG: 81 TABLET ORAL at 07:42

## 2020-05-29 RX ADMIN — INSULIN LISPRO 5 UNITS: 100 INJECTION, SOLUTION INTRAVENOUS; SUBCUTANEOUS at 09:14

## 2020-05-29 RX ADMIN — GABAPENTIN 600 MG: 300 CAPSULE ORAL at 07:41

## 2020-05-29 RX ADMIN — DEXTROSE AND SODIUM CHLORIDE: 5; 450 INJECTION, SOLUTION INTRAVENOUS at 04:39

## 2020-05-29 RX ADMIN — METOCLOPRAMIDE 10 MG: 10 TABLET ORAL at 07:41

## 2020-05-29 RX ADMIN — ACETAMINOPHEN 1000 MG: 500 TABLET, FILM COATED ORAL at 01:42

## 2020-05-29 RX ADMIN — ONDANSETRON 4 MG: 2 INJECTION INTRAMUSCULAR; INTRAVENOUS at 05:11

## 2020-05-29 RX ADMIN — ACETAMINOPHEN 1000 MG: 500 TABLET, FILM COATED ORAL at 07:41

## 2020-05-29 RX ADMIN — Medication 1 CAPSULE: at 18:05

## 2020-05-29 RX ADMIN — HYDROMORPHONE HYDROCHLORIDE 0.5 MG: 1 INJECTION, SOLUTION INTRAMUSCULAR; INTRAVENOUS; SUBCUTANEOUS at 14:10

## 2020-05-29 RX ADMIN — SODIUM PHOSPHATE, MONOBASIC, MONOHYDRATE 15 MMOL: 276; 142 INJECTION, SOLUTION INTRAVENOUS at 01:41

## 2020-05-29 RX ADMIN — ENOXAPARIN SODIUM 40 MG: 40 INJECTION SUBCUTANEOUS at 07:41

## 2020-05-29 RX ADMIN — HYDROMORPHONE HYDROCHLORIDE 0.5 MG: 1 INJECTION, SOLUTION INTRAMUSCULAR; INTRAVENOUS; SUBCUTANEOUS at 01:42

## 2020-05-29 RX ADMIN — INSULIN GLARGINE 15 UNITS: 100 INJECTION, SOLUTION SUBCUTANEOUS at 02:51

## 2020-05-29 RX ADMIN — INSULIN GLARGINE 15 UNITS: 100 INJECTION, SOLUTION SUBCUTANEOUS at 21:55

## 2020-05-29 RX ADMIN — ATORVASTATIN CALCIUM 40 MG: 40 TABLET, FILM COATED ORAL at 07:42

## 2020-05-29 RX ADMIN — HYDROMORPHONE HYDROCHLORIDE 0.5 MG: 1 INJECTION, SOLUTION INTRAMUSCULAR; INTRAVENOUS; SUBCUTANEOUS at 05:43

## 2020-05-29 RX ADMIN — METOCLOPRAMIDE 10 MG: 10 TABLET ORAL at 18:05

## 2020-05-29 RX ADMIN — HYDROMORPHONE HYDROCHLORIDE 0.5 MG: 1 INJECTION, SOLUTION INTRAMUSCULAR; INTRAVENOUS; SUBCUTANEOUS at 22:01

## 2020-05-29 RX ADMIN — LOSARTAN POTASSIUM 50 MG: 50 TABLET ORAL at 07:41

## 2020-05-29 RX ADMIN — INSULIN LISPRO 2 UNITS: 100 INJECTION, SOLUTION INTRAVENOUS; SUBCUTANEOUS at 09:14

## 2020-05-29 RX ADMIN — POTASSIUM CHLORIDE 20 MEQ: 29.8 INJECTION, SOLUTION INTRAVENOUS at 00:10

## 2020-05-29 RX ADMIN — DEXTROSE AND SODIUM CHLORIDE: 5; 450 INJECTION, SOLUTION INTRAVENOUS at 00:34

## 2020-05-29 RX ADMIN — HYDROMORPHONE HYDROCHLORIDE 0.5 MG: 1 INJECTION, SOLUTION INTRAMUSCULAR; INTRAVENOUS; SUBCUTANEOUS at 10:08

## 2020-05-29 RX ADMIN — GABAPENTIN 600 MG: 300 CAPSULE ORAL at 14:10

## 2020-05-29 RX ADMIN — ONDANSETRON 4 MG: 2 INJECTION INTRAMUSCULAR; INTRAVENOUS at 14:15

## 2020-05-29 RX ADMIN — INSULIN LISPRO 4 UNITS: 100 INJECTION, SOLUTION INTRAVENOUS; SUBCUTANEOUS at 18:04

## 2020-05-29 RX ADMIN — POTASSIUM CHLORIDE 20 MEQ: 29.8 INJECTION, SOLUTION INTRAVENOUS at 03:30

## 2020-05-29 RX ADMIN — DIPHENHYDRAMINE HCL 25 MG: 25 TABLET ORAL at 07:41

## 2020-05-29 RX ADMIN — ACETAMINOPHEN 1000 MG: 500 TABLET, FILM COATED ORAL at 19:51

## 2020-05-29 RX ADMIN — METOCLOPRAMIDE 10 MG: 10 TABLET ORAL at 12:21

## 2020-05-29 RX ADMIN — MAGNESIUM SULFATE HEPTAHYDRATE 1 G: 1 INJECTION, SOLUTION INTRAVENOUS at 05:49

## 2020-05-29 RX ADMIN — HYDROMORPHONE HYDROCHLORIDE 0.5 MG: 1 INJECTION, SOLUTION INTRAMUSCULAR; INTRAVENOUS; SUBCUTANEOUS at 18:05

## 2020-05-29 RX ADMIN — ACETAMINOPHEN 1000 MG: 500 TABLET, FILM COATED ORAL at 14:10

## 2020-05-29 RX ADMIN — GABAPENTIN 600 MG: 300 CAPSULE ORAL at 21:54

## 2020-05-29 RX ADMIN — PIPERACILLIN AND TAZOBACTAM 3.38 G: 3; .375 INJECTION, POWDER, LYOPHILIZED, FOR SOLUTION INTRAVENOUS at 15:19

## 2020-05-29 RX ADMIN — PIPERACILLIN AND TAZOBACTAM 3.38 G: 3; .375 INJECTION, POWDER, LYOPHILIZED, FOR SOLUTION INTRAVENOUS at 21:55

## 2020-05-29 RX ADMIN — MAGNESIUM SULFATE HEPTAHYDRATE 1 G: 1 INJECTION, SOLUTION INTRAVENOUS at 04:51

## 2020-05-29 ASSESSMENT — PAIN DESCRIPTION - FREQUENCY
FREQUENCY: CONTINUOUS
FREQUENCY: CONTINUOUS
FREQUENCY: INTERMITTENT
FREQUENCY: CONTINUOUS
FREQUENCY: CONTINUOUS

## 2020-05-29 ASSESSMENT — PAIN DESCRIPTION - ONSET
ONSET: ON-GOING
ONSET: PROGRESSIVE

## 2020-05-29 ASSESSMENT — PAIN DESCRIPTION - ORIENTATION
ORIENTATION: MID

## 2020-05-29 ASSESSMENT — PAIN DESCRIPTION - PAIN TYPE
TYPE: ACUTE PAIN

## 2020-05-29 ASSESSMENT — PAIN DESCRIPTION - DESCRIPTORS
DESCRIPTORS: SHARP

## 2020-05-29 ASSESSMENT — PAIN SCALES - GENERAL
PAINLEVEL_OUTOF10: 3
PAINLEVEL_OUTOF10: 10
PAINLEVEL_OUTOF10: 10
PAINLEVEL_OUTOF10: 0
PAINLEVEL_OUTOF10: 10
PAINLEVEL_OUTOF10: 4
PAINLEVEL_OUTOF10: 0
PAINLEVEL_OUTOF10: 10
PAINLEVEL_OUTOF10: 10
PAINLEVEL_OUTOF10: 9
PAINLEVEL_OUTOF10: 10
PAINLEVEL_OUTOF10: 0

## 2020-05-29 ASSESSMENT — PAIN DESCRIPTION - PROGRESSION
CLINICAL_PROGRESSION: GRADUALLY IMPROVING
CLINICAL_PROGRESSION: GRADUALLY WORSENING
CLINICAL_PROGRESSION: GRADUALLY IMPROVING
CLINICAL_PROGRESSION: GRADUALLY WORSENING
CLINICAL_PROGRESSION: GRADUALLY IMPROVING
CLINICAL_PROGRESSION: GRADUALLY WORSENING

## 2020-05-29 ASSESSMENT — PAIN DESCRIPTION - LOCATION
LOCATION: TOE (COMMENT WHICH ONE)

## 2020-05-29 NOTE — PROGRESS NOTES
Regular rhythm. No murmur or rub. No edema. GI: Non-tender. Non-distended. No hernia. Skin: Warm, dry, normal texture and turgor. No nodule on exposed extremities. Lymph: No cervical LAD. No supraclavicular LAD. M/S: No cyanosis. No clubbing. No joint deformity. Neuro: Moves all four extremities. CN 2-12 tested, no defect noted. Psych: Oriented x 3. No anxiety. Awake. Alert. Intact judgement and insight. Data    LABS  CBC:   Recent Labs     05/28/20  1507   WBC 10.2   HGB 12.8*   HCT 39.2*   MCV 80.7        BMP:   Recent Labs     05/27/20  1403 05/28/20  1507 05/28/20  1924 05/28/20  2130 05/29/20  0150   NA  --  142  --  144 140   K 3.3* 3.7  --  3.3* 3.5   CL  --  101  --  109 107   CO2  --  19*  --  26 24   PHOS 2.6  --   --  2.0* 2.0*   BUN  --  10  --  8 6*   CREATININE  --  0.8*  --  0.6* 0.7*   GLUCOSE  --  260* 141 80 163*     POC GLUCOSE:    Recent Labs     05/29/20  0055 05/29/20  0150 05/29/20  0253 05/29/20  0403 05/29/20  0723   POCGLU 139* 183* 159* 183* 166*     LIVER PROFILE:   Recent Labs     05/28/20  1507   AST 56*   ALT 47*   LIPASE 7.0*   LABALBU 4.6   BILITOT 0.5   ALKPHOS 101     PT/INR: No results for input(s): PROTIME, INR in the last 72 hours. APTT: No results for input(s): APTT in the last 72 hours. UA:  Recent Labs     05/28/20  1540   COLORU YELLOW   PHUR 5.5   WBCUA 10*   RBCUA 1   CLARITYU Clear   SPECGRAV 1.023   LEUKOCYTESUR Negative   UROBILINOGEN 0.2   BILIRUBINUR Negative   BLOODU Negative   GLUCOSEU >=1000*   KETUA >=80*     Microbiology:  Wound Culture: No results for input(s): WNDABS, ORG in the last 72 hours. Invalid input(s):  LABGRAM  Nasal Culture: No results for input(s): ORG, MRSAPCR in the last 72 hours. Blood Culture: No results for input(s): BC, BLOODCULT2 in the last 72 hours. Fungal Culture:   No results for input(s): FUNGSM in the last 72 hours. No results for input(s): FUNCXBLD in the last 72 hours.   CSF Culture:  No results for input(s): COLORCSF, APPEARCSF, CFTUBE, CLOTCSF, WBCCSF, RBCCSF, NEUTCSF, NUMCELLSCSF, LYMPHSCSF, MONOCSF, GLUCCSF, VOLCSF in the last 72 hours. Respiratory Culture:  No results for input(s): Edwina Suresh in the last 72 hours. AFB:No results for input(s): AFBSMEAR in the last 72 hours. Urine Culture  No results for input(s): LABURIN in the last 72 hours. RADIOLOGY:    XR CHEST PORTABLE   Final Result   Right PICC projects in normal position. No acute cardiopulmonary disease. CONSULTS:    IP CONSULT TO PODIATRY    ASSESSMENT AND PLAN:      Active Problems:    DKA, type 1, not at goal [facility])  Resolved Problems:    * No resolved hospital problems. *    The patient is a 50 y.o. male with PMH significant for IDDM, gastroparesis,  Rtoe osteo on zosyn with a PICC, who presents to [facility] with nausea, vomiting. And was discharged 1 day prior to admission. That admission was for DKA     DKA  -Resolved  -Switch to Lantus and sliding scale insulin  -Hypoglycemic at present cut back postprandial insulin    Gastroparesis  -Recurrent  -ct Reglan    Infection/osteomyelitis of the great toe  -S/p resection 5/13  -Continue IV Zosyn. Last day 6/2    HTN  -ct home meds  - monitor BP    Possible melena  -Stool for occult blood  - hold asa, lovenox  -monitor hb    DVT Prophylaxis: lovenox  Diet: DIET CARB CONTROL;  Code Status: Full Code        Discharge plan -tomorrow    The patient and / or the family were informed of the results of any tests, a time was given to answer questions, a plan was proposed and they agreed with plan. Discussed with consulting physicians, nursing and social work     The note was completed using EMR. Every effort was made to ensure accuracy; however, inadvertent computerized transcription errors may be present.        Kwadwo Haro MD

## 2020-05-29 NOTE — PROGRESS NOTES
Pt complained of pain rated 10/10 to right great toe. PRN pain medication given (see eMAR). Dressing to right great toe clean/dry/intact with ace wrap covering. Pt states that he placed the wrap over the dressing himself. Pt and visitor deny any other needs at this time. Call light within reach, pt demonstrates ability to call for assistance as needed. Will monitor. Pt states that pain is 0/10 upon reassessment.

## 2020-05-29 NOTE — CARE COORDINATION
INITIAL CASE MANAGEMENT ASSESSMENT    Reviewed chart, met with patient to assess possible discharge needs. Explained Case Management role/services. Living Situation: confirmed address. Lives w/ his girlfriend     ADLs: independent     DME: glucose monitor    PT/OT Recs: not ordered     Active Services: current w/ [facility] and [facility], was to finish continuous infusions of zosyn 6/2     Transportation: girlfriend can transport     Medications: confirmed [facility] [employer] [facility] as health coverage, rx are obtained at [facility] on [address]    PCP: scheduled new PCP appt w/ Dr Negro Cruz 6/8 at 1100, this visit will be virtual      HD/PD: n/a    PLAN/COMMENTS: previous visit w/ Dr Nakul Meza was cancelled as [facility] w/ new PCP appt and they will forward needed paperwork      SW/CM provided contact information for patient or family to call with any questions. SW/CM will follow and assist as needed.   Electronically signed by Taisha Paul RN on 5/29/2020 at 11:40 AM

## 2020-05-29 NOTE — PROGRESS NOTES
(65.3 kg)  · Admission Body Wt: 144 lb (65.3 kg)  · % Weight Change:  ,  11% loss in 3 months with wt noted on 2/5/20 at 162# .6 oz  · Ideal Body Wt: 190 lb (86.2 kg), % Ideal Body 76%  · BMI Classification: BMI 18.5 - 24.9 Normal Weight    Nutrition Interventions:   Continue current diet, Start ONS  Continued Inpatient Monitoring    Nutrition Evaluation:   · Evaluation: Goals set   · Goals: tolerate most appropriate form of nutrition    · Monitoring: Meal Intake, Supplement Intake, Diet Tolerance, Weight, Pertinent Labs, Diarrhea, Constipation, Nausea or Vomiting, Wound Healing      Electronically signed by Yamilka Wilhelm RD, LD on 5/29/20 at 12:34 PM EDT    Contact Number: [phone]

## 2020-05-29 NOTE — CONSULTS
Department of Podiatry Consult Note  Resident       Reason for Consult:  R foot wound, chronic, stitches coming out  Requesting Physician:  Lc Ozuna MD    CHIEF COMPLAINT:  Suture removal    HISTORY OF PRESENT ILLNESS:                The patient is a 50 y.o. male with significant past medical history of DM, HTN, and gastroparesis who is consulted for possible wound dehiscence with \"popped sutures\"l. Patient is status post partial hallux amputation, right foot 5/13/20. Patient has not followed up with Dr. Juliet Oliver post operatively. Patient denies pain or discomfort to the surgical site and denies redness, swelling, or drainage. Reports he ran from [employer] [facility] [employer]. Patient was admitted to the hospital secondary to gastroparesis. Denies fever, chills, shortness of breath, chest pain, or calf pain.     Past Medical History:        Diagnosis Date    Diabetes mellitus ([address]     Gastroparesis     Hypertension      Past Surgical History:        Procedure Laterality Date    BONY PELVIS SURGERY      FOOT AMPUTATION Right 5/13/2020    EMERGENCY; RIGHT INCISION AND DEBRIDEMENT; HALUX AMPUTATION performed by Kitty Sanchez DPM at [address] 2006    pins and rods- plate    UPPER GASTROINTESTINAL ENDOSCOPY N/A 12/2/2019    EGD BIOPSY performed by Mario Alston MD at [facility]     Current Medications:    Current Facility-Administered Medications: diphenhydrAMINE (BENADRYL) tablet 25 mg, 25 mg, Oral, Q6H PRN  glucose (GLUTOSE) 40 % oral gel 15 g, 15 g, Oral, PRN  dextrose 50 % IV solution, 12.5 g, Intravenous, PRN  glucagon (rDNA) injection 1 mg, 1 mg, Intramuscular, PRN  dextrose 5 % solution, 100 mL/hr, Intravenous, PRN  insulin lispro (HUMALOG) injection vial 5 Units, 5 Units, Subcutaneous, TID WC  insulin lispro (HUMALOG) injection vial 0-3 Units, 0-3 Units, Subcutaneous, Nightly  insulin glargine (LANTUS) injection vial 15 Units, 15 Units, Subcutaneous, Nightly  ondansetron (ZOFRAN) injection 4 mg, 4 strength is 5/5 for all pedal groups tested. No pain with palpation of the foot or ankle b/l. Ankle joint ROM is decreased in dorsiflexion with the knee extended. Right foot partial hallux amputation, right 4th and 5th digit amputation. ASSESSMENT:   1. S/p I&D with partial amputation, right hallux (DOS 5/13/20)  2. Onychomycosis toenails  3. DM2 with peripheral neuropathy      PLAN:  -Patient seen and evaluated at the bedside this AM  -VSS, no leukocytosis noted  -Right foot 2nd digit toenail trimmed and debrided using sterile bone cutter, patient tolerated without incident  -Right foot partial hallux amputation site redressed using: Xeroform, gauze, anna, and ACE bandage  -Weightbearing as tolerated in post-op shoe to the right foot      DISPO: S/p I&D with partial amputation right hallux (DOS 5/13/20). Sutures were not removed at this time, incision site redressed. Patient is to follow up with Dr. Corey Spence or Dr. Angela Weaver DPM upon discharge. - Will sign off on the patient. If any other pedal problems occur please contact us. Thank you for the opportunity to take part in the patient's care. The patient assessment and plan was discussed with Dr. Corey Spence DPM. Please page the on-call podiatry attending with any acute changes.       Marek Estevez DPM  Podiatric Resident PGY1  5/29/2020, 11:44 AM

## 2020-05-30 LAB
ANION GAP SERPL CALCULATED.3IONS-SCNC: 9 MMOL/L (ref 3–16)
BUN BLDV-MCNC: 7 MG/DL (ref 7–20)
CALCIUM SERPL-MCNC: 8.9 MG/DL (ref 8.3–10.6)
CHLORIDE BLD-SCNC: 102 MMOL/L (ref 99–110)
CO2: 27 MMOL/L (ref 21–32)
CREAT SERPL-MCNC: 0.7 MG/DL (ref 0.9–1.3)
GFR AFRICAN AMERICAN: >60
GFR NON-AFRICAN AMERICAN: >60
GLUCOSE BLD-MCNC: 105 MG/DL (ref 70–99)
GLUCOSE BLD-MCNC: 207 MG/DL (ref 70–99)
GLUCOSE BLD-MCNC: 232 MG/DL (ref 70–99)
GLUCOSE BLD-MCNC: 288 MG/DL (ref 70–99)
GLUCOSE BLD-MCNC: 55 MG/DL (ref 70–99)
GLUCOSE BLD-MCNC: 58 MG/DL (ref 70–99)
GLUCOSE BLD-MCNC: 75 MG/DL (ref 70–99)
GLUCOSE BLD-MCNC: 78 MG/DL (ref 70–99)
GLUCOSE BLD-MCNC: 89 MG/DL (ref 70–99)
GLUCOSE BLD-MCNC: 95 MG/DL (ref 70–99)
MAGNESIUM: 1.8 MG/DL (ref 1.8–2.4)
PERFORMED ON: ABNORMAL
PERFORMED ON: NORMAL
PHOSPHORUS: 3.8 MG/DL (ref 2.5–4.9)
POTASSIUM SERPL-SCNC: 3.6 MMOL/L (ref 3.5–5.1)
SODIUM BLD-SCNC: 138 MMOL/L (ref 136–145)

## 2020-05-30 PROCEDURE — 6360000002 HC RX W HCPCS: Performed by: INTERNAL MEDICINE

## 2020-05-30 PROCEDURE — 84100 ASSAY OF PHOSPHORUS: CPT

## 2020-05-30 PROCEDURE — 2580000003 HC RX 258: Performed by: INTERNAL MEDICINE

## 2020-05-30 PROCEDURE — 1200000000 HC SEMI PRIVATE

## 2020-05-30 PROCEDURE — 6370000000 HC RX 637 (ALT 250 FOR IP): Performed by: PEDIATRICS

## 2020-05-30 PROCEDURE — 80048 BASIC METABOLIC PNL TOTAL CA: CPT

## 2020-05-30 PROCEDURE — 94760 N-INVAS EAR/PLS OXIMETRY 1: CPT

## 2020-05-30 PROCEDURE — 36592 COLLECT BLOOD FROM PICC: CPT

## 2020-05-30 PROCEDURE — 6360000002 HC RX W HCPCS: Performed by: PEDIATRICS

## 2020-05-30 PROCEDURE — 83735 ASSAY OF MAGNESIUM: CPT

## 2020-05-30 PROCEDURE — 6370000000 HC RX 637 (ALT 250 FOR IP): Performed by: INTERNAL MEDICINE

## 2020-05-30 RX ORDER — INSULIN GLARGINE 100 [IU]/ML
10 INJECTION, SOLUTION SUBCUTANEOUS NIGHTLY
Status: DISCONTINUED | OUTPATIENT
Start: 2020-05-30 | End: 2020-05-31 | Stop reason: HOSPADM

## 2020-05-30 RX ORDER — PROMETHAZINE HYDROCHLORIDE 25 MG/ML
25 INJECTION, SOLUTION INTRAMUSCULAR; INTRAVENOUS EVERY 6 HOURS PRN
Status: DISCONTINUED | OUTPATIENT
Start: 2020-05-30 | End: 2020-05-30 | Stop reason: CLARIF

## 2020-05-30 RX ADMIN — LOSARTAN POTASSIUM 50 MG: 50 TABLET ORAL at 07:54

## 2020-05-30 RX ADMIN — HYDROMORPHONE HYDROCHLORIDE 0.5 MG: 1 INJECTION, SOLUTION INTRAMUSCULAR; INTRAVENOUS; SUBCUTANEOUS at 02:09

## 2020-05-30 RX ADMIN — Medication 25 MG: at 11:08

## 2020-05-30 RX ADMIN — Medication 1 CAPSULE: at 16:57

## 2020-05-30 RX ADMIN — PIPERACILLIN AND TAZOBACTAM 3.38 G: 3; .375 INJECTION, POWDER, LYOPHILIZED, FOR SOLUTION INTRAVENOUS at 13:18

## 2020-05-30 RX ADMIN — ONDANSETRON 4 MG: 2 INJECTION INTRAMUSCULAR; INTRAVENOUS at 07:53

## 2020-05-30 RX ADMIN — HYDROMORPHONE HYDROCHLORIDE 0.5 MG: 1 INJECTION, SOLUTION INTRAMUSCULAR; INTRAVENOUS; SUBCUTANEOUS at 06:40

## 2020-05-30 RX ADMIN — PIPERACILLIN AND TAZOBACTAM 3.38 G: 3; .375 INJECTION, POWDER, LYOPHILIZED, FOR SOLUTION INTRAVENOUS at 06:33

## 2020-05-30 RX ADMIN — GABAPENTIN 600 MG: 300 CAPSULE ORAL at 13:17

## 2020-05-30 RX ADMIN — OXYCODONE 5 MG: 5 TABLET ORAL at 21:59

## 2020-05-30 RX ADMIN — ACETAMINOPHEN 1000 MG: 500 TABLET, FILM COATED ORAL at 02:09

## 2020-05-30 RX ADMIN — ACETAMINOPHEN 1000 MG: 500 TABLET, FILM COATED ORAL at 21:48

## 2020-05-30 RX ADMIN — GABAPENTIN 600 MG: 300 CAPSULE ORAL at 07:54

## 2020-05-30 RX ADMIN — OXYCODONE 5 MG: 5 TABLET ORAL at 18:07

## 2020-05-30 RX ADMIN — GABAPENTIN 600 MG: 300 CAPSULE ORAL at 21:49

## 2020-05-30 RX ADMIN — OXYCODONE 5 MG: 5 TABLET ORAL at 09:55

## 2020-05-30 RX ADMIN — METOCLOPRAMIDE 10 MG: 10 TABLET ORAL at 07:53

## 2020-05-30 RX ADMIN — METOCLOPRAMIDE 10 MG: 10 TABLET ORAL at 13:18

## 2020-05-30 RX ADMIN — ACETAMINOPHEN 1000 MG: 500 TABLET, FILM COATED ORAL at 07:53

## 2020-05-30 RX ADMIN — PANTOPRAZOLE SODIUM 40 MG: 40 TABLET, DELAYED RELEASE ORAL at 07:53

## 2020-05-30 RX ADMIN — ATORVASTATIN CALCIUM 40 MG: 40 TABLET, FILM COATED ORAL at 07:53

## 2020-05-30 RX ADMIN — INSULIN LISPRO 3 UNITS: 100 INJECTION, SOLUTION INTRAVENOUS; SUBCUTANEOUS at 21:50

## 2020-05-30 RX ADMIN — METOCLOPRAMIDE 10 MG: 10 TABLET ORAL at 16:57

## 2020-05-30 RX ADMIN — Medication 25 MG: at 22:33

## 2020-05-30 RX ADMIN — PIPERACILLIN AND TAZOBACTAM 3.38 G: 3; .375 INJECTION, POWDER, LYOPHILIZED, FOR SOLUTION INTRAVENOUS at 22:00

## 2020-05-30 RX ADMIN — INSULIN GLARGINE 10 UNITS: 100 INJECTION, SOLUTION SUBCUTANEOUS at 21:49

## 2020-05-30 RX ADMIN — ACETAMINOPHEN 1000 MG: 500 TABLET, FILM COATED ORAL at 13:20

## 2020-05-30 RX ADMIN — Medication 1 CAPSULE: at 07:54

## 2020-05-30 ASSESSMENT — PAIN DESCRIPTION - DESCRIPTORS
DESCRIPTORS: ACHING
DESCRIPTORS: DISCOMFORT
DESCRIPTORS: DISCOMFORT
DESCRIPTORS: ACHING
DESCRIPTORS: DISCOMFORT

## 2020-05-30 ASSESSMENT — PAIN DESCRIPTION - PAIN TYPE
TYPE: ACUTE PAIN
TYPE_2: CHRONIC PAIN
TYPE: ACUTE PAIN

## 2020-05-30 ASSESSMENT — PAIN - FUNCTIONAL ASSESSMENT: PAIN_FUNCTIONAL_ASSESSMENT: PREVENTS OR INTERFERES SOME ACTIVE ACTIVITIES AND ADLS

## 2020-05-30 ASSESSMENT — PAIN DESCRIPTION - ORIENTATION
ORIENTATION_2: RIGHT
ORIENTATION: MID

## 2020-05-30 ASSESSMENT — PAIN DESCRIPTION - LOCATION
LOCATION: TOE (COMMENT WHICH ONE)
LOCATION: TOE (COMMENT WHICH ONE)
LOCATION: ABDOMEN;TOE (COMMENT WHICH ONE)
LOCATION: TOE (COMMENT WHICH ONE)
LOCATION_2: TOE (COMMENT WHICH ONE)
LOCATION: ABDOMEN;TOE (COMMENT WHICH ONE)
LOCATION: TOE (COMMENT WHICH ONE)
LOCATION: ABDOMEN

## 2020-05-30 ASSESSMENT — PAIN SCALES - GENERAL
PAINLEVEL_OUTOF10: 4
PAINLEVEL_OUTOF10: 10
PAINLEVEL_OUTOF10: 9
PAINLEVEL_OUTOF10: 10
PAINLEVEL_OUTOF10: 8
PAINLEVEL_OUTOF10: 5
PAINLEVEL_OUTOF10: 5
PAINLEVEL_OUTOF10: 9
PAINLEVEL_OUTOF10: 9

## 2020-05-30 ASSESSMENT — PAIN DESCRIPTION - INTENSITY: RATING_2: 10

## 2020-05-30 ASSESSMENT — PAIN DESCRIPTION - FREQUENCY: FREQUENCY: INTERMITTENT

## 2020-05-30 ASSESSMENT — PAIN DESCRIPTION - PROGRESSION: CLINICAL_PROGRESSION: GRADUALLY IMPROVING

## 2020-05-30 ASSESSMENT — PAIN DESCRIPTION - ONSET: ONSET: ON-GOING

## 2020-05-30 NOTE — FLOWSHEET NOTE
Instructed that stool for occult still needed. Stated that his stool is back to normal now and sample not needed. Also has removed dressing to right great toe, sutures intact and open to air.

## 2020-05-30 NOTE — PROGRESS NOTES
Dressing present on the toe. Lymph: No cervical LAD. No supraclavicular LAD. M/S: No cyanosis. No clubbing. No joint deformity. Neuro: Moves all four extremities. CN 2-12 tested, no defect noted. Psych: Oriented x 3. No anxiety. Awake. Alert. Intact judgement and insight. Data    LABS  CBC:   Recent Labs     05/28/20  1507   WBC 10.2   HGB 12.8*   HCT 39.2*   MCV 80.7        BMP:   Recent Labs     05/28/20  2130 05/29/20  0150 05/30/20  0630    140 138   K 3.3* 3.5 3.6    107 102   CO2 26 24 27   PHOS 2.0* 2.0* 3.8   BUN 8 6* 7   CREATININE 0.6* 0.7* 0.7*   GLUCOSE 80 163* 95     POC GLUCOSE:    Recent Labs     05/29/20  2047 05/30/20  0118 05/30/20  0600 05/30/20  0711 05/30/20  0734   POCGLU 124* 89 55* 78 75     LIVER PROFILE:   Recent Labs     05/28/20  1507   AST 56*   ALT 47*   LIPASE 7.0*   LABALBU 4.6   BILITOT 0.5   ALKPHOS 101     PT/INR: No results for input(s): PROTIME, INR in the last 72 hours. APTT: No results for input(s): APTT in the last 72 hours. UA:  Recent Labs     05/28/20  1540   COLORU YELLOW   PHUR 5.5   WBCUA 10*   RBCUA 1   CLARITYU Clear   SPECGRAV 1.023   LEUKOCYTESUR Negative   UROBILINOGEN 0.2   BILIRUBINUR Negative   BLOODU Negative   GLUCOSEU >=1000*   KETUA >=80*     Microbiology:  Wound Culture: No results for input(s): WNDABS, ORG in the last 72 hours. Invalid input(s):  LABGRAM  Nasal Culture: No results for input(s): ORG, MRSAPCR in the last 72 hours. Blood Culture: No results for input(s): BC, BLOODCULT2 in the last 72 hours. Fungal Culture:   No results for input(s): FUNGSM in the last 72 hours. No results for input(s): FUNCXBLD in the last 72 hours. CSF Culture:  No results for input(s): COLORCSF, APPEARCSF, CFTUBE, CLOTCSF, WBCCSF, RBCCSF, NEUTCSF, NUMCELLSCSF, LYMPHSCSF, MONOCSF, GLUCCSF, VOLCSF in the last 72 hours. Respiratory Culture:  No results for input(s): Bridgette Bear in the last 72 hours.   AFB:No results for input(s): AFBSMEAR in the last 72 hours. Urine Culture  Recent Labs     05/28/20  1540   LABURIN No growth at 18-36 hours       RADIOLOGY:    XR CHEST PORTABLE   Final Result   Right PICC projects in normal position. No acute cardiopulmonary disease. CONSULTS:    IP CONSULT TO PODIATRY    ASSESSMENT AND PLAN:      Active Problems:    DKA, type 1, not at goal [facility])  Resolved Problems:    * No resolved hospital problems. *    The patient is a 50 y.o. male with PMH significant for IDDM, gastroparesis,  Rtoe osteo on zosyn with a PICC, who presents to [facility] with nausea, vomiting. And was discharged 1 day prior to admission. That admission was for DKA     DKA  -Resolved    Type 1 diabetes  -Patient is having episodes of hypoglycemia. Cut back Lantus  -Hypoglycemic at present cut back postprandial insulin    Gastroparesis-persistent nausea will add Phenergan  -Recurrent  -ct Reglan    Infection/osteomyelitis of the great toe  -S/p resection 5/13  -Continue IV Zosyn. Last day 6/2    HTN  -ct home meds  - monitor BP    Possible melena-now resolved  -Stool for occult blood, could not be sent. At present patient does not want to give a sample  - hold asa, lovenox  -monitor hb, ordered for tomorrow    DVT Prophylaxis: lovenox  Diet: DIET CARB CONTROL; Dietary Nutrition Supplements: Diabetic Oral Supplement  Code Status: Full Code        Discharge plan -tomorrow if able to tolerate a diet    The patient and / or the family were informed of the results of any tests, a time was given to answer questions, a plan was proposed and they agreed with plan. Discussed with consulting physicians, nursing and social work     The note was completed using EMR. Every effort was made to ensure accuracy; however, inadvertent computerized transcription errors may be present.        Maryann Alvarez MD

## 2020-05-31 VITALS
HEART RATE: 69 BPM | BODY MASS INDEX: 18.45 KG/M2 | SYSTOLIC BLOOD PRESSURE: 141 MMHG | WEIGHT: 143.74 LBS | RESPIRATION RATE: 16 BRPM | TEMPERATURE: 97.9 F | DIASTOLIC BLOOD PRESSURE: 84 MMHG | HEIGHT: 74 IN | OXYGEN SATURATION: 99 %

## 2020-05-31 LAB
ANION GAP SERPL CALCULATED.3IONS-SCNC: 10 MMOL/L (ref 3–16)
BUN BLDV-MCNC: 13 MG/DL (ref 7–20)
CALCIUM SERPL-MCNC: 9 MG/DL (ref 8.3–10.6)
CHLORIDE BLD-SCNC: 104 MMOL/L (ref 99–110)
CO2: 27 MMOL/L (ref 21–32)
CREAT SERPL-MCNC: 0.8 MG/DL (ref 0.9–1.3)
GFR AFRICAN AMERICAN: >60
GFR NON-AFRICAN AMERICAN: >60
GLUCOSE BLD-MCNC: 121 MG/DL (ref 70–99)
GLUCOSE BLD-MCNC: 129 MG/DL (ref 70–99)
GLUCOSE BLD-MCNC: 164 MG/DL (ref 70–99)
GLUCOSE BLD-MCNC: 306 MG/DL (ref 70–99)
GLUCOSE BLD-MCNC: 61 MG/DL (ref 70–99)
GLUCOSE BLD-MCNC: 80 MG/DL (ref 70–99)
HCT VFR BLD CALC: 36.2 % (ref 40.5–52.5)
HEMOGLOBIN: 12 G/DL (ref 13.5–17.5)
MCH RBC QN AUTO: 26.7 PG (ref 26–34)
MCHC RBC AUTO-ENTMCNC: 33.2 G/DL (ref 31–36)
MCV RBC AUTO: 80.5 FL (ref 80–100)
PDW BLD-RTO: 16.8 % (ref 12.4–15.4)
PERFORMED ON: ABNORMAL
PLATELET # BLD: 333 K/UL (ref 135–450)
PMV BLD AUTO: 7.5 FL (ref 5–10.5)
POTASSIUM SERPL-SCNC: 3.8 MMOL/L (ref 3.5–5.1)
RBC # BLD: 4.5 M/UL (ref 4.2–5.9)
SODIUM BLD-SCNC: 141 MMOL/L (ref 136–145)
WBC # BLD: 6.6 K/UL (ref 4–11)

## 2020-05-31 PROCEDURE — 80048 BASIC METABOLIC PNL TOTAL CA: CPT

## 2020-05-31 PROCEDURE — 2580000003 HC RX 258: Performed by: INTERNAL MEDICINE

## 2020-05-31 PROCEDURE — 94760 N-INVAS EAR/PLS OXIMETRY 1: CPT

## 2020-05-31 PROCEDURE — 85027 COMPLETE CBC AUTOMATED: CPT

## 2020-05-31 PROCEDURE — 6370000000 HC RX 637 (ALT 250 FOR IP): Performed by: PEDIATRICS

## 2020-05-31 PROCEDURE — 6360000002 HC RX W HCPCS: Performed by: INTERNAL MEDICINE

## 2020-05-31 PROCEDURE — 6370000000 HC RX 637 (ALT 250 FOR IP): Performed by: INTERNAL MEDICINE

## 2020-05-31 PROCEDURE — 6360000002 HC RX W HCPCS: Performed by: PEDIATRICS

## 2020-05-31 RX ORDER — TRAMADOL HYDROCHLORIDE 50 MG/1
50 TABLET ORAL EVERY 6 HOURS PRN
Qty: 12 TABLET | Refills: 0 | Status: SHIPPED | OUTPATIENT
Start: 2020-05-31 | End: 2020-06-03

## 2020-05-31 RX ORDER — PROMETHAZINE HYDROCHLORIDE 25 MG/1
25 TABLET ORAL 4 TIMES DAILY PRN
Qty: 20 TABLET | Refills: 0 | Status: SHIPPED | OUTPATIENT
Start: 2020-05-31 | End: 2020-06-07

## 2020-05-31 RX ADMIN — ACETAMINOPHEN 1000 MG: 500 TABLET, FILM COATED ORAL at 03:17

## 2020-05-31 RX ADMIN — GABAPENTIN 600 MG: 300 CAPSULE ORAL at 07:50

## 2020-05-31 RX ADMIN — LOSARTAN POTASSIUM 50 MG: 50 TABLET ORAL at 07:50

## 2020-05-31 RX ADMIN — Medication 1 CAPSULE: at 07:51

## 2020-05-31 RX ADMIN — INSULIN LISPRO 8 UNITS: 100 INJECTION, SOLUTION INTRAVENOUS; SUBCUTANEOUS at 07:51

## 2020-05-31 RX ADMIN — PANTOPRAZOLE SODIUM 40 MG: 40 TABLET, DELAYED RELEASE ORAL at 07:50

## 2020-05-31 RX ADMIN — HYDROMORPHONE HYDROCHLORIDE 0.5 MG: 1 INJECTION, SOLUTION INTRAMUSCULAR; INTRAVENOUS; SUBCUTANEOUS at 07:51

## 2020-05-31 RX ADMIN — ACETAMINOPHEN 1000 MG: 500 TABLET, FILM COATED ORAL at 07:51

## 2020-05-31 RX ADMIN — METOCLOPRAMIDE 10 MG: 10 TABLET ORAL at 07:50

## 2020-05-31 RX ADMIN — PIPERACILLIN AND TAZOBACTAM 3.38 G: 3; .375 INJECTION, POWDER, LYOPHILIZED, FOR SOLUTION INTRAVENOUS at 06:48

## 2020-05-31 RX ADMIN — OXYCODONE 5 MG: 5 TABLET ORAL at 03:17

## 2020-05-31 RX ADMIN — ATORVASTATIN CALCIUM 40 MG: 40 TABLET, FILM COATED ORAL at 07:51

## 2020-05-31 RX ADMIN — OXYCODONE 5 MG: 5 TABLET ORAL at 10:56

## 2020-05-31 ASSESSMENT — PAIN SCALES - GENERAL
PAINLEVEL_OUTOF10: 9
PAINLEVEL_OUTOF10: 0
PAINLEVEL_OUTOF10: 5
PAINLEVEL_OUTOF10: 8
PAINLEVEL_OUTOF10: 3
PAINLEVEL_OUTOF10: 8
PAINLEVEL_OUTOF10: 0

## 2020-05-31 ASSESSMENT — PAIN DESCRIPTION - DESCRIPTORS
DESCRIPTORS: ACHING
DESCRIPTORS: ACHING
DESCRIPTORS: DISCOMFORT

## 2020-05-31 ASSESSMENT — PAIN DESCRIPTION - LOCATION
LOCATION: TOE (COMMENT WHICH ONE)

## 2020-05-31 ASSESSMENT — PAIN DESCRIPTION - ORIENTATION: ORIENTATION: MID

## 2020-05-31 ASSESSMENT — PAIN SCALES - WONG BAKER
WONGBAKER_NUMERICALRESPONSE: 0

## 2020-05-31 ASSESSMENT — PAIN DESCRIPTION - INTENSITY: RATING_2: 0

## 2020-05-31 ASSESSMENT — PAIN DESCRIPTION - PAIN TYPE
TYPE: ACUTE PAIN

## 2020-05-31 NOTE — PLAN OF CARE
Problem: Pain:  Goal: Pain level will decrease  Description: Pain level will decrease  Outcome: Ongoing  Goal: Control of acute pain  Description: Control of acute pain  Outcome: Ongoing  Goal: Control of chronic pain  Description: Control of chronic pain  Outcome: Ongoing     Problem: Falls - Risk of:  Goal: Will remain free from falls  Description: Will remain free from falls  5/31/2020 0807 by Shakeel Lopez RN  Outcome: Ongoing  5/31/2020 0117 by Karyle Grade, RN  Outcome: Ongoing  Goal: Absence of physical injury  Description: Absence of physical injury  Outcome: Ongoing     Problem: Nutrition  Goal: Optimal nutrition therapy  Outcome: Ongoing     Problem: Discharge Planning:  Goal: Discharged to appropriate level of care  Description: Discharged to appropriate level of care  Outcome: Ongoing     Problem: Serum Glucose Level - Abnormal:  Goal: Ability to maintain appropriate glucose levels will improve  Description: Ability to maintain appropriate glucose levels will improve  Outcome: Ongoing     Problem: Skin Integrity:  Goal: Will show no infection signs and symptoms  Description: Will show no infection signs and symptoms  5/31/2020 0807 by Shakeel Lopez RN  Outcome: Ongoing  5/31/2020 0117 by Karyle Grade, RN  Outcome: Ongoing  Goal: Absence of new skin breakdown  Description: Absence of new skin breakdown  Outcome: Ongoing

## 2020-05-31 NOTE — PROGRESS NOTES
Pt discharged. Tramadol ordered for discharge, however pt stated he is allergic, pageanabel ARMANDO. MD stated pt has history of polysubstance abuse and she can't order anything else. Discharge instructions read and given to pt. Pt picked up by family.

## 2020-05-31 NOTE — DISCHARGE SUMMARY
Hospital Medicine Discharge Summary      Patient ID: Patti Del Rio      Patient's PCP: No primary care provider on file. Admit Date: 5/28/2020     Discharge Date: 5/31/2020  The patient was seen and examined on day of discharge and this discharge summary is in conjunction with any daily progress note from day of discharge. Admitting Physician: Zainab Darling MD    Discharge Physician: Kadie Armstrong MD     Admitted for   Chief Complaint   Patient presents with    Abdominal Pain     Patient was just released from the hospital yesterday. patient was admitted for DKA.  Emesis       Admitting Diagnosis DKA, type 1, not at goal [facility]) [E10.10]    Discharge Diagnoses: Active Hospital Problems    Diagnosis Date Noted    Uncontrolled type 1 diabetes mellitus with diabetic peripheral neuropathy ([facility] [address] [E10.42, E10.65] 09/19/2018    DKA, type 1, not at goal [facility]) [E10.10] 01/08/2018    Gastroparesis [K31.84] 04/12/2010       Follow Up: Primary Care Physician in one week    PCP to Follow up on   Up with podiatry          Hospital Course: The patient is a 50 y. o. male with PMH significant for IDDM, gastroparesis,  Rtoe osteo on zosyn with a PICC, who presents to [facility] with nausea, vomiting. And was discharged 1 day prior to admission. That admission was for DKA . Patient was admitted again for DKA with gastroparesis flare which improved with insulin and IV fluids. Adjusting his blood glucose in the hospital was a challenge due to hypoglycemia due to nausea and vomiting. At the time of discharge patient is tolerating a diet. Discussed with the patient about his diabetic regimen, patient states that his sugars are well controlled on Lantus of 15 along with pre-meal of 5 and sliding scale. Patient keeps a log of his blood sugars. Explained to the patient that if his sugars drops he will need to stop his pre-meal insulin and call his PCP to optimize his regimen.   Patient is requesting for personal CGMS. Replace every 2 weeks. gabapentin (NEURONTIN) 300 MG capsule  Take 2 capsules by mouth 3 times daily for 30 days. glucose monitoring kit (FREESTYLE) monitoring kit  1 kit by Does not apply route daily             insulin glargine (LANTUS) 100 UNIT/ML injection vial  Inject 15 Units into the skin nightly             insulin lispro (ADMELOG) 100 UNIT/ML injection vial  5 units for meals and 2 units for snacks             Insulin Syringe-Needle U-100 (ULTRA-THIN II INS SYR SHORT) 31G X 5/16\" 1 ML MISC  1 each by Does not apply route 3 times daily             lactobacillus (CULTURELLE) capsule  Take 1 capsule by mouth 2 times daily (with meals)             Lancets MISC  1 each by Does not apply route daily Check blood sugars 4-5 times per day             losartan (COZAAR) 50 MG tablet  Take 1 tablet by mouth daily             metoclopramide (REGLAN) 10 MG tablet  Take 1 tablet by mouth 3 times daily (with meals) WARNING:  May cause drowsiness. May impair ability to operate vehicles or machinery. Do not use in combination with alcohol. pantoprazole (PROTONIX) 20 MG tablet  Take 2 tablets by mouth daily             promethazine (PHENERGAN) 25 MG tablet  Take 1 tablet by mouth 4 times daily as needed for Nausea             traMADol (ULTRAM) 50 MG tablet  Take 1 tablet by mouth every 6 hours as needed for Pain for up to 3 days. Intended supply: 3 days. Take lowest dose possible to manage pain                 Future Appointments   Date Time Provider Trinidad Curtis   6/8/2020 11:00 AM Jyoti Trent MD [facility]       Time Spent on discharge is more than 45 minutes in the examination, evaluation, counseling and review of medications and discharge plan. Signed:  Kwadwo Haro MD   5/31/2020    The note was completed using EMR. Every effort was made to ensure accuracy; however, inadvertent computerized transcription errors may be present.      Thank

## 2020-05-31 NOTE — DISCHARGE INSTR - COC
(1800kcals/day)    Routes of Feeding: Oral  Liquids: Thin Liquids  Daily Fluid Restriction: no  Last Modified Barium Swallow with Video (Video Swallowing Test): not done    Treatments at the Time of Hospital Discharge:   Respiratory Treatments: ***  Oxygen Therapy:  is not on home oxygen therapy. Ventilator:    - No ventilator support    Rehab Therapies: {THERAPEUTIC INTERVENTION:8674199029}  Weight Bearing Status/Restrictions: [address] [employer] Weight Bearin}  Other Medical Equipment (for information only, NOT a DME order):  {EQUIPMENT:456242164}  Other Treatments: IV antibiotics, was already on before admission    Patient's personal belongings (please select all that are sent with patient):  {[facility] DME Belongings:538826488}    RN SIGNATURE:  Electronically signed by Ignacia Teixeira RN on 20 at 11:31 AM EDT    CASE MANAGEMENT/SOCIAL WORK SECTION    Inpatient Status Date: 2020    Readmission Risk Assessment Score:  Readmission Risk              Risk of Unplanned Readmission:        52           Discharging to Facility/ Agency   · Name: [facility]  · Address:  · Phone: [phone]  · Fax:  [phone]    · Name: Rafael Mota  · Phone:  [phone]    / signature: Electronically signed by Nidia Hernandez RN on 20 at 11:44 AM EDT    PHYSICIAN SECTION    Prognosis: Fair    Condition at Discharge: Stable    Rehab Potential (if transferring to Rehab): Fair    Recommended Labs or Other Treatments After Discharge: needs to finish zosyn course , last day 81    Physician Certification: I certify the above information and transfer of Soto Girard  is necessary for the continuing treatment of the diagnosis listed and that he requires [address] for less 30 days.      Update Admission H&P: No change in H&P    PHYSICIAN SIGNATURE:  Electronically signed by Elon Lesch, MD on 20 at 10:13 AM EDT

## 2020-05-31 NOTE — PROGRESS NOTES
Non-distended. No hernia. Skin: Partial amputation of the toe presents which was present no active infection seen  Lymph: No cervical LAD. No supraclavicular LAD. M/S: No cyanosis. No clubbing. No joint deformity. Neuro: Moves all four extremities. CN 2-12 tested, no defect noted. Psych: Oriented x 3. No anxiety. Awake. Alert. Intact judgement and insight. Data    LABS  CBC:   Recent Labs     05/28/20  1507 05/31/20  0605   WBC 10.2 6.6   HGB 12.8* 12.0*   HCT 39.2* 36.2*   MCV 80.7 80.5    333     BMP:   Recent Labs     05/28/20  2130 05/29/20  0150 05/30/20  0630 05/31/20  0605    140 138 141   K 3.3* 3.5 3.6 3.8    107 102 104   CO2 26 24 27 27   PHOS 2.0* 2.0* 3.8  --    BUN 8 6* 7 13   CREATININE 0.6* 0.7* 0.7* 0.8*   GLUCOSE 80 163* 95 80     POC GLUCOSE:    Recent Labs     05/30/20  2106 05/31/20  0039 05/31/20  0447 05/31/20  0543 05/31/20  0735   POCGLU 288* 129* 61* 164* 306*     LIVER PROFILE:   Recent Labs     05/28/20  1507   AST 56*   ALT 47*   LIPASE 7.0*   LABALBU 4.6   BILITOT 0.5   ALKPHOS 101     PT/INR: No results for input(s): PROTIME, INR in the last 72 hours. APTT: No results for input(s): APTT in the last 72 hours. UA:  Recent Labs     05/28/20  1540   COLORU YELLOW   PHUR 5.5   WBCUA 10*   RBCUA 1   CLARITYU Clear   SPECGRAV 1.023   LEUKOCYTESUR Negative   UROBILINOGEN 0.2   BILIRUBINUR Negative   BLOODU Negative   GLUCOSEU >=1000*   KETUA >=80*     Microbiology:  Wound Culture: No results for input(s): WNDABS, ORG in the last 72 hours. Invalid input(s):  LABGRAM  Nasal Culture: No results for input(s): ORG, MRSAPCR in the last 72 hours. Blood Culture: No results for input(s): BC, BLOODCULT2 in the last 72 hours. Fungal Culture:   No results for input(s): FUNGSM in the last 72 hours. No results for input(s): FUNCXBLD in the last 72 hours.   CSF Culture:  No results for input(s): COLORCSF, APPEARCSF, CFTUBE, CLOTCSF, WBCCSF, RBCCSF, NEUTCSF, NUMCELLSCSF, diet  - will add Phenergan  -Recurrent  -ct Reglan    Infection/osteomyelitis of the great toe  -S/p resection 5/13  -Continue IV Zosyn. Last day 6/2    HTN  -ct home meds  - monitor BP    Possible melena-now resolved  -Stool for occult blood, could not be sent. At present patient does not want to give a sample  - hold asa, lovenox  -monitor hb, ordered for tomorrow    DVT Prophylaxis: lovenox  Diet: DIET CARB CONTROL; Dietary Nutrition Supplements: Diabetic Oral Supplement  Code Status: Full Code        Discharge plan -today    The patient and / or the family were informed of the results of any tests, a time was given to answer questions, a plan was proposed and they agreed with plan. Discussed with consulting physicians, nursing and social work     The note was completed using EMR. Every effort was made to ensure accuracy; however, inadvertent computerized transcription errors may be present.        Yanna Meeks MD

## 2020-06-01 ENCOUNTER — TELEPHONE (OUTPATIENT)
Dept: INFECTIOUS DISEASES | Age: 45
End: 2020-06-01

## 2020-06-01 LAB
ANION GAP SERPL CALCULATED.3IONS-SCNC: 14 MMOL/L (ref 3–16)
BASOPHILS ABSOLUTE: 0.1 K/UL (ref 0–0.2)
BASOPHILS RELATIVE PERCENT: 0.6 %
BUN BLDV-MCNC: 30 MG/DL (ref 7–20)
C-REACTIVE PROTEIN: 10.3 MG/L (ref 0–5.1)
CALCIUM SERPL-MCNC: 8.7 MG/DL (ref 8.3–10.6)
CHLORIDE BLD-SCNC: 96 MMOL/L (ref 99–110)
CO2: 22 MMOL/L (ref 21–32)
CREAT SERPL-MCNC: 1.6 MG/DL (ref 0.9–1.3)
EOSINOPHILS ABSOLUTE: 0.7 K/UL (ref 0–0.6)
EOSINOPHILS RELATIVE PERCENT: 7.7 %
GFR AFRICAN AMERICAN: 56
GFR NON-AFRICAN AMERICAN: 46
GLUCOSE BLD-MCNC: 702 MG/DL (ref 70–99)
HCT VFR BLD CALC: 31.5 % (ref 40.5–52.5)
HEMOGLOBIN: 10.2 G/DL (ref 13.5–17.5)
LYMPHOCYTES ABSOLUTE: 1.2 K/UL (ref 1–5.1)
LYMPHOCYTES RELATIVE PERCENT: 13.3 %
MCH RBC QN AUTO: 26.7 PG (ref 26–34)
MCHC RBC AUTO-ENTMCNC: 32.2 G/DL (ref 31–36)
MCV RBC AUTO: 83 FL (ref 80–100)
MONOCYTES ABSOLUTE: 0.5 K/UL (ref 0–1.3)
MONOCYTES RELATIVE PERCENT: 5.4 %
NEUTROPHILS ABSOLUTE: 6.8 K/UL (ref 1.7–7.7)
NEUTROPHILS RELATIVE PERCENT: 73 %
PDW BLD-RTO: 17.3 % (ref 12.4–15.4)
PLATELET # BLD: 294 K/UL (ref 135–450)
PMV BLD AUTO: 8 FL (ref 5–10.5)
POTASSIUM SERPL-SCNC: 4.5 MMOL/L (ref 3.5–5.1)
RBC # BLD: 3.8 M/UL (ref 4.2–5.9)
SEDIMENTATION RATE, ERYTHROCYTE: 22 MM/HR (ref 0–15)
SODIUM BLD-SCNC: 132 MMOL/L (ref 136–145)
WBC # BLD: 9.3 K/UL (ref 4–11)

## 2020-06-04 ENCOUNTER — NURSE TRIAGE (OUTPATIENT)
Dept: OTHER | Facility: CLINIC | Age: 45
End: 2020-06-04

## 2020-06-04 NOTE — TELEPHONE ENCOUNTER
Patient called [facility]) to schedule appointment, with red flag complaint, transferred to RN access for triage. Reports having PICC line removed yesterday with swelling to bilateral legs noted. States having shortness of breath with exertion, which is new for him. Reports having difficulty urinating, as well. States only having a few drops of urine but sensation of full bladder. Patient informed of disposition. Care advice as documented. Instructed patient to call back with worsening symptoms. Patient verbalized understanding and denies any further questions/concerns. Please do not respond to the triage nurse through this encounter. Any subsequent communication should be directly with the patient.     Reason for Disposition   [1] Difficulty breathing with exertion (e.g., walking) AND [2] new onset or worsening    Protocols used: LEG SWELLING AND EDEMA-ADULT-AH

## 2020-06-08 NOTE — DISCHARGE SUMMARY
cooperative. HEENT:  Normal cephalic, atraumatic without obvious deformity. Pupils equal, round, and reactive to light. Extra ocular muscles intact. Conjunctivae/corneas clear. Neck: Supple, with full range of motion. No jugular venous distention. Trachea midline. Respiratory:  Normal respiratory effort. Clear to auscultation, bilaterally without Rales/Wheezes/Rhonchi. Cardiovascular:  Regular rate and rhythm with normal S1/S2 without murmurs, rubs or gallops. Abdomen: Soft, non-tender, non-distended with normal bowel sounds. Musculoskeletal:  No clubbing, cyanosis or edema bilaterally. Full range of motion without deformity. Skin: Skin color, texture, turgor normal.  No rashes or lesions. Neurologic:  Neurovascularly intact without any focal sensory/motor deficits. Cranial nerves: II-XII intact, grossly non-focal.  Psychiatric:  Alert and oriented, thought content appropriate, normal insight    Consults:     IP CONSULT TO INFECTIOUS DISEASES    Labs:  For convenience and continuity at follow-up the following most recent labs are provided:    Lab Results   Component Value Date    WBC 9.3 06/01/2020    HGB 10.2 06/01/2020    HCT 31.5 06/01/2020    MCV 83.0 06/01/2020     06/01/2020     06/01/2020    K 4.5 06/01/2020    K 3.7 05/28/2020    CL 96 06/01/2020    CO2 22 06/01/2020    BUN 30 06/01/2020    CREATININE 1.6 06/01/2020    CALCIUM 8.7 06/01/2020    PHOS 3.8 05/30/2020    BNP 10.0 04/10/2010    ALKPHOS 101 05/28/2020    ALT 47 05/28/2020    AST 56 05/28/2020    BILITOT 0.5 05/28/2020    BILIDIR <0.2 05/26/2020    LABALBU 4.6 05/28/2020    LDLCALC 114 03/26/2018    TRIG 66 03/26/2018     Lab Results   Component Value Date    INR 0.93 01/08/2020    INR 1.02 04/10/2010       Radiology:  CT ABDOMEN PELVIS W IV CONTRAST Additional Contrast? None   Final Result   Suspected circumferential wall thickening from the cecum into the distal   descending colon potentially due to colitis of infectious or inflammatory   etiology. The appearance could be due to (or accentuated by) lack of   distention. Discharge Medications:   Discharge Medication List as of 5/27/2020  4:48 PM        Discharge Medication List as of 5/27/2020  4:48 PM      CONTINUE these medications which have CHANGED    Details   metoclopramide (REGLAN) 10 MG tablet Take 1 tablet by mouth 3 times daily (with meals) WARNING:  May cause drowsiness. May impair ability to operate vehicles or machinery. Do not use in combination with alcohol., Disp-20 tablet, R-0Print      pantoprazole (PROTONIX) 20 MG tablet Take 2 tablets by mouth daily, Disp-30 tablet, R-0Print           Discharge Medication List as of 5/27/2020  4:48 PM      CONTINUE these medications which have NOT CHANGED    Details   aspirin 81 MG chewable tablet Take 1 tablet by mouth daily, Disp-30 tablet, R-0Normal      gabapentin (NEURONTIN) 300 MG capsule Take 2 capsules by mouth 3 times daily for 30 days. , Disp-90 capsule, R-3Normal      lactobacillus (CULTURELLE) capsule Take 1 capsule by mouth 2 times daily (with meals), Disp-60 capsule, R-0Normal      atorvastatin (LIPITOR) 40 MG tablet Take 1 tablet by mouth daily, Disp-30 tablet, R-0Normal      losartan (COZAAR) 50 MG tablet Take 1 tablet by mouth daily, Disp-30 tablet, R-3Normal      blood glucose monitor strips Check blood sugars 4-5 times per day, Disp-150 strip, R-0, Normal      Continuous Blood Gluc Sensor (FREESTYLE HINA 14 DAY SENSOR) MISC Use for personal CGMS. Replace every 2 weeks. , Disp-2 each, R-0Normal      glucose monitoring kit (FREESTYLE) monitoring kit DAILY Starting Fri 5/15/2020, Disp-1 kit, R-0, Normal      Lancets MISC DAILY Starting Fri 5/15/2020, Disp-150 each, R-0, NormalCheck blood sugars 4-5 times per day      insulin glargine (LANTUS) 100 UNIT/ML injection vial Inject 15 Units into the skin nightly, Disp-1 vial, R-3Normal      Insulin Syringe-Needle U-100 (ULTRA-THIN II INS SYR SHORT) 31G X 5/16\"

## 2020-06-16 ENCOUNTER — OFFICE VISIT (OUTPATIENT)
Dept: INTERNAL MEDICINE CLINIC | Age: 45
End: 2020-06-16
Payer: MEDICAID

## 2020-06-16 VITALS
OXYGEN SATURATION: 99 % | RESPIRATION RATE: 18 BRPM | TEMPERATURE: 98 F | BODY MASS INDEX: 20.21 KG/M2 | SYSTOLIC BLOOD PRESSURE: 140 MMHG | WEIGHT: 157.4 LBS | DIASTOLIC BLOOD PRESSURE: 88 MMHG | HEART RATE: 72 BPM

## 2020-06-16 LAB
CREATININE URINE: 151.6 MG/DL (ref 39–259)
MICROALBUMIN UR-MCNC: 1.3 MG/DL
MICROALBUMIN/CREAT UR-RTO: 8.6 MG/G (ref 0–30)

## 2020-06-16 PROCEDURE — G8420 CALC BMI NORM PARAMETERS: HCPCS | Performed by: INTERNAL MEDICINE

## 2020-06-16 PROCEDURE — 99204 OFFICE O/P NEW MOD 45 MIN: CPT | Performed by: INTERNAL MEDICINE

## 2020-06-16 PROCEDURE — 2022F DILAT RTA XM EVC RTNOPTHY: CPT | Performed by: INTERNAL MEDICINE

## 2020-06-16 PROCEDURE — 4004F PT TOBACCO SCREEN RCVD TLK: CPT | Performed by: INTERNAL MEDICINE

## 2020-06-16 PROCEDURE — 3046F HEMOGLOBIN A1C LEVEL >9.0%: CPT | Performed by: INTERNAL MEDICINE

## 2020-06-16 PROCEDURE — G8427 DOCREV CUR MEDS BY ELIG CLIN: HCPCS | Performed by: INTERNAL MEDICINE

## 2020-06-16 PROCEDURE — 1111F DSCHRG MED/CURRENT MED MERGE: CPT | Performed by: INTERNAL MEDICINE

## 2020-06-16 RX ORDER — IBUPROFEN 600 MG/1
600 TABLET ORAL EVERY 6 HOURS PRN
Status: ON HOLD | COMMUNITY
Start: 2020-02-18 | End: 2020-07-17

## 2020-06-16 RX ORDER — INSULIN GLARGINE 100 [IU]/ML
12 INJECTION, SOLUTION SUBCUTANEOUS NIGHTLY
Qty: 1 VIAL | Refills: 3 | Status: ON HOLD | OUTPATIENT
Start: 2020-06-16 | End: 2020-08-11 | Stop reason: HOSPADM

## 2020-06-16 RX ORDER — ATORVASTATIN CALCIUM 40 MG/1
40 TABLET, FILM COATED ORAL DAILY
Qty: 30 TABLET | Refills: 3 | Status: ON HOLD | OUTPATIENT
Start: 2020-06-16 | End: 2020-08-25

## 2020-06-16 RX ORDER — LOSARTAN POTASSIUM 50 MG/1
50 TABLET ORAL DAILY
Status: ON HOLD | COMMUNITY
Start: 2020-02-27 | End: 2020-07-17

## 2020-06-16 RX ORDER — GABAPENTIN 300 MG/1
CAPSULE ORAL
COMMUNITY
Start: 2020-05-16 | End: 2020-06-16

## 2020-06-16 RX ORDER — PROMETHAZINE HYDROCHLORIDE 25 MG/1
SUPPOSITORY RECTAL
Status: ON HOLD | COMMUNITY
Start: 2019-11-27 | End: 2020-07-17

## 2020-06-16 RX ORDER — FAMOTIDINE 20 MG/1
TABLET, FILM COATED ORAL
Status: ON HOLD | COMMUNITY
Start: 2019-11-27 | End: 2020-07-17

## 2020-06-16 RX ORDER — ONDANSETRON 4 MG/1
4 TABLET, FILM COATED ORAL EVERY 6 HOURS PRN
Status: ON HOLD | COMMUNITY
End: 2020-07-17

## 2020-06-16 RX ORDER — AMLODIPINE BESYLATE 2.5 MG/1
2.5 TABLET ORAL DAILY
Qty: 90 TABLET | Refills: 1 | Status: ON HOLD | OUTPATIENT
Start: 2020-06-16 | End: 2020-08-25 | Stop reason: SDUPTHER

## 2020-06-16 RX ORDER — GLUCOSAMINE HCL/CHONDROITIN SU 500-400 MG
CAPSULE ORAL
Qty: 100 STRIP | Refills: 2 | Status: ON HOLD | OUTPATIENT
Start: 2020-06-16 | End: 2020-08-25 | Stop reason: HOSPADM

## 2020-06-16 RX ORDER — DIVALPROEX SODIUM 500 MG/1
500 TABLET, DELAYED RELEASE ORAL 2 TIMES DAILY
Status: ON HOLD | COMMUNITY
Start: 2020-02-28 | End: 2020-07-17

## 2020-06-16 RX ORDER — GABAPENTIN 300 MG/1
300 CAPSULE ORAL 3 TIMES DAILY
Qty: 90 CAPSULE | Refills: 1 | Status: ON HOLD | OUTPATIENT
Start: 2020-06-16 | End: 2020-08-11 | Stop reason: HOSPADM

## 2020-06-16 RX ORDER — QUETIAPINE FUMARATE 100 MG/1
100 TABLET, FILM COATED ORAL NIGHTLY
Status: ON HOLD | COMMUNITY
Start: 2020-02-28 | End: 2020-07-17

## 2020-06-16 RX ORDER — METOCLOPRAMIDE HYDROCHLORIDE 5 MG/5ML
10 SOLUTION ORAL
Qty: 750 ML | Refills: 3 | Status: ON HOLD | OUTPATIENT
Start: 2020-06-16 | End: 2020-08-11 | Stop reason: HOSPADM

## 2020-06-16 SDOH — HEALTH STABILITY: MENTAL HEALTH: HOW OFTEN DO YOU HAVE A DRINK CONTAINING ALCOHOL?: NEVER

## 2020-06-16 ASSESSMENT — PATIENT HEALTH QUESTIONNAIRE - PHQ9
SUM OF ALL RESPONSES TO PHQ9 QUESTIONS 1 & 2: 1
SUM OF ALL RESPONSES TO PHQ QUESTIONS 1-9: 1
2. FEELING DOWN, DEPRESSED OR HOPELESS: 1
1. LITTLE INTEREST OR PLEASURE IN DOING THINGS: 0
SUM OF ALL RESPONSES TO PHQ QUESTIONS 1-9: 1

## 2020-06-16 ASSESSMENT — ENCOUNTER SYMPTOMS
ABDOMINAL PAIN: 0
CONSTIPATION: 0
VOMITING: 0
BACK PAIN: 0
BLOOD IN STOOL: 0
EYE DISCHARGE: 0
NAUSEA: 0
COUGH: 0
COLOR CHANGE: 0
CHEST TIGHTNESS: 0
ABDOMINAL DISTENTION: 0
SHORTNESS OF BREATH: 0
DIARRHEA: 0
SORE THROAT: 0
WHEEZING: 0

## 2020-06-16 NOTE — LETTER
MEDICATION AGREEMENT     Eugene Appl  [birth year]      For certain conditions, multiple classes of medications may be used to help better manage your symptoms, and to improve your ability to function at home, work and in social settings. However, these medications do have risks, which will be discussed with you, including addiction and dependency. The following prescribed medications need frequent monitoring and will require you to partner and assist in your healthcare. Medication  Dose, instructions and quantity as indicated on current prescription bottle Diagnosis/Reason(s) for Taking Category   gabapentin (NEURONTIN) 300 MG capsule     Sig: Take 1 capsule by mouth 3 times daily for 180 days. Intended supply: 30 days                                           Benefits and goals of Controlled Substance Medications: There are two potential goals for your treatment: (1) decreased pain and suffering (2) improved daily life functions. There are many possible treatments for your chronic condition(s), and, in addition to controlled substance medications, we will try alternatives such as physical therapy, yoga, massage, home daily exercise, meditation, relaxation techniques, injections, chiropractic manipulations, surgery, cognitive therapy, hypnosis and many medications that are not habit-forming. Use of controlled substance medications may be helpful, but they are unlikely to resolve all of your symptoms or restore all function. Risks of Controlled Substance Medications:    Opioid pain medications: These medications can lead to problems such as addiction/dependence, sedation, lightheadedness/dizziness, memory issues, falls, constipation, nausea, or vomiting. They may also impair the ability to drive or operate machinery. Additionally, these medications may lower testosterone levels, leading to loss of bone strength, stamina and sex drive.   They may cause problems with breathing, sleep apnea and stimulants, such as caffeine pills or energy drinks, certain weight loss supplements and oral decongestants. Dependence withdrawal symptoms may include depressed mood, loss of interest, suicidal thoughts, anxiety, fatigue, appetite changes and agitation. Testosterone replacement therapy:  Potential side effects include increased risk of stroke and heart attack, blood clots, increased blood pressure, increased cholesterol, enlarged prostate, sleep apnea, irritability/aggression and other mood disorders, and decreased fertility. Other:     1. I understand that I have the following responsibilities:  · I will take medications at the dose and frequency prescribed. · I will not increase or change how I take my medications without the approval of the health care provider who signs this Medication Agreement. · I will arrange for refills at the prescribed interval ONLY during regular office hours. I will not ask for refills earlier than agreed, after-hours, on holidays or on weekends. · I will obtain all refills for these medications at  ·  ____________________________________  pharmacy (phone number  ·  ________________________), with full consent for my provider and pharmacist to exchange information in writing or verbally. · I will not request any pain medications or controlled substances from other providers and will inform this provider of all other medications I am taking. · I will inform my other health care providers that I am taking these medications and of the existence of this [id number]. In the event of an emergency, I will provide the same information to the emergency department providers. · I will protect my prescriptions and medications. I understand that lost or misplaced prescriptions will not be replaced. · I will keep medications only for my own use and will not share them with others. I will keep all medications away from children.

## 2020-06-16 NOTE — PROGRESS NOTES
for difficulty urinating and frequency. Musculoskeletal: Negative for arthralgias, back pain, gait problem, myalgias, neck pain and neck stiffness. Skin: Negative for color change, pallor, rash and wound. Neurological: Positive for numbness. Negative for dizziness, facial asymmetry, speech difficulty, weakness and headaches. Tingling and numbness of the feet as well as pain   Hematological: Negative for adenopathy. Psychiatric/Behavioral: Negative for agitation, confusion, dysphoric mood, self-injury and suicidal ideas. Prior to Visit Medications    Medication Sig Taking? Authorizing Provider   QUEtiapine (SEROQUEL) 100 MG tablet Take 100 mg by mouth nightly Yes Historical Provider, MD   promethazine (PHENERGAN) 25 MG suppository Place rectally Yes Historical Provider, MD   ondansetron (ZOFRAN) 4 MG tablet Take 4 mg by mouth every 6 hours as needed Yes Historical Provider, MD   ibuprofen (ADVIL;MOTRIN) 600 MG tablet Take 600 mg by mouth every 6 hours as needed Yes Historical Provider, MD   famotidine (PEPCID) 20 MG tablet Take by mouth Yes Historical Provider, MD   divalproex (DEPAKOTE) 500 MG DR tablet Take 500 mg by mouth 2 times daily Yes Historical Provider, MD   diclofenac sodium (VOLTAREN) 1 % GEL APPLY 2 GRAMS TOPICALLY 4 TIMES A DAY Yes Historical Provider, MD   losartan (COZAAR) 50 MG tablet Take 50 mg by mouth daily Yes Historical Provider, MD   gabapentin (NEURONTIN) 300 MG capsule Take 1 capsule by mouth 3 times daily for 180 days.  Intended supply: 30 days Yes Ernie Day MD   atorvastatin (LIPITOR) 40 MG tablet Take 1 tablet by mouth daily Yes Ernie Day MD   insulin glargine (LANTUS) 100 UNIT/ML injection vial Inject 12 Units into the skin nightly Yes Ernie Day MD   insulin lispro (ADMELOG) 100 UNIT/ML injection vial 5 units for meals and 2 units for snacks Yes Ernie Day MD   metoclopramide (REGLAN) 5 MG/5ML solution Take 10 mLs by mouth 3 times daily (before meals) Yes Kaylee Mcclure MD   blood glucose monitor strips Test 3 times a day & as needed for symptoms of irregular blood glucose. Yes Kaylee Mcclure MD   amLODIPine (NORVASC) 2.5 MG tablet Take 1 tablet by mouth daily Yes Kaylee Mcclure MD   pantoprazole (PROTONIX) 20 MG tablet Take 2 tablets by mouth daily Yes Mic Meza MD   aspirin 81 MG chewable tablet Take 1 tablet by mouth daily Yes Michael Rivera MD   blood glucose monitor strips Check blood sugars 4-5 times per day Yes Michael Rivera MD   Continuous Blood Gluc Sensor (FREESTYLE HINA 14 DAY SENSOR) [facility] Use for personal CGMS. Replace every 2 weeks.  Yes Michael Rivera MD   glucose monitoring kit (FREESTYLE) monitoring kit 1 kit by Does not apply route daily Yes Michael Rivera MD   Lancets MISC 1 each by Does not apply route daily Check blood sugars 4-5 times per day Yes Michael Rivera MD   Insulin Syringe-Needle U-100 (ULTRA-THIN II INS SYR SHORT) 31G X 5/16\" 1 ML MISC 1 each by Does not apply route 3 times daily Yes Michael Rivera MD   losartan (COZAAR) 50 MG tablet Take 1 tablet by mouth daily  Michael Rivera MD        Allergies   Allergen Reactions    Ketorolac Shortness Of Breath and Itching    Morphine Shortness Of Breath, Hives and Itching     Tolerates hydromorphone    Morphine And Related Hives and Shortness Of Breath    Tramadol Shortness Of Breath     resp failure   resp failure     Lisinopril Swelling     When he takes lisinopril he gets large lips which go away when he doesn't take lisinopril    Haloperidol Lactate Itching    Toradol [Ketorolac Tromethamine]      resp failure    Vicodin [Hydrocodone-Acetaminophen]        Past Medical History:   Diagnosis Date    Diabetes mellitus ([facility] [address]     Gastroparesis     Hypertension        Past Surgical History:   Procedure Laterality Date    BONY PELVIS SURGERY      FOOT AMPUTATION Right 5/13/2020    EMERGENCY; RIGHT INCISION AND Pupils: Pupils are equal, round, and reactive to light. Neck:      Musculoskeletal: Normal range of motion. No neck rigidity or muscular tenderness. Cardiovascular:      Rate and Rhythm: Normal rate and regular rhythm. Pulses: Normal pulses. Pulmonary:      Effort: Pulmonary effort is normal.      Breath sounds: Normal breath sounds. No wheezing or rhonchi. Abdominal:      General: Abdomen is flat. There is no distension. Palpations: Abdomen is soft. There is no mass. Tenderness: There is no abdominal tenderness. Musculoskeletal: Normal range of motion. Comments: Right fourth and fifth toe amputation  Right 1st toe has darker discoloration no exudate   Right DP not as strong s the right   Skin:     General: Skin is warm and dry. Comments: Right DP pulse is faint   Neurological:      General: No focal deficit present. Mental Status: He is alert and oriented to person, place, and time. Sensory: No sensory deficit. Motor: No weakness. Psychiatric:         Mood and Affect: Mood normal.         Behavior: Behavior normal.         Thought Content: Thought content normal.         Judgment: Judgment normal.         ASSESSMENT/PLAN:  1. Establishing care with new doctor, encounter for  Wants to hold off on the pneumococcal vaccine for now  - Basic Metabolic Panel; Future  2. Uncontrolled type 1 diabetes mellitus with diabetic peripheral neuropathy ([facility] [address]   Microalbumin / Creatinine Urine Ratio; Future  - CAROLANN - Mariam Maradiaga MD, Ophthalmology, [facility], Fasting;  Future  - Eusebio Duarte MD, Pain Management, Evangelista Mckenna MD, Endocrinology, [facility] [place]    - insulin lispro (ADMELOG) 100 UNIT/ML injection vial; 5 units for meals and 2 units for snacks  Dispense: 1 vial; Refill: 2 Will add on low dose ISS to his regimen  - Ultrasound doppler arterial leg right; Future  Will also check the dopplers of the right

## 2020-06-17 ENCOUNTER — NURSE ONLY (OUTPATIENT)
Dept: INTERNAL MEDICINE CLINIC | Age: 45
End: 2020-06-17

## 2020-06-17 ENCOUNTER — TELEPHONE (OUTPATIENT)
Dept: INTERNAL MEDICINE CLINIC | Age: 45
End: 2020-06-17

## 2020-06-18 ENCOUNTER — HOSPITAL ENCOUNTER (OUTPATIENT)
Dept: VASCULAR LAB | Age: 45
Discharge: HOME | End: 2020-06-18
Payer: MEDICAID

## 2020-06-18 LAB
ANION GAP SERPL CALCULATED.3IONS-SCNC: 15 MMOL/L (ref 3–16)
BUN BLDV-MCNC: 13 MG/DL (ref 7–20)
CALCIUM SERPL-MCNC: 9.9 MG/DL (ref 8.3–10.6)
CHLORIDE BLD-SCNC: 98 MMOL/L (ref 99–110)
CHOLESTEROL, FASTING: 266 MG/DL (ref 0–199)
CO2: 26 MMOL/L (ref 21–32)
CREAT SERPL-MCNC: 0.8 MG/DL (ref 0.9–1.3)
GFR AFRICAN AMERICAN: >60
GFR NON-AFRICAN AMERICAN: >60
GLUCOSE BLD-MCNC: 137 MG/DL (ref 70–99)
HDLC SERPL-MCNC: 86 MG/DL (ref 40–60)
LDL CHOLESTEROL CALCULATED: 165 MG/DL
POTASSIUM SERPL-SCNC: 4 MMOL/L (ref 3.5–5.1)
SODIUM BLD-SCNC: 139 MMOL/L (ref 136–145)
TRIGLYCERIDE, FASTING: 75 MG/DL (ref 0–150)
VLDLC SERPL CALC-MCNC: 15 MG/DL

## 2020-06-18 PROCEDURE — 93926 LOWER EXTREMITY STUDY: CPT

## 2020-06-19 LAB
VALPROIC ACID % FREE: ABNORMAL % (ref 5–18.4)
VALPROIC ACID LEVEL: <3 UG/ML (ref 50–125)
VALPROIC ACID, FREE: <0.4 UG/ML (ref 7–23)
VALPROIC DATE LAST DOSE: ABNORMAL
VALPROIC DOSE AMOUNT: ABNORMAL
VALPROIC TIME LAST DOSE: ABNORMAL

## 2020-06-21 LAB
6-ACETYLMORPHINE: NOT DETECTED
7-AMINOCLONAZEPAM: NOT DETECTED
ALPHA-OH-ALPRAZOLAM: NOT DETECTED
ALPRAZOLAM: NOT DETECTED
AMPHETAMINE: NOT DETECTED
BARBITURATES: NOT DETECTED
BENZOYLECGONINE: NOT DETECTED
BUPRENORPHINE: NOT DETECTED
CARISOPRODOL: NOT DETECTED
CLONAZEPAM: NOT DETECTED
CODEINE: NOT DETECTED
CREATININE URINE: 139.6 MG/DL (ref 20–400)
DIAZEPAM: NOT DETECTED
DRUGS EXPECTED: NORMAL
EER PAIN MGT DRUG PANEL, HIGH RES/EMIT U: NORMAL
ETHYL GLUCURONIDE: NOT DETECTED
FENTANYL: NOT DETECTED
HYDROCODONE: NOT DETECTED
HYDROMORPHONE: NOT DETECTED
LORAZEPAM: NOT DETECTED
MARIJUANA METABOLITE: PRESENT
MDA: NOT DETECTED
MDEA: NOT DETECTED
MDMA URINE: NOT DETECTED
MEPERIDINE: NOT DETECTED
METHADONE: NOT DETECTED
METHAMPHETAMINE: NOT DETECTED
METHYLPHENIDATE: NOT DETECTED
MIDAZOLAM: NOT DETECTED
MORPHINE: NOT DETECTED
NORBUPRENORPHINE, FREE: NOT DETECTED
NORDIAZEPAM: NOT DETECTED
NORFENTANYL: NOT DETECTED
NORHYDROCODONE, URINE: NOT DETECTED
NOROXYCODONE: NOT DETECTED
NOROXYMORPHONE, URINE: NOT DETECTED
OXAZEPAM: NOT DETECTED
OXYCODONE: NOT DETECTED
OXYMORPHONE: NOT DETECTED
PAIN MANAGEMENT DRUG PANEL: NORMAL
PAIN MANAGEMENT DRUG PANEL: NORMAL
PCP: NOT DETECTED
PHENTERMINE: NOT DETECTED
PROPOXYPHENE: NOT DETECTED
TAPENTADOL, URINE: NOT DETECTED
TAPENTADOL-O-SULFATE, URINE: NOT DETECTED
TEMAZEPAM: NOT DETECTED
TRAMADOL: NOT DETECTED
ZOLPIDEM: NOT DETECTED

## 2020-06-24 ENCOUNTER — TELEPHONE (OUTPATIENT)
Dept: PAIN MANAGEMENT | Age: 45
End: 2020-06-24

## 2020-06-30 ENCOUNTER — TELEPHONE (OUTPATIENT)
Dept: INTERNAL MEDICINE CLINIC | Age: 45
End: 2020-06-30

## 2020-06-30 RX ORDER — PROMETHAZINE HYDROCHLORIDE 12.5 MG/1
12.5 TABLET ORAL 3 TIMES DAILY PRN
Qty: 12 TABLET | Refills: 1 | Status: SHIPPED | OUTPATIENT
Start: 2020-06-30 | End: 2020-07-07

## 2020-06-30 NOTE — TELEPHONE ENCOUNTER
Called pharmacy noted that they dispense enough for 30 day. Called [facility]by to clarify how is taking VM left.

## 2020-06-30 NOTE — TELEPHONE ENCOUNTER
Pt said Metoclopramide (5 mg/ml) liquid it is not helping with nausea. He asked if he could have an Rx for Phenergan. He is out of fast acting insulin, Lispro. PLease send in scripts.      [facility]

## 2020-07-15 ENCOUNTER — APPOINTMENT (OUTPATIENT)
Dept: GENERAL RADIOLOGY | Age: 45
DRG: 812 | End: 2020-07-15
Payer: MEDICAID

## 2020-07-15 ENCOUNTER — HOSPITAL ENCOUNTER (INPATIENT)
Age: 45
LOS: 26 days | Discharge: PSYCHIATRIC OR SUBSTANCE USE FACILITY | DRG: 812 | End: 2020-08-11
Attending: EMERGENCY MEDICINE | Admitting: INTERNAL MEDICINE
Payer: MEDICAID

## 2020-07-15 ENCOUNTER — APPOINTMENT (OUTPATIENT)
Dept: CT IMAGING | Age: 45
DRG: 812 | End: 2020-07-15
Payer: MEDICAID

## 2020-07-15 LAB
AMPHETAMINE SCREEN, URINE: ABNORMAL
ANION GAP SERPL CALCULATED.3IONS-SCNC: 11 MMOL/L (ref 3–16)
BARBITURATE SCREEN URINE: ABNORMAL
BASE EXCESS VENOUS: 2.1 MMOL/L
BASOPHILS ABSOLUTE: 0 K/UL (ref 0–0.2)
BASOPHILS RELATIVE PERCENT: 0.2 %
BENZODIAZEPINE SCREEN, URINE: ABNORMAL
BUN BLDV-MCNC: 18 MG/DL (ref 7–20)
CALCIUM SERPL-MCNC: 9.5 MG/DL (ref 8.3–10.6)
CANNABINOID SCREEN URINE: POSITIVE
CARBOXYHEMOGLOBIN: 1.5 %
CHLORIDE BLD-SCNC: 107 MMOL/L (ref 99–110)
CHP ED QC CHECK: YES
CHP ED QC CHECK: YES
CO2: 25 MMOL/L (ref 21–32)
COCAINE METABOLITE SCREEN URINE: ABNORMAL
CREAT SERPL-MCNC: 0.9 MG/DL (ref 0.9–1.3)
EOSINOPHILS ABSOLUTE: 0 K/UL (ref 0–0.6)
EOSINOPHILS RELATIVE PERCENT: 0.1 %
ETHANOL: NORMAL MG/DL (ref 0–0.08)
GFR AFRICAN AMERICAN: >60
GFR NON-AFRICAN AMERICAN: >60
GLUCOSE BLD-MCNC: 108 MG/DL (ref 70–99)
GLUCOSE BLD-MCNC: 139 MG/DL (ref 70–99)
GLUCOSE BLD-MCNC: 16 MG/DL (ref 70–99)
GLUCOSE BLD-MCNC: 162 MG/DL
GLUCOSE BLD-MCNC: 162 MG/DL (ref 70–99)
GLUCOSE BLD-MCNC: 67 MG/DL (ref 70–99)
GLUCOSE BLD-MCNC: 69 MG/DL
GLUCOSE BLD-MCNC: 69 MG/DL (ref 70–99)
HCO3 VENOUS: 28 MMOL/L (ref 23–29)
HCT VFR BLD CALC: 37 % (ref 40.5–52.5)
HEMOGLOBIN: 12 G/DL (ref 13.5–17.5)
LACTIC ACID: 2.5 MMOL/L (ref 0.4–2)
LYMPHOCYTES ABSOLUTE: 1 K/UL (ref 1–5.1)
LYMPHOCYTES RELATIVE PERCENT: 5.3 %
Lab: ABNORMAL
MAGNESIUM: 1.9 MG/DL (ref 1.8–2.4)
MCH RBC QN AUTO: 27.2 PG (ref 26–34)
MCHC RBC AUTO-ENTMCNC: 32.5 G/DL (ref 31–36)
MCV RBC AUTO: 83.6 FL (ref 80–100)
METHADONE SCREEN, URINE: ABNORMAL
METHEMOGLOBIN VENOUS: 0.9 %
MONOCYTES ABSOLUTE: 1 K/UL (ref 0–1.3)
MONOCYTES RELATIVE PERCENT: 5.2 %
NEUTROPHILS ABSOLUTE: 17.4 K/UL (ref 1.7–7.7)
NEUTROPHILS RELATIVE PERCENT: 89.2 %
O2 CONTENT, VEN: 15 ML/DL
O2 SAT, VEN: 94 %
O2 THERAPY: ABNORMAL
OPIATE SCREEN URINE: ABNORMAL
OXYCODONE URINE: ABNORMAL
PCO2, VEN: 50.5 MMHG (ref 40–50)
PDW BLD-RTO: 17.2 % (ref 12.4–15.4)
PERFORMED ON: ABNORMAL
PH UA: 5.5
PH VENOUS: 7.36 (ref 7.35–7.45)
PHENCYCLIDINE SCREEN URINE: ABNORMAL
PLATELET # BLD: 333 K/UL (ref 135–450)
PMV BLD AUTO: 8 FL (ref 5–10.5)
PO2, VEN: 71 MMHG
POTASSIUM REFLEX MAGNESIUM: 3.1 MMOL/L (ref 3.5–5.1)
PROPOXYPHENE SCREEN: ABNORMAL
RBC # BLD: 4.42 M/UL (ref 4.2–5.9)
SODIUM BLD-SCNC: 143 MMOL/L (ref 136–145)
TCO2 CALC VENOUS: 30 MMOL/L
WBC # BLD: 19.5 K/UL (ref 4–11)

## 2020-07-15 PROCEDURE — 36415 COLL VENOUS BLD VENIPUNCTURE: CPT

## 2020-07-15 PROCEDURE — 31500 INSERT EMERGENCY AIRWAY: CPT

## 2020-07-15 PROCEDURE — 6360000002 HC RX W HCPCS: Performed by: EMERGENCY MEDICINE

## 2020-07-15 PROCEDURE — 71045 X-RAY EXAM CHEST 1 VIEW: CPT

## 2020-07-15 PROCEDURE — 2580000003 HC RX 258: Performed by: EMERGENCY MEDICINE

## 2020-07-15 PROCEDURE — 83605 ASSAY OF LACTIC ACID: CPT

## 2020-07-15 PROCEDURE — 81003 URINALYSIS AUTO W/O SCOPE: CPT

## 2020-07-15 PROCEDURE — 94002 VENT MGMT INPAT INIT DAY: CPT

## 2020-07-15 PROCEDURE — 83735 ASSAY OF MAGNESIUM: CPT

## 2020-07-15 PROCEDURE — 70450 CT HEAD/BRAIN W/O DYE: CPT

## 2020-07-15 PROCEDURE — G0480 DRUG TEST DEF 1-7 CLASSES: HCPCS

## 2020-07-15 PROCEDURE — 0BH18EZ INSERTION OF ENDOTRACHEAL AIRWAY INTO TRACHEA, VIA NATURAL OR ARTIFICIAL OPENING ENDOSCOPIC: ICD-10-PCS | Performed by: EMERGENCY MEDICINE

## 2020-07-15 PROCEDURE — 87040 BLOOD CULTURE FOR BACTERIA: CPT

## 2020-07-15 PROCEDURE — 80307 DRUG TEST PRSMV CHEM ANLYZR: CPT

## 2020-07-15 PROCEDURE — 80048 BASIC METABOLIC PNL TOTAL CA: CPT

## 2020-07-15 PROCEDURE — 5A1945Z RESPIRATORY VENTILATION, 24-96 CONSECUTIVE HOURS: ICD-10-PCS | Performed by: EMERGENCY MEDICINE

## 2020-07-15 PROCEDURE — 99291 CRITICAL CARE FIRST HOUR: CPT

## 2020-07-15 PROCEDURE — 82803 BLOOD GASES ANY COMBINATION: CPT

## 2020-07-15 PROCEDURE — 85025 COMPLETE CBC W/AUTO DIFF WBC: CPT

## 2020-07-15 PROCEDURE — 96374 THER/PROPH/DIAG INJ IV PUSH: CPT

## 2020-07-15 PROCEDURE — 80164 ASSAY DIPROPYLACETIC ACD TOT: CPT

## 2020-07-15 RX ORDER — DEXTROSE MONOHYDRATE 100 MG/ML
INJECTION, SOLUTION INTRAVENOUS CONTINUOUS
Status: DISCONTINUED | OUTPATIENT
Start: 2020-07-15 | End: 2020-07-16

## 2020-07-15 RX ORDER — DEXTROSE MONOHYDRATE 25 G/50ML
12.5 INJECTION, SOLUTION INTRAVENOUS ONCE
Status: COMPLETED | OUTPATIENT
Start: 2020-07-15 | End: 2020-07-15

## 2020-07-15 RX ORDER — NALOXONE HYDROCHLORIDE 0.4 MG/ML
0.4 INJECTION, SOLUTION INTRAMUSCULAR; INTRAVENOUS; SUBCUTANEOUS ONCE
Status: COMPLETED | OUTPATIENT
Start: 2020-07-15 | End: 2020-07-15

## 2020-07-15 RX ORDER — DEXTROSE MONOHYDRATE 25 G/50ML
25 INJECTION, SOLUTION INTRAVENOUS ONCE
Status: COMPLETED | OUTPATIENT
Start: 2020-07-15 | End: 2020-07-15

## 2020-07-15 RX ORDER — NALOXONE HYDROCHLORIDE 0.4 MG/ML
0.2 INJECTION, SOLUTION INTRAMUSCULAR; INTRAVENOUS; SUBCUTANEOUS ONCE
Status: COMPLETED | OUTPATIENT
Start: 2020-07-15 | End: 2020-07-15

## 2020-07-15 RX ORDER — PROPOFOL 10 MG/ML
10 INJECTION, EMULSION INTRAVENOUS ONCE
Status: COMPLETED | OUTPATIENT
Start: 2020-07-15 | End: 2020-07-16

## 2020-07-15 RX ADMIN — PROPOFOL 10 MCG/KG/MIN: 10 INJECTION, EMULSION INTRAVENOUS at 22:17

## 2020-07-15 RX ADMIN — DEXTROSE MONOHYDRATE 12.5 G: 25 INJECTION, SOLUTION INTRAVENOUS at 21:11

## 2020-07-15 RX ADMIN — NALOXONE HYDROCHLORIDE 0.2 MG: 0.4 INJECTION, SOLUTION INTRAMUSCULAR; INTRAVENOUS; SUBCUTANEOUS at 21:13

## 2020-07-15 RX ADMIN — NALOXONE HYDROCHLORIDE 0.4 MG: 0.4 INJECTION, SOLUTION INTRAMUSCULAR; INTRAVENOUS; SUBCUTANEOUS at 21:33

## 2020-07-15 RX ADMIN — DEXTROSE MONOHYDRATE 25 G: 25 INJECTION, SOLUTION INTRAVENOUS at 23:37

## 2020-07-15 RX ADMIN — DEXTROSE MONOHYDRATE: 100 INJECTION, SOLUTION INTRAVENOUS at 23:35

## 2020-07-15 RX ADMIN — DEXTROSE MONOHYDRATE 12.5 G: 25 INJECTION, SOLUTION INTRAVENOUS at 21:19

## 2020-07-15 ASSESSMENT — PULMONARY FUNCTION TESTS: PIF_VALUE: 12

## 2020-07-16 ENCOUNTER — APPOINTMENT (OUTPATIENT)
Dept: GENERAL RADIOLOGY | Age: 45
DRG: 812 | End: 2020-07-16
Payer: MEDICAID

## 2020-07-16 ENCOUNTER — APPOINTMENT (OUTPATIENT)
Dept: MRI IMAGING | Age: 45
DRG: 812 | End: 2020-07-16
Payer: MEDICAID

## 2020-07-16 PROBLEM — J96.01 ACUTE RESPIRATORY FAILURE WITH HYPOXIA (HCC): Status: ACTIVE | Noted: 2020-07-16

## 2020-07-16 LAB
A/G RATIO: 1.3 (ref 1.1–2.2)
ACETAMINOPHEN LEVEL: <5 UG/ML (ref 10–30)
ALBUMIN SERPL-MCNC: 3.7 G/DL (ref 3.4–5)
ALP BLD-CCNC: 77 U/L (ref 40–129)
ALT SERPL-CCNC: 8 U/L (ref 10–40)
ANION GAP SERPL CALCULATED.3IONS-SCNC: 13 MMOL/L (ref 3–16)
AST SERPL-CCNC: 17 U/L (ref 15–37)
BASE EXCESS ARTERIAL: 2.5 MMOL/L (ref -3–3)
BASOPHILS ABSOLUTE: 0 K/UL (ref 0–0.2)
BASOPHILS RELATIVE PERCENT: 0.1 %
BILIRUB SERPL-MCNC: <0.2 MG/DL (ref 0–1)
BILIRUBIN DIRECT: <0.2 MG/DL (ref 0–0.3)
BILIRUBIN URINE: NEGATIVE
BILIRUBIN, INDIRECT: ABNORMAL MG/DL (ref 0–1)
BLOOD, URINE: NEGATIVE
BUN BLDV-MCNC: 15 MG/DL (ref 7–20)
CALCIUM SERPL-MCNC: 9.1 MG/DL (ref 8.3–10.6)
CARBOXYHEMOGLOBIN ARTERIAL: 0 % (ref 0–1.5)
CHLORIDE BLD-SCNC: 101 MMOL/L (ref 99–110)
CLARITY: CLEAR
CO2: 24 MMOL/L (ref 21–32)
COLOR: YELLOW
CREAT SERPL-MCNC: 0.8 MG/DL (ref 0.9–1.3)
D DIMER: 458 NG/ML DDU (ref 0–229)
EOSINOPHILS ABSOLUTE: 0 K/UL (ref 0–0.6)
EOSINOPHILS RELATIVE PERCENT: 0.1 %
FERRITIN: 29.2 NG/ML (ref 30–400)
GFR AFRICAN AMERICAN: >60
GFR NON-AFRICAN AMERICAN: >60
GLOBULIN: 2.8 G/DL
GLUCOSE BLD-MCNC: 100 MG/DL (ref 70–99)
GLUCOSE BLD-MCNC: 103 MG/DL (ref 70–99)
GLUCOSE BLD-MCNC: 103 MG/DL (ref 70–99)
GLUCOSE BLD-MCNC: 108 MG/DL (ref 70–99)
GLUCOSE BLD-MCNC: 109 MG/DL (ref 70–99)
GLUCOSE BLD-MCNC: 122 MG/DL (ref 70–99)
GLUCOSE BLD-MCNC: 143 MG/DL (ref 70–99)
GLUCOSE BLD-MCNC: 146 MG/DL (ref 70–99)
GLUCOSE BLD-MCNC: 27 MG/DL (ref 70–99)
GLUCOSE BLD-MCNC: 51 MG/DL (ref 70–99)
GLUCOSE BLD-MCNC: 52 MG/DL (ref 70–99)
GLUCOSE BLD-MCNC: 54 MG/DL (ref 70–99)
GLUCOSE BLD-MCNC: 58 MG/DL (ref 70–99)
GLUCOSE BLD-MCNC: 59 MG/DL (ref 70–99)
GLUCOSE BLD-MCNC: 60 MG/DL (ref 70–99)
GLUCOSE BLD-MCNC: 63 MG/DL (ref 70–99)
GLUCOSE BLD-MCNC: 64 MG/DL (ref 70–99)
GLUCOSE BLD-MCNC: 69 MG/DL (ref 70–99)
GLUCOSE BLD-MCNC: 80 MG/DL (ref 70–99)
GLUCOSE BLD-MCNC: 85 MG/DL (ref 70–99)
GLUCOSE BLD-MCNC: 86 MG/DL (ref 70–99)
GLUCOSE BLD-MCNC: 87 MG/DL (ref 70–99)
GLUCOSE BLD-MCNC: 88 MG/DL (ref 70–99)
GLUCOSE BLD-MCNC: 91 MG/DL (ref 70–99)
GLUCOSE BLD-MCNC: 93 MG/DL (ref 70–99)
GLUCOSE BLD-MCNC: 94 MG/DL (ref 70–99)
GLUCOSE BLD-MCNC: 94 MG/DL (ref 70–99)
GLUCOSE BLD-MCNC: 95 MG/DL (ref 70–99)
GLUCOSE URINE: 500 MG/DL
HCO3 ARTERIAL: 25.9 MMOL/L (ref 21–29)
HCT VFR BLD CALC: 34.4 % (ref 40.5–52.5)
HEMOGLOBIN, ART, EXTENDED: 11.4 G/DL (ref 13.5–17.5)
HEMOGLOBIN: 11.2 G/DL (ref 13.5–17.5)
INR BLD: 0.97 (ref 0.86–1.14)
KETONES, URINE: NEGATIVE MG/DL
LACTIC ACID: 1.8 MMOL/L (ref 0.4–2)
LEUKOCYTE ESTERASE, URINE: NEGATIVE
LYMPHOCYTES ABSOLUTE: 1.3 K/UL (ref 1–5.1)
LYMPHOCYTES RELATIVE PERCENT: 7.1 %
MAGNESIUM: 1.6 MG/DL (ref 1.8–2.4)
MCH RBC QN AUTO: 26.9 PG (ref 26–34)
MCHC RBC AUTO-ENTMCNC: 32.5 G/DL (ref 31–36)
MCV RBC AUTO: 82.8 FL (ref 80–100)
METHEMOGLOBIN ARTERIAL: 0.8 %
MICROSCOPIC EXAMINATION: ABNORMAL
MONOCYTES ABSOLUTE: 0.8 K/UL (ref 0–1.3)
MONOCYTES RELATIVE PERCENT: 4.3 %
NEUTROPHILS ABSOLUTE: 16.7 K/UL (ref 1.7–7.7)
NEUTROPHILS RELATIVE PERCENT: 88.4 %
NITRITE, URINE: NEGATIVE
O2 CONTENT ARTERIAL: 16 ML/DL
O2 SAT, ARTERIAL: 98 %
O2 THERAPY: ABNORMAL
PCO2 ARTERIAL: 35.2 MMHG (ref 35–45)
PDW BLD-RTO: 17 % (ref 12.4–15.4)
PERFORMED ON: ABNORMAL
PERFORMED ON: NORMAL
PH ARTERIAL: 7.48 (ref 7.35–7.45)
PH UA: 5.5 (ref 5–8)
PHOSPHORUS: 2.1 MG/DL (ref 2.5–4.9)
PLATELET # BLD: 293 K/UL (ref 135–450)
PMV BLD AUTO: 7.9 FL (ref 5–10.5)
PO2 ARTERIAL: 118 MMHG (ref 75–108)
POTASSIUM REFLEX MAGNESIUM: 3 MMOL/L (ref 3.5–5.1)
POTASSIUM SERPL-SCNC: 3 MMOL/L (ref 3.5–5.1)
POTASSIUM SERPL-SCNC: 4.2 MMOL/L (ref 3.5–5.1)
PROTEIN UA: NEGATIVE MG/DL
PROTHROMBIN TIME: 11.2 SEC (ref 10–13.2)
RBC # BLD: 4.15 M/UL (ref 4.2–5.9)
SALICYLATE, SERUM: <0.3 MG/DL (ref 15–30)
SODIUM BLD-SCNC: 138 MMOL/L (ref 136–145)
SPECIFIC GRAVITY UA: 1.02 (ref 1–1.03)
TCO2 ARTERIAL: 27 MMOL/L
TOTAL PROTEIN: 6.5 G/DL (ref 6.4–8.2)
URINE REFLEX TO CULTURE: ABNORMAL
URINE TYPE: ABNORMAL
UROBILINOGEN, URINE: 0.2 E.U./DL
VALPROIC ACID LEVEL: <2.8 UG/ML (ref 50–100)
WBC # BLD: 18.9 K/UL (ref 4–11)

## 2020-07-16 PROCEDURE — 94003 VENT MGMT INPAT SUBQ DAY: CPT

## 2020-07-16 PROCEDURE — 84100 ASSAY OF PHOSPHORUS: CPT

## 2020-07-16 PROCEDURE — 85379 FIBRIN DEGRADATION QUANT: CPT

## 2020-07-16 PROCEDURE — 2700000000 HC OXYGEN THERAPY PER DAY

## 2020-07-16 PROCEDURE — 2580000003 HC RX 258: Performed by: INTERNAL MEDICINE

## 2020-07-16 PROCEDURE — 82728 ASSAY OF FERRITIN: CPT

## 2020-07-16 PROCEDURE — 85610 PROTHROMBIN TIME: CPT

## 2020-07-16 PROCEDURE — 87040 BLOOD CULTURE FOR BACTERIA: CPT

## 2020-07-16 PROCEDURE — 6370000000 HC RX 637 (ALT 250 FOR IP): Performed by: NURSE PRACTITIONER

## 2020-07-16 PROCEDURE — 2500000003 HC RX 250 WO HCPCS: Performed by: NURSE PRACTITIONER

## 2020-07-16 PROCEDURE — 94750 HC PULMONARY COMPLIANCE STUDY: CPT

## 2020-07-16 PROCEDURE — 82248 BILIRUBIN DIRECT: CPT

## 2020-07-16 PROCEDURE — 94761 N-INVAS EAR/PLS OXIMETRY MLT: CPT

## 2020-07-16 PROCEDURE — 2580000003 HC RX 258: Performed by: EMERGENCY MEDICINE

## 2020-07-16 PROCEDURE — 6360000002 HC RX W HCPCS: Performed by: INTERNAL MEDICINE

## 2020-07-16 PROCEDURE — APPNB15 APP NON BILLABLE TIME 0-15 MINS: Performed by: NURSE PRACTITIONER

## 2020-07-16 PROCEDURE — 99291 CRITICAL CARE FIRST HOUR: CPT | Performed by: INTERNAL MEDICINE

## 2020-07-16 PROCEDURE — 2000000000 HC ICU R&B

## 2020-07-16 PROCEDURE — 83605 ASSAY OF LACTIC ACID: CPT

## 2020-07-16 PROCEDURE — C9113 INJ PANTOPRAZOLE SODIUM, VIA: HCPCS | Performed by: NURSE PRACTITIONER

## 2020-07-16 PROCEDURE — 6360000002 HC RX W HCPCS

## 2020-07-16 PROCEDURE — 83735 ASSAY OF MAGNESIUM: CPT

## 2020-07-16 PROCEDURE — 6360000002 HC RX W HCPCS: Performed by: EMERGENCY MEDICINE

## 2020-07-16 PROCEDURE — 70551 MRI BRAIN STEM W/O DYE: CPT

## 2020-07-16 PROCEDURE — 36415 COLL VENOUS BLD VENIPUNCTURE: CPT

## 2020-07-16 PROCEDURE — 80053 COMPREHEN METABOLIC PANEL: CPT

## 2020-07-16 PROCEDURE — 85025 COMPLETE CBC W/AUTO DIFF WBC: CPT

## 2020-07-16 PROCEDURE — 71045 X-RAY EXAM CHEST 1 VIEW: CPT

## 2020-07-16 PROCEDURE — 84132 ASSAY OF SERUM POTASSIUM: CPT

## 2020-07-16 PROCEDURE — 2580000003 HC RX 258: Performed by: NURSE PRACTITIONER

## 2020-07-16 PROCEDURE — 6360000002 HC RX W HCPCS: Performed by: NURSE PRACTITIONER

## 2020-07-16 PROCEDURE — 06HY33Z INSERTION OF INFUSION DEVICE INTO LOWER VEIN, PERCUTANEOUS APPROACH: ICD-10-PCS | Performed by: EMERGENCY MEDICINE

## 2020-07-16 PROCEDURE — 36600 WITHDRAWAL OF ARTERIAL BLOOD: CPT

## 2020-07-16 PROCEDURE — 82803 BLOOD GASES ANY COMBINATION: CPT

## 2020-07-16 RX ORDER — MAGNESIUM SULFATE IN WATER 40 MG/ML
4 INJECTION, SOLUTION INTRAVENOUS ONCE
Status: COMPLETED | OUTPATIENT
Start: 2020-07-16 | End: 2020-07-16

## 2020-07-16 RX ORDER — PANTOPRAZOLE SODIUM 40 MG/10ML
40 INJECTION, POWDER, LYOPHILIZED, FOR SOLUTION INTRAVENOUS DAILY
Status: DISCONTINUED | OUTPATIENT
Start: 2020-07-16 | End: 2020-08-11 | Stop reason: HOSPADM

## 2020-07-16 RX ORDER — ONDANSETRON 2 MG/ML
4 INJECTION INTRAMUSCULAR; INTRAVENOUS EVERY 4 HOURS PRN
Status: DISCONTINUED | OUTPATIENT
Start: 2020-07-16 | End: 2020-08-11 | Stop reason: HOSPADM

## 2020-07-16 RX ORDER — SODIUM CHLORIDE 9 MG/ML
10 INJECTION INTRAVENOUS DAILY
Status: DISCONTINUED | OUTPATIENT
Start: 2020-07-16 | End: 2020-08-11 | Stop reason: HOSPADM

## 2020-07-16 RX ORDER — POTASSIUM CHLORIDE 7.45 MG/ML
10 INJECTION INTRAVENOUS
Status: DISPENSED | OUTPATIENT
Start: 2020-07-16 | End: 2020-07-16

## 2020-07-16 RX ORDER — DEXTROSE MONOHYDRATE 25 G/50ML
12.5 INJECTION, SOLUTION INTRAVENOUS PRN
Status: DISCONTINUED | OUTPATIENT
Start: 2020-07-16 | End: 2020-08-11 | Stop reason: HOSPADM

## 2020-07-16 RX ORDER — POTASSIUM CHLORIDE 29.8 MG/ML
20 INJECTION INTRAVENOUS PRN
Status: DISCONTINUED | OUTPATIENT
Start: 2020-07-16 | End: 2020-08-11 | Stop reason: HOSPADM

## 2020-07-16 RX ORDER — POLYETHYLENE GLYCOL 3350 17 G/17G
17 POWDER, FOR SOLUTION ORAL DAILY PRN
Status: DISCONTINUED | OUTPATIENT
Start: 2020-07-16 | End: 2020-08-11 | Stop reason: HOSPADM

## 2020-07-16 RX ORDER — DEXTROSE MONOHYDRATE 50 MG/ML
100 INJECTION, SOLUTION INTRAVENOUS PRN
Status: DISCONTINUED | OUTPATIENT
Start: 2020-07-16 | End: 2020-08-11 | Stop reason: HOSPADM

## 2020-07-16 RX ORDER — POTASSIUM CHLORIDE 29.8 MG/ML
20 INJECTION INTRAVENOUS
Status: COMPLETED | OUTPATIENT
Start: 2020-07-16 | End: 2020-07-16

## 2020-07-16 RX ORDER — DEXTROSE MONOHYDRATE 100 MG/ML
INJECTION, SOLUTION INTRAVENOUS CONTINUOUS
Status: DISCONTINUED | OUTPATIENT
Start: 2020-07-16 | End: 2020-07-17

## 2020-07-16 RX ORDER — NICOTINE POLACRILEX 4 MG
15 LOZENGE BUCCAL PRN
Status: DISCONTINUED | OUTPATIENT
Start: 2020-07-16 | End: 2020-08-11 | Stop reason: HOSPADM

## 2020-07-16 RX ORDER — LOSARTAN POTASSIUM 50 MG/1
50 TABLET ORAL DAILY
Status: DISCONTINUED | OUTPATIENT
Start: 2020-07-16 | End: 2020-07-21

## 2020-07-16 RX ORDER — MAGNESIUM SULFATE 1 G/100ML
1 INJECTION INTRAVENOUS PRN
Status: DISCONTINUED | OUTPATIENT
Start: 2020-07-16 | End: 2020-08-11 | Stop reason: HOSPADM

## 2020-07-16 RX ORDER — SODIUM CHLORIDE 0.9 % (FLUSH) 0.9 %
10 SYRINGE (ML) INJECTION EVERY 12 HOURS SCHEDULED
Status: DISCONTINUED | OUTPATIENT
Start: 2020-07-16 | End: 2020-07-18 | Stop reason: SDUPTHER

## 2020-07-16 RX ORDER — PROPOFOL 10 MG/ML
12 INJECTION, EMULSION INTRAVENOUS CONTINUOUS PRN
Status: DISCONTINUED | OUTPATIENT
Start: 2020-07-16 | End: 2020-07-19 | Stop reason: ALTCHOICE

## 2020-07-16 RX ORDER — HYDRALAZINE HYDROCHLORIDE 20 MG/ML
INJECTION INTRAMUSCULAR; INTRAVENOUS
Status: COMPLETED
Start: 2020-07-16 | End: 2020-07-16

## 2020-07-16 RX ORDER — ACETAMINOPHEN 325 MG/1
650 TABLET ORAL EVERY 4 HOURS PRN
Status: DISCONTINUED | OUTPATIENT
Start: 2020-07-16 | End: 2020-08-11 | Stop reason: HOSPADM

## 2020-07-16 RX ORDER — HYDRALAZINE HYDROCHLORIDE 20 MG/ML
10 INJECTION INTRAMUSCULAR; INTRAVENOUS EVERY 4 HOURS PRN
Status: DISPENSED | OUTPATIENT
Start: 2020-07-16 | End: 2020-07-20

## 2020-07-16 RX ORDER — PROPOFOL 10 MG/ML
10 INJECTION, EMULSION INTRAVENOUS
Status: DISCONTINUED | OUTPATIENT
Start: 2020-07-16 | End: 2020-07-18

## 2020-07-16 RX ORDER — SODIUM CHLORIDE 0.9 % (FLUSH) 0.9 %
10 SYRINGE (ML) INJECTION PRN
Status: DISCONTINUED | OUTPATIENT
Start: 2020-07-16 | End: 2020-07-18 | Stop reason: SDUPTHER

## 2020-07-16 RX ORDER — ACETAMINOPHEN 650 MG/1
650 SUPPOSITORY RECTAL EVERY 4 HOURS PRN
Status: DISCONTINUED | OUTPATIENT
Start: 2020-07-16 | End: 2020-08-11 | Stop reason: HOSPADM

## 2020-07-16 RX ORDER — DEXTROSE MONOHYDRATE 25 G/50ML
25 INJECTION, SOLUTION INTRAVENOUS ONCE
Status: COMPLETED | OUTPATIENT
Start: 2020-07-16 | End: 2020-07-16

## 2020-07-16 RX ORDER — CHLORHEXIDINE GLUCONATE 0.12 MG/ML
15 RINSE ORAL 2 TIMES DAILY
Status: DISCONTINUED | OUTPATIENT
Start: 2020-07-16 | End: 2020-07-18

## 2020-07-16 RX ADMIN — POTASSIUM CHLORIDE 10 MEQ: 7.46 INJECTION, SOLUTION INTRAVENOUS at 02:22

## 2020-07-16 RX ADMIN — DEXTROSE MONOHYDRATE: 100 INJECTION, SOLUTION INTRAVENOUS at 12:49

## 2020-07-16 RX ADMIN — VANCOMYCIN HYDROCHLORIDE 1250 MG: 10 INJECTION, POWDER, LYOPHILIZED, FOR SOLUTION INTRAVENOUS at 06:54

## 2020-07-16 RX ADMIN — CEFEPIME HYDROCHLORIDE 2 G: 2 INJECTION, POWDER, FOR SOLUTION INTRAVENOUS at 05:29

## 2020-07-16 RX ADMIN — AZITHROMYCIN MONOHYDRATE 500 MG: 500 INJECTION, POWDER, LYOPHILIZED, FOR SOLUTION INTRAVENOUS at 03:17

## 2020-07-16 RX ADMIN — DEXTROSE MONOHYDRATE 12.5 G: 25 INJECTION, SOLUTION INTRAVENOUS at 19:55

## 2020-07-16 RX ADMIN — DEXTROSE MONOHYDRATE 12.5 G: 25 INJECTION, SOLUTION INTRAVENOUS at 10:20

## 2020-07-16 RX ADMIN — CHLORHEXIDINE GLUCONATE 15 ML: 0.12 RINSE ORAL at 08:56

## 2020-07-16 RX ADMIN — MAGNESIUM SULFATE HEPTAHYDRATE 4 G: 40 INJECTION, SOLUTION INTRAVENOUS at 09:05

## 2020-07-16 RX ADMIN — DEXTROSE MONOHYDRATE 12.5 G: 25 INJECTION, SOLUTION INTRAVENOUS at 03:32

## 2020-07-16 RX ADMIN — HYDRALAZINE HYDROCHLORIDE 10 MG: 20 INJECTION INTRAMUSCULAR; INTRAVENOUS at 22:38

## 2020-07-16 RX ADMIN — DEXTROSE MONOHYDRATE 12.5 G: 25 INJECTION, SOLUTION INTRAVENOUS at 14:36

## 2020-07-16 RX ADMIN — POTASSIUM CHLORIDE 20 MEQ: 29.8 INJECTION, SOLUTION INTRAVENOUS at 09:00

## 2020-07-16 RX ADMIN — LOSARTAN POTASSIUM 50 MG: 50 TABLET ORAL at 15:38

## 2020-07-16 RX ADMIN — POTASSIUM PHOSPHATE, MONOBASIC AND POTASSIUM PHOSPHATE, DIBASIC 20 MMOL: 224; 236 INJECTION, SOLUTION, CONCENTRATE INTRAVENOUS at 12:35

## 2020-07-16 RX ADMIN — PANTOPRAZOLE SODIUM 40 MG: 40 INJECTION, POWDER, FOR SOLUTION INTRAVENOUS at 08:51

## 2020-07-16 RX ADMIN — PROPOFOL 25 MCG/KG/MIN: 10 INJECTION, EMULSION INTRAVENOUS at 07:00

## 2020-07-16 RX ADMIN — POTASSIUM CHLORIDE 10 MEQ: 7.46 INJECTION, SOLUTION INTRAVENOUS at 05:28

## 2020-07-16 RX ADMIN — CHLORHEXIDINE GLUCONATE 15 ML: 0.12 RINSE ORAL at 20:09

## 2020-07-16 RX ADMIN — Medication 10 ML: at 20:09

## 2020-07-16 RX ADMIN — DEXTROSE MONOHYDRATE 12.5 G: 25 INJECTION, SOLUTION INTRAVENOUS at 17:08

## 2020-07-16 RX ADMIN — PROPOFOL 10 MCG/KG/MIN: 10 INJECTION, EMULSION INTRAVENOUS at 05:56

## 2020-07-16 RX ADMIN — DEXTROSE MONOHYDRATE 12.5 G: 25 INJECTION, SOLUTION INTRAVENOUS at 10:53

## 2020-07-16 RX ADMIN — DEXTROSE MONOHYDRATE 12.5 G: 25 INJECTION, SOLUTION INTRAVENOUS at 12:19

## 2020-07-16 RX ADMIN — Medication 10 ML: at 08:57

## 2020-07-16 RX ADMIN — DEXTROSE MONOHYDRATE 12.5 G: 25 INJECTION, SOLUTION INTRAVENOUS at 06:54

## 2020-07-16 RX ADMIN — Medication 10 ML: at 08:58

## 2020-07-16 RX ADMIN — DEXTROSE MONOHYDRATE 12.5 G: 25 INJECTION, SOLUTION INTRAVENOUS at 02:28

## 2020-07-16 RX ADMIN — POTASSIUM CHLORIDE 20 MEQ: 29.8 INJECTION, SOLUTION INTRAVENOUS at 11:33

## 2020-07-16 RX ADMIN — CEFTRIAXONE 1 G: 1 INJECTION, POWDER, FOR SOLUTION INTRAMUSCULAR; INTRAVENOUS at 02:19

## 2020-07-16 RX ADMIN — DEXTROSE MONOHYDRATE: 100 INJECTION, SOLUTION INTRAVENOUS at 05:27

## 2020-07-16 RX ADMIN — POTASSIUM CHLORIDE 20 MEQ: 29.8 INJECTION, SOLUTION INTRAVENOUS at 10:18

## 2020-07-16 RX ADMIN — ENOXAPARIN SODIUM 40 MG: 40 INJECTION SUBCUTANEOUS at 08:51

## 2020-07-16 ASSESSMENT — PULMONARY FUNCTION TESTS
PIF_VALUE: 11
PIF_VALUE: 11
PIF_VALUE: 12
PIF_VALUE: 11
PIF_VALUE: 11
PIF_VALUE: 13
PIF_VALUE: 11
PIF_VALUE: 11
PIF_VALUE: 15
PIF_VALUE: 15
PIF_VALUE: 11
PIF_VALUE: 11
PIF_VALUE: 16
PIF_VALUE: 10
PIF_VALUE: 16
PIF_VALUE: 14
PIF_VALUE: 12
PIF_VALUE: 11
PIF_VALUE: 11
PIF_VALUE: 14
PIF_VALUE: 11
PIF_VALUE: 11
PIF_VALUE: 14
PIF_VALUE: 11
PIF_VALUE: 11
PIF_VALUE: 10
PIF_VALUE: 14
PIF_VALUE: 16

## 2020-07-16 NOTE — PROGRESS NOTES
Assisted with intubation at 2215 7.5mm tube placed at the 23 lip rosalina     Electronically signed by Melvina Sanchez RCP on 7/15/2020 at 10:42 PM

## 2020-07-16 NOTE — ED NOTES
Dr Nunez Filter at bedside for intubation. Patient not responding to gag reflex. Noise from airway. Airway held open at this time by [facility].       Phylicia Ward RN  07/15/20 1881

## 2020-07-16 NOTE — H&P
QUEtiapine (SEROQUEL) 100 MG tablet Take 100 mg by mouth nightly 2/28/20   Historical Provider, MD   promethazine (PHENERGAN) 25 MG suppository Place rectally 11/27/19   Historical Provider, MD   ondansetron (ZOFRAN) 4 MG tablet Take 4 mg by mouth every 6 hours as needed    Historical Provider, MD   ibuprofen (ADVIL;MOTRIN) 600 MG tablet Take 600 mg by mouth every 6 hours as needed 2/18/20   Historical Provider, MD   famotidine (PEPCID) 20 MG tablet Take by mouth 11/27/19   Historical Provider, MD   divalproex (DEPAKOTE) 500 MG DR tablet Take 500 mg by mouth 2 times daily 2/28/20   Historical Provider, MD   diclofenac sodium (VOLTAREN) 1 % GEL APPLY 2 GRAMS TOPICALLY 4 TIMES A DAY 4/14/20   Historical Provider, MD   losartan (COZAAR) 50 MG tablet Take 50 mg by mouth daily 2/27/20   Historical Provider, MD   gabapentin (NEURONTIN) 300 MG capsule Take 1 capsule by mouth 3 times daily for 180 days. Intended supply: 30 days 6/16/20 12/13/20  Lobo Narayanan MD   atorvastatin (LIPITOR) 40 MG tablet Take 1 tablet by mouth daily 6/16/20 7/16/20  Lobo Narayanan MD   insulin glargine (LANTUS) 100 UNIT/ML injection vial Inject 12 Units into the skin nightly 6/16/20   Lobo Narayanan MD   metoclopramide (REGLAN) 5 MG/5ML solution Take 10 mLs by mouth 3 times daily (before meals) 6/16/20   Lobo Narayanan MD   blood glucose monitor strips Test 3 times a day & as needed for symptoms of irregular blood glucose.  6/16/20   Lobo Narayanan MD   amLODIPine (NORVASC) 2.5 MG tablet Take 1 tablet by mouth daily 6/16/20   Lobo Narayanan MD   pantoprazole (PROTONIX) 20 MG tablet Take 2 tablets by mouth daily 5/26/20   Asif Brannon MD   aspirin 81 MG chewable tablet Take 1 tablet by mouth daily 5/15/20   Sulaiman Adames MD   losartan (COZAAR) 50 MG tablet Take 1 tablet by mouth daily 5/15/20 6/14/20  Sulaiman Adames MD   blood glucose monitor strips Check blood sugars 4-5 times per day 5/15/20   Sulaiman Adames MD Continuous Blood Gluc Sensor (FREESTYLE HINA 14 DAY SENSOR) MISC Use for personal CGMS. Replace every 2 weeks. 5/15/20   Jett Bhatia MD   glucose monitoring kit (FREESTYLE) monitoring kit 1 kit by Does not apply route daily 5/15/20   Jett Bhatia MD   Lancets MISC 1 each by Does not apply route daily Check blood sugars 4-5 times per day 5/15/20   Jett Bhatia MD   Insulin Syringe-Needle U-100 (ULTRA-THIN II INS SYR SHORT) 31G X 5/16\" 1 ML MISC 1 each by Does not apply route 3 times daily 5/15/20   Jett Bhatia MD       Allergies:  Ketorolac; Morphine; Morphine and related; Tramadol; Lisinopril; Haloperidol lactate; Toradol [ketorolac tromethamine]; and Vicodin [hydrocodone-acetaminophen]    Social History:     TOBACCO:   reports that he has been smoking cigars. He started smoking about 11 years ago. He has a 22.00 pack-year smoking history. He has never used smokeless tobacco.  ETOH:   reports previous alcohol use. Family History:  Reviewed in detail and negative for DM, Early CAD, Cancer, CVA. Positive as follows:        Problem Relation Age of Onset    Diabetes Mother     Heart Disease Mother     High Blood Pressure Mother     Asthma Brother     Other Father         kidney disease    No Known Problems Maternal Grandmother     No Known Problems Maternal Grandfather     No Known Problems Paternal Grandmother     No Known Problems Paternal Grandfather        REVIEW OF SYSTEMS:   Positive for hypoglycemia and as noted in the HPI. All other systems reviewed and negative. PHYSICAL EXAM:    BP (!) 175/95   Pulse 91   Temp 98.4 °F (36.9 °C) (Axillary)   Resp 21   Ht 6' 2\" (1.88 m)   Wt 152 lb 5.4 oz (69.1 kg)   SpO2 100%   BMI 19.56 kg/m²     General appearance: Sedated intubated  HEENT Normal cephalic, atraumatic without obvious deformity. Pupils equal, round, and reactive to light. Extra ocular muscles intact. Conjunctivae/corneas clear.   Neck: Supple, No jugular venous distention/bruits. Trachea midline without thyromegaly or adenopathy with full range of motion. Lungs: Sedated intubated clear to auscultation, bilaterally without Rales/Wheezes/Rhonchi  Heart: Regular rate and rhythm with Normal S1/S2 without murmurs, rubs or gallops, point of maximum impulse non-displaced  Abdomen: Soft, non-tender or non-distended without rigidity or guarding and positive bowel sounds all four quadrants. Extremities: No clubbing, cyanosis, or edema bilaterally. Full range of motion without deformity and normal gait intact. Skin: Skin color, texture, turgor normal.  No rashes or lesions. Neurologic: Sedated intubated. Nonresponsive without sedation  Capillary Refill: Acceptable  < 3 seconds  Peripheral Pulses: +3 Easily felt, not easily obliterated with pressure      CBC   Recent Labs     07/15/20  2117 07/16/20  0602   WBC 19.5* 18.9*   HGB 12.0* 11.2*   HCT 37.0* 34.4*    293      RENAL  Recent Labs     07/15/20  2117 07/16/20  0602    138   K 3.1* 3.0*    101   CO2 25 24   PHOS  --  2.1*   BUN 18 15   CREATININE 0.9 0.8*     LFT'S  Recent Labs     07/16/20  0602   AST 17   ALT 8*   BILIDIR <0.2   BILITOT <0.2   ALKPHOS 77     COAG  Recent Labs     07/16/20  0602   INR 0.97     CARDIAC ENZYMES  No results for input(s): CKTOTAL, CKMB, CKMBINDEX, TROPONINI in the last 72 hours.     U/A:    Lab Results   Component Value Date    COLORU Yellow 07/15/2020    WBCUA 10 05/28/2020    RBCUA 1 05/28/2020    BACTERIA 1+ 12/15/2010    CLARITYU Clear 07/15/2020    SPECGRAV 1.025 07/15/2020    LEUKOCYTESUR Negative 07/15/2020    BLOODU Negative 07/15/2020    GLUCOSEU 500 07/15/2020    GLUCOSEU >=1000 12/15/2010       ABG    Lab Results   Component Value Date    SBJ3FUM 25.9 07/16/2020    BEART 2.5 07/16/2020    U9IQFIET 98.0 07/16/2020    PHART 7.476 07/16/2020    THGBART 13.9 04/10/2010    NPC2QGX 35.2 07/16/2020    PO2ART 118.0 07/16/2020    PVO2FMM 27.0 07/16/2020 Active Hospital Problems    Diagnosis Date Noted    Acute respiratory failure with hypoxia ([facility] [address] [J96.01] 07/16/2020         PHYSICIANS CERTIFICATION:    I certify that Domenic Calles is expected to be hospitalized for more than 2 midnights based on the following assessment and plan:      ASSESSMENT/PLAN:    Unresponsiveness: Appears to have acute toxic encephalopathy from insulin overdose. D10 fluids and as needed amps of glucose ordered. Alarmingly patient not responsive when completely off sedation and glucoses greater than 60. Will obtain MRI and EEG if still unresponsive tomorrow. Accidental versus intentional insulin overdose: We will obtain psychiatry consult after alert and oriented. DVT Prophylaxis: Lovenox  Diet: Diet NPO Effective Now  Code Status: Full Code  PT/OT Eval Status: Pending    Hossein Boyer MD    Thank you Jorge Sims MD for the opportunity to be involved in this patient's care. If you have any questions or concerns please feel free to contact me at [phone].

## 2020-07-16 NOTE — CONSULTS
REASON FOR CONSULTATION/CC: respiratory failure, hypoglycemia      Consult at request of Jeni Franco MD     PCP: Jessica Gutierres MD  Established Pulmonologist: None    Chief Complaint   Patient presents with    Hypoglycemia     Found unresponsive by girlfriend. Per EMS, pt had blood sugar of \"22 on arrival and was given IM glucagon\". EMS reports blood sugar \"went up to 59\". Pt responsive to pain at this time. HISTORY OF PRESENT ILLNESS: Zara Stallings is a 50y.o. year old male with a history of DMI and medical noncompliance who presents with altered mental status and hypoglycemia. Pt intubated in ED due to inability to protect airway. PT unable to provide HPI, obtained from bedside nursing report and EMR. Pt has a h/o poorly controlled DMI and frequent admissions for DKA/hypoglycemia and reportedly had taken 200U of lantus yesterday prior to somnolent episodes. Despite D10 drip patient has had episodes of hypoglycemia needing dextrose supplements. No seizure activity of note. CT head within normal limits. Past Medical History:   Diagnosis Date    Diabetes mellitus ([address]     Gastroparesis     Hypertension          Past Surgical History:   Procedure Laterality Date    BONY PELVIS SURGERY      FOOT AMPUTATION Right 5/13/2020    EMERGENCY; RIGHT INCISION AND DEBRIDEMENT; HALUX AMPUTATION performed by Richard Cazares DPM at [address] 2006    pins and rods- plate    UPPER GASTROINTESTINAL ENDOSCOPY N/A 12/2/2019    EGD BIOPSY performed by Souleymane Branch MD at [address] Hx  family history includes Asthma in his brother; Diabetes in his mother; Heart Disease in his mother; High Blood Pressure in his mother; No Known Problems in his maternal grandfather, maternal grandmother, paternal grandfather, and paternal grandmother; Other in his father. Social Hx   reports that he has been smoking cigars. He started smoking about 11 years ago.  He has a 22.00 pack-year smoking history. He has never used smokeless tobacco.    Scheduled Meds:   sodium chloride flush  10 mL Intravenous 2 times per day    insulin lispro  0-12 Units Subcutaneous 6 times per day    enoxaparin  40 mg Subcutaneous Daily    cefepime  2 g Intravenous Q12H    vancomycin (VANCOCIN) intermittent dosing (placeholder)   Other RX Placeholder    vancomycin  1,250 mg Intravenous Q12H    chlorhexidine  15 mL Mouth/Throat BID    pantoprazole  40 mg Intravenous Daily    And    sodium chloride (PF)  10 mL Intravenous Daily    magnesium sulfate  4 g Intravenous Once    potassium chloride  20 mEq Intravenous Q1H    potassium phosphate IVPB  20 mmol Intravenous Once       Continuous Infusions:   dextrose 150 mL/hr at 07/16/20 0527    dextrose      propofol 25 mcg/kg/min (07/16/20 0700)    propofol         PRN Meds:  sodium chloride flush, acetaminophen **OR** acetaminophen, polyethylene glycol, ondansetron, glucose, dextrose, glucagon (rDNA), dextrose, propofol    ALLERGIES:  Patient is allergic to ketorolac; morphine; morphine and related; tramadol; lisinopril; haloperidol lactate; toradol [ketorolac tromethamine]; and vicodin [hydrocodone-acetaminophen]. REVIEW OF SYSTEMS:  Unable to obtain due to mechanical ventilation    Objective:   PHYSICAL EXAM:  Blood pressure (!) 175/95, pulse 91, temperature 98.4 °F (36.9 °C), temperature source Axillary, resp. rate 21, height 6' 2\" (1.88 m), weight 152 lb 5.4 oz (69.1 kg), SpO2 100 %.'  Gen:  No acute distress. Intubated   Eyes: PERRL. Anicteric sclera. No conjunctival injection. ENT: No discharge. oropharynx with ETTube. External appearance of ears and nose normal.  Neck: Trachea midline. No mass   Resp:  No crackles. No wheezes. No rhonchi. No dullness on percussion. CV: Regular rate. Regular rhythm. No murmur or rub. No edema. GI: Soft, Non-tender. Non-distended. +BS  Skin: Warm, dry, w/o erythema. Lymph: No cervical or supraclavicular LAD. M/S: No cyanosis. No clubbing. Neuro:   +cough, +gag, Overbreathing vent Moves all extremities sedation. Data Reviewed:   LABS:  CBC:   Recent Labs     07/15/20  2117 07/16/20  0602   WBC 19.5* 18.9*   HGB 12.0* 11.2*   HCT 37.0* 34.4*   MCV 83.6 82.8    293     BMP:   Recent Labs     07/15/20  2117 07/16/20  0602    138   K 3.1* 3.0*    101   CO2 25 24   PHOS  --  2.1*   BUN 18 15   CREATININE 0.9 0.8*     LIVER PROFILE:   Recent Labs     07/16/20 0602   AST 17   ALT 8*   BILIDIR <0.2   BILITOT <0.2   ALKPHOS 77     PT/INR:   Recent Labs     07/16/20 0602   PROTIME 11.2   INR 0.97     APTT: No results for input(s): APTT in the last 72 hours. UA:  Recent Labs     07/15/20  2226   COLORU Yellow   PHUR 5.5  5.5   CLARITYU Clear   SPECGRAV 1.025   LEUKOCYTESUR Negative   UROBILINOGEN 0.2   BILIRUBINUR Negative   BLOODU Negative   GLUCOSEU 500*     Recent Labs     07/16/20  0543   PHART 7.476*   XYV4HQS 35.2   PO2ART 118.0*       Vent Information  $Ventilation: $Initial Day  Skin Assessment: Clean, dry, & intact  Suction Catheter Diameter: 14  Equipment ID: [id number]  Vent Type: 840  Vent Mode: AC/VC+  Vt Ordered: 400 mL  Rate Set: 20 bmp  Peak Flow: 0 L/min  Pressure Support: 0 cmH20  FiO2 : 40 %  SpO2: 100 %  SpO2/FiO2 ratio: 250  Sensitivity: 3  PEEP/CPAP: 5  I Time/ I Time %: 0.9 s  Humidification Source: Heated wire  Humidification Temp: 37  Humidification Temp Measured: 37    Radiology Review:  Pertinent images / reports were reviewed as a part of this visit. CT Chest w/ contrast: No results found for this or any previous visit. CT Chest w/o contrast:   Results for orders placed during the hospital encounter of 01/08/20   CT CHEST WO CONTRAST    Narrative EXAMINATION:  CT OF THE CHEST WITHOUT CONTRAST 1/8/2020 11:40 am    TECHNIQUE:  CT of the chest was performed without the administration of intravenous  contrast. Multiplanar reformatted images are provided for review. Dose  modulation, iterative reconstruction, and/or weight based adjustment of the  mA/kV was utilized to reduce the radiation dose to as low as reasonably  achievable. COMPARISON:  12/29/2019    HISTORY:  ORDERING SYSTEM PROVIDED HISTORY: eval abnl portable CXR  TECHNOLOGIST PROVIDED HISTORY:  Reason for exam:->eval abnl portable CXR  Reason for Exam: eval abnl portable CXR  Acuity: Unknown  Type of Exam: Unknown    FINDINGS:  Mediastinum: Borderline cardiomegaly. Group of calcified subcarinal left  hilar lymph nodes. Thyroid gland and esophagus grossly normal.    Lungs/pleura: No focal consolidation. No significant nodules or masses. A  cluster of calcified nodules noted in the medial left lower lobe toward the  lung base. Of note there is no right apical lesion and finding on chest  x-ray from earlier same day would be considered artifactual.    No pleural effusion or pneumothorax. Upper Abdomen: No suspicious lesions. Soft Tissues/Bones: No acute abnormality of the bones. The superficial soft  tissues show no significant abnormalities. Impression 1. No focal consolidation or lung lesion. 2. Calcified subcarinal, left hilar and left lower lobe nodules suggestive of  granulomatous disease. CTPA:   Results for orders placed during the hospital encounter of 12/29/19   CT CHEST PULMONARY EMBOLISM W CONTRAST    Narrative EXAMINATION:  CTA OF THE CHEST 12/29/2019 10:46 pm    TECHNIQUE:  CTA of the chest was performed after the administration of intravenous  contrast.  Multiplanar reformatted images are provided for review. MIP  images are provided for review. Dose modulation, iterative reconstruction,  and/or weight based adjustment of the mA/kV was utilized to reduce the  radiation dose to as low as reasonably achievable.     COMPARISON:  None    HISTORY:  ORDERING SYSTEM PROVIDED HISTORY: PE vs PNA  TECHNOLOGIST PROVIDED HISTORY:  Reason for exam:->PE vs PNA  Reason for Exam: 53 y.o. male who presents to the emergency department with  abdominal pain. Patient was here this AM for DKA and left AMA 2/2 not getting  pain medicine. States he went to [employer] and ate a bunch of food. States he  decided to come back because his abdomen started hurting. Pain is 9/10 sharp  in nature in his entire abdomen. Hasn't taken anything for pain. Nothing  makes symptoms better or worse. No other associated symptoms. FINDINGS:  Pulmonary Arteries: Pulmonary arteries are adequately opacified for  evaluation. No evidence of intraluminal filling defect to suggest pulmonary  embolism. Main pulmonary artery is normal in caliber. Mediastinum: The thoracic aorta is normal in course and caliber. The heart  is not enlarged. No pericardial effusion. No pathologic hilar or  mediastinal adenopathy. Calcified subcarinal and left hilar nodes indicative  of previous granulomatous disease. Soft tissue wall thickening, greatest  distally suggesting an esophagitis. Lungs/pleura: The lungs are without acute process. No focal consolidation or  pulmonary edema. No evidence of pleural effusion or pneumothorax. Calcified  granuloma in the left lower lobe. Upper Abdomen: Dense contrast in the IVC, likely related to injection via a  lower extremity venous access. Visualized upper abdominal viscera are  unremarkable    Soft Tissues/Bones: No acute bone or soft tissue abnormality. Impression 1. No evidence of pulmonary embolic disease. 2. No acute pulmonary findings. Evidence of old granulomatous disease. 3. Esophageal wall thickening, most pronounced distally consistent with an  esophagitis.          CXR PA/LAT:   Results for orders placed during the hospital encounter of 02/05/20   XR CHEST STANDARD (2 VW)    Narrative EXAMINATION:  TWO XRAY VIEWS OF THE CHEST    2/5/2020 10:33 pm    COMPARISON:  January 18, 2020    HISTORY:  ORDERING SYSTEM PROVIDED HISTORY: Chest Pain  TECHNOLOGIST PROVIDED HISTORY:  Reason for exam:->Chest Pain  Reason for Exam: Leg Swelling (x2 weeks), chest pain  Acuity: Acute  Type of Exam: Initial    FINDINGS:  The lungs are clear. No pneumothorax is present. Heart size and mediastinal  contours are stable. Calcified mediastinal hilar lymph nodes are again  visualized. Osseous structures are stable. Impression No evidence of acute cardiopulmonary disease         CXR portable:   Results for orders placed during the hospital encounter of 07/15/20   XR CHEST PORTABLE    Narrative EXAMINATION:  ONE XRAY VIEW OF THE CHEST    7/16/2020 5:39 am    COMPARISON:  Prior study(s) most recent 07/15/2020. HISTORY:  ORDERING SYSTEM PROVIDED HISTORY: intubated  TECHNOLOGIST PROVIDED HISTORY:  Reason for exam:->intubated  Reason for Exam: intubated  Acuity: Acute  Type of Exam: Initial    FINDINGS:  The endotracheal tube is approximately 7 cm above the jose carlos. NG tube  extends to the stomach with the side port at the GE junction level. Patient  is somewhat rotated to the left. Lungs are clear. Vessels are normal.      Impression No acute cardiac or pulmonary disease. ET tube 7 cm above the jose carlos. NG side-port at the GE junction. Access  Arterial       PICC           CVC        CVC Triple Lumen 07/16/20 Right Femoral (Active)   Continued need for line? Yes 07/16/20 0640   Site Assessment Dry;Clean; Intact 07/16/20 0640   Proximal Lumen Status Infusing;Capped 07/16/20 0640   Medial Lumen Status Infusing;Capped 07/16/20 0640   Distal Lumen Status Normal saline locked; Capped 07/16/20 0640   Dressing Type Anti-microbial patch; No dressing 07/16/20 0640   Dressing Status Clean;Dry 07/16/20 0640   Dressing Intervention Dressing changed 07/16/20 0640   Dressing Change Due 07/22/20 07/16/20 0640   Number of days: 0           Assessment:     Acute Hypoxemic Respiratory Failure with SAO2 <90% on Room Air  Severe Hypoglycemia  Acute Metabolic Encephalopathy  DMI  Accidental overdose of Insulin    Plan:       -Full vent support,Wean supplemental oxygen to goal saturation of >90%  -wean sedation for SAT  -Plan for SBT, expect mental status improved now that hypoglycemia improved  -cont D10 gtt, still needing PRN D50 for hypoglycemia despite infusion. -on hypoglycemia protocol    lovenox  protonix  peridex    Due to the immediate potential for life-threatening deterioration due to respiratory failure and hypoglycemia , I spent 33 minutes providing critical care. This time is excluding time spent performing separately billable procedures. This note was transcribed using [id number] [employer]. Please disregard any translational errors.     Thank you for the consult    [address]  Pulmonary, Sleep and Critical Care Medicine

## 2020-07-16 NOTE — ED NOTES
Report called to Pippa Harden RN on 2W. All questions answered.      Alecia Batista RN  07/16/20 3237

## 2020-07-16 NOTE — PROGRESS NOTES
7093: Report received from Chris Francois, [facility].     1802: Patient arrived to unit via stretcher from ED. Patient transferred to ICU bed with help of 4 staff members. Ventilator remains in place, respiratory in room to monitor. B upon arrival to unit. 2925: Admission assessment completed, see flow sheets for details. Patient placed in bilateral wrist restraints to promote patient safety and prevent removal of lines and tubes. Patient response to pain noted with lower extremities, non-purposeful movement noted to upper extremities. Right femoral TLC flushed with brisk blood return noted; IVF, propofol, and KVO running with no issues. Ryan catheter emptied. [id number] arrival to unit. Received orders to obtain a full set of labs and a chest x-ray for the AM.     0602: Blood obtained for morning labs, sent for processing. 4908: B, PRN D50 given per right femoral TLC. Will recheck BG in 15 minutes.      5167: Report given to Bradley Penn RN and Dustin Bhatt RN.     1382: Repeat B  Electronically signed by Libia Hare RN on 2020 at 7:06 AM

## 2020-07-16 NOTE — ED NOTES
Patient's mother updated on patient's status and inpatient bed number. All questions answered.      Mariah Kumar RN  07/16/20 5853

## 2020-07-16 NOTE — ED PROVIDER NOTES
Izabela Carolina MD at [address]        Previous Medications    AMLODIPINE (NORVASC) 2.5 MG TABLET    Take 1 tablet by mouth daily    ASPIRIN 81 MG CHEWABLE TABLET    Take 1 tablet by mouth daily    ATORVASTATIN (LIPITOR) 40 MG TABLET    Take 1 tablet by mouth daily    BLOOD GLUCOSE MONITOR STRIPS    Check blood sugars 4-5 times per day    BLOOD GLUCOSE MONITOR STRIPS    Test 3 times a day & as needed for symptoms of irregular blood glucose. CONTINUOUS BLOOD GLUC SENSOR (FREESTYLE HINA 14 DAY SENSOR) [facility] [place]    Use for personal CGMS. Replace every 2 weeks. DICLOFENAC SODIUM (VOLTAREN) 1 % GEL    APPLY 2 GRAMS TOPICALLY 4 TIMES A DAY    DIVALPROEX (DEPAKOTE) 500 MG DR TABLET    Take 500 mg by mouth 2 times daily    FAMOTIDINE (PEPCID) 20 MG TABLET    Take by mouth    GABAPENTIN (NEURONTIN) 300 MG CAPSULE    Take 1 capsule by mouth 3 times daily for 180 days.  Intended supply: 30 days    GLUCOSE MONITORING KIT (FREESTYLE) MONITORING KIT    1 kit by Does not apply route daily    IBUPROFEN (ADVIL;MOTRIN) 600 MG TABLET    Take 600 mg by mouth every 6 hours as needed    INSULIN GLARGINE (LANTUS) 100 UNIT/ML INJECTION VIAL    Inject 12 Units into the skin nightly    INSULIN LISPRO (ADMELOG) 100 UNIT/ML INJECTION VIAL    5 units for meals and 2 units for snacks    INSULIN SYRINGE-NEEDLE U-100 (ULTRA-THIN II INS SYR SHORT) 31G X 5/16\" 1 ML MISC    1 each by Does not apply route 3 times daily    LANCETS MISC    1 each by Does not apply route daily Check blood sugars 4-5 times per day    LOSARTAN (COZAAR) 50 MG TABLET    Take 1 tablet by mouth daily    LOSARTAN (COZAAR) 50 MG TABLET    Take 50 mg by mouth daily    METOCLOPRAMIDE (REGLAN) 5 MG/5ML SOLUTION    Take 10 mLs by mouth 3 times daily (before meals)    ONDANSETRON (ZOFRAN) 4 MG TABLET    Take 4 mg by mouth every 6 hours as needed    PANTOPRAZOLE (PROTONIX) 20 MG TABLET    Take 2 tablets by mouth daily    PROMETHAZINE (PHENERGAN) 25 MG SUPPOSITORY    Place rectally    QUETIAPINE (SEROQUEL) 100 MG TABLET    Take 100 mg by mouth nightly       ALLERGIES     Ketorolac; Morphine; Morphine and related; Tramadol; Lisinopril; Haloperidol lactate;  Toradol [ketorolac tromethamine]; and Vicodin [hydrocodone-acetaminophen]    FAMILY HISTORY       Family History   Problem Relation Age of Onset    Diabetes Mother     Heart Disease Mother     High Blood Pressure Mother     Asthma Brother     Other Father         kidney disease    No Known Problems Maternal Grandmother     No Known Problems Maternal Grandfather     No Known Problems Paternal Grandmother     No Known Problems Paternal Grandfather           SOCIAL HISTORY       Social History     Socioeconomic History    Marital status: Legally      Spouse name: Not on file    Number of children: 1    Years of education: Not on file    Highest education level: Not on file   Occupational History    Occupation: unemployed   Social Needs    Financial resource strain: Not on file    Food insecurity     Worry: Not on file     Inability: Not on file   [employer] needs     Medical: Not on file     Non-medical: Not on file   Tobacco Use    Smoking status: Current Every Day Smoker     Packs/day: 2.00     Years: 11.00     Pack years: 22.00     Types: Cigars     Start date: 3/26/2009    Smokeless tobacco: Never Used    Tobacco comment: black and mild 2  x daily   Substance and Sexual Activity    Alcohol use: Not Currently     Frequency: Never    Drug use: Yes     Frequency: 3.0 times per week     Types: Marijuana    Sexual activity: Yes     Partners: Female   Lifestyle    Physical activity     Days per week: Not on file     Minutes per session: Not on file    Stress: Not on file   Relationships    Social connections     Talks on phone: Not on file     Gets together: Not on file     Attends [employer] service: Not on file     Active member of club or organization: Not on file     Attends meetings of clubs or organizations: Not on file     Relationship status: Not on file    Intimate partner violence     Fear of current or ex partner: Not on file     Emotionally abused: Not on file     Physically abused: Not on file     Forced sexual activity: Not on file   Other Topics Concern    Not on file   Social History Narrative    Not on file       SCREENINGS             PHYSICAL EXAM    (up to 7 for level 4, 8 or more for level 5)     ED Triage Vitals [07/15/20 2058]   BP Temp Temp Source Pulse Resp SpO2 Height Weight   (!) 158/80 97.6 °F (36.4 °C) Oral 132 19 94 % -- --       Physical Exam  Vitals signs and nursing note reviewed. Constitutional:       Appearance: He is well-developed. He is not diaphoretic. Comments: Not diaphoretic. HENT:      Head: Normocephalic and atraumatic. Comments: No zepeda sign. No raccoon eyes. No blood or fluid from the ears or nose. Mouth/Throat:      Mouth: Mucous membranes are moist.   Eyes:      General:         Right eye: No discharge. Left eye: No discharge. Conjunctiva/sclera: Conjunctivae normal.   Neck:      Musculoskeletal: Neck supple. Cardiovascular:      Rate and Rhythm: Tachycardia present. Pulses: Normal pulses. Heart sounds: No murmur. Pulmonary:      Effort: Pulmonary effort is normal. No respiratory distress. Comments: Few scattered rhonchi. Pulse ox 94 to 95% on room air. Patient was started on 2 to 4 L of oxygen via nasal cannula. Abdominal:      Palpations: Abdomen is soft. Comments: Flat. Soft. Nontender, nondistended. Musculoskeletal: Normal range of motion. Skin:     General: Skin is warm and dry. Capillary Refill: Capillary refill takes less than 2 seconds. Neurological:      Mental Status: He is alert. Comments: On arrival the patient had a gag reflex and a GCS of 8. Sometime after the arrival to the ED the patient changes started having sonorous respirations.   He was reexamined and at that time he no longer had a gag. Psychiatric:         Behavior: Behavior normal.         DIAGNOSTIC RESULTS     EKG: All EKG's are interpreted by the Emergency Department Physician who either signs or Co-signsthis chart in the absence of a cardiologist.        RADIOLOGY:   Mere Neat such as CT, Ultrasound and MRI are read by the radiologist. Plain radiographic images are visualized and preliminarily interpreted by the emergency physician with the below findings:        Interpretation per the Radiologist below, if available at the time ofthis note:    XR CHEST PORTABLE   Final Result   The tip of the endotracheal tube is slightly high in position 9 cm above the   jose carlos. The OG/NG tube should be advanced 10 cm. The lungs are clear. CT Head WO Contrast   Final Result   No acute intracranial abnormality. Paranasal sinusitis. XR CHEST PORTABLE   Final Result   No acute cardiopulmonary disease.                ED BEDSIDE ULTRASOUND:   Performed by ED Physician - none    LABS:  Labs Reviewed   CBC WITH AUTO DIFFERENTIAL - Abnormal; Notable for the following components:       Result Value    WBC 19.5 (*)     Hemoglobin 12.0 (*)     Hematocrit 37.0 (*)     RDW 17.2 (*)     Neutrophils Absolute 17.4 (*)     All other components within normal limits    Narrative:     Performed at:  [facility]  [address] [facility] [address]   Phone [phone]   BASIC METABOLIC PANEL W/ REFLEX TO MG FOR LOW K - Abnormal; Notable for the following components:    Potassium reflex Magnesium 3.1 (*)     Glucose 67 (*)     All other components within normal limits    Narrative:     Performed at:  [facility]  [address] [place], [place] [address]   Phone [phone]   LACTIC ACID, PLASMA - Abnormal; Notable for the following components:    Lactic Acid 2.5 (*)     All other components within normal limits    Narrative:     Performed at:  [facility]  [address] [facility] [place] [address]   Phone [phone]   URINE DRUG SCREEN - Abnormal; Notable for the following components:    Cannabinoid Scrn, Ur POSITIVE (*)     All other components within normal limits    Narrative:     Performed at:  [facility]  [address], [place] [employer] [place] [address]   Phone [phone]   BLOOD GAS, VENOUS - Abnormal; Notable for the following components:    pCO2, Ronan 50.5 (*)     All other components within normal limits    Narrative:     Performed at:  [facility]  [address], [place] [employer] [place] [address]   Phone [phone]   POCT GLUCOSE - Abnormal; Notable for the following components:    POC Glucose 69 (*)     All other components within normal limits    Narrative:     Performed at:  [facility]  [address], [place] [employer] [place] [address]   Phone [phone]   POCT GLUCOSE - Abnormal; Notable for the following components:    POC Glucose 162 (*)     All other components within normal limits    Narrative:     Performed at:  [facility]  [address], [place] [employer] [place] [address]   Phone [phone]   POCT GLUCOSE - Abnormal; Notable for the following components:    POC Glucose 108 (*)     All other components within normal limits    Narrative:     Performed at:  [facility]  [address], [place] [address]   Phone [phone]   POCT GLUCOSE - Abnormal; Notable for the following components:    POC Glucose 16 (*)     All other components within normal limits    Narrative:     Performed at:  [facility]  [address], [place] [address]   Phone [phone]   POCT GLUCOSE - Abnormal; Notable for the following components:    POC Glucose 139 (*)     All other a patient on the patient. He was also started on D10W for repeated episodes of hypoglycemia in the ED. His CT scan did not show any acute pathology. He remained afebrile. He does have leukocytosis, he did initially have a tachycardia and as such he meets sepsis criteria. At this time I find no cause for the patient to potentially have sepsis on his chest x-ray. A urinalysis is pending. I will start the patient on antibiotics of Rocephin and azithromycin to cover for community-acquired pneumonia and the Rocephin would also cover urine. At this time there is no reported history of seizure tonight in this patient. The mother the patient states that he used to have seizures but he has not had one in a long time and his girlfriend did not notice any evidence of seizure. There is no history of trauma. Patient does not have a fever so I do not suspect meningitis or encephalitis. His alcohol level was 0 here. Of concern the mother of the patient when she arrived talk to me and told me that the girlfriend told her that he injected himself numerous times with his long-acting insulin and she is concerned that he may have been suicidal.  I will add on an aspirin and a Tylenol for this patient. CT scan does not show anoxic brain injury but that is also in the differential diagnosis and I have spoken to the patient's mother about this. CRITICAL CARE TIME   Total Critical Care time was 30 minutes, excluding separately reportable procedures. There was a high probability of clinically significant/life threatening deterioration in the patient's condition which required my urgent intervention.       CONSULTS:  None    PROCEDURES:  Unless otherwise noted below, none     Intubation    Date/Time: 7/16/2020 12:23 AM  Performed by: Maximiliano Ag MD  Authorized by: Maximiliano Ag MD     Consent:     Consent obtained:  Emergent situation  Pre-procedure details:     Patient status:  Adi April

## 2020-07-17 LAB
ANION GAP SERPL CALCULATED.3IONS-SCNC: 15 MMOL/L (ref 3–16)
APPEARANCE CSF: CLEAR
APPEARANCE CSF: CLEAR
BASOPHILS ABSOLUTE: 0 K/UL (ref 0–0.2)
BASOPHILS RELATIVE PERCENT: 0.2 %
BUN BLDV-MCNC: 7 MG/DL (ref 7–20)
CALCIUM SERPL-MCNC: 9.1 MG/DL (ref 8.3–10.6)
CHLORIDE BLD-SCNC: 97 MMOL/L (ref 99–110)
CLOT EVALUATION CSF: ABNORMAL
CLOT EVALUATION CSF: ABNORMAL
CO2: 24 MMOL/L (ref 21–32)
COLOR CSF: COLORLESS
COLOR CSF: COLORLESS
CREAT SERPL-MCNC: 0.7 MG/DL (ref 0.9–1.3)
EOSINOPHILS ABSOLUTE: 0.1 K/UL (ref 0–0.6)
EOSINOPHILS RELATIVE PERCENT: 0.4 %
GFR AFRICAN AMERICAN: >60
GFR NON-AFRICAN AMERICAN: >60
GLUCOSE BLD-MCNC: 104 MG/DL (ref 70–99)
GLUCOSE BLD-MCNC: 107 MG/DL (ref 70–99)
GLUCOSE BLD-MCNC: 117 MG/DL (ref 70–99)
GLUCOSE BLD-MCNC: 147 MG/DL (ref 70–99)
GLUCOSE BLD-MCNC: 185 MG/DL (ref 70–99)
GLUCOSE BLD-MCNC: 191 MG/DL (ref 70–99)
GLUCOSE BLD-MCNC: 193 MG/DL (ref 70–99)
GLUCOSE BLD-MCNC: 208 MG/DL (ref 70–99)
GLUCOSE BLD-MCNC: 209 MG/DL (ref 70–99)
GLUCOSE BLD-MCNC: 231 MG/DL (ref 70–99)
GLUCOSE BLD-MCNC: 240 MG/DL (ref 70–99)
GLUCOSE BLD-MCNC: 244 MG/DL (ref 70–99)
GLUCOSE BLD-MCNC: 247 MG/DL (ref 70–99)
GLUCOSE BLD-MCNC: 247 MG/DL (ref 70–99)
GLUCOSE BLD-MCNC: 263 MG/DL (ref 70–99)
GLUCOSE BLD-MCNC: 271 MG/DL (ref 70–99)
GLUCOSE BLD-MCNC: 287 MG/DL (ref 70–99)
GLUCOSE BLD-MCNC: 78 MG/DL (ref 70–99)
GLUCOSE BLD-MCNC: 78 MG/DL (ref 70–99)
GLUCOSE BLD-MCNC: 81 MG/DL (ref 70–99)
GLUCOSE BLD-MCNC: 94 MG/DL (ref 70–99)
GLUCOSE, CSF: 137 MG/DL (ref 40–80)
HCT VFR BLD CALC: 40.3 % (ref 40.5–52.5)
HEMOGLOBIN: 13.2 G/DL (ref 13.5–17.5)
LYMPHOCYTES ABSOLUTE: 1.4 K/UL (ref 1–5.1)
LYMPHOCYTES RELATIVE PERCENT: 6.8 %
MAGNESIUM: 2.2 MG/DL (ref 1.8–2.4)
MCH RBC QN AUTO: 27.3 PG (ref 26–34)
MCHC RBC AUTO-ENTMCNC: 32.8 G/DL (ref 31–36)
MCV RBC AUTO: 83.1 FL (ref 80–100)
MENINGITIS ENCEPHALITIS PANEL: NORMAL
MONOCYTES ABSOLUTE: 0.7 K/UL (ref 0–1.3)
MONOCYTES RELATIVE PERCENT: 3.2 %
NEUTROPHILS ABSOLUTE: 18.6 K/UL (ref 1.7–7.7)
NEUTROPHILS RELATIVE PERCENT: 89.4 %
NO DIFFERENTIAL CSF: ABNORMAL
NO DIFFERENTIAL CSF: ABNORMAL
PDW BLD-RTO: 17.1 % (ref 12.4–15.4)
PERFORMED ON: ABNORMAL
PERFORMED ON: NORMAL
PHOSPHORUS: 4.7 MG/DL (ref 2.5–4.9)
PLATELET # BLD: 344 K/UL (ref 135–450)
PMV BLD AUTO: 8.1 FL (ref 5–10.5)
POTASSIUM REFLEX MAGNESIUM: 3.9 MMOL/L (ref 3.5–5.1)
PROTEIN CSF: 50 MG/DL (ref 15–45)
RBC # BLD: 4.85 M/UL (ref 4.2–5.9)
RBC CSF: 1 /CUMM
RBC CSF: 84 /CUMM
REPORT: NORMAL
SODIUM BLD-SCNC: 136 MMOL/L (ref 136–145)
TUBE NUMBER CSF: ABNORMAL
TUBE NUMBER CSF: ABNORMAL
VOLUME CSF: 9.5 ML
VOLUME CSF: 9.5 ML
WBC # BLD: 20.8 K/UL (ref 4–11)
WBC CSF: 5 /CUMM (ref 0–5)
WBC CSF: 8 /CUMM (ref 0–5)

## 2020-07-17 PROCEDURE — 99291 CRITICAL CARE FIRST HOUR: CPT | Performed by: INTERNAL MEDICINE

## 2020-07-17 PROCEDURE — 94003 VENT MGMT INPAT SUBQ DAY: CPT

## 2020-07-17 PROCEDURE — 6360000002 HC RX W HCPCS: Performed by: NURSE PRACTITIONER

## 2020-07-17 PROCEDURE — 82040 ASSAY OF SERUM ALBUMIN: CPT

## 2020-07-17 PROCEDURE — 87899 AGENT NOS ASSAY W/OPTIC: CPT

## 2020-07-17 PROCEDURE — 83916 OLIGOCLONAL BANDS: CPT

## 2020-07-17 PROCEDURE — APPNB15 APP NON BILLABLE TIME 0-15 MINS: Performed by: NURSE PRACTITIONER

## 2020-07-17 PROCEDURE — 94750 HC PULMONARY COMPLIANCE STUDY: CPT

## 2020-07-17 PROCEDURE — 85025 COMPLETE CBC W/AUTO DIFF WBC: CPT

## 2020-07-17 PROCEDURE — 2580000003 HC RX 258: Performed by: INTERNAL MEDICINE

## 2020-07-17 PROCEDURE — 6360000002 HC RX W HCPCS: Performed by: INTERNAL MEDICINE

## 2020-07-17 PROCEDURE — 82945 GLUCOSE OTHER FLUID: CPT

## 2020-07-17 PROCEDURE — 82784 ASSAY IGA/IGD/IGG/IGM EACH: CPT

## 2020-07-17 PROCEDURE — 87070 CULTURE OTHR SPECIMN AEROBIC: CPT

## 2020-07-17 PROCEDURE — 89050 BODY FLUID CELL COUNT: CPT

## 2020-07-17 PROCEDURE — 82042 OTHER SOURCE ALBUMIN QUAN EA: CPT

## 2020-07-17 PROCEDURE — 009U3ZX DRAINAGE OF SPINAL CANAL, PERCUTANEOUS APPROACH, DIAGNOSTIC: ICD-10-PCS | Performed by: RADIOLOGY

## 2020-07-17 PROCEDURE — 6370000000 HC RX 637 (ALT 250 FOR IP): Performed by: NURSE PRACTITIONER

## 2020-07-17 PROCEDURE — 86592 SYPHILIS TEST NON-TREP QUAL: CPT

## 2020-07-17 PROCEDURE — 6370000000 HC RX 637 (ALT 250 FOR IP): Performed by: INTERNAL MEDICINE

## 2020-07-17 PROCEDURE — 83735 ASSAY OF MAGNESIUM: CPT

## 2020-07-17 PROCEDURE — 2580000003 HC RX 258: Performed by: NURSE PRACTITIONER

## 2020-07-17 PROCEDURE — 2700000000 HC OXYGEN THERAPY PER DAY

## 2020-07-17 PROCEDURE — C9113 INJ PANTOPRAZOLE SODIUM, VIA: HCPCS | Performed by: NURSE PRACTITIONER

## 2020-07-17 PROCEDURE — 84100 ASSAY OF PHOSPHORUS: CPT

## 2020-07-17 PROCEDURE — 87205 SMEAR GRAM STAIN: CPT

## 2020-07-17 PROCEDURE — 84157 ASSAY OF PROTEIN OTHER: CPT

## 2020-07-17 PROCEDURE — 87483 CNS DNA AMP PROBE TYPE 12-25: CPT

## 2020-07-17 PROCEDURE — 80048 BASIC METABOLIC PNL TOTAL CA: CPT

## 2020-07-17 PROCEDURE — 2000000000 HC ICU R&B

## 2020-07-17 PROCEDURE — 94761 N-INVAS EAR/PLS OXIMETRY MLT: CPT

## 2020-07-17 PROCEDURE — 95819 EEG AWAKE AND ASLEEP: CPT

## 2020-07-17 PROCEDURE — 88112 CYTOPATH CELL ENHANCE TECH: CPT

## 2020-07-17 PROCEDURE — 36592 COLLECT BLOOD FROM PICC: CPT

## 2020-07-17 RX ORDER — PROMETHAZINE HYDROCHLORIDE 12.5 MG/1
12.5 TABLET ORAL EVERY 6 HOURS PRN
Status: ON HOLD | COMMUNITY
End: 2020-08-25 | Stop reason: HOSPADM

## 2020-07-17 RX ORDER — MIDAZOLAM HYDROCHLORIDE 1 MG/ML
4 INJECTION INTRAMUSCULAR; INTRAVENOUS ONCE
Status: DISCONTINUED | OUTPATIENT
Start: 2020-07-17 | End: 2020-07-18

## 2020-07-17 RX ORDER — POTASSIUM CHLORIDE 29.8 MG/ML
20 INJECTION INTRAVENOUS ONCE
Status: COMPLETED | OUTPATIENT
Start: 2020-07-17 | End: 2020-07-17

## 2020-07-17 RX ORDER — MIDAZOLAM HYDROCHLORIDE 1 MG/ML
4 INJECTION INTRAMUSCULAR; INTRAVENOUS ONCE
Status: COMPLETED | OUTPATIENT
Start: 2020-07-17 | End: 2020-07-17

## 2020-07-17 RX ORDER — MIDAZOLAM HYDROCHLORIDE 1 MG/ML
INJECTION INTRAMUSCULAR; INTRAVENOUS
Status: DISCONTINUED
Start: 2020-07-17 | End: 2020-07-18

## 2020-07-17 RX ADMIN — HYDRALAZINE HYDROCHLORIDE 10 MG: 20 INJECTION INTRAMUSCULAR; INTRAVENOUS at 06:09

## 2020-07-17 RX ADMIN — INSULIN LISPRO 2 UNITS: 100 INJECTION, SOLUTION INTRAVENOUS; SUBCUTANEOUS at 11:47

## 2020-07-17 RX ADMIN — DEXTROSE MONOHYDRATE 150 ML/HR: 100 INJECTION, SOLUTION INTRAVENOUS at 03:30

## 2020-07-17 RX ADMIN — PANTOPRAZOLE SODIUM 40 MG: 40 INJECTION, POWDER, FOR SOLUTION INTRAVENOUS at 08:15

## 2020-07-17 RX ADMIN — PROPOFOL 10 MCG/KG/MIN: 10 INJECTION, EMULSION INTRAVENOUS at 12:20

## 2020-07-17 RX ADMIN — INSULIN LISPRO 3 UNITS: 100 INJECTION, SOLUTION INTRAVENOUS; SUBCUTANEOUS at 21:03

## 2020-07-17 RX ADMIN — LOSARTAN POTASSIUM 50 MG: 50 TABLET ORAL at 08:15

## 2020-07-17 RX ADMIN — MIDAZOLAM 4 MG: 1 INJECTION INTRAMUSCULAR; INTRAVENOUS at 16:14

## 2020-07-17 RX ADMIN — Medication 10 ML: at 08:15

## 2020-07-17 RX ADMIN — CHLORHEXIDINE GLUCONATE 15 ML: 0.12 RINSE ORAL at 08:26

## 2020-07-17 RX ADMIN — HYDRALAZINE HYDROCHLORIDE 10 MG: 20 INJECTION INTRAMUSCULAR; INTRAVENOUS at 02:01

## 2020-07-17 RX ADMIN — CHLORHEXIDINE GLUCONATE 15 ML: 0.12 RINSE ORAL at 20:33

## 2020-07-17 RX ADMIN — POTASSIUM CHLORIDE 20 MEQ: 29.8 INJECTION, SOLUTION INTRAVENOUS at 08:25

## 2020-07-17 RX ADMIN — PROPOFOL 25 MCG/KG/MIN: 10 INJECTION, EMULSION INTRAVENOUS at 21:05

## 2020-07-17 RX ADMIN — ENOXAPARIN SODIUM 40 MG: 40 INJECTION SUBCUTANEOUS at 08:15

## 2020-07-17 RX ADMIN — INSULIN LISPRO 3 UNITS: 100 INJECTION, SOLUTION INTRAVENOUS; SUBCUTANEOUS at 17:19

## 2020-07-17 ASSESSMENT — PULMONARY FUNCTION TESTS
PIF_VALUE: 15
PIF_VALUE: 11
PIF_VALUE: 15
PIF_VALUE: 13
PIF_VALUE: 12
PIF_VALUE: 18
PIF_VALUE: 18
PIF_VALUE: 14
PIF_VALUE: 13
PIF_VALUE: 12
PIF_VALUE: 14
PIF_VALUE: 12
PIF_VALUE: 11
PIF_VALUE: 12
PIF_VALUE: 14
PIF_VALUE: 12
PIF_VALUE: 12
PIF_VALUE: 15
PIF_VALUE: 17
PIF_VALUE: 13
PIF_VALUE: 14
PIF_VALUE: 13
PIF_VALUE: 14
PIF_VALUE: 11
PIF_VALUE: 13
PIF_VALUE: 11
PIF_VALUE: 12
PIF_VALUE: 15
PIF_VALUE: 20

## 2020-07-17 NOTE — CONSULTS
Neurology Consult Note  Reason for Consult: abnormal MRI    Chief complaint: unable to obtain from the patient at this time    Dr Destin Guillen MD asked me to see Mercedes Gonzalez in consultation for evaluation of abnormal MRI    History of Present Illness:  Mercedes Gonzalez is a 50 y.o. male who presents with altered mental status. I obtained my information via chart review. The patient is unresponsive and unable to provide any information at this time. It is not clear when the patient was last known to be at baseline. He has poorly controlled DM at baseline and apparently had stated to his girlfriend that he had injected himself w/ a significant amount of long acting insulin. He was eventually found unresponsive the evening of the 15th, sonorous. Down time is uncertain. His blood sugar was found to be 22. This was treated and his sugar was in the 60s on arrival.  No response to narcan. He did not have a gag reflex here so he was intubated. His BP was around 502 - 736 systolic the day of arrival, yesterday up on 769 systolic on occasion. CT head was w/out any acute abnormalities. He was treated for possible sepsis. Currently, he is not on any sedation and remains unresponsive. His family reported a hx of seizure for the patient, though no other details. Per Care Everywhere, he was supposed to see  Neurology for an evaluation for possible seizure/spells, though had been a no show. EEG from March was unrevealing. He is not on an AED.       Medical History:  Past Medical History:   Diagnosis Date    Diabetes mellitus ([address]     Gastroparesis     Hypertension      Past Surgical History:   Procedure Laterality Date    BONY PELVIS SURGERY      FOOT AMPUTATION Right 5/13/2020    EMERGENCY; RIGHT INCISION AND DEBRIDEMENT; HALUX AMPUTATION performed by Raymon Oliveira DPM at [address] 2006    pins and rods- plate    UPPER GASTROINTESTINAL ENDOSCOPY N/A 12/2/2019    EGD BIOPSY performed by Souleymane Branch MD at [address] [place] [address] Avenue:   sodium chloride flush  10 mL Intravenous 2 times per day    insulin lispro  0-12 Units Subcutaneous 6 times per day    enoxaparin  40 mg Subcutaneous Daily    chlorhexidine  15 mL Mouth/Throat BID    pantoprazole  40 mg Intravenous Daily    And    sodium chloride (PF)  10 mL Intravenous Daily    losartan  50 mg Oral Daily     Continuous Infusions:   dextrose 75 mL/hr at 07/17/20 0854    propofol Stopped (07/16/20 0839)     Medications Prior to Admission:   insulin lispro (ADMELOG) 100 UNIT/ML injection vial, 5 units for meals and 2 units for snacks  QUEtiapine (SEROQUEL) 100 MG tablet, Take 100 mg by mouth nightly  promethazine (PHENERGAN) 25 MG suppository, Place rectally  ondansetron (ZOFRAN) 4 MG tablet, Take 4 mg by mouth every 6 hours as needed  ibuprofen (ADVIL;MOTRIN) 600 MG tablet, Take 600 mg by mouth every 6 hours as needed  famotidine (PEPCID) 20 MG tablet, Take by mouth  divalproex (DEPAKOTE) 500 MG DR tablet, Take 500 mg by mouth 2 times daily  diclofenac sodium (VOLTAREN) 1 % GEL, APPLY 2 GRAMS TOPICALLY 4 TIMES A DAY  losartan (COZAAR) 50 MG tablet, Take 50 mg by mouth daily  gabapentin (NEURONTIN) 300 MG capsule, Take 1 capsule by mouth 3 times daily for 180 days. Intended supply: 30 days  atorvastatin (LIPITOR) 40 MG tablet, Take 1 tablet by mouth daily  insulin glargine (LANTUS) 100 UNIT/ML injection vial, Inject 12 Units into the skin nightly  metoclopramide (REGLAN) 5 MG/5ML solution, Take 10 mLs by mouth 3 times daily (before meals)  blood glucose monitor strips, Test 3 times a day & as needed for symptoms of irregular blood glucose.   amLODIPine (NORVASC) 2.5 MG tablet, Take 1 tablet by mouth daily  pantoprazole (PROTONIX) 20 MG tablet, Take 2 tablets by mouth daily  aspirin 81 MG chewable tablet, Take 1 tablet by mouth daily  losartan (COZAAR) 50 MG tablet, Take 1 tablet by mouth daily  blood glucose monitor strips, Check blood sugars 4-5 times per day  Continuous Blood Gluc Sensor (FREESTYLE HINA 14 DAY SENSOR) MISC, Use for personal CGMS. Replace every 2 weeks. glucose monitoring kit (FREESTYLE) monitoring kit, 1 kit by Does not apply route daily  Lancets MISC, 1 each by Does not apply route daily Check blood sugars 4-5 times per day  Insulin Syringe-Needle U-100 (ULTRA-THIN II INS SYR SHORT) 31G X 5/16\" 1 ML MISC, 1 each by Does not apply route 3 times daily    Allergies   Allergen Reactions    Ketorolac Shortness Of Breath and Itching    Morphine Shortness Of Breath, Hives and Itching     Tolerates hydromorphone    Morphine And Related Hives and Shortness Of Breath    Tramadol Shortness Of Breath     resp failure   resp failure     Lisinopril Swelling     When he takes lisinopril he gets large lips which go away when he doesn't take lisinopril    Haloperidol Lactate Itching    Toradol [Ketorolac Tromethamine]      resp failure    Vicodin [Hydrocodone-Acetaminophen]      Family History   Problem Relation Age of Onset    Diabetes Mother     Heart Disease Mother     High Blood Pressure Mother     Asthma Brother     Other Father         kidney disease    No Known Problems Maternal Grandmother     No Known Problems Maternal Grandfather     No Known Problems Paternal Grandmother     No Known Problems Paternal Grandfather      Social History     Tobacco Use   Smoking Status Current Every Day Smoker    Packs/day: 2.00    Years: 11.00    Pack years: 22.00    Types: Cigars    Start date: 3/26/2009   Smokeless Tobacco Never Used   Tobacco Comment    black and mild 2  x daily     Social History     Substance and Sexual Activity   Drug Use Yes    Frequency: 3.0 times per week    Types: Marijuana     Social History     Substance and Sexual Activity   Alcohol Use Not Currently    Frequency: Never     ROS: unable to obtain from the patient at this time. Chart reviewed.       Exam:  Blood pressure 129/72, pulse 105, temperature 97.6 °F (36.4 °C), temperature source Axillary, resp. rate 20, height 6' 2\" (1.88 m), weight 150 lb 2.1 oz (68.1 kg), SpO2 100 %. Constitutional    Vital signs: BP, HR, and RR reviewed   General depressed mental status, no distress  Eyes: unable to visualize the fundi. Cardiovascular: pulses symmetric in all 4 extremities. Psychiatric: limited exam given encephalopathy   Neurologic  Mental status: limited exam given encephalopathy  orientation unable to assess given comatose state  General fund of knowledge limited exam given encephalopathy   Memory limited exam given encephalopathy  Attention poor  Language limited exam given encephalopathy  Comprehension not following any commands  CN2: corneal reflexes present bilaterally. CN 3,4,6: a bit of a dysconjugate gaze. Some roving eye movements. No forced gaze deviation. Pupils sluggish bilaterally. CN5: facial sensation limited exam given encephalopathy  CN7: face symmetric but exam limited by ET tube  CN8: hearing limited exam given encephalopathy  CN9: + gag per RN. CN11: limited exam given encephalopathy  CN12: limited exam given encephalopathy  Strength: moris due to encephalopathy. A bit of decerebrate posturing at times. Deep tendon reflexes: normal in all 4 extremities  Sensory: delayed withdraw to noxious pain stimulus in all four limbs. Cerebellar/coordination:limited exam given encephalopathy  Tone: no increased tone or rigidity. Gait: limited exam given encephalopathy and intubated with ventilator. Labs  Glucose 247  Na 136  K 3.9  BUN 7  Cr 0.7  Mg 2.20    WBC 20.8K  Hg 13.2  Platelets 125    VPA < 2.8    Blood cultures NG  UA no infection    Urine drug screen + cannabinoids  ETOH none detected    Studies  MRI brain w/o 7/16/20, independently reviewed  Subtle bilateral frontoparietal cortical/subcortical signal abnormality. No acute abnormality. Impression  1.   The patient was found unresponsive severely hypoglycemic. He remains intubated, off sedation, unresponsive. MRI brain w/ bilateral cortical/subcortical hyperintensities on FLAIR, differential included hypoglycemic injury/PRES/encephalitis. 2.  Hypoglycemia. 3.  Leukocytosis. 4.  Hypertension. Recommendations  1. EEG. 2.  Lumbar puncture/CSF. Will order. 3.  Gradual BP reduction. 4.  Supportive care. 5.  Will follow exam and results when available.       Sharif Velazco NP  [address] [place] [address] [address] Neurology    A copy of this note was provided for Dr Alma Guzman MD

## 2020-07-17 NOTE — PLAN OF CARE
Problem: Nutrition  Goal: Optimal nutrition therapy  Outcome: Ongoing     Nutrition Problem #1: Inadequate oral intake  Intervention: Food and/or Nutrient Delivery: Continue NPO(start nutrition when appropriate)  Nutritional Goals:  Tolerate most appropriate nutrition therapy

## 2020-07-17 NOTE — PROGRESS NOTES
Hospitalist Progress Note      PCP: Devante Tello MD    Date of Admission: 7/15/2020    Chief Complaint: metabolic encephalopathy    Hospital Course: Mr. Campbell Dodson is a 49-year-old gentleman with history of poorly controlled insulin-dependent diabetes who was admitted unresponsive at home with glucose of 22. He was reported to have accidental versus intentional overdose of insulin. He is status post glucose resuscitation with D10 solution but currently still intubated with nonpurposeful movements. Subjective: Euglycemia reestablished but mental status still poor. Continues to be intubated without sedation. Withdraws to pain and occasional nonpurposeful movement      Medications:  Reviewed    Infusion Medications    dextrose 75 mL/hr at 07/17/20 0854    dextrose      propofol Stopped (07/16/20 0839)    propofol       Scheduled Medications    sodium chloride flush  10 mL Intravenous 2 times per day    insulin lispro  0-12 Units Subcutaneous 6 times per day    enoxaparin  40 mg Subcutaneous Daily    chlorhexidine  15 mL Mouth/Throat BID    pantoprazole  40 mg Intravenous Daily    And    sodium chloride (PF)  10 mL Intravenous Daily    losartan  50 mg Oral Daily     PRN Meds: sodium chloride flush, acetaminophen **OR** acetaminophen, polyethylene glycol, ondansetron, glucose, dextrose, glucagon (rDNA), dextrose, propofol, magnesium sulfate, potassium chloride, sodium phosphate IVPB **OR** sodium phosphate IVPB, hydrALAZINE      Intake/Output Summary (Last 24 hours) at 7/17/2020 1013  Last data filed at 7/17/2020 1013  Gross per 24 hour   Intake 5162 ml   Output 4100 ml   Net 1062 ml       Exam:    BP (!) 147/80   Pulse 114   Temp 97.6 °F (36.4 °C) (Axillary)   Resp 19   Ht 6' 2\" (1.88 m)   Wt 150 lb 2.1 oz (68.1 kg)   SpO2 100%   BMI 19.28 kg/m²     General appearance: intubated without sedation  HEENT Normal cephalic, atraumatic without obvious deformity.   Pupils equal, round, and reactive to light. Extra ocular muscles intact. Conjunctivae/corneas clear. Neck: Supple, No jugular venous distention/bruits. Trachea midline without thyromegaly or adenopathy with full range of motion. Lungs: intubated clear to auscultation, bilaterally without Rales/Wheezes/Rhonchi  Heart: Regular rate and rhythm with Normal S1/S2 without murmurs, rubs or gallops, point of maximum impulse non-displaced  Abdomen: Soft, non-tender or non-distended without rigidity or guarding and positive bowel sounds all four quadrants. Extremities: No clubbing, cyanosis, or edema bilaterally. Full range of motion without deformity and normal gait intact. Skin: Skin color, texture, turgor normal.  No rashes or lesions. Neurologic: intubated without sedation. Withdraws to pain and occasional nonpurposeful movement  Capillary Refill: Acceptable  < 3 seconds  Peripheral Pulses: +3 Easily felt, not easily obliterated with pressure      Labs:   Recent Labs     07/15/20  2117 07/16/20  0602 07/17/20  0357   WBC 19.5* 18.9* 20.8*   HGB 12.0* 11.2* 13.2*   HCT 37.0* 34.4* 40.3*    293 344     Recent Labs     07/15/20  2117 07/16/20  0602 07/16/20  1443 07/17/20  0357    138  --  136   K 3.1* 3.0*  3.0* 4.2 3.9    101  --  97*   CO2 25 24  --  24   BUN 18 15  --  7   CREATININE 0.9 0.8*  --  0.7*   CALCIUM 9.5 9.1  --  9.1   PHOS  --  2.1*  --  4.7     Recent Labs     07/16/20  0602   AST 17   ALT 8*   BILIDIR <0.2   BILITOT <0.2   ALKPHOS 77     Recent Labs     07/16/20  0602   INR 0.97     No results for input(s): CKTOTAL, TROPONINI in the last 72 hours.     Urinalysis:      Lab Results   Component Value Date    NITRU Negative 07/15/2020    WBCUA 10 05/28/2020    BACTERIA 1+ 12/15/2010    RBCUA 1 05/28/2020    BLOODU Negative 07/15/2020    SPECGRAV 1.025 07/15/2020    GLUCOSEU 500 07/15/2020    GLUCOSEU >=1000 12/15/2010       Radiology:  MRI BRAIN WO CONTRAST   Final Result   Subtle bilateral

## 2020-07-17 NOTE — PROGRESS NOTES
Food and/or Nutrient Delivery:  Continue NPO(start nutrition when appropriate)  Nutrition Education/Counseling:  No recommendation at this time   Coordination of Nutrition Care:  Continued Inpatient Monitoring    Goals: Tolerate most appropriate nutrition therapy       Nutrition Monitoring and Evaluation:   Behavioral-Environmental Outcomes:  (NA)   Food/Nutrient Intake Outcomes:  Diet Advancement/Tolerance, Food and Nutrient Intake, Enteral Nutrition Intake/Tolerance  Physical Signs/Symptoms Outcomes:  Biochemical Data, GI Status, Fluid Status or Edema, Hemodynamic Status, Nutrition Focused Physical Findings, Skin, Weight     Discharge Planning:     Too soon to determine     Electronically signed by Ling Shi RD, LD on 7/17/20 at 11:22 AM EDT    Contact: [phone]

## 2020-07-17 NOTE — PROCEDURES
[address] [place] [address]                          ELECTROENCEPHALOGRAM REPORT    PATIENT NAME: Mary Ruby                      :        1975  MED REC NO:   [id number]                          ROOM:       2124  ACCOUNT NO:   [de-identified]                           ADMIT DATE: 07/15/2020  PROVIDER:     Heber Puga DO    DATE OF EE2020    REFERRING PROVIDER:  Solomon Lesches, NP    REASON FOR STUDY:  Seizure activity. BRIEF HISTORY AND NEUROLOGIC FINDINGS:  The patient is a 78-year-old  male being evaluated for reported seizure activity. MEDICATIONS:  The patient's medications did not include any listed  centrally acting medications. EEG FINDINGS:  This is a 20-channel digital EEG performed utilizing  bipolar and referential montages. The patient was reportedly asleep  throughout the entire recording though was noted to be moving his legs  at times. This EEG typically consisted of a generalized background of  0.5 to 4 Hz delta activity with occasional superimposed theta  frequencies. The anterior background was somewhat slower than the  posterior background at times. The maximum posterior background  frequency was approximately 6 Hz. Typical background in the posterior  regions was at approximately 3 to 5 Hz and in the anterior regions about  3 to 4 Hz. There was significant myogenic artifact at times which  obscured the underlying background rhythms. Though the background  seemed somewhat repetitive at times, there did not appear to be any  significant evolution to suggest any ictal activity. No discernible  sleep or wakefulness was identified during the recording. Photic stimulation was performed at various flash frequencies and did  not evoke a significant posterior driving response. Hyperventilation  exercise was not performed due to the patient's clinical history.     A 1-channel EKG rhythm strip was reviewed and showed no obvious cardiac  dysrhythmias. Occasional myogenic artifact was present which obscured  the cardiac rhythm at times. EEG DIAGNOSIS:  This EEG is abnormal due to the presence of moderately  severe background slowing with disorganization. CLINICAL INTERPRETATION:  The background slowing and disorganization is  nonspecific and consistent with at least a moderate encephalopathy. This may be seen in multiple settings including toxic, metabolic, or  degenerative conditions as well as with medication effect. No definite  epileptiform activity was present during this recording. If seizures  remain a clinical concern, then a repeat EEG may be of further benefit. Clinical correlation is advised.         Joey Kowalski DO    D: 07/17/2020 14:23:05       T: 07/17/2020 14:34:05     [email or web address]  Job#: [id number]     Doc#: [id number]    CC:

## 2020-07-17 NOTE — PROGRESS NOTES
Pulmonary Progress Note    Date of Admission: 7/15/2020   LOS: 1 day     CC:  Chief Complaint   Patient presents with    Hypoglycemia     Found unresponsive by girlfriend. Per EMS, pt had blood sugar of \"22 on arrival and was given IM glucagon\". EMS reports blood sugar \"went up to 59\". Pt responsive to pain at this time. HPI/Subjective  NO events o/n. Poor mental status continues  MRI with possible PRES  Hypertensive overnight  BS better    ROS:   Unable to obtain due to mechanical ventilation      Intake/Output Summary (Last 24 hours) at 7/17/2020 0801  Last data filed at 7/17/2020 0500  Gross per 24 hour   Intake 5513 ml   Output 3875 ml   Net 1638 ml         PHYSICAL EXAM:   Blood pressure (!) 222/103, pulse 103, temperature 98.3 °F (36.8 °C), temperature source Oral, resp. rate 25, height 6' 2\" (1.88 m), weight 150 lb 2.1 oz (68.1 kg), SpO2 100 %.'  Gen:  No acute distress. Eyes: PERRL. Anicteric sclera. No conjunctival injection. ENT: No discharge OP with ETT. External appearance of ears and nose normal.  Neck: Trachea midline. No mass   Resp:  No crackles. No wheezes. No rhonchi. No dullness on percussion. CV: Regular rate. Regular rhythm. No murmur or rub. No edema. GI: Soft, Non-tender. Non-distended. +BS  Skin: Warm, dry, w/o erythema. Lymph: No cervical or supraclavicular LAD. M/S: No cyanosis. No clubbing. Neuro:   no focal neurologic deficit. Moves all extremities, evidence of posturing.  +gag, +cough, overbreath vent.       Medications:    Scheduled Meds:   sodium chloride flush  10 mL Intravenous 2 times per day    insulin lispro  0-12 Units Subcutaneous 6 times per day    enoxaparin  40 mg Subcutaneous Daily    chlorhexidine  15 mL Mouth/Throat BID    pantoprazole  40 mg Intravenous Daily    And    sodium chloride (PF)  10 mL Intravenous Daily    losartan  50 mg Oral Daily       Continuous Infusions:   dextrose 150 mL/hr (07/17/20 0330)    dextrose      propofol Stopped (07/16/20 0839)    propofol         PRN Meds:  sodium chloride flush, acetaminophen **OR** acetaminophen, polyethylene glycol, ondansetron, glucose, dextrose, glucagon (rDNA), dextrose, propofol, magnesium sulfate, potassium chloride, sodium phosphate IVPB **OR** sodium phosphate IVPB, hydrALAZINE    Labs reviewed:  CBC:   Recent Labs     07/15/20  2117 07/16/20  0602 07/17/20  0357   WBC 19.5* 18.9* 20.8*   HGB 12.0* 11.2* 13.2*   HCT 37.0* 34.4* 40.3*   MCV 83.6 82.8 83.1    293 344     BMP:   Recent Labs     07/15/20  2117 07/16/20  0602 07/16/20  1443 07/17/20  0357    138  --  136   K 3.1* 3.0*  3.0* 4.2 3.9    101  --  97*   CO2 25 24  --  24   PHOS  --  2.1*  --  4.7   BUN 18 15  --  7   CREATININE 0.9 0.8*  --  0.7*     LIVER PROFILE:   Recent Labs     07/16/20  0602   AST 17   ALT 8*   BILIDIR <0.2   BILITOT <0.2   ALKPHOS 77     PT/INR:   Recent Labs     07/16/20  0602   PROTIME 11.2   INR 0.97     APTT: No results for input(s): APTT in the last 72 hours. UA:  Recent Labs     07/15/20  2226   COLORU Yellow   PHUR 5.5  5.5   CLARITYU Clear   SPECGRAV 1.025   LEUKOCYTESUR Negative   UROBILINOGEN 0.2   BILIRUBINUR Negative   BLOODU Negative   GLUCOSEU 500*     No results for input(s): PH, PCO2, PO2 in the last 72 hours. Films:  Radiology Review:  Pertinent images / reports were reviewed as a part of this visit. CT Chest w/ contrast: No results found for this or any previous visit. CT Chest w/o contrast:   Results for orders placed during the hospital encounter of 01/08/20   CT CHEST WO CONTRAST    Narrative EXAMINATION:  CT OF THE CHEST WITHOUT CONTRAST 1/8/2020 11:40 am    TECHNIQUE:  CT of the chest was performed without the administration of intravenous  contrast. Multiplanar reformatted images are provided for review.  Dose  modulation, iterative reconstruction, and/or weight based adjustment of the  mA/kV was utilized to reduce the radiation dose to as low as reasonably  achievable. COMPARISON:  12/29/2019    HISTORY:  ORDERING SYSTEM PROVIDED HISTORY: eval abnl portable CXR  TECHNOLOGIST PROVIDED HISTORY:  Reason for exam:->eval abnl portable CXR  Reason for Exam: eval abnl portable CXR  Acuity: Unknown  Type of Exam: Unknown    FINDINGS:  Mediastinum: Borderline cardiomegaly. Group of calcified subcarinal left  hilar lymph nodes. Thyroid gland and esophagus grossly normal.    Lungs/pleura: No focal consolidation. No significant nodules or masses. A  cluster of calcified nodules noted in the medial left lower lobe toward the  lung base. Of note there is no right apical lesion and finding on chest  x-ray from earlier same day would be considered artifactual.    No pleural effusion or pneumothorax. Upper Abdomen: No suspicious lesions. Soft Tissues/Bones: No acute abnormality of the bones. The superficial soft  tissues show no significant abnormalities. Impression 1. No focal consolidation or lung lesion. 2. Calcified subcarinal, left hilar and left lower lobe nodules suggestive of  granulomatous disease. CTPA:   Results for orders placed during the hospital encounter of 12/29/19   CT CHEST PULMONARY EMBOLISM W CONTRAST    Narrative EXAMINATION:  CTA OF THE CHEST 12/29/2019 10:46 pm    TECHNIQUE:  CTA of the chest was performed after the administration of intravenous  contrast.  Multiplanar reformatted images are provided for review. MIP  images are provided for review. Dose modulation, iterative reconstruction,  and/or weight based adjustment of the mA/kV was utilized to reduce the  radiation dose to as low as reasonably achievable. COMPARISON:  None    HISTORY:  ORDERING SYSTEM PROVIDED HISTORY: PE vs PNA  TECHNOLOGIST PROVIDED HISTORY:  Reason for exam:->PE vs PNA  Reason for Exam: 53 y.o. male who presents to the emergency department with  abdominal pain. Patient was here this AM for DKA and left AMA 2/2 not getting  pain medicine. States he went to [employer] and ate a bunch of food. States he  decided to come back because his abdomen started hurting. Pain is 9/10 sharp  in nature in his entire abdomen. Hasn't taken anything for pain. Nothing  makes symptoms better or worse. No other associated symptoms. FINDINGS:  Pulmonary Arteries: Pulmonary arteries are adequately opacified for  evaluation. No evidence of intraluminal filling defect to suggest pulmonary  embolism. Main pulmonary artery is normal in caliber. Mediastinum: The thoracic aorta is normal in course and caliber. The heart  is not enlarged. No pericardial effusion. No pathologic hilar or  mediastinal adenopathy. Calcified subcarinal and left hilar nodes indicative  of previous granulomatous disease. Soft tissue wall thickening, greatest  distally suggesting an esophagitis. Lungs/pleura: The lungs are without acute process. No focal consolidation or  pulmonary edema. No evidence of pleural effusion or pneumothorax. Calcified  granuloma in the left lower lobe. Upper Abdomen: Dense contrast in the IVC, likely related to injection via a  lower extremity venous access. Visualized upper abdominal viscera are  unremarkable    Soft Tissues/Bones: No acute bone or soft tissue abnormality. Impression 1. No evidence of pulmonary embolic disease. 2. No acute pulmonary findings. Evidence of old granulomatous disease. 3. Esophageal wall thickening, most pronounced distally consistent with an  esophagitis.          CXR PA/LAT:   Results for orders placed during the hospital encounter of 02/05/20   XR CHEST STANDARD (2 VW)    Narrative EXAMINATION:  TWO XRAY VIEWS OF THE CHEST    2/5/2020 10:33 pm    COMPARISON:  January 18, 2020    HISTORY:  ORDERING SYSTEM PROVIDED HISTORY: Chest Pain  TECHNOLOGIST PROVIDED HISTORY:  Reason for exam:->Chest Pain  Reason for Exam: Leg Swelling (x2 weeks), chest pain  Acuity: Acute  Type of Exam: Initial    FINDINGS:  The lungs are clear.  No pneumothorax is present. Heart size and mediastinal  contours are stable. Calcified mediastinal hilar lymph nodes are again  visualized. Osseous structures are stable. Impression No evidence of acute cardiopulmonary disease         CXR portable:   Results for orders placed during the hospital encounter of 07/15/20   XR CHEST PORTABLE    Narrative EXAMINATION:  ONE XRAY VIEW OF THE CHEST    7/16/2020 5:39 am    COMPARISON:  Prior study(s) most recent 07/15/2020. HISTORY:  ORDERING SYSTEM PROVIDED HISTORY: intubated  TECHNOLOGIST PROVIDED HISTORY:  Reason for exam:->intubated  Reason for Exam: intubated  Acuity: Acute  Type of Exam: Initial    FINDINGS:  The endotracheal tube is approximately 7 cm above the jose carlos. NG tube  extends to the stomach with the side port at the GE junction level. Patient  is somewhat rotated to the left. Lungs are clear. Vessels are normal.      Impression No acute cardiac or pulmonary disease. ET tube 7 cm above the jose carlos. NG side-port at the GE junction. Access  Arterial       PICC           CVC        CVC Triple Lumen 07/16/20 Right Femoral (Active)   Continued need for line? Yes 07/17/20 0600   Site Assessment Dry;Clean; Intact 07/17/20 0600   Proximal Lumen Status Infusing;Capped 07/17/20 0600   Medial Lumen Status Infusing;Capped 07/17/20 0600   Distal Lumen Status Normal saline locked; Capped 07/17/20 0600   Dressing Type Transparent; Anti-microbial patch 07/17/20 0600   Dressing Status Clean;Dry; Intact 07/17/20 0600   Dressing Intervention Dressing changed 07/16/20 0640   Dressing Change Due 07/22/20 07/17/20 0600   Number of days: 1             Assessment:     Acute Hypoxemic Respiratory Failure with SAO2 <90% on Room Air  Severe Hypoglycemia  Acute Metabolic Encephalopathy  DMI  Overdose of insulin  PRES    Plan:      -MRI yesterday with evidence of possible PRES, he is alos very hypertensive overnight.  -will attempt to control BP, may need cardene gtt  -d/w pharmacy to help identify possible pharmacological inciting agent.  -Neurology consult  -EEG pending  -full vent support, Wean supplemental oxygen to goal saturation of >90%  -wean d10 to 75, continue Blood sugar checks      Due to the immediate potential for life-threatening deterioration due to respiratory failure , I spent 33 minutes providing critical care. This time is excluding time spent performing separately billable procedures.       Thank you for this consult,    Jordy Sigala [facility]

## 2020-07-17 NOTE — PROGRESS NOTES
Medication Reconciliation    List of medications patient is currently taking is complete. Source of information: 1. [facility] pharmacy                                      2. [facility]                                      3. EPIC records                                      4. Recent pharmacy med rec notes and discharge instructions          Notes regarding home medications:   1. Per pharmacy documentation during admission in May. Negro David ... \"Removed Seroquel - no recent hx\"        \"Was given Depakote for \" mood\" in Feb, was supposed to f/u with mental health clinic. Appears he has not been taking it\"    2.  Amlodipine 2.5 mg daily recently added to patient medication list.     3. All other medications have been filled recently at [facility]

## 2020-07-17 NOTE — PLAN OF CARE
Problem: Restraint Use - Nonviolent/Non-Self-Destructive Behavior:  Goal: Absence of restraint indications  Description: Absence of restraint indications  Outcome: Ongoing

## 2020-07-17 NOTE — PROGRESS NOTES
1900 Report given by Jai Heard, [address] [employer] BS 60 -gave 25 ml of D50  2000 Assessment completed - see flow sheets. PT non-responsive. Pupils 3 reactive but sluggish, does not follow any commands. ETT 7.5 at 26 lip line. Diminished, not breathing over vent. CVC in rt fem flushed all lines but no blood return. PIV in rt AC removed- did not flush. OG in at 58- drainage in canister to be accounted for 150ml dark brown/ green. Ryan in urine is clear yellow and output is WNL. No BM noted at this time. Mepilex on butt, SCDs on legs- Repositioned . 2013   2058 BG 91  2204 BS 80  2230 Perfect serve MD Mcdaniels for PRN medicaiton for high BP- Q4 hydralazine for SBP >160  2300 BS 85  0000 BS 78 Assessment completed - see flow sheets. 0100 BS 78  0200 104, gave hydralazine for BP  0300 BS 81  0400 BS 94 Assessment completed - see flow sheets.   0500   0600  gave hydralazine for BP  0700  -Report given to [place]

## 2020-07-17 NOTE — PROCEDURES
Radiology Procedure Note    Patient Name: Beckie Yi  Date of Birth 1975  MRN: [id number]    Procedure:Lumbar puncture    Pre-Procedure Diagnosis: Mental status chane    Post- Procedure Diagnosis: Same    Findings: The patient was positioned left lateral decubitus. Lower back prepped with iodine prep. Using the iliac crest and L3 spinous process as landmarks, the presumed right L3-L4 was accessed using a 20g spinal needle. There was return of clear CSF. Approximately 15cc was removed in 4 vials and sent to lab. There were no immediate complications.     Complications: None    Estimated Blood Loss: less than 50     Specimens: Was Obtained: 15cc clear CSF      Electronically signed by Beau Miller MD on 7/17/2020 at 4:45 PM  2

## 2020-07-18 ENCOUNTER — APPOINTMENT (OUTPATIENT)
Dept: GENERAL RADIOLOGY | Age: 45
DRG: 812 | End: 2020-07-18
Payer: MEDICAID

## 2020-07-18 LAB
ANION GAP SERPL CALCULATED.3IONS-SCNC: 17 MMOL/L (ref 3–16)
BASOPHILS ABSOLUTE: 0.1 K/UL (ref 0–0.2)
BASOPHILS RELATIVE PERCENT: 0.3 %
BUN BLDV-MCNC: 18 MG/DL (ref 7–20)
CALCIUM SERPL-MCNC: 9.3 MG/DL (ref 8.3–10.6)
CHLORIDE BLD-SCNC: 93 MMOL/L (ref 99–110)
CO2: 23 MMOL/L (ref 21–32)
CREAT SERPL-MCNC: 1 MG/DL (ref 0.9–1.3)
CRYPTOCOCCAL ANTIGEN CSF: NEGATIVE
EOSINOPHILS ABSOLUTE: 0.1 K/UL (ref 0–0.6)
EOSINOPHILS RELATIVE PERCENT: 0.3 %
GFR AFRICAN AMERICAN: >60
GFR NON-AFRICAN AMERICAN: >60
GLUCOSE BLD-MCNC: 110 MG/DL (ref 70–99)
GLUCOSE BLD-MCNC: 200 MG/DL (ref 70–99)
GLUCOSE BLD-MCNC: 273 MG/DL (ref 70–99)
GLUCOSE BLD-MCNC: 316 MG/DL (ref 70–99)
GLUCOSE BLD-MCNC: 318 MG/DL (ref 70–99)
GLUCOSE BLD-MCNC: 336 MG/DL (ref 70–99)
GLUCOSE BLD-MCNC: 360 MG/DL (ref 70–99)
HCT VFR BLD CALC: 38.3 % (ref 40.5–52.5)
HEMOGLOBIN: 12.5 G/DL (ref 13.5–17.5)
LYMPHOCYTES ABSOLUTE: 1.2 K/UL (ref 1–5.1)
LYMPHOCYTES RELATIVE PERCENT: 7.1 %
MAGNESIUM: 2.5 MG/DL (ref 1.8–2.4)
MCH RBC QN AUTO: 27.2 PG (ref 26–34)
MCHC RBC AUTO-ENTMCNC: 32.6 G/DL (ref 31–36)
MCV RBC AUTO: 83.4 FL (ref 80–100)
MONOCYTES ABSOLUTE: 0.7 K/UL (ref 0–1.3)
MONOCYTES RELATIVE PERCENT: 4 %
NEUTROPHILS ABSOLUTE: 15.3 K/UL (ref 1.7–7.7)
NEUTROPHILS RELATIVE PERCENT: 88.3 %
PDW BLD-RTO: 17.6 % (ref 12.4–15.4)
PERFORMED ON: ABNORMAL
PHOSPHORUS: 3 MG/DL (ref 2.5–4.9)
PLATELET # BLD: 361 K/UL (ref 135–450)
PMV BLD AUTO: 8.2 FL (ref 5–10.5)
POTASSIUM REFLEX MAGNESIUM: 4.6 MMOL/L (ref 3.5–5.1)
RBC # BLD: 4.59 M/UL (ref 4.2–5.9)
SODIUM BLD-SCNC: 133 MMOL/L (ref 136–145)
WBC # BLD: 17.4 K/UL (ref 4–11)

## 2020-07-18 PROCEDURE — 36569 INSJ PICC 5 YR+ W/O IMAGING: CPT

## 2020-07-18 PROCEDURE — 80048 BASIC METABOLIC PNL TOTAL CA: CPT

## 2020-07-18 PROCEDURE — 94761 N-INVAS EAR/PLS OXIMETRY MLT: CPT

## 2020-07-18 PROCEDURE — 76937 US GUIDE VASCULAR ACCESS: CPT

## 2020-07-18 PROCEDURE — 6360000002 HC RX W HCPCS: Performed by: NURSE PRACTITIONER

## 2020-07-18 PROCEDURE — 6360000002 HC RX W HCPCS: Performed by: INTERNAL MEDICINE

## 2020-07-18 PROCEDURE — 02HV33Z INSERTION OF INFUSION DEVICE INTO SUPERIOR VENA CAVA, PERCUTANEOUS APPROACH: ICD-10-PCS | Performed by: FAMILY MEDICINE

## 2020-07-18 PROCEDURE — 2580000003 HC RX 258: Performed by: NURSE PRACTITIONER

## 2020-07-18 PROCEDURE — 2000000000 HC ICU R&B

## 2020-07-18 PROCEDURE — 2500000003 HC RX 250 WO HCPCS: Performed by: INTERNAL MEDICINE

## 2020-07-18 PROCEDURE — 84100 ASSAY OF PHOSPHORUS: CPT

## 2020-07-18 PROCEDURE — APPNB15 APP NON BILLABLE TIME 0-15 MINS: Performed by: NURSE PRACTITIONER

## 2020-07-18 PROCEDURE — C9113 INJ PANTOPRAZOLE SODIUM, VIA: HCPCS | Performed by: NURSE PRACTITIONER

## 2020-07-18 PROCEDURE — 99291 CRITICAL CARE FIRST HOUR: CPT | Performed by: INTERNAL MEDICINE

## 2020-07-18 PROCEDURE — 2700000000 HC OXYGEN THERAPY PER DAY

## 2020-07-18 PROCEDURE — 83735 ASSAY OF MAGNESIUM: CPT

## 2020-07-18 PROCEDURE — C1751 CATH, INF, PER/CENT/MIDLINE: HCPCS

## 2020-07-18 PROCEDURE — 2580000003 HC RX 258: Performed by: INTERNAL MEDICINE

## 2020-07-18 PROCEDURE — 6370000000 HC RX 637 (ALT 250 FOR IP): Performed by: NURSE PRACTITIONER

## 2020-07-18 PROCEDURE — 36592 COLLECT BLOOD FROM PICC: CPT

## 2020-07-18 PROCEDURE — 85025 COMPLETE CBC W/AUTO DIFF WBC: CPT

## 2020-07-18 PROCEDURE — 6370000000 HC RX 637 (ALT 250 FOR IP): Performed by: INTERNAL MEDICINE

## 2020-07-18 PROCEDURE — 71045 X-RAY EXAM CHEST 1 VIEW: CPT

## 2020-07-18 RX ORDER — SODIUM CHLORIDE 0.9 % (FLUSH) 0.9 %
10 SYRINGE (ML) INJECTION PRN
Status: DISCONTINUED | OUTPATIENT
Start: 2020-07-18 | End: 2020-08-11 | Stop reason: HOSPADM

## 2020-07-18 RX ORDER — LIDOCAINE HYDROCHLORIDE 10 MG/ML
5 INJECTION, SOLUTION EPIDURAL; INFILTRATION; INTRACAUDAL; PERINEURAL ONCE
Status: DISCONTINUED | OUTPATIENT
Start: 2020-07-18 | End: 2020-07-19

## 2020-07-18 RX ORDER — DEXMEDETOMIDINE HYDROCHLORIDE 4 UG/ML
0.2 INJECTION, SOLUTION INTRAVENOUS CONTINUOUS
Status: DISCONTINUED | OUTPATIENT
Start: 2020-07-18 | End: 2020-07-18 | Stop reason: CLARIF

## 2020-07-18 RX ORDER — SODIUM CHLORIDE 0.9 % (FLUSH) 0.9 %
10 SYRINGE (ML) INJECTION EVERY 12 HOURS SCHEDULED
Status: DISCONTINUED | OUTPATIENT
Start: 2020-07-18 | End: 2020-08-11 | Stop reason: HOSPADM

## 2020-07-18 RX ORDER — DEXMEDETOMIDINE HYDROCHLORIDE 4 UG/ML
0.2 INJECTION, SOLUTION INTRAVENOUS CONTINUOUS
Status: DISCONTINUED | OUTPATIENT
Start: 2020-07-18 | End: 2020-07-20

## 2020-07-18 RX ADMIN — Medication 10 ML: at 10:36

## 2020-07-18 RX ADMIN — INSULIN LISPRO 4 UNITS: 100 INJECTION, SOLUTION INTRAVENOUS; SUBCUTANEOUS at 12:58

## 2020-07-18 RX ADMIN — ENOXAPARIN SODIUM 40 MG: 40 INJECTION SUBCUTANEOUS at 10:35

## 2020-07-18 RX ADMIN — DEXMEDETOMIDINE HYDROCHLORIDE 0.2 MCG/KG/HR: 4 INJECTION, SOLUTION INTRAVENOUS at 01:00

## 2020-07-18 RX ADMIN — INSULIN LISPRO 3 UNITS: 100 INJECTION, SOLUTION INTRAVENOUS; SUBCUTANEOUS at 01:48

## 2020-07-18 RX ADMIN — HYDRALAZINE HYDROCHLORIDE 10 MG: 20 INJECTION INTRAMUSCULAR; INTRAVENOUS at 02:05

## 2020-07-18 RX ADMIN — LOSARTAN POTASSIUM 50 MG: 50 TABLET ORAL at 10:35

## 2020-07-18 RX ADMIN — INSULIN LISPRO 8 UNITS: 100 INJECTION, SOLUTION INTRAVENOUS; SUBCUTANEOUS at 23:04

## 2020-07-18 RX ADMIN — INSULIN LISPRO 8 UNITS: 100 INJECTION, SOLUTION INTRAVENOUS; SUBCUTANEOUS at 10:38

## 2020-07-18 RX ADMIN — INSULIN LISPRO 4 UNITS: 100 INJECTION, SOLUTION INTRAVENOUS; SUBCUTANEOUS at 05:59

## 2020-07-18 RX ADMIN — PANTOPRAZOLE SODIUM 40 MG: 40 INJECTION, POWDER, FOR SOLUTION INTRAVENOUS at 10:35

## 2020-07-18 RX ADMIN — DEXMEDETOMIDINE HYDROCHLORIDE 0.4 MCG/KG/HR: 4 INJECTION, SOLUTION INTRAVENOUS at 12:15

## 2020-07-18 NOTE — PROGRESS NOTES
Pulmonary Progress Note    Date of Admission: 7/15/2020   LOS: 2 days     CC:  Chief Complaint   Patient presents with    Hypoglycemia     Found unresponsive by girlfriend. Per EMS, pt had blood sugar of \"22 on arrival and was given IM glucagon\". EMS reports blood sugar \"went up to 59\". Pt responsive to pain at this time. HPI/Subjective  Self extubated o/n  Down to 2L  BP better conrolled, but now a little hypotensive  LP yesterday unrevealing    ROS:   Unable to obtain due to mechanical ventilation      Intake/Output Summary (Last 24 hours) at 7/18/2020 0901  Last data filed at 7/18/2020 0600  Gross per 24 hour   Intake 879 ml   Output 1725 ml   Net -846 ml         PHYSICAL EXAM:   Blood pressure (!) 140/76, pulse 103, temperature 97.8 °F (36.6 °C), temperature source Axillary, resp. rate 19, height 6' 2\" (1.88 m), weight 147 lb 4.3 oz (66.8 kg), SpO2 100 %.'  Gen:  No acute distress. Eyes: PERRL. Anicteric sclera. No conjunctival injection. ENT: No discharge. OP clear . External appearance of ears and nose normal.  Neck: Trachea midline. No mass   Resp:  No crackles. No wheezes. +r sided rhonchi. No dullness on percussion. CV: Regular rate. Regular rhythm. No murmur or rub. No edema. GI: Soft, Non-tender. Non-distended. +BS  Skin: Warm, dry, w/o erythema. Lymph: No cervical or supraclavicular LAD. M/S: No cyanosis. No clubbing. Neuro:   no focal neurologic deficit.   Moves all extremities, lethargic    Medications:    Scheduled Meds:   insulin lispro  0-12 Units Subcutaneous Q4H    sodium chloride flush  10 mL Intravenous 2 times per day    enoxaparin  40 mg Subcutaneous Daily    pantoprazole  40 mg Intravenous Daily    And    sodium chloride (PF)  10 mL Intravenous Daily    losartan  50 mg Oral Daily       Continuous Infusions:   dexmedetomidine HCl in NaCl 0.4 mcg/kg/hr (07/18/20 0209)    dextrose      propofol         PRN Meds:  sodium chloride flush, acetaminophen **OR** acetaminophen, polyethylene glycol, ondansetron, glucose, dextrose, glucagon (rDNA), dextrose, propofol, magnesium sulfate, potassium chloride, sodium phosphate IVPB **OR** sodium phosphate IVPB, hydrALAZINE    Labs reviewed:  CBC:   Recent Labs     07/16/20  0602 07/17/20  0357 07/18/20  0538   WBC 18.9* 20.8* 17.4*   HGB 11.2* 13.2* 12.5*   HCT 34.4* 40.3* 38.3*   MCV 82.8 83.1 83.4    344 361     BMP:   Recent Labs     07/16/20  0602 07/16/20  1443 07/17/20  0357 07/18/20  0538     --  136 133*   K 3.0*  3.0* 4.2 3.9 4.6     --  97* 93*   CO2 24  --  24 23   PHOS 2.1*  --  4.7 3.0   BUN 15  --  7 18   CREATININE 0.8*  --  0.7* 1.0     LIVER PROFILE:   Recent Labs     07/16/20  0602   AST 17   ALT 8*   BILIDIR <0.2   BILITOT <0.2   ALKPHOS 77     PT/INR:   Recent Labs     07/16/20  0602   PROTIME 11.2   INR 0.97     APTT: No results for input(s): APTT in the last 72 hours. UA:  Recent Labs     07/15/20  2226   COLORU Yellow   PHUR 5.5  5.5   CLARITYU Clear   SPECGRAV 1.025   LEUKOCYTESUR Negative   UROBILINOGEN 0.2   BILIRUBINUR Negative   BLOODU Negative   GLUCOSEU 500*     No results for input(s): PH, PCO2, PO2 in the last 72 hours. Films:  Radiology Review:  Pertinent images / reports were reviewed as a part of this visit. CT Chest w/ contrast: No results found for this or any previous visit. CT Chest w/o contrast:   Results for orders placed during the hospital encounter of 01/08/20   CT CHEST WO CONTRAST    Narrative EXAMINATION:  CT OF THE CHEST WITHOUT CONTRAST 1/8/2020 11:40 am    TECHNIQUE:  CT of the chest was performed without the administration of intravenous  contrast. Multiplanar reformatted images are provided for review. Dose  modulation, iterative reconstruction, and/or weight based adjustment of the  mA/kV was utilized to reduce the radiation dose to as low as reasonably  achievable.     COMPARISON:  12/29/2019    HISTORY:  ORDERING SYSTEM PROVIDED HISTORY: paul abnl portable CXR  TECHNOLOGIST PROVIDED HISTORY:  Reason for exam:->eval abnl portable CXR  Reason for Exam: eval abnl portable CXR  Acuity: Unknown  Type of Exam: Unknown    FINDINGS:  Mediastinum: Borderline cardiomegaly. Group of calcified subcarinal left  hilar lymph nodes. Thyroid gland and esophagus grossly normal.    Lungs/pleura: No focal consolidation. No significant nodules or masses. A  cluster of calcified nodules noted in the medial left lower lobe toward the  lung base. Of note there is no right apical lesion and finding on chest  x-ray from earlier same day would be considered artifactual.    No pleural effusion or pneumothorax. Upper Abdomen: No suspicious lesions. Soft Tissues/Bones: No acute abnormality of the bones. The superficial soft  tissues show no significant abnormalities. Impression 1. No focal consolidation or lung lesion. 2. Calcified subcarinal, left hilar and left lower lobe nodules suggestive of  granulomatous disease. CTPA:   Results for orders placed during the hospital encounter of 12/29/19   CT CHEST PULMONARY EMBOLISM W CONTRAST    Narrative EXAMINATION:  CTA OF THE CHEST 12/29/2019 10:46 pm    TECHNIQUE:  CTA of the chest was performed after the administration of intravenous  contrast.  Multiplanar reformatted images are provided for review. MIP  images are provided for review. Dose modulation, iterative reconstruction,  and/or weight based adjustment of the mA/kV was utilized to reduce the  radiation dose to as low as reasonably achievable. COMPARISON:  None    HISTORY:  ORDERING SYSTEM PROVIDED HISTORY: PE vs PNA  TECHNOLOGIST PROVIDED HISTORY:  Reason for exam:->PE vs PNA  Reason for Exam: 53 y.o. male who presents to the emergency department with  abdominal pain. Patient was here this AM for DKA and left AMA 2/2 not getting  pain medicine. States he went to [employer] and ate a bunch of food.  States he  decided to come back because his abdomen mediastinal hilar lymph nodes are again  visualized. Osseous structures are stable. Impression No evidence of acute cardiopulmonary disease         CXR portable:   Results for orders placed during the hospital encounter of 07/15/20   XR CHEST PORTABLE    Narrative EXAMINATION:  ONE XRAY VIEW OF THE CHEST    7/16/2020 5:39 am    COMPARISON:  Prior study(s) most recent 07/15/2020. HISTORY:  ORDERING SYSTEM PROVIDED HISTORY: intubated  TECHNOLOGIST PROVIDED HISTORY:  Reason for exam:->intubated  Reason for Exam: intubated  Acuity: Acute  Type of Exam: Initial    FINDINGS:  The endotracheal tube is approximately 7 cm above the jose carlos. NG tube  extends to the stomach with the side port at the GE junction level. Patient  is somewhat rotated to the left. Lungs are clear. Vessels are normal.      Impression No acute cardiac or pulmonary disease. ET tube 7 cm above the jose carlos. NG side-port at the GE junction. Access  Arterial       PICC           CVC        CVC Triple Lumen 07/16/20 Right Femoral (Active)   Continued need for line? Yes 07/17/20 0600   Site Assessment Dry;Clean; Intact 07/17/20 0600   Proximal Lumen Status Infusing;Capped 07/17/20 0600   Medial Lumen Status Infusing;Capped 07/17/20 0600   Distal Lumen Status Normal saline locked; Capped 07/17/20 0600   Dressing Type Transparent; Anti-microbial patch 07/17/20 0600   Dressing Status Clean;Dry; Intact 07/17/20 0600   Dressing Intervention Dressing changed 07/16/20 0640   Dressing Change Due 07/22/20 07/17/20 0600   Number of days: 1             Assessment:     Acute Hypoxemic Respiratory Failure with SAO2 <90% on Room Air  Severe Hypoglycemia  Acute Metabolic Encephalopathy  DMI  Overdose of insulin  PRES    Plan:      -bp better controlled, hydralazine PRN  -self extubated overnight  -post extubation vomiting, now with righ tsided ronchi, will get CXR  -wean off precedex, likely contributing to hypotension  -Wean supplemental oxygen to goal saturation of >90%  -EEG nonspecific  -off dextrose gtt, increasing SSI    Due to the immediate potential for life-threatening deterioration due to altered mental status and PRES , I spent 32 minutes providing critical care. This time is excluding time spent performing procedures.     Thank you for this consult,    Jordy Sigala [address] [facility]

## 2020-07-18 NOTE — SIGNIFICANT EVENT
Around midnight patient ventilator was red alarming. I immediately entered the room and the patient was violently coughing. I turned the lights on to find the ETT at about 12cm at the lips. Propofol was turned off. Patient had quickly coughed his ETT almost all of the way out. Pt maintained his 02 sats and airway and was not struggling to breath. hospitalist was paged and ETT removed per order. Also, order for precedex to help with patients agitation. Pt was placed on 4LNC and observed closely. His breathing was normal and unlabored and 02 sats were fine. Pt had no respiratory issues for the rest of the night. As of 0700, he is on [facility] and sats are % with no dyspnea noted. Pt still unable to follow commands and becomes very agitated at times.

## 2020-07-18 NOTE — PROGRESS NOTES
PICC PLACEMENT:  Preprocedure & timeout done w primary RN [id number] [facility]; no difficulty accessing R BASILIC vein, other than pts intermittent impulsive movement of the shoulder and arm during procedure; picc catheter is inserted and advanced without difficulty or complications; tip is verified using Crystalplex 3cg Confirmation system w positive pwave and no negative deflection visualized at 0 cm out; waveform printed and placed in chart; reported same and ok to use PICC to primary RN; pt seemed to tolerate procedure well after lidocaine was injected into tissue; pt will remain in bed at rest in order to promote hemostasis to the insertion site; he currently remains in 2 pt restraints; pt is left in a position of comfort w siderails up x3, call light in reach and bed is locked in lowest position; Order for OK to use PICC is placed in EMR

## 2020-07-18 NOTE — PROGRESS NOTES
Hospitalist Progress Note      PCP: Lobo Narayanan MD    Date of Admission: 7/15/2020    Chief Complaint: metabolic encephalopathy    Hospital Course: Mr. Jenaro Coyne is a 49-year-old gentleman with history of poorly controlled insulin-dependent diabetes who was admitted unresponsive at home with glucose of 22. He was reported to have accidental versus intentional overdose of insulin. Self extubated to 2 L nasal cannula by coughing. Appears to have some deeper breathing with painful stimuli otherwise nothing purposeful. Subjective: Mental status still compromised, minimally responsive to painful stimuli. Self extubated overnight by coughing to 2 L nasal cannula. Put on Precedex for agitation but weaning off per critical care. Medications:  Reviewed    Infusion Medications    dexmedetomidine HCl in NaCl 0.4 mcg/kg/hr (07/18/20 0209)    dextrose      propofol       Scheduled Medications    insulin lispro  0-12 Units Subcutaneous Q4H    sodium chloride flush  10 mL Intravenous 2 times per day    enoxaparin  40 mg Subcutaneous Daily    pantoprazole  40 mg Intravenous Daily    And    sodium chloride (PF)  10 mL Intravenous Daily    losartan  50 mg Oral Daily     PRN Meds: sodium chloride flush, acetaminophen **OR** acetaminophen, polyethylene glycol, ondansetron, glucose, dextrose, glucagon (rDNA), dextrose, propofol, magnesium sulfate, potassium chloride, sodium phosphate IVPB **OR** sodium phosphate IVPB, hydrALAZINE      Intake/Output Summary (Last 24 hours) at 7/18/2020 0924  Last data filed at 7/18/2020 0600  Gross per 24 hour   Intake 879 ml   Output 1725 ml   Net -846 ml       Exam:    BP (!) 140/76   Pulse 103   Temp 97.8 °F (36.6 °C) (Axillary)   Resp 19   Ht 6' 2\" (1.88 m)   Wt 147 lb 4.3 oz (66.8 kg)   SpO2 100%   BMI 18.91 kg/m²     General appearance:  Minimally responsive on 2 L nasal cannula  HEENT Normal cephalic, atraumatic without obvious deformity.   Pupils equal, round, and reactive to light. Extra ocular muscles intact. Conjunctivae/corneas clear. Neck: Supple, No jugular venous distention/bruits. Trachea midline without thyromegaly or adenopathy with full range of motion. Lungs: intubated clear to auscultation, bilaterally without Rales/Wheezes/Rhonchi  Heart: Regular rate and rhythm with Normal S1/S2 without murmurs, rubs or gallops, point of maximum impulse non-displaced  Abdomen: Soft, non-tender or non-distended without rigidity or guarding and positive bowel sounds all four quadrants. Extremities: No clubbing, cyanosis, or edema bilaterally. Full range of motion without deformity and normal gait intact. Skin: Skin color, texture, turgor normal.  No rashes or lesions. Neurologic:  Minimally responsive to painful stimuli, occasional nonpurposeful movement  Capillary Refill: Acceptable  < 3 seconds  Peripheral Pulses: +3 Easily felt, not easily obliterated with pressure      Labs:   Recent Labs     07/16/20  0602 07/17/20  0357 07/18/20  0538   WBC 18.9* 20.8* 17.4*   HGB 11.2* 13.2* 12.5*   HCT 34.4* 40.3* 38.3*    344 361     Recent Labs     07/16/20  0602 07/16/20  1443 07/17/20  0357 07/18/20  0538     --  136 133*   K 3.0*  3.0* 4.2 3.9 4.6     --  97* 93*   CO2 24  --  24 23   BUN 15  --  7 18   CREATININE 0.8*  --  0.7* 1.0   CALCIUM 9.1  --  9.1 9.3   PHOS 2.1*  --  4.7 3.0     Recent Labs     07/16/20  0602   AST 17   ALT 8*   BILIDIR <0.2   BILITOT <0.2   ALKPHOS 77     Recent Labs     07/16/20  0602   INR 0.97     No results for input(s): CKTOTAL, TROPONINI in the last 72 hours.     Urinalysis:      Lab Results   Component Value Date    NITRU Negative 07/15/2020    WBCUA 10 05/28/2020    BACTERIA 1+ 12/15/2010    RBCUA 1 05/28/2020    BLOODU Negative 07/15/2020    SPECGRAV 1.025 07/15/2020    GLUCOSEU 500 07/15/2020    GLUCOSEU >=1000 12/15/2010       Radiology:  MRI BRAIN WO CONTRAST   Final Result   Subtle bilateral frontoparietal cortical and subcortical signal abnormality   suggesting mild posterior reversal encephalopathy syndrome. No acute infarct   or hemorrhage. XR CHEST PORTABLE   Final Result   No acute cardiac or pulmonary disease. ET tube 7 cm above the jose carlos. NG side-port at the GE junction. XR CHEST PORTABLE   Final Result   The tip of the endotracheal tube is slightly high in position 9 cm above the   jose carlos. The OG/NG tube should be advanced 10 cm. The lungs are clear. CT Head WO Contrast   Final Result   No acute intracranial abnormality. Paranasal sinusitis. XR CHEST PORTABLE   Final Result   No acute cardiopulmonary disease. IR LUMBAR PUNCTURE FOR DIAGNOSIS    (Results Pending)   XR CHEST 1 VW    (Results Pending)           Assessment/Plan:    Active Hospital Problems    Diagnosis Date Noted    Acute respiratory failure with hypoxia ([facility] [address] [J96.01] 07/16/2020    Hypoglycemia [E16.2]     Altered mental status [R41.82]        Acute Metabolic Encephalopathy: Improving  - multifactorial from life-threatening hypoglycemia and/or PRES. EEG performed, no seizure like activity  -LP performed, results pending  -Required intubation in the emergency department for airway protection.   Status post self extubation by coughing on 7/18  -Wean off Precedex drip per pulmonology    PRES: Resolving  -gradual BP reduction  - oral losartan and IV hydralazine for now  -keep SBP <170    Severe hypoglycemia: Resolved  -s/p rescucitation with D10  -low dose SSI    Insulin overdose:  -Uncertain if accidental or intentional  -If mental status improves consult psychiatry    DVT Prophylaxis:  lovenox  Diet: Diet NPO Effective Now  Code Status: Full Code    PT/OT Eval Status: pending    Dispo - ICU, critical    Aleksey Sellers MD

## 2020-07-18 NOTE — PROGRESS NOTES
Precedex weaned off post PICC placement. Pt will open eyes to voice/stimuli but does not track nurse. Pupils sluggish but reactive. Pt spontaneously moves BLE-non purposeful.  Non purposeful movt noted to all 4 extr

## 2020-07-18 NOTE — PROGRESS NOTES
Units, Subcutaneous, Q4H, Millie Baugh MD, 4 Units at 07/18/20 0559    midazolam (VERSED) injection 4 mg, 4 mg, Intravenous, Once, ESTEPHANIA Noel CNP    sodium chloride flush 0.9 % injection 10 mL, 10 mL, Intravenous, 2 times per day, Marleny Engel MD, 10 mL at 07/17/20 0815    sodium chloride flush 0.9 % injection 10 mL, 10 mL, Intravenous, PRN, Marleny Engel MD    acetaminophen (TYLENOL) tablet 650 mg, 650 mg, Oral, Q4H PRN **OR** acetaminophen (TYLENOL) suppository 650 mg, 650 mg, Rectal, Q4H PRN, Marleny Engel MD    polyethylene glycol [facility]) packet 17 g, 17 g, Oral, Daily PRN, Marleny Engel MD    ondansetron [facility]) injection 4 mg, 4 mg, Intravenous, Q4H PRN, Marleny Engel MD    glucose (GLUTOSE) 40 % oral gel 15 g, 15 g, Oral, PRN, Marleny Engel MD    dextrose 50 % IV solution, 12.5 g, Intravenous, PRN, Marleny Engel MD, 12.5 g at 07/16/20 1955    glucagon (rDNA) injection 1 mg, 1 mg, Intramuscular, PRN, Marleny Engel MD    dextrose 5 % solution, 100 mL/hr, Intravenous, PRN, Marleny Engel MD    enoxaparin (LOVENOX) injection 40 mg, 40 mg, Subcutaneous, Daily, Marleny Engel MD, 40 mg at 07/17/20 0815    propofol injection, 10 mcg/kg/min, Intravenous, Titrated, ESTEPHANIA Concepcion CNP, Last Rate: 10.4 mL/hr at 07/17/20 2105, 25 mcg/kg/min at 07/17/20 2105    propofol injection 120 mg, 12 mL, Intravenous, Continuous PRN, Marleny Engel MD    chlorhexidine (PERIDEX) 0.12 % solution 15 mL, 15 mL, Mouth/Throat, BID, ESTEPHANIA Noel CNP, 15 mL at 07/17/20 2033    pantoprazole (PROTONIX) injection 40 mg, 40 mg, Intravenous, Daily, 40 mg at 07/17/20 0815 **AND** sodium chloride (PF) 0.9 % injection 10 mL, 10 mL, Intravenous, Daily, Shannon Wang, ESTEPHANIA - CNP, 10 mL at 07/17/20 0815    magnesium sulfate 1 g in dextrose 5% 100 mL IVPB, 1 g, Intravenous, PRN, Alexia Hendricks, ESTEPHANIA - CNP    potassium chloride 20 mEq/50 mL IVPB (Central Line), 20 mEq, Intravenous, PRN, ESTEPHANIA Hanson - CNP    sodium phosphate 11.07 mmol in dextrose 5 % 250 mL IVPB, 0.16 mmol/kg, Intravenous, PRN **OR** sodium phosphate 22.11 mmol in dextrose 5 % 250 mL IVPB, 0.32 mmol/kg, Intravenous, PRN, ESTEPHANIA Hanson - CNP    losartan (COZAAR) tablet 50 mg, 50 mg, Oral, Daily, ESTEPHANIA Hanson CNP, 50 mg at 07/17/20 0815    hydrALAZINE (APRESOLINE) injection 10 mg, 10 mg, Intravenous, Q4H PRN, Irving Al MD, 10 mg at 07/18/20 0205    ------------------------------------------    Exam:    Vitals:  BP (!) 140/76   Pulse 103   Temp 97.8 °F (36.6 °C) (Axillary)   Resp 19   Ht 6' 2\" (1.88 m)   Wt 147 lb 4.3 oz (66.8 kg)   SpO2 100%   BMI 18.91 kg/m²     Pt now extubated. Does not follow commands. No verbal responses. Grimaces to pain bilateral.  Pupils reactive. Gaze appears dysconjugate. NLFs symmetric. Moves all 4 ext in response to pin. Appears to localize and withdraw. DTRs symmetric.   Plantar responses flexor bilateral.      Recent Results (from the past 24 hour(s))   POCT Glucose    Collection Time: 07/17/20  7:50 AM   Result Value Ref Range    POC Glucose 247 (H) 70 - 99 mg/dl    Performed on ACCU-CHEK    POCT Glucose    Collection Time: 07/17/20  8:58 AM   Result Value Ref Range    POC Glucose 244 (H) 70 - 99 mg/dl    Performed on ACCU-CHEK    POCT Glucose    Collection Time: 07/17/20 10:05 AM   Result Value Ref Range    POC Glucose 287 (H) 70 - 99 mg/dl    Performed on ACCU-CHEK    POCT Glucose    Collection Time: 07/17/20 11:00 AM   Result Value Ref Range    POC Glucose 240 (H) 70 - 99 mg/dl    Performed on ACCU-CHEK    POCT Glucose    Collection Time: 07/17/20 12:02 PM   Result Value Ref Range    POC Glucose 247 (H) 70 - 99 mg/dl    Performed on ACCU-CHEK    POCT Glucose    Collection Time: 07/17/20  1:06 PM   Result Value Ref Range    POC Glucose 209 (H) 70 - 99 mg/dl    Performed on ACCU-CHEK    POCT Glucose    Collection Time: 07/17/20  2:05 PM Result Value Ref Range    POC Glucose 193 (H) 70 - 99 mg/dl    Performed on ACCU-CHEK    POCT Glucose    Collection Time: 07/17/20  3:15 PM   Result Value Ref Range    POC Glucose 185 (H) 70 - 99 mg/dl    Performed on ACCU-CHEK    CSF Cell Count with Differential    Collection Time: 07/17/20  4:15 PM   Result Value Ref Range    Color, CSF Colorless Colorless    Appearance, CSF Clear Clear    Tube Number + CELL CT + DIFF-CSF Tube 1     Clot Evaluation CSF see below     WBC, CSF 5 0 - 5 /cumm    RBC, CSF 84 (A) 0 /cumm    Volume Csf 9.5 mL    No differential CSF see below    Glucose, CSF    Collection Time: 07/17/20  4:15 PM   Result Value Ref Range    Glucose,  (H) 40 - 80 mg/dL   Protein, CSF    Collection Time: 07/17/20  4:15 PM   Result Value Ref Range    Protein, CSF 50 (H) 15 - 45 mg/dL   Cryptococcal Antigen, CSF    Collection Time: 07/17/20  4:15 PM   Result Value Ref Range    Crypto Ag, CSF Negative Negative   CSF culture    Collection Time: 07/17/20  4:15 PM    Specimen: CSF   Result Value Ref Range    Gram Stain Result       Cytospin performed,no quantitation  WBC's (Polymorphonuclear)     Meningitis/Encephalitis Panel, CSF    Collection Time: 07/17/20  4:15 PM    Specimen: CSF   Result Value Ref Range    Meningitis Encephalitis Panel       Meningitis Encephalitis Panel PCR Result: Not Detected  See additional report for complete Meningitis Encephalitis Panel.      CSF Cell Count with Differential    Collection Time: 07/17/20  4:15 PM   Result Value Ref Range    Color, CSF Colorless Colorless    Appearance, CSF Clear Clear    Tube Number + CELL CT + DIFF-CSF Tube 4     Clot Evaluation CSF see below     WBC, CSF 8 (H) 0 - 5 /cumm    RBC, CSF 1 (A) 0 /cumm    Volume Csf 9.5 mL    No differential CSF see below    Meningitis Encephalitis Panel Report    Collection Time: 07/17/20  4:15 PM   Result Value Ref Range    Report SEE IMAGE    POCT Glucose    Collection Time: 07/17/20  4:23 PM   Result Value Ref Range    POC Glucose 271 (H) 70 - 99 mg/dl    Performed on ACCU-CHEK    POCT Glucose    Collection Time: 07/17/20  6:09 PM   Result Value Ref Range    POC Glucose 231 (H) 70 - 99 mg/dl    Performed on ACCU-CHEK    POCT Glucose    Collection Time: 07/17/20  6:57 PM   Result Value Ref Range    POC Glucose 208 (H) 70 - 99 mg/dl    Performed on ACCU-CHEK    POCT Glucose    Collection Time: 07/17/20  8:53 PM   Result Value Ref Range    POC Glucose 263 (H) 70 - 99 mg/dl    Performed on ACCU-CHEK    POCT Glucose    Collection Time: 07/18/20  1:02 AM   Result Value Ref Range    POC Glucose 273 (H) 70 - 99 mg/dl    Performed on ACCU-CHEK    Basic Metabolic Panel w/ Reflex to MG    Collection Time: 07/18/20  5:38 AM   Result Value Ref Range    Sodium 133 (L) 136 - 145 mmol/L    Potassium reflex Magnesium 4.6 3.5 - 5.1 mmol/L    Chloride 93 (L) 99 - 110 mmol/L    CO2 23 21 - 32 mmol/L    Anion Gap 17 (H) 3 - 16    Glucose 336 (H) 70 - 99 mg/dL    BUN 18 7 - 20 mg/dL    CREATININE 1.0 0.9 - 1.3 mg/dL    GFR Non-African American >60 >60    GFR African American >60 >60    Calcium 9.3 8.3 - 10.6 mg/dL   CBC auto differential    Collection Time: 07/18/20  5:38 AM   Result Value Ref Range    WBC 17.4 (H) 4.0 - 11.0 K/uL    RBC 4.59 4.20 - 5.90 M/uL    Hemoglobin 12.5 (L) 13.5 - 17.5 g/dL    Hematocrit 38.3 (L) 40.5 - 52.5 %    MCV 83.4 80.0 - 100.0 fL    MCH 27.2 26.0 - 34.0 pg    MCHC 32.6 31.0 - 36.0 g/dL    RDW 17.6 (H) 12.4 - 15.4 %    Platelets 319 480 - 874 K/uL    MPV 8.2 5.0 - 10.5 fL    Neutrophils % 88.3 %    Lymphocytes % 7.1 %    Monocytes % 4.0 %    Eosinophils % 0.3 %    Basophils % 0.3 %    Neutrophils Absolute 15.3 (H) 1.7 - 7.7 K/uL    Lymphocytes Absolute 1.2 1.0 - 5.1 K/uL    Monocytes Absolute 0.7 0.0 - 1.3 K/uL    Eosinophils Absolute 0.1 0.0 - 0.6 K/uL    Basophils Absolute 0.1 0.0 - 0.2 K/uL   Magnesium    Collection Time: 07/18/20  5:38 AM   Result Value Ref Range    Magnesium 2.50 (H) 1.80 - 2.40 mg/dL Phosphorus    Collection Time: 07/18/20  5:38 AM   Result Value Ref Range    Phosphorus 3.0 2.5 - 4.9 mg/dL   POCT Glucose    Collection Time: 07/18/20  5:48 AM   Result Value Ref Range    POC Glucose 318 (H) 70 - 99 mg/dl    Performed on ACCU-CHEK        --  CSF studies as above. Some labs pending. Protein: 50. WBC: 5-->8. Otherwise appears benign.    ----------------------------------------    Impression:  3. 49 yo male with severe hypoglycemic event with blood glucose of 22. Also with elevated SBP yesterday. Above c/w metabolic encephalopathy. 2. Abnormal MRI - most likely secondary to hypoglycemia and/or PRES though seizures may also provide similar appearance. Cannot entirely exclude encephalitis or meningitis though unlikely. 3. Leukocytosis. Unclear etiology. Could be from sepsis though could also be from seizure. 4. DM.  5. HTN.    Plan: See NP recommendations below. 1. Continue supportive care. 2. Await pending CSF studies. 3. Agree with gradual BP reduction. 4. Prognosis remains guarded at this time. 5. Will follow.     --  Thank you for allowing me to participate in the care of your patient. If I can be of any further assistance, please do not hesitate to contact me. Barrett Robert, DO  [address] [place] [address] [phone] Neuroscience

## 2020-07-18 NOTE — PROGRESS NOTES
Precedex decreased. See MAR. Patient opens eyes to voice and stimuli but does not track. Pt now moving lower extr but does not follow commands.

## 2020-07-19 LAB
ALBUMIN CSF: 27 MG/DL (ref 10–30)
ALBUMIN SERPL-MCNC: 3322 MG/DL (ref 3400–5000)
ANION GAP SERPL CALCULATED.3IONS-SCNC: 11 MMOL/L (ref 3–16)
BASOPHILS ABSOLUTE: 0.1 K/UL (ref 0–0.2)
BASOPHILS RELATIVE PERCENT: 0.4 %
BLOOD CULTURE, ROUTINE: NORMAL
BUN BLDV-MCNC: 31 MG/DL (ref 7–20)
CALCIUM SERPL-MCNC: 9.4 MG/DL (ref 8.3–10.6)
CHLORIDE BLD-SCNC: 102 MMOL/L (ref 99–110)
CO2: 27 MMOL/L (ref 21–32)
CREAT SERPL-MCNC: 0.9 MG/DL (ref 0.9–1.3)
EOSINOPHILS ABSOLUTE: 0.1 K/UL (ref 0–0.6)
EOSINOPHILS RELATIVE PERCENT: 0.7 %
GFR AFRICAN AMERICAN: >60
GFR NON-AFRICAN AMERICAN: >60
GLUCOSE BLD-MCNC: 136 MG/DL (ref 70–99)
GLUCOSE BLD-MCNC: 144 MG/DL (ref 70–99)
GLUCOSE BLD-MCNC: 188 MG/DL (ref 70–99)
GLUCOSE BLD-MCNC: 212 MG/DL (ref 70–99)
GLUCOSE BLD-MCNC: 244 MG/DL (ref 70–99)
GLUCOSE BLD-MCNC: 308 MG/DL (ref 70–99)
GLUCOSE BLD-MCNC: 317 MG/DL (ref 70–99)
HCT VFR BLD CALC: 37.7 % (ref 40.5–52.5)
HEMOGLOBIN: 12.2 G/DL (ref 13.5–17.5)
IGG CSF: 4.4 MG/DL (ref 0–6)
IGG INDEX: 0.48 (ref 0.2–0.7)
IGG SYNTHESIS RATE CSF: -2.4 MG/DAY (ref -9.9–3.3)
IGG: 1124 MG/DL (ref 700–1600)
LYMPHOCYTES ABSOLUTE: 2.2 K/UL (ref 1–5.1)
LYMPHOCYTES RELATIVE PERCENT: 13 %
MAGNESIUM: 2.5 MG/DL (ref 1.8–2.4)
MCH RBC QN AUTO: 27.2 PG (ref 26–34)
MCHC RBC AUTO-ENTMCNC: 32.5 G/DL (ref 31–36)
MCV RBC AUTO: 83.8 FL (ref 80–100)
MONOCYTES ABSOLUTE: 1 K/UL (ref 0–1.3)
MONOCYTES RELATIVE PERCENT: 5.8 %
NEUTROPHILS ABSOLUTE: 13.6 K/UL (ref 1.7–7.7)
NEUTROPHILS RELATIVE PERCENT: 80.1 %
PDW BLD-RTO: 16.9 % (ref 12.4–15.4)
PERFORMED ON: ABNORMAL
PHOSPHORUS: 3.2 MG/DL (ref 2.5–4.9)
PLATELET # BLD: 331 K/UL (ref 135–450)
PMV BLD AUTO: 7.9 FL (ref 5–10.5)
POTASSIUM REFLEX MAGNESIUM: 4.3 MMOL/L (ref 3.5–5.1)
PROTEIN CSF: 50 MG/DL (ref 15–45)
RBC # BLD: 4.5 M/UL (ref 4.2–5.9)
SODIUM BLD-SCNC: 140 MMOL/L (ref 136–145)
WBC # BLD: 16.9 K/UL (ref 4–11)

## 2020-07-19 PROCEDURE — 36592 COLLECT BLOOD FROM PICC: CPT

## 2020-07-19 PROCEDURE — C9113 INJ PANTOPRAZOLE SODIUM, VIA: HCPCS | Performed by: NURSE PRACTITIONER

## 2020-07-19 PROCEDURE — 80048 BASIC METABOLIC PNL TOTAL CA: CPT

## 2020-07-19 PROCEDURE — 83735 ASSAY OF MAGNESIUM: CPT

## 2020-07-19 PROCEDURE — 6370000000 HC RX 637 (ALT 250 FOR IP): Performed by: NURSE PRACTITIONER

## 2020-07-19 PROCEDURE — 84100 ASSAY OF PHOSPHORUS: CPT

## 2020-07-19 PROCEDURE — 2580000003 HC RX 258: Performed by: NURSE PRACTITIONER

## 2020-07-19 PROCEDURE — 2000000000 HC ICU R&B

## 2020-07-19 PROCEDURE — 85025 COMPLETE CBC W/AUTO DIFF WBC: CPT

## 2020-07-19 PROCEDURE — 99232 SBSQ HOSP IP/OBS MODERATE 35: CPT | Performed by: INTERNAL MEDICINE

## 2020-07-19 PROCEDURE — 94761 N-INVAS EAR/PLS OXIMETRY MLT: CPT

## 2020-07-19 PROCEDURE — 6360000002 HC RX W HCPCS: Performed by: NURSE PRACTITIONER

## 2020-07-19 PROCEDURE — APPNB15 APP NON BILLABLE TIME 0-15 MINS: Performed by: NURSE PRACTITIONER

## 2020-07-19 PROCEDURE — 6360000002 HC RX W HCPCS: Performed by: INTERNAL MEDICINE

## 2020-07-19 RX ADMIN — INSULIN LISPRO 4 UNITS: 100 INJECTION, SOLUTION INTRAVENOUS; SUBCUTANEOUS at 09:54

## 2020-07-19 RX ADMIN — INSULIN LISPRO 4 UNITS: 100 INJECTION, SOLUTION INTRAVENOUS; SUBCUTANEOUS at 17:23

## 2020-07-19 RX ADMIN — PANTOPRAZOLE SODIUM 40 MG: 40 INJECTION, POWDER, FOR SOLUTION INTRAVENOUS at 09:52

## 2020-07-19 RX ADMIN — INSULIN LISPRO 2 UNITS: 100 INJECTION, SOLUTION INTRAVENOUS; SUBCUTANEOUS at 12:34

## 2020-07-19 RX ADMIN — SODIUM CHLORIDE, PRESERVATIVE FREE 10 ML: 5 INJECTION INTRAVENOUS at 03:41

## 2020-07-19 RX ADMIN — SODIUM CHLORIDE, PRESERVATIVE FREE 10 ML: 5 INJECTION INTRAVENOUS at 21:12

## 2020-07-19 RX ADMIN — INSULIN LISPRO 8 UNITS: 100 INJECTION, SOLUTION INTRAVENOUS; SUBCUTANEOUS at 01:27

## 2020-07-19 RX ADMIN — LOSARTAN POTASSIUM 50 MG: 50 TABLET ORAL at 09:52

## 2020-07-19 RX ADMIN — INSULIN LISPRO 8 UNITS: 100 INJECTION, SOLUTION INTRAVENOUS; SUBCUTANEOUS at 21:25

## 2020-07-19 RX ADMIN — Medication 10 ML: at 09:53

## 2020-07-19 RX ADMIN — ENOXAPARIN SODIUM 40 MG: 40 INJECTION SUBCUTANEOUS at 09:52

## 2020-07-19 NOTE — PROGRESS NOTES
Hospitalist Progress Note      PCP: Roxane Arambula MD    Date of Admission: 7/15/2020    Chief Complaint: metabolic encephalopathy    Hospital Course: Mr. Liudmila Agosto is a 64-year-old gentleman with history of poorly controlled insulin-dependent diabetes who was admitted unresponsive at home with glucose of 22. He was reported to have accidental versus intentional overdose of insulin. Self extubated to 2 L nasal cannula by coughing. Appears to have some deeper breathing with painful stimuli otherwise nothing purposeful. Subjective: Weaned to RA. Still obtunded. No new medical issues. Medications:  Reviewed    Infusion Medications    dexmedetomidine HCl in NaCl 0.1 mcg/kg/hr (07/19/20 0605)    dextrose      propofol       Scheduled Medications    insulin lispro  0-12 Units Subcutaneous Q4H    sodium chloride flush  10 mL Intravenous 2 times per day    enoxaparin  40 mg Subcutaneous Daily    pantoprazole  40 mg Intravenous Daily    And    sodium chloride (PF)  10 mL Intravenous Daily    losartan  50 mg Oral Daily     PRN Meds: sodium chloride flush, acetaminophen **OR** acetaminophen, polyethylene glycol, ondansetron, glucose, dextrose, glucagon (rDNA), dextrose, propofol, magnesium sulfate, potassium chloride, sodium phosphate IVPB **OR** sodium phosphate IVPB, hydrALAZINE      Intake/Output Summary (Last 24 hours) at 7/19/2020 0877  Last data filed at 7/19/2020 0645  Gross per 24 hour   Intake 113.74 ml   Output 1385 ml   Net -1271.26 ml       Exam:    /79   Pulse 106   Temp 98.2 °F (36.8 °C) (Axillary)   Resp 19   Ht 6' 2\" (1.88 m)   Wt 146 lb 6.2 oz (66.4 kg)   SpO2 100%   BMI 18.79 kg/m²     General appearance:  Minimally responsive on 2 L nasal cannula  HEENT Normal cephalic, atraumatic without obvious deformity. Pupils equal, round, and reactive to light. Extra ocular muscles intact. Conjunctivae/corneas clear. Neck: Supple, No jugular venous distention/bruits.   Trachea midline without thyromegaly or adenopathy with full range of motion. Lungs: intubated clear to auscultation, bilaterally without Rales/Wheezes/Rhonchi  Heart: Regular rate and rhythm with Normal S1/S2 without murmurs, rubs or gallops, point of maximum impulse non-displaced  Abdomen: Soft, non-tender or non-distended without rigidity or guarding and positive bowel sounds all four quadrants. Extremities: No clubbing, cyanosis, or edema bilaterally. Full range of motion without deformity and normal gait intact. Skin: Skin color, texture, turgor normal.  No rashes or lesions. Neurologic:  Minimally responsive to painful stimuli, occasional nonpurposeful movement  Capillary Refill: Acceptable  < 3 seconds  Peripheral Pulses: +3 Easily felt, not easily obliterated with pressure      Labs:   Recent Labs     07/17/20 0357 07/18/20  0538 07/19/20  0600   WBC 20.8* 17.4* 16.9*   HGB 13.2* 12.5* 12.2*   HCT 40.3* 38.3* 37.7*    361 331     Recent Labs     07/17/20 0357 07/18/20  0538 07/19/20  0600    133* 140   K 3.9 4.6 4.3   CL 97* 93* 102   CO2 24 23 27   BUN 7 18 31*   CREATININE 0.7* 1.0 0.9   CALCIUM 9.1 9.3 9.4   PHOS 4.7 3.0 3.2     No results for input(s): AST, ALT, BILIDIR, BILITOT, ALKPHOS in the last 72 hours. No results for input(s): INR in the last 72 hours. No results for input(s): Dulcie Mean in the last 72 hours. Urinalysis:      Lab Results   Component Value Date    NITRU Negative 07/15/2020    WBCUA 10 05/28/2020    BACTERIA 1+ 12/15/2010    RBCUA 1 05/28/2020    BLOODU Negative 07/15/2020    SPECGRAV 1.025 07/15/2020    GLUCOSEU 500 07/15/2020    GLUCOSEU >=1000 12/15/2010       Radiology:  XR CHEST 1 VW   Final Result   Nasogastric tube projects in normal position. No acute cardiopulmonary disease.          MRI BRAIN WO CONTRAST   Final Result   Subtle bilateral frontoparietal cortical and subcortical signal abnormality   suggesting mild posterior reversal encephalopathy syndrome. No acute infarct   or hemorrhage. XR CHEST PORTABLE   Final Result   No acute cardiac or pulmonary disease. ET tube 7 cm above the jose carlos. NG side-port at the GE junction. XR CHEST PORTABLE   Final Result   The tip of the endotracheal tube is slightly high in position 9 cm above the   jose carlos. The OG/NG tube should be advanced 10 cm. The lungs are clear. CT Head WO Contrast   Final Result   No acute intracranial abnormality. Paranasal sinusitis. XR CHEST PORTABLE   Final Result   No acute cardiopulmonary disease. IR LUMBAR PUNCTURE FOR DIAGNOSIS    (Results Pending)           Assessment/Plan:    Active Hospital Problems    Diagnosis Date Noted    Acute respiratory failure with hypoxia ([facility] [address] [J96.01] 07/16/2020    Hypoglycemia [E16.2]     Altered mental status [R41.82]        Acute Metabolic Encephalopathy: Stable  - multifactorial from life-threatening hypoglycemia and/or PRES. EEG performed, no seizure like activity  -LP performed, results pending  -Required intubation in the emergency department for airway protection. Status post self extubation by coughing on 7/18  -Still obtunded on 7/19/2020.   Guarded prognosis    PRES: Resolving  -gradual BP reduction  - oral losartan and IV hydralazine for now  -keep SBP <170    Severe hypoglycemia: Resolved  -s/p rescucitation with D10  -low dose SSI    Insulin overdose:  -Uncertain if accidental or intentional  -If mental status improves consult psychiatry    DVT Prophylaxis:  lovenox  Diet: Diet NPO Effective Now  Code Status: Full Code    PT/OT Eval Status: pending    Dispo - ICU, guarded prognosis    Luis Enrique Narayan MD

## 2020-07-19 NOTE — PROGRESS NOTES
0122: report received from Krystin Long RN. Taking over this pt's care. Patient is in bed, bilateral wrist restraints and precedex drip at 0.2 mcg infusing. Assessment as noted. Patient is not following any command, sits up keeping feed out of the bed over the side rails, but once reminded patient calms down and falls back to sleep.      Electronically signed by Charan Pleitez RN on 7/19/2020 at 1:46 AM

## 2020-07-19 NOTE — PROGRESS NOTES
9769: MD Borges at bedside. Case reviewed. MD requested Precedex to be turned off. Pt is lying in bed/obtunded. Does not follow commands. Pt will open eyes to voice but does not track nurse. 9845: Pt sitting up in bed and attempting to lean over. Unable to follow directions. Pt in leo wrist restraints. NP Kathleen at bedside and gave orders for vest restraint for safety. 1250: Spoke with spouse Alex Peña. Update given. Per wife she will be here to visit today. 1720: Spouse Nani at bedside visiting. Update given. Sedation remains off.

## 2020-07-19 NOTE — PROGRESS NOTES
Pulmonary Progress Note    Date of Admission: 7/15/2020   LOS: 3 days     CC:  Chief Complaint   Patient presents with    Hypoglycemia     Found unresponsive by girlfriend. Per EMS, pt had blood sugar of \"22 on arrival and was given IM glucagon\". EMS reports blood sugar \"went up to 59\". Pt responsive to pain at this time. HPI/Subjective  NO events o/n  Tolerating Room air  CSF studies pending  Still obtunded    ROS:   Unable to obtain due to mechanical ventilation      Intake/Output Summary (Last 24 hours) at 7/19/2020 0816  Last data filed at 7/19/2020 0645  Gross per 24 hour   Intake 113.74 ml   Output 1385 ml   Net -1271.26 ml         PHYSICAL EXAM:   Blood pressure 135/79, pulse 106, temperature 98.2 °F (36.8 °C), temperature source Axillary, resp. rate 19, height 6' 2\" (1.88 m), weight 146 lb 6.2 oz (66.4 kg), SpO2 100 %.'  Gen:  No acute distress. Eyes: PERRL. Anicteric sclera. No conjunctival injection. ENT: No discharge. OP clear . External appearance of ears and nose normal.  Neck: Trachea midline. No mass   Resp:  No crackles. No wheezes. +r sided rhonchi. No dullness on percussion. CV: Regular rate. Regular rhythm. No murmur or rub. No edema. GI: Soft, Non-tender. Non-distended. +BS  Skin: Warm, dry, w/o erythema. Lymph: No cervical or supraclavicular LAD. M/S: No cyanosis. No clubbing. Neuro:   no focal neurologic deficit.   Moves all extremities, obtunded    Medications:    Scheduled Meds:   insulin lispro  0-12 Units Subcutaneous Q4H    lidocaine 1 % injection  5 mL Intradermal Once    sodium chloride flush  10 mL Intravenous 2 times per day    enoxaparin  40 mg Subcutaneous Daily    pantoprazole  40 mg Intravenous Daily    And    sodium chloride (PF)  10 mL Intravenous Daily    losartan  50 mg Oral Daily       Continuous Infusions:   dexmedetomidine HCl in NaCl 0.1 mcg/kg/hr (07/19/20 0605)    dextrose      propofol         PRN Meds:  sodium chloride flush, reasonably  achievable. COMPARISON:  12/29/2019    HISTORY:  ORDERING SYSTEM PROVIDED HISTORY: eval abnl portable CXR  TECHNOLOGIST PROVIDED HISTORY:  Reason for exam:->eval abnl portable CXR  Reason for Exam: eval abnl portable CXR  Acuity: Unknown  Type of Exam: Unknown    FINDINGS:  Mediastinum: Borderline cardiomegaly. Group of calcified subcarinal left  hilar lymph nodes. Thyroid gland and esophagus grossly normal.    Lungs/pleura: No focal consolidation. No significant nodules or masses. A  cluster of calcified nodules noted in the medial left lower lobe toward the  lung base. Of note there is no right apical lesion and finding on chest  x-ray from earlier same day would be considered artifactual.    No pleural effusion or pneumothorax. Upper Abdomen: No suspicious lesions. Soft Tissues/Bones: No acute abnormality of the bones. The superficial soft  tissues show no significant abnormalities. Impression 1. No focal consolidation or lung lesion. 2. Calcified subcarinal, left hilar and left lower lobe nodules suggestive of  granulomatous disease. CTPA:   Results for orders placed during the hospital encounter of 12/29/19   CT CHEST PULMONARY EMBOLISM W CONTRAST    Narrative EXAMINATION:  CTA OF THE CHEST 12/29/2019 10:46 pm    TECHNIQUE:  CTA of the chest was performed after the administration of intravenous  contrast.  Multiplanar reformatted images are provided for review. MIP  images are provided for review. Dose modulation, iterative reconstruction,  and/or weight based adjustment of the mA/kV was utilized to reduce the  radiation dose to as low as reasonably achievable. COMPARISON:  None    HISTORY:  ORDERING SYSTEM PROVIDED HISTORY: PE vs PNA  TECHNOLOGIST PROVIDED HISTORY:  Reason for exam:->PE vs PNA  Reason for Exam: 53 y.o. male who presents to the emergency department with  abdominal pain. Patient was here this AM for DKA and left AMA 2/2 not getting  pain medicine. States he went to [employer] and ate a bunch of food. States he  decided to come back because his abdomen started hurting. Pain is 9/10 sharp  in nature in his entire abdomen. Hasn't taken anything for pain. Nothing  makes symptoms better or worse. No other associated symptoms. FINDINGS:  Pulmonary Arteries: Pulmonary arteries are adequately opacified for  evaluation. No evidence of intraluminal filling defect to suggest pulmonary  embolism. Main pulmonary artery is normal in caliber. Mediastinum: The thoracic aorta is normal in course and caliber. The heart  is not enlarged. No pericardial effusion. No pathologic hilar or  mediastinal adenopathy. Calcified subcarinal and left hilar nodes indicative  of previous granulomatous disease. Soft tissue wall thickening, greatest  distally suggesting an esophagitis. Lungs/pleura: The lungs are without acute process. No focal consolidation or  pulmonary edema. No evidence of pleural effusion or pneumothorax. Calcified  granuloma in the left lower lobe. Upper Abdomen: Dense contrast in the IVC, likely related to injection via a  lower extremity venous access. Visualized upper abdominal viscera are  unremarkable    Soft Tissues/Bones: No acute bone or soft tissue abnormality. Impression 1. No evidence of pulmonary embolic disease. 2. No acute pulmonary findings. Evidence of old granulomatous disease. 3. Esophageal wall thickening, most pronounced distally consistent with an  esophagitis.          CXR PA/LAT:   Results for orders placed during the hospital encounter of 02/05/20   XR CHEST STANDARD (2 VW)    Narrative EXAMINATION:  TWO XRAY VIEWS OF THE CHEST    2/5/2020 10:33 pm    COMPARISON:  January 18, 2020    HISTORY:  ORDERING SYSTEM PROVIDED HISTORY: Chest Pain  TECHNOLOGIST PROVIDED HISTORY:  Reason for exam:->Chest Pain  Reason for Exam: Leg Swelling (x2 weeks), chest pain  Acuity: Acute  Type of Exam: Initial    FINDINGS:  The lungs are clear.  No pneumothorax is present. Heart size and mediastinal  contours are stable. Calcified mediastinal hilar lymph nodes are again  visualized. Osseous structures are stable. Impression No evidence of acute cardiopulmonary disease         CXR portable:   Results for orders placed during the hospital encounter of 07/15/20   XR CHEST PORTABLE    Narrative EXAMINATION:  ONE XRAY VIEW OF THE CHEST    7/16/2020 5:39 am    COMPARISON:  Prior study(s) most recent 07/15/2020. HISTORY:  ORDERING SYSTEM PROVIDED HISTORY: intubated  TECHNOLOGIST PROVIDED HISTORY:  Reason for exam:->intubated  Reason for Exam: intubated  Acuity: Acute  Type of Exam: Initial    FINDINGS:  The endotracheal tube is approximately 7 cm above the jose carlos. NG tube  extends to the stomach with the side port at the GE junction level. Patient  is somewhat rotated to the left. Lungs are clear. Vessels are normal.      Impression No acute cardiac or pulmonary disease. ET tube 7 cm above the jose carlos. NG side-port at the GE junction.                Access  Arterial       PICC           CVC      Assessment:     Severe Hypoglycemia  Acute Metabolic Encephalopathy  DMI  Overdose of insulin  PRES    Plan:      -bp better controlled, hydralazine PRN  -still not awake may be 2/2 to prolonged hypoglycemic state  -wean off precedex, likely contributing to hypotension  -blood sugars better, cont SSI  -EEG nonspecific, neurology following, CSF studies pending  -PICC yesterday    Thank you for this consult,    Damion Ray Pulmonary, Critical Care, and Sleep Medicine

## 2020-07-20 ENCOUNTER — APPOINTMENT (OUTPATIENT)
Dept: GENERAL RADIOLOGY | Age: 45
DRG: 812 | End: 2020-07-20
Payer: MEDICAID

## 2020-07-20 LAB
ANION GAP SERPL CALCULATED.3IONS-SCNC: 22 MMOL/L (ref 3–16)
BASOPHILS ABSOLUTE: 0.1 K/UL (ref 0–0.2)
BASOPHILS RELATIVE PERCENT: 0.6 %
BUN BLDV-MCNC: 33 MG/DL (ref 7–20)
CALCIUM SERPL-MCNC: 9.4 MG/DL (ref 8.3–10.6)
CHLORIDE BLD-SCNC: 102 MMOL/L (ref 99–110)
CO2: 19 MMOL/L (ref 21–32)
CREAT SERPL-MCNC: 1 MG/DL (ref 0.9–1.3)
CULTURE, BLOOD 2: NORMAL
EOSINOPHILS ABSOLUTE: 0.1 K/UL (ref 0–0.6)
EOSINOPHILS RELATIVE PERCENT: 0.8 %
GFR AFRICAN AMERICAN: >60
GFR NON-AFRICAN AMERICAN: >60
GLUCOSE BLD-MCNC: 186 MG/DL (ref 70–99)
GLUCOSE BLD-MCNC: 196 MG/DL (ref 70–99)
GLUCOSE BLD-MCNC: 235 MG/DL (ref 70–99)
GLUCOSE BLD-MCNC: 256 MG/DL (ref 70–99)
GLUCOSE BLD-MCNC: 288 MG/DL (ref 70–99)
GLUCOSE BLD-MCNC: 319 MG/DL (ref 70–99)
GLUCOSE BLD-MCNC: 336 MG/DL (ref 70–99)
HCT VFR BLD CALC: 38.4 % (ref 40.5–52.5)
HEMOGLOBIN: 12.2 G/DL (ref 13.5–17.5)
LYMPHOCYTES ABSOLUTE: 1.5 K/UL (ref 1–5.1)
LYMPHOCYTES RELATIVE PERCENT: 8.1 %
MAGNESIUM: 2.4 MG/DL (ref 1.8–2.4)
MCH RBC QN AUTO: 27 PG (ref 26–34)
MCHC RBC AUTO-ENTMCNC: 31.6 G/DL (ref 31–36)
MCV RBC AUTO: 85.2 FL (ref 80–100)
MONOCYTES ABSOLUTE: 0.8 K/UL (ref 0–1.3)
MONOCYTES RELATIVE PERCENT: 4.5 %
NEUTROPHILS ABSOLUTE: 15.8 K/UL (ref 1.7–7.7)
NEUTROPHILS RELATIVE PERCENT: 86 %
PDW BLD-RTO: 16.6 % (ref 12.4–15.4)
PERFORMED ON: ABNORMAL
PHOSPHORUS: 3.5 MG/DL (ref 2.5–4.9)
PLATELET # BLD: 320 K/UL (ref 135–450)
PMV BLD AUTO: 7.9 FL (ref 5–10.5)
POTASSIUM REFLEX MAGNESIUM: 4.4 MMOL/L (ref 3.5–5.1)
PROCALCITONIN: 0.48 NG/ML (ref 0–0.15)
RBC # BLD: 4.51 M/UL (ref 4.2–5.9)
SODIUM BLD-SCNC: 143 MMOL/L (ref 136–145)
WBC # BLD: 18.4 K/UL (ref 4–11)

## 2020-07-20 PROCEDURE — 85025 COMPLETE CBC W/AUTO DIFF WBC: CPT

## 2020-07-20 PROCEDURE — 84145 PROCALCITONIN (PCT): CPT

## 2020-07-20 PROCEDURE — 6360000002 HC RX W HCPCS: Performed by: FAMILY MEDICINE

## 2020-07-20 PROCEDURE — 36415 COLL VENOUS BLD VENIPUNCTURE: CPT

## 2020-07-20 PROCEDURE — 6370000000 HC RX 637 (ALT 250 FOR IP): Performed by: INTERNAL MEDICINE

## 2020-07-20 PROCEDURE — 6370000000 HC RX 637 (ALT 250 FOR IP): Performed by: NURSE PRACTITIONER

## 2020-07-20 PROCEDURE — C9113 INJ PANTOPRAZOLE SODIUM, VIA: HCPCS | Performed by: NURSE PRACTITIONER

## 2020-07-20 PROCEDURE — 94761 N-INVAS EAR/PLS OXIMETRY MLT: CPT

## 2020-07-20 PROCEDURE — 2580000003 HC RX 258: Performed by: NURSE PRACTITIONER

## 2020-07-20 PROCEDURE — 71045 X-RAY EXAM CHEST 1 VIEW: CPT

## 2020-07-20 PROCEDURE — 2580000003 HC RX 258: Performed by: FAMILY MEDICINE

## 2020-07-20 PROCEDURE — 80048 BASIC METABOLIC PNL TOTAL CA: CPT

## 2020-07-20 PROCEDURE — 6360000002 HC RX W HCPCS: Performed by: NURSE PRACTITIONER

## 2020-07-20 PROCEDURE — 1200000000 HC SEMI PRIVATE

## 2020-07-20 PROCEDURE — 99231 SBSQ HOSP IP/OBS SF/LOW 25: CPT | Performed by: INTERNAL MEDICINE

## 2020-07-20 PROCEDURE — 6360000002 HC RX W HCPCS: Performed by: INTERNAL MEDICINE

## 2020-07-20 PROCEDURE — 83735 ASSAY OF MAGNESIUM: CPT

## 2020-07-20 PROCEDURE — 84100 ASSAY OF PHOSPHORUS: CPT

## 2020-07-20 RX ADMIN — ACETAMINOPHEN 650 MG: 325 TABLET ORAL at 21:40

## 2020-07-20 RX ADMIN — INSULIN LISPRO 6 UNITS: 100 INJECTION, SOLUTION INTRAVENOUS; SUBCUTANEOUS at 08:16

## 2020-07-20 RX ADMIN — AMPICILLIN SODIUM AND SULBACTAM SODIUM 1.5 G: 1; .5 INJECTION, POWDER, FOR SOLUTION INTRAMUSCULAR; INTRAVENOUS at 18:05

## 2020-07-20 RX ADMIN — Medication 10 ML: at 08:14

## 2020-07-20 RX ADMIN — INSULIN LISPRO 8 UNITS: 100 INJECTION, SOLUTION INTRAVENOUS; SUBCUTANEOUS at 21:15

## 2020-07-20 RX ADMIN — LOSARTAN POTASSIUM 50 MG: 50 TABLET ORAL at 09:31

## 2020-07-20 RX ADMIN — INSULIN LISPRO 2 UNITS: 100 INJECTION, SOLUTION INTRAVENOUS; SUBCUTANEOUS at 00:17

## 2020-07-20 RX ADMIN — INSULIN LISPRO 6 UNITS: 100 INJECTION, SOLUTION INTRAVENOUS; SUBCUTANEOUS at 06:11

## 2020-07-20 RX ADMIN — AMPICILLIN SODIUM AND SULBACTAM SODIUM 1.5 G: 1; .5 INJECTION, POWDER, FOR SOLUTION INTRAMUSCULAR; INTRAVENOUS at 12:25

## 2020-07-20 RX ADMIN — ENOXAPARIN SODIUM 40 MG: 40 INJECTION SUBCUTANEOUS at 08:13

## 2020-07-20 RX ADMIN — INSULIN LISPRO 2 UNITS: 100 INJECTION, SOLUTION INTRAVENOUS; SUBCUTANEOUS at 12:20

## 2020-07-20 RX ADMIN — PANTOPRAZOLE SODIUM 40 MG: 40 INJECTION, POWDER, FOR SOLUTION INTRAVENOUS at 08:13

## 2020-07-20 RX ADMIN — INSULIN LISPRO 4 UNITS: 100 INJECTION, SOLUTION INTRAVENOUS; SUBCUTANEOUS at 16:14

## 2020-07-20 RX ADMIN — SODIUM CHLORIDE, PRESERVATIVE FREE 10 ML: 5 INJECTION INTRAVENOUS at 08:13

## 2020-07-20 ASSESSMENT — PAIN SCALES - GENERAL
PAINLEVEL_OUTOF10: 0
PAINLEVEL_OUTOF10: 4

## 2020-07-20 NOTE — PLAN OF CARE
Nutrition Problem #1: Inadequate oral intake  Intervention: Food and/or Nutrient Delivery: Start Tube Feeding  Nutritional Goals:  Tolerate most appropriate nutrition therapy

## 2020-07-20 NOTE — PROGRESS NOTES
Pulmonary Progress Note    Date of Admission: 7/15/2020   LOS: 4 days       CC: Altered mental status    Subjective: Weaned off Precedex from yesterday. Denied answering questions or following commands. ROS:   Unable to assess. PHYSICAL EXAM:   Blood pressure (!) 155/85, pulse 111, temperature 98.5 °F (36.9 °C), temperature source Oral, resp. rate 15, height 6' 2\" (1.88 m), weight 146 lb 2.6 oz (66.3 kg), SpO2 99 %.'  Gen: No distress. Eyes: PERRL. No sclera icterus. No conjunctival injection. ENT: No discharge. Pharynx clear. External appearance of ears and nose normal.  Neck: Trachea midline. No obvious mass. Resp: No accessory muscle use. No crackles. No wheezes. No rhonchi. CV: Regular rate. Regular rhythm. No murmur or rub. No edema. GI: Non-tender. Non-distended. No hernia. Skin: Warm, dry, normal texture and turgor. No nodule on exposed extremities. Lymph: No cervical LAD. No supraclavicular LAD. M/S: No cyanosis. No clubbing. No joint deformity. Neuro: Moves all four extremities. Psych: Wakes to sternal rub but does not answer any questions. Not making sounds.       Medications:    Scheduled Meds:   insulin lispro  0-12 Units Subcutaneous Q4H    sodium chloride flush  10 mL Intravenous 2 times per day    enoxaparin  40 mg Subcutaneous Daily    pantoprazole  40 mg Intravenous Daily    And    sodium chloride (PF)  10 mL Intravenous Daily    losartan  50 mg Oral Daily       Continuous Infusions:   dexmedetomidine HCl in NaCl Stopped (07/19/20 0755)    dextrose         PRN Meds:  sodium chloride flush, acetaminophen **OR** acetaminophen, polyethylene glycol, ondansetron, glucose, dextrose, glucagon (rDNA), dextrose, magnesium sulfate, potassium chloride, sodium phosphate IVPB **OR** sodium phosphate IVPB, hydrALAZINE    Labs reviewed:  CBC:   Recent Labs     07/18/20  0538 07/19/20  0600 07/20/20  0515   WBC 17.4* 16.9* 18.4*   HGB 12.5* 12.2* 12.2*   HCT 38.3* 37.7* 38.4*   MCV 83.4 83.8 85.2    331 320     BMP:   Recent Labs     07/18/20  0538 07/19/20  0600 07/20/20  0515   * 140 143   K 4.6 4.3 4.4   CL 93* 102 102   CO2 23 27 19*   PHOS 3.0 3.2 3.5   BUN 18 31* 33*   CREATININE 1.0 0.9 1.0     LIVER PROFILE: No results for input(s): AST, ALT, LIPASE, BILIDIR, BILITOT, ALKPHOS in the last 72 hours. Invalid input(s): AMYLASE,  ALB  PT/INR: No results for input(s): PROTIME, INR in the last 72 hours. APTT: No results for input(s): APTT in the last 72 hours. UA:No results for input(s): NITRITE, COLORU, PHUR, LABCAST, WBCUA, RBCUA, MUCUS, TRICHOMONAS, YEAST, BACTERIA, CLARITYU, SPECGRAV, LEUKOCYTESUR, UROBILINOGEN, BILIRUBINUR, BLOODU, GLUCOSEU, AMORPHOUS in the last 72 hours. Invalid input(s): Rosalva Mis  No results for input(s): PH, PCO2, PO2 in the last 72 hours. Cx:      Films:  Radiology Review:  Pertinent images / reports were reviewed as a part of this visit. CT Chest w/ contrast: No results found for this or any previous visit. CT Chest w/o contrast:   Results for orders placed during the hospital encounter of 01/08/20   CT CHEST WO CONTRAST    Narrative EXAMINATION:  CT OF THE CHEST WITHOUT CONTRAST 1/8/2020 11:40 am    TECHNIQUE:  CT of the chest was performed without the administration of intravenous  contrast. Multiplanar reformatted images are provided for review. Dose  modulation, iterative reconstruction, and/or weight based adjustment of the  mA/kV was utilized to reduce the radiation dose to as low as reasonably  achievable. COMPARISON:  12/29/2019    HISTORY:  ORDERING SYSTEM PROVIDED HISTORY: eval abnl portable CXR  TECHNOLOGIST PROVIDED HISTORY:  Reason for exam:->eval abnl portable CXR  Reason for Exam: eval abnl portable CXR  Acuity: Unknown  Type of Exam: Unknown    FINDINGS:  Mediastinum: Borderline cardiomegaly. Group of calcified subcarinal left  hilar lymph nodes.   Thyroid gland and esophagus grossly normal.    Lungs/pleura: No focal consolidation. No significant nodules or masses. A  cluster of calcified nodules noted in the medial left lower lobe toward the  lung base. Of note there is no right apical lesion and finding on chest  x-ray from earlier same day would be considered artifactual.    No pleural effusion or pneumothorax. Upper Abdomen: No suspicious lesions. Soft Tissues/Bones: No acute abnormality of the bones. The superficial soft  tissues show no significant abnormalities. Impression 1. No focal consolidation or lung lesion. 2. Calcified subcarinal, left hilar and left lower lobe nodules suggestive of  granulomatous disease. CTPA:   Results for orders placed during the hospital encounter of 12/29/19   CT CHEST PULMONARY EMBOLISM W CONTRAST    Narrative EXAMINATION:  CTA OF THE CHEST 12/29/2019 10:46 pm    TECHNIQUE:  CTA of the chest was performed after the administration of intravenous  contrast.  Multiplanar reformatted images are provided for review. MIP  images are provided for review. Dose modulation, iterative reconstruction,  and/or weight based adjustment of the mA/kV was utilized to reduce the  radiation dose to as low as reasonably achievable. COMPARISON:  None    HISTORY:  ORDERING SYSTEM PROVIDED HISTORY: PE vs PNA  TECHNOLOGIST PROVIDED HISTORY:  Reason for exam:->PE vs PNA  Reason for Exam: 53 y.o. male who presents to the emergency department with  abdominal pain. Patient was here this AM for DKA and left AMA 2/2 not getting  pain medicine. States he went to [employer] and ate a bunch of food. States he  decided to come back because his abdomen started hurting. Pain is 9/10 sharp  in nature in his entire abdomen. Hasn't taken anything for pain. Nothing  makes symptoms better or worse. No other associated symptoms. FINDINGS:  Pulmonary Arteries: Pulmonary arteries are adequately opacified for  evaluation.   No evidence of intraluminal filling defect to suggest pulmonary  embolism. Main pulmonary artery is normal in caliber. Mediastinum: The thoracic aorta is normal in course and caliber. The heart  is not enlarged. No pericardial effusion. No pathologic hilar or  mediastinal adenopathy. Calcified subcarinal and left hilar nodes indicative  of previous granulomatous disease. Soft tissue wall thickening, greatest  distally suggesting an esophagitis. Lungs/pleura: The lungs are without acute process. No focal consolidation or  pulmonary edema. No evidence of pleural effusion or pneumothorax. Calcified  granuloma in the left lower lobe. Upper Abdomen: Dense contrast in the IVC, likely related to injection via a  lower extremity venous access. Visualized upper abdominal viscera are  unremarkable    Soft Tissues/Bones: No acute bone or soft tissue abnormality. Impression 1. No evidence of pulmonary embolic disease. 2. No acute pulmonary findings. Evidence of old granulomatous disease. 3. Esophageal wall thickening, most pronounced distally consistent with an  esophagitis. CXR PA/LAT:   Results for orders placed during the hospital encounter of 02/05/20   XR CHEST STANDARD (2 VW)    Narrative EXAMINATION:  TWO XRAY VIEWS OF THE CHEST    2/5/2020 10:33 pm    COMPARISON:  January 18, 2020    HISTORY:  ORDERING SYSTEM PROVIDED HISTORY: Chest Pain  TECHNOLOGIST PROVIDED HISTORY:  Reason for exam:->Chest Pain  Reason for Exam: Leg Swelling (x2 weeks), chest pain  Acuity: Acute  Type of Exam: Initial    FINDINGS:  The lungs are clear. No pneumothorax is present. Heart size and mediastinal  contours are stable. Calcified mediastinal hilar lymph nodes are again  visualized. Osseous structures are stable.       Impression No evidence of acute cardiopulmonary disease         CXR portable:   Results for orders placed during the hospital encounter of 07/15/20   XR CHEST PORTABLE    Narrative EXAMINATION:  ONE XRAY VIEW OF THE CHEST    7/16/2020

## 2020-07-20 NOTE — PLAN OF CARE
Problem: Restraint Use - Nonviolent/Non-Self-Destructive Behavior:  Goal: Absence of restraint indications  Description: Absence of restraint indications  Outcome: Ongoing   Restraints remain in place, Range of motion performed Q2 hour. No signs or symptoms of injury    Problem: Pain:  Goal: Control of acute pain  Description: Control of acute pain  Outcome: Ongoing   . pain    Problem: Falls - Risk of:  Goal: Will remain free from falls  Description: Will remain free from falls  Outcome: Ongoing   Fall risk assessment completed . Fall precautions in place, bed/ chair alarm on, side rails 2/4 up, call light in reach, educated pt on calling for assistance when needed, room clear of clutter. Pt verbalized understanding.

## 2020-07-20 NOTE — PROGRESS NOTES
4 Eyes Skin Assessment     The patient is being assess for  Transfer to New Unit    I agree that 2 RN's have performed a thorough Head to Toe Skin Assessment on the patient. ALL assessment sites listed below have been assessed. Areas assessed by both nurses:   [x]   Head, Face, and Ears   [x]   Shoulders, Back, and Chest  [x]   Arms, Elbows, and Hands   [x]   Coccyx, Sacrum, and IschIum  [x]   Legs, Feet, and Heels        Does the Patient have Skin Breakdown?   No         Elmer Prevention initiated:  No   Wound Care Orders initiated:  No      [facility] nurse consulted for Pressure Injury (Stage 3,4, Unstageable, DTI, NWPT, and Complex wounds), New and Established Ostomies:  No      Nurse 1 eSignature: Electronically signed by Verner Searing, RN on 7/20/20 at 4:01 PM EDT    **SHARE this note so that the co-signing nurse is able to place an eSignature**    Nurse 2 eSignature: Electronically signed by Mamie Carcamo RN on 8/5/20 at 12:34 AM EDT

## 2020-07-20 NOTE — PROGRESS NOTES
Progress Note    Updates  No significant events. Minimal neurologic improvement.       Active Ambulatory Problems     Diagnosis Date Noted    Diabetic ketoacidosis without coma associated with diabetes mellitus due to underlying condition ([address]     Chronic abdominal pain 04/12/2010    Gastroparesis 04/12/2010    GERD (gastroesophageal reflux disease) 01/09/2012    Seizure ([address] 07/25/2012    Toe deformity 07/11/2017    Uncontrolled type 1 diabetes mellitus with diabetic peripheral neuropathy ([address] 09/19/2018    Type 1 diabetes mellitus with background diabetic retinopathy ([address] 09/19/2018    Hypoglycemia unawareness in type 1 diabetes mellitus ([address] 09/19/2018    Type 1 diabetes mellitus with hypercholesterolemia ([address] 09/19/2018    Accelerated hypertension 12/07/2013    Acute blood loss anemia 09/25/2018    Acute respiratory failure ([address] 01/08/2014    Candida esophagitis ([address] 09/11/2015    Cholelithiasis 12/10/2013    Cocaine abuse, episodic ([address] 12/08/2013    Cerebral infarction ([address] 01/08/2014    Diabetic polyneuropathy ([address] 01/06/2012    Duodenal ulcer 09/11/2015    Heart failure, diastolic, acute ([address] 34/04/2027    Leg weakness, bilateral 01/08/2014    Cannabis abuse 12/08/2013    Numbness in both legs 01/08/2014    Polysubstance abuse ([address] 09/11/2014    Pulmonary edema 01/08/2014    Diabetic foot ulcer ([address] 05/04/2019    Heart murmur 08/29/2019    Hyperlipidemia 03/25/2020    Osteomyelitis of great toe of right foot ([address] 05/12/2020    Epigastric abdominal pain 05/12/2020    Hypertensive urgency 05/12/2020    Diabetic gastroparesis associated with type 1 diabetes mellitus ([address] 05/26/2020     Past Surgical History:   Procedure Laterality Date    BONY PELVIS SURGERY      FOOT AMPUTATION Right 5/13/2020    EMERGENCY; RIGHT INCISION AND DEBRIDEMENT; HALUX AMPUTATION performed by Everardo Kan DPM at [address] 2006    pins and rods- plate    UPPER GASTROINTESTINAL ENDOSCOPY N/A 12/2/2019    EGD BIOPSY performed by Marielle Hazel MD at [address] [place] [address]-Administered Medications:     ampicillin-sulbactam (UNASYN) 1.5 g IVPB minibag, 1.5 g, Intravenous, Q6H, Irma Wilson MD    insulin lispro (HUMALOG) injection vial 0-12 Units, 0-12 Units, Subcutaneous, Q4H, France Jerome APRN - CNP, 6 Units at 07/20/20 0816    sodium chloride flush 0.9 % injection 10 mL, 10 mL, Intravenous, 2 times per day, France Gambles, APRN - CNP, 10 mL at 07/20/20 0813    sodium chloride flush 0.9 % injection 10 mL, 10 mL, Intravenous, PRN, France Jerome, APRN - CNP    acetaminophen (TYLENOL) tablet 650 mg, 650 mg, Oral, Q4H PRN **OR** acetaminophen (TYLENOL) suppository 650 mg, 650 mg, Rectal, Q4H PRN, Rose Nogueira MD    polyethylene glycol [facility]) packet 17 g, 17 g, Oral, Daily PRN, Rose Nogueira MD    ondansetron [facility]) injection 4 mg, 4 mg, Intravenous, Q4H PRN, Rose Nogueira MD    glucose (GLUTOSE) 40 % oral gel 15 g, 15 g, Oral, PRN, Rose Nogueira MD    dextrose 50 % IV solution, 12.5 g, Intravenous, PRN, Rose Nogueira MD, 12.5 g at 07/16/20 1955    glucagon (rDNA) injection 1 mg, 1 mg, Intramuscular, PRN, Rose Nogueira MD    dextrose 5 % solution, 100 mL/hr, Intravenous, PRN, Rose Nogueira MD    enoxaparin (LOVENOX) injection 40 mg, 40 mg, Subcutaneous, Daily, Rose Nogueira MD, 40 mg at 07/20/20 0813    pantoprazole (PROTONIX) injection 40 mg, 40 mg, Intravenous, Daily, 40 mg at 07/20/20 0813 **AND** sodium chloride (PF) 0.9 % injection 10 mL, 10 mL, Intravenous, Daily, France Gambles, APRN - CNP, 10 mL at 07/20/20 0814    magnesium sulfate 1 g in dextrose 5% 100 mL IVPB, 1 g, Intravenous, PRN, France Gambles, APRN - CNP    potassium chloride 20 mEq/50 mL IVPB (Central Line), 20 mEq, Intravenous, PRN, France Gambles, APRN - CNP    sodium phosphate 11.07 mmol in dextrose 5 % 250 mL IVPB, 0.16 mmol/kg, Intravenous, PRN **OR** sodium phosphate 22.11 mmol in dextrose 5 % 250 mL IVPB, 0.32 mmol/kg, Intravenous, PRN, Tasia De La Torre, APRN - CNP    losartan (COZAAR) tablet 50 mg, 50 mg, Oral, Daily, Shannon Wang, APRN - CNP, 50 mg at 07/20/20 0931    hydrALAZINE (APRESOLINE) injection 10 mg, 10 mg, Intravenous, Q4H PRN, Inez Roque MD, 10 mg at 07/18/20 0205    Exam  Blood pressure (!) 150/88, pulse 106, temperature 97.8 °F (36.6 °C), temperature source Axillary, resp. rate 17, height 6' 2\" (1.88 m), weight 146 lb 2.6 oz (66.3 kg), SpO2 100 %. Constitutional    Vital signs: BP, HR, and RR reviewed   General awake, no distress  Eyes: unable to visualize the fundi  Cardiovascular: pulses symmetric in all 4 extremities. Psychiatric: no psychotic behavior noted. Neurologic  Mental status:   orientation moris due to encephalopathy. Attention  poor   Language unable to verbalize. Comprehension follows minimal commands. Cranial nerves:   CN2: appears to blink to threat bilaterally. CN 3,4,6: will turn head and gaze in my direction on either side of bed when asked. No forced gaze deviation. Pupils are equal, round, reactive bilaterally. CN7: face symmetric  CN8: hearing grossly intact  CN11: trap strength moris. CN12: unable to assess tongue protrusion as he is not following this command. Strength: did hand grasp bilaterally. Moves all four limbs. Sensory: reactions to noxious stimulus in BLE, less so in BUE. Cerebellar/coordination: finger nose finger moris  Tone: no increased tone or rigidity. Gait: deferred at this time for safety given mental status. ROS - unable to obtain from the patient at this time. Chart reviewed.       Labs  Glucose 319  Na 143  K 4.4  BUN 33  Cr 1.0    WBC 18.4K  Hg 12.2  Platelets 661    Procalcitonin 0.48    Blood cultures NG  UA no infection    CSF   Tube 1 - WBC 5, RBC 84  Tube 4 - WBC 8, RBC 1  Glucose 137  Protein 50    Culture NG  Cryptococcal Ag negative        Studies  MRI brain w/o 7/16/20  Subtle bilateral frontoparietal cortical/subcortical signal abnormality. No acute abnormality.         EEG 7/17/20  Moderate to severe background slowing and disorganization. Impression  1. The patient was found unresponsive significantly hypoglycemic. He has since been extubated, off sedation, w/ minimal neurologic progress. EEG w/ severe slowing and disorganization. MRI brain w/ cortical/subcortical FLAIR changes, which could be secondary to hypoglycemia or PRES. CSF has been reassuring against CNS infection/encephalitis. Recommendations  1. Nothing else to add from Neurology standpoint at this time. 2.  Supportive care. 3.  Will follow CSF as it becomes available. Will follow from periphery. Please call back w/ any additional questions or concerns. Thank you.       Becca Justice NP  [address] [place] [address] [address] Neurology    A copy of this note was provided for Dr Thanh Hardwick MD

## 2020-07-20 NOTE — PROGRESS NOTES
Pt active in bed, sitting up and leaning over side of bed rail. Pt did pull NG out about 20cm. NG replaced back to 58cm line where it was this morning and chest xray ordered for placement. 1st chest xray shows NG  Is left lung. NG removed. New NG placed in Left nostril at 58cm line. Air bolus heard. Repeat chest xray confirms NG in stomach.      Electronically signed by Do Mcmanus RN on 7/20/2020 at 8:33 AM

## 2020-07-20 NOTE — PROGRESS NOTES
Hospitalist Progress Note      PCP: Devante Tello MD    Date of Admission: 7/15/2020    Chief Complaint: metabolic encephalopathy    Hospital Course: Mr. Campbell Dodson is a 49-year-old gentleman with history of poorly controlled insulin-dependent diabetes who was admitted unresponsive at home with glucose of 22. He was reported to have accidental versus intentional overdose of insulin. Self extubated to 2 L nasal cannula by coughing. Appears to have some deeper breathing with painful stimuli otherwise nothing purposeful. Subjective: Mental status still poor. Does not follow commands or talk. Medications:  Reviewed    Infusion Medications    dextrose       Scheduled Medications    ampicillin-sulbactam  1.5 g Intravenous Q6H    insulin lispro  0-12 Units Subcutaneous Q4H    sodium chloride flush  10 mL Intravenous 2 times per day    enoxaparin  40 mg Subcutaneous Daily    pantoprazole  40 mg Intravenous Daily    And    sodium chloride (PF)  10 mL Intravenous Daily    losartan  50 mg Oral Daily     PRN Meds: sodium chloride flush, acetaminophen **OR** acetaminophen, polyethylene glycol, ondansetron, glucose, dextrose, glucagon (rDNA), dextrose, magnesium sulfate, potassium chloride, sodium phosphate IVPB **OR** sodium phosphate IVPB, hydrALAZINE      Intake/Output Summary (Last 24 hours) at 7/20/2020 1507  Last data filed at 7/20/2020 1217  Gross per 24 hour   Intake 100 ml   Output 1470 ml   Net -1370 ml       Exam:    BP (!) 147/84   Pulse 98   Temp 97.8 °F (36.6 °C) (Axillary)   Resp 15   Ht 6' 2\" (1.88 m)   Wt 146 lb 2.6 oz (66.3 kg)   SpO2 99%   BMI 18.77 kg/m²     General appearance:  Minimally responsive on 2 L nasal cannula  HEENT Normal cephalic, atraumatic without obvious deformity. Pupils equal, round, and reactive to light. Extra ocular muscles intact. Conjunctivae/corneas clear. Neck: Supple, No jugular venous distention/bruits.   Trachea midline without thyromegaly or adenopathy with full range of motion. Lungs: intubated clear to auscultation, bilaterally without Rales/Wheezes/Rhonchi  Heart: Regular rate and rhythm with Normal S1/S2 without murmurs, rubs or gallops, point of maximum impulse non-displaced  Abdomen: Soft, non-tender or non-distended without rigidity or guarding and positive bowel sounds all four quadrants. Extremities: No clubbing, cyanosis, or edema bilaterally. Full range of motion without deformity and normal gait intact. Skin: Skin color, texture, turgor normal.  No rashes or lesions. Neurologic:  Minimally responsive to painful stimuli, occasional nonpurposeful movement  Capillary Refill: Acceptable  < 3 seconds  Peripheral Pulses: +3 Easily felt, not easily obliterated with pressure      Labs:   Recent Labs     07/18/20 0538 07/19/20  0600 07/20/20  0515   WBC 17.4* 16.9* 18.4*   HGB 12.5* 12.2* 12.2*   HCT 38.3* 37.7* 38.4*    331 320     Recent Labs     07/18/20 0538 07/19/20 0600 07/20/20  0515   * 140 143   K 4.6 4.3 4.4   CL 93* 102 102   CO2 23 27 19*   BUN 18 31* 33*   CREATININE 1.0 0.9 1.0   CALCIUM 9.3 9.4 9.4   PHOS 3.0 3.2 3.5     No results for input(s): AST, ALT, BILIDIR, BILITOT, ALKPHOS in the last 72 hours. No results for input(s): INR in the last 72 hours. No results for input(s): Florentin Shown in the last 72 hours. Urinalysis:      Lab Results   Component Value Date    NITRU Negative 07/15/2020    WBCUA 10 05/28/2020    BACTERIA 1+ 12/15/2010    RBCUA 1 05/28/2020    BLOODU Negative 07/15/2020    SPECGRAV 1.025 07/15/2020    GLUCOSEU 500 07/15/2020    GLUCOSEU >=1000 12/15/2010       Radiology:  XR CHEST PORTABLE   Final Result   No acute findings. NG tube with tip in the stomach. XR CHEST 1 VW   Final Result   Nasogastric tube projects in normal position. No acute cardiopulmonary disease.          MRI BRAIN WO CONTRAST   Final Result   Subtle bilateral frontoparietal cortical and subcortical signal abnormality   suggesting mild posterior reversal encephalopathy syndrome. No acute infarct   or hemorrhage. XR CHEST PORTABLE   Final Result   No acute cardiac or pulmonary disease. ET tube 7 cm above the jose carlos. NG side-port at the GE junction. XR CHEST PORTABLE   Final Result   The tip of the endotracheal tube is slightly high in position 9 cm above the   jose carlos. The OG/NG tube should be advanced 10 cm. The lungs are clear. CT Head WO Contrast   Final Result   No acute intracranial abnormality. Paranasal sinusitis. XR CHEST PORTABLE   Final Result   No acute cardiopulmonary disease. IR LUMBAR PUNCTURE FOR DIAGNOSIS    (Results Pending)           Assessment/Plan:    Active Hospital Problems    Diagnosis Date Noted    Acute respiratory failure with hypoxia ([facility] [J96.01] 07/16/2020    Hypoglycemia [E16.2]     Altered mental status [R41.82]        Acute Metabolic Encephalopathy: Stable  - multifactorial from life-threatening hypoglycemia and/or PRES. EEG performed, no seizure like activity  -LP performed, results pending  -Required intubation in the emergency department for airway protection. Status post self extubation by coughing on 7/18  -Still obtunded on 7/19/2020. Guarded prognosis    Leukocytosis:  -no focal symptoms of infection clinically or on imaging. Is likely stress reaction  -does have elevated procalcitonin.   Will treat for possible infection with antibiotics    PRES: Resolving  -gradual BP reduction  - oral losartan and IV hydralazine for now  -keep SBP <170    Severe hypoglycemia: Resolved  -s/p rescucitation with D10  -low dose SSI    Insulin overdose:  -Uncertain if accidental or intentional  -If mental status improves consult psychiatry    DVT Prophylaxis:  lovenox  Diet: DIET TUBE FEED CONTINUOUS/CYCLIC NPO; Diabetic 1.5; Nasogastric; Continuous; 25; 65  Code Status: Full Code    PT/OT Eval Status: pending    Dispo - ICU, guarded prognosis    Aubree Larry MD

## 2020-07-20 NOTE — PROGRESS NOTES
Pt arrived to room 4125 from ICU. Alert and nonverbal at this time. Wife at bedside. NG in place infusing tube feed at 25. Bilateral soft wrist restraints and posey vest in place d/t pt pulling at lines. All fall precautions in place at this time. Will monitor.

## 2020-07-20 NOTE — CONSULTS
Comprehensive Nutrition Assessment    Type and Reason for Visit:  Reassess, Consult(TF ordering/mgmt)    Nutrition Recommendations/Plan:   Start TF per MD orders using Glucerna 1.5 formula goal rate of 65 ml per hour + water flush of 200 ml every 4 hours    Nutrition Assessment:  Noted that pt self extubated on 7/18. Mental status has not improved enough to safely take po, so decision made to start TF per NG. Still unknown as to whether insuling OD was intentional.    Malnutrition Assessment:  Malnutrition Status:  Insufficient data    Context:  Acute Illness     Due to current CDC guidelines recommending 6-ft distancing for social isolation for COVID19 prevention, NFPE/malnutrition assessment was deferred at this time. Estimated Daily Nutrient Needs:  Energy (kcal):  1296-4285 kcal (25-30 kcal/kg ABW); Weight Used for Energy Requirements:  Current     Protein (g):   gm (1.2-2 gm/kg ABW); Weight Used for Protein Requirements:  Current        Fluid (ml/day):  per MD; Weight Used for Fluid Requirements:  Current      Nutrition Related Findings:  no edema. Wounds:  None       Current Nutrition Therapies:    Current Tube Feeding (TF) Orders:  · Feeding Route: Nasogastric  · Formula: 1.5 Diabetic  · Schedule: Continuous  · Water Flushes: 200 ml every 4 hours  · Current TF & Flush Orders Provides: 1300 ml TV / 1950 kcals / 108 gms pro / 2187 ml free water (987 (TF) + 1200 (flush) per day      Anthropometric Measures:  · Height: 6' 2\" (188 cm)  · Current Body Weight: 146 lb (66.2 kg)   · Admission Body Weight: 160 lb (72.6 kg)    · Usual Body Weight: 160 lb (72.6 kg)     · Ideal Body Weight: 190 lbs; % Ideal Body Weight 76.8 %   · BMI: 18.7  · Adjusted Body Weight:  ; No Adjustment    · BMI Categories: Normal Weight (BMI 18.5-24. 9)       Nutrition Diagnosis:   · Inadequate oral intake related to cognitive or neurological impairment as evidenced by nutrition support - enteral nutrition      Nutrition Interventions:   Food and/or Nutrient Delivery:  Start Tube Feeding  Nutrition Education/Counseling:  No recommendation at this time   Coordination of Nutrition Care:  Continued Inpatient Monitoring    Goals: Tolerate most appropriate nutrition therapy       Nutrition Monitoring and Evaluation:   Behavioral-Environmental Outcomes:  (NA)   Food/Nutrient Intake Outcomes:  Enteral Nutrition Intake/Tolerance  Physical Signs/Symptoms Outcomes:  Biochemical Data, Chewing or Swallowing, Constipation, Diarrhea, Nausea or Vomiting, Fluid Status or Edema, Nutrition Focused Physical Findings, Skin, Weight     Discharge Planning:     Too soon to determine     Electronically signed by Radha Sethi RD, LD on 7/20/20 at 12:01 PM EDT    Contact: [phone]

## 2020-07-21 ENCOUNTER — TELEPHONE (OUTPATIENT)
Dept: INTERNAL MEDICINE CLINIC | Age: 45
End: 2020-07-21

## 2020-07-21 LAB
ANION GAP SERPL CALCULATED.3IONS-SCNC: 14 MMOL/L (ref 3–16)
BASOPHILS ABSOLUTE: 0.1 K/UL (ref 0–0.2)
BASOPHILS RELATIVE PERCENT: 0.6 %
BUN BLDV-MCNC: 38 MG/DL (ref 7–20)
CALCIUM SERPL-MCNC: 10.2 MG/DL (ref 8.3–10.6)
CHLORIDE BLD-SCNC: 109 MMOL/L (ref 99–110)
CO2: 26 MMOL/L (ref 21–32)
CREAT SERPL-MCNC: 1 MG/DL (ref 0.9–1.3)
EOSINOPHILS ABSOLUTE: 0.3 K/UL (ref 0–0.6)
EOSINOPHILS RELATIVE PERCENT: 1.9 %
GFR AFRICAN AMERICAN: >60
GFR NON-AFRICAN AMERICAN: >60
GLUCOSE BLD-MCNC: 259 MG/DL (ref 70–99)
GLUCOSE BLD-MCNC: 275 MG/DL (ref 70–99)
GLUCOSE BLD-MCNC: 302 MG/DL (ref 70–99)
GLUCOSE BLD-MCNC: 321 MG/DL (ref 70–99)
GLUCOSE BLD-MCNC: 325 MG/DL (ref 70–99)
GLUCOSE BLD-MCNC: 342 MG/DL (ref 70–99)
GLUCOSE BLD-MCNC: 342 MG/DL (ref 70–99)
GLUCOSE BLD-MCNC: 370 MG/DL (ref 70–99)
HCT VFR BLD CALC: 38 % (ref 40.5–52.5)
HEMOGLOBIN: 12.2 G/DL (ref 13.5–17.5)
LYMPHOCYTES ABSOLUTE: 1.8 K/UL (ref 1–5.1)
LYMPHOCYTES RELATIVE PERCENT: 11.4 %
MAGNESIUM: 2.7 MG/DL (ref 1.8–2.4)
MCH RBC QN AUTO: 27 PG (ref 26–34)
MCHC RBC AUTO-ENTMCNC: 32 G/DL (ref 31–36)
MCV RBC AUTO: 84.4 FL (ref 80–100)
MONOCYTES ABSOLUTE: 0.9 K/UL (ref 0–1.3)
MONOCYTES RELATIVE PERCENT: 5.9 %
NEUTROPHILS ABSOLUTE: 12.9 K/UL (ref 1.7–7.7)
NEUTROPHILS RELATIVE PERCENT: 80.2 %
PDW BLD-RTO: 17.1 % (ref 12.4–15.4)
PERFORMED ON: ABNORMAL
PHOSPHORUS: 3.3 MG/DL (ref 2.5–4.9)
PLATELET # BLD: 302 K/UL (ref 135–450)
PMV BLD AUTO: 8 FL (ref 5–10.5)
POTASSIUM REFLEX MAGNESIUM: 4.7 MMOL/L (ref 3.5–5.1)
RBC # BLD: 4.51 M/UL (ref 4.2–5.9)
SODIUM BLD-SCNC: 149 MMOL/L (ref 136–145)
WBC # BLD: 16.1 K/UL (ref 4–11)

## 2020-07-21 PROCEDURE — 6370000000 HC RX 637 (ALT 250 FOR IP): Performed by: INTERNAL MEDICINE

## 2020-07-21 PROCEDURE — 94760 N-INVAS EAR/PLS OXIMETRY 1: CPT

## 2020-07-21 PROCEDURE — 6370000000 HC RX 637 (ALT 250 FOR IP): Performed by: NURSE PRACTITIONER

## 2020-07-21 PROCEDURE — 6360000002 HC RX W HCPCS: Performed by: FAMILY MEDICINE

## 2020-07-21 PROCEDURE — 84100 ASSAY OF PHOSPHORUS: CPT

## 2020-07-21 PROCEDURE — 83735 ASSAY OF MAGNESIUM: CPT

## 2020-07-21 PROCEDURE — 2580000003 HC RX 258: Performed by: INTERNAL MEDICINE

## 2020-07-21 PROCEDURE — 80048 BASIC METABOLIC PNL TOTAL CA: CPT

## 2020-07-21 PROCEDURE — 2580000003 HC RX 258: Performed by: NURSE PRACTITIONER

## 2020-07-21 PROCEDURE — 36592 COLLECT BLOOD FROM PICC: CPT

## 2020-07-21 PROCEDURE — 6360000002 HC RX W HCPCS: Performed by: INTERNAL MEDICINE

## 2020-07-21 PROCEDURE — 6360000002 HC RX W HCPCS: Performed by: NURSE PRACTITIONER

## 2020-07-21 PROCEDURE — 85025 COMPLETE CBC W/AUTO DIFF WBC: CPT

## 2020-07-21 PROCEDURE — 2580000003 HC RX 258: Performed by: FAMILY MEDICINE

## 2020-07-21 PROCEDURE — 1200000000 HC SEMI PRIVATE

## 2020-07-21 PROCEDURE — C9113 INJ PANTOPRAZOLE SODIUM, VIA: HCPCS | Performed by: NURSE PRACTITIONER

## 2020-07-21 RX ORDER — LOSARTAN POTASSIUM 25 MG/1
25 TABLET ORAL ONCE
Status: COMPLETED | OUTPATIENT
Start: 2020-07-21 | End: 2020-07-21

## 2020-07-21 RX ORDER — INSULIN GLARGINE 100 [IU]/ML
5 INJECTION, SOLUTION SUBCUTANEOUS NIGHTLY
Status: DISCONTINUED | OUTPATIENT
Start: 2020-07-21 | End: 2020-07-22

## 2020-07-21 RX ORDER — OLANZAPINE 5 MG/1
5 TABLET ORAL NIGHTLY PRN
Status: DISCONTINUED | OUTPATIENT
Start: 2020-07-21 | End: 2020-07-29

## 2020-07-21 RX ORDER — LOSARTAN POTASSIUM 100 MG/1
100 TABLET ORAL DAILY
Status: DISCONTINUED | OUTPATIENT
Start: 2020-07-22 | End: 2020-08-06

## 2020-07-21 RX ORDER — SODIUM CHLORIDE 450 MG/100ML
INJECTION, SOLUTION INTRAVENOUS CONTINUOUS
Status: DISCONTINUED | OUTPATIENT
Start: 2020-07-21 | End: 2020-07-29

## 2020-07-21 RX ADMIN — SODIUM CHLORIDE: 4.5 INJECTION, SOLUTION INTRAVENOUS at 12:10

## 2020-07-21 RX ADMIN — AMPICILLIN SODIUM AND SULBACTAM SODIUM 1.5 G: 1; .5 INJECTION, POWDER, FOR SOLUTION INTRAMUSCULAR; INTRAVENOUS at 18:08

## 2020-07-21 RX ADMIN — SODIUM CHLORIDE, PRESERVATIVE FREE 10 ML: 5 INJECTION INTRAVENOUS at 00:33

## 2020-07-21 RX ADMIN — INSULIN LISPRO 6 UNITS: 100 INJECTION, SOLUTION INTRAVENOUS; SUBCUTANEOUS at 09:20

## 2020-07-21 RX ADMIN — INSULIN GLARGINE 5 UNITS: 100 INJECTION, SOLUTION SUBCUTANEOUS at 20:11

## 2020-07-21 RX ADMIN — INSULIN LISPRO 8 UNITS: 100 INJECTION, SOLUTION INTRAVENOUS; SUBCUTANEOUS at 05:39

## 2020-07-21 RX ADMIN — ENOXAPARIN SODIUM 40 MG: 40 INJECTION SUBCUTANEOUS at 09:21

## 2020-07-21 RX ADMIN — PANTOPRAZOLE SODIUM 40 MG: 40 INJECTION, POWDER, FOR SOLUTION INTRAVENOUS at 09:21

## 2020-07-21 RX ADMIN — AMPICILLIN SODIUM AND SULBACTAM SODIUM 1.5 G: 1; .5 INJECTION, POWDER, FOR SOLUTION INTRAMUSCULAR; INTRAVENOUS at 05:27

## 2020-07-21 RX ADMIN — AMPICILLIN SODIUM AND SULBACTAM SODIUM 1.5 G: 1; .5 INJECTION, POWDER, FOR SOLUTION INTRAMUSCULAR; INTRAVENOUS at 12:07

## 2020-07-21 RX ADMIN — INSULIN LISPRO 8 UNITS: 100 INJECTION, SOLUTION INTRAVENOUS; SUBCUTANEOUS at 18:08

## 2020-07-21 RX ADMIN — LOSARTAN POTASSIUM 25 MG: 25 TABLET, FILM COATED ORAL at 12:07

## 2020-07-21 RX ADMIN — SODIUM CHLORIDE, PRESERVATIVE FREE 10 ML: 5 INJECTION INTRAVENOUS at 20:11

## 2020-07-21 RX ADMIN — INSULIN LISPRO 8 UNITS: 100 INJECTION, SOLUTION INTRAVENOUS; SUBCUTANEOUS at 00:27

## 2020-07-21 RX ADMIN — INSULIN LISPRO 10 UNITS: 100 INJECTION, SOLUTION INTRAVENOUS; SUBCUTANEOUS at 20:11

## 2020-07-21 RX ADMIN — Medication 10 ML: at 09:21

## 2020-07-21 RX ADMIN — AMPICILLIN SODIUM AND SULBACTAM SODIUM 1.5 G: 1; .5 INJECTION, POWDER, FOR SOLUTION INTRAMUSCULAR; INTRAVENOUS at 00:31

## 2020-07-21 RX ADMIN — INSULIN LISPRO 6 UNITS: 100 INJECTION, SOLUTION INTRAVENOUS; SUBCUTANEOUS at 12:07

## 2020-07-21 RX ADMIN — ACETAMINOPHEN 650 MG: 325 TABLET ORAL at 05:28

## 2020-07-21 ASSESSMENT — PAIN SCALES - WONG BAKER
WONGBAKER_NUMERICALRESPONSE: 0

## 2020-07-21 ASSESSMENT — PAIN SCALES - GENERAL
PAINLEVEL_OUTOF10: 4
PAINLEVEL_OUTOF10: 0
PAINLEVEL_OUTOF10: 0

## 2020-07-21 NOTE — PLAN OF CARE
Problem: Restraint Use - Nonviolent/Non-Self-Destructive Behavior:  Goal: Absence of restraint indications  Description: Absence of restraint indications  7/21/2020 1634 by Brianne Fleming RN  Outcome: Ongoing  Note: Restraints will be removed once NG , Picc line no longer necessary or when pt able to follow directions. Problem: Pain:  Goal: Control of acute pain  Description: Control of acute pain  7/21/2020 1634 by Brianne Fleming RN  Outcome: Ongoing  Note: Pain/discomfort being managed with PRN analgesics per MD orders. Pt able to express presence and absence of pain and rate pain appropriately using numerical scale. Problem: Nutrition  Goal: Optimal nutrition therapy  7/21/2020 1634 by Brianne Fleming RN  Outcome: Ongoing  Note: Pt will be enc to eat as much as tolerated. Pt will be offered supplements as needed. Problem: Skin Integrity:  Goal: Will show no infection signs and symptoms  Description: Will show no infection signs and symptoms  7/21/2020 1634 by Brianne Fleming RN  Outcome: Ongoing  Note: Skin assessment performed each shift and as needed. Pt will be enc to turn, reposition, and ambulate. Skin will be kept clean and dry. Problem: Falls - Risk of:  Goal: Will remain free from falls  Description: Will remain free from falls  7/21/2020 1634 by Brianne Fleming RN  Outcome: Ongoing  Note: Pt free from falls this shift. Fall precautions in place at all times. Call light always within reach. Pt able and agreeable to contact for safety appropriately.

## 2020-07-21 NOTE — PLAN OF CARE
Problem: Restraint Use - Nonviolent/Non-Self-Destructive Behavior:  Goal: Absence of restraint indications  Description: Absence of restraint indications  7/21/2020 1634 by Stan Fleming RN  Outcome: Ongoing  Note: Restraints will be removed once NG , Picc line no longer necessary or when pt able to follow directions. Problem: Pain:  Goal: Control of acute pain  Description: Control of acute pain  7/21/2020 1634 by Stan Fleming RN  Outcome: Ongoing  Note: Pain/discomfort being managed with PRN analgesics per MD orders. Pt able to express presence and absence of pain and rate pain appropriately using numerical scale.

## 2020-07-21 NOTE — PLAN OF CARE
Problem: Restraint Use - Nonviolent/Non-Self-Destructive Behavior:  Goal: Absence of restraint indications  Description: Absence of restraint indications  7/20/2020 2312 by Donald Jha RN  Outcome: Ongoing     Problem: Restraint Use - Nonviolent/Non-Self-Destructive Behavior:  Goal: Absence of restraint-related injury  Description: Absence of restraint-related injury  7/20/2020 2312 by Donald Jha RN  Outcome: Ongoing  Unsafe behavior noted, tries to turn his head side to side to get rid of the NG, an also tries to get up, 2 point soft restraints and a posey vest in place. Problem: Pain:  Goal: Pain level will decrease  Description: Pain level will decrease  7/20/2020 2312 by Donald Jha RN  Outcome: Ongoing  Appears to be in mild discomfort, prn tylenol given and was effective       Problem: Nutrition  Goal: Optimal nutrition therapy  7/20/2020 2312 by Donald Jha RN  Outcome: Ongoing   Tube feeding going at 50cc current, goal is at 60cc/hr   Problem: Falls - Risk of:  Goal: Will remain free from falls  Description: Will remain free from falls  7/20/2020 2312 by Donald Jha RN  Outcome: Ongoing  Patient in 2 point soft hand restraints and posey vest. He still tries to sit/get up. Tele-camera placed at the bedside for added safety.

## 2020-07-21 NOTE — PROGRESS NOTES
Hospitalist Progress Note      PCP: Roxane Arambula MD    Date of Admission: 7/15/2020    Hospital Course: 40-year-old M Hx poorly controlled DM who was admitted unresponsive at home with glucose of 22. He was reported to have accidental versus intentional overdose of insulin. He was intubated. He Self extubated to 2 L nasal cannula by coughing. Subjective: He is non verbal.He responds to a few commands. He is in 2 point restraits    Medications:  Reviewed    Infusion Medications    dextrose       Scheduled Medications    ampicillin-sulbactam  1.5 g Intravenous Q6H    insulin lispro  0-12 Units Subcutaneous Q4H    sodium chloride flush  10 mL Intravenous 2 times per day    enoxaparin  40 mg Subcutaneous Daily    pantoprazole  40 mg Intravenous Daily    And    sodium chloride (PF)  10 mL Intravenous Daily    losartan  50 mg Oral Daily     PRN Meds: sodium chloride flush, acetaminophen **OR** acetaminophen, polyethylene glycol, ondansetron, glucose, dextrose, glucagon (rDNA), dextrose, magnesium sulfate, potassium chloride, sodium phosphate IVPB **OR** sodium phosphate IVPB      Intake/Output Summary (Last 24 hours) at 7/21/2020 0957  Last data filed at 7/21/2020 0626  Gross per 24 hour   Intake 711 ml   Output 1200 ml   Net -489 ml       Exam:    BP (!) 165/97   Pulse 90   Temp 97.8 °F (36.6 °C)   Resp 18   Ht 6' 2\" (1.88 m)   Wt 149 lb 11.1 oz (67.9 kg)   SpO2 93%   BMI 19.22 kg/m²     General appearance:  No acute distress  HEENT Normal cephalic, atraumatic without obvious deformity. NG tube in place  Neck: Supple, No jugular venous distention/bruits. Trachea midline without thyromegaly or adenopathy with full range of motion.   Lungs: intubated clear to auscultation, bilaterally without Rales/Wheezes/Rhonchi  Heart: Regular rate and rhythm with Normal S1/S2 without murmurs, rubs or gallops, point of maximum impulse non-displaced  Abdomen: Soft, non-tender or non-distended without rigidity or guarding and positive bowel sounds all four quadrants. Extremities: No clubbing, cyanosis, or edema bilaterally. Skin: Skin color, texture, turgor normal.  No rashes or lesions. Neurologic:  Awake but non verbal,moving all ext,doesnt follow cmmands      Labs:   Recent Labs     07/19/20  0600 07/20/20  0515 07/21/20  0530   WBC 16.9* 18.4* 16.1*   HGB 12.2* 12.2* 12.2*   HCT 37.7* 38.4* 38.0*    320 302     Recent Labs     07/19/20  0600 07/20/20  0515 07/21/20  0530    143 149*   K 4.3 4.4 4.7    102 109   CO2 27 19* 26   BUN 31* 33* 38*   CREATININE 0.9 1.0 1.0   CALCIUM 9.4 9.4 10.2   PHOS 3.2 3.5 3.3     No results for input(s): AST, ALT, BILIDIR, BILITOT, ALKPHOS in the last 72 hours. No results for input(s): INR in the last 72 hours. No results for input(s): Charlynne Bicker in the last 72 hours. Urinalysis:      Lab Results   Component Value Date    NITRU Negative 07/15/2020    WBCUA 10 05/28/2020    BACTERIA 1+ 12/15/2010    RBCUA 1 05/28/2020    BLOODU Negative 07/15/2020    SPECGRAV 1.025 07/15/2020    GLUCOSEU 500 07/15/2020    GLUCOSEU >=1000 12/15/2010       Radiology:  XR CHEST PORTABLE   Final Result   No acute findings. NG tube with tip in the stomach. XR CHEST 1 VW   Final Result   Nasogastric tube projects in normal position. No acute cardiopulmonary disease. MRI BRAIN WO CONTRAST   Final Result   Subtle bilateral frontoparietal cortical and subcortical signal abnormality   suggesting mild posterior reversal encephalopathy syndrome. No acute infarct   or hemorrhage. XR CHEST PORTABLE   Final Result   No acute cardiac or pulmonary disease. ET tube 7 cm above the jose carlos. NG side-port at the GE junction. XR CHEST PORTABLE   Final Result   The tip of the endotracheal tube is slightly high in position 9 cm above the   jose carlos. The OG/NG tube should be advanced 10 cm. The lungs are clear.          CT Head WO Contrast   Final Result   No acute intracranial abnormality. Paranasal sinusitis. XR CHEST PORTABLE   Final Result   No acute cardiopulmonary disease. IR LUMBAR PUNCTURE FOR DIAGNOSIS    (Results Pending)           Assessment/Plan:    Active Hospital Problems    Diagnosis Date Noted    Acute respiratory failure with hypoxia ([facility] [address] [J96.01] 07/16/2020    Hypoglycemia [E16.2]     Altered mental status [R41.82]        #Acute Metabolic Encephalopathy- multifactorial including severe hypoglycemia and/or PRES. EEG did not show seizure activity. -LP CSF now growth thus far. Elevated protein and glucose. Await viral panel. -Appreciate Neurology recommendations  -Continue restraints for now and attempt to remove. -Zyprexa PRN for agitation  -Prognosis guarded    #Acute respiratory failure-due to intability to protect airway  -Self extubated on 7/18 by coughing    #Possible PRES  -gradual BP reduction  - Increase losartan to 100mg daily. -Goal SBP <170    #Leukocytosis-likely stress reaction  -Procalcitonin,elevated. Continue empiric unasyn day 2    #Severe hypoglycemia: Resolved  -s/p rescucitation with D10    #Insulin overdose:-Uncertain if accidental or intentional  -Consult psychiatry if mental status improves    #DM II  -Hyperglycemia,insulin coverage  -Continue DM TF    #Hypernatremia  -Start 1/4 NS @75cc/hr and free water boluses    #Dysphagia  -Continue TFs.     DVT Prophylaxis:  lovenox  Diet: DIET TUBE FEED CONTINUOUS/CYCLIC NPO; Diabetic 1.5; Nasogastric; Continuous; 25; 65  Code Status: Full Code    PT/OT Eval Status: pending    Dispo - Awaiting improvement of mental status    Estela Martin MD

## 2020-07-22 LAB
ANION GAP SERPL CALCULATED.3IONS-SCNC: 13 MMOL/L (ref 3–16)
BASOPHILS ABSOLUTE: 0.1 K/UL (ref 0–0.2)
BASOPHILS RELATIVE PERCENT: 0.6 %
BUN BLDV-MCNC: 29 MG/DL (ref 7–20)
CALCIUM SERPL-MCNC: 10 MG/DL (ref 8.3–10.6)
CHLORIDE BLD-SCNC: 106 MMOL/L (ref 99–110)
CO2: 28 MMOL/L (ref 21–32)
CREAT SERPL-MCNC: 0.9 MG/DL (ref 0.9–1.3)
EOSINOPHILS ABSOLUTE: 0.4 K/UL (ref 0–0.6)
EOSINOPHILS RELATIVE PERCENT: 2.5 %
GFR AFRICAN AMERICAN: >60
GFR NON-AFRICAN AMERICAN: >60
GLUCOSE BLD-MCNC: 265 MG/DL (ref 70–99)
GLUCOSE BLD-MCNC: 286 MG/DL (ref 70–99)
GLUCOSE BLD-MCNC: 292 MG/DL (ref 70–99)
GLUCOSE BLD-MCNC: 295 MG/DL (ref 70–99)
GLUCOSE BLD-MCNC: 321 MG/DL (ref 70–99)
GLUCOSE BLD-MCNC: 353 MG/DL (ref 70–99)
GLUCOSE BLD-MCNC: 402 MG/DL (ref 70–99)
HCT VFR BLD CALC: 36.4 % (ref 40.5–52.5)
HEMOGLOBIN: 11.7 G/DL (ref 13.5–17.5)
LYMPHOCYTES ABSOLUTE: 2.1 K/UL (ref 1–5.1)
LYMPHOCYTES RELATIVE PERCENT: 14 %
MAGNESIUM: 2.2 MG/DL (ref 1.8–2.4)
MCH RBC QN AUTO: 27.2 PG (ref 26–34)
MCHC RBC AUTO-ENTMCNC: 32.1 G/DL (ref 31–36)
MCV RBC AUTO: 84.7 FL (ref 80–100)
MONOCYTES ABSOLUTE: 0.8 K/UL (ref 0–1.3)
MONOCYTES RELATIVE PERCENT: 5.2 %
NEUTROPHILS ABSOLUTE: 11.5 K/UL (ref 1.7–7.7)
NEUTROPHILS RELATIVE PERCENT: 77.7 %
PDW BLD-RTO: 17 % (ref 12.4–15.4)
PERFORMED ON: ABNORMAL
PHOSPHORUS: 3.3 MG/DL (ref 2.5–4.9)
PLATELET # BLD: 278 K/UL (ref 135–450)
PMV BLD AUTO: 8 FL (ref 5–10.5)
POTASSIUM REFLEX MAGNESIUM: 3.9 MMOL/L (ref 3.5–5.1)
RBC # BLD: 4.3 M/UL (ref 4.2–5.9)
SODIUM BLD-SCNC: 147 MMOL/L (ref 136–145)
WBC # BLD: 14.8 K/UL (ref 4–11)

## 2020-07-22 PROCEDURE — 84100 ASSAY OF PHOSPHORUS: CPT

## 2020-07-22 PROCEDURE — 2580000003 HC RX 258: Performed by: INTERNAL MEDICINE

## 2020-07-22 PROCEDURE — 85025 COMPLETE CBC W/AUTO DIFF WBC: CPT

## 2020-07-22 PROCEDURE — 6360000002 HC RX W HCPCS: Performed by: FAMILY MEDICINE

## 2020-07-22 PROCEDURE — 6360000002 HC RX W HCPCS: Performed by: NURSE PRACTITIONER

## 2020-07-22 PROCEDURE — 6370000000 HC RX 637 (ALT 250 FOR IP): Performed by: NURSE PRACTITIONER

## 2020-07-22 PROCEDURE — 83735 ASSAY OF MAGNESIUM: CPT

## 2020-07-22 PROCEDURE — 6360000002 HC RX W HCPCS: Performed by: INTERNAL MEDICINE

## 2020-07-22 PROCEDURE — C9113 INJ PANTOPRAZOLE SODIUM, VIA: HCPCS | Performed by: NURSE PRACTITIONER

## 2020-07-22 PROCEDURE — 6370000000 HC RX 637 (ALT 250 FOR IP): Performed by: INTERNAL MEDICINE

## 2020-07-22 PROCEDURE — 2580000003 HC RX 258: Performed by: FAMILY MEDICINE

## 2020-07-22 PROCEDURE — 80048 BASIC METABOLIC PNL TOTAL CA: CPT

## 2020-07-22 PROCEDURE — 2580000003 HC RX 258: Performed by: NURSE PRACTITIONER

## 2020-07-22 PROCEDURE — 1200000000 HC SEMI PRIVATE

## 2020-07-22 RX ORDER — OLANZAPINE 10 MG/1
5 INJECTION, POWDER, LYOPHILIZED, FOR SOLUTION INTRAMUSCULAR EVERY 8 HOURS PRN
Status: DISCONTINUED | OUTPATIENT
Start: 2020-07-22 | End: 2020-08-06

## 2020-07-22 RX ORDER — INSULIN GLARGINE 100 [IU]/ML
10 INJECTION, SOLUTION SUBCUTANEOUS NIGHTLY
Status: DISCONTINUED | OUTPATIENT
Start: 2020-07-22 | End: 2020-08-05

## 2020-07-22 RX ADMIN — INSULIN GLARGINE 10 UNITS: 100 INJECTION, SOLUTION SUBCUTANEOUS at 22:46

## 2020-07-22 RX ADMIN — AMPICILLIN SODIUM AND SULBACTAM SODIUM 1.5 G: 1; .5 INJECTION, POWDER, FOR SOLUTION INTRAMUSCULAR; INTRAVENOUS at 17:30

## 2020-07-22 RX ADMIN — INSULIN LISPRO 12 UNITS: 100 INJECTION, SOLUTION INTRAVENOUS; SUBCUTANEOUS at 22:46

## 2020-07-22 RX ADMIN — PANTOPRAZOLE SODIUM 40 MG: 40 INJECTION, POWDER, FOR SOLUTION INTRAVENOUS at 09:37

## 2020-07-22 RX ADMIN — INSULIN LISPRO 4 UNITS: 100 INJECTION, SOLUTION INTRAVENOUS; SUBCUTANEOUS at 11:52

## 2020-07-22 RX ADMIN — AMPICILLIN SODIUM AND SULBACTAM SODIUM 1.5 G: 1; .5 INJECTION, POWDER, FOR SOLUTION INTRAMUSCULAR; INTRAVENOUS at 05:48

## 2020-07-22 RX ADMIN — INSULIN LISPRO 6 UNITS: 100 INJECTION, SOLUTION INTRAVENOUS; SUBCUTANEOUS at 17:31

## 2020-07-22 RX ADMIN — INSULIN LISPRO 6 UNITS: 100 INJECTION, SOLUTION INTRAVENOUS; SUBCUTANEOUS at 09:37

## 2020-07-22 RX ADMIN — ENOXAPARIN SODIUM 40 MG: 40 INJECTION SUBCUTANEOUS at 09:36

## 2020-07-22 RX ADMIN — SODIUM CHLORIDE, PRESERVATIVE FREE 10 ML: 5 INJECTION INTRAVENOUS at 22:43

## 2020-07-22 RX ADMIN — AMPICILLIN SODIUM AND SULBACTAM SODIUM 1.5 G: 1; .5 INJECTION, POWDER, FOR SOLUTION INTRAMUSCULAR; INTRAVENOUS at 00:49

## 2020-07-22 RX ADMIN — SODIUM CHLORIDE: 4.5 INJECTION, SOLUTION INTRAVENOUS at 02:11

## 2020-07-22 RX ADMIN — INSULIN LISPRO 6 UNITS: 100 INJECTION, SOLUTION INTRAVENOUS; SUBCUTANEOUS at 00:54

## 2020-07-22 RX ADMIN — INSULIN LISPRO 10 UNITS: 100 INJECTION, SOLUTION INTRAVENOUS; SUBCUTANEOUS at 11:52

## 2020-07-22 RX ADMIN — LOSARTAN POTASSIUM 100 MG: 100 TABLET, FILM COATED ORAL at 09:36

## 2020-07-22 RX ADMIN — AMPICILLIN SODIUM AND SULBACTAM SODIUM 1.5 G: 1; .5 INJECTION, POWDER, FOR SOLUTION INTRAMUSCULAR; INTRAVENOUS at 11:52

## 2020-07-22 RX ADMIN — Medication 10 ML: at 09:37

## 2020-07-22 RX ADMIN — INSULIN LISPRO 6 UNITS: 100 INJECTION, SOLUTION INTRAVENOUS; SUBCUTANEOUS at 04:39

## 2020-07-22 ASSESSMENT — PAIN SCALES - GENERAL
PAINLEVEL_OUTOF10: 0
PAINLEVEL_OUTOF10: 0

## 2020-07-22 ASSESSMENT — PAIN SCALES - WONG BAKER
WONGBAKER_NUMERICALRESPONSE: 0

## 2020-07-22 NOTE — PROGRESS NOTES
Pt resting in bed quietly. Makes eye contact, is nonverbal. Did shake his head \"no\" when nurse asked him if he was cold. Pt did squeeze nurse's fingers with left and right hand upon request, but very weakly.

## 2020-07-22 NOTE — PROGRESS NOTES
Visitor in room named, [id number] This RN noted in chart that the patient's wife is the only one who is allowed to visit or have any information about the patient. This RN told visitor that she needed to leave. Visitor left unit with no issues. Will continue to monitor.
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
## 2020-07-22 NOTE — PROGRESS NOTES
Due to many different stories from family members and for the safety of the patient, call made to security to put patient on the no visitors list. Electronically signed by Padma Arteaga RN on 7/22/2020 at 7:55 PM

## 2020-07-22 NOTE — PROGRESS NOTES
point of maximum impulse non-displaced  Abdomen: Soft, non-tender or non-distended without rigidity or guarding and positive bowel sounds all four quadrants. Extremities: No clubbing, cyanosis, or edema bilaterally. Skin: Skin color, texture, turgor normal.  No rashes or lesions. Neurologic:  Awake but non verbal,moving all ext,doesnt follow cmmands      Labs:   Recent Labs     07/20/20  0515 07/21/20  0530 07/22/20  0545   WBC 18.4* 16.1* 14.8*   HGB 12.2* 12.2* 11.7*   HCT 38.4* 38.0* 36.4*    302 278     Recent Labs     07/20/20  0515 07/21/20  0530 07/22/20  0545    149* 147*   K 4.4 4.7 3.9    109 106   CO2 19* 26 28   BUN 33* 38* 29*   CREATININE 1.0 1.0 0.9   CALCIUM 9.4 10.2 10.0   PHOS 3.5 3.3 3.3     No results for input(s): AST, ALT, BILIDIR, BILITOT, ALKPHOS in the last 72 hours. No results for input(s): INR in the last 72 hours. No results for input(s): Marie Moellers in the last 72 hours. Urinalysis:      Lab Results   Component Value Date    NITRU Negative 07/15/2020    WBCUA 10 05/28/2020    BACTERIA 1+ 12/15/2010    RBCUA 1 05/28/2020    BLOODU Negative 07/15/2020    SPECGRAV 1.025 07/15/2020    GLUCOSEU 500 07/15/2020    GLUCOSEU >=1000 12/15/2010       Radiology:  XR CHEST PORTABLE   Final Result   No acute findings. NG tube with tip in the stomach. XR CHEST 1 VW   Final Result   Nasogastric tube projects in normal position. No acute cardiopulmonary disease. MRI BRAIN WO CONTRAST   Final Result   Subtle bilateral frontoparietal cortical and subcortical signal abnormality   suggesting mild posterior reversal encephalopathy syndrome. No acute infarct   or hemorrhage. XR CHEST PORTABLE   Final Result   No acute cardiac or pulmonary disease. ET tube 7 cm above the jose carlos. NG side-port at the GE junction.          XR CHEST PORTABLE   Final Result   The tip of the endotracheal tube is slightly high in position 9 cm above the jose carlos. The OG/NG tube should be advanced 10 cm. The lungs are clear. CT Head WO Contrast   Final Result   No acute intracranial abnormality. Paranasal sinusitis. XR CHEST PORTABLE   Final Result   No acute cardiopulmonary disease. IR LUMBAR PUNCTURE FOR DIAGNOSIS    (Results Pending)           Assessment/Plan:    Active Hospital Problems    Diagnosis Date Noted    Acute respiratory failure with hypoxia ([facility] [address] [J96.01] 07/16/2020    Hypoglycemia [E16.2]     Altered mental status [R41.82]        #Acute Metabolic Encephalopathy- multifactorial including severe hypoglycemia and/or PRES. EEG did not show seizure activity. -LP CSF now growth thus far. Elevated protein and glucose. Encephalitis panel and cryptococcal ag is negative. Await VDRL,HSV,Cytology,Oligoclonal banding,  -Appreciate Neurology recommendations  -Continue restraints for now and attempt to remove. -Zyprexa PRN for agitation  -Prognosis guarded    #Acute respiratory failure-due to intability to protect airway  -Self extubated on 7/18 by coughing    #Possible PRES  -gradual BP reduction  - Adjust BP meds as needed  -Goal SBP <170    #Leukocytosis-likely stress reaction vs occult infection. WBC decreasing  -Procalcitonin,elevated. Continue empiric unasyn day 3/7    #Severe hypoglycemia: Resolved  -s/p rescucitation with D10    #Insulin overdose:-Uncertain if accidental or intentional  -Consult psychiatry if mental status improves    #DM II  -Hyperglycemia,increased lantus to 10units qhs and will give lispro sq now. Continue coverage.  -Continue DM TF    #Hypernatremia-trending down  -Continue 1/4 NS @75cc/hr and increase free water boluses to q4h    #Dysphagia  -Continue TFs. DVT Prophylaxis:  lovenox  Diet: DIET TUBE FEED CONTINUOUS/CYCLIC NPO; Diabetic 1.5; Nasogastric; Continuous; 25; 65  Code Status: Full Code    PT/OT Eval Status: pending    Dispo - Awaiting improvement of mental status. He will likely need SNF.    Esequiel García MD

## 2020-07-22 NOTE — PLAN OF CARE
Problem: Restraint Use - Nonviolent/Non-Self-Destructive Behavior:  Goal: Absence of restraint indications  Description: Absence of restraint indications  7/22/2020 1113 by Amita Ferrer RN  Outcome: Ongoing  7/22/2020 0318 by Jason Jaquez RN  Outcome: Ongoing  Goal: Absence of restraint-related injury  Description: Absence of restraint-related injury  Outcome: Ongoing     Problem: Pain:  Goal: Pain level will decrease  Description: Pain level will decrease  7/22/2020 1113 by Amita Ferrer RN  Outcome: Ongoing  7/22/2020 0318 by Jason Jaquez RN  Outcome: Ongoing  Goal: Control of acute pain  Description: Control of acute pain  Outcome: Ongoing  Goal: Control of chronic pain  Description: Control of chronic pain  Outcome: Ongoing     Problem: Nutrition  Goal: Optimal nutrition therapy  7/22/2020 1113 by Amita Ferrer RN  Outcome: Ongoing  7/22/2020 0318 by Jason Jaquez RN  Outcome: Ongoing     Problem: Skin Integrity:  Goal: Will show no infection signs and symptoms  Description: Will show no infection signs and symptoms  Outcome: Ongoing  Goal: Absence of new skin breakdown  Description: Absence of new skin breakdown  7/22/2020 1113 by Amita Ferrer RN  Outcome: Ongoing  7/22/2020 0318 by Jason Jaquez RN  Outcome: Ongoing     Problem: Falls - Risk of:  Goal: Will remain free from falls  Description: Will remain free from falls  Outcome: Ongoing  Goal: Absence of physical injury  Description: Absence of physical injury  Outcome: Ongoing
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
## 2020-07-22 NOTE — PROGRESS NOTES
Security accidentally let wife onto the unit. This RN met wife in room. Educated wife on the patient's new visitor restrictions due to the safety of the patient. Wife states that she is POA. RN notified wife that there is no POA paperwork on file and to please bring it in. Security came in to escort wife out of the hospital. Will continue to monitor.  Electronically signed by Jenny Lopez RN on 7/22/2020 at 8:08 PM

## 2020-07-23 LAB
ANION GAP SERPL CALCULATED.3IONS-SCNC: 11 MMOL/L (ref 3–16)
BASOPHILS ABSOLUTE: 0.1 K/UL (ref 0–0.2)
BASOPHILS RELATIVE PERCENT: 1.3 %
BUN BLDV-MCNC: 22 MG/DL (ref 7–20)
CALCIUM SERPL-MCNC: 9.6 MG/DL (ref 8.3–10.6)
CHLORIDE BLD-SCNC: 106 MMOL/L (ref 99–110)
CO2: 29 MMOL/L (ref 21–32)
CREAT SERPL-MCNC: 0.7 MG/DL (ref 0.9–1.3)
CSF INTERPRETATION: NORMAL
EOSINOPHILS ABSOLUTE: 0.3 K/UL (ref 0–0.6)
EOSINOPHILS RELATIVE PERCENT: 2.9 %
GFR AFRICAN AMERICAN: >60
GFR NON-AFRICAN AMERICAN: >60
GLUCOSE BLD-MCNC: 224 MG/DL (ref 70–99)
GLUCOSE BLD-MCNC: 234 MG/DL (ref 70–99)
GLUCOSE BLD-MCNC: 264 MG/DL (ref 70–99)
GLUCOSE BLD-MCNC: 283 MG/DL (ref 70–99)
GLUCOSE BLD-MCNC: 295 MG/DL (ref 70–99)
GLUCOSE BLD-MCNC: 377 MG/DL (ref 70–99)
GLUCOSE BLD-MCNC: 380 MG/DL (ref 70–99)
HCT VFR BLD CALC: 35.5 % (ref 40.5–52.5)
HEMOGLOBIN: 11.5 G/DL (ref 13.5–17.5)
LYMPHOCYTES ABSOLUTE: 2.2 K/UL (ref 1–5.1)
LYMPHOCYTES RELATIVE PERCENT: 19.3 %
MAGNESIUM: 2 MG/DL (ref 1.8–2.4)
MCH RBC QN AUTO: 27.2 PG (ref 26–34)
MCHC RBC AUTO-ENTMCNC: 32.3 G/DL (ref 31–36)
MCV RBC AUTO: 84.1 FL (ref 80–100)
MONOCYTES ABSOLUTE: 0.6 K/UL (ref 0–1.3)
MONOCYTES RELATIVE PERCENT: 5.4 %
NEUTROPHILS ABSOLUTE: 8.2 K/UL (ref 1.7–7.7)
NEUTROPHILS RELATIVE PERCENT: 71.1 %
OLIGOCLONAL BANDS CSF: 0
OLIGOCLONAL BANDS: 0
PDW BLD-RTO: 16.4 % (ref 12.4–15.4)
PERFORMED ON: ABNORMAL
PHOSPHORUS: 4.4 MG/DL (ref 2.5–4.9)
PLATELET # BLD: 274 K/UL (ref 135–450)
PMV BLD AUTO: 8.1 FL (ref 5–10.5)
POTASSIUM REFLEX MAGNESIUM: 4.4 MMOL/L (ref 3.5–5.1)
RBC # BLD: 4.22 M/UL (ref 4.2–5.9)
SODIUM BLD-SCNC: 146 MMOL/L (ref 136–145)
VDRL CSF SCREEN: NON REACTIVE
WBC # BLD: 11.6 K/UL (ref 4–11)

## 2020-07-23 PROCEDURE — 2580000003 HC RX 258: Performed by: NURSE PRACTITIONER

## 2020-07-23 PROCEDURE — C9113 INJ PANTOPRAZOLE SODIUM, VIA: HCPCS | Performed by: NURSE PRACTITIONER

## 2020-07-23 PROCEDURE — 6370000000 HC RX 637 (ALT 250 FOR IP): Performed by: INTERNAL MEDICINE

## 2020-07-23 PROCEDURE — 6370000000 HC RX 637 (ALT 250 FOR IP): Performed by: NURSE PRACTITIONER

## 2020-07-23 PROCEDURE — 36592 COLLECT BLOOD FROM PICC: CPT

## 2020-07-23 PROCEDURE — 2580000003 HC RX 258: Performed by: FAMILY MEDICINE

## 2020-07-23 PROCEDURE — 80048 BASIC METABOLIC PNL TOTAL CA: CPT

## 2020-07-23 PROCEDURE — 6360000002 HC RX W HCPCS: Performed by: FAMILY MEDICINE

## 2020-07-23 PROCEDURE — 83735 ASSAY OF MAGNESIUM: CPT

## 2020-07-23 PROCEDURE — 6360000002 HC RX W HCPCS: Performed by: NURSE PRACTITIONER

## 2020-07-23 PROCEDURE — 85025 COMPLETE CBC W/AUTO DIFF WBC: CPT

## 2020-07-23 PROCEDURE — 2580000003 HC RX 258: Performed by: INTERNAL MEDICINE

## 2020-07-23 PROCEDURE — 1200000000 HC SEMI PRIVATE

## 2020-07-23 PROCEDURE — 92610 EVALUATE SWALLOWING FUNCTION: CPT

## 2020-07-23 PROCEDURE — 94761 N-INVAS EAR/PLS OXIMETRY MLT: CPT

## 2020-07-23 PROCEDURE — 84100 ASSAY OF PHOSPHORUS: CPT

## 2020-07-23 PROCEDURE — 6360000002 HC RX W HCPCS: Performed by: INTERNAL MEDICINE

## 2020-07-23 RX ADMIN — AMPICILLIN SODIUM AND SULBACTAM SODIUM 1.5 G: 1; .5 INJECTION, POWDER, FOR SOLUTION INTRAMUSCULAR; INTRAVENOUS at 05:31

## 2020-07-23 RX ADMIN — AMPICILLIN SODIUM AND SULBACTAM SODIUM 1.5 G: 1; .5 INJECTION, POWDER, FOR SOLUTION INTRAMUSCULAR; INTRAVENOUS at 13:07

## 2020-07-23 RX ADMIN — INSULIN LISPRO 6 UNITS: 100 INJECTION, SOLUTION INTRAVENOUS; SUBCUTANEOUS at 13:09

## 2020-07-23 RX ADMIN — Medication 10 ML: at 09:00

## 2020-07-23 RX ADMIN — AMPICILLIN SODIUM AND SULBACTAM SODIUM 1.5 G: 1; .5 INJECTION, POWDER, FOR SOLUTION INTRAMUSCULAR; INTRAVENOUS at 17:59

## 2020-07-23 RX ADMIN — INSULIN LISPRO 6 UNITS: 100 INJECTION, SOLUTION INTRAVENOUS; SUBCUTANEOUS at 08:59

## 2020-07-23 RX ADMIN — PANTOPRAZOLE SODIUM 40 MG: 40 INJECTION, POWDER, FOR SOLUTION INTRAVENOUS at 08:58

## 2020-07-23 RX ADMIN — INSULIN LISPRO 10 UNITS: 100 INJECTION, SOLUTION INTRAVENOUS; SUBCUTANEOUS at 20:58

## 2020-07-23 RX ADMIN — INSULIN LISPRO 4 UNITS: 100 INJECTION, SOLUTION INTRAVENOUS; SUBCUTANEOUS at 17:59

## 2020-07-23 RX ADMIN — INSULIN GLARGINE 10 UNITS: 100 INJECTION, SOLUTION SUBCUTANEOUS at 20:58

## 2020-07-23 RX ADMIN — INSULIN LISPRO 10 UNITS: 100 INJECTION, SOLUTION INTRAVENOUS; SUBCUTANEOUS at 00:39

## 2020-07-23 RX ADMIN — SODIUM CHLORIDE, PRESERVATIVE FREE 10 ML: 5 INJECTION INTRAVENOUS at 09:02

## 2020-07-23 RX ADMIN — LOSARTAN POTASSIUM 100 MG: 100 TABLET, FILM COATED ORAL at 08:58

## 2020-07-23 RX ADMIN — SODIUM CHLORIDE: 4.5 INJECTION, SOLUTION INTRAVENOUS at 21:14

## 2020-07-23 RX ADMIN — INSULIN LISPRO 4 UNITS: 100 INJECTION, SOLUTION INTRAVENOUS; SUBCUTANEOUS at 04:40

## 2020-07-23 RX ADMIN — AMPICILLIN SODIUM AND SULBACTAM SODIUM 1.5 G: 1; .5 INJECTION, POWDER, FOR SOLUTION INTRAMUSCULAR; INTRAVENOUS at 00:38

## 2020-07-23 ASSESSMENT — PAIN SCALES - WONG BAKER

## 2020-07-23 ASSESSMENT — PAIN SCALES - GENERAL
PAINLEVEL_OUTOF10: 0
PAINLEVEL_OUTOF10: 0

## 2020-07-23 NOTE — CARE COORDINATION
Sw following patient for dc needs. Patient is currently in restraints. Per chart review, patient has had several family members make contact--Girlfriend Gabbie Painter, Pierre Dejesuser, Mother, and a spouse Samm Solorio. DPOA for Health Care has been requested by staff--no documents currently on the chart.      Per Rn note of 7/22/20, \"Due to many different stories from family members and for the safety of the patient, call made to security to put patient on the no visitors list.\"    Electronically signed by Go Valdivia on 7/23/2020 at 11:42 AM

## 2020-07-23 NOTE — PROGRESS NOTES
Comprehensive Nutrition Assessment    Type and Reason for Visit:  Reassess    Nutrition Recommendations/Plan:   Continue TF at goal rate: Glucerna 1.5 @ 65 mL/hr x 24 hrs with 250 mL water flush q 4 hrs. Nutrition Assessment:  Pt continues non-verbal and mental status still not improved. TF continues via NG. Tolerating TF @ goal rate of 65 mL/hr. Blood sugars remain elevated. Malnutrition Assessment:  Malnutrition Status:  Insufficient data    Context:  Acute Illness       Estimated Daily Nutrient Needs:  Energy (kcal):  9977-8030 kcal (25-30 kcal/kg ABW)  Protein (g):   gm (1.2-2 gm/kg ABW)        Fluid (ml/day):  per MD    Nutrition Related Findings:  BM 7/22; Gluc elevated      Wounds:  None       Current Nutrition Therapies:    DIET TUBE FEED CONTINUOUS/CYCLIC NPO; Diabetic 1.5; Nasogastric; Continuous; 25; 65  Current Tube Feeding (TF) Orders:  · Feeding Route: Nasogastric  · Formula: 1.5 Diabetic  · Schedule: Continuous   · Water Flushes: 250 mL q 4 hrs per provider  · Current TF & Flush Orders Provides: 1300 ml TV / 1950 kcals / 108 gms pro / 2187 ml free water (987 (TF) + 1200 (flush) per day    Anthropometric Measures:  · Height: 6' 2\" (188 cm)  · Current Body Weight: 149 lb (67.6 kg)   · Admission Body Weight: 160 lb (72.6 kg)    · Usual Body Weight: 160 lb (72.6 kg)     · Ideal Body Weight: 190 lbs; % Ideal Body Weight 78.4 %   · BMI: 19.1  · BMI Categories: Normal Weight (BMI 18.5-24. 9)       Nutrition Diagnosis:   · Inadequate oral intake(improving with TF) related to cognitive or neurological impairment as evidenced by nutrition support - enteral nutrition      Nutrition Interventions:   Food and/or Nutrient Delivery:  Continue Current Tube Feeding  Nutrition Education/Counseling:  No recommendation at this time   Coordination of Nutrition Care:  Continued Inpatient Monitoring    Goals:   Tolerate most appropriate nutrition therapy       Nutrition Monitoring and Evaluation: Food/Nutrient Intake Outcomes:  Enteral Nutrition Intake/Tolerance  Physical Signs/Symptoms Outcomes:  Biochemical Data, Chewing or Swallowing, Constipation, Diarrhea, Nausea or Vomiting, Fluid Status or Edema, Nutrition Focused Physical Findings, Skin, Weight     Discharge Planning:     Too soon to determine     Electronically signed by Givoana Mayo RD, LD on 20 at 11:22 AM EDT    Contact: [phone]

## 2020-07-23 NOTE — PLAN OF CARE
Problem: Nutrition  Goal: Optimal nutrition therapy  7/23/2020 1124 by Franck Escobar RD, LD  Outcome: Ongoing  7/23/2020 1124 by Franck Escobar RD, LD  Outcome: Ongoing  7/23/2020 0253 by Stephanie Ramos RN  Outcome: Ongoing   Nutrition Problem #1: Inadequate oral intake(improving with TF)  Intervention: Food and/or Nutrient Delivery: Continue Current Tube Feeding  Nutritional Goals:  Tolerate most appropriate nutrition therapy

## 2020-07-23 NOTE — PROGRESS NOTES
1992 agitated patient care: diaper change,   1105 agitated external condom cath placed    1258 calm eyes closed restraints re-ordered

## 2020-07-23 NOTE — PLAN OF CARE
Problem: Restraint Use - Nonviolent/Non-Self-Destructive Behavior:  Goal: Absence of restraint indications  Description: Absence of restraint indications  Outcome: Ongoing  Note: Pt is restless at times and can be combative with care. Goal: Absence of restraint-related injury  Description: Absence of restraint-related injury  Outcome: Ongoing     Problem: Pain:  Goal: Pain level will decrease  Description: Pain level will decrease  Outcome: Ongoing  Note: Pain /discomfort being managed with PRN analgesics per MD orders. Patient able to express presence and absence of pain and rate pain appropriately using numerical scale. Goal: Control of acute pain  Description: Control of acute pain  Outcome: Ongoing  Goal: Control of chronic pain  Description: Control of chronic pain  Outcome: Ongoing     Problem: Nutrition  Goal: Optimal nutrition therapy  Outcome: Ongoing     Problem: Skin Integrity:  Goal: Will show no infection signs and symptoms  Description: Will show no infection signs and symptoms  Outcome: Ongoing  Note: Pt assessed for infection, No signs or symptoms of surgical site noted. VVS, WBC WNL. Reviewed information with pt and family, pt verbalized understanding     Goal: Absence of new skin breakdown  Description: Absence of new skin breakdown  Outcome: Ongoing     Problem: Falls - Risk of:  Goal: Will remain free from falls  Description: Will remain free from falls  Outcome: Ongoing  Note: Fall risk assessment completed . Fall precautions in place, bed/ chair alarm on, side rails 2/4 up, call light in reach, educated pt on calling for assistance when needed, room clear of clutter. Pt verbalized understanding.      Goal: Absence of physical injury  Description: Absence of physical injury  Outcome: Ongoing

## 2020-07-23 NOTE — PROGRESS NOTES
Hospitalist Progress Note      PCP: Myriam Laguerre MD    Date of Admission: 7/15/2020    Hospital Course: 78-year-old M Hx poorly controlled DM who was admitted unresponsive at home with glucose of 22. He was reported to have accidental versus intentional overdose of insulin. He was intubated. He Self extubated to 2 L nasal cannula by coughing. Subjective: He is non verbal. He is hard to awake today and does not follows commands. Afebrile. Medications:  Reviewed    Infusion Medications    sodium chloride 75 mL/hr at 07/22/20 0211    dextrose       Scheduled Medications    insulin glargine  10 Units Subcutaneous Nightly    losartan  100 mg Oral Daily    ampicillin-sulbactam  1.5 g Intravenous Q6H    insulin lispro  0-12 Units Subcutaneous Q4H    sodium chloride flush  10 mL Intravenous 2 times per day    enoxaparin  40 mg Subcutaneous Daily    pantoprazole  40 mg Intravenous Daily    And    sodium chloride (PF)  10 mL Intravenous Daily     PRN Meds: OLANZapine, OLANZapine, sodium chloride flush, acetaminophen **OR** acetaminophen, polyethylene glycol, ondansetron, glucose, dextrose, glucagon (rDNA), dextrose, magnesium sulfate, potassium chloride, sodium phosphate IVPB **OR** sodium phosphate IVPB      Intake/Output Summary (Last 24 hours) at 7/23/2020 1733  Last data filed at 7/23/2020 1656  Gross per 24 hour   Intake 3261 ml   Output 375 ml   Net 2886 ml       Exam:    BP (!) 159/81   Pulse 83   Temp 100.7 °F (38.2 °C) (Oral)   Resp 18   Ht 6' 2\" (1.88 m)   Wt 149 lb 14.6 oz (68 kg)   SpO2 99%   BMI 19.25 kg/m²     General appearance:  No acute distress  HEENT Normal cephalic, atraumatic without obvious deformity. NG tube in place  Neck: Supple, No jugular venous distention/bruits. Trachea midline without thyromegaly or adenopathy with full range of motion.   Lungs: intubated clear to auscultation, bilaterally without Rales/Wheezes/Rhonchi  Heart: Regular rate and rhythm with Normal S1/S2 without murmurs, rubs or gallops, point of maximum impulse non-displaced  Abdomen: Soft, non-tender or non-distended without rigidity or guarding and positive bowel sounds all four quadrants. Extremities: No clubbing, cyanosis, or edema bilaterally. Skin: Skin color, texture, turgor normal.  No rashes or lesions. Neurologic:  Awake but non verbal,moving all ext,doesnt follow cmmands      Labs:   Recent Labs     07/21/20  0530 07/22/20  0545 07/23/20  0500   WBC 16.1* 14.8* 11.6*   HGB 12.2* 11.7* 11.5*   HCT 38.0* 36.4* 35.5*    278 274     Recent Labs     07/21/20  0530 07/22/20  0545 07/23/20  0500   * 147* 146*   K 4.7 3.9 4.4    106 106   CO2 26 28 29   BUN 38* 29* 22*   CREATININE 1.0 0.9 0.7*   CALCIUM 10.2 10.0 9.6   PHOS 3.3 3.3 4.4     No results for input(s): AST, ALT, BILIDIR, BILITOT, ALKPHOS in the last 72 hours. No results for input(s): INR in the last 72 hours. No results for input(s): Leellen Carota in the last 72 hours. Urinalysis:      Lab Results   Component Value Date    NITRU Negative 07/15/2020    WBCUA 10 05/28/2020    BACTERIA 1+ 12/15/2010    RBCUA 1 05/28/2020    BLOODU Negative 07/15/2020    SPECGRAV 1.025 07/15/2020    GLUCOSEU 500 07/15/2020    GLUCOSEU >=1000 12/15/2010       Radiology:  XR CHEST PORTABLE   Final Result   No acute findings. NG tube with tip in the stomach. XR CHEST 1 VW   Final Result   Nasogastric tube projects in normal position. No acute cardiopulmonary disease. MRI BRAIN WO CONTRAST   Final Result   Subtle bilateral frontoparietal cortical and subcortical signal abnormality   suggesting mild posterior reversal encephalopathy syndrome. No acute infarct   or hemorrhage. XR CHEST PORTABLE   Final Result   No acute cardiac or pulmonary disease. ET tube 7 cm above the jose carlos. NG side-port at the GE junction.          XR CHEST PORTABLE   Final Result   The tip of the endotracheal tube is slightly high in position 9 cm above the   jose carlos. The OG/NG tube should be advanced 10 cm. The lungs are clear. CT Head WO Contrast   Final Result   No acute intracranial abnormality. Paranasal sinusitis. XR CHEST PORTABLE   Final Result   No acute cardiopulmonary disease. IR LUMBAR PUNCTURE FOR DIAGNOSIS    (Results Pending)           Assessment/Plan:    Active Hospital Problems    Diagnosis Date Noted    Acute respiratory failure with hypoxia ([facility] [address] [J96.01] 07/16/2020    Hypoglycemia [E16.2]     Altered mental status [R41.82]        Patient is a 38-year-old male with poorly controlled diabetes who presented to hospital in unresponsive state, reportedly patient had blood glucose 22 at home on EMS arrival.  Patient was intubated, he self extubated while in the hospital.    Assessment  Acute metabolic encephalopathy-multifactorial, severe hypoglycemia, press syndrome  Acute respiratory failure-improving  Suspected PRES  Severe hypoglycemia on admission  Insulin overdose-unsure if accidental or intentional  Diabetes mellitus type 2  Hypernatremia  Dysphagia on tube feeds, speech therapy continues to follow    Plan  Patient is nonverbal, neurology has evaluated patient, thus far work-up including MRI, MRI showed mild posterior reversible encephalopathy syndrome ( PRES), continue to closely manage BP less than 870 systolic  LP negative for growth. EEG did not reveal any seizure activity  Continue oxygen support as needed  Continue to monitor CBC, BMP, insulin sliding scale  Hypoglycemia precautions  Hypernatremia has been improving, continue to monitor BMP, leukocytosis has been improving, monitor CBC      DVT Prophylaxis:  lovenox  Diet: DIET TUBE FEED CONTINUOUS/CYCLIC NPO; Diabetic 1.5; Nasogastric; Continuous; 25; 65  Code Status: Full Code    PT/OT Eval Status: pending    Dispo - Awaiting improvement of mental status. He will likely need SNF.     Barrie Mckeon MD

## 2020-07-23 NOTE — PROGRESS NOTES
Message sent to MD to renew restraints. MD confirmed he will reorder. Will continue to monitor.  Electronically signed by Nidia Salinas RN on 7/22/2020 at 8:24 PM

## 2020-07-23 NOTE — PLAN OF CARE
Problem: Restraint Use - Nonviolent/Non-Self-Destructive Behavior:  Goal: Absence of restraint indications  Description: Absence of restraint indications  7/23/2020 1314 by Kd Kraus RN  Outcome: Ongoing     Problem: Restraint Use - Nonviolent/Non-Self-Destructive Behavior:  Goal: Absence of restraint-related injury  Description: Absence of restraint-related injury  7/23/2020 1314 by Kd Kraus RN  Outcome: Ongoing     Problem: Pain:  Description: Pain management should include both nonpharmacologic and pharmacologic interventions. Goal: Pain level will decrease  Description: Pain level will decrease  7/23/2020 1314 by Kd Kraus RN  Outcome: Ongoing     Problem: Pain:  Description: Pain management should include both nonpharmacologic and pharmacologic interventions. Goal: Control of acute pain  Description: Control of acute pain  7/23/2020 1314 by Kd Kruas RN  Outcome: Ongoing     Problem: Pain:  Description: Pain management should include both nonpharmacologic and pharmacologic interventions.   Goal: Control of chronic pain  Description: Control of chronic pain  7/23/2020 1314 by Kd Kraus RN  Outcome: Ongoing     Problem: Nutrition  Goal: Optimal nutrition therapy  7/23/2020 1314 by Kd Kraus RN  Outcome: Ongoing     Problem: Skin Integrity:  Goal: Will show no infection signs and symptoms  Description: Will show no infection signs and symptoms  7/23/2020 1314 by Kd Kraus RN  Outcome: Ongoing     Problem: Skin Integrity:  Goal: Absence of new skin breakdown  Description: Absence of new skin breakdown  7/23/2020 1314 by Kd Kraus RN  Outcome: Ongoing     Problem: Falls - Risk of:  Goal: Will remain free from falls  Description: Will remain free from falls  7/23/2020 1314 by Kd Kraus RN  Outcome: Ongoing     Problem: Falls - Risk of:  Goal: Absence of physical injury  Description: Absence of physical injury  7/23/2020 1314 by Kd Kraus RN  Outcome: Ongoing

## 2020-07-23 NOTE — PROGRESS NOTES
Speech Language Pathology  Facility/Department: [address]   CLINICAL BEDSIDE SWALLOW EVALUATION    NAME: Tristen Uribe  : 1975  MRN: [id number]    ADMISSION DATE: 7/15/2020  ADMITTING DIAGNOSIS: Acute respiratory failure with hypoxia ([address] [J96.01]     Tristen Uribe has Diabetic ketoacidosis without coma associated with diabetes mellitus due to underlying condition ([address] Chronic abdominal pain; Gastroparesis; GERD (gastroesophageal reflux disease); Seizure ([address] Toe deformity; Uncontrolled type 1 diabetes mellitus with diabetic peripheral neuropathy ([address] Type 1 diabetes mellitus with background diabetic retinopathy ([address] Hypoglycemia unawareness in type 1 diabetes mellitus ([address] Type 1 diabetes mellitus with hypercholesterolemia ([address] Accelerated hypertension; Acute blood loss anemia; Acute respiratory failure ([address] Candida esophagitis ([address] Cholelithiasis; Cocaine abuse, episodic ([address] Cerebral infarction ([address] Diabetic polyneuropathy ([address] Duodenal ulcer; Heart failure, diastolic, acute ([address] Leg weakness, bilateral; Cannabis abuse; Numbness in both legs; Polysubstance abuse ([address] Pulmonary edema; Diabetic foot ulcer ([address] Heart murmur; Hyperlipidemia; Osteomyelitis of great toe of right foot ([address] Epigastric abdominal pain; Hypertensive urgency; Diabetic gastroparesis associated with type 1 diabetes mellitus ([address] Acute respiratory failure with hypoxia ([address] Hypoglycemia; and Altered mental status on their problem list.    ONSET DATE: 7/15/2020    CHART REVIEW:  7/15/2020 admitted: Mr. Leno Domínguez is a 50 y.o. male with history of uncontrolled diabetes, gastroparesis and hypertension who presents to [facility] by EMS with unresponsiveness at home. On glucose check he was found to have a glucose of 22 and to have intentionally versus unintentionally overdosed himself on insulin just prior to being found unresponsive.   He was given an amp of D50 in route head to the emergency department. Per report his girlfriend says he made some farewell type statements prior to this episode. He was not noted to have fevers chills, focal symptoms of infection, alcohol or other forms of drug abuse suspected prior to his presentation. No response to Narcan. Infectious labs other than leukocytosis were not consistent with sepsis. He was intubated in the ED and medicine was called for further management  7/15/2020 intubated  7/16/2020 Brain MRI:  Impression    Subtle bilateral frontoparietal cortical and subcortical signal abnormality    suggesting mild posterior reversal encephalopathy syndrome.  No acute infarct    or hemorrhage. 7/18/2020 SELF-extubated  7/20/2020 Neuro: The patient was found unresponsive significantly hypoglycemic. He has since been extubated, off sedation, w/ minimal neurologic progress. EEG w/ severe slowing and disorganization. MRI brain w/ cortical/subcortical FLAIR changes, which could be secondary to hypoglycemia or PRES. CSF has been reassuring against CNS infection/encephalitis. Date of Eval: 7/23/2020  Evaluating Therapist: Geronimo Esqueda    Current Diet level:  Current Diet : NPO  Current Liquid Diet : NPO      Primary Complaint  Patient Complaint: med team concern for PO safety    Pain:  Pain Level: 0    Reason for Referral  Ariana Salas was referred for a bedside swallow evaluation to assess the efficiency of his swallow function, identify signs and symptoms of aspiration and make recommendations regarding safe dietary consistencies, effective compensatory strategies, and safe eating environment. Impression  Dysphagia Diagnosis: Suspected needs further assessment;Severe oral stage dysphagia  · Tolerated limited assessment. · Patient met at bedside. Localizes to SLP greeting and maintains eye contact. Patient nonverbal and does not follow directions.     · Attempted oral care administration and PO trial administration: both declined with lips clenched and patient shaking head \"no\". Uncertain if this is 2/2 severity of oral motor impairments, severity of cognitive status, and/or behavioral in nature. · ST to continue to monitor for readiness/appropriateness to complete initiated assessment. · At this time, recommend to continue NPO with potential consideration for long-term alternate nutrition. Dysphagia Outcome Severity Scale: Level 1: Severe dysphagia- NPO. Unable to tolerate any PO safely     Treatment Plan  Requires SLP Intervention: Yes  Duration/Frequency of Treatment: ST to tx 3-5 times per week for dyspahgia durign aucte admission  D/C Recommendations: To be determined    Recommended Diet and Intervention  Diet Solids Recommendation: NPO  Liquid Consistency Recommendation: NPO  Recommended Form of Meds: Via alternative means of nutrition     Therapeutic Interventions: Patient/Family education;Oral care; Therapeutic PO trials with SLP    Treatment/Goals  Dysphagia Goals: The patient will tolerate repeat BSE when able. General  Chart Reviewed: Yes  Behavior/Cognition: Alert; Cooperative; Doesn't follow directions; Requires cueing  Communication Observation: Non-verbal  Follows Directions: None  Dentition: Adequate  Patient Positioning: Upright in bed  Baseline Vocal Quality: ([place])  Volitional Cough: ([place])  Volitional Swallow: ([place])  Consistencies Administered:  Ice Chips;Pudding - teaspoon    Vision/Hearing  Vision: (adequate for assessment needs)  Hearing: (adequate for assessment needs)    Oral Motor Deficits  Oral Motor: ([place] - patient unable to follow dx and unable to tolerate oral care for assessment)    Oral Phase Dysfunction  Oral Phase: ([place] - patient with lips clenched and shakes head \"no\" when offered PO)     Indicators of Pharyngeal Phase Dysfunction  Pharyngeal Phase: ([place] 2/2 severity of oral phase impairments)    Prognosis  Prognosis for safe diet advancement: guarded  Barriers to reach goals: cognitive deficits;severity of dysphagia;behavior  Consulted and agree with results and recommendations: Patient;RN    Education  Patient Education: Attempted on results/recs/plan  Patient Education Response: No evidence of learning;Needs reinforcement         Therapy Time  SLP Individual Minutes  Time In: 1530  Time Out: [facility]  Minutes: Jamarcus Quinn.  Yuli Crespo, [id number] [facility], [phone]  Speech-Language Pathologist  7/23/2020 4:21 PM

## 2020-07-24 LAB
ANION GAP SERPL CALCULATED.3IONS-SCNC: 12 MMOL/L (ref 3–16)
BUN BLDV-MCNC: 14 MG/DL (ref 7–20)
CALCIUM SERPL-MCNC: 7.2 MG/DL (ref 8.3–10.6)
CHLORIDE BLD-SCNC: 92 MMOL/L (ref 99–110)
CO2: 22 MMOL/L (ref 21–32)
CREAT SERPL-MCNC: <0.5 MG/DL (ref 0.9–1.3)
CSF CULTURE: NORMAL
GFR AFRICAN AMERICAN: >60
GFR NON-AFRICAN AMERICAN: >60
GLUCOSE BLD-MCNC: 213 MG/DL (ref 70–99)
GLUCOSE BLD-MCNC: 222 MG/DL (ref 70–99)
GLUCOSE BLD-MCNC: 259 MG/DL (ref 70–99)
GLUCOSE BLD-MCNC: 277 MG/DL (ref 70–99)
GLUCOSE BLD-MCNC: 280 MG/DL (ref 70–99)
GLUCOSE BLD-MCNC: 318 MG/DL (ref 70–99)
GRAM STAIN RESULT: NORMAL
HERPES SIMPLEX VIRUS BY PCR: NOT DETECTED
MAGNESIUM: 1.5 MG/DL (ref 1.8–2.4)
PERFORMED ON: ABNORMAL
PHOSPHORUS: 2.6 MG/DL (ref 2.5–4.9)
POTASSIUM REFLEX MAGNESIUM: 3.1 MMOL/L (ref 3.5–5.1)
REASON FOR REJECTION: NORMAL
REJECTED TEST: NORMAL
SODIUM BLD-SCNC: 126 MMOL/L (ref 136–145)

## 2020-07-24 PROCEDURE — 6370000000 HC RX 637 (ALT 250 FOR IP): Performed by: INTERNAL MEDICINE

## 2020-07-24 PROCEDURE — 92526 ORAL FUNCTION THERAPY: CPT

## 2020-07-24 PROCEDURE — 6370000000 HC RX 637 (ALT 250 FOR IP): Performed by: NURSE PRACTITIONER

## 2020-07-24 PROCEDURE — 83735 ASSAY OF MAGNESIUM: CPT

## 2020-07-24 PROCEDURE — 97530 THERAPEUTIC ACTIVITIES: CPT

## 2020-07-24 PROCEDURE — 97162 PT EVAL MOD COMPLEX 30 MIN: CPT

## 2020-07-24 PROCEDURE — 84100 ASSAY OF PHOSPHORUS: CPT

## 2020-07-24 PROCEDURE — 94760 N-INVAS EAR/PLS OXIMETRY 1: CPT

## 2020-07-24 PROCEDURE — 6360000002 HC RX W HCPCS: Performed by: NURSE PRACTITIONER

## 2020-07-24 PROCEDURE — 6360000002 HC RX W HCPCS: Performed by: FAMILY MEDICINE

## 2020-07-24 PROCEDURE — 6360000002 HC RX W HCPCS: Performed by: INTERNAL MEDICINE

## 2020-07-24 PROCEDURE — C9113 INJ PANTOPRAZOLE SODIUM, VIA: HCPCS | Performed by: NURSE PRACTITIONER

## 2020-07-24 PROCEDURE — 2580000003 HC RX 258: Performed by: NURSE PRACTITIONER

## 2020-07-24 PROCEDURE — 97535 SELF CARE MNGMENT TRAINING: CPT

## 2020-07-24 PROCEDURE — 2580000003 HC RX 258: Performed by: FAMILY MEDICINE

## 2020-07-24 PROCEDURE — 2580000003 HC RX 258: Performed by: INTERNAL MEDICINE

## 2020-07-24 PROCEDURE — 97129 THER IVNTJ 1ST 15 MIN: CPT

## 2020-07-24 PROCEDURE — 97166 OT EVAL MOD COMPLEX 45 MIN: CPT

## 2020-07-24 PROCEDURE — 1200000000 HC SEMI PRIVATE

## 2020-07-24 PROCEDURE — 80048 BASIC METABOLIC PNL TOTAL CA: CPT

## 2020-07-24 RX ADMIN — INSULIN LISPRO 8 UNITS: 100 INJECTION, SOLUTION INTRAVENOUS; SUBCUTANEOUS at 05:37

## 2020-07-24 RX ADMIN — LOSARTAN POTASSIUM 100 MG: 100 TABLET, FILM COATED ORAL at 09:38

## 2020-07-24 RX ADMIN — ACETAMINOPHEN 650 MG: 325 TABLET ORAL at 05:37

## 2020-07-24 RX ADMIN — AMPICILLIN SODIUM AND SULBACTAM SODIUM 1.5 G: 1; .5 INJECTION, POWDER, FOR SOLUTION INTRAMUSCULAR; INTRAVENOUS at 22:28

## 2020-07-24 RX ADMIN — POTASSIUM CHLORIDE 20 MEQ: 29.8 INJECTION, SOLUTION INTRAVENOUS at 23:53

## 2020-07-24 RX ADMIN — POTASSIUM CHLORIDE 20 MEQ: 29.8 INJECTION, SOLUTION INTRAVENOUS at 23:05

## 2020-07-24 RX ADMIN — AMPICILLIN SODIUM AND SULBACTAM SODIUM 1.5 G: 1; .5 INJECTION, POWDER, FOR SOLUTION INTRAMUSCULAR; INTRAVENOUS at 18:04

## 2020-07-24 RX ADMIN — AMPICILLIN SODIUM AND SULBACTAM SODIUM 1.5 G: 1; .5 INJECTION, POWDER, FOR SOLUTION INTRAMUSCULAR; INTRAVENOUS at 01:00

## 2020-07-24 RX ADMIN — SODIUM CHLORIDE: 4.5 INJECTION, SOLUTION INTRAVENOUS at 12:15

## 2020-07-24 RX ADMIN — Medication 10 ML: at 09:38

## 2020-07-24 RX ADMIN — MAGNESIUM SULFATE HEPTAHYDRATE 1 G: 1 INJECTION, SOLUTION INTRAVENOUS at 22:58

## 2020-07-24 RX ADMIN — PANTOPRAZOLE SODIUM 40 MG: 40 INJECTION, POWDER, FOR SOLUTION INTRAVENOUS at 09:37

## 2020-07-24 RX ADMIN — INSULIN LISPRO 6 UNITS: 100 INJECTION, SOLUTION INTRAVENOUS; SUBCUTANEOUS at 09:39

## 2020-07-24 RX ADMIN — INSULIN LISPRO 6 UNITS: 100 INJECTION, SOLUTION INTRAVENOUS; SUBCUTANEOUS at 16:44

## 2020-07-24 RX ADMIN — INSULIN LISPRO 6 UNITS: 100 INJECTION, SOLUTION INTRAVENOUS; SUBCUTANEOUS at 01:00

## 2020-07-24 RX ADMIN — AMPICILLIN SODIUM AND SULBACTAM SODIUM 1.5 G: 1; .5 INJECTION, POWDER, FOR SOLUTION INTRAMUSCULAR; INTRAVENOUS at 12:15

## 2020-07-24 RX ADMIN — ENOXAPARIN SODIUM 40 MG: 40 INJECTION SUBCUTANEOUS at 09:38

## 2020-07-24 RX ADMIN — AMPICILLIN SODIUM AND SULBACTAM SODIUM 1.5 G: 1; .5 INJECTION, POWDER, FOR SOLUTION INTRAMUSCULAR; INTRAVENOUS at 05:36

## 2020-07-24 RX ADMIN — INSULIN LISPRO 6 UNITS: 100 INJECTION, SOLUTION INTRAVENOUS; SUBCUTANEOUS at 12:15

## 2020-07-24 ASSESSMENT — PAIN SCALES - WONG BAKER

## 2020-07-24 ASSESSMENT — PAIN SCALES - GENERAL
PAINLEVEL_OUTOF10: 0
PAINLEVEL_OUTOF10: 0

## 2020-07-24 NOTE — PROGRESS NOTES
At the start of the shift the patient was in restraints, but trying to sit up and swing his legs over the bed. At this time he was redirectable and compliant. He took his morning medications and was more talkative than previous shifts. Late morning patient got himself tangled in his restraints and had had a bowel movement which he had managed to smear all over his bed. We removed his restraints to clean him up and the patient wanted to clean himself. At this time I decided that he no longer needed the restraints, although while cleaning himself up he pulled out his NG tube. The patient was alert enough that I thought he might be able to switch to a diet instead of reinserting the NG tube. Patient was scheduled to have a swallow eval.  I called speech and asked for them to see him as soon as they could. At this time the doctor came in and agreed that he was much more alert and likely did not need restraints or NG tube. MD started patient on a clear liquad diet.

## 2020-07-24 NOTE — CARE COORDINATION
At this point, patient is legally . No DPOA papers on file. Will defer to wife to decision making.   Dyan Woodward -[phone]

## 2020-07-24 NOTE — PROGRESS NOTES
Patient very impulsive towards end of shift, seems very anxious, attempting to get out of bed. Was able to say a few words throughout shift \"ok\", \"alright\" and \"yes\".

## 2020-07-24 NOTE — PROGRESS NOTES
Occupational Therapy   Occupational Therapy Initial Assessment  Date: 2020   Patient Name: Caitlin Hercules  MRN: [id number]     : 1975    Date of Service: 2020    Discharge Recommendations:  Continue to assess pending progress, Patient would benefit from continued therapy after discharge  OT Equipment Recommendations  Other: TBD         Assessment   Performance deficits / Impairments: Decreased functional mobility ; Decreased ADL status; Decreased safe awareness;Decreased cognition;Decreased endurance;Decreased balance;Decreased high-level IADLs  Assessment: Pt is a 50 y.o. male admitted [facility] Acute Metabolic Encephalopathy, Severe hypoglycemia, Insulin overdose. Pt's baseline unclear as pt very poor historian and unable to answer orientation or PLOF questions. Pt currently limited in self-care by the above deficits, today requiring min A bed mobility, CGA fxl sit><stand transfer, CGA for static standing balance, and total A toileting and LB dressing in bed. Unsafe to attempt fxl mobility d/t pt's cognition and impulsivity. Anticipate pt functioning below his baseline and demonstrates need for ongoing skilled OT services to maximize pt's safety and independence. Will cont to assess pending progress for d/c recommendation, at current status pt would be unsafe for home but may progress. Prognosis: Fair  Decision Making: Medium Complexity  History: see above  Exam: decreased ADL status, balance/fxl mobility, cognition, fxl activity tolerance  Assistance / Modification: anticipate overall max A for ADLs  OT Education: OT Role;Plan of Care;Orientation;Transfer Training  Barriers to Learning: cognition  REQUIRES OT FOLLOW UP: Yes  Activity Tolerance  Activity Tolerance: Treatment limited secondary to decreased cognition  Safety Devices  Safety Devices in place: Yes  Type of devices: Call light within reach; Bed alarm in place;Nurse notified; Patient at risk for falls; Left in bed  Restraints  Initially in place: assistance  Standing Balance: Contact guard assistance(static standing balance ~1 minute with CGA)  Functional Mobility  Functional Mobility Comments: unsafe to attempt d/t cognition and lines. ADL  Feeding: (NG tube feed; pt repeatedly attempting to pull at feeding tube)  LE Dressing: Dependent/Total(to change depends, don socks)  Toileting: Dependent/Total(pt's depends saturated with urine, total A to change depends and provide hygiene rolling in bed)  Additional Comments: Anticipate pt is max A for ADLs based on ROM, balance, cognition. Bed mobility  Rolling to Left: Contact guard assistance  Rolling to Right: Contact guard assistance  Supine to Sit: Minimal assistance  Sit to Supine: Contact guard assistance     Transfers  Sit to stand: Contact guard assistance  Stand to sit: Contact guard assistance     Cognition  Overall Cognitive Status: Exceptions  Arousal/Alertness: Delayed responses to stimuli  Following Commands: Inconsistently follows commands  Attention Span: Difficulty attending to directions; Difficulty dividing attention  Memory: Decreased long term memory;Decreased short term memory;Decreased recall of recent events;Decreased recall of precautions;Decreased recall of biographical Information  Safety Judgement: Decreased awareness of need for assistance;Decreased awareness of need for safety  Insights: Not aware of deficits  Initiation: Requires cues for all  Sequencing: Requires cues for all     LUE AROM (degrees)  LUE AROM : WFL  LUE General AROM: pt not following commands for assessment of AROM, but observed to be [facility] for purpose of ADLs  RUE AROM (degrees)  RUE AROM : WFL  RUE General AROM: pt not following commands for assessment of AROM, but observed to be [facility] for purpose of ADLs     LUE Strength  LUE Strength Comment: pt unable to follow commands for MMT  RUE Strength  RUE Strength Comment: pt unable to follow commands for MMT                   Plan   Plan  Times per week: 3-5  Times

## 2020-07-24 NOTE — PROGRESS NOTES
[facility]   Speech Therapy  Daily Dysphagia Treatment Note        Rodolfo Slade  AGE: 50 y.o.    GENDER: male  : 1975  [id number]  EPISODE DATE:  7/15/2020  Patient Active Problem List   Diagnosis    Diabetic ketoacidosis without coma associated with diabetes mellitus due to underlying condition ([facility] [address]    Chronic abdominal pain    Gastroparesis    GERD (gastroesophageal reflux disease)    Seizure ([facility])    Toe deformity    Uncontrolled type 1 diabetes mellitus with diabetic peripheral neuropathy (HCC)    Type 1 diabetes mellitus with background diabetic retinopathy ([facility])    Hypoglycemia unawareness in type 1 diabetes mellitus (HCC)    Type 1 diabetes mellitus with hypercholesterolemia (HCC)    Accelerated hypertension    Acute blood loss anemia    Acute respiratory failure ([facility])    Candida esophagitis ([facility])    Cholelithiasis    Cocaine abuse, episodic ([facility])    Cerebral infarction ([facility] [facility]    Diabetic polyneuropathy ([facility])    Duodenal ulcer    Heart failure, diastolic, acute ([facility])    Leg weakness, bilateral    Cannabis abuse    Numbness in both legs    Polysubstance abuse ([facility] [address]    Pulmonary edema    Diabetic foot ulcer ([facility])    Heart murmur    Hyperlipidemia    Osteomyelitis of great toe of right foot ([facility])    Epigastric abdominal pain    Hypertensive urgency    Diabetic gastroparesis associated with type 1 diabetes mellitus (HCC)    Acute respiratory failure with hypoxia (HCC)    Hypoglycemia    Altered mental status     Allergies   Allergen Reactions    Ketorolac Shortness Of Breath and Itching    Morphine Shortness Of Breath, Hives and Itching     Tolerates hydromorphone    Morphine And Related Hives and Shortness Of Breath    Tramadol Shortness Of Breath     resp failure   resp failure     Lisinopril Swelling     When he takes lisinopril he gets large lips which go away when he doesn't take lisinopril    Haloperidol Lactate Itching    Toradol [Ketorolac Tromethamine]      resp failure    Vicodin [Hydrocodone-Acetaminophen]      Treatment Diagnosis: Dysphagia       Chart review:     Subjective:     Current diet  NPO. Pt pulled NG tube this a.m. Comments regarding tolerating Current Diet:     Nsg reports pt takes pills with thin by straw this date with no cough/choke. Objective:     Pain   Patient Currently in Pain: Denies  During PO trials pt answers yes/no to confirm pain, and to confirm pain with vianney cracker in throat. Cognitive/Behavior   Behavior/Cognition: Alert, Cooperative, Doesn't follow directions, Requires cueing    Presentations   Consistencies Administered: vianney cracker x 1 self feeding; attempts with total feeding, assist feed and self feed with puree and thin by cup and straw with pt refusing all. Positioning   Upright in bed    PO Trials:  · Thin Liquids: not observed by SLP  Nsg reports pt took meds with thin with no difficulty. Cup with straw left with pt and PCA/sitter to monitor if pt would spontaneously take a sip. After  45 min delay, PCA reports pt did take 2-3 boluses with no cough; nsg reports this PCA is very reliable. · Nectar thick liquids: pt refuses and clamps mouth shut, shakes head \"no\"  · Honey Thick liquids: N/A due to previous refusals and SLp focus on trials with most likely diet. · Puree :   Pt refuses. With attempts at self feeding, pt states the container \"it's wet\" and gives back to SLP with no trials. No puree observed. · Soft food  Pt self feeds one bite of vianney cracker with no oral residue noted when pt yawns. · Regular food NT due to pt refusal and c/o sticking with vianney cracker. Dysphagia Tx:   PO trials:   As above. Pt generally refuses but appears to take more with self feeding. No noted aspiration/penetration with only 1 PO trials actually observed by SLP. Cognition:  Pt follows no commands and has limited verbalization.   Pt generally clamps mouth shut when PO offered. Suspect this is a cognitive issue. Attempts to set concrete goals (I.e \"take one sip and I will leave. \" ) are unsuccessful. Attempts to impact pt behavior with modifying presentation of trials is generally unsuccessful. Attempts with RN giving instruction to determine if the RN being a male has any impact, with no noted improvement. Goals:   Dysphagia Goals:   1)  The patient will tolerate repeat BSE when able. Partially met. Pt continues to refuse in general.  2)  New goal:  Pt will tolerate PO diet with safe and adequate nutrition and hydration. Assessment:   Impressions:   Dysphagia Diagnosis: Suspected needs further assessment    Pt presents with possible mild-moderate pharyngeal dysphagia and mild- moderate oral dysphagia. This is based on limited trials due to pt refuses all but 1 trial of vianney cracker, which he only accepts when he self feeds. Pt does report pain in his throat with the vianney cracker via Yes/no questioning. NO liquids are directly observed by the SLP due to pt refusal with multiple trials and means of presentation attempted. Nsg and PCA report pt takes thin liquids with no coughing per their observation. Pt seems to accept PO better with self feeding, but adequate intake may be an issue as pt self feeds rarely. Pt refusal appears to be related to cognitive impairment. Based on above information, which is limited to only 1 direct observation by SLP, it appears pt would be safe for Dysphagia II diet (minced and moist) and thin liquids. If any issues are observed, pt should be downgraded. Speech will continue to follow for dysphagia and cognition. Diet Recommendations:  Dysphagia II diet (minced and moist)  Thin liquids  Recommended Form of Meds: give with water  Strategies:  Upright for all PO; pt to self feed when able, monitor for impulsivity when pt does self feed.        Education:  Consulted and agree with results and recommendations: Patient, RN  Patient Education: Attempted on results/recs/plan  Patient Education Response: No evidence of learning, Needs reinforcement    Prognosis:   guarded    Plan:     Continue Dysphagia Therapy:   Interventions: Therapeutic Interventions: Patient/Family education, Oral care, Therapeutic PO trials with SLP  Duration/Frequency of therapy while on unit: Duration/Frequency of Treatment  Duration/Frequency of Treatment: ST to tx 3-5 times per week for dyspahgia durign aucte admission  Discharge Instructions:   Anticipate YES for further skilled Speech Therapy for Dysphagia AND COGNITION at discharge    This note serves as a D/C Summary in the event that this patient is discharged prior to the next therapy session.     Coded treatment time: 15  Total treatment time: 30    Electronically signed by   Cory Perry MS CCC/SLP [id number]  Speech Language Pathologist   on 7/24/2020 at 1:35 PM

## 2020-07-24 NOTE — CARE COORDINATION
NICOW notes that patient's wife (Sonya Cazares [phone]) it patient's decision maker. LSW spoke with Nani over the phone to discuss discharge planning. Beata Hannah states that patient and herself have been  for 3 months. Beata Hannah states that during that time patient was living with his mother and/or another female. LSW discussed potential need for SNF vs LTC. Beata Hannah states that she lives in a 7 bedroom house with her 7 children (ages:27,25,22,21,15,9, and 3). Beata Hannah states that with the exception of the 32and 22year old that patient is the father of the children. Beata Hannah states that she plans to have a family meeting with her older children this evening to discuss the possibility of patient returning home. Beata Hannah is aware that patient with likely require 24 hrs assistance. Beata Hannah states that she will be in contact with social work tomorrow (7/25/20) to discuss further discharge plans.  LSW following   Electronically signed by Johnathon Lui on 7/24/2020 at 3:33 PM

## 2020-07-24 NOTE — PROGRESS NOTES
Physical Therapy    Facility/Department: [address] MED SURG  Initial Assessment  (co-eval)    NAME: Alysha Mayorga  : 1975  MRN: [id number]    Date of Service: 2020    Discharge Recommendations:  Continue to assess pending progress   PT Equipment Recommendations  Other: will monitor    Assessment   Assessment: Per H&P on 7- of Robel Parrish MD: The pt is a 51 yo male who was found unresponsive at home with a blood glucose of 22. The pt was intubated until he self-extubated on 2020. The pt has been in restraints and impulsive since. The pt was awake but unable to give any PLOF info and call placed to pt's wife went unanswered. PMHx:DM, gastroparesis, HTN, R hallux amp, L hip sx    Today, the pt demonstrated that he is unable to follow most directions but was able to complete bed mobility with CGA/min A. He was able to sit on the EOB with CGA and spontaneously stood and was able to stand with CGA x 1 minute. Attempts at ambulation were unsafe to attempt today due to pt's impulsiveness and multiple lines. Discharge disposition unclear at this time due to the pt's impulsiveness and inability to follow directions but anticipate that if his mentation clears and he is able to participate that he will be able to progress with con't skilled PT. Will con't to follow and make recommendations as able. Specific instructions for Next Treatment: cotx  Prognosis: Guarded; Fair  Decision Making: Medium Complexity  History: see above  Exam: see above  Clinical Presentation: evolving  PT Education: PT Role;Orientation;Goals; General Safety  Barriers to Learning: cog  REQUIRES PT FOLLOW UP: Yes  Activity Tolerance  Activity Tolerance: Patient limited by cognitive status  Activity Tolerance: limited by his impulsiveness and multiple lines       Patient Diagnosis(es): The primary encounter diagnosis was Hypoglycemia.  A diagnosis of Altered mental status, unspecified altered mental status type was also pertinent to this visit. has a past medical history of Diabetes mellitus ([facility] [address] Gastroparesis, and Hypertension. has a past surgical history that includes hip surgery (Left, 2006); Bony pelvis surgery; Upper gastrointestinal endoscopy (N/A, 12/2/2019); and Foot Amputation (Right, 5/13/2020). Restrictions  Restrictions/Precautions  Restrictions/Precautions: Fall Risk  Position Activity Restriction  Other position/activity restrictions: tele-sitter, B wrist and posey vest restraints, TF, IV  Vision/Hearing  Vision: Within Functional Limits  Hearing: Within functional limits     Subjective  General  Chart Reviewed: Yes  Additional Pertinent Hx: Per H&P on 7- of Judah Arriola MD: The pt is a 49 yo male who was found unresponsive at home with a blood glucose of 22. The pt was intubated until he self-extubated on 7-. The pt has been in restraints and impulsive since. PMHx:DM, gastroparesis, HTN, R hallux amp, L hip sx  Response To Previous Treatment: Not applicable  Family / Caregiver Present: No  Referring Practitioner: Nicolas Combs MD  Referral Date : 07/23/20  Diagnosis: acute metabolic encephalopathy due to insulin overdose, severe hypoglycemia  Follows Commands: Impaired  Subjective  Subjective: the pt found to be awake, unable to follow directions and with garbled speech; unable to answer orientation questions or give any PLOF info          Orientation  Orientation  Overall Orientation Status: Impaired  Orientation Level: Unable to assess(responds to his 1st name)  Social/Functional History  Social/Functional History  Additional Comments: Pt confused unable to answer any PLOF questions. Attempted to call wife Sonny Alejandre, but no answer. Cognition   Cognition  Overall Cognitive Status: Exceptions  Arousal/Alertness: Delayed responses to stimuli  Following Commands: Inconsistently follows commands  Attention Span: Difficulty attending to directions; Difficulty dividing attention  Memory: Decreased long term memory;Decreased short term memory;Decreased recall of recent events;Decreased recall of precautions;Decreased recall of biographical Information  Safety Judgement: Decreased awareness of need for assistance;Decreased awareness of need for safety  Insights: Not aware of deficits  Initiation: Requires cues for all  Sequencing: Requires cues for all    Objective          PROM RLE (degrees)  RLE PROM: WFL  AROM RLE (degrees)  RLE AROM: WFL  PROM LLE (degrees)  LLE PROM: WFL  AROM LLE (degrees)  LLE AROM : WFL  Strength RLE  Comment: unable to test formally due to the pt not following directions but appears functionally at least fair  Strength LLE  Comment: unable to test formally due to the pt not following directions but appears functionally at least fair        Bed mobility  Rolling to Left: Contact guard assistance  Rolling to Right: Contact guard assistance  Supine to Sit: Minimal assistance  Sit to Supine: Contact guard assistance  Transfers  Sit to Stand: Contact guard assistance  Stand to sit: Contact guard assistance  Ambulation  Ambulation?: No(too unsafe and impulsive to attempt walking today)     Balance  Sitting - Static: Good;-  Standing - Static: Good;-  Comments: the pt able to sit on the EOB with CGA and then spontaneously stood and was able to stand at the side of the bed 1 minute with CGA        Plan   Plan  Times per week: 3-5x/week  Specific instructions for Next Treatment: cotx  Current Treatment Recommendations: Functional Mobility Training  Safety Devices  Type of devices: Bed alarm in place, Call light within reach, Telesitter in use, Patient at risk for falls, Left in bed, Nurse notified(SKYLAR Coffman notified)  Restraints  Initially in place: Yes  Restraints: B wrist restraints and posey vest restraints were secured at end of session      AM-PAC Score  AM-PAC Inpatient Mobility Raw Score : 15 (07/24/20 0946)  AM-PAC Inpatient T-Scale Score : 39.45 (07/24/20 0946)  Mobility Inpatient CMS 0-100% Score: 57.7 (07/24/20 0946)  Mobility Inpatient CMS G-Code Modifier : CK (07/24/20 0946)          Goals  Short term goals  Time Frame for Short term goals: upon d/c  Short term goal 1: Bed mobility with SBA. Short term goal 2: Trasfers sit <> stand with SBA. Short term goal 3: Ambulate without device 50 feet with CGA.   Patient Goals   Patient goals : the pt unable to give a personal therapy goal due to cog       Therapy Time   Individual Concurrent Group Co-treatment   Time In 0915         Time Out 0955         Minutes 40         Timed Code Treatment Minutes: 25 Minutes       Electronically signed by Jeri Dudley, PT [id number] on 7/24/2020 at 9:59 AM

## 2020-07-24 NOTE — PROGRESS NOTES
Hospitalist Progress Note      PCP: Shweta Persaud MD    Date of Admission: 7/15/2020    Hospital Course: 47-year-old M Hx poorly controlled DM who was admitted unresponsive at home with glucose of 22. He was reported to have accidental versus intentional overdose of insulin. He was intubated. He Self extubated to 2 L nasal cannula by coughing. Subjective: Patient seems much alert today, patient does not have any complaints, he is able to wake up and talk however sometimes his conversation was not making sense. Patient also when asked to raise arms or legs, he voluntarily starts doing abnormal movements and seemed confused. That made me think psychiatric consult will be appropriate for evaluation for possible acute psychosis.   Patient pulled out his NG tube last night    Medications:  Reviewed    Infusion Medications    sodium chloride 75 mL/hr at 07/24/20 1215    dextrose       Scheduled Medications    insulin glargine  10 Units Subcutaneous Nightly    losartan  100 mg Oral Daily    ampicillin-sulbactam  1.5 g Intravenous Q6H    insulin lispro  0-12 Units Subcutaneous Q4H    sodium chloride flush  10 mL Intravenous 2 times per day    enoxaparin  40 mg Subcutaneous Daily    pantoprazole  40 mg Intravenous Daily    And    sodium chloride (PF)  10 mL Intravenous Daily     PRN Meds: OLANZapine, OLANZapine, sodium chloride flush, acetaminophen **OR** acetaminophen, polyethylene glycol, ondansetron, glucose, dextrose, glucagon (rDNA), dextrose, magnesium sulfate, potassium chloride, sodium phosphate IVPB **OR** sodium phosphate IVPB      Intake/Output Summary (Last 24 hours) at 7/24/2020 1421  Last data filed at 7/23/2020 1829  Gross per 24 hour   Intake 375 ml   Output 375 ml   Net 0 ml       Exam:    BP (!) 170/98   Pulse 76   Temp 98.5 °F (36.9 °C) (Oral)   Resp 18   Ht 6' 2\" (1.88 m)   Wt 147 lb 11.3 oz (67 kg)   SpO2 98%   BMI 18.96 kg/m²     General appearance:  No acute distress  HEENT Normal cephalic, atraumatic without obvious deformity. NG tube in place  Neck: Supple, No jugular venous distention/bruits. Trachea midline without thyromegaly or adenopathy with full range of motion. Lungs: intubated clear to auscultation, bilaterally without Rales/Wheezes/Rhonchi  Heart: Regular rate and rhythm with Normal S1/S2 without murmurs, rubs or gallops, point of maximum impulse non-displaced  Abdomen: Soft, non-tender or non-distended without rigidity or guarding and positive bowel sounds all four quadrants. Extremities: No clubbing, cyanosis, or edema bilaterally. Skin: Skin color, texture, turgor normal.  No rashes or lesions. Neurologic:  Awake and verbal but not oriented, no focal neurologic deficits noticed    Labs:   Recent Labs     07/22/20  0545 07/23/20  0500   WBC 14.8* 11.6*   HGB 11.7* 11.5*   HCT 36.4* 35.5*    274     Recent Labs     07/22/20  0545 07/23/20  0500 07/24/20  0635   * 146* 126*   K 3.9 4.4 3.1*    106 92*   CO2 28 29 22   BUN 29* 22* 14   CREATININE 0.9 0.7* <0.5*   CALCIUM 10.0 9.6 7.2*   PHOS 3.3 4.4 2.6     No results for input(s): AST, ALT, BILIDIR, BILITOT, ALKPHOS in the last 72 hours. No results for input(s): INR in the last 72 hours. No results for input(s): Laverda Dancer in the last 72 hours. Urinalysis:      Lab Results   Component Value Date    NITRU Negative 07/15/2020    WBCUA 10 05/28/2020    BACTERIA 1+ 12/15/2010    RBCUA 1 05/28/2020    BLOODU Negative 07/15/2020    SPECGRAV 1.025 07/15/2020    GLUCOSEU 500 07/15/2020    GLUCOSEU >=1000 12/15/2010       Radiology:  XR CHEST PORTABLE   Final Result   No acute findings. NG tube with tip in the stomach. XR CHEST 1 VW   Final Result   Nasogastric tube projects in normal position. No acute cardiopulmonary disease.          MRI BRAIN WO CONTRAST   Final Result   Subtle bilateral frontoparietal cortical and subcortical signal abnormality   suggesting mild posterior reversal encephalopathy syndrome. No acute infarct   or hemorrhage. XR CHEST PORTABLE   Final Result   No acute cardiac or pulmonary disease. ET tube 7 cm above the jose carlos. NG side-port at the GE junction. XR CHEST PORTABLE   Final Result   The tip of the endotracheal tube is slightly high in position 9 cm above the   jose carlos. The OG/NG tube should be advanced 10 cm. The lungs are clear. CT Head WO Contrast   Final Result   No acute intracranial abnormality. Paranasal sinusitis. XR CHEST PORTABLE   Final Result   No acute cardiopulmonary disease. IR LUMBAR PUNCTURE FOR DIAGNOSIS    (Results Pending)           Assessment/Plan:    Active Hospital Problems    Diagnosis Date Noted    Acute respiratory failure with hypoxia ([facility] [address] [J96.01] 07/16/2020    Hypoglycemia [E16.2]     Altered mental status [R41.82]        Patient is a 68-year-old male with poorly controlled diabetes who presented to hospital in unresponsive state, reportedly patient had blood glucose 22 at home on EMS arrival.  Patient was intubated, he self extubated while in the hospital.    Assessment  Acute metabolic encephalopathy-multifactorial, severe hypoglycemia, press syndrome-improved  Acute respiratory failure-improving  Suspected PRES-improved  Abnormal behavior-tangential thoughts, starts voluntarily dancing movements during physical examination  Severe hypoglycemia on admission-resolved  Insulin overdose-unsure if accidental or intentional  Diabetes mellitus type 2  Hypernatremia  Dysphagia on tube feeds, speech therapy continues to follow    Plan  Patient is is much alert, conversational, neurology has evaluated patient, thus far work-up including MRI, MRI showed mild posterior reversible encephalopathy syndrome ( PRES), continue to closely manage BP less than 787 systolic  LP negative for growth.   EEG did not reveal any seizure activity  Continue oxygen support as needed  Continue to monitor CBC, BMP, insulin sliding scale  Hypoglycemia precautions  Hypernatremia has been improving, continue to monitor BMP, leukocytosis has been improving, monitor CBC  DVT Prophylaxis:  lovenox  Diet: Clear liquid diet  Code Status: Full Code    PT/OT Eval Status: Pending clinical improvement    Dispo -patient seems much alert awake and conversational  .  Pending psych eval.    Pineda Luo MD

## 2020-07-25 LAB
ANION GAP SERPL CALCULATED.3IONS-SCNC: 17 MMOL/L (ref 3–16)
BASOPHILS ABSOLUTE: 0.1 K/UL (ref 0–0.2)
BASOPHILS RELATIVE PERCENT: 0.9 %
BUN BLDV-MCNC: 15 MG/DL (ref 7–20)
CALCIUM SERPL-MCNC: 8.8 MG/DL (ref 8.3–10.6)
CHLORIDE BLD-SCNC: 96 MMOL/L (ref 99–110)
CO2: 22 MMOL/L (ref 21–32)
CREAT SERPL-MCNC: 0.7 MG/DL (ref 0.9–1.3)
EOSINOPHILS ABSOLUTE: 0.3 K/UL (ref 0–0.6)
EOSINOPHILS RELATIVE PERCENT: 3.2 %
GFR AFRICAN AMERICAN: >60
GFR NON-AFRICAN AMERICAN: >60
GLUCOSE BLD-MCNC: 187 MG/DL (ref 70–99)
GLUCOSE BLD-MCNC: 234 MG/DL (ref 70–99)
GLUCOSE BLD-MCNC: 289 MG/DL (ref 70–99)
GLUCOSE BLD-MCNC: 324 MG/DL (ref 70–99)
GLUCOSE BLD-MCNC: 329 MG/DL (ref 70–99)
HCT VFR BLD CALC: 34.6 % (ref 40.5–52.5)
HEMOGLOBIN: 11.4 G/DL (ref 13.5–17.5)
LYMPHOCYTES ABSOLUTE: 2.3 K/UL (ref 1–5.1)
LYMPHOCYTES RELATIVE PERCENT: 26.4 %
MAGNESIUM: 2.2 MG/DL (ref 1.8–2.4)
MCH RBC QN AUTO: 27.5 PG (ref 26–34)
MCHC RBC AUTO-ENTMCNC: 33 G/DL (ref 31–36)
MCV RBC AUTO: 83.6 FL (ref 80–100)
MONOCYTES ABSOLUTE: 0.5 K/UL (ref 0–1.3)
MONOCYTES RELATIVE PERCENT: 5.2 %
NEUTROPHILS ABSOLUTE: 5.7 K/UL (ref 1.7–7.7)
NEUTROPHILS RELATIVE PERCENT: 64.3 %
PDW BLD-RTO: 16.1 % (ref 12.4–15.4)
PERFORMED ON: ABNORMAL
PHOSPHORUS: 2.6 MG/DL (ref 2.5–4.9)
PLATELET # BLD: 240 K/UL (ref 135–450)
PMV BLD AUTO: 8.2 FL (ref 5–10.5)
POTASSIUM REFLEX MAGNESIUM: 4.7 MMOL/L (ref 3.5–5.1)
RBC # BLD: 4.14 M/UL (ref 4.2–5.9)
SODIUM BLD-SCNC: 135 MMOL/L (ref 136–145)
WBC # BLD: 8.9 K/UL (ref 4–11)

## 2020-07-25 PROCEDURE — 2500000003 HC RX 250 WO HCPCS: Performed by: INTERNAL MEDICINE

## 2020-07-25 PROCEDURE — 6370000000 HC RX 637 (ALT 250 FOR IP): Performed by: NURSE PRACTITIONER

## 2020-07-25 PROCEDURE — 94760 N-INVAS EAR/PLS OXIMETRY 1: CPT

## 2020-07-25 PROCEDURE — 6360000002 HC RX W HCPCS: Performed by: NURSE PRACTITIONER

## 2020-07-25 PROCEDURE — 80048 BASIC METABOLIC PNL TOTAL CA: CPT

## 2020-07-25 PROCEDURE — 84100 ASSAY OF PHOSPHORUS: CPT

## 2020-07-25 PROCEDURE — 1200000000 HC SEMI PRIVATE

## 2020-07-25 PROCEDURE — 6360000002 HC RX W HCPCS: Performed by: FAMILY MEDICINE

## 2020-07-25 PROCEDURE — 6370000000 HC RX 637 (ALT 250 FOR IP): Performed by: INTERNAL MEDICINE

## 2020-07-25 PROCEDURE — 83735 ASSAY OF MAGNESIUM: CPT

## 2020-07-25 PROCEDURE — 2580000003 HC RX 258: Performed by: NURSE PRACTITIONER

## 2020-07-25 PROCEDURE — C9113 INJ PANTOPRAZOLE SODIUM, VIA: HCPCS | Performed by: NURSE PRACTITIONER

## 2020-07-25 PROCEDURE — 2580000003 HC RX 258: Performed by: FAMILY MEDICINE

## 2020-07-25 PROCEDURE — 85025 COMPLETE CBC W/AUTO DIFF WBC: CPT

## 2020-07-25 RX ADMIN — PANTOPRAZOLE SODIUM 40 MG: 40 INJECTION, POWDER, FOR SOLUTION INTRAVENOUS at 09:35

## 2020-07-25 RX ADMIN — INSULIN LISPRO 8 UNITS: 100 INJECTION, SOLUTION INTRAVENOUS; SUBCUTANEOUS at 09:39

## 2020-07-25 RX ADMIN — AMPICILLIN SODIUM AND SULBACTAM SODIUM 1.5 G: 1; .5 INJECTION, POWDER, FOR SOLUTION INTRAMUSCULAR; INTRAVENOUS at 23:56

## 2020-07-25 RX ADMIN — MAGNESIUM SULFATE HEPTAHYDRATE 1 G: 1 INJECTION, SOLUTION INTRAVENOUS at 00:12

## 2020-07-25 RX ADMIN — AMPICILLIN SODIUM AND SULBACTAM SODIUM 1.5 G: 1; .5 INJECTION, POWDER, FOR SOLUTION INTRAMUSCULAR; INTRAVENOUS at 17:27

## 2020-07-25 RX ADMIN — INSULIN LISPRO 6 UNITS: 100 INJECTION, SOLUTION INTRAVENOUS; SUBCUTANEOUS at 13:05

## 2020-07-25 RX ADMIN — INSULIN LISPRO 6 UNITS: 100 INJECTION, SOLUTION INTRAVENOUS; SUBCUTANEOUS at 21:34

## 2020-07-25 RX ADMIN — Medication 10 ML: at 09:37

## 2020-07-25 RX ADMIN — AMPICILLIN SODIUM AND SULBACTAM SODIUM 1.5 G: 1; .5 INJECTION, POWDER, FOR SOLUTION INTRAMUSCULAR; INTRAVENOUS at 13:05

## 2020-07-25 RX ADMIN — OLANZAPINE 5 MG: 10 INJECTION, POWDER, FOR SOLUTION INTRAMUSCULAR at 20:03

## 2020-07-25 RX ADMIN — AMPICILLIN SODIUM AND SULBACTAM SODIUM 1.5 G: 1; .5 INJECTION, POWDER, FOR SOLUTION INTRAMUSCULAR; INTRAVENOUS at 06:03

## 2020-07-25 RX ADMIN — INSULIN LISPRO 2 UNITS: 100 INJECTION, SOLUTION INTRAVENOUS; SUBCUTANEOUS at 17:27

## 2020-07-25 RX ADMIN — INSULIN GLARGINE 10 UNITS: 100 INJECTION, SOLUTION SUBCUTANEOUS at 21:34

## 2020-07-25 ASSESSMENT — PAIN SCALES - WONG BAKER
WONGBAKER_NUMERICALRESPONSE: 0

## 2020-07-25 ASSESSMENT — PAIN SCALES - GENERAL
PAINLEVEL_OUTOF10: 0

## 2020-07-25 NOTE — CARE COORDINATION
LSW met with patient's wife Manjit Bob) at bedside. LSW notes that patient is now out of restraints and is moving around room independently however patient remains confused. Kristyn Jordan states that family is going to continue to wait and watch for more progress prior to making a decision about discharge. LSW did give [employer] of home care, private duty, and SNF. LSW will continue to follow and assist with discharge planning.    Electronically signed by Daly Hernandez on 7/25/2020 at 4:52 PM

## 2020-07-25 NOTE — PLAN OF CARE
Problem: Restraint Use - Nonviolent/Non-Self-Destructive Behavior:  Goal: Absence of restraint indications  Description: Absence of restraint indications  Outcome: Met This Shift     Problem: Nutrition  Goal: Optimal nutrition therapy  Outcome: Ongoing     Problem: Falls - Risk of:  Goal: Will remain free from falls  Description: Will remain free from falls  Outcome: Ongoing

## 2020-07-25 NOTE — PROGRESS NOTES
Pt is restless at times, then walking quickly back and forth in room, not attempting to walk out, with  following him around with the IV pole. Pt avoiding eye contact, not verbalizing except occasional \"okay\" or \"alright\". Pt opened a soda can at his bedside table and drank from it without difficulty. Pt is accepting staff present. He will lie back down and cover up to apparently sleep for a few minutes, then jumps back up pacing in room.

## 2020-07-25 NOTE — PROGRESS NOTES
Rales/Wheezes/Rhonchi  Heart: Regular rate and rhythm with Normal S1/S2 without murmurs, rubs or gallops, point of maximum impulse non-displaced  Abdomen: Soft, non-tender or non-distended without rigidity or guarding and positive bowel sounds all four quadrants. Extremities: No clubbing, cyanosis, or edema bilaterally. Skin: Skin color, texture, turgor normal.  No rashes or lesions. Neurologic:  Awake and verbal but not oriented, no focal neurologic deficits noticed    Labs:   Recent Labs     07/23/20  0500 07/25/20  0620   WBC 11.6* 8.9   HGB 11.5* 11.4*   HCT 35.5* 34.6*    240     Recent Labs     07/23/20  0500 07/24/20  0635 07/25/20  0620   * 126* 135*   K 4.4 3.1* 4.7    92* 96*   CO2 29 22 22   BUN 22* 14 15   CREATININE 0.7* <0.5* 0.7*   CALCIUM 9.6 7.2* 8.8   PHOS 4.4 2.6 2.6     No results for input(s): AST, ALT, BILIDIR, BILITOT, ALKPHOS in the last 72 hours. No results for input(s): INR in the last 72 hours. No results for input(s): Florentin Shown in the last 72 hours. Urinalysis:      Lab Results   Component Value Date    NITRU Negative 07/15/2020    WBCUA 10 05/28/2020    BACTERIA 1+ 12/15/2010    RBCUA 1 05/28/2020    BLOODU Negative 07/15/2020    SPECGRAV 1.025 07/15/2020    GLUCOSEU 500 07/15/2020    GLUCOSEU >=1000 12/15/2010       Radiology:  XR CHEST PORTABLE   Final Result   No acute findings. NG tube with tip in the stomach. XR CHEST 1 VW   Final Result   Nasogastric tube projects in normal position. No acute cardiopulmonary disease. MRI BRAIN WO CONTRAST   Final Result   Subtle bilateral frontoparietal cortical and subcortical signal abnormality   suggesting mild posterior reversal encephalopathy syndrome. No acute infarct   or hemorrhage. XR CHEST PORTABLE   Final Result   No acute cardiac or pulmonary disease. ET tube 7 cm above the jose carlos. NG side-port at the GE junction.          XR CHEST PORTABLE   Final Result The tip of the endotracheal tube is slightly high in position 9 cm above the   jose carlos. The OG/NG tube should be advanced 10 cm. The lungs are clear. CT Head WO Contrast   Final Result   No acute intracranial abnormality. Paranasal sinusitis. XR CHEST PORTABLE   Final Result   No acute cardiopulmonary disease. IR LUMBAR PUNCTURE FOR DIAGNOSIS    (Results Pending)           Assessment/Plan:    Active Hospital Problems    Diagnosis Date Noted    Acute respiratory failure with hypoxia ([facility] [address] [J96.01] 07/16/2020    Hypoglycemia [E16.2]     Altered mental status [R41.82]        Patient is a 64-year-old male with poorly controlled diabetes who presented to hospital in unresponsive state, reportedly patient had blood glucose 22 at home on EMS arrival.  Patient was intubated, he self extubated while in the hospital.    Assessment  Acute metabolic encephalopathy-multifactorial, severe hypoglycemia, press syndrome-improved  Acute respiratory failure-improving  Suspected PRES-improved  Agitation, wandering around in the room, patient does not seem to understand conversation  Severe hypoglycemia on admission-resolved  Insulin overdose-unsure if accidental or intentional  Diabetes mellitus type 2  Hypernatremia  Dysphagia on tube feeds, speech therapy continues to follow    Plan  Patient is is much alert, conversational, neurology has evaluated patient, thus far work-up including MRI, MRI showed mild posterior reversible encephalopathy syndrome ( PRES), continue to closely manage BP less than 120 systolic  LP negative for growth.   EEG did not reveal any seizure activity  Continue oxygen support as needed  Continue to monitor CBC, BMP, insulin sliding scale  Hypoglycemia precautions  Hypernatremia has been improving, continue to monitor BMP, leukocytosis has been improving, monitor CBC  DVT Prophylaxis:  lovenox  Diet: Clear liquid diet  Code Status: Full Code    PT/OT Eval Status: Pending clinical improvement    Dispo -patient seems much alert awake and conversationa.   Pending psych eval. patient likely to be discharged home after seen by loli Bashir MD

## 2020-07-26 LAB
ANION GAP SERPL CALCULATED.3IONS-SCNC: 14 MMOL/L (ref 3–16)
BASOPHILS ABSOLUTE: 0.1 K/UL (ref 0–0.2)
BASOPHILS RELATIVE PERCENT: 0.7 %
BUN BLDV-MCNC: 14 MG/DL (ref 7–20)
CALCIUM SERPL-MCNC: 9 MG/DL (ref 8.3–10.6)
CHLORIDE BLD-SCNC: 102 MMOL/L (ref 99–110)
CO2: 25 MMOL/L (ref 21–32)
CREAT SERPL-MCNC: 0.6 MG/DL (ref 0.9–1.3)
EOSINOPHILS ABSOLUTE: 0.2 K/UL (ref 0–0.6)
EOSINOPHILS RELATIVE PERCENT: 2.5 %
GFR AFRICAN AMERICAN: >60
GFR NON-AFRICAN AMERICAN: >60
GLUCOSE BLD-MCNC: 237 MG/DL (ref 70–99)
GLUCOSE BLD-MCNC: 73 MG/DL (ref 70–99)
GLUCOSE BLD-MCNC: 90 MG/DL (ref 70–99)
HCT VFR BLD CALC: 32.9 % (ref 40.5–52.5)
HEMOGLOBIN: 11 G/DL (ref 13.5–17.5)
LYMPHOCYTES ABSOLUTE: 2.1 K/UL (ref 1–5.1)
LYMPHOCYTES RELATIVE PERCENT: 25.2 %
MAGNESIUM: 2 MG/DL (ref 1.8–2.4)
MCH RBC QN AUTO: 27.5 PG (ref 26–34)
MCHC RBC AUTO-ENTMCNC: 33.4 G/DL (ref 31–36)
MCV RBC AUTO: 82.6 FL (ref 80–100)
MONOCYTES ABSOLUTE: 0.4 K/UL (ref 0–1.3)
MONOCYTES RELATIVE PERCENT: 5.2 %
NEUTROPHILS ABSOLUTE: 5.6 K/UL (ref 1.7–7.7)
NEUTROPHILS RELATIVE PERCENT: 66.4 %
PDW BLD-RTO: 15.8 % (ref 12.4–15.4)
PERFORMED ON: ABNORMAL
PERFORMED ON: NORMAL
PHOSPHORUS: 3.1 MG/DL (ref 2.5–4.9)
PLATELET # BLD: 299 K/UL (ref 135–450)
PMV BLD AUTO: 7.9 FL (ref 5–10.5)
POTASSIUM REFLEX MAGNESIUM: 3.8 MMOL/L (ref 3.5–5.1)
RBC # BLD: 3.99 M/UL (ref 4.2–5.9)
SODIUM BLD-SCNC: 141 MMOL/L (ref 136–145)
WBC # BLD: 8.4 K/UL (ref 4–11)

## 2020-07-26 PROCEDURE — 6360000002 HC RX W HCPCS: Performed by: NURSE PRACTITIONER

## 2020-07-26 PROCEDURE — 84100 ASSAY OF PHOSPHORUS: CPT

## 2020-07-26 PROCEDURE — 85025 COMPLETE CBC W/AUTO DIFF WBC: CPT

## 2020-07-26 PROCEDURE — 6360000002 HC RX W HCPCS: Performed by: FAMILY MEDICINE

## 2020-07-26 PROCEDURE — 1200000000 HC SEMI PRIVATE

## 2020-07-26 PROCEDURE — 83735 ASSAY OF MAGNESIUM: CPT

## 2020-07-26 PROCEDURE — 2580000003 HC RX 258: Performed by: FAMILY MEDICINE

## 2020-07-26 PROCEDURE — 80048 BASIC METABOLIC PNL TOTAL CA: CPT

## 2020-07-26 PROCEDURE — 2500000003 HC RX 250 WO HCPCS: Performed by: INTERNAL MEDICINE

## 2020-07-26 PROCEDURE — 94760 N-INVAS EAR/PLS OXIMETRY 1: CPT

## 2020-07-26 PROCEDURE — C9113 INJ PANTOPRAZOLE SODIUM, VIA: HCPCS | Performed by: NURSE PRACTITIONER

## 2020-07-26 PROCEDURE — 6370000000 HC RX 637 (ALT 250 FOR IP): Performed by: NURSE PRACTITIONER

## 2020-07-26 PROCEDURE — 2580000003 HC RX 258: Performed by: NURSE PRACTITIONER

## 2020-07-26 RX ADMIN — AMPICILLIN SODIUM AND SULBACTAM SODIUM 1.5 G: 1; .5 INJECTION, POWDER, FOR SOLUTION INTRAMUSCULAR; INTRAVENOUS at 14:02

## 2020-07-26 RX ADMIN — Medication 10 ML: at 07:56

## 2020-07-26 RX ADMIN — AMPICILLIN SODIUM AND SULBACTAM SODIUM 1.5 G: 1; .5 INJECTION, POWDER, FOR SOLUTION INTRAMUSCULAR; INTRAVENOUS at 06:15

## 2020-07-26 RX ADMIN — AMPICILLIN SODIUM AND SULBACTAM SODIUM 1.5 G: 1; .5 INJECTION, POWDER, FOR SOLUTION INTRAMUSCULAR; INTRAVENOUS at 18:42

## 2020-07-26 RX ADMIN — INSULIN LISPRO 6 UNITS: 100 INJECTION, SOLUTION INTRAVENOUS; SUBCUTANEOUS at 00:57

## 2020-07-26 RX ADMIN — AMPICILLIN SODIUM AND SULBACTAM SODIUM 1.5 G: 1; .5 INJECTION, POWDER, FOR SOLUTION INTRAMUSCULAR; INTRAVENOUS at 23:38

## 2020-07-26 RX ADMIN — OLANZAPINE 5 MG: 10 INJECTION, POWDER, FOR SOLUTION INTRAMUSCULAR at 21:47

## 2020-07-26 RX ADMIN — PANTOPRAZOLE SODIUM 40 MG: 40 INJECTION, POWDER, FOR SOLUTION INTRAVENOUS at 07:55

## 2020-07-26 ASSESSMENT — PAIN SCALES - WONG BAKER

## 2020-07-26 ASSESSMENT — PAIN SCALES - GENERAL
PAINLEVEL_OUTOF10: 0

## 2020-07-26 NOTE — PLAN OF CARE
Patient has not progressed from the previous day. He continues to eat and drink very little. Patient has periods where he paces around the room and then lays down to sleep a couple of hours and then repeats the cycle. Patient has not let us check his blood sugar, but with him eating very little food we have held his insulin sliding scale.         Problem: Restraint Use - Nonviolent/Non-Self-Destructive Behavior:  Goal: Absence of restraint indications  Description: Absence of restraint indications  Outcome: Ongoing  Goal: Absence of restraint-related injury  Description: Absence of restraint-related injury  Outcome: Ongoing     Problem: Pain:  Goal: Pain level will decrease  Description: Pain level will decrease  Outcome: Ongoing  Goal: Control of acute pain  Description: Control of acute pain  Outcome: Ongoing  Goal: Control of chronic pain  Description: Control of chronic pain  Outcome: Ongoing     Problem: Nutrition  Goal: Optimal nutrition therapy  Outcome: Ongoing     Problem: Skin Integrity:  Goal: Will show no infection signs and symptoms  Description: Will show no infection signs and symptoms  Outcome: Ongoing  Goal: Absence of new skin breakdown  Description: Absence of new skin breakdown  Outcome: Ongoing     Problem: Falls - Risk of:  Goal: Will remain free from falls  Description: Will remain free from falls  Outcome: Ongoing  Goal: Absence of physical injury  Description: Absence of physical injury  Outcome: Ongoing

## 2020-07-26 NOTE — PROGRESS NOTES
Around 9 [facility], RN notified by tele sitter that patient was seen on telecam moving all over the room. RN went to room and sitter at bedside said that pt was becoming aggressive with him by shoving the IV pole into him. Assisted sitter with trying to get patient to sit back in bed, and pt became increasingly agitated and aggressive with trying to hit staff. Staff assist called to get help with getting patient back in bed safely. Pt then placed in 4 pt restraints with the assistance of security. MD aware and Orders for restraints placed. Charge nurse at bedside. Will also notify family and continue to monitor.      Noelle Dakin, RN 7/25/2020 8:17 PM

## 2020-07-26 NOTE — PROGRESS NOTES
Hospitalist Progress Note      PCP: Sumaya Raman MD    Date of Admission: 7/15/2020    Hospital Course: 47-year-old M Hx poorly controlled DM who was admitted unresponsive at home with glucose of 22. He was reported to have accidental versus intentional overdose of insulin. He was intubated. He Self extubated to 2 L nasal cannula by coughing. Subjective: Per the night nurse patient became very aggressive and agitated, patient was restrained in four-point restriction, patient did not hurt himself or any staff. Patient this morning is not communicating however has been wandering around in the room. He is not able to sit in bed and likes being on toes.  Patient does not seem to understand conversation, sitter at bedside per sitter patient has been agitated at times    Medications:  Reviewed    Infusion Medications    sodium chloride 75 mL/hr at 07/24/20 1215    dextrose       Scheduled Medications    insulin glargine  10 Units Subcutaneous Nightly    losartan  100 mg Oral Daily    ampicillin-sulbactam  1.5 g Intravenous Q6H    insulin lispro  0-12 Units Subcutaneous Q4H    sodium chloride flush  10 mL Intravenous 2 times per day    enoxaparin  40 mg Subcutaneous Daily    pantoprazole  40 mg Intravenous Daily    And    sodium chloride (PF)  10 mL Intravenous Daily     PRN Meds: OLANZapine, OLANZapine, sodium chloride flush, acetaminophen **OR** acetaminophen, polyethylene glycol, ondansetron, glucose, dextrose, glucagon (rDNA), dextrose, magnesium sulfate, potassium chloride, sodium phosphate IVPB **OR** sodium phosphate IVPB      Intake/Output Summary (Last 24 hours) at 7/26/2020 1529  Last data filed at 7/26/2020 1300  Gross per 24 hour   Intake 3146.42 ml   Output 4 ml   Net 3142.42 ml       Exam:    /83   Pulse 79   Temp 98 °F (36.7 °C) (Oral)   Resp 18   Ht 6' 2\" (1.88 m)   Wt 145 lb 11.6 oz (66.1 kg)   SpO2 99%   BMI 18.71 kg/m²     General appearance:  No acute distress, currently on room air  HEENT Normal cephalic, atraumatic without obvious deformity. Lungs: Clear to auscultation bilaterally, no wheezing  Heart: Regular rate and rhythm with Normal S1/S2 without murmurs, rubs or gallops, point of maximum impulse non-displaced  Abdomen: Soft, non-tender or non-distended. Extremities: No clubbing, cyanosis, or edema bilaterally. Skin: Skin color, texture, turgor normal.  No rashes or lesions. Neurologic:  Awake and verbal but not oriented, no focal neurologic deficits noticed    Labs:   Recent Labs     07/25/20  0620 07/26/20  0634   WBC 8.9 8.4   HGB 11.4* 11.0*   HCT 34.6* 32.9*    299     Recent Labs     07/24/20  0635 07/25/20  0620 07/26/20  0634   * 135* 141   K 3.1* 4.7 3.8   CL 92* 96* 102   CO2 22 22 25   BUN 14 15 14   CREATININE <0.5* 0.7* 0.6*   CALCIUM 7.2* 8.8 9.0   PHOS 2.6 2.6 3.1     No results for input(s): AST, ALT, BILIDIR, BILITOT, ALKPHOS in the last 72 hours. No results for input(s): INR in the last 72 hours. No results for input(s): Vallarie Brunette in the last 72 hours. Urinalysis:      Lab Results   Component Value Date    NITRU Negative 07/15/2020    WBCUA 10 05/28/2020    BACTERIA 1+ 12/15/2010    RBCUA 1 05/28/2020    BLOODU Negative 07/15/2020    SPECGRAV 1.025 07/15/2020    GLUCOSEU 500 07/15/2020    GLUCOSEU >=1000 12/15/2010       Radiology:  XR CHEST PORTABLE   Final Result   No acute findings. NG tube with tip in the stomach. XR CHEST 1 VW   Final Result   Nasogastric tube projects in normal position. No acute cardiopulmonary disease. MRI BRAIN WO CONTRAST   Final Result   Subtle bilateral frontoparietal cortical and subcortical signal abnormality   suggesting mild posterior reversal encephalopathy syndrome. No acute infarct   or hemorrhage. XR CHEST PORTABLE   Final Result   No acute cardiac or pulmonary disease. ET tube 7 cm above the jose carlos. NG side-port at the GE junction. XR CHEST PORTABLE   Final Result   The tip of the endotracheal tube is slightly high in position 9 cm above the   jose carlos. The OG/NG tube should be advanced 10 cm. The lungs are clear. CT Head WO Contrast   Final Result   No acute intracranial abnormality. Paranasal sinusitis. XR CHEST PORTABLE   Final Result   No acute cardiopulmonary disease. IR LUMBAR PUNCTURE FOR DIAGNOSIS    (Results Pending)           Assessment/Plan:    Active Hospital Problems    Diagnosis Date Noted    Acute respiratory failure with hypoxia ([facility] [address] [J96.01] 07/16/2020    Hypoglycemia [E16.2]     Altered mental status [R41.82]        Patient is a 24-year-old male with poorly controlled diabetes who presented to hospital in unresponsive state, reportedly patient had blood glucose 22 at home on EMS arrival.  Patient was intubated, he self extubated while in the hospital.  Patient has been very confused however alert. Patient does not seem to be understanding conversation. Psych has been consulted    Assessment  Acute metabolic encephalopathy-multifactorial, severe hypoglycemia, press syndrome-improved  Acute respiratory failure-improving  Suspected PRES-improved  Agitation, wandering around in the room, patient does not seem to understand conversation  Severe hypoglycemia on admission-resolved  Insulin overdose-unsure if accidental or intentional  Diabetes mellitus type 2  Hypernatremia  Dysphagia on tube feeds, speech therapy continues to follow    Plan  MRI showed mild posterior reversible encephalopathy syndrome ( PRES), continue to closely manage BP less than 545 systolic  Psychiatry has been consulted, patient may benefit from inpatient psych placement  LP negative for growth.   EEG did not reveal any seizure activity, patient was evaluated by neurology during hospital stay  Continue oxygen support as needed  Continue to monitor CBC, BMP, insulin sliding scale  Hypoglycemia precautions  Hypernatremia has been improving, continue to monitor BMP, leukocytosis has been improving, monitor CBC  DVT Prophylaxis:  lovenox  Diet: Clear liquid diet  Code Status: Full Code    PT/OT Eval Status: Pending clinical improvement    Dispo -pending psychiatry evaluation, patient may benefit from inpatient psych placement. Patient has been wandering around in the room and unable to sit in bed. Patient does not seem to follow conversations however he is alert.   Reportedly patient has been aggressive and agitated with staff    Terry Bueno MD

## 2020-07-26 NOTE — PLAN OF CARE
Problem: Restraint Use - Nonviolent/Non-Self-Destructive Behavior:  Goal: Absence of restraint indications  Description: Absence of restraint indications  7/26/2020 0129 by Huang Strickland RN  Outcome: Ongoing  7/25/2020 1843 by Brina Bucio RN  Outcome: Ongoing     Problem: Restraint Use - Nonviolent/Non-Self-Destructive Behavior:  Goal: Absence of restraint-related injury  Description: Absence of restraint-related injury  7/26/2020 0129 by Huang Strickland RN  Outcome: Ongoing  7/25/2020 1843 by Brina Bucio RN  Outcome: Ongoing     Problem: Pain:  Goal: Pain level will decrease  Description: Pain level will decrease  7/26/2020 0129 by Huang Strickland RN  Outcome: Ongoing  7/25/2020 1843 by Brina Bucio RN  Outcome: Ongoing     Problem: Pain:  Goal: Control of acute pain  Description: Control of acute pain  7/26/2020 0129 by Huang Strickland RN  Outcome: Ongoing  7/25/2020 1843 by Brina Bucio RN  Outcome: Ongoing     Problem: Pain:  Goal: Control of chronic pain  Description: Control of chronic pain  7/26/2020 0129 by Huang Strickland RN  Outcome: Ongoing  7/25/2020 1843 by Brina Bucio RN  Outcome: Ongoing     Problem: Nutrition  Goal: Optimal nutrition therapy  7/26/2020 0129 by Huang Strickland RN  Outcome: Ongoing  7/25/2020 1843 by Brina Bucio RN  Outcome: Ongoing     Problem: Skin Integrity:  Goal: Will show no infection signs and symptoms  Description: Will show no infection signs and symptoms  7/26/2020 0129 by Huang Strickland RN  Outcome: Ongoing  7/25/2020 1843 by Brina Bucio RN  Outcome: Ongoing     Problem: Skin Integrity:  Goal: Absence of new skin breakdown  Description: Absence of new skin breakdown  7/26/2020 0129 by Huang Strickland RN  Outcome: Ongoing  7/25/2020 1843 by Brina Bucio RN  Outcome: Ongoing     Problem: Falls - Risk of:  Goal: Will remain free from falls  Description: Will remain free from falls  7/26/2020 0129 by Stevenson Ott Arabella Bunn RN  Outcome: Ongoing  7/25/2020 1843 by Wade Zaidi RN  Outcome: Ongoing     Problem: Falls - Risk of:  Goal: Absence of physical injury  Description: Absence of physical injury  7/26/2020 0129 by Manjinder Garcia RN  Outcome: Ongoing  7/25/2020 1843 by Wade Zaidi RN  Outcome: Ongoing

## 2020-07-27 LAB
ANION GAP SERPL CALCULATED.3IONS-SCNC: 33 MMOL/L (ref 3–16)
BASOPHILS ABSOLUTE: 0.1 K/UL (ref 0–0.2)
BASOPHILS RELATIVE PERCENT: 0.6 %
BUN BLDV-MCNC: 15 MG/DL (ref 7–20)
CALCIUM SERPL-MCNC: 9.6 MG/DL (ref 8.3–10.6)
CHLORIDE BLD-SCNC: 97 MMOL/L (ref 99–110)
CO2: 9 MMOL/L (ref 21–32)
CREAT SERPL-MCNC: 1 MG/DL (ref 0.9–1.3)
EOSINOPHILS ABSOLUTE: 0.2 K/UL (ref 0–0.6)
EOSINOPHILS RELATIVE PERCENT: 1.8 %
GFR AFRICAN AMERICAN: >60
GFR NON-AFRICAN AMERICAN: >60
GLUCOSE BLD-MCNC: 218 MG/DL (ref 70–99)
GLUCOSE BLD-MCNC: 226 MG/DL (ref 70–99)
GLUCOSE BLD-MCNC: 344 MG/DL (ref 70–99)
GLUCOSE BLD-MCNC: 362 MG/DL (ref 70–99)
GLUCOSE BLD-MCNC: 414 MG/DL (ref 70–99)
HCT VFR BLD CALC: 33.6 % (ref 40.5–52.5)
HEMOGLOBIN: 10.8 G/DL (ref 13.5–17.5)
LYMPHOCYTES ABSOLUTE: 2.8 K/UL (ref 1–5.1)
LYMPHOCYTES RELATIVE PERCENT: 26.1 %
MAGNESIUM: 2 MG/DL (ref 1.8–2.4)
MCH RBC QN AUTO: 27.5 PG (ref 26–34)
MCHC RBC AUTO-ENTMCNC: 32.3 G/DL (ref 31–36)
MCV RBC AUTO: 85 FL (ref 80–100)
MONOCYTES ABSOLUTE: 0.5 K/UL (ref 0–1.3)
MONOCYTES RELATIVE PERCENT: 4.2 %
NEUTROPHILS ABSOLUTE: 7.2 K/UL (ref 1.7–7.7)
NEUTROPHILS RELATIVE PERCENT: 67.3 %
PDW BLD-RTO: 15.5 % (ref 12.4–15.4)
PERFORMED ON: ABNORMAL
PHOSPHORUS: 2.8 MG/DL (ref 2.5–4.9)
PLATELET # BLD: 326 K/UL (ref 135–450)
PMV BLD AUTO: 8.6 FL (ref 5–10.5)
POTASSIUM REFLEX MAGNESIUM: 5 MMOL/L (ref 3.5–5.1)
RBC # BLD: 3.95 M/UL (ref 4.2–5.9)
SODIUM BLD-SCNC: 139 MMOL/L (ref 136–145)
WBC # BLD: 10.7 K/UL (ref 4–11)

## 2020-07-27 PROCEDURE — 6360000002 HC RX W HCPCS: Performed by: NURSE PRACTITIONER

## 2020-07-27 PROCEDURE — 6360000002 HC RX W HCPCS: Performed by: FAMILY MEDICINE

## 2020-07-27 PROCEDURE — 85025 COMPLETE CBC W/AUTO DIFF WBC: CPT

## 2020-07-27 PROCEDURE — 97530 THERAPEUTIC ACTIVITIES: CPT

## 2020-07-27 PROCEDURE — 6370000000 HC RX 637 (ALT 250 FOR IP): Performed by: INTERNAL MEDICINE

## 2020-07-27 PROCEDURE — 2580000003 HC RX 258: Performed by: FAMILY MEDICINE

## 2020-07-27 PROCEDURE — C9113 INJ PANTOPRAZOLE SODIUM, VIA: HCPCS | Performed by: NURSE PRACTITIONER

## 2020-07-27 PROCEDURE — 92526 ORAL FUNCTION THERAPY: CPT

## 2020-07-27 PROCEDURE — 1200000000 HC SEMI PRIVATE

## 2020-07-27 PROCEDURE — 6360000002 HC RX W HCPCS: Performed by: INTERNAL MEDICINE

## 2020-07-27 PROCEDURE — 6370000000 HC RX 637 (ALT 250 FOR IP): Performed by: NURSE PRACTITIONER

## 2020-07-27 PROCEDURE — 2580000003 HC RX 258: Performed by: NURSE PRACTITIONER

## 2020-07-27 PROCEDURE — 80048 BASIC METABOLIC PNL TOTAL CA: CPT

## 2020-07-27 PROCEDURE — 83735 ASSAY OF MAGNESIUM: CPT

## 2020-07-27 PROCEDURE — 84100 ASSAY OF PHOSPHORUS: CPT

## 2020-07-27 RX ADMIN — INSULIN LISPRO 4 UNITS: 100 INJECTION, SOLUTION INTRAVENOUS; SUBCUTANEOUS at 11:00

## 2020-07-27 RX ADMIN — ENOXAPARIN SODIUM 40 MG: 40 INJECTION SUBCUTANEOUS at 10:20

## 2020-07-27 RX ADMIN — INSULIN LISPRO 8 UNITS: 100 INJECTION, SOLUTION INTRAVENOUS; SUBCUTANEOUS at 23:00

## 2020-07-27 RX ADMIN — SODIUM CHLORIDE, PRESERVATIVE FREE 10 ML: 5 INJECTION INTRAVENOUS at 22:59

## 2020-07-27 RX ADMIN — AMPICILLIN SODIUM AND SULBACTAM SODIUM 1.5 G: 1; .5 INJECTION, POWDER, FOR SOLUTION INTRAMUSCULAR; INTRAVENOUS at 19:08

## 2020-07-27 RX ADMIN — AMPICILLIN SODIUM AND SULBACTAM SODIUM 1.5 G: 1; .5 INJECTION, POWDER, FOR SOLUTION INTRAMUSCULAR; INTRAVENOUS at 05:01

## 2020-07-27 RX ADMIN — AMPICILLIN SODIUM AND SULBACTAM SODIUM 1.5 G: 1; .5 INJECTION, POWDER, FOR SOLUTION INTRAMUSCULAR; INTRAVENOUS at 22:59

## 2020-07-27 RX ADMIN — INSULIN GLARGINE 10 UNITS: 100 INJECTION, SOLUTION SUBCUTANEOUS at 23:00

## 2020-07-27 RX ADMIN — Medication 10 ML: at 10:21

## 2020-07-27 RX ADMIN — SODIUM CHLORIDE, PRESERVATIVE FREE 10 ML: 5 INJECTION INTRAVENOUS at 10:21

## 2020-07-27 RX ADMIN — AMPICILLIN SODIUM AND SULBACTAM SODIUM 1.5 G: 1; .5 INJECTION, POWDER, FOR SOLUTION INTRAMUSCULAR; INTRAVENOUS at 11:54

## 2020-07-27 RX ADMIN — INSULIN LISPRO 12 UNITS: 100 INJECTION, SOLUTION INTRAVENOUS; SUBCUTANEOUS at 05:01

## 2020-07-27 RX ADMIN — PANTOPRAZOLE SODIUM 40 MG: 40 INJECTION, POWDER, FOR SOLUTION INTRAVENOUS at 10:20

## 2020-07-27 ASSESSMENT — PAIN SCALES - GENERAL
PAINLEVEL_OUTOF10: 0
PAINLEVEL_OUTOF10: 0

## 2020-07-27 ASSESSMENT — PAIN SCALES - WONG BAKER
WONGBAKER_NUMERICALRESPONSE: 0
WONGBAKER_NUMERICALRESPONSE: 0

## 2020-07-27 NOTE — PLAN OF CARE
Problem: Nutrition  Goal: Optimal nutrition therapy  Outcome: Ongoing   Nutrition Problem #1: Inadequate oral intake  Intervention: Food and/or Nutrient Delivery: Continue Current Diet, Continue Oral Nutrition Supplement  Nutritional Goals:  Tolerate most appropriate nutrition therapy

## 2020-07-27 NOTE — PROGRESS NOTES
Occupational Therapy  Facility/Department: [address] MED SURG  Daily Treatment Note  NAME: Mercedes Gonzalez  : 1975  MRN: [id number]    Date of Service: 2020    Discharge Recommendations:  Continue to assess pending progress, Patient would benefit from continued therapy after discharge  OT Equipment Recommendations  Other: TBD    Assessment   Performance deficits / Impairments: Decreased functional mobility ; Decreased ADL status; Decreased safe awareness;Decreased cognition;Decreased endurance;Decreased balance;Decreased high-level IADLs  Assessment: Pt tolerated treatment poor. Pt confused, non-verbal, and unable to follow commands to purposefully participate. This date, pt completed fxl mobility short distances in room with no AD and CGA, but unable to follow commands to participate in ADLs. Cont per OT POC. Will cont to assess pending progress for d/c recommendations. Prognosis: Fair  OT Education: OT Role;Plan of Care;Orientation;Transfer Training  Barriers to Learning: cognition  REQUIRES OT FOLLOW UP: Yes  Activity Tolerance  Activity Tolerance: Treatment limited secondary to decreased cognition;Patient limited by fatigue  Safety Devices  Safety Devices in place: Yes  Type of devices: Call light within reach; Bed alarm in place; Left in bed(sitter)         Patient Diagnosis(es): The primary encounter diagnosis was Hypoglycemia. A diagnosis of Altered mental status, unspecified altered mental status type was also pertinent to this visit. has a past medical history of Diabetes mellitus ([address] Gastroparesis, and Hypertension. has a past surgical history that includes hip surgery (Left, ); Bony pelvis surgery; Upper gastrointestinal endoscopy (N/A, 2019); and Foot Amputation (Right, 2020).     Restrictions  Restrictions/Precautions  Restrictions/Precautions: Fall Risk  Position Activity Restriction  Other position/activity restrictions: one on one sitter, IV  Subjective   General  Chart precautions;Decreased recall of biographical Information  Safety Judgement: Decreased awareness of need for assistance;Decreased awareness of need for safety  Problem Solving: Decreased awareness of errors;Assistance required to identify errors made;Assistance required to generate solutions  Insights: Not aware of deficits  Initiation: Requires cues for all  Sequencing: Requires cues for all  Cognition Comment: the pt does not follow directions and impulsively moves only when he wants to      Plan   Plan  Times per week: 3-5  Times per day: Daily  Current Treatment Recommendations: Balance Training, Functional Mobility Training, Endurance Training, Safety Education & Training, Self-Care / ADL, Strengthening, Cognitive Reorientation, Cognitive/Perceptual Training, Equipment Evaluation, Education, & procurement    AM-PAC Score        AM-PAC Inpatient Daily Activity Raw Score: 8 (07/27/20 1433)  AM-PAC Inpatient ADL T-Scale Score : 22.86 (07/27/20 1433)  ADL Inpatient CMS 0-100% Score: 85.69 (07/27/20 1433)  ADL Inpatient CMS G-Code Modifier : CM (07/27/20 1433)    Goals  Short term goals  Time Frame for Short term goals: Prior to d/c: STATUS GOALS 7/27: ALL GOALS ONGOING  Short term goal 1: Pt will bathe with mod A. Short term goal 2: Pt will dress with mod A. Short term goal 3: Pt will toilet with mod A. Short term goal 4: Pt will complete fxl mobility and fxl transfers to/from ADL surfaces with SBA using AD prn. Short term goal 5: Further assess pt's cognitive status with efforts to improve pt's orientation, safety awareness, and problem solving. Long term goals  Time Frame for Long term goals : TBD by d/c site. Patient Goals   Patient goals : pt unable to verbalize personal therapy goal d/t decreased cognition.        Therapy Time   Individual Concurrent Group Co-treatment   Time In 1409         Time Out 1425         Minutes 16               This note to serve as OT d/c summary if pt is d/c-ed prior to next therapy session.      Darryl Mcmillan, OTR/L [id number]

## 2020-07-27 NOTE — PROGRESS NOTES
Pt agitated at and refusing vital signs and accu checks. To prevent the pt's agitation from escalating, RN  Letting pt to rest at this time and will re attempt vital signs and accu check on next round.        Cindy Mcintyre RN 7/26/2020 9:22 PM

## 2020-07-27 NOTE — PROGRESS NOTES
Physical Therapy    Facility/Department: [facility] 4 MED SURG  Daily Note  (cotx)  If pt. is D/C'd prior to next visit please refer back to last daily progress note for D/C status. NAME: Vinod Tidwell  : 1975  MRN: [id number]    Date of Service: 2020    Discharge Recommendations:  Continue to assess pending progress   PT Equipment Recommendations  Other: will monitor    Assessment   Assessment: Today, the pt presented as impulsive and unable to follow directions. He becomes impulsive when he does decide to move and needs assist only for maintaining his IV lines. He is a high fall risk due to his impulsiveness but was able to stand and take a couple of steps in the room with just CGA. The pt is unable to follow directions well enough to participate fully in PT session but is able to walk with some light assist and no device. Recommendations for d/c are difficult as the pt does not follow directions and will need constant supervision for safety. Will con't to attempt to engage the pt in PT and see if he improves with following directions and increase safety with longer distances ambulation. Specific instructions for Next Treatment: cotx for safety due to impulsiveness  Prognosis: Guarded  PT Education: PT Role;Orientation;General Safety  Barriers to Learning: cog  REQUIRES PT FOLLOW UP: Yes  Activity Tolerance  Activity Tolerance: Treatment limited secondary to agitation;Patient limited by cognitive status       Patient Diagnosis(es): The primary encounter diagnosis was Hypoglycemia. A diagnosis of Altered mental status, unspecified altered mental status type was also pertinent to this visit. has a past medical history of Diabetes mellitus ([facility] [address] Gastroparesis, and Hypertension. has a past surgical history that includes hip surgery (Left, ); Bony pelvis surgery;  Upper gastrointestinal endoscopy (N/A, 2019); and Foot Amputation (Right, 5/13/2020). Restrictions  Restrictions/Precautions  Restrictions/Precautions: Fall Risk  Position Activity Restriction  Other position/activity restrictions: one on one sitter, IV     Subjective  General  Chart Reviewed: Yes  Additional Pertinent Hx: Per H&P on 7- of Gaston Storm MD: The pt is a 49 yo male who was found unresponsive at home with a blood glucose of 22. The pt was intubated until he self-extubated on 7-. The pt has been in restraints and impulsive since. PMHx:DM, gastroparesis, HTN, R hallux amp, L hip sx  Response To Previous Treatment: Patient unable to report, no changes reported from family or staff  Family / Caregiver Present: (sitter in the room)  Referring Practitioner: Vasquez Lee MD  Subjective  Subjective: the pt found to be laying prone in the bed; he did not respond to his name or to touch initially; the pt non-verbal thru-out session and appeared to keep eyes closed thru-out  Pain Screening  Patient Currently in Pain: Denies          Social/Functional History  Social/Functional History  Additional Comments: Pt confused unable to answer any PLOF questions. Attempted to call wife Sean Gonzales, but no answer.   Cognition   Cognition  Arousal/Alertness: Inconsistent responses to stimuli  Following Commands: Inconsistently follows commands  Attention Span: Unable to maintain attention  Safety Judgement: Decreased awareness of need for assistance;Decreased awareness of need for safety  Insights: Not aware of deficits  Initiation: Requires cues for all  Sequencing: Requires cues for all  Cognition Comment: the pt does not follow directions and impulsively moves only when he wants to    Objective  Bed mobility  Rolling to Left: Stand by assistance  Rolling to Right: Stand by assistance  Supine to Sit: Stand by assistance  Sit to Supine: Stand by assistance  Comment: the pt impulsively gets up w/o warning  Transfers  Sit to Stand: Stand by assistance  Stand to sit: Contact guard assistance(assist needed only for safety when going to sit)  Ambulation  Ambulation?: Yes  Ambulation 1  Surface: level tile  Device: No Device  Assistance: Contact guard assistance  Quality of Gait: impulsive but no LOB, has no regard for IV lines when moving about and turning to sit; did not wait for gown to be fastened or for slipper socks to be placed on his feet  Distance: 5 feet x 2 (bed <> couch)  Comments: unsafe to walk this pt further due to his inability to follow directions and decreased safety  Stairs/Curb  Stairs?: No     Balance  Sitting - Static: Good  Standing - Static: Good;-  Comments: the pt had no LOB when seated EOB or while on the couch; stood briefly without LOB        Plan   Plan  Times per week: 3-5x/week  Specific instructions for Next Treatment: cotx for safety due to impulsiveness  Current Treatment Recommendations: Functional Mobility Training  Safety Devices  Type of devices: Bed alarm in place, Call light within reach, Telesitter in use, Patient at risk for falls, Left in bed, Nurse notified(SKYLAR Coffman notified)  Restraints  Initially in place: Yes  Restraints: B wrist restraints and posey vest restraints were secured at end of session      AM-PAC Score  AM-PAC Inpatient Mobility Raw Score : 16 (07/27/20 1424)  AM-PAC Inpatient T-Scale Score : 40.78 (07/27/20 1424)  Mobility Inpatient CMS 0-100% Score: 54.16 (07/27/20 1424)  Mobility Inpatient CMS G-Code Modifier : CK (07/27/20 1424)          Goals  Short term goals  Time Frame for Short term goals: upon d/c  Short term goal 1: Bed mobility with SBA. (met)  Short term goal 2: Trasfers sit <> stand with SBA.  (ongoing)  Short term goal 3: Ambulate without device 50 feet with CGA. (ongoing)  Patient Goals   Patient goals : the pt unable to give a personal therapy goal due to cog       Therapy Time   Individual Concurrent Group Co-treatment   Time In 1409         Time Out 1425         Minutes 16                 Electronically signed by Thompson Shaffer, PT  [id number] on 7/27/2020 at 2:42 PM

## 2020-07-27 NOTE — PROGRESS NOTES
PCA and I attempted to get pt BS and vitals. Pt laying on couch on belly with arms underneath him. Pt was asked several times to get vitals pt yelled \"NO, GO! \"  Pt refusing vitals and BS check.

## 2020-07-27 NOTE — PROGRESS NOTES
41-year-old M Hx poorly controlled DM who was admitted unresponsive at home with glucose of 22. He was reported to have accidental versus intentional overdose of insulin. He was intubated. He Self extubated to 2 L nasal cannula by coughing. Patient was evaluated by neurology and no obvious etiology was found  EEG is not consistent with seizure disorder. No significant findings on CSF. Patient's respiratory status improved. Patient is considered to be possibly PRES. nurses report patient's behavior as aggressive and agitated at times.   Psychiatric services consulted                                                         ----------------------------------------------------------      SUBJECTIVE COMPLAINTS-encephalopathy    Diet: DIET CLEAR LIQUID;  Dietary Nutrition Supplements: Clear Liquid Oral Supplement      OBJECTIVE:   Patient Active Problem List   Diagnosis    Diabetic ketoacidosis without coma associated with diabetes mellitus due to underlying condition ([facility] [address]    Chronic abdominal pain    Gastroparesis    GERD (gastroesophageal reflux disease)    Seizure (HCC)    Toe deformity    Uncontrolled type 1 diabetes mellitus with diabetic peripheral neuropathy (HCC)    Type 1 diabetes mellitus with background diabetic retinopathy (HCC)    Hypoglycemia unawareness in type 1 diabetes mellitus (HCC)    Type 1 diabetes mellitus with hypercholesterolemia (HCC)    Accelerated hypertension    Acute blood loss anemia    Acute respiratory failure (HCC)    Candida esophagitis (HCC)    Cholelithiasis    Cocaine abuse, episodic (HCC)    Cerebral infarction ([address]    Diabetic polyneuropathy (HCC)    Duodenal ulcer    Heart failure, diastolic, acute (HCC)    Leg weakness, bilateral    Cannabis abuse    Numbness in both legs    Polysubstance abuse ([address]    Pulmonary edema    Diabetic foot ulcer (HCC)    Heart murmur    Hyperlipidemia    Osteomyelitis of great toe of right foot ([facility]    Epigastric abdominal pain    Hypertensive urgency    Diabetic gastroparesis associated with type 1 diabetes mellitus ([facility]    Acute respiratory failure with hypoxia (HCC)    Hypoglycemia    Altered mental status       Allergies  Ketorolac; Morphine; Morphine and related; Tramadol; Lisinopril; Haloperidol lactate; Toradol [ketorolac tromethamine]; and Vicodin [hydrocodone-acetaminophen]    Medications    Scheduled Meds:   insulin glargine  10 Units Subcutaneous Nightly    losartan  100 mg Oral Daily    ampicillin-sulbactam  1.5 g Intravenous Q6H    insulin lispro  0-12 Units Subcutaneous Q4H    sodium chloride flush  10 mL Intravenous 2 times per day    enoxaparin  40 mg Subcutaneous Daily    pantoprazole  40 mg Intravenous Daily    And    sodium chloride (PF)  10 mL Intravenous Daily     Continuous Infusions:   sodium chloride Stopped (07/26/20 2018)    dextrose       PRN Meds:  OLANZapine, OLANZapine, sodium chloride flush, acetaminophen **OR** acetaminophen, polyethylene glycol, ondansetron, glucose, dextrose, glucagon (rDNA), dextrose, magnesium sulfate, potassium chloride, sodium phosphate IVPB **OR** sodium phosphate IVPB    Vitals   Vitals /wt   Patient Vitals for the past 8 hrs:   BP Temp Temp src Pulse Resp SpO2 Height Weight   07/27/20 1020 111/71 98.1 °F (36.7 °C) Axillary 83 18 99 % -- --   07/27/20 0937 -- -- -- -- -- -- 6' 2\" (1.88 m) --   07/27/20 0441 131/69 97.4 °F (36.3 °C) Oral 97 16 100 % -- 147 lb 4.3 oz (66.8 kg)        72HR INTAKE/OUTPUT:      Intake/Output Summary (Last 24 hours) at 7/27/2020 1036  Last data filed at 7/27/2020 0700  Gross per 24 hour   Intake 1226 ml   Output 8 ml   Net 1218 ml       Exam:    Gen:   Patient sound asleep  Did not wake him up due to risk of agitation and aggressive behavior  Eyes: PERRL. No sclera icterus. No conjunctival injection. ENT: No discharge. Pharynx clear. External appearance of ears and nose normal.  Neck: Trachea midline. No obvious mass. Resp: No accessory muscle use. No crackles. No wheezes. No rhonchi. CV: Regular rate. Regular rhythm. No murmur or rub. No edema. GI: Non-tender. Non-distended. No hernia. Skin: Warm, dry, normal texture and turgor. Lymph: No cervical LAD. No supraclavicular LAD. M/S: / Ext. No cyanosis. No clubbing. No joint deformity. Neuro: CN 2-12 are intact,  no neurologic deficits noted. PT/INR: No results for input(s): PROTIME, INR in the last 72 hours. APTT: No results for input(s): APTT in the last 72 hours. CBC:   Recent Labs     07/25/20 0620 07/26/20  0634 07/27/20  0500   WBC 8.9 8.4 10.7   HGB 11.4* 11.0* 10.8*   HCT 34.6* 32.9* 33.6*   MCV 83.6 82.6 85.0    299 326       BMP:   Recent Labs     07/25/20 0620 07/26/20  0634 07/27/20  0500   * 141 139   K 4.7 3.8 5.0   CL 96* 102 97*   CO2 22 25 9*   PHOS 2.6 3.1 2.8   BUN 15 14 15   CREATININE 0.7* 0.6* 1.0       LIVER PROFILE: No results for input(s): ALKPHOS, AST, ALT, ALB, BILIDIR, BILITOT, ALKPHOS in the last 72 hours. No results for input(s): AMYLASE in the last 72 hours. No results for input(s): LIPASE in the last 72 hours. UA:No results for input(s): NITRITE, LABCAST, WBCUA, RBCUA, MUCUS in the last 72 hours. TROPONIN: No results for input(s): Florentin Shown in the last 72 hours. Lab Results   Component Value Date/Time    URRFLXCULT Not Indicated 07/15/2020 10:26 PM       No results for input(s): TSHREFLEX in the last 72 hours. No components found for: [id number]  POC GLUCOSE:    Recent Labs     07/25/20 2029 07/26/20  0029 07/26/20  0508 07/27/20  0443 07/27/20  1023   POCGLU 289* 237* 90 414* 226*     No results for input(s): LABA1C in the last 72 hours.    Lab Results   Component Value Date    LABA1C 10.3 05/29/2020         ASSESSMENT AND PLAN    Acute metabolic encephalopathy-multifactorial, severe hypoglycemia, PRES syndrome  Patient was evaluated by neurology    Agitation and aggressive behavior  Psychiatric consult  Possible candidate for inpatient psych    Acute respiratory failure-improving      Diabetes mellitus  Severe hypoglycemia on admission-resolved  Insulin overdose-unsure if accidental or intentional    Dysphagia on tube feeds,   speech therapy continues to follow     DVT Prophylaxis:  lovenox  Diet: Clear liquid diet  Code Status: Full Code      Code Status: Full Code        Dispo - cc        The patient and / or the family were informed of the results of any tests, a time was given to answer questions, a plan was proposed and they agreed with plan. Brianda Bloom MD    This note was transcribed using [employer]. Please disregard any translational errors.

## 2020-07-27 NOTE — PLAN OF CARE
Problem: Restraint Use - Nonviolent/Non-Self-Destructive Behavior:  Goal: Absence of restraint-related injury  Description: Absence of restraint-related injury  7/26/2020 1847 by Landry Willson RN  Outcome: Ongoing     Problem: Pain:  Goal: Pain level will decrease  Description: Pain level will decrease  7/27/2020 0236 by Jax Peña RN  Outcome: Ongoing  7/26/2020 1847 by Landry Willson RN  Outcome: Ongoing     Problem: Pain:  Goal: Control of acute pain  Description: Control of acute pain  7/27/2020 0236 by Jax Peña RN  Outcome: Ongoing  7/26/2020 1847 by Landry Willson RN  Outcome: Ongoing     Problem: Pain:  Goal: Control of chronic pain  Description: Control of chronic pain  7/26/2020 1847 by Landry Willson RN  Outcome: Ongoing     Problem: Nutrition  Goal: Optimal nutrition therapy  7/26/2020 1847 by Landry Willson RN  Outcome: Ongoing     Problem: Skin Integrity:  Goal: Will show no infection signs and symptoms  Description: Will show no infection signs and symptoms  7/27/2020 0236 by Jax Peña RN  Outcome: Ongoing  7/26/2020 1847 by Landry Willson RN  Outcome: Ongoing     Problem: Skin Integrity:  Goal: Absence of new skin breakdown  Description: Absence of new skin breakdown  7/27/2020 0236 by Jax Peña RN  Outcome: Ongoing  7/26/2020 1847 by Landry Willson RN  Outcome: Ongoing     Problem: Falls - Risk of:  Goal: Will remain free from falls  Description: Will remain free from falls  7/27/2020 0236 by Jax Peña RN  Outcome: Ongoing  7/26/2020 1847 by Landry Willson RN  Outcome: Ongoing     Problem: Falls - Risk of:  Goal: Absence of physical injury  Description: Absence of physical injury  7/27/2020 0236 by Jax Peña RN  Outcome: Ongoing  7/26/2020 1847 by Landry Willson RN  Outcome: Ongoing     Problem: Restraint Use - Nonviolent/Non-Self-Destructive Behavior:  Goal: Absence of restraint indications  Description: Absence of restraint indications  7/27/2020 0236 by Elsie Hodgson RN  Outcome: Completed  7/26/2020 1847 by Tavares Cowart RN  Outcome: Ongoing

## 2020-07-27 NOTE — PROGRESS NOTES
Comprehensive Nutrition Assessment    Type and Reason for Visit:  Reassess    Nutrition Recommendations/Plan:   Diet progression, currently Clear liquid diet. Continue Ensure Clear. May need alternative nutrition if PO intakes remain poor. Nutrition Assessment:  Pt pulled NG out and was more alert to be hopeful to take PO intakes. Pt with poor oral intakes over last 3 days since TF stopped. Pt does remain confused and occasionally agitated. Note high BS. Pt would benefit from alternative nutrition until confusion resolves. Currently on clear liquid supplement. Malnutrition Assessment:  Malnutrition Status:  Insufficient data      Estimated Daily Nutrient Needs:  Energy (kcal):  8625-4712 kcal (25-30 kcal/kg ABW)  Protein (g):   gm (1.2-2 gm/kg ABW)  Fluid (ml/day):  per MD    Nutrition Related Findings:  BM 7/26      Wounds:  None       Current Nutrition Therapies:    DIET CLEAR LIQUID;  Dietary Nutrition Supplements: Clear Liquid Oral Supplement    Anthropometric Measures:  · Height: 6' 2\" (188 cm)  · Current Body Weight: 147 lb (66.7 kg)   · Admission Body Weight: 160 lb (72.6 kg)    · Usual Body Weight: 160 lb (72.6 kg)     · Ideal Body Weight: 190 lbs; % Ideal Body Weight 77.4 %   · BMI: 18.9  · Adjusted Body Weight:   No Adjustment   · BMI Categories: Normal Weight (BMI 18.5-24. 9)       Nutrition Diagnosis:   · Inadequate oral intake related to cognitive or neurological impairment as evidenced by poor intake prior to admission      Nutrition Interventions:   Food and/or Nutrient Delivery:  Continue Current Diet, Continue Oral Nutrition Supplement  Nutrition Education/Counseling:  No recommendation at this time   Coordination of Nutrition Care:  Continued Inpatient Monitoring    Goals:   Tolerate most appropriate nutrition therapy       Nutrition Monitoring and Evaluation:   Food/Nutrient Intake Outcomes:  Food and Nutrient Intake, Supplement Intake  Physical Signs/Symptoms Outcomes: Biochemical Data, Chewing or Swallowing, Constipation, Diarrhea, Nausea or Vomiting, Fluid Status or Edema, Nutrition Focused Physical Findings, Skin, Weight     Discharge Planning:     Too soon to determine     Electronically signed by Gunner Christianson RD, LD on 7/27/20 at 9:45 AM EDT    Contact: [phone]

## 2020-07-27 NOTE — PROGRESS NOTES
Pt slept most of the night with sitter at the bedside. However, when sitter and RN attempted to get accuchecks and vital signs, pt would decline and become agitated if we tried. Only able to get 0000 and 0400 vitals on this patient overnight and 0400. Will continue to monitor.

## 2020-07-27 NOTE — PROGRESS NOTES
[facility]   Speech Therapy  Daily Dysphagia Treatment Note        Dallin Vuong  AGE: 50 y.o.    GENDER: male  : 1975  [id number]  EPISODE DATE:  7/15/2020  Patient Active Problem List   Diagnosis    Diabetic ketoacidosis without coma associated with diabetes mellitus due to underlying condition ([facility] [address]    Chronic abdominal pain    Gastroparesis    GERD (gastroesophageal reflux disease)    Seizure (HCC)    Toe deformity    Uncontrolled type 1 diabetes mellitus with diabetic peripheral neuropathy (HCC)    Type 1 diabetes mellitus with background diabetic retinopathy (HCC)    Hypoglycemia unawareness in type 1 diabetes mellitus (HCC)    Type 1 diabetes mellitus with hypercholesterolemia (HCC)    Accelerated hypertension    Acute blood loss anemia    Acute respiratory failure (HCC)    Candida esophagitis ([facility])    Cholelithiasis    Cocaine abuse, episodic ([facility])    Cerebral infarction ([facility] [address]    Diabetic polyneuropathy ([facility])    Duodenal ulcer    Heart failure, diastolic, acute ([facility])    Leg weakness, bilateral    Cannabis abuse    Numbness in both legs    Polysubstance abuse ([facility] [address]    Pulmonary edema    Diabetic foot ulcer ([facility])    Heart murmur    Hyperlipidemia    Osteomyelitis of great toe of right foot (HCC)    Epigastric abdominal pain    Hypertensive urgency    Diabetic gastroparesis associated with type 1 diabetes mellitus (HCC)    Acute respiratory failure with hypoxia (HCC)    Hypoglycemia    Altered mental status     Allergies   Allergen Reactions    Ketorolac Shortness Of Breath and Itching    Morphine Shortness Of Breath, Hives and Itching     Tolerates hydromorphone    Morphine And Related Hives and Shortness Of Breath    Tramadol Shortness Of Breath     resp failure   resp failure     Lisinopril Swelling     When he takes lisinopril he gets large lips which go away when he doesn't take lisinopril    Haloperidol Lactate Itching    Toradol able. ongoing    Assessment:   Impressions:   Dysphagia Diagnosis: needs furhter assessment    Pt unable to be awoken this date. Based on pt status on 7/24/2020 a Dysphagia II diet (minced and moist) diet with thin liquids was recommended as a trial  Adequate nutrition and hydration appear to be a risk for this pt due to limited intake when he is awake  Thorough evaluation is unable to be complete due to pt behaviors and today his lethargy. At evaluation the pt appeared to have good oral motor strength and seemed a good candidate to safely tolerate the recommended diet. Please order diet if in agreement for trial of this diet. Diet Recommendations:  Dysphagia II diet (minced and moist)  Thin liquids  Recommended Form of Meds: oral with water or in puree as pt will accept; only consistent means will likely be IV due to pt refusal    Strategies:   Allow pt to self feed when able  Upright for all po  Monitor for any signs of difficulty and downgrade diet as needed. Alert SLP to any signs of dysphagia. Education:  Consulted and agree with results and recommendations: Patient, RN  Patient Education: Attempted on results/recs/plan  Patient Education Response: No evidence of learning, Needs reinforcement    Prognosis:   Fair-poor for adequate PO intake    Plan:     Continue Dysphagia Therapy:   Interventions: Therapeutic Interventions: Patient/Family education, Oral care, Therapeutic PO trials with SLP  Duration/Frequency of therapy while on unit: Duration/Frequency of Treatment  Duration/Frequency of Treatment: ST to tx 3-5 times per week for dyspahgia durign aucte admission  Discharge Instructions:   Anticipate NEED for further skilled Speech Therapy for Dysphagia at discharge    This note serves as a D/C Summary in the event that this patient is discharged prior to the next therapy session.     Coded treatment time:0  Total treatment time: 15    Electronically signed by Robert Goetz 87 CCC/SLP [id number]  Speech Language Pathologist  on 7/27/2020 at 3:37 PM

## 2020-07-28 LAB
ANION GAP SERPL CALCULATED.3IONS-SCNC: 27 MMOL/L (ref 3–16)
BASOPHILS ABSOLUTE: 0.2 K/UL (ref 0–0.2)
BASOPHILS RELATIVE PERCENT: 1 %
BUN BLDV-MCNC: 16 MG/DL (ref 7–20)
CALCIUM SERPL-MCNC: 9.5 MG/DL (ref 8.3–10.6)
CHLORIDE BLD-SCNC: 101 MMOL/L (ref 99–110)
CO2: 11 MMOL/L (ref 21–32)
CREAT SERPL-MCNC: 1.2 MG/DL (ref 0.9–1.3)
EOSINOPHILS ABSOLUTE: 0.1 K/UL (ref 0–0.6)
EOSINOPHILS RELATIVE PERCENT: 0.4 %
GFR AFRICAN AMERICAN: >60
GFR NON-AFRICAN AMERICAN: >60
GLUCOSE BLD-MCNC: 118 MG/DL (ref 70–99)
GLUCOSE BLD-MCNC: 175 MG/DL (ref 70–99)
GLUCOSE BLD-MCNC: 187 MG/DL (ref 70–99)
GLUCOSE BLD-MCNC: 202 MG/DL (ref 70–99)
GLUCOSE BLD-MCNC: 224 MG/DL (ref 70–99)
GLUCOSE BLD-MCNC: 243 MG/DL (ref 70–99)
HCT VFR BLD CALC: 36.3 % (ref 40.5–52.5)
HEMOGLOBIN: 11.7 G/DL (ref 13.5–17.5)
LYMPHOCYTES ABSOLUTE: 2.4 K/UL (ref 1–5.1)
LYMPHOCYTES RELATIVE PERCENT: 15.3 %
MAGNESIUM: 1.9 MG/DL (ref 1.8–2.4)
MCH RBC QN AUTO: 27.2 PG (ref 26–34)
MCHC RBC AUTO-ENTMCNC: 32.2 G/DL (ref 31–36)
MCV RBC AUTO: 84.5 FL (ref 80–100)
MONOCYTES ABSOLUTE: 0.5 K/UL (ref 0–1.3)
MONOCYTES RELATIVE PERCENT: 2.9 %
NEUTROPHILS ABSOLUTE: 12.7 K/UL (ref 1.7–7.7)
NEUTROPHILS RELATIVE PERCENT: 80.4 %
PDW BLD-RTO: 15.8 % (ref 12.4–15.4)
PERFORMED ON: ABNORMAL
PHOSPHORUS: 2.7 MG/DL (ref 2.5–4.9)
PLATELET # BLD: 306 K/UL (ref 135–450)
PLATELET SLIDE REVIEW: ADEQUATE
PMV BLD AUTO: 7.6 FL (ref 5–10.5)
POTASSIUM REFLEX MAGNESIUM: 4.7 MMOL/L (ref 3.5–5.1)
RBC # BLD: 4.3 M/UL (ref 4.2–5.9)
SODIUM BLD-SCNC: 139 MMOL/L (ref 136–145)
WBC # BLD: 15.8 K/UL (ref 4–11)

## 2020-07-28 PROCEDURE — 85025 COMPLETE CBC W/AUTO DIFF WBC: CPT

## 2020-07-28 PROCEDURE — 2580000003 HC RX 258: Performed by: NURSE PRACTITIONER

## 2020-07-28 PROCEDURE — 6360000002 HC RX W HCPCS: Performed by: NURSE PRACTITIONER

## 2020-07-28 PROCEDURE — 6370000000 HC RX 637 (ALT 250 FOR IP): Performed by: NURSE PRACTITIONER

## 2020-07-28 PROCEDURE — 6360000002 HC RX W HCPCS: Performed by: FAMILY MEDICINE

## 2020-07-28 PROCEDURE — 84100 ASSAY OF PHOSPHORUS: CPT

## 2020-07-28 PROCEDURE — 83735 ASSAY OF MAGNESIUM: CPT

## 2020-07-28 PROCEDURE — 2580000003 HC RX 258: Performed by: INTERNAL MEDICINE

## 2020-07-28 PROCEDURE — 6370000000 HC RX 637 (ALT 250 FOR IP): Performed by: INTERNAL MEDICINE

## 2020-07-28 PROCEDURE — C9113 INJ PANTOPRAZOLE SODIUM, VIA: HCPCS | Performed by: NURSE PRACTITIONER

## 2020-07-28 PROCEDURE — 1200000000 HC SEMI PRIVATE

## 2020-07-28 PROCEDURE — 80048 BASIC METABOLIC PNL TOTAL CA: CPT

## 2020-07-28 PROCEDURE — 2580000003 HC RX 258: Performed by: FAMILY MEDICINE

## 2020-07-28 RX ORDER — OLANZAPINE 5 MG/1
5 TABLET ORAL NIGHTLY
Status: DISCONTINUED | OUTPATIENT
Start: 2020-07-28 | End: 2020-08-03

## 2020-07-28 RX ADMIN — AMPICILLIN SODIUM AND SULBACTAM SODIUM 1.5 G: 1; .5 INJECTION, POWDER, FOR SOLUTION INTRAMUSCULAR; INTRAVENOUS at 12:45

## 2020-07-28 RX ADMIN — INSULIN LISPRO 4 UNITS: 100 INJECTION, SOLUTION INTRAVENOUS; SUBCUTANEOUS at 21:36

## 2020-07-28 RX ADMIN — INSULIN GLARGINE 10 UNITS: 100 INJECTION, SOLUTION SUBCUTANEOUS at 21:36

## 2020-07-28 RX ADMIN — INSULIN LISPRO 2 UNITS: 100 INJECTION, SOLUTION INTRAVENOUS; SUBCUTANEOUS at 10:30

## 2020-07-28 RX ADMIN — AMPICILLIN SODIUM AND SULBACTAM SODIUM 1.5 G: 1; .5 INJECTION, POWDER, FOR SOLUTION INTRAMUSCULAR; INTRAVENOUS at 06:40

## 2020-07-28 RX ADMIN — INSULIN LISPRO 2 UNITS: 100 INJECTION, SOLUTION INTRAVENOUS; SUBCUTANEOUS at 13:10

## 2020-07-28 RX ADMIN — AMPICILLIN SODIUM AND SULBACTAM SODIUM 1.5 G: 1; .5 INJECTION, POWDER, FOR SOLUTION INTRAMUSCULAR; INTRAVENOUS at 17:47

## 2020-07-28 RX ADMIN — PANTOPRAZOLE SODIUM 40 MG: 40 INJECTION, POWDER, FOR SOLUTION INTRAVENOUS at 09:52

## 2020-07-28 RX ADMIN — SODIUM CHLORIDE, PRESERVATIVE FREE 10 ML: 5 INJECTION INTRAVENOUS at 21:37

## 2020-07-28 RX ADMIN — SODIUM CHLORIDE: 4.5 INJECTION, SOLUTION INTRAVENOUS at 18:51

## 2020-07-28 ASSESSMENT — PAIN SCALES - WONG BAKER: WONGBAKER_NUMERICALRESPONSE: 0

## 2020-07-28 ASSESSMENT — PAIN SCALES - GENERAL: PAINLEVEL_OUTOF10: 0

## 2020-07-28 NOTE — PLAN OF CARE
Problem: Pain:  Description: Pain management should include both nonpharmacologic and pharmacologic interventions. Goal: Pain level will decrease  Description: Pain level will decrease  Outcome: Ongoing  Goal: Control of acute pain  Description: Control of acute pain  Outcome: Ongoing  Goal: Control of chronic pain  Description: Control of chronic pain  Outcome: Ongoing     Problem: Nutrition  Goal: Optimal nutrition therapy  Outcome: Ongoing     Problem: Skin Integrity:  Goal: Will show no infection signs and symptoms  Description: Will show no infection signs and symptoms  Outcome: Ongoing  Goal: Absence of new skin breakdown  Description: Absence of new skin breakdown  Outcome: Ongoing  Skin assessed per protocol. Elmer score obtained. Skin kept clean, dry and warm. Encouraged pt to reposition to reduce the risk of PUs. Pillow support in place. Will continue to monitor. Problem: Falls - Risk of:  Goal: Will remain free from falls  Description: Will remain free from falls  Outcome: Ongoing  Goal: Absence of physical injury  Description: Absence of physical injury  Outcome: Ongoing  Fall risk assessed. Precautions in place. Bed low and locked with side rails up x2-3. Nonskid socks on when OOB. Bed/chair alarm utilized. Call light within reach. Frequent checks performed. No falls at this time.

## 2020-07-28 NOTE — PROGRESS NOTES
Pt refusing to take oral Losartan. Pt turning over away from RN. Pt also becoming aggressive and started swinging his fists when attempting to administer Lovenox.   Secure message sent to MD.

## 2020-07-28 NOTE — PROGRESS NOTES
Hospitalist Progress Note  7/28/2020 9:19 AM    PCP: Lobo Narayanan MD    [id number]     Date of Admission: 7/15/2020                                                                                                                     HOSPITAL COURSE    Patient demographics:  The patient  Delilah Eckert is a 50 y.o. male     Significant past medical history:   Patient Active Problem List   Diagnosis    Diabetic ketoacidosis without coma associated with diabetes mellitus due to underlying condition ([address]    Chronic abdominal pain    Gastroparesis    GERD (gastroesophageal reflux disease)    Seizure ([address]    Toe deformity    Uncontrolled type 1 diabetes mellitus with diabetic peripheral neuropathy ([address]    Type 1 diabetes mellitus with background diabetic retinopathy ([address]    Hypoglycemia unawareness in type 1 diabetes mellitus ([address]    Type 1 diabetes mellitus with hypercholesterolemia ([address]    Accelerated hypertension    Acute blood loss anemia    Acute respiratory failure (HCC)    Candida esophagitis (HCC)    Cholelithiasis    Cocaine abuse, episodic ([address]    Cerebral infarction ([address]    Diabetic polyneuropathy (HCC)    Duodenal ulcer    Heart failure, diastolic, acute (HCC)    Leg weakness, bilateral    Cannabis abuse    Numbness in both legs    Polysubstance abuse ([address]    Pulmonary edema    Diabetic foot ulcer (HCC)    Heart murmur    Hyperlipidemia    Osteomyelitis of great toe of right foot ([address]    Epigastric abdominal pain    Hypertensive urgency    Diabetic gastroparesis associated with type 1 diabetes mellitus ([address]    Acute respiratory failure with hypoxia (HCC)    Hypoglycemia    Altered mental status         Presenting symptoms:      Diagnostic workup:      CONSULTS DURING ADMISSION :   IP CONSULT TO PULMONOLOGY  IP CONSULT TO NEUROLOGY  IP CONSULT TO DIETITIAN  IP CONSULT TO PSYCHIATRY      Patient was diagnosed with:        Treatment while inpatient: 57-year-old M Hx poorly controlled DM who was admitted unresponsive at home with glucose of 22. He was reported to have accidental versus intentional overdose of insulin. He was intubated. He Self extubated to 2 L nasal cannula by coughing. Patient was evaluated by neurology and no obvious etiology was found  EEG is not consistent with seizure disorder. No significant findings on CSF. Patient's respiratory status improved. Patient is considered to be possibly PRES. nurses report patient's behavior as aggressive and agitated at times.   Psychiatric services consulted                                                         ----------------------------------------------------------      SUBJECTIVE COMPLAINTS-encephalopathy    Diet: DIET CLEAR LIQUID;  Dietary Nutrition Supplements: Clear Liquid Oral Supplement      OBJECTIVE:   Patient Active Problem List   Diagnosis    Diabetic ketoacidosis without coma associated with diabetes mellitus due to underlying condition ([facility]    Chronic abdominal pain    Gastroparesis    GERD (gastroesophageal reflux disease)    Seizure (HCC)    Toe deformity    Uncontrolled type 1 diabetes mellitus with diabetic peripheral neuropathy (HCC)    Type 1 diabetes mellitus with background diabetic retinopathy (HCC)    Hypoglycemia unawareness in type 1 diabetes mellitus (HCC)    Type 1 diabetes mellitus with hypercholesterolemia (HCC)    Accelerated hypertension    Acute blood loss anemia    Acute respiratory failure (HCC)    Candida esophagitis (HCC)    Cholelithiasis    Cocaine abuse, episodic (HCC)    Cerebral infarction ([address]    Diabetic polyneuropathy (HCC)    Duodenal ulcer    Heart failure, diastolic, acute (HCC)    Leg weakness, bilateral    Cannabis abuse    Numbness in both legs    Polysubstance abuse ([address]    Pulmonary edema    Diabetic foot ulcer (HCC)    Heart murmur    Hyperlipidemia    Osteomyelitis of great toe of right foot ([facility]    Epigastric abdominal pain    Hypertensive urgency    Diabetic gastroparesis associated with type 1 diabetes mellitus ([facility]    Acute respiratory failure with hypoxia (HCC)    Hypoglycemia    Altered mental status       Allergies  Ketorolac; Morphine; Morphine and related; Tramadol; Lisinopril; Haloperidol lactate; Toradol [ketorolac tromethamine]; and Vicodin [hydrocodone-acetaminophen]    Medications    Scheduled Meds:   insulin glargine  10 Units Subcutaneous Nightly    losartan  100 mg Oral Daily    ampicillin-sulbactam  1.5 g Intravenous Q6H    insulin lispro  0-12 Units Subcutaneous Q4H    sodium chloride flush  10 mL Intravenous 2 times per day    enoxaparin  40 mg Subcutaneous Daily    pantoprazole  40 mg Intravenous Daily    And    sodium chloride (PF)  10 mL Intravenous Daily     Continuous Infusions:   sodium chloride Stopped (07/26/20 2018)    dextrose       PRN Meds:  OLANZapine, OLANZapine, sodium chloride flush, acetaminophen **OR** acetaminophen, polyethylene glycol, ondansetron, glucose, dextrose, glucagon (rDNA), dextrose, magnesium sulfate, potassium chloride, sodium phosphate IVPB **OR** sodium phosphate IVPB    Vitals   Vitals /wt   Patient Vitals for the past 8 hrs:   BP Temp Temp src Pulse Resp SpO2 Weight   07/28/20 0612 119/73 98.3 °F (36.8 °C) Axillary 93 18 100 % 136 lb 14.5 oz (62.1 kg)        72HR INTAKE/OUTPUT:      Intake/Output Summary (Last 24 hours) at 7/28/2020 0919  Last data filed at 7/27/2020 2300  Gross per 24 hour   Intake 150 ml   Output --   Net 150 ml       Exam:    Gen:   Patient sound asleep  Did not wake him up due to risk of agitation and aggressive behavior  Eyes: PERRL. No sclera icterus. No conjunctival injection. ENT: No discharge. Pharynx clear. External appearance of ears and nose normal.  Neck: Trachea midline. No obvious mass. Resp: No accessory muscle use. No crackles. No wheezes. No rhonchi. CV: Regular rate. Regular rhythm.  No murmur or rub. No edema. GI: Non-tender. Non-distended. No hernia. Skin: Warm, dry, normal texture and turgor. Lymph: No cervical LAD. No supraclavicular LAD. M/S: / Ext. No cyanosis. No clubbing. No joint deformity. Neuro: CN 2-12 are intact,  no neurologic deficits noted. PT/INR: No results for input(s): PROTIME, INR in the last 72 hours. APTT: No results for input(s): APTT in the last 72 hours. CBC:   Recent Labs     07/26/20  0634 07/27/20  0500 07/28/20  0530   WBC 8.4 10.7 15.8*   HGB 11.0* 10.8* 11.7*   HCT 32.9* 33.6* 36.3*   MCV 82.6 85.0 84.5    326 306       BMP:   Recent Labs     07/26/20  0634 07/27/20  0500 07/28/20  0530    139 139   K 3.8 5.0 4.7    97* 101   CO2 25 9* 11*   PHOS 3.1 2.8 2.7   BUN 14 15 16   CREATININE 0.6* 1.0 1.2       LIVER PROFILE: No results for input(s): ALKPHOS, AST, ALT, ALB, BILIDIR, BILITOT, ALKPHOS in the last 72 hours. No results for input(s): AMYLASE in the last 72 hours. No results for input(s): LIPASE in the last 72 hours. UA:No results for input(s): NITRITE, LABCAST, WBCUA, RBCUA, MUCUS in the last 72 hours. TROPONIN: No results for input(s): Arlette Sox in the last 72 hours. Lab Results   Component Value Date/Time    URRFLXCULT Not Indicated 07/15/2020 10:26 PM       No results for input(s): TSHREFLEX in the last 72 hours. No components found for: [id number]  POC GLUCOSE:    Recent Labs     07/27/20  1023 07/27/20  1433 07/27/20  2135 07/28/20  0608 07/28/20  0736   POCGLU 226* 218* 344* 224* 175*     No results for input(s): LABA1C in the last 72 hours.    Lab Results   Component Value Date    LABA1C 10.3 05/29/2020         ASSESSMENT AND PLAN    Acute metabolic encephalopathy-  multifactorial, severe hypoglycemia, PRES syndrome  Start patient on Zyprexa    Agitation and aggressive behavior  Psychiatric consult  Possible candidate for inpatient psych    Acute respiratory failure  -improving      Diabetes mellitus  Severe hypoglycemia on admission-resolved  Insulin overdose-unsure if accidental or intentional    Dysphagia on tube feeds,   speech therapy continues to follow     DVT Prophylaxis:  lovenox  Diet: Clear liquid diet  Code Status: Full Code      Code Status: Full Code        Dispo - cc        The patient and / or the family were informed of the results of any tests, a time was given to answer questions, a plan was proposed and they agreed with plan. Timoteo Boeck, MD    This note was transcribed using [id number]. Please disregard any translational errors.

## 2020-07-28 NOTE — PLAN OF CARE
Problem: Restraint Use - Nonviolent/Non-Self-Destructive Behavior:  Goal: Absence of restraint-related injury  Description: Absence of restraint-related injury  Outcome: Ongoing     Problem: Pain:  Goal: Pain level will decrease  Description: Pain level will decrease  7/28/2020 1424 by Amelie Lehman RN  Outcome: Ongoing  7/28/2020 0211 by Inez Medley RN  Outcome: Ongoing  Goal: Control of acute pain  Description: Control of acute pain  7/28/2020 1424 by Amelie Lehman RN  Outcome: Ongoing  7/28/2020 0211 by Inez Medley RN  Outcome: Ongoing  Goal: Control of chronic pain  Description: Control of chronic pain  7/28/2020 1424 by Amelei Lehman RN  Outcome: Ongoing  7/28/2020 0211 by Inez Medley RN  Outcome: Ongoing     Problem: Nutrition  Goal: Optimal nutrition therapy  7/28/2020 1424 by Amelie Lehman RN  Outcome: Ongoing  7/28/2020 0211 by Inez Medley RN  Outcome: Ongoing     Problem: Skin Integrity:  Goal: Will show no infection signs and symptoms  Description: Will show no infection signs and symptoms  7/28/2020 1424 by Amelie Lehman RN  Outcome: Ongoing  7/28/2020 0211 by Inez Medley RN  Outcome: Ongoing  Goal: Absence of new skin breakdown  Description: Absence of new skin breakdown  7/28/2020 1424 by Amelie Lehman RN  Outcome: Ongoing  7/28/2020 0211 by Inez Medley RN  Outcome: Ongoing     Problem: Falls - Risk of:  Goal: Will remain free from falls  Description: Will remain free from falls  7/28/2020 1424 by Amelie Lehman RN  Outcome: Ongoing  7/28/2020 0211 by Inez Medley RN  Outcome: Ongoing  Goal: Absence of physical injury  Description: Absence of physical injury  7/28/2020 1424 by Amelie Lehman RN  Outcome: Ongoing  7/28/2020 0211 by Inez Medley RN  Outcome: Ongoing

## 2020-07-28 NOTE — PROGRESS NOTES
Pt resting in bed with a sitter at the bedside. Remains impulsive, combative and mostly nonverbal. Gait unsteady. Refusing to get vitals or blood sugars taken. He begins to swing at staff when attempts made. No signs or symptoms of distress noted. Will continue to monitor.

## 2020-07-29 LAB
ANION GAP SERPL CALCULATED.3IONS-SCNC: 20 MMOL/L (ref 3–16)
BASOPHILS ABSOLUTE: 0.1 K/UL (ref 0–0.2)
BASOPHILS RELATIVE PERCENT: 0.7 %
BUN BLDV-MCNC: 12 MG/DL (ref 7–20)
CALCIUM SERPL-MCNC: 9.3 MG/DL (ref 8.3–10.6)
CHLORIDE BLD-SCNC: 106 MMOL/L (ref 99–110)
CO2: 17 MMOL/L (ref 21–32)
CREAT SERPL-MCNC: 0.9 MG/DL (ref 0.9–1.3)
EOSINOPHILS ABSOLUTE: 0.2 K/UL (ref 0–0.6)
EOSINOPHILS RELATIVE PERCENT: 2.1 %
GFR AFRICAN AMERICAN: >60
GFR NON-AFRICAN AMERICAN: >60
GLUCOSE BLD-MCNC: 109 MG/DL (ref 70–99)
GLUCOSE BLD-MCNC: 111 MG/DL (ref 70–99)
GLUCOSE BLD-MCNC: 148 MG/DL (ref 70–99)
GLUCOSE BLD-MCNC: 158 MG/DL (ref 70–99)
GLUCOSE BLD-MCNC: 178 MG/DL (ref 70–99)
GLUCOSE BLD-MCNC: 186 MG/DL (ref 70–99)
GLUCOSE BLD-MCNC: 188 MG/DL (ref 70–99)
GLUCOSE BLD-MCNC: 199 MG/DL (ref 70–99)
HCT VFR BLD CALC: 34.9 % (ref 40.5–52.5)
HEMOGLOBIN: 11.5 G/DL (ref 13.5–17.5)
LYMPHOCYTES ABSOLUTE: 2.5 K/UL (ref 1–5.1)
LYMPHOCYTES RELATIVE PERCENT: 22.6 %
MAGNESIUM: 1.7 MG/DL (ref 1.8–2.4)
MCH RBC QN AUTO: 27.4 PG (ref 26–34)
MCHC RBC AUTO-ENTMCNC: 32.9 G/DL (ref 31–36)
MCV RBC AUTO: 83.3 FL (ref 80–100)
MONOCYTES ABSOLUTE: 0.4 K/UL (ref 0–1.3)
MONOCYTES RELATIVE PERCENT: 4 %
NEUTROPHILS ABSOLUTE: 7.9 K/UL (ref 1.7–7.7)
NEUTROPHILS RELATIVE PERCENT: 70.6 %
PDW BLD-RTO: 16.5 % (ref 12.4–15.4)
PERFORMED ON: ABNORMAL
PHOSPHORUS: 1.9 MG/DL (ref 2.5–4.9)
PLATELET # BLD: 385 K/UL (ref 135–450)
PMV BLD AUTO: 7.3 FL (ref 5–10.5)
POTASSIUM REFLEX MAGNESIUM: 3.9 MMOL/L (ref 3.5–5.1)
RBC # BLD: 4.2 M/UL (ref 4.2–5.9)
SODIUM BLD-SCNC: 143 MMOL/L (ref 136–145)
WBC # BLD: 11.2 K/UL (ref 4–11)

## 2020-07-29 PROCEDURE — 6370000000 HC RX 637 (ALT 250 FOR IP): Performed by: NURSE PRACTITIONER

## 2020-07-29 PROCEDURE — 1200000000 HC SEMI PRIVATE

## 2020-07-29 PROCEDURE — 94760 N-INVAS EAR/PLS OXIMETRY 1: CPT

## 2020-07-29 PROCEDURE — 80048 BASIC METABOLIC PNL TOTAL CA: CPT

## 2020-07-29 PROCEDURE — 2580000003 HC RX 258: Performed by: INTERNAL MEDICINE

## 2020-07-29 PROCEDURE — 85025 COMPLETE CBC W/AUTO DIFF WBC: CPT

## 2020-07-29 PROCEDURE — 99233 SBSQ HOSP IP/OBS HIGH 50: CPT | Performed by: PSYCHIATRY & NEUROLOGY

## 2020-07-29 PROCEDURE — 6360000002 HC RX W HCPCS: Performed by: FAMILY MEDICINE

## 2020-07-29 PROCEDURE — 6360000002 HC RX W HCPCS: Performed by: INTERNAL MEDICINE

## 2020-07-29 PROCEDURE — 2580000003 HC RX 258: Performed by: NURSE PRACTITIONER

## 2020-07-29 PROCEDURE — 6360000002 HC RX W HCPCS: Performed by: NURSE PRACTITIONER

## 2020-07-29 PROCEDURE — 83735 ASSAY OF MAGNESIUM: CPT

## 2020-07-29 PROCEDURE — 6370000000 HC RX 637 (ALT 250 FOR IP): Performed by: INTERNAL MEDICINE

## 2020-07-29 PROCEDURE — 2580000003 HC RX 258: Performed by: FAMILY MEDICINE

## 2020-07-29 PROCEDURE — 6370000000 HC RX 637 (ALT 250 FOR IP): Performed by: HOSPITALIST

## 2020-07-29 PROCEDURE — 51798 US URINE CAPACITY MEASURE: CPT

## 2020-07-29 PROCEDURE — C9113 INJ PANTOPRAZOLE SODIUM, VIA: HCPCS | Performed by: NURSE PRACTITIONER

## 2020-07-29 PROCEDURE — 84100 ASSAY OF PHOSPHORUS: CPT

## 2020-07-29 RX ADMIN — AMPICILLIN SODIUM AND SULBACTAM SODIUM 1.5 G: 1; .5 INJECTION, POWDER, FOR SOLUTION INTRAMUSCULAR; INTRAVENOUS at 06:08

## 2020-07-29 RX ADMIN — INSULIN LISPRO 2 UNITS: 100 INJECTION, SOLUTION INTRAVENOUS; SUBCUTANEOUS at 00:24

## 2020-07-29 RX ADMIN — INSULIN LISPRO 2 UNITS: 100 INJECTION, SOLUTION INTRAVENOUS; SUBCUTANEOUS at 17:11

## 2020-07-29 RX ADMIN — AMPICILLIN SODIUM AND SULBACTAM SODIUM 1.5 G: 1; .5 INJECTION, POWDER, FOR SOLUTION INTRAMUSCULAR; INTRAVENOUS at 00:24

## 2020-07-29 RX ADMIN — ENOXAPARIN SODIUM 40 MG: 40 INJECTION SUBCUTANEOUS at 08:21

## 2020-07-29 RX ADMIN — AMPICILLIN SODIUM AND SULBACTAM SODIUM 1.5 G: 1; .5 INJECTION, POWDER, FOR SOLUTION INTRAMUSCULAR; INTRAVENOUS at 13:20

## 2020-07-29 RX ADMIN — SODIUM CHLORIDE: 4.5 INJECTION, SOLUTION INTRAVENOUS at 18:53

## 2020-07-29 RX ADMIN — INSULIN LISPRO 2 UNITS: 100 INJECTION, SOLUTION INTRAVENOUS; SUBCUTANEOUS at 20:59

## 2020-07-29 RX ADMIN — SODIUM CHLORIDE, PRESERVATIVE FREE 10 ML: 5 INJECTION INTRAVENOUS at 20:59

## 2020-07-29 RX ADMIN — INSULIN LISPRO 2 UNITS: 100 INJECTION, SOLUTION INTRAVENOUS; SUBCUTANEOUS at 13:20

## 2020-07-29 RX ADMIN — AMPICILLIN SODIUM AND SULBACTAM SODIUM 1.5 G: 1; .5 INJECTION, POWDER, FOR SOLUTION INTRAMUSCULAR; INTRAVENOUS at 17:11

## 2020-07-29 RX ADMIN — OLANZAPINE 5 MG: 5 TABLET, FILM COATED ORAL at 20:59

## 2020-07-29 RX ADMIN — PANTOPRAZOLE SODIUM 40 MG: 40 INJECTION, POWDER, FOR SOLUTION INTRAVENOUS at 08:21

## 2020-07-29 RX ADMIN — INSULIN LISPRO 2 UNITS: 100 INJECTION, SOLUTION INTRAVENOUS; SUBCUTANEOUS at 08:22

## 2020-07-29 RX ADMIN — SODIUM CHLORIDE: 4.5 INJECTION, SOLUTION INTRAVENOUS at 08:21

## 2020-07-29 RX ADMIN — INSULIN GLARGINE 10 UNITS: 100 INJECTION, SOLUTION SUBCUTANEOUS at 20:59

## 2020-07-29 RX ADMIN — Medication 10 ML: at 08:23

## 2020-07-29 ASSESSMENT — PAIN SCALES - GENERAL: PAINLEVEL_OUTOF10: 0

## 2020-07-29 NOTE — PROGRESS NOTES
Pt not cooperating today. Sleeping. RR >12. Pt has not taken any PO medications. [place] enamorado. Bed alarm in place.

## 2020-07-29 NOTE — PROGRESS NOTES
Pt becoming more agitated. At risk for pulling out right upper arm picc, pt has snapped IV tubing in half. Pt is not speaking just screams when he becomes frustrated with telling him to stop jumping up. Received order to stop IVF at this time to prevent PICC line from being pulled out.

## 2020-07-29 NOTE — PLAN OF CARE
Problem: Pain:  Description: Pain management should include both nonpharmacologic and pharmacologic interventions. Goal: Pain level will decrease  Description: Pain level will decrease  Outcome: Ongoing  Goal: Control of acute pain  Description: Control of acute pain  Outcome: Ongoing  Goal: Control of chronic pain  Description: Control of chronic pain  Outcome: Ongoing     Problem: Nutrition  Goal: Optimal nutrition therapy  Outcome: Ongoing     Problem: Skin Integrity:  Goal: Will show no infection signs and symptoms  Description: Will show no infection signs and symptoms  Outcome: Ongoing  Goal: Absence of new skin breakdown  Description: Absence of new skin breakdown  Outcome: Ongoing     Problem: Falls - Risk of:  Goal: Will remain free from falls  Description: Will remain free from falls  Outcome: Ongoing  Goal: Absence of physical injury  Description: Absence of physical injury  Outcome: Ongoing  Fall risk assessed. Precautions in place. Bed low and locked with side rails up x2. Nonskid socks on when OOB. Sitter at the bedside. Call light within reach. Frequent checks performed. No falls at this time.

## 2020-07-29 NOTE — PROGRESS NOTES
Wife at bedside requesting to speak to Dr. Astrid Avila, message sent to Dr. Astrid Avila. Per wife only wife has permission for information and visit.  Code word [place].

## 2020-07-29 NOTE — PROGRESS NOTES
Went to catheterize patient patient opened eyes, rolled over and pulled pad out looked and me then rolled over forcefully. Rolled onto stomach. Unable to catheterize at this time. I feel as though pt may be aware of what is going on.
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
## 2020-07-29 NOTE — PROGRESS NOTES
Hospitalist Progress Note  7/29/2020 9:20 AM    PCP: Karin Diego MD    [id number]     Date of Admission: 7/15/2020                                                                                                                     HOSPITAL COURSE    Patient demographics:  The patient  Gabby Lopez is a 50 y.o. male     Significant past medical history:   Patient Active Problem List   Diagnosis    Diabetic ketoacidosis without coma associated with diabetes mellitus due to underlying condition ([address]    Chronic abdominal pain    Gastroparesis    GERD (gastroesophageal reflux disease)    Seizure ([address]    Toe deformity    Uncontrolled type 1 diabetes mellitus with diabetic peripheral neuropathy ([address]    Type 1 diabetes mellitus with background diabetic retinopathy ([address]    Hypoglycemia unawareness in type 1 diabetes mellitus ([address]    Type 1 diabetes mellitus with hypercholesterolemia ([address]    Accelerated hypertension    Acute blood loss anemia    Acute respiratory failure (HCC)    Candida esophagitis (HCC)    Cholelithiasis    Cocaine abuse, episodic (HCC)    Cerebral infarction ([address]    Diabetic polyneuropathy (HCC)    Duodenal ulcer    Heart failure, diastolic, acute (HCC)    Leg weakness, bilateral    Cannabis abuse    Numbness in both legs    Polysubstance abuse ([address]    Pulmonary edema    Diabetic foot ulcer (HCC)    Heart murmur    Hyperlipidemia    Osteomyelitis of great toe of right foot ([address]    Epigastric abdominal pain    Hypertensive urgency    Diabetic gastroparesis associated with type 1 diabetes mellitus ([address]    Acute respiratory failure with hypoxia (HCC)    Hypoglycemia    Altered mental status         Presenting symptoms:      Diagnostic workup:      CONSULTS DURING ADMISSION :   IP CONSULT TO PULMONOLOGY  IP CONSULT TO NEUROLOGY  IP CONSULT TO DIETITIAN  IP CONSULT TO PSYCHIATRY      Patient was diagnosed with:        Treatment while inpatient: 58-year-old M Hx poorly controlled DM who was admitted unresponsive at home with glucose of 22. He was reported to have accidental versus intentional overdose of insulin. He was intubated. He Self extubated to 2 L nasal cannula by coughing. Patient was evaluated by neurology and no obvious etiology was found  EEG is not consistent with seizure disorder. No significant findings on CSF. Patient's respiratory status improved. Patient is considered to be possibly PRES. nurses report patient's behavior as aggressive and agitated at times.   Psychiatric services consulted                                                         ----------------------------------------------------------      SUBJECTIVE COMPLAINTS-encephalopathy    Diet: DIET CLEAR LIQUID;  Dietary Nutrition Supplements: Clear Liquid Oral Supplement      OBJECTIVE:   Patient Active Problem List   Diagnosis    Diabetic ketoacidosis without coma associated with diabetes mellitus due to underlying condition [facility]    Chronic abdominal pain    Gastroparesis    GERD (gastroesophageal reflux disease)    Seizure (HCC)    Toe deformity    Uncontrolled type 1 diabetes mellitus with diabetic peripheral neuropathy (HCC)    Type 1 diabetes mellitus with background diabetic retinopathy (HCC)    Hypoglycemia unawareness in type 1 diabetes mellitus (HCC)    Type 1 diabetes mellitus with hypercholesterolemia (HCC)    Accelerated hypertension    Acute blood loss anemia    Acute respiratory failure (HCC)    Candida esophagitis (HCC)    Cholelithiasis    Cocaine abuse, episodic (HCC)    Cerebral infarction ([address]    Diabetic polyneuropathy (HCC)    Duodenal ulcer    Heart failure, diastolic, acute (HCC)    Leg weakness, bilateral    Cannabis abuse    Numbness in both legs    Polysubstance abuse ([address]    Pulmonary edema    Diabetic foot ulcer (HCC)    Heart murmur    Hyperlipidemia    Osteomyelitis of great toe of right foot ([facility]    Epigastric abdominal pain    Hypertensive urgency    Diabetic gastroparesis associated with type 1 diabetes mellitus ([facility]    Acute respiratory failure with hypoxia (HCC)    Hypoglycemia    Altered mental status       Allergies  Ketorolac; Morphine; Morphine and related; Tramadol; Lisinopril; Haloperidol lactate; Toradol [ketorolac tromethamine]; and Vicodin [hydrocodone-acetaminophen]    Medications    Scheduled Meds:   OLANZapine  5 mg Oral Nightly    insulin glargine  10 Units Subcutaneous Nightly    losartan  100 mg Oral Daily    ampicillin-sulbactam  1.5 g Intravenous Q6H    insulin lispro  0-12 Units Subcutaneous Q4H    sodium chloride flush  10 mL Intravenous 2 times per day    enoxaparin  40 mg Subcutaneous Daily    pantoprazole  40 mg Intravenous Daily    And    sodium chloride (PF)  10 mL Intravenous Daily     Continuous Infusions:   sodium chloride 75 mL/hr at 07/29/20 0821    dextrose       PRN Meds:  OLANZapine, OLANZapine, sodium chloride flush, acetaminophen **OR** acetaminophen, polyethylene glycol, ondansetron, glucose, dextrose, glucagon (rDNA), dextrose, magnesium sulfate, potassium chloride, sodium phosphate IVPB **OR** sodium phosphate IVPB    Vitals   Vitals /wt   Patient Vitals for the past 8 hrs:   BP Temp Temp src Pulse Resp SpO2   07/29/20 0433 134/77 97.7 °F (36.5 °C) Oral 94 18 98 %        72HR INTAKE/OUTPUT:      Intake/Output Summary (Last 24 hours) at 7/29/2020 0920  Last data filed at 7/29/2020 4104  Gross per 24 hour   Intake 1037 ml   Output --   Net 1037 ml       Exam:    Gen:   Patient sound asleep  Did not wake him up due to risk of agitation and aggressive behavior  Eyes: PERRL. No sclera icterus. No conjunctival injection. ENT: No discharge. Pharynx clear. External appearance of ears and nose normal.  Neck: Trachea midline. No obvious mass. Resp: No accessory muscle use. No crackles. No wheezes. No rhonchi. CV: Regular rate. Regular rhythm.  No murmur or rub. No edema. GI: Non-tender. Non-distended. No hernia. Skin: Warm, dry, normal texture and turgor. Lymph: No cervical LAD. No supraclavicular LAD. M/S: / Ext. No cyanosis. No clubbing. No joint deformity. Neuro: CN 2-12 are intact,  no neurologic deficits noted. PT/INR: No results for input(s): PROTIME, INR in the last 72 hours. APTT: No results for input(s): APTT in the last 72 hours. CBC:   Recent Labs     07/27/20  0500 07/28/20  0530 07/29/20  0400   WBC 10.7 15.8* 11.2*   HGB 10.8* 11.7* 11.5*   HCT 33.6* 36.3* 34.9*   MCV 85.0 84.5 83.3    306 385       BMP:   Recent Labs     07/27/20  0500 07/28/20  0530 07/29/20  0400    139 143   K 5.0 4.7 3.9   CL 97* 101 106   CO2 9* 11* 17*   PHOS 2.8 2.7 1.9*   BUN 15 16 12   CREATININE 1.0 1.2 0.9       LIVER PROFILE: No results for input(s): ALKPHOS, AST, ALT, ALB, BILIDIR, BILITOT, ALKPHOS in the last 72 hours. No results for input(s): AMYLASE in the last 72 hours. No results for input(s): LIPASE in the last 72 hours. UA:No results for input(s): NITRITE, LABCAST, WBCUA, RBCUA, MUCUS in the last 72 hours. TROPONIN: No results for input(s): Serene Marinette in the last 72 hours. Lab Results   Component Value Date/Time    URRFLXCULT Not Indicated 07/15/2020 10:26 PM       No results for input(s): TSHREFLEX in the last 72 hours. No components found for: [id number]  POC GLUCOSE:    Recent Labs     07/28/20  1640 07/28/20  2125 07/29/20  0015 07/29/20  0437 07/29/20  0729   POCGLU 118* 243* 188* 111* 148*     No results for input(s): LABA1C in the last 72 hours.    Lab Results   Component Value Date    LABA1C 10.3 05/29/2020         ASSESSMENT AND PLAN    Acute metabolic encephalopathy-  multifactorial, severe hypoglycemia, PRES syndrome  Continue with Zyprexa as needed  Some improvement in mental status    Agitation and aggressive behavior  Psychiatric consult is appreciated  They agree with Zyprexa as needed    Acute

## 2020-07-29 NOTE — PROGRESS NOTES
Occupational Therapy  Failed Attempt: Spoke with RN, Dex Simon, who reports that pt is not agreeable/participating with any intervention today. RN requesting that therapy hold today. Will follow up another date as pt is agreeable and appropriate.      Doe Caballero, OTR/L [id number]

## 2020-07-30 LAB
ANION GAP SERPL CALCULATED.3IONS-SCNC: 20 MMOL/L (ref 3–16)
BASOPHILS ABSOLUTE: 0.1 K/UL (ref 0–0.2)
BASOPHILS RELATIVE PERCENT: 0.8 %
BUN BLDV-MCNC: 11 MG/DL (ref 7–20)
CALCIUM SERPL-MCNC: 9.5 MG/DL (ref 8.3–10.6)
CHLORIDE BLD-SCNC: 105 MMOL/L (ref 99–110)
CO2: 19 MMOL/L (ref 21–32)
CREAT SERPL-MCNC: 0.9 MG/DL (ref 0.9–1.3)
EOSINOPHILS ABSOLUTE: 0.2 K/UL (ref 0–0.6)
EOSINOPHILS RELATIVE PERCENT: 2.2 %
GFR AFRICAN AMERICAN: >60
GFR NON-AFRICAN AMERICAN: >60
GLUCOSE BLD-MCNC: 104 MG/DL (ref 70–99)
GLUCOSE BLD-MCNC: 134 MG/DL (ref 70–99)
GLUCOSE BLD-MCNC: 150 MG/DL (ref 70–99)
GLUCOSE BLD-MCNC: 152 MG/DL (ref 70–99)
GLUCOSE BLD-MCNC: 153 MG/DL (ref 70–99)
GLUCOSE BLD-MCNC: 167 MG/DL (ref 70–99)
GLUCOSE BLD-MCNC: 173 MG/DL (ref 70–99)
GLUCOSE BLD-MCNC: >600 MG/DL (ref 70–99)
HCT VFR BLD CALC: 36.1 % (ref 40.5–52.5)
HEMOGLOBIN: 12 G/DL (ref 13.5–17.5)
LYMPHOCYTES ABSOLUTE: 2 K/UL (ref 1–5.1)
LYMPHOCYTES RELATIVE PERCENT: 23.5 %
MAGNESIUM: 1.6 MG/DL (ref 1.8–2.4)
MCH RBC QN AUTO: 27.4 PG (ref 26–34)
MCHC RBC AUTO-ENTMCNC: 33.2 G/DL (ref 31–36)
MCV RBC AUTO: 82.5 FL (ref 80–100)
MONOCYTES ABSOLUTE: 0.4 K/UL (ref 0–1.3)
MONOCYTES RELATIVE PERCENT: 4.4 %
NEUTROPHILS ABSOLUTE: 5.9 K/UL (ref 1.7–7.7)
NEUTROPHILS RELATIVE PERCENT: 69.1 %
PDW BLD-RTO: 16.9 % (ref 12.4–15.4)
PERFORMED ON: ABNORMAL
PHOSPHORUS: 2.6 MG/DL (ref 2.5–4.9)
PLATELET # BLD: 366 K/UL (ref 135–450)
PMV BLD AUTO: 7.2 FL (ref 5–10.5)
POTASSIUM REFLEX MAGNESIUM: 3.7 MMOL/L (ref 3.5–5.1)
RBC # BLD: 4.38 M/UL (ref 4.2–5.9)
SODIUM BLD-SCNC: 144 MMOL/L (ref 136–145)
WBC # BLD: 8.5 K/UL (ref 4–11)

## 2020-07-30 PROCEDURE — 6360000002 HC RX W HCPCS: Performed by: NURSE PRACTITIONER

## 2020-07-30 PROCEDURE — 99231 SBSQ HOSP IP/OBS SF/LOW 25: CPT | Performed by: PSYCHIATRY & NEUROLOGY

## 2020-07-30 PROCEDURE — 6360000002 HC RX W HCPCS: Performed by: INTERNAL MEDICINE

## 2020-07-30 PROCEDURE — 85025 COMPLETE CBC W/AUTO DIFF WBC: CPT

## 2020-07-30 PROCEDURE — C9113 INJ PANTOPRAZOLE SODIUM, VIA: HCPCS | Performed by: NURSE PRACTITIONER

## 2020-07-30 PROCEDURE — 84100 ASSAY OF PHOSPHORUS: CPT

## 2020-07-30 PROCEDURE — 6370000000 HC RX 637 (ALT 250 FOR IP): Performed by: NURSE PRACTITIONER

## 2020-07-30 PROCEDURE — 6360000002 HC RX W HCPCS: Performed by: FAMILY MEDICINE

## 2020-07-30 PROCEDURE — 83735 ASSAY OF MAGNESIUM: CPT

## 2020-07-30 PROCEDURE — 2580000003 HC RX 258: Performed by: FAMILY MEDICINE

## 2020-07-30 PROCEDURE — 1200000000 HC SEMI PRIVATE

## 2020-07-30 PROCEDURE — 2580000003 HC RX 258: Performed by: NURSE PRACTITIONER

## 2020-07-30 PROCEDURE — 94760 N-INVAS EAR/PLS OXIMETRY 1: CPT

## 2020-07-30 PROCEDURE — 80048 BASIC METABOLIC PNL TOTAL CA: CPT

## 2020-07-30 RX ADMIN — INSULIN LISPRO 2 UNITS: 100 INJECTION, SOLUTION INTRAVENOUS; SUBCUTANEOUS at 12:21

## 2020-07-30 RX ADMIN — MAGNESIUM SULFATE HEPTAHYDRATE 1 G: 1 INJECTION, SOLUTION INTRAVENOUS at 14:02

## 2020-07-30 RX ADMIN — INSULIN LISPRO 2 UNITS: 100 INJECTION, SOLUTION INTRAVENOUS; SUBCUTANEOUS at 01:00

## 2020-07-30 RX ADMIN — AMPICILLIN SODIUM AND SULBACTAM SODIUM 1.5 G: 1; .5 INJECTION, POWDER, FOR SOLUTION INTRAMUSCULAR; INTRAVENOUS at 00:55

## 2020-07-30 RX ADMIN — MAGNESIUM SULFATE HEPTAHYDRATE 1 G: 1 INJECTION, SOLUTION INTRAVENOUS at 13:08

## 2020-07-30 RX ADMIN — AMPICILLIN SODIUM AND SULBACTAM SODIUM 1.5 G: 1; .5 INJECTION, POWDER, FOR SOLUTION INTRAMUSCULAR; INTRAVENOUS at 12:25

## 2020-07-30 RX ADMIN — AMPICILLIN SODIUM AND SULBACTAM SODIUM 1.5 G: 1; .5 INJECTION, POWDER, FOR SOLUTION INTRAMUSCULAR; INTRAVENOUS at 06:03

## 2020-07-30 RX ADMIN — PANTOPRAZOLE SODIUM 40 MG: 40 INJECTION, POWDER, FOR SOLUTION INTRAVENOUS at 08:59

## 2020-07-30 RX ADMIN — AMPICILLIN SODIUM AND SULBACTAM SODIUM 1.5 G: 1; .5 INJECTION, POWDER, FOR SOLUTION INTRAMUSCULAR; INTRAVENOUS at 17:16

## 2020-07-30 RX ADMIN — INSULIN LISPRO 2 UNITS: 100 INJECTION, SOLUTION INTRAVENOUS; SUBCUTANEOUS at 08:48

## 2020-07-30 RX ADMIN — ENOXAPARIN SODIUM 40 MG: 40 INJECTION SUBCUTANEOUS at 09:00

## 2020-07-30 RX ADMIN — SODIUM CHLORIDE, PRESERVATIVE FREE 10 ML: 5 INJECTION INTRAVENOUS at 09:00

## 2020-07-30 RX ADMIN — Medication 10 ML: at 09:00

## 2020-07-30 RX ADMIN — INSULIN LISPRO 2 UNITS: 100 INJECTION, SOLUTION INTRAVENOUS; SUBCUTANEOUS at 16:30

## 2020-07-30 ASSESSMENT — PAIN SCALES - GENERAL: PAINLEVEL_OUTOF10: 0

## 2020-07-30 NOTE — PROGRESS NOTES
Pt alternated between sleeping in bed and jumping up to pace in room or go to bathroom, did void per sitter staff.  Pt tries to avoid procedures, but can be talked into being compliant, if unwillingly, such as connecting IV meds, drawing labs or taking VS.

## 2020-07-30 NOTE — PROGRESS NOTES
Patient is impulsive; jumped out of the bed and went to bathroom. Sitter at bedside. Patient's mother stated \"I am glad you can walk son. \" Patient stated \"Good mom. \" Electronically signed by Enrique Arora RN on 7/30/2020 at 4:58 PM

## 2020-07-30 NOTE — PROGRESS NOTES
This nurse asked the patient if he has a son or a daughter. Patient stated \"Girl. \" Later, patient's mother called and patient nodded his head \"yes\" again when asked if he wants to talk to her. This nurse held the phone for the patient. Patient did not say nothing to his mother while she was saying that she loves him and she wants him to get well. However, when patient's mother began crying on the phone, patient began crying as well and nodded \"yes\" when the mother asked him if he could hear her. This nurse asked the mother if the patient has a daughter, she stated that the patient has a 5year-old daughter Aquiles Hall who he talks about a lot.  Electronically signed by Enrique Arora RN on 7/30/2020 at 2:13 PM

## 2020-07-30 NOTE — PROGRESS NOTES
ADMINISTRATIVE    I was asked to see Mr Chasity Hearn. I spoke to attending  I spoke to Dr Frankey Pigeon. I visited with patient who is withdrawn but sat up and responded to questions =affimative with head nods. He wants his mother to visit. He wants his recent friend Nichol Schuster) to vist. There are investigations in process that suggest she should not visit at this time. Lewis Kilgore says Mrs Chasity Hearn does visit daily. Usually afternoon. See NURSING NOTE  I spoke with her for quite a while  CARING, CONSIDERATE  THOUGHTFUL about his best interest -is a nurse and thinks his sister Lisa Schaefer would be the most helpful and she would bring her for a brief visit if allowed. I have spoken to her again after discussion with Ms. Cesilia Alicia and we will allow them to visit together this evening. Mother has called and doctor is calling her to update. She is on her way for a visit and I have asked to be notified when she is here. Dr Frankey Pigeon and neurology will be doing further review and assessments. Ryan Ibrahim MD, [facility] [address] [facility]    Cell [phone]  Office [phone]  7/30/2020  10:59 AM

## 2020-07-30 NOTE — PROGRESS NOTES
acetaminophen (TYLENOL) tablet 650 mg, 650 mg, Oral, Q4H PRN, 650 mg at 07/24/20 0537 **OR** acetaminophen (TYLENOL) suppository 650 mg, 650 mg, Rectal, Q4H PRN, Jason Loomis MD    polyethylene glycol [facility]) packet 17 g, 17 g, Oral, Daily PRN, Jason Loomis MD    ondansetron [facility]) injection 4 mg, 4 mg, Intravenous, Q4H PRN, Jason Loomis MD    glucose (GLUTOSE) 40 % oral gel 15 g, 15 g, Oral, PRN, Jason Loomis MD    dextrose 50 % IV solution, 12.5 g, Intravenous, PRN, Jason Loomis MD, 12.5 g at 07/16/20 1955    glucagon (rDNA) injection 1 mg, 1 mg, Intramuscular, PRN, Jason Loomis MD    dextrose 5 % solution, 100 mL/hr, Intravenous, PRN, Jason Loomis MD    enoxaparin (LOVENOX) injection 40 mg, 40 mg, Subcutaneous, Daily, Jason Loomis MD, 40 mg at 07/30/20 0900    pantoprazole (PROTONIX) injection 40 mg, 40 mg, Intravenous, Daily, 40 mg at 07/30/20 0859 **AND** sodium chloride (PF) 0.9 % injection 10 mL, 10 mL, Intravenous, Daily, ESTEPHANIA Noel - CNP, 10 mL at 07/30/20 0900    magnesium sulfate 1 g in dextrose 5% 100 mL IVPB, 1 g, Intravenous, PRN, ESTEPHANIA Blackburn - CNP, Last Rate: 100 mL/hr at 07/30/20 1402, 1 g at 07/30/20 1402    potassium chloride 20 mEq/50 mL IVPB (Central Line), 20 mEq, Intravenous, PRN, ESTEPHANIA Blackburn - CNP, Last Rate: 50 mL/hr at 07/24/20 2353, 20 mEq at 07/24/20 2353    sodium phosphate 11.07 mmol in dextrose 5 % 250 mL IVPB, 0.16 mmol/kg, Intravenous, PRN **OR** sodium phosphate 22.11 mmol in dextrose 5 % 250 mL IVPB, 0.32 mmol/kg, Intravenous, PRN, Colton Jacobsen, APRN - CNP    Exam  Blood pressure (!) 135/91, pulse 97, temperature 97.8 °F (36.6 °C), temperature source Axillary, resp. rate 16, height 6' 2\" (1.88 m), weight 136 lb 0.4 oz (61.7 kg), SpO2 99 %.   Constitutional    Vital signs: BP, HR, and RR reviewed   General awake, no distress  Eyes: unable to visualize the fundi  Cardiovascular: pulses symmetric in all 4 extremities. Psychiatric: uncooperative w/ exam.   Neurologic  Mental status:   Orientation moris due to encephalopathy. Attention  poor   Language not verbalizing at the moment . Comprehension not following simple commands  Cranial nerves:   CN2: blink to threat bilaterally. CN 3,4,6: tracks. Will not allow me to assess pupillary response. CN7: face symmetric  CN8: hearing grossly intact  CN11: trap strength moris. CN12: tongue protrusion moris. Strength: moves all four limbs w/out any noticeable focal paresis. Sensory: moris. Cerebellar/coordination: no ataxia noted when ambulating. Tone: normal in all 4 extremities  Gait: normal gait    ROS - unable to obtain from the patient at this time. Chart reviewed. Labs  Glucose 152  Na 144  K 3.7  BUN 11  Cr 0.9  Mg 1.60    WBC 8.5K  Hg 12.0  Platelets 427    Blood cultures NG  UA no infection     CSF   Tube 1 - WBC 5, RBC 84  Tube 4 - WBC 8, RBC 1  Glucose 137  Protein 50     IgG normal  No bands. Culture NG  Cryptococcal Ag negative  VDRL non reactive  HSV PCR negative         Studies  MRI brain w/o 7/16/20  Subtle bilateral frontoparietal cortical/subcortical signal abnormality. No acute abnormality.         EEG 7/17/20  Moderate to severe background slowing and disorganization. Impression  1. Persistent encephalopathy. Could be hypoglycemic. Initial MRI brain identified cortical/subcortical signal abnormalities, which too could be hypoglycemic versus an alternative etiology. EEG was slow and disorganized. CSF has been unrevealing. Recommendations  1. A follow up MRI brain w/wo contrast may be useful. Will order. 2.  Discussed w/ Psychiatry. Appreciate input.       Sharif Velazco NP  [address] [place] [address] [address] Neurology    A copy of this note was provided for Dr Raquel Weiner MD

## 2020-07-30 NOTE — PROGRESS NOTES
Call received from [facility] tech re: test scheduled for tomorrow morning 0800 am. Previous checklist can be used.  Electronically signed by Laura Delatorre RN on 7/30/2020 at 7:15 PM

## 2020-07-30 NOTE — CONSULTS
[address]                                  CONSULTATION    PATIENT NAME: Stephany Nj                      :        1975  MED REC NO:   [id number]                          ROOM:       9611  ACCOUNT NO:   [de-identified]                           ADMIT DATE: 07/15/2020  PROVIDER:     Kelsi Powell MD    CONSULT DATE:  2020    REASON FOR CONSULTATION:  The patient is a 40-year-old male who presents  to the hospital with delirium, disorganized thinking, and behaviors  putting himself and others in harm's way. CHIEF COMPLAINT/HISTORY OF PRESENT ILLNESS:  The patient is referred for  hospitalization after having psychotic symptoms, mood instability,  disorganized thinking, and behaviors putting himself and others in  harm's way. The patient is noted to have difficulty with paranoia,  ideas of reference, and was noted to be increasingly confused. On  examination, the patient was noted to have needing redirection by a  sitter with periods of confusion, responding to internal stimuli, and  requiring redirection. The patient is noted to have significant  dysregulation on approach and was not particularly cooperative with a  full mental status examination. The patient endorsed difficulty with  fearfulness and was noted to be increasingly preoccupied. PAST PSYCHIATRIC HISTORY:  The patient was not able to verbalize  significant information in regards to his past psychiatric history and  mental health treatment. MENTAL STATUS EXAMINATION:  The patient is a 40-year-old male. He is  dressed in a hospital gown. He is alert and oriented to person. He is  unaware of the date and time and his sensorium is clouded. His affect  is labile and bizarre. His stated mood is \"Go away. \"  He denies any  suicidality or homicidality.   He is noted to have auditory and visual  hallucinations and is paranoid and suspicious of others. Thought  process is illogical and tangential at times. Thought content is  focused on psychosis and mood instability. His insight is poor,  judgement is poor, and impulse control is poor. ASSESSMENT:  The patient is noted to have symptoms consistent with  delirium. It is unclear whether this may be longstanding in regards to  longstanding mental health challenges versus at a metabolic etiology. I  would recommend initiating Risperdal 1 mg twice a day to address  concerns of persistent psychosis and disorganized thinking and the use  of benzodiazepines may be helpful to help with delirium. If there is a  medical cause to his confusion, that would need to be addressed. In  addition, collateral information from family members or caregivers may  be helpful. Thank you for consulting me on this interesting patient.         Jin Wise MD    D: 07/30/2020 11:46:58       T: 07/30/2020 14:13:00     [id number]  Job#: [id number]     Doc#: [id number]    CC:

## 2020-07-30 NOTE — PROGRESS NOTES
Psych Consult Progress Note    07/30/20      Sky Oneill  [id number]  Chief Complaint   Patient presents with    Hypoglycemia     Found unresponsive by girlfriend. Per EMS, pt had blood sugar of \"22 on arrival and was given IM glucagon\". EMS reports blood sugar \"went up to 59\". Pt responsive to pain at this time. I have reviewed recent documentation. Sky Oneill is a 50 y.o. male  With  has a past medical history of Diabetes mellitus ([address] Gastroparesis, and Hypertension. Subjective/Interval Hx:  Would not wake up for me. Just kind of rocked back and forth. Quality:  Altered ms  Severity:  Severe? Duration:  Days to weeks  Context:  As above.   Location:  Brain    Objective:  Vitals:    07/30/20 0843   BP: (!) 135/91   Pulse: 97   Resp: 16   Temp: 97.8 °F (36.6 °C)   SpO2: 99%     Recent Results (from the past 168 hour(s))   POCT Glucose    Collection Time: 07/23/20  4:20 PM   Result Value Ref Range    POC Glucose 224 (H) 70 - 99 mg/dl    Performed on ACCU-CHEK    POCT Glucose    Collection Time: 07/23/20  8:46 PM   Result Value Ref Range    POC Glucose 380 (H) 70 - 99 mg/dl    Performed on ACCU-CHEK    POCT Glucose    Collection Time: 07/24/20 12:35 AM   Result Value Ref Range    POC Glucose 259 (H) 70 - 99 mg/dl    Performed on ACCU-CHEK    POCT Glucose    Collection Time: 07/24/20  5:14 AM   Result Value Ref Range    POC Glucose 318 (H) 70 - 99 mg/dl    Performed on ACCU-CHEK    Basic Metabolic Panel w/ Reflex to MG    Collection Time: 07/24/20  6:35 AM   Result Value Ref Range    Sodium 126 (L) 136 - 145 mmol/L    Potassium reflex Magnesium 3.1 (L) 3.5 - 5.1 mmol/L    Chloride 92 (L) 99 - 110 mmol/L    CO2 22 21 - 32 mmol/L    Anion Gap 12 3 - 16    Glucose 213 (H) 70 - 99 mg/dL    BUN 14 7 - 20 mg/dL    CREATININE <0.5 (L) 0.9 - 1.3 mg/dL    GFR Non-African American >60 >60    GFR African American >60 >60    Calcium 7.2 (L) 8.3 - 10.6 mg/dL   Magnesium    Collection Time: 07/24/20  6:35 AM   Result Value Ref Range    Magnesium 1.50 (L) 1.80 - 2.40 mg/dL   Phosphorus    Collection Time: 07/24/20  6:35 AM   Result Value Ref Range    Phosphorus 2.6 2.5 - 4.9 mg/dL   POCT Glucose    Collection Time: 07/24/20  7:26 AM   Result Value Ref Range    POC Glucose 277 (H) 70 - 99 mg/dl    Performed on ACCU-CHEK    SPECIMEN REJECTION    Collection Time: 07/24/20  7:38 AM   Result Value Ref Range    Rejected Test CBCWD     Reason for Rejection see below    POCT Glucose    Collection Time: 07/24/20 11:44 AM   Result Value Ref Range    POC Glucose 222 (H) 70 - 99 mg/dl    Performed on ACCU-CHEK    POCT Glucose    Collection Time: 07/24/20  4:39 PM   Result Value Ref Range    POC Glucose 280 (H) 70 - 99 mg/dl    Performed on ACCU-CHEK    POCT Glucose    Collection Time: 07/25/20  6:10 AM   Result Value Ref Range    POC Glucose 329 (H) 70 - 99 mg/dl    Performed on ACCU-CHEK    Basic Metabolic Panel w/ Reflex to MG    Collection Time: 07/25/20  6:20 AM   Result Value Ref Range    Sodium 135 (L) 136 - 145 mmol/L    Potassium reflex Magnesium 4.7 3.5 - 5.1 mmol/L    Chloride 96 (L) 99 - 110 mmol/L    CO2 22 21 - 32 mmol/L    Anion Gap 17 (H) 3 - 16    Glucose 324 (H) 70 - 99 mg/dL    BUN 15 7 - 20 mg/dL    CREATININE 0.7 (L) 0.9 - 1.3 mg/dL    GFR Non-African American >60 >60    GFR African American >60 >60    Calcium 8.8 8.3 - 10.6 mg/dL   CBC auto differential    Collection Time: 07/25/20  6:20 AM   Result Value Ref Range    WBC 8.9 4.0 - 11.0 K/uL    RBC 4.14 (L) 4.20 - 5.90 M/uL    Hemoglobin 11.4 (L) 13.5 - 17.5 g/dL    Hematocrit 34.6 (L) 40.5 - 52.5 %    MCV 83.6 80.0 - 100.0 fL    MCH 27.5 26.0 - 34.0 pg    MCHC 33.0 31.0 - 36.0 g/dL    RDW 16.1 (H) 12.4 - 15.4 %    Platelets 527 303 - 431 K/uL    MPV 8.2 5.0 - 10.5 fL    Neutrophils % 64.3 %    Lymphocytes % 26.4 %    Monocytes % 5.2 %    Eosinophils % 3.2 %    Basophils % 0.9 %    Neutrophils Absolute 5.7 1.7 - 7.7 K/uL    Lymphocytes Absolute 2.3 1.0 - 5.1 K/uL    Monocytes Absolute 0.5 0.0 - 1.3 K/uL    Eosinophils Absolute 0.3 0.0 - 0.6 K/uL    Basophils Absolute 0.1 0.0 - 0.2 K/uL   Magnesium    Collection Time: 07/25/20  6:20 AM   Result Value Ref Range    Magnesium 2.20 1.80 - 2.40 mg/dL   Phosphorus    Collection Time: 07/25/20  6:20 AM   Result Value Ref Range    Phosphorus 2.6 2.5 - 4.9 mg/dL   POCT Glucose    Collection Time: 07/25/20 11:45 AM   Result Value Ref Range    POC Glucose 234 (H) 70 - 99 mg/dl    Performed on ACCU-CHEK    POCT Glucose    Collection Time: 07/25/20  4:15 PM   Result Value Ref Range    POC Glucose 187 (H) 70 - 99 mg/dl    Performed on ACCU-CHEK    POCT Glucose    Collection Time: 07/25/20  8:29 PM   Result Value Ref Range    POC Glucose 289 (H) 70 - 99 mg/dl    Performed on ACCU-CHEK    POCT Glucose    Collection Time: 07/26/20 12:29 AM   Result Value Ref Range    POC Glucose 237 (H) 70 - 99 mg/dl    Performed on ACCU-CHEK    POCT Glucose    Collection Time: 07/26/20  5:08 AM   Result Value Ref Range    POC Glucose 90 70 - 99 mg/dl    Performed on ACCU-CHEK    Basic Metabolic Panel w/ Reflex to MG    Collection Time: 07/26/20  6:34 AM   Result Value Ref Range    Sodium 141 136 - 145 mmol/L    Potassium reflex Magnesium 3.8 3.5 - 5.1 mmol/L    Chloride 102 99 - 110 mmol/L    CO2 25 21 - 32 mmol/L    Anion Gap 14 3 - 16    Glucose 73 70 - 99 mg/dL    BUN 14 7 - 20 mg/dL    CREATININE 0.6 (L) 0.9 - 1.3 mg/dL    GFR Non-African American >60 >60    GFR African American >60 >60    Calcium 9.0 8.3 - 10.6 mg/dL   CBC auto differential    Collection Time: 07/26/20  6:34 AM   Result Value Ref Range    WBC 8.4 4.0 - 11.0 K/uL    RBC 3.99 (L) 4.20 - 5.90 M/uL    Hemoglobin 11.0 (L) 13.5 - 17.5 g/dL    Hematocrit 32.9 (L) 40.5 - 52.5 %    MCV 82.6 80.0 - 100.0 fL    MCH 27.5 26.0 - 34.0 pg    MCHC 33.4 31.0 - 36.0 g/dL    RDW 15.8 (H) 12.4 - 15.4 %    Platelets 182 866 - 489 K/uL    MPV 7.9 5.0 - 10.5 fL    Neutrophils % 66.4 %    Lymphocytes % 25.2 %    Monocytes % 5.2 %    Eosinophils % 2.5 %    Basophils % 0.7 %    Neutrophils Absolute 5.6 1.7 - 7.7 K/uL    Lymphocytes Absolute 2.1 1.0 - 5.1 K/uL    Monocytes Absolute 0.4 0.0 - 1.3 K/uL    Eosinophils Absolute 0.2 0.0 - 0.6 K/uL    Basophils Absolute 0.1 0.0 - 0.2 K/uL   Magnesium    Collection Time: 07/26/20  6:34 AM   Result Value Ref Range    Magnesium 2.00 1.80 - 2.40 mg/dL   Phosphorus    Collection Time: 07/26/20  6:34 AM   Result Value Ref Range    Phosphorus 3.1 2.5 - 4.9 mg/dL   POCT Glucose    Collection Time: 07/27/20  4:43 AM   Result Value Ref Range    POC Glucose 414 (H) 70 - 99 mg/dl    Performed on ACCU-CHEK    Basic Metabolic Panel w/ Reflex to MG    Collection Time: 07/27/20  5:00 AM   Result Value Ref Range    Sodium 139 136 - 145 mmol/L    Potassium reflex Magnesium 5.0 3.5 - 5.1 mmol/L    Chloride 97 (L) 99 - 110 mmol/L    CO2 9 (LL) 21 - 32 mmol/L    Anion Gap 33 (H) 3 - 16    Glucose 362 (H) 70 - 99 mg/dL    BUN 15 7 - 20 mg/dL    CREATININE 1.0 0.9 - 1.3 mg/dL    GFR Non-African American >60 >60    GFR African American >60 >60    Calcium 9.6 8.3 - 10.6 mg/dL   CBC auto differential    Collection Time: 07/27/20  5:00 AM   Result Value Ref Range    WBC 10.7 4.0 - 11.0 K/uL    RBC 3.95 (L) 4.20 - 5.90 M/uL    Hemoglobin 10.8 (L) 13.5 - 17.5 g/dL    Hematocrit 33.6 (L) 40.5 - 52.5 %    MCV 85.0 80.0 - 100.0 fL    MCH 27.5 26.0 - 34.0 pg    MCHC 32.3 31.0 - 36.0 g/dL    RDW 15.5 (H) 12.4 - 15.4 %    Platelets 728 783 - 837 K/uL    MPV 8.6 5.0 - 10.5 fL    Neutrophils % 67.3 %    Lymphocytes % 26.1 %    Monocytes % 4.2 %    Eosinophils % 1.8 %    Basophils % 0.6 %    Neutrophils Absolute 7.2 1.7 - 7.7 K/uL    Lymphocytes Absolute 2.8 1.0 - 5.1 K/uL    Monocytes Absolute 0.5 0.0 - 1.3 K/uL    Eosinophils Absolute 0.2 0.0 - 0.6 K/uL    Basophils Absolute 0.1 0.0 - 0.2 K/uL   Magnesium    Collection Time: 07/27/20  5:00 AM   Result Value Ref Range    Magnesium 2.00 1.80 - 2.40 mg/dL Phosphorus    Collection Time: 07/27/20  5:00 AM   Result Value Ref Range    Phosphorus 2.8 2.5 - 4.9 mg/dL   POCT Glucose    Collection Time: 07/27/20 10:23 AM   Result Value Ref Range    POC Glucose 226 (H) 70 - 99 mg/dl    Performed on ACCU-CHEK    POCT Glucose    Collection Time: 07/27/20  2:33 PM   Result Value Ref Range    POC Glucose 218 (H) 70 - 99 mg/dl    Performed on ACCU-CHEK    POCT Glucose    Collection Time: 07/27/20  9:35 PM   Result Value Ref Range    POC Glucose 344 (H) 70 - 99 mg/dl    Performed on ACCU-CHEK    Basic Metabolic Panel w/ Reflex to MG    Collection Time: 07/28/20  5:30 AM   Result Value Ref Range    Sodium 139 136 - 145 mmol/L    Potassium reflex Magnesium 4.7 3.5 - 5.1 mmol/L    Chloride 101 99 - 110 mmol/L    CO2 11 (LL) 21 - 32 mmol/L    Anion Gap 27 (H) 3 - 16    Glucose 202 (H) 70 - 99 mg/dL    BUN 16 7 - 20 mg/dL    CREATININE 1.2 0.9 - 1.3 mg/dL    GFR Non-African American >60 >60    GFR African American >60 >60    Calcium 9.5 8.3 - 10.6 mg/dL   CBC auto differential    Collection Time: 07/28/20  5:30 AM   Result Value Ref Range    WBC 15.8 (H) 4.0 - 11.0 K/uL    RBC 4.30 4.20 - 5.90 M/uL    Hemoglobin 11.7 (L) 13.5 - 17.5 g/dL    Hematocrit 36.3 (L) 40.5 - 52.5 %    MCV 84.5 80.0 - 100.0 fL    MCH 27.2 26.0 - 34.0 pg    MCHC 32.2 31.0 - 36.0 g/dL    RDW 15.8 (H) 12.4 - 15.4 %    Platelets 595 577 - 447 K/uL    MPV 7.6 5.0 - 10.5 fL    PLATELET SLIDE REVIEW Adequate     Neutrophils % 80.4 %    Lymphocytes % 15.3 %    Monocytes % 2.9 %    Eosinophils % 0.4 %    Basophils % 1.0 %    Neutrophils Absolute 12.7 (H) 1.7 - 7.7 K/uL    Lymphocytes Absolute 2.4 1.0 - 5.1 K/uL    Monocytes Absolute 0.5 0.0 - 1.3 K/uL    Eosinophils Absolute 0.1 0.0 - 0.6 K/uL    Basophils Absolute 0.2 0.0 - 0.2 K/uL   Magnesium    Collection Time: 07/28/20  5:30 AM   Result Value Ref Range    Magnesium 1.90 1.80 - 2.40 mg/dL   Phosphorus    Collection Time: 07/28/20  5:30 AM   Result Value Ref Range Phosphorus 2.7 2.5 - 4.9 mg/dL   POCT Glucose    Collection Time: 07/28/20  6:08 AM   Result Value Ref Range    POC Glucose 224 (H) 70 - 99 mg/dl    Performed on ACCU-CHEK    POCT Glucose    Collection Time: 07/28/20  7:36 AM   Result Value Ref Range    POC Glucose 175 (H) 70 - 99 mg/dl    Performed on ACCU-CHEK    POCT Glucose    Collection Time: 07/28/20 12:24 PM   Result Value Ref Range    POC Glucose 187 (H) 70 - 99 mg/dl    Performed on ACCU-CHEK    POCT Glucose    Collection Time: 07/28/20  4:40 PM   Result Value Ref Range    POC Glucose 118 (H) 70 - 99 mg/dl    Performed on ACCU-CHEK    POCT Glucose    Collection Time: 07/28/20  9:25 PM   Result Value Ref Range    POC Glucose 243 (H) 70 - 99 mg/dl    Performed on ACCU-CHEK    POCT Glucose    Collection Time: 07/29/20 12:15 AM   Result Value Ref Range    POC Glucose 188 (H) 70 - 99 mg/dl    Performed on ACCU-CHEK    Basic Metabolic Panel w/ Reflex to MG    Collection Time: 07/29/20  4:00 AM   Result Value Ref Range    Sodium 143 136 - 145 mmol/L    Potassium reflex Magnesium 3.9 3.5 - 5.1 mmol/L    Chloride 106 99 - 110 mmol/L    CO2 17 (L) 21 - 32 mmol/L    Anion Gap 20 (H) 3 - 16    Glucose 109 (H) 70 - 99 mg/dL    BUN 12 7 - 20 mg/dL    CREATININE 0.9 0.9 - 1.3 mg/dL    GFR Non-African American >60 >60    GFR African American >60 >60    Calcium 9.3 8.3 - 10.6 mg/dL   CBC auto differential    Collection Time: 07/29/20  4:00 AM   Result Value Ref Range    WBC 11.2 (H) 4.0 - 11.0 K/uL    RBC 4.20 4. 20 - 5.90 M/uL    Hemoglobin 11.5 (L) 13.5 - 17.5 g/dL    Hematocrit 34.9 (L) 40.5 - 52.5 %    MCV 83.3 80.0 - 100.0 fL    MCH 27.4 26.0 - 34.0 pg    MCHC 32.9 31.0 - 36.0 g/dL    RDW 16.5 (H) 12.4 - 15.4 %    Platelets 022 560 - 701 K/uL    MPV 7.3 5.0 - 10.5 fL    Neutrophils % 70.6 %    Lymphocytes % 22.6 %    Monocytes % 4.0 %    Eosinophils % 2.1 %    Basophils % 0.7 %    Neutrophils Absolute 7.9 (H) 1.7 - 7.7 K/uL    Lymphocytes Absolute 2.5 1.0 - 5.1 K/uL Monocytes Absolute 0.4 0.0 - 1.3 K/uL    Eosinophils Absolute 0.2 0.0 - 0.6 K/uL    Basophils Absolute 0.1 0.0 - 0.2 K/uL   Magnesium    Collection Time: 07/29/20  4:00 AM   Result Value Ref Range    Magnesium 1.70 (L) 1.80 - 2.40 mg/dL   Phosphorus    Collection Time: 07/29/20  4:00 AM   Result Value Ref Range    Phosphorus 1.9 (L) 2.5 - 4.9 mg/dL   POCT Glucose    Collection Time: 07/29/20  4:37 AM   Result Value Ref Range    POC Glucose 111 (H) 70 - 99 mg/dl    Performed on ACCU-CHEK    POCT Glucose    Collection Time: 07/29/20  7:29 AM   Result Value Ref Range    POC Glucose 148 (H) 70 - 99 mg/dl    Performed on ACCU-CHEK    POCT Glucose    Collection Time: 07/29/20 11:28 AM   Result Value Ref Range    POC Glucose 158 (H) 70 - 99 mg/dl    Performed on ACCU-CHEK    POCT Glucose    Collection Time: 07/29/20  2:34 PM   Result Value Ref Range    POC Glucose 178 (H) 70 - 99 mg/dl    Performed on ACCU-CHEK    POCT Glucose    Collection Time: 07/29/20  4:36 PM   Result Value Ref Range    POC Glucose 186 (H) 70 - 99 mg/dl    Performed on ACCU-CHEK    POCT Glucose    Collection Time: 07/29/20  7:52 PM   Result Value Ref Range    POC Glucose 199 (H) 70 - 99 mg/dl    Performed on ACCU-CHEK    POCT Glucose    Collection Time: 07/30/20 12:11 AM   Result Value Ref Range    POC Glucose 167 (H) 70 - 99 mg/dl    Performed on ACCU-CHEK    POCT Glucose    Collection Time: 07/30/20  3:58 AM   Result Value Ref Range    POC Glucose 134 (H) 70 - 99 mg/dl    Performed on ACCU-CHEK    Basic Metabolic Panel w/ Reflex to MG    Collection Time: 07/30/20  6:10 AM   Result Value Ref Range    Sodium 144 136 - 145 mmol/L    Potassium reflex Magnesium 3.7 3.5 - 5.1 mmol/L    Chloride 105 99 - 110 mmol/L    CO2 19 (L) 21 - 32 mmol/L    Anion Gap 20 (H) 3 - 16    Glucose 153 (H) 70 - 99 mg/dL    BUN 11 7 - 20 mg/dL    CREATININE 0.9 0.9 - 1.3 mg/dL    GFR Non-African American >60 >60    GFR African American >60 >60    Calcium 9.5 8.3 - 10.6 mg/dL   CBC auto differential    Collection Time: 07/30/20  6:10 AM   Result Value Ref Range    WBC 8.5 4.0 - 11.0 K/uL    RBC 4.38 4.20 - 5.90 M/uL    Hemoglobin 12.0 (L) 13.5 - 17.5 g/dL    Hematocrit 36.1 (L) 40.5 - 52.5 %    MCV 82.5 80.0 - 100.0 fL    MCH 27.4 26.0 - 34.0 pg    MCHC 33.2 31.0 - 36.0 g/dL    RDW 16.9 (H) 12.4 - 15.4 %    Platelets 895 872 - 801 K/uL    MPV 7.2 5.0 - 10.5 fL    Neutrophils % 69.1 %    Lymphocytes % 23.5 %    Monocytes % 4.4 %    Eosinophils % 2.2 %    Basophils % 0.8 %    Neutrophils Absolute 5.9 1.7 - 7.7 K/uL    Lymphocytes Absolute 2.0 1.0 - 5.1 K/uL    Monocytes Absolute 0.4 0.0 - 1.3 K/uL    Eosinophils Absolute 0.2 0.0 - 0.6 K/uL    Basophils Absolute 0.1 0.0 - 0.2 K/uL   Magnesium    Collection Time: 07/30/20  6:10 AM   Result Value Ref Range    Magnesium 1.60 (L) 1.80 - 2.40 mg/dL   Phosphorus    Collection Time: 07/30/20  6:10 AM   Result Value Ref Range    Phosphorus 2.6 2.5 - 4.9 mg/dL   POCT Glucose    Collection Time: 07/30/20  8:48 AM   Result Value Ref Range    POC Glucose 150 (H) 70 - 99 mg/dl    Performed on ACCU-CHEK    POCT Glucose    Collection Time: 07/30/20 11:20 AM   Result Value Ref Range    POC Glucose 152 (H) 70 - 99 mg/dl    Performed on ACCU-CHEK        Current Facility-Administered Medications   Medication Dose Route Frequency Provider Last Rate Last Dose    OLANZapine (ZYPREXA) tablet 5 mg  5 mg Oral Nightly Kerry Calle MD   5 mg at 07/29/20 2059    insulin glargine (LANTUS) injection vial 10 Units  10 Units Subcutaneous Nightly Jw Larios MD   10 Units at 07/29/20 2059    OLANZapine (ZYPREXA) injection 5 mg  5 mg Intramuscular Q8H PRN Jw Lairos MD   5 mg at 07/26/20 2147    losartan (COZAAR) tablet 100 mg  100 mg Oral Daily Jw Larios MD   Stopped at 07/29/20 1117    ampicillin-sulbactam (UNASYN) 1.5 g IVPB minibag  1.5 g Intravenous Q6H Anthony Manzanares MD   Stopped at 07/30/20 0640    insulin lispro (HUMALOG) APRN - CNP         ROS:  Could not assess, no speech. MSE:  A-under covers, rocks a little bit to stimulation  A-sleepy  M-can't assess, no speech  S-impaired  I/J-impaired  T-no speech. No resp to int stim. Recs:  1. AMS-I've d/w Neuro, they're going to consider some repeat imaging and w/u to see if maybe there's an evolving issue. I note pt's urinary retention at times? It would be nice to know if he's consistently retaining. Sometimes this can affect mental status, though admittedly that happens more in older people (see the phenomenon of \"cystocerebral syndrome. \")

## 2020-07-30 NOTE — PROGRESS NOTES
Patient's mother and brother present at bedside; Dr. Phylicia Reyes is at bedside as well. Patient does not interact with family members.  Electronically signed by Lucretia Sanchez RN on 7/30/2020 at 4:56 PM

## 2020-07-30 NOTE — PROGRESS NOTES
Patient noted withdrawn this morning. He opened his eyes several times; turned and followed directions when this nurse asked if he could stick his hand out to administer IV medicine. This nurse oriented the patient to date, placement, situation, and time. This nurse notified the patient that his mother called and asked him if he wants to talk to her, patient nodded his head \"yes. \" PT/OT and a sitter in the room observing. This nurse clarified \"did you hear that I said that you mom called, do you want to talk to her? \" Patient nodded his head \"yes\" again. This nurse left the room. Therapy and a sitter are at bedside.  Electronically signed by Laura Delatorre RN on 7/30/2020 at 10:21 AM

## 2020-07-30 NOTE — PROGRESS NOTES
Physical Therapy Attempt    Attempted to see patient for therapy. Pt sleeping on couch upon entry. Attempted to engage patient in therapy and encourage participation. Pt opened eyes briefly and sat up and switched sides of the couch but did not respond to therapist's questions. Sitter present in room and reports patient able to walk in room and take self to bathroom independently. Will continue to follow and attempt as schedule permits.     Clark Valencia,   [facility]

## 2020-07-30 NOTE — PROGRESS NOTES
Patient refuses meals/drinks. Impulsive, gets OOB without letting staff know. Gait is steady. Attempts to refuse care as FSBS checks hiding his hands under his body, requires multiple educate.  Electronically signed by Enrique Arora RN on 7/30/2020 at 6:16 PM

## 2020-07-30 NOTE — CARE COORDINATION
7/30 Call placed to wife, Bret Doran regarding  snf placement. The Plan for Transition of Care is related to the following treatment goals: Rehab    The Patient and/or patient representative Bret Doran (Wife) was provided with a choice of provider and agrees   with the discharge plan. [x] Yes [] No    Freedom of choice list was provided with basic dialogue that supports the patient's individualized plan of care/goals, treatment preferences and shares the quality data associated with the providers. [x] Yes [] No     Wife has chosen several facilities in her living area. Referrals will be placed. Electronically signed by Chasity Reyna RN on 7/30/2020 at 8:45 AM

## 2020-07-30 NOTE — PLAN OF CARE
Problem: Nutrition  Goal: Optimal nutrition therapy  Outcome: Ongoing     Problem: Skin Integrity:  Goal: Will show no infection signs and symptoms  Description: Will show no infection signs and symptoms  Outcome: Ongoing     Problem: Falls - Risk of:  Goal: Will remain free from falls  Description: Will remain free from falls  Outcome: Ongoing

## 2020-07-30 NOTE — PLAN OF CARE
Problem: Nutrition  Goal: Optimal nutrition therapy  7/30/2020 1307 by Bartolome Schulz RD, LD  Outcome: Ongoing  7/30/2020 0446 by Joy Jaquez RN  Outcome: Ongoing   Nutrition Problem #1: Inadequate oral intake  Intervention: Food and/or Nutrient Delivery: Continue Current Diet, Discontinue Oral Nutrition Supplement  Nutritional Goals:  Tolerate most appropriate nutrition therapy

## 2020-07-31 ENCOUNTER — APPOINTMENT (OUTPATIENT)
Dept: MRI IMAGING | Age: 45
DRG: 812 | End: 2020-07-31
Payer: MEDICAID

## 2020-07-31 LAB
ANION GAP SERPL CALCULATED.3IONS-SCNC: 17 MMOL/L (ref 3–16)
BASOPHILS ABSOLUTE: 0.1 K/UL (ref 0–0.2)
BASOPHILS RELATIVE PERCENT: 1.3 %
BUN BLDV-MCNC: 10 MG/DL (ref 7–20)
CALCIUM SERPL-MCNC: 9.4 MG/DL (ref 8.3–10.6)
CHLORIDE BLD-SCNC: 111 MMOL/L (ref 99–110)
CO2: 21 MMOL/L (ref 21–32)
CREAT SERPL-MCNC: 0.7 MG/DL (ref 0.9–1.3)
EOSINOPHILS ABSOLUTE: 0.2 K/UL (ref 0–0.6)
EOSINOPHILS RELATIVE PERCENT: 2.8 %
GFR AFRICAN AMERICAN: >60
GFR NON-AFRICAN AMERICAN: >60
GLUCOSE BLD-MCNC: 145 MG/DL (ref 70–99)
GLUCOSE BLD-MCNC: 151 MG/DL (ref 70–99)
GLUCOSE BLD-MCNC: 151 MG/DL (ref 70–99)
GLUCOSE BLD-MCNC: 169 MG/DL (ref 70–99)
GLUCOSE BLD-MCNC: 170 MG/DL (ref 70–99)
GLUCOSE BLD-MCNC: 171 MG/DL (ref 70–99)
GLUCOSE BLD-MCNC: 185 MG/DL (ref 70–99)
GLUCOSE BLD-MCNC: 271 MG/DL (ref 70–99)
HCT VFR BLD CALC: 34.7 % (ref 40.5–52.5)
HEMOGLOBIN: 11.7 G/DL (ref 13.5–17.5)
LYMPHOCYTES ABSOLUTE: 2.1 K/UL (ref 1–5.1)
LYMPHOCYTES RELATIVE PERCENT: 26.5 %
MAGNESIUM: 1.7 MG/DL (ref 1.8–2.4)
MCH RBC QN AUTO: 28 PG (ref 26–34)
MCHC RBC AUTO-ENTMCNC: 33.6 G/DL (ref 31–36)
MCV RBC AUTO: 83.2 FL (ref 80–100)
MONOCYTES ABSOLUTE: 0.4 K/UL (ref 0–1.3)
MONOCYTES RELATIVE PERCENT: 5.1 %
NEUTROPHILS ABSOLUTE: 5 K/UL (ref 1.7–7.7)
NEUTROPHILS RELATIVE PERCENT: 64.3 %
PDW BLD-RTO: 16.3 % (ref 12.4–15.4)
PERFORMED ON: ABNORMAL
PHOSPHORUS: 3.2 MG/DL (ref 2.5–4.9)
PLATELET # BLD: 342 K/UL (ref 135–450)
PMV BLD AUTO: 7.3 FL (ref 5–10.5)
POTASSIUM REFLEX MAGNESIUM: 3.8 MMOL/L (ref 3.5–5.1)
RBC # BLD: 4.17 M/UL (ref 4.2–5.9)
SODIUM BLD-SCNC: 149 MMOL/L (ref 136–145)
WBC # BLD: 7.8 K/UL (ref 4–11)

## 2020-07-31 PROCEDURE — 99232 SBSQ HOSP IP/OBS MODERATE 35: CPT | Performed by: PSYCHIATRY & NEUROLOGY

## 2020-07-31 PROCEDURE — 1200000000 HC SEMI PRIVATE

## 2020-07-31 PROCEDURE — C9113 INJ PANTOPRAZOLE SODIUM, VIA: HCPCS | Performed by: NURSE PRACTITIONER

## 2020-07-31 PROCEDURE — 84100 ASSAY OF PHOSPHORUS: CPT

## 2020-07-31 PROCEDURE — 85025 COMPLETE CBC W/AUTO DIFF WBC: CPT

## 2020-07-31 PROCEDURE — 2580000003 HC RX 258: Performed by: NURSE PRACTITIONER

## 2020-07-31 PROCEDURE — 6360000002 HC RX W HCPCS: Performed by: INTERNAL MEDICINE

## 2020-07-31 PROCEDURE — 6360000002 HC RX W HCPCS: Performed by: PSYCHIATRY & NEUROLOGY

## 2020-07-31 PROCEDURE — 6360000002 HC RX W HCPCS: Performed by: NURSE PRACTITIONER

## 2020-07-31 PROCEDURE — 6370000000 HC RX 637 (ALT 250 FOR IP): Performed by: INTERNAL MEDICINE

## 2020-07-31 PROCEDURE — 80048 BASIC METABOLIC PNL TOTAL CA: CPT

## 2020-07-31 PROCEDURE — 6370000000 HC RX 637 (ALT 250 FOR IP): Performed by: NURSE PRACTITIONER

## 2020-07-31 PROCEDURE — 36592 COLLECT BLOOD FROM PICC: CPT

## 2020-07-31 PROCEDURE — 83735 ASSAY OF MAGNESIUM: CPT

## 2020-07-31 RX ORDER — LORAZEPAM 2 MG/ML
2 INJECTION INTRAMUSCULAR ONCE
Status: COMPLETED | OUTPATIENT
Start: 2020-07-31 | End: 2020-07-31

## 2020-07-31 RX ORDER — MAGNESIUM SULFATE IN WATER 40 MG/ML
2 INJECTION, SOLUTION INTRAVENOUS ONCE
Status: COMPLETED | OUTPATIENT
Start: 2020-07-31 | End: 2020-07-31

## 2020-07-31 RX ORDER — RISPERIDONE 1 MG/1
1 TABLET, FILM COATED ORAL 2 TIMES DAILY
Status: DISCONTINUED | OUTPATIENT
Start: 2020-07-31 | End: 2020-07-31

## 2020-07-31 RX ADMIN — SODIUM CHLORIDE, PRESERVATIVE FREE 10 ML: 5 INJECTION INTRAVENOUS at 05:45

## 2020-07-31 RX ADMIN — INSULIN LISPRO 2 UNITS: 100 INJECTION, SOLUTION INTRAVENOUS; SUBCUTANEOUS at 05:48

## 2020-07-31 RX ADMIN — ENOXAPARIN SODIUM 40 MG: 40 INJECTION SUBCUTANEOUS at 10:03

## 2020-07-31 RX ADMIN — Medication 10 ML: at 10:05

## 2020-07-31 RX ADMIN — PANTOPRAZOLE SODIUM 40 MG: 40 INJECTION, POWDER, FOR SOLUTION INTRAVENOUS at 10:03

## 2020-07-31 RX ADMIN — MAGNESIUM SULFATE 2 G: 2 INJECTION INTRAVENOUS at 10:00

## 2020-07-31 RX ADMIN — LORAZEPAM 2 MG: 2 INJECTION INTRAMUSCULAR; INTRAVENOUS at 13:59

## 2020-07-31 RX ADMIN — INSULIN LISPRO 2 UNITS: 100 INJECTION, SOLUTION INTRAVENOUS; SUBCUTANEOUS at 10:00

## 2020-07-31 RX ADMIN — INSULIN LISPRO 2 UNITS: 100 INJECTION, SOLUTION INTRAVENOUS; SUBCUTANEOUS at 22:02

## 2020-07-31 RX ADMIN — INSULIN GLARGINE 10 UNITS: 100 INJECTION, SOLUTION SUBCUTANEOUS at 22:02

## 2020-07-31 RX ADMIN — INSULIN LISPRO 6 UNITS: 100 INJECTION, SOLUTION INTRAVENOUS; SUBCUTANEOUS at 17:25

## 2020-07-31 RX ADMIN — SODIUM CHLORIDE, PRESERVATIVE FREE 10 ML: 5 INJECTION INTRAVENOUS at 10:05

## 2020-07-31 RX ADMIN — INSULIN LISPRO 2 UNITS: 100 INJECTION, SOLUTION INTRAVENOUS; SUBCUTANEOUS at 11:54

## 2020-07-31 NOTE — PROGRESS NOTES
Call received from EEG re:unable to perform EEG since patient did not tolerate MRI. NP [place] notified.  Electronically signed by Govind Colunga RN on 7/31/2020 at 10:31 AM

## 2020-07-31 NOTE — PROGRESS NOTES
Ativan administered as ordered. Observed the patient at bedside for next 30 minutes. Patient continued resting with eyes closed and no obvious changes in behavior. In two hours, patient suddenly opened his eyes and got OOB; began pacing around the room. This nurse asked the patient if he wants to drink, patient stated \"yes\" and smiled. Patient tolerated two full glasses of drink. Patient then was asked if he needs to use the bathroom, patient got up and walked to the bathroom and sat on toilet. Urinated large amount. Then patient was reminded to wash his hands; patient walked to the sink and took a bar of soap and rubbed his hands. Needed reminders to rinse his hands, otherwise he would have walked out with his hands soapy. At this time, patient's family members are at bedside. Sitter is at bedside as well.  Electronically signed by Govind Colunga RN on 7/31/2020 at 4:54 PM

## 2020-07-31 NOTE — PROGRESS NOTES
Progress Note    Updates  Per chart, the patient's brother tried to feed him last night and he was able to verbalize \"stop Nader\". Also, when his mother saw him walking she said she was glad to see him up to which he stated \"good mom\". He is not eating or drinking. He would not interact w/ me, turning over to the opposite side.       Active Ambulatory Problems     Diagnosis Date Noted    Diabetic ketoacidosis without coma associated with diabetes mellitus due to underlying condition ([address]     Chronic abdominal pain 04/12/2010    Gastroparesis 04/12/2010    GERD (gastroesophageal reflux disease) 01/09/2012    Seizure ([address] 07/25/2012    Toe deformity 07/11/2017    Uncontrolled type 1 diabetes mellitus with diabetic peripheral neuropathy ([address] 09/19/2018    Type 1 diabetes mellitus with background diabetic retinopathy ([address] 09/19/2018    Hypoglycemia unawareness in type 1 diabetes mellitus ([address] 09/19/2018    Type 1 diabetes mellitus with hypercholesterolemia ([address] 09/19/2018    Accelerated hypertension 12/07/2013    Acute blood loss anemia 09/25/2018    Acute respiratory failure ([address] 01/08/2014    Candida esophagitis ([address] 09/11/2015    Cholelithiasis 12/10/2013    Cocaine abuse, episodic ([address] 12/08/2013    Cerebral infarction ([address] 01/08/2014    Diabetic polyneuropathy ([address] 01/06/2012    Duodenal ulcer 09/11/2015    Heart failure, diastolic, acute ([address] 13/90/9825    Leg weakness, bilateral 01/08/2014    Cannabis abuse 12/08/2013    Numbness in both legs 01/08/2014    Polysubstance abuse ([address] 09/11/2014    Pulmonary edema 01/08/2014    Diabetic foot ulcer ([address] 05/04/2019    Heart murmur 08/29/2019    Hyperlipidemia 03/25/2020    Osteomyelitis of great toe of right foot ([address] 05/12/2020    Epigastric abdominal pain 05/12/2020    Hypertensive urgency 05/12/2020    Diabetic gastroparesis associated with type 1 diabetes mellitus ([address] 05/26/2020     Past Surgical History: Procedure Laterality Date    BONY PELVIS SURGERY      FOOT AMPUTATION Right 5/13/2020    EMERGENCY; RIGHT INCISION AND DEBRIDEMENT; HALUX AMPUTATION performed by Jennifer Toro DPM at [address] 2006    pins and rods- plate    UPPER GASTROINTESTINAL ENDOSCOPY N/A 12/2/2019    EGD BIOPSY performed by Jennifer Ramos MD at [facility]     Current Facility-Administered Medications:     LORazepam (ATIVAN) injection 2 mg, 2 mg, Intravenous, Once, Beverley Logan MD    OLANZapine [facility]) tablet 5 mg, 5 mg, Oral, Nightly, Renaldo Hardy MD, 5 mg at 07/29/20 2059    insulin glargine (LANTUS) injection vial 10 Units, 10 Units, Subcutaneous, Nightly, Veronika Chen MD, Stopped at 07/30/20 2100    OLANZapine (ZYPREXA) injection 5 mg, 5 mg, Intramuscular, Q8H PRN, Veronika Chen MD, 5 mg at 07/26/20 2147    losartan (COZAAR) tablet 100 mg, 100 mg, Oral, Daily, Veronika Chen MD, Stopped at 07/29/20 1117    insulin lispro (HUMALOG) injection vial 0-12 Units, 0-12 Units, Subcutaneous, Q4H, ESTEPHANIA Cintron CNP, 2 Units at 07/31/20 1154    sodium chloride flush 0.9 % injection 10 mL, 10 mL, Intravenous, 2 times per day, ESTEPHANIA Cintron CNP, 10 mL at 07/31/20 1005    sodium chloride flush 0.9 % injection 10 mL, 10 mL, Intravenous, PRN, ESTEPHANIA Cintron CNP    acetaminophen (TYLENOL) tablet 650 mg, 650 mg, Oral, Q4H PRN, 650 mg at 07/24/20 0537 **OR** acetaminophen (TYLENOL) suppository 650 mg, 650 mg, Rectal, Q4H PRN, Jigna Jarvis MD    polyethylene glycol [facility]) packet 17 g, 17 g, Oral, Daily PRN, Jigna Jarvis MD    ondansetron [facility]) injection 4 mg, 4 mg, Intravenous, Q4H PRN, Jigna Jarvis MD    glucose (GLUTOSE) 40 % oral gel 15 g, 15 g, Oral, PRN, Jigna Jarvis MD    dextrose 50 % IV solution, 12.5 g, Intravenous, PRN, Jigna Jarvis MD, 12.5 g at 07/16/20 1955    glucagon (rDNA) injection 1 mg, 1 mg, Intramuscular, PRN, Lum Limes Diane Lombardi MD    dextrose 5 % solution, 100 mL/hr, Intravenous, PRN, Marleny Engel MD    enoxaparin (LOVENOX) injection 40 mg, 40 mg, Subcutaneous, Daily, Marleny Engel MD, 40 mg at 07/31/20 1003    pantoprazole (PROTONIX) injection 40 mg, 40 mg, Intravenous, Daily, 40 mg at 07/31/20 1003 **AND** sodium chloride (PF) 0.9 % injection 10 mL, 10 mL, Intravenous, Daily, ESTEPHANIA Concepcion - CNP, 10 mL at 07/31/20 1005    magnesium sulfate 1 g in dextrose 5% 100 mL IVPB, 1 g, Intravenous, PRN, ESTEPHANIA Concepcion - CNP, Stopped at 07/30/20 1502    potassium chloride 20 mEq/50 mL IVPB (Central Line), 20 mEq, Intravenous, PRN, ESTEPHANIA Concepcion - CNP, Last Rate: 50 mL/hr at 07/24/20 2353, 20 mEq at 07/24/20 2353    sodium phosphate 11.07 mmol in dextrose 5 % 250 mL IVPB, 0.16 mmol/kg, Intravenous, PRN **OR** sodium phosphate 22.11 mmol in dextrose 5 % 250 mL IVPB, 0.32 mmol/kg, Intravenous, PRN, ESTEPHANIA Concepcion - CNP    Exam  Blood pressure (!) 150/103, pulse 75, temperature 97.6 °F (36.4 °C), temperature source Axillary, resp. rate 14, height 6' 2\" (1.88 m), weight 134 lb 14.7 oz (61.2 kg), SpO2 99 %. Constitutional                          Vital signs: BP, HR, and RR reviewed            General awake, no distress  Eyes: unable to visualize the fundi  Cardiovascular: pulses symmetric in all 4 extremities. Psychiatric: uncooperative w/ exam.   Neurologic  Mental status:   Orientation moris due to encephalopathy. Attention  poor              Language not verbalizing at the moment . Comprehension not following simple commands  Cranial nerves:   CN2: blink to threat bilaterally. CN 3,4,6: tracks. Will not allow me to assess pupillary response. CN7: face symmetric  CN8: hearing grossly intact  CN11: trap strength moris. CN12: tongue protrusion moris. Strength: moves all four limbs w/out any noticeable focal paresis. Sensory: moris.    Cerebellar/coordination: difficult to Psych and possible seizures. 4.  Discussed w/ Dr. Frank Gamboa and Nesha NP. He will try ativan challenge. 5.  cvEEG could be a consideration if he does not respond.       Jacky Ganser NP  [address] [place] [address] [id number] Neurology    A copy of this note was provided for Dr Giovanna Saint, MD

## 2020-07-31 NOTE — PROGRESS NOTES
Time: 07/25/20  6:20 AM   Result Value Ref Range    WBC 8.9 4.0 - 11.0 K/uL    RBC 4.14 (L) 4.20 - 5.90 M/uL    Hemoglobin 11.4 (L) 13.5 - 17.5 g/dL    Hematocrit 34.6 (L) 40.5 - 52.5 %    MCV 83.6 80.0 - 100.0 fL    MCH 27.5 26.0 - 34.0 pg    MCHC 33.0 31.0 - 36.0 g/dL    RDW 16.1 (H) 12.4 - 15.4 %    Platelets 017 060 - 006 K/uL    MPV 8.2 5.0 - 10.5 fL    Neutrophils % 64.3 %    Lymphocytes % 26.4 %    Monocytes % 5.2 %    Eosinophils % 3.2 %    Basophils % 0.9 %    Neutrophils Absolute 5.7 1.7 - 7.7 K/uL    Lymphocytes Absolute 2.3 1.0 - 5.1 K/uL    Monocytes Absolute 0.5 0.0 - 1.3 K/uL    Eosinophils Absolute 0.3 0.0 - 0.6 K/uL    Basophils Absolute 0.1 0.0 - 0.2 K/uL   Magnesium    Collection Time: 07/25/20  6:20 AM   Result Value Ref Range    Magnesium 2.20 1.80 - 2.40 mg/dL   Phosphorus    Collection Time: 07/25/20  6:20 AM   Result Value Ref Range    Phosphorus 2.6 2.5 - 4.9 mg/dL   POCT Glucose    Collection Time: 07/25/20 11:45 AM   Result Value Ref Range    POC Glucose 234 (H) 70 - 99 mg/dl    Performed on ACCU-CHEK    POCT Glucose    Collection Time: 07/25/20  4:15 PM   Result Value Ref Range    POC Glucose 187 (H) 70 - 99 mg/dl    Performed on ACCU-CHEK    POCT Glucose    Collection Time: 07/25/20  8:29 PM   Result Value Ref Range    POC Glucose 289 (H) 70 - 99 mg/dl    Performed on ACCU-CHEK    POCT Glucose    Collection Time: 07/26/20 12:29 AM   Result Value Ref Range    POC Glucose 237 (H) 70 - 99 mg/dl    Performed on ACCU-CHEK    POCT Glucose    Collection Time: 07/26/20  5:08 AM   Result Value Ref Range    POC Glucose 90 70 - 99 mg/dl    Performed on ACCU-CHEK    Basic Metabolic Panel w/ Reflex to MG    Collection Time: 07/26/20  6:34 AM   Result Value Ref Range    Sodium 141 136 - 145 mmol/L    Potassium reflex Magnesium 3.8 3.5 - 5.1 mmol/L    Chloride 102 99 - 110 mmol/L    CO2 25 21 - 32 mmol/L    Anion Gap 14 3 - 16    Glucose 73 70 - 99 mg/dL    BUN 14 7 - 20 mg/dL    CREATININE 0.6 (L) 0.9 - 1.3 mg/dL    GFR Non-African American >60 >60    GFR African American >60 >60    Calcium 9.0 8.3 - 10.6 mg/dL   CBC auto differential    Collection Time: 07/26/20  6:34 AM   Result Value Ref Range    WBC 8.4 4.0 - 11.0 K/uL    RBC 3.99 (L) 4.20 - 5.90 M/uL    Hemoglobin 11.0 (L) 13.5 - 17.5 g/dL    Hematocrit 32.9 (L) 40.5 - 52.5 %    MCV 82.6 80.0 - 100.0 fL    MCH 27.5 26.0 - 34.0 pg    MCHC 33.4 31.0 - 36.0 g/dL    RDW 15.8 (H) 12.4 - 15.4 %    Platelets 824 577 - 862 K/uL    MPV 7.9 5.0 - 10.5 fL    Neutrophils % 66.4 %    Lymphocytes % 25.2 %    Monocytes % 5.2 %    Eosinophils % 2.5 %    Basophils % 0.7 %    Neutrophils Absolute 5.6 1.7 - 7.7 K/uL    Lymphocytes Absolute 2.1 1.0 - 5.1 K/uL    Monocytes Absolute 0.4 0.0 - 1.3 K/uL    Eosinophils Absolute 0.2 0.0 - 0.6 K/uL    Basophils Absolute 0.1 0.0 - 0.2 K/uL   Magnesium    Collection Time: 07/26/20  6:34 AM   Result Value Ref Range    Magnesium 2.00 1.80 - 2.40 mg/dL   Phosphorus    Collection Time: 07/26/20  6:34 AM   Result Value Ref Range    Phosphorus 3.1 2.5 - 4.9 mg/dL   POCT Glucose    Collection Time: 07/27/20  4:43 AM   Result Value Ref Range    POC Glucose 414 (H) 70 - 99 mg/dl    Performed on ACCU-CHEK    Basic Metabolic Panel w/ Reflex to MG    Collection Time: 07/27/20  5:00 AM   Result Value Ref Range    Sodium 139 136 - 145 mmol/L    Potassium reflex Magnesium 5.0 3.5 - 5.1 mmol/L    Chloride 97 (L) 99 - 110 mmol/L    CO2 9 (LL) 21 - 32 mmol/L    Anion Gap 33 (H) 3 - 16    Glucose 362 (H) 70 - 99 mg/dL    BUN 15 7 - 20 mg/dL    CREATININE 1.0 0.9 - 1.3 mg/dL    GFR Non-African American >60 >60    GFR African American >60 >60    Calcium 9.6 8.3 - 10.6 mg/dL   CBC auto differential    Collection Time: 07/27/20  5:00 AM   Result Value Ref Range    WBC 10.7 4.0 - 11.0 K/uL    RBC 3.95 (L) 4.20 - 5.90 M/uL    Hemoglobin 10.8 (L) 13.5 - 17.5 g/dL    Hematocrit 33.6 (L) 40.5 - 52.5 %    MCV 85.0 80.0 - 100.0 fL    MCH 27.5 26.0 - 34.0 pg    MCHC 32.3 31.0 - 36.0 g/dL    RDW 15.5 (H) 12.4 - 15.4 %    Platelets 493 767 - 828 K/uL    MPV 8.6 5.0 - 10.5 fL    Neutrophils % 67.3 %    Lymphocytes % 26.1 %    Monocytes % 4.2 %    Eosinophils % 1.8 %    Basophils % 0.6 %    Neutrophils Absolute 7.2 1.7 - 7.7 K/uL    Lymphocytes Absolute 2.8 1.0 - 5.1 K/uL    Monocytes Absolute 0.5 0.0 - 1.3 K/uL    Eosinophils Absolute 0.2 0.0 - 0.6 K/uL    Basophils Absolute 0.1 0.0 - 0.2 K/uL   Magnesium    Collection Time: 07/27/20  5:00 AM   Result Value Ref Range    Magnesium 2.00 1.80 - 2.40 mg/dL   Phosphorus    Collection Time: 07/27/20  5:00 AM   Result Value Ref Range    Phosphorus 2.8 2.5 - 4.9 mg/dL   POCT Glucose    Collection Time: 07/27/20 10:23 AM   Result Value Ref Range    POC Glucose 226 (H) 70 - 99 mg/dl    Performed on ACCU-CHEK    POCT Glucose    Collection Time: 07/27/20  2:33 PM   Result Value Ref Range    POC Glucose 218 (H) 70 - 99 mg/dl    Performed on ACCU-CHEK    POCT Glucose    Collection Time: 07/27/20  9:35 PM   Result Value Ref Range    POC Glucose 344 (H) 70 - 99 mg/dl    Performed on ACCU-CHEK    Basic Metabolic Panel w/ Reflex to MG    Collection Time: 07/28/20  5:30 AM   Result Value Ref Range    Sodium 139 136 - 145 mmol/L    Potassium reflex Magnesium 4.7 3.5 - 5.1 mmol/L    Chloride 101 99 - 110 mmol/L    CO2 11 (LL) 21 - 32 mmol/L    Anion Gap 27 (H) 3 - 16    Glucose 202 (H) 70 - 99 mg/dL    BUN 16 7 - 20 mg/dL    CREATININE 1.2 0.9 - 1.3 mg/dL    GFR Non-African American >60 >60    GFR African American >60 >60    Calcium 9.5 8.3 - 10.6 mg/dL   CBC auto differential    Collection Time: 07/28/20  5:30 AM   Result Value Ref Range    WBC 15.8 (H) 4.0 - 11.0 K/uL    RBC 4.30 4.20 - 5.90 M/uL    Hemoglobin 11.7 (L) 13.5 - 17.5 g/dL    Hematocrit 36.3 (L) 40.5 - 52.5 %    MCV 84.5 80.0 - 100.0 fL    MCH 27.2 26.0 - 34.0 pg    MCHC 32.2 31.0 - 36.0 g/dL    RDW 15.8 (H) 12.4 - 15.4 %    Platelets 840 396 - 169 K/uL    MPV 7.6 5.0 - 10.5 fL PLATELET SLIDE REVIEW Adequate     Neutrophils % 80.4 %    Lymphocytes % 15.3 %    Monocytes % 2.9 %    Eosinophils % 0.4 %    Basophils % 1.0 %    Neutrophils Absolute 12.7 (H) 1.7 - 7.7 K/uL    Lymphocytes Absolute 2.4 1.0 - 5.1 K/uL    Monocytes Absolute 0.5 0.0 - 1.3 K/uL    Eosinophils Absolute 0.1 0.0 - 0.6 K/uL    Basophils Absolute 0.2 0.0 - 0.2 K/uL   Magnesium    Collection Time: 07/28/20  5:30 AM   Result Value Ref Range    Magnesium 1.90 1.80 - 2.40 mg/dL   Phosphorus    Collection Time: 07/28/20  5:30 AM   Result Value Ref Range    Phosphorus 2.7 2.5 - 4.9 mg/dL   POCT Glucose    Collection Time: 07/28/20  6:08 AM   Result Value Ref Range    POC Glucose 224 (H) 70 - 99 mg/dl    Performed on ACCU-CHEK    POCT Glucose    Collection Time: 07/28/20  7:36 AM   Result Value Ref Range    POC Glucose 175 (H) 70 - 99 mg/dl    Performed on ACCU-CHEK    POCT Glucose    Collection Time: 07/28/20 12:24 PM   Result Value Ref Range    POC Glucose 187 (H) 70 - 99 mg/dl    Performed on ACCU-CHEK    POCT Glucose    Collection Time: 07/28/20  4:40 PM   Result Value Ref Range    POC Glucose 118 (H) 70 - 99 mg/dl    Performed on ACCU-CHEK    POCT Glucose    Collection Time: 07/28/20  9:25 PM   Result Value Ref Range    POC Glucose 243 (H) 70 - 99 mg/dl    Performed on ACCU-CHEK    POCT Glucose    Collection Time: 07/29/20 12:15 AM   Result Value Ref Range    POC Glucose 188 (H) 70 - 99 mg/dl    Performed on ACCU-CHEK    Basic Metabolic Panel w/ Reflex to MG    Collection Time: 07/29/20  4:00 AM   Result Value Ref Range    Sodium 143 136 - 145 mmol/L    Potassium reflex Magnesium 3.9 3.5 - 5.1 mmol/L    Chloride 106 99 - 110 mmol/L    CO2 17 (L) 21 - 32 mmol/L    Anion Gap 20 (H) 3 - 16    Glucose 109 (H) 70 - 99 mg/dL    BUN 12 7 - 20 mg/dL    CREATININE 0.9 0.9 - 1.3 mg/dL    GFR Non-African American >60 >60    GFR African American >60 >60    Calcium 9.3 8.3 - 10.6 mg/dL   CBC auto differential    Collection Time: 07/29/20 4:00 AM   Result Value Ref Range    WBC 11.2 (H) 4.0 - 11.0 K/uL    RBC 4.20 4. 20 - 5.90 M/uL    Hemoglobin 11.5 (L) 13.5 - 17.5 g/dL    Hematocrit 34.9 (L) 40.5 - 52.5 %    MCV 83.3 80.0 - 100.0 fL    MCH 27.4 26.0 - 34.0 pg    MCHC 32.9 31.0 - 36.0 g/dL    RDW 16.5 (H) 12.4 - 15.4 %    Platelets 250 496 - 573 K/uL    MPV 7.3 5.0 - 10.5 fL    Neutrophils % 70.6 %    Lymphocytes % 22.6 %    Monocytes % 4.0 %    Eosinophils % 2.1 %    Basophils % 0.7 %    Neutrophils Absolute 7.9 (H) 1.7 - 7.7 K/uL    Lymphocytes Absolute 2.5 1.0 - 5.1 K/uL    Monocytes Absolute 0.4 0.0 - 1.3 K/uL    Eosinophils Absolute 0.2 0.0 - 0.6 K/uL    Basophils Absolute 0.1 0.0 - 0.2 K/uL   Magnesium    Collection Time: 07/29/20  4:00 AM   Result Value Ref Range    Magnesium 1.70 (L) 1.80 - 2.40 mg/dL   Phosphorus    Collection Time: 07/29/20  4:00 AM   Result Value Ref Range    Phosphorus 1.9 (L) 2.5 - 4.9 mg/dL   POCT Glucose    Collection Time: 07/29/20  4:37 AM   Result Value Ref Range    POC Glucose 111 (H) 70 - 99 mg/dl    Performed on ACCU-CHEK    POCT Glucose    Collection Time: 07/29/20  7:29 AM   Result Value Ref Range    POC Glucose 148 (H) 70 - 99 mg/dl    Performed on ACCU-CHEK    POCT Glucose    Collection Time: 07/29/20 11:28 AM   Result Value Ref Range    POC Glucose 158 (H) 70 - 99 mg/dl    Performed on ACCU-CHEK    POCT Glucose    Collection Time: 07/29/20  2:34 PM   Result Value Ref Range    POC Glucose 178 (H) 70 - 99 mg/dl    Performed on ACCU-CHEK    POCT Glucose    Collection Time: 07/29/20  4:36 PM   Result Value Ref Range    POC Glucose 186 (H) 70 - 99 mg/dl    Performed on ACCU-CHEK    POCT Glucose    Collection Time: 07/29/20  7:52 PM   Result Value Ref Range    POC Glucose 199 (H) 70 - 99 mg/dl    Performed on ACCU-CHEK    POCT Glucose    Collection Time: 07/30/20 12:11 AM   Result Value Ref Range    POC Glucose 167 (H) 70 - 99 mg/dl    Performed on ACCU-CHEK    POCT Glucose    Collection Time: 07/30/20  3:58 AM Result Value Ref Range    POC Glucose 134 (H) 70 - 99 mg/dl    Performed on ACCU-CHEK    Basic Metabolic Panel w/ Reflex to MG    Collection Time: 07/30/20  6:10 AM   Result Value Ref Range    Sodium 144 136 - 145 mmol/L    Potassium reflex Magnesium 3.7 3.5 - 5.1 mmol/L    Chloride 105 99 - 110 mmol/L    CO2 19 (L) 21 - 32 mmol/L    Anion Gap 20 (H) 3 - 16    Glucose 153 (H) 70 - 99 mg/dL    BUN 11 7 - 20 mg/dL    CREATININE 0.9 0.9 - 1.3 mg/dL    GFR Non-African American >60 >60    GFR African American >60 >60    Calcium 9.5 8.3 - 10.6 mg/dL   CBC auto differential    Collection Time: 07/30/20  6:10 AM   Result Value Ref Range    WBC 8.5 4.0 - 11.0 K/uL    RBC 4.38 4.20 - 5.90 M/uL    Hemoglobin 12.0 (L) 13.5 - 17.5 g/dL    Hematocrit 36.1 (L) 40.5 - 52.5 %    MCV 82.5 80.0 - 100.0 fL    MCH 27.4 26.0 - 34.0 pg    MCHC 33.2 31.0 - 36.0 g/dL    RDW 16.9 (H) 12.4 - 15.4 %    Platelets 192 708 - 799 K/uL    MPV 7.2 5.0 - 10.5 fL    Neutrophils % 69.1 %    Lymphocytes % 23.5 %    Monocytes % 4.4 %    Eosinophils % 2.2 %    Basophils % 0.8 %    Neutrophils Absolute 5.9 1.7 - 7.7 K/uL    Lymphocytes Absolute 2.0 1.0 - 5.1 K/uL    Monocytes Absolute 0.4 0.0 - 1.3 K/uL    Eosinophils Absolute 0.2 0.0 - 0.6 K/uL    Basophils Absolute 0.1 0.0 - 0.2 K/uL   Magnesium    Collection Time: 07/30/20  6:10 AM   Result Value Ref Range    Magnesium 1.60 (L) 1.80 - 2.40 mg/dL   Phosphorus    Collection Time: 07/30/20  6:10 AM   Result Value Ref Range    Phosphorus 2.6 2.5 - 4.9 mg/dL   POCT Glucose    Collection Time: 07/30/20  8:48 AM   Result Value Ref Range    POC Glucose 150 (H) 70 - 99 mg/dl    Performed on ACCU-CHEK    POCT Glucose    Collection Time: 07/30/20 11:20 AM   Result Value Ref Range    POC Glucose 152 (H) 70 - 99 mg/dl    Performed on ACCU-CHEK    POCT Glucose    Collection Time: 07/30/20  5:03 PM   Result Value Ref Range    POC Glucose >600 (AA) 70 - 99 mg/dl    Performed on ACCU-CHEK    POCT Glucose    Collection Time: 07/30/20  5:05 PM   Result Value Ref Range    POC Glucose 173 (H) 70 - 99 mg/dl    Performed on ACCU-CHEK    POCT Glucose    Collection Time: 07/30/20  8:26 PM   Result Value Ref Range    POC Glucose 104 (H) 70 - 99 mg/dl    Performed on ACCU-CHEK    POCT Glucose    Collection Time: 07/31/20  1:30 AM   Result Value Ref Range    POC Glucose 151 (H) 70 - 99 mg/dl    Performed on ACCU-CHEK    POCT Glucose    Collection Time: 07/31/20  5:32 AM   Result Value Ref Range    POC Glucose 170 (H) 70 - 99 mg/dl    Performed on ACCU-CHEK    Basic Metabolic Panel w/ Reflex to MG    Collection Time: 07/31/20  5:45 AM   Result Value Ref Range    Sodium 149 (H) 136 - 145 mmol/L    Potassium reflex Magnesium 3.8 3.5 - 5.1 mmol/L    Chloride 111 (H) 99 - 110 mmol/L    CO2 21 21 - 32 mmol/L    Anion Gap 17 (H) 3 - 16    Glucose 171 (H) 70 - 99 mg/dL    BUN 10 7 - 20 mg/dL    CREATININE 0.7 (L) 0.9 - 1.3 mg/dL    GFR Non-African American >60 >60    GFR African American >60 >60    Calcium 9.4 8.3 - 10.6 mg/dL   CBC auto differential    Collection Time: 07/31/20  5:45 AM   Result Value Ref Range    WBC 7.8 4.0 - 11.0 K/uL    RBC 4.17 (L) 4.20 - 5.90 M/uL    Hemoglobin 11.7 (L) 13.5 - 17.5 g/dL    Hematocrit 34.7 (L) 40.5 - 52.5 %    MCV 83.2 80.0 - 100.0 fL    MCH 28.0 26.0 - 34.0 pg    MCHC 33.6 31.0 - 36.0 g/dL    RDW 16.3 (H) 12.4 - 15.4 %    Platelets 335 255 - 465 K/uL    MPV 7.3 5.0 - 10.5 fL    Neutrophils % 64.3 %    Lymphocytes % 26.5 %    Monocytes % 5.1 %    Eosinophils % 2.8 %    Basophils % 1.3 %    Neutrophils Absolute 5.0 1.7 - 7.7 K/uL    Lymphocytes Absolute 2.1 1.0 - 5.1 K/uL    Monocytes Absolute 0.4 0.0 - 1.3 K/uL    Eosinophils Absolute 0.2 0.0 - 0.6 K/uL    Basophils Absolute 0.1 0.0 - 0.2 K/uL   Magnesium    Collection Time: 07/31/20  5:45 AM   Result Value Ref Range    Magnesium 1.70 (L) 1.80 - 2.40 mg/dL   Phosphorus    Collection Time: 07/31/20  5:45 AM   Result Value Ref Range    Phosphorus 3.2 2.5 - 4.9 mg/dL   POCT Glucose    Collection Time: 07/31/20  8:01 AM   Result Value Ref Range    POC Glucose 145 (H) 70 - 99 mg/dl    Performed on ACCU-CHEK    POCT Glucose    Collection Time: 07/31/20  9:51 AM   Result Value Ref Range    POC Glucose 185 (H) 70 - 99 mg/dl    Performed on ACCU-CHEK        Current Facility-Administered Medications   Medication Dose Route Frequency Provider Last Rate Last Dose    risperiDONE (RISPERDAL) tablet 1 mg  1 mg Oral BID Winsome NaikESTEPHANIA proctor CNP        magnesium sulfate 2 g in 50 mL IVPB premix  2 g Intravenous Once [facility] Сергей, APRN - CNP 25 mL/hr at 07/31/20 1000 2 g at 07/31/20 1000    OLANZapine (ZYPREXA) tablet 5 mg  5 mg Oral Nightly Margie Manzanares MD   5 mg at 07/29/20 2059    insulin glargine (LANTUS) injection vial 10 Units  10 Units Subcutaneous Nightly Carlos A Alicea MD   Stopped at 07/30/20 2100    OLANZapine (ZYPREXA) injection 5 mg  5 mg Intramuscular Q8H PRN Carlos A Alicea MD   5 mg at 07/26/20 2147    losartan (COZAAR) tablet 100 mg  100 mg Oral Daily Carlos A Alicea MD   Stopped at 07/29/20 1117    insulin lispro (HUMALOG) injection vial 0-12 Units  0-12 Units Subcutaneous Q4H ESTEPHANIA Horta CNP   2 Units at 07/31/20 1000    sodium chloride flush 0.9 % injection 10 mL  10 mL Intravenous 2 times per day ESTEPHANIA Horta CNP   10 mL at 07/31/20 1005    sodium chloride flush 0.9 % injection 10 mL  10 mL Intravenous PRN ESTEPHANIA Horta CNP        acetaminophen (TYLENOL) tablet 650 mg  650 mg Oral Q4H PRN Luisa Goel MD   650 mg at 07/24/20 6972    Or    acetaminophen (TYLENOL) suppository 650 mg  650 mg Rectal Q4H PRN Luisa Goel MD        polyethylene glycol [facility]) packet 17 g  17 g Oral Daily PRN Luisa Goel MD        ondansetron [facility]) injection 4 mg  4 mg Intravenous Q4H PRN Luisa Goel MD        glucose (GLUTOSE) 40 % oral gel 15 g  15 g Oral PRN Luisa Goel MD        dextrose 50 % IV solution  12.5 g Intravenous PRN Nilo Nelson MD   12.5 g at 07/16/20 1955    glucagon (rDNA) injection 1 mg  1 mg Intramuscular PRN Nilo Nelson MD        dextrose 5 % solution  100 mL/hr Intravenous PRN Nilo Nelson MD        enoxaparin (LOVENOX) injection 40 mg  40 mg Subcutaneous Daily Nilo Nelson MD   40 mg at 07/31/20 1003    pantoprazole (PROTONIX) injection 40 mg  40 mg Intravenous Daily Collie Amen, APRN - CNP   40 mg at 07/31/20 1003    And    sodium chloride (PF) 0.9 % injection 10 mL  10 mL Intravenous Daily Collie Amen, APRN - CNP   10 mL at 07/31/20 1005    magnesium sulfate 1 g in dextrose 5% 100 mL IVPB  1 g Intravenous PRN Collie Amen, APRN - CNP   Stopped at 07/30/20 1502    potassium chloride 20 mEq/50 mL IVPB (Central Line)  20 mEq Intravenous PRN Collie Amen, APRN - CNP 50 mL/hr at 07/24/20 2353 20 mEq at 07/24/20 2353    sodium phosphate 11.07 mmol in dextrose 5 % 250 mL IVPB  0.16 mmol/kg Intravenous PRN Collie Amen, APRN - CNP        Or    sodium phosphate 22.11 mmol in dextrose 5 % 250 mL IVPB  0.32 mmol/kg Intravenous PRN Collie Amen, APRN - CNP         ROS:  Could not assess, no speech. MSE:  A-under covers, rocks a little bit to stimulation  A-sleepy  M-can't assess, no speech  S-impaired  I/J-impaired  T-no speech. No resp to int stim. Recs:  1. AMS-If we cannot get MRI/eeg because of agitation, is it worth sedating pt to try and get it? I see that the team has started risperdal 1 bid today, in addition to the scheduled zyprexa he has ordered. I typically don't recommend using 2 different antipsychotics as it increases the risk of things like NMS, and is sort of redundant in some ways. Per my d/w NP Hisset, I'll d/c. Per my d/w NP Maryjo Greening, we'll try an ativan challenge today, 2 mg. I'd ask staff to document extensively if it works and pt improves dramatically relatively quickly.   If it works, this is probably either delirious catatonia or a sz-related issue. SOUMYA Mary will consider re-instituting vpa.

## 2020-07-31 NOTE — PLAN OF CARE
Problem: Pain:  Goal: Pain level will decrease  Description: Pain level will decrease  Outcome: Completed  Goal: Control of acute pain  Description: Control of acute pain  Outcome: Completed  Goal: Control of chronic pain  Description: Control of chronic pain  Outcome: Completed     Problem: Nutrition  Goal: Optimal nutrition therapy  7/31/2020 0147 by Thu Berry RN  Outcome: Completed  7/30/2020 1307 by Mendez Narayanan RD, LD  Outcome: Ongoing     Problem: Skin Integrity:  Goal: Will show no infection signs and symptoms  Description: Will show no infection signs and symptoms  Outcome: Completed  Goal: Absence of new skin breakdown  Description: Absence of new skin breakdown  Outcome: Completed     Problem: Falls - Risk of:  Goal: Will remain free from falls  Description: Will remain free from falls  Outcome: Completed  Goal: Absence of physical injury  Description: Absence of physical injury  Outcome: Completed

## 2020-07-31 NOTE — PROGRESS NOTES
Occupational Therapy  Attempt Note/Discharge    Chuckiegrantmariam Langfordjaspreet  7/31/2020    OT attempted to see pt for therapy treatment session. Pt sleeping in bed on arrival, does not respond to call of name or touch. Pt pulls covers over head with therapist's attempts to engage pt in therapy. Sitter present at bedside and reports pt has not woken up all day, and not eating. Pt with similar presentation x3 attempts earlier this week. Will discharge pt from therapy caseload d/t pt's inability to participate and lack of progress. No goals met.      Walker Noss, OTR/L [id number]

## 2020-07-31 NOTE — PROGRESS NOTES
data filed at 7/30/2020 1716  Gross per 24 hour   Intake 0 ml   Output --   Net 0 ml       Physical Exam Performed:    BP (!) 150/103   Pulse 75   Temp 97.6 °F (36.4 °C) (Axillary)   Resp 14   Ht 6' 2\" (1.88 m)   Wt 134 lb 14.7 oz (61.2 kg)   SpO2 99%   BMI 17.32 kg/m²     Pt did not let me examine him as he would keep changing his position and lie on opposite side without answering any questions. My observation is as below    General appearance: No apparent distress, seemed to willingly avoid interation  HEENT: NC/AT,EOMI  Respiratory/CVS: unable to assess  Abdomen: unable to assess  Musculoskeletal: no obvious deformity noted  Neurologic:  Moving all extremities, no focal motor deficits. Psychiatric: uncooperative with exam      Labs:   Recent Labs     07/29/20  0400 07/30/20  0610 07/31/20  0545   WBC 11.2* 8.5 7.8   HGB 11.5* 12.0* 11.7*   HCT 34.9* 36.1* 34.7*    366 342     Recent Labs     07/29/20  0400 07/30/20  0610 07/31/20  0545    144 149*   K 3.9 3.7 3.8    105 111*   CO2 17* 19* 21   BUN 12 11 10   CREATININE 0.9 0.9 0.7*   CALCIUM 9.3 9.5 9.4   PHOS 1.9* 2.6 3.2     No results for input(s): AST, ALT, BILIDIR, BILITOT, ALKPHOS in the last 72 hours. No results for input(s): INR in the last 72 hours. No results for input(s): Liliana Kalata in the last 72 hours. Urinalysis:      Lab Results   Component Value Date    NITRU Negative 07/15/2020    WBCUA 10 05/28/2020    BACTERIA 1+ 12/15/2010    RBCUA 1 05/28/2020    BLOODU Negative 07/15/2020    SPECGRAV 1.025 07/15/2020    GLUCOSEU 500 07/15/2020    GLUCOSEU >=1000 12/15/2010       Radiology:  XR CHEST PORTABLE   Final Result   No acute findings. NG tube with tip in the stomach. XR CHEST 1 VW   Final Result   Nasogastric tube projects in normal position. No acute cardiopulmonary disease.          MRI BRAIN WO CONTRAST   Final Result   Subtle bilateral frontoparietal cortical and subcortical signal abnormality   suggesting mild posterior reversal encephalopathy syndrome. No acute infarct   or hemorrhage. XR CHEST PORTABLE   Final Result   No acute cardiac or pulmonary disease. ET tube 7 cm above the jose carlos. NG side-port at the GE junction. XR CHEST PORTABLE   Final Result   The tip of the endotracheal tube is slightly high in position 9 cm above the   jose carlos. The OG/NG tube should be advanced 10 cm. The lungs are clear. CT Head WO Contrast   Final Result   No acute intracranial abnormality. Paranasal sinusitis. XR CHEST PORTABLE   Final Result   No acute cardiopulmonary disease.          IR LUMBAR PUNCTURE FOR DIAGNOSIS    (Results Pending)   MRI BRAIN W WO CONTRAST    (Results Pending)           Assessment/Plan:    Active Hospital Problems    Diagnosis    Acute respiratory failure with hypoxia (HCC) [J96.01]    Hypoglycemia [E16.2]    Altered mental status [R41.82]     Persistent metabolic encephalopathy  -psychiatry and neurology following  -Continue with Zyprexa as needed  -MRI brain ordered-unable to obtain  -Prior workup including MRI, EEG and CSF has been unrevealing     Agitation and aggressive behavior  Psychiatric following  on Zyprexa as needed     Diabetes mellitus  Severe hypoglycemia on admission-resolved  Insulin overdose-unsure if accidental or intentional     Continue ISS     Hypertension  Continue meds    Weightloss  Initial wt 152lbs today 136lbs      DVT Prophylaxis: lovenox  Diet: DIET CLEAR LIQUID;  Code Status: Full Code    PT/OT Eval Status: pending    Dispo - pending clinical course    ESTEPHANIA Carson - CNP

## 2020-07-31 NOTE — PROGRESS NOTES
Dillon Gabriel NP notified that patient's oral intake is zero.  Electronically signed by Minal Montana RN on 7/31/2020 at 10:48 AM

## 2020-07-31 NOTE — PROGRESS NOTES
Hospitalist Progress Note      PCP: Lobo Narayanan MD    Date of Admission: 7/15/2020    Chief Complaint: altered mental status    Hospital Course:  64-year-old M Hx poorly controlled DM who was admitted unresponsive at home with glucose of 25.  He was reported to have accidental versus intentional overdose of insulin. He was intubated. He Self extubated to 2 L nasal cannula by coughing. Patient was evaluated by neurology and no obvious etiology was found  EEG is not consistent with seizure disorder. No significant findings on CSF. Patient's respiratory status improved. Patient is considered to be possibly PRES. nurses report patient's behavior as aggressive and agitated at times. Psychiatric services consulted        Subjective: At the time of my assessment pt seemed to willingly not answer any question or make eye contact. When trying to talk to him he would turn to the opposite and cover his face and lie there.  In spite of multiple attempts pt did not speak to me      Medications:  Reviewed    Infusion Medications    dextrose       Scheduled Medications    OLANZapine  5 mg Oral Nightly    insulin glargine  10 Units Subcutaneous Nightly    losartan  100 mg Oral Daily    ampicillin-sulbactam  1.5 g Intravenous Q6H    insulin lispro  0-12 Units Subcutaneous Q4H    sodium chloride flush  10 mL Intravenous 2 times per day    enoxaparin  40 mg Subcutaneous Daily    pantoprazole  40 mg Intravenous Daily    And    sodium chloride (PF)  10 mL Intravenous Daily     PRN Meds: OLANZapine, sodium chloride flush, acetaminophen **OR** acetaminophen, polyethylene glycol, ondansetron, glucose, dextrose, glucagon (rDNA), dextrose, magnesium sulfate, potassium chloride, sodium phosphate IVPB **OR** sodium phosphate IVPB      Intake/Output Summary (Last 24 hours) at 7/30/2020 1749  Last data filed at 7/30/2020 1716  Gross per 24 hour   Intake 0 ml   Output --   Net 0 ml       Physical Exam Performed:    BP (!) 149/84   Pulse 77   Temp 97.5 °F (36.4 °C) (Oral)   Resp 12   Ht 6' 2\" (1.88 m)   Wt 136 lb 0.4 oz (61.7 kg)   SpO2 98%   BMI 17.46 kg/m²     Pt did not let me examine him as he would keep changing his position and lie on opposite side without answering any questions. My observation is as below    General appearance: No apparent distress, seemed to willingly avoid interation  HEENT: NC/AT,EOMI  Respiratory/CVS: unable to assess  Abdomen: unable to assess  Musculoskeletal: no obvious deformity noted  Neurologic:  Moving all extremities, no focal motor deficits. Psychiatric: uncooperative with exam      Labs:   Recent Labs     07/28/20 0530 07/29/20  0400 07/30/20  0610   WBC 15.8* 11.2* 8.5   HGB 11.7* 11.5* 12.0*   HCT 36.3* 34.9* 36.1*    385 366     Recent Labs     07/28/20 0530 07/29/20  0400 07/30/20  0610    143 144   K 4.7 3.9 3.7    106 105   CO2 11* 17* 19*   BUN 16 12 11   CREATININE 1.2 0.9 0.9   CALCIUM 9.5 9.3 9.5   PHOS 2.7 1.9* 2.6     No results for input(s): AST, ALT, BILIDIR, BILITOT, ALKPHOS in the last 72 hours. No results for input(s): INR in the last 72 hours. No results for input(s): [place] in the last 72 hours. Urinalysis:      Lab Results   Component Value Date    NITRU Negative 07/15/2020    WBCUA 10 05/28/2020    BACTERIA 1+ 12/15/2010    RBCUA 1 05/28/2020    BLOODU Negative 07/15/2020    SPECGRAV 1.025 07/15/2020    GLUCOSEU 500 07/15/2020    GLUCOSEU >=1000 12/15/2010       Radiology:  XR CHEST PORTABLE   Final Result   No acute findings. NG tube with tip in the stomach. XR CHEST 1 VW   Final Result   Nasogastric tube projects in normal position. No acute cardiopulmonary disease. MRI BRAIN WO CONTRAST   Final Result   Subtle bilateral frontoparietal cortical and subcortical signal abnormality   suggesting mild posterior reversal encephalopathy syndrome. No acute infarct   or hemorrhage.          XR CHEST PORTABLE   Final Result   No acute cardiac or pulmonary disease. ET tube 7 cm above the jose carlos. NG side-port at the GE junction. XR CHEST PORTABLE   Final Result   The tip of the endotracheal tube is slightly high in position 9 cm above the   jose carlos. The OG/NG tube should be advanced 10 cm. The lungs are clear. CT Head WO Contrast   Final Result   No acute intracranial abnormality. Paranasal sinusitis. XR CHEST PORTABLE   Final Result   No acute cardiopulmonary disease.          IR LUMBAR PUNCTURE FOR DIAGNOSIS    (Results Pending)   MRI BRAIN W WO CONTRAST    (Results Pending)           Assessment/Plan:    Active Hospital Problems    Diagnosis    Acute respiratory failure with hypoxia (HCC) [J96.01]    Hypoglycemia [E16.2]    Altered mental status [R41.82]     Persistent metabolic encephalopathy  -psychiatry and neurology following  -Continue with Zyprexa as needed  -MRI brain ordered  -Prior workup including MRI, EEG and CSF has been unrevealing     Agitation and aggressive behavior  Psychiatric following  on Zyprexa as needed     Diabetes mellitus  Severe hypoglycemia on admission-resolved  Insulin overdose-unsure if accidental or intentional     Continue ISS     Hypertension  Continue meds      DVT Prophylaxis: lovenox  Diet: DIET CLEAR LIQUID;  Code Status: Full Code    PT/OT Eval Status: pending    Dispo - pending clinical course    Rae Baptiste MD

## 2020-07-31 NOTE — PLAN OF CARE
MRI unable to be done. Patient not able to follow commands, screaming \"help\" throughout, twisting, and moving. Exam terminated as patient tried to scoot out of scanner and it was no longer safe to continue. Please discuss and let the department know if exam can be done with patient sedated.

## 2020-07-31 NOTE — PLAN OF CARE
Problem: Pain:  Goal: Pain level will decrease  Description: Pain level will decrease  7/31/2020 1042 by Devorah Lynch RN  Outcome: Ongoing  7/31/2020 0153 by Raisa Duff RN  Outcome: Ongoing  7/31/2020 0150 by Raisa Duff RN  Reactivated  7/31/2020 0147 by Raisa Duff RN  Outcome: Completed  Goal: Control of acute pain  Description: Control of acute pain  7/31/2020 1042 by Devorah Lynch RN  Outcome: Ongoing  7/31/2020 0153 by Raisa Duff RN  Outcome: Ongoing  7/31/2020 0150 by Raisa Duff RN  Reactivated  7/31/2020 0147 by Raisa Duff RN  Outcome: Completed  Goal: Control of chronic pain  Description: Control of chronic pain  7/31/2020 1042 by Devorah Lynch RN  Outcome: Ongoing  7/31/2020 0153 by Raisa Duff RN  Outcome: Ongoing  7/31/2020 0150 by Raisa Duff RN  Reactivated  7/31/2020 0147 by Riasa Duff RN  Outcome: Completed     Problem: Nutrition  Goal: Optimal nutrition therapy  7/31/2020 1042 by Devorah Lynch RN  Outcome: Ongoing  7/31/2020 0153 by Raisa Duff RN  Outcome: Ongoing  7/31/2020 0150 by Raisa Duff RN  Reactivated  7/31/2020 0147 by Raisa Duff RN  Outcome: Completed     Problem: Skin Integrity:  Goal: Will show no infection signs and symptoms  Description: Will show no infection signs and symptoms  7/31/2020 1042 by Devorah Lynch RN  Outcome: Ongoing  7/31/2020 0153 by Raisa Duff RN  Outcome: Ongoing  7/31/2020 0150 by Raisa Duff RN  Reactivated  7/31/2020 0147 by Raisa Duff RN  Outcome: Completed  Goal: Absence of new skin breakdown  Description: Absence of new skin breakdown  7/31/2020 1042 by Devorah Lynch RN  Outcome: Ongoing  7/31/2020 0153 by Raisa Duff RN  Outcome: Ongoing  7/31/2020 0150 by Raisa Duff RN  Reactivated  7/31/2020 0147 by Raisa Outhouse, RN  Outcome: Completed     Problem: Falls - Risk of:  Goal: Will remain free from falls  Description: Will remain free from falls  7/31/2020 1042 by Joy Kaplan RN  Outcome: Ongoing  7/31/2020 0153 by Cain Donaldson RN  Outcome: Ongoing  7/31/2020 0150 by Cain Donaldson RN  Reactivated  7/31/2020 0147 by Cain Donaldson RN  Outcome: Completed  Goal: Absence of physical injury  Description: Absence of physical injury  7/31/2020 1042 by Joy Kaplan RN  Outcome: Ongoing  7/31/2020 0153 by Cain Donaldson RN  Outcome: Ongoing  7/31/2020 0150 by Cain Donaldson RN  Reactivated  7/31/2020 0147 by Cain Donaldson RN  Outcome: Completed     Problem: Nutrition Deficit:  Goal: Ability to achieve adequate nutritional intake will improve  Description: Ability to achieve adequate nutritional intake will improve  Outcome: Ongoing     Problem: Mental Status - Impaired:  Goal: Mental status will improve  Description: Mental status will improve  Outcome: Ongoing

## 2020-07-31 NOTE — CARE COORDINATION
7/31 1601 [address] [facility] can take patient and send precert if patient will participate in therapies for authorization. Therapy notified to attempt treatment.  Electronically signed by Adi Saavedra RN on 7/31/2020 at 3:18 PM

## 2020-07-31 NOTE — PROGRESS NOTES
Encouraged the patient to eat or drink for lunch. Patient opened his eyes and turned to the other side. Educate ineffective.  Electronically signed by Magnus Baxter RN on 7/31/2020 at 12:24 PM

## 2020-07-31 NOTE — PLAN OF CARE
Problem: Pain:  Goal: Pain level will decrease  Description: Pain level will decrease  7/31/2020 0153 by Lilian Farley RN  Outcome: Ongoing  7/31/2020 0150 by Lilian Farley RN  Reactivated  7/31/2020 0147 by Lilian Farley RN  Outcome: Completed  Goal: Control of acute pain  Description: Control of acute pain  7/31/2020 0153 by Lilian Farley RN  Outcome: Ongoing  7/31/2020 0150 by Lilian Farley RN  Reactivated  7/31/2020 0147 by Lilian Farley RN  Outcome: Completed  Goal: Control of chronic pain  Description: Control of chronic pain  7/31/2020 0153 by Lilian Farley RN  Outcome: Ongoing  7/31/2020 0150 by Lilian Farley RN  Reactivated  7/31/2020 0147 by Lilian Farley RN  Outcome: Completed     Problem: Nutrition  Goal: Optimal nutrition therapy  7/31/2020 0153 by Lilian Farley RN  Outcome: Ongoing  7/31/2020 0150 by Lilian Farley RN  Reactivated  7/31/2020 0147 by Lilian Farley RN  Outcome: Completed  7/30/2020 1307 by Alfredo Hannah RD, LD  Outcome: Ongoing     Problem: Skin Integrity:  Goal: Will show no infection signs and symptoms  Description: Will show no infection signs and symptoms  7/31/2020 0153 by Lilian Farley RN  Outcome: Ongoing  7/31/2020 0150 by Lilian Farley RN  Reactivated  7/31/2020 0147 by Lilian Farley RN  Outcome: Completed  Goal: Absence of new skin breakdown  Description: Absence of new skin breakdown  7/31/2020 0153 by Lilian Farley RN  Outcome: Ongoing  7/31/2020 0150 by Lilian Farley RN  Reactivated  7/31/2020 0147 by Lilian Farley RN  Outcome: Completed     Problem: Falls - Risk of:  Goal: Will remain free from falls  Description: Will remain free from falls  7/31/2020 0153 by Lilian Farley RN  Outcome: Ongoing  7/31/2020 0150 by Lilian Farley RN  Reactivated  7/31/2020 0147 by Lilian Farley RN  Outcome: Completed  Goal: Absence of physical injury  Description: Absence of physical injury  7/31/2020 0153 by Josh Lan RN  Outcome: Ongoing  7/31/2020 0150 by Josh Lan RN  Reactivated  7/31/2020 0147 by Josh Lan RN  Outcome: Completed

## 2020-07-31 NOTE — PROGRESS NOTES
Patient refused PO meds and PO intake. Patient closes his lips tight when offered a drink or food. Neuro NP Chevy and Jigna Flaherty NP notified at bedside.  Electronically signed by Ochoa Case RN on 7/31/2020 at 10:36 AM

## 2020-08-01 ENCOUNTER — APPOINTMENT (OUTPATIENT)
Dept: CT IMAGING | Age: 45
DRG: 812 | End: 2020-08-01
Payer: MEDICAID

## 2020-08-01 LAB
ANION GAP SERPL CALCULATED.3IONS-SCNC: 14 MMOL/L (ref 3–16)
BASOPHILS ABSOLUTE: 0.1 K/UL (ref 0–0.2)
BASOPHILS RELATIVE PERCENT: 0.7 %
BILIRUBIN URINE: ABNORMAL
BLOOD, URINE: NEGATIVE
BUN BLDV-MCNC: 10 MG/DL (ref 7–20)
CALCIUM SERPL-MCNC: 9.6 MG/DL (ref 8.3–10.6)
CHLORIDE BLD-SCNC: 109 MMOL/L (ref 99–110)
CLARITY: ABNORMAL
CO2: 24 MMOL/L (ref 21–32)
COLOR: ABNORMAL
CREAT SERPL-MCNC: 0.8 MG/DL (ref 0.9–1.3)
CRYSTALS, UA: ABNORMAL /HPF
EOSINOPHILS ABSOLUTE: 0.2 K/UL (ref 0–0.6)
EOSINOPHILS RELATIVE PERCENT: 2.1 %
EPITHELIAL CELLS, UA: 7 /HPF (ref 0–5)
FOLATE: 14.94 NG/ML (ref 4.78–24.2)
GFR AFRICAN AMERICAN: >60
GFR NON-AFRICAN AMERICAN: >60
GLUCOSE BLD-MCNC: 110 MG/DL (ref 70–99)
GLUCOSE BLD-MCNC: 130 MG/DL (ref 70–99)
GLUCOSE BLD-MCNC: 138 MG/DL (ref 70–99)
GLUCOSE BLD-MCNC: 151 MG/DL (ref 70–99)
GLUCOSE BLD-MCNC: 152 MG/DL (ref 70–99)
GLUCOSE BLD-MCNC: 246 MG/DL (ref 70–99)
GLUCOSE BLD-MCNC: 322 MG/DL (ref 70–99)
GLUCOSE BLD-MCNC: 407 MG/DL (ref 70–99)
GLUCOSE URINE: 250 MG/DL
HCT VFR BLD CALC: 35.3 % (ref 40.5–52.5)
HEMOGLOBIN: 11.8 G/DL (ref 13.5–17.5)
HYALINE CASTS: ABNORMAL /LPF (ref 0–2)
KETONES, URINE: 15 MG/DL
LEUKOCYTE ESTERASE, URINE: ABNORMAL
LYMPHOCYTES ABSOLUTE: 1.6 K/UL (ref 1–5.1)
LYMPHOCYTES RELATIVE PERCENT: 22.2 %
MAGNESIUM: 1.9 MG/DL (ref 1.8–2.4)
MCH RBC QN AUTO: 27.5 PG (ref 26–34)
MCHC RBC AUTO-ENTMCNC: 33.3 G/DL (ref 31–36)
MCV RBC AUTO: 82.5 FL (ref 80–100)
MICROSCOPIC EXAMINATION: YES
MONOCYTES ABSOLUTE: 0.4 K/UL (ref 0–1.3)
MONOCYTES RELATIVE PERCENT: 4.9 %
MUCUS: ABNORMAL /LPF
NEUTROPHILS ABSOLUTE: 5.2 K/UL (ref 1.7–7.7)
NEUTROPHILS RELATIVE PERCENT: 70.1 %
NITRITE, URINE: NEGATIVE
PDW BLD-RTO: 16.6 % (ref 12.4–15.4)
PERFORMED ON: ABNORMAL
PH UA: 6 (ref 5–8)
PHOSPHORUS: 2.8 MG/DL (ref 2.5–4.9)
PLATELET # BLD: 326 K/UL (ref 135–450)
PMV BLD AUTO: 7.7 FL (ref 5–10.5)
POTASSIUM REFLEX MAGNESIUM: 3.3 MMOL/L (ref 3.5–5.1)
PROTEIN UA: 30 MG/DL
RBC # BLD: 4.28 M/UL (ref 4.2–5.9)
RBC UA: 1 /HPF (ref 0–4)
SODIUM BLD-SCNC: 147 MMOL/L (ref 136–145)
SPECIFIC GRAVITY UA: 1.03 (ref 1–1.03)
TSH REFLEX: 0.8 UIU/ML (ref 0.27–4.2)
URINE REFLEX TO CULTURE: YES
URINE TYPE: ABNORMAL
UROBILINOGEN, URINE: 1 E.U./DL
VITAMIN B-12: 680 PG/ML (ref 211–911)
WBC # BLD: 7.4 K/UL (ref 4–11)
WBC UA: 49 /HPF (ref 0–5)

## 2020-08-01 PROCEDURE — 6370000000 HC RX 637 (ALT 250 FOR IP): Performed by: NURSE PRACTITIONER

## 2020-08-01 PROCEDURE — 82746 ASSAY OF FOLIC ACID SERUM: CPT

## 2020-08-01 PROCEDURE — 84100 ASSAY OF PHOSPHORUS: CPT

## 2020-08-01 PROCEDURE — C9113 INJ PANTOPRAZOLE SODIUM, VIA: HCPCS | Performed by: NURSE PRACTITIONER

## 2020-08-01 PROCEDURE — 6370000000 HC RX 637 (ALT 250 FOR IP): Performed by: INTERNAL MEDICINE

## 2020-08-01 PROCEDURE — 80048 BASIC METABOLIC PNL TOTAL CA: CPT

## 2020-08-01 PROCEDURE — 81001 URINALYSIS AUTO W/SCOPE: CPT

## 2020-08-01 PROCEDURE — 2580000003 HC RX 258: Performed by: NURSE PRACTITIONER

## 2020-08-01 PROCEDURE — 1200000000 HC SEMI PRIVATE

## 2020-08-01 PROCEDURE — 6360000002 HC RX W HCPCS: Performed by: INTERNAL MEDICINE

## 2020-08-01 PROCEDURE — 83735 ASSAY OF MAGNESIUM: CPT

## 2020-08-01 PROCEDURE — 6360000002 HC RX W HCPCS: Performed by: NURSE PRACTITIONER

## 2020-08-01 PROCEDURE — 85025 COMPLETE CBC W/AUTO DIFF WBC: CPT

## 2020-08-01 PROCEDURE — 82607 VITAMIN B-12: CPT

## 2020-08-01 PROCEDURE — 94761 N-INVAS EAR/PLS OXIMETRY MLT: CPT

## 2020-08-01 PROCEDURE — 70450 CT HEAD/BRAIN W/O DYE: CPT

## 2020-08-01 PROCEDURE — 99231 SBSQ HOSP IP/OBS SF/LOW 25: CPT | Performed by: PSYCHIATRY & NEUROLOGY

## 2020-08-01 PROCEDURE — 84443 ASSAY THYROID STIM HORMONE: CPT

## 2020-08-01 PROCEDURE — 87086 URINE CULTURE/COLONY COUNT: CPT

## 2020-08-01 RX ORDER — LORAZEPAM 2 MG/ML
2 INJECTION INTRAMUSCULAR ONCE
Status: COMPLETED | OUTPATIENT
Start: 2020-08-01 | End: 2020-08-01

## 2020-08-01 RX ORDER — LORAZEPAM 2 MG/ML
1 INJECTION INTRAMUSCULAR EVERY 6 HOURS
Status: DISCONTINUED | OUTPATIENT
Start: 2020-08-01 | End: 2020-08-02

## 2020-08-01 RX ADMIN — INSULIN LISPRO 12 UNITS: 100 INJECTION, SOLUTION INTRAVENOUS; SUBCUTANEOUS at 16:36

## 2020-08-01 RX ADMIN — INSULIN LISPRO 2 UNITS: 100 INJECTION, SOLUTION INTRAVENOUS; SUBCUTANEOUS at 05:02

## 2020-08-01 RX ADMIN — POTASSIUM CHLORIDE 20 MEQ: 29.8 INJECTION, SOLUTION INTRAVENOUS at 13:22

## 2020-08-01 RX ADMIN — LORAZEPAM 2 MG: 2 INJECTION INTRAMUSCULAR; INTRAVENOUS at 10:28

## 2020-08-01 RX ADMIN — PANTOPRAZOLE SODIUM 40 MG: 40 INJECTION, POWDER, FOR SOLUTION INTRAVENOUS at 09:43

## 2020-08-01 RX ADMIN — Medication 10 ML: at 09:45

## 2020-08-01 RX ADMIN — POTASSIUM CHLORIDE 20 MEQ: 29.8 INJECTION, SOLUTION INTRAVENOUS at 11:38

## 2020-08-01 RX ADMIN — LORAZEPAM 1 MG: 2 INJECTION INTRAMUSCULAR; INTRAVENOUS at 16:06

## 2020-08-01 RX ADMIN — SODIUM CHLORIDE, PRESERVATIVE FREE 10 ML: 5 INJECTION INTRAVENOUS at 01:27

## 2020-08-01 RX ADMIN — INSULIN LISPRO 4 UNITS: 100 INJECTION, SOLUTION INTRAVENOUS; SUBCUTANEOUS at 22:14

## 2020-08-01 RX ADMIN — LORAZEPAM 1 MG: 2 INJECTION INTRAMUSCULAR; INTRAVENOUS at 22:15

## 2020-08-01 RX ADMIN — ONDANSETRON 4 MG: 2 INJECTION INTRAMUSCULAR; INTRAVENOUS at 16:06

## 2020-08-01 RX ADMIN — SODIUM CHLORIDE, PRESERVATIVE FREE 10 ML: 5 INJECTION INTRAVENOUS at 09:45

## 2020-08-01 RX ADMIN — ENOXAPARIN SODIUM 40 MG: 40 INJECTION SUBCUTANEOUS at 09:43

## 2020-08-01 RX ADMIN — INSULIN GLARGINE 10 UNITS: 100 INJECTION, SOLUTION SUBCUTANEOUS at 22:14

## 2020-08-01 ASSESSMENT — PAIN SCALES - GENERAL
PAINLEVEL_OUTOF10: 0
PAINLEVEL_OUTOF10: 0

## 2020-08-01 ASSESSMENT — PAIN SCALES - WONG BAKER: WONGBAKER_NUMERICALRESPONSE: 0

## 2020-08-01 NOTE — PROGRESS NOTES
Patient was up multiple times to the bathroom this evening per sitter in room. And one of those times, suyapa stated that he had a conversation with her. He said \"hello\" and answered her some of her questions. She stated they were short responses. Pt has been sleeping under covers otherwise. Refused to be given medications or any meal kept his mouth cloth when sitter tried to feed him. He refused drink at first but when given the choice for a soda he agreed by nodding. RN brought in [facility] and pt was able to take a sip with a straw . Insulin administered as prescribed see EMILIA Harmon in the room . Pt non verbal and flat affect most of the time. Will continue to monitor. No complains or distress . Respirations easy and unlabored.

## 2020-08-01 NOTE — PROGRESS NOTES
Patient appears to be sleeping when entering room. Respirations easy and even. Attempt to wake patient verbally. Patient seems withdrawn, opening his eyes spontaneously, but not communicating with staff. Unable to follow commands. VS stable.  at bedside. [employer], NP notified face to face of patient unable to take am medications and of patient's status. Call light in reach. Will continue to monitor.

## 2020-08-01 NOTE — PROGRESS NOTES
Labs drawn this morning for CBC and BMP. Pt cooporated slightly and turned around for me and gave the arm with the PICC line. However Pt non verbal and did not have any affect. Continued to sleep. Labs drawn and sent out. Will continue to  Monitor. Respirations easy and unlabored. Vitals normal. bs was covered around 4 am for 2 UNITS of sliding scale insulin. Pt was at one time in the night found up side down in bed and resisted to get back to normal position. However this morning he was found in a normal position in bed under the sheets.

## 2020-08-01 NOTE — PROGRESS NOTES
Hospitalist Progress Note      PCP: Joycelyn Gage MD    Date of Admission: 7/15/2020    Chief Complaint: altered mental status    Hospital Course:  57-year-old M Hx poorly controlled DM who was admitted unresponsive at home with glucose of 25.  He was reported to have accidental versus intentional overdose of insulin. He was intubated. He Self extubated to 2 L nasal cannula by coughing. Patient was evaluated by neurology and no obvious etiology was found  EEG is not consistent with seizure disorder. No significant findings on CSF. Patient's respiratory status improved. Patient is considered to be possibly PRES. nurses report patient's behavior as aggressive and agitated at times. Psychiatric services consulted        Subjective: Patient was seen and examined. I received a call last night that approximately 4 to 6 hours after receiving the Ativan the patient was awakened and able to walk around the room requesting something to drink. However around midnight he became catatonic again. On my evaluation today he is catatonic. He allows you to withdraw his arms and there is no resistance when released. He does not respond to painful stimuli. I spoke to the wife today I have updated her on the progress with using Ativan.   She is currently on her way here I will see her in the room    Medications:  Reviewed    Infusion Medications    dextrose       Scheduled Medications    LORazepam  1 mg Intravenous Q6H    OLANZapine  5 mg Oral Nightly    insulin glargine  10 Units Subcutaneous Nightly    losartan  100 mg Oral Daily    insulin lispro  0-12 Units Subcutaneous Q4H    sodium chloride flush  10 mL Intravenous 2 times per day    enoxaparin  40 mg Subcutaneous Daily    pantoprazole  40 mg Intravenous Daily    And    sodium chloride (PF)  10 mL Intravenous Daily     PRN Meds: OLANZapine, sodium chloride flush, acetaminophen **OR** acetaminophen, polyethylene glycol, ondansetron, glucose, dextrose, glucagon (rDNA), dextrose, magnesium sulfate, potassium chloride, sodium phosphate IVPB **OR** sodium phosphate IVPB      Intake/Output Summary (Last 24 hours) at 8/1/2020 1348  Last data filed at 8/1/2020 1340  Gross per 24 hour   Intake 780 ml   Output --   Net 780 ml       Physical Exam Performed:    /65   Pulse 81   Temp 98.1 °F (36.7 °C) (Axillary)   Resp 14   Ht 6' 2\" (1.88 m)   Wt 134 lb 14.7 oz (61.2 kg)   SpO2 100%   BMI 17.32 kg/m²     Pt did not let me examine him as he would keep changing his position and lie on opposite side without answering any questions. My observation is as below    General appearance: No apparent distress, seemed to willingly avoid interation  HEENT: NC/AT,EOMI  Respiratory/CVS: Lungs clear to auscultate respirations even, rate regular  Abdomen: Does not withdrawal to pain bowel sounds present x4  Musculoskeletal: no obvious deformity noted  Neurologic:  Moving all extremities, no focal motor deficits. Psychiatric: uncooperative with exam  Skin: No breakdown noted to buttocks hips heels or spine. Labs:   Recent Labs     07/30/20  0610 07/31/20  0545 08/01/20  0709   WBC 8.5 7.8 7.4   HGB 12.0* 11.7* 11.8*   HCT 36.1* 34.7* 35.3*    342 326     Recent Labs     07/30/20  0610 07/31/20  0545 08/01/20  0709    149* 147*   K 3.7 3.8 3.3*    111* 109   CO2 19* 21 24   BUN 11 10 10   CREATININE 0.9 0.7* 0.8*   CALCIUM 9.5 9.4 9.6   PHOS 2.6 3.2 2.8     No results for input(s): AST, ALT, BILIDIR, BILITOT, ALKPHOS in the last 72 hours. No results for input(s): INR in the last 72 hours. No results for input(s): Amaryllis Goodell in the last 72 hours.     Urinalysis:      Lab Results   Component Value Date    NITRU Negative 07/15/2020    WBCUA 10 05/28/2020    BACTERIA 1+ 12/15/2010    RBCUA 1 05/28/2020    BLOODU Negative 07/15/2020    SPECGRAV 1.025 07/15/2020    GLUCOSEU 500 07/15/2020    GLUCOSEU >=1000 12/15/2010 Radiology:  XR CHEST PORTABLE   Final Result   No acute findings. NG tube with tip in the stomach. XR CHEST 1 VW   Final Result   Nasogastric tube projects in normal position. No acute cardiopulmonary disease. MRI BRAIN WO CONTRAST   Final Result   Subtle bilateral frontoparietal cortical and subcortical signal abnormality   suggesting mild posterior reversal encephalopathy syndrome. No acute infarct   or hemorrhage. XR CHEST PORTABLE   Final Result   No acute cardiac or pulmonary disease. ET tube 7 cm above the jose carlos. NG side-port at the GE junction. XR CHEST PORTABLE   Final Result   The tip of the endotracheal tube is slightly high in position 9 cm above the   jose carlos. The OG/NG tube should be advanced 10 cm. The lungs are clear. CT Head WO Contrast   Final Result   No acute intracranial abnormality. Paranasal sinusitis. XR CHEST PORTABLE   Final Result   No acute cardiopulmonary disease. IR LUMBAR PUNCTURE FOR DIAGNOSIS    (Results Pending)           Assessment/Plan:    Active Hospital Problems    Diagnosis    Acute respiratory failure with hypoxia ([facility] [address] [J96.01]    Hypoglycemia [E16.2]    Altered mental status [R41.82]     Persistent metabolic encephalopathy  -psychiatry and neurology following  -Continue with Zyprexa as needed  -MRI brain ordered-unable to obtain  -Prior workup including MRI, EEG and CSF has been unrevealing  -7/31-Ativan challenge given at 130. Approximately 630 the patient awoken and was ambulating within the room complained of being hungry. However around midnight he became catatonic again. -8/1-Ativan was given at 1030 approximately 130 this afternoon the patient again woke up and is complaining of being hungry. I will initiate Ativan 1 mg every 6.   If this becomes too much we will back down on the dosing.     Agitation and aggressive behavior  Psychiatric following  on Zyprexa as needed     Diabetes mellitus  Severe hypoglycemia on admission-resolved  Insulin overdose-unsure if accidental or intentional     Continue ISS     Hypertension  Continue meds    Weightloss  Initial wt 152lbs today 136lbs  -Nutritional consult  -Daily weights  -We will add phosphorus, potassium and magnesium levels to be addressed in the morning      DVT Prophylaxis: lovenox  Diet: DIET CLEAR LIQUID;  Code Status: Full Code    PT/OT Eval Status: pending    Dispo - pending clinical course    Winsome Jeffries, ESTEPHANIA - CNP

## 2020-08-01 NOTE — PROGRESS NOTES
Patient continued to sleep throughout the night, he would change positions in bed, respirations easy and unlabored. No distress noted at this time . Just eyes closed , and under the covers. Vitals stable.  Will draw AM labs shortly

## 2020-08-01 NOTE — PROGRESS NOTES
PCA/safety companion called to report that when ambulating back from the bathroom to bed, patient's spouse attempted to dance with patient and PCA, Javier Leigh had to assist patient to floor and patient's butt landed on PCA's foot. [address] denies patient hitting his head. Call to charge nurse Malathi Guzman who have been notified.  Secure message sent to Porfirio Castro NP.

## 2020-08-01 NOTE — PLAN OF CARE
Problem: Pain:  Goal: Pain level will decrease  Description: Pain level will decrease  8/1/2020 1539 by Leobardo Saleh RN  Outcome: Ongoing     Problem: Nutrition  Goal: Optimal nutrition therapy  8/1/2020 1539 by Leobardo Saleh RN  Outcome: Ongoing   Offer food patient likes per MD order. Problem: Falls - Risk of:  Goal: Will remain free from falls  Description: Will remain free from falls  8/1/2020 1539 by Leobardo Saleh RN  Outcome: Ongoing   Wheels of bed locked x 4. Room free from clutter. Non-skid footwear intact. Call light in reach.  at bedside. Problem: Mental Status - Impaired:  Goal: Mental status will improve  Description: Mental status will improve  8/1/2020 1539 by Leobardo Saleh RN  Outcome: Ongoing   Attempt to re-orient patient this shift to time, date, person and place, and situation.

## 2020-08-01 NOTE — PROGRESS NOTES
PCA, [address] called to report patient is more alert. On assessment, Patient nods his head , \"yes,\" now when asked if he is hungry. He has incomprehensible speech. Tolerates clear liquids, but is a feed. Secure message sent to Dr. Staci Neal and Blessing Verdugo NP per request from both. Will continue to monitor. Safty  remains at patient's side.

## 2020-08-01 NOTE — PROGRESS NOTES
Attempt to give morning dose of Cozaar since patient is able to walk to BR stand by assist. Current /92. Patient still refusing. Call light in reach.

## 2020-08-01 NOTE — PROGRESS NOTES
Spoke with RN. Pt up eating ice cream this afternoon. Had lorazepam 2mg at 10:30am. Seems he had a favorable response but I suspect 2mg may be to sedating for him. Agree with NP Nesha's plan for lorazepam 1mg q6h IV.

## 2020-08-01 NOTE — PLAN OF CARE
Problem: Pain:  Goal: Pain level will decrease  Description: Pain level will decrease  Outcome: Ongoing     Problem: Nutrition  Goal: Optimal nutrition therapy  Outcome: Ongoing   Offer food patient likes per MD diet order. Problem: Skin Integrity:  Goal: Will show no infection signs and symptoms  Description: Will show no infection signs and symptoms  Outcome: Ongoing     Problem: Falls - Risk of:  Goal: Will remain free from falls  Description: Will remain free from falls  Outcome: Ongoing  Wheels of bed locked x 4. Room free from clutter. Non-skid footwear intact. Call light in reach of patient at all times.

## 2020-08-01 NOTE — PROGRESS NOTES
5:08 AM   Result Value Ref Range    POC Glucose 90 70 - 99 mg/dl    Performed on ACCU-CHEK    Basic Metabolic Panel w/ Reflex to MG    Collection Time: 07/26/20  6:34 AM   Result Value Ref Range    Sodium 141 136 - 145 mmol/L    Potassium reflex Magnesium 3.8 3.5 - 5.1 mmol/L    Chloride 102 99 - 110 mmol/L    CO2 25 21 - 32 mmol/L    Anion Gap 14 3 - 16    Glucose 73 70 - 99 mg/dL    BUN 14 7 - 20 mg/dL    CREATININE 0.6 (L) 0.9 - 1.3 mg/dL    GFR Non-African American >60 >60    GFR African American >60 >60    Calcium 9.0 8.3 - 10.6 mg/dL   CBC auto differential    Collection Time: 07/26/20  6:34 AM   Result Value Ref Range    WBC 8.4 4.0 - 11.0 K/uL    RBC 3.99 (L) 4.20 - 5.90 M/uL    Hemoglobin 11.0 (L) 13.5 - 17.5 g/dL    Hematocrit 32.9 (L) 40.5 - 52.5 %    MCV 82.6 80.0 - 100.0 fL    MCH 27.5 26.0 - 34.0 pg    MCHC 33.4 31.0 - 36.0 g/dL    RDW 15.8 (H) 12.4 - 15.4 %    Platelets 759 512 - 407 K/uL    MPV 7.9 5.0 - 10.5 fL    Neutrophils % 66.4 %    Lymphocytes % 25.2 %    Monocytes % 5.2 %    Eosinophils % 2.5 %    Basophils % 0.7 %    Neutrophils Absolute 5.6 1.7 - 7.7 K/uL    Lymphocytes Absolute 2.1 1.0 - 5.1 K/uL    Monocytes Absolute 0.4 0.0 - 1.3 K/uL    Eosinophils Absolute 0.2 0.0 - 0.6 K/uL    Basophils Absolute 0.1 0.0 - 0.2 K/uL   Magnesium    Collection Time: 07/26/20  6:34 AM   Result Value Ref Range    Magnesium 2.00 1.80 - 2.40 mg/dL   Phosphorus    Collection Time: 07/26/20  6:34 AM   Result Value Ref Range    Phosphorus 3.1 2.5 - 4.9 mg/dL   POCT Glucose    Collection Time: 07/27/20  4:43 AM   Result Value Ref Range    POC Glucose 414 (H) 70 - 99 mg/dl    Performed on ACCU-CHEK    Basic Metabolic Panel w/ Reflex to MG    Collection Time: 07/27/20  5:00 AM   Result Value Ref Range    Sodium 139 136 - 145 mmol/L    Potassium reflex Magnesium 5.0 3.5 - 5.1 mmol/L    Chloride 97 (L) 99 - 110 mmol/L    CO2 9 (LL) 21 - 32 mmol/L    Anion Gap 33 (H) 3 - 16    Glucose 362 (H) 70 - 99 mg/dL    BUN 15 7 Calcium 9.5 8.3 - 10.6 mg/dL   CBC auto differential    Collection Time: 07/28/20  5:30 AM   Result Value Ref Range    WBC 15.8 (H) 4.0 - 11.0 K/uL    RBC 4.30 4.20 - 5.90 M/uL    Hemoglobin 11.7 (L) 13.5 - 17.5 g/dL    Hematocrit 36.3 (L) 40.5 - 52.5 %    MCV 84.5 80.0 - 100.0 fL    MCH 27.2 26.0 - 34.0 pg    MCHC 32.2 31.0 - 36.0 g/dL    RDW 15.8 (H) 12.4 - 15.4 %    Platelets 433 325 - 415 K/uL    MPV 7.6 5.0 - 10.5 fL    PLATELET SLIDE REVIEW Adequate     Neutrophils % 80.4 %    Lymphocytes % 15.3 %    Monocytes % 2.9 %    Eosinophils % 0.4 %    Basophils % 1.0 %    Neutrophils Absolute 12.7 (H) 1.7 - 7.7 K/uL    Lymphocytes Absolute 2.4 1.0 - 5.1 K/uL    Monocytes Absolute 0.5 0.0 - 1.3 K/uL    Eosinophils Absolute 0.1 0.0 - 0.6 K/uL    Basophils Absolute 0.2 0.0 - 0.2 K/uL   Magnesium    Collection Time: 07/28/20  5:30 AM   Result Value Ref Range    Magnesium 1.90 1.80 - 2.40 mg/dL   Phosphorus    Collection Time: 07/28/20  5:30 AM   Result Value Ref Range    Phosphorus 2.7 2.5 - 4.9 mg/dL   POCT Glucose    Collection Time: 07/28/20  6:08 AM   Result Value Ref Range    POC Glucose 224 (H) 70 - 99 mg/dl    Performed on ACCU-CHEK    POCT Glucose    Collection Time: 07/28/20  7:36 AM   Result Value Ref Range    POC Glucose 175 (H) 70 - 99 mg/dl    Performed on ACCU-CHEK    POCT Glucose    Collection Time: 07/28/20 12:24 PM   Result Value Ref Range    POC Glucose 187 (H) 70 - 99 mg/dl    Performed on ACCU-CHEK    POCT Glucose    Collection Time: 07/28/20  4:40 PM   Result Value Ref Range    POC Glucose 118 (H) 70 - 99 mg/dl    Performed on ACCU-CHEK    POCT Glucose    Collection Time: 07/28/20  9:25 PM   Result Value Ref Range    POC Glucose 243 (H) 70 - 99 mg/dl    Performed on ACCU-CHEK    POCT Glucose    Collection Time: 07/29/20 12:15 AM   Result Value Ref Range    POC Glucose 188 (H) 70 - 99 mg/dl    Performed on ACCU-CHEK    Basic Metabolic Panel w/ Reflex to MG    Collection Time: 07/29/20  4:00 AM   Result Value Ref Range    Sodium 143 136 - 145 mmol/L    Potassium reflex Magnesium 3.9 3.5 - 5.1 mmol/L    Chloride 106 99 - 110 mmol/L    CO2 17 (L) 21 - 32 mmol/L    Anion Gap 20 (H) 3 - 16    Glucose 109 (H) 70 - 99 mg/dL    BUN 12 7 - 20 mg/dL    CREATININE 0.9 0.9 - 1.3 mg/dL    GFR Non-African American >60 >60    GFR African American >60 >60    Calcium 9.3 8.3 - 10.6 mg/dL   CBC auto differential    Collection Time: 07/29/20  4:00 AM   Result Value Ref Range    WBC 11.2 (H) 4.0 - 11.0 K/uL    RBC 4.20 4. 20 - 5.90 M/uL    Hemoglobin 11.5 (L) 13.5 - 17.5 g/dL    Hematocrit 34.9 (L) 40.5 - 52.5 %    MCV 83.3 80.0 - 100.0 fL    MCH 27.4 26.0 - 34.0 pg    MCHC 32.9 31.0 - 36.0 g/dL    RDW 16.5 (H) 12.4 - 15.4 %    Platelets 517 910 - 181 K/uL    MPV 7.3 5.0 - 10.5 fL    Neutrophils % 70.6 %    Lymphocytes % 22.6 %    Monocytes % 4.0 %    Eosinophils % 2.1 %    Basophils % 0.7 %    Neutrophils Absolute 7.9 (H) 1.7 - 7.7 K/uL    Lymphocytes Absolute 2.5 1.0 - 5.1 K/uL    Monocytes Absolute 0.4 0.0 - 1.3 K/uL    Eosinophils Absolute 0.2 0.0 - 0.6 K/uL    Basophils Absolute 0.1 0.0 - 0.2 K/uL   Magnesium    Collection Time: 07/29/20  4:00 AM   Result Value Ref Range    Magnesium 1.70 (L) 1.80 - 2.40 mg/dL   Phosphorus    Collection Time: 07/29/20  4:00 AM   Result Value Ref Range    Phosphorus 1.9 (L) 2.5 - 4.9 mg/dL   POCT Glucose    Collection Time: 07/29/20  4:37 AM   Result Value Ref Range    POC Glucose 111 (H) 70 - 99 mg/dl    Performed on ACCU-CHEK    POCT Glucose    Collection Time: 07/29/20  7:29 AM   Result Value Ref Range    POC Glucose 148 (H) 70 - 99 mg/dl    Performed on ACCU-CHEK    POCT Glucose    Collection Time: 07/29/20 11:28 AM   Result Value Ref Range    POC Glucose 158 (H) 70 - 99 mg/dl    Performed on ACCU-CHEK    POCT Glucose    Collection Time: 07/29/20  2:34 PM   Result Value Ref Range    POC Glucose 178 (H) 70 - 99 mg/dl    Performed on ACCU-CHEK    POCT Glucose    Collection Time: 07/29/20  4:36 PM   Result Value Ref Range    POC Glucose 186 (H) 70 - 99 mg/dl    Performed on ACCU-CHEK    POCT Glucose    Collection Time: 07/29/20  7:52 PM   Result Value Ref Range    POC Glucose 199 (H) 70 - 99 mg/dl    Performed on ACCU-CHEK    POCT Glucose    Collection Time: 07/30/20 12:11 AM   Result Value Ref Range    POC Glucose 167 (H) 70 - 99 mg/dl    Performed on ACCU-CHEK    POCT Glucose    Collection Time: 07/30/20  3:58 AM   Result Value Ref Range    POC Glucose 134 (H) 70 - 99 mg/dl    Performed on ACCU-CHEK    Basic Metabolic Panel w/ Reflex to MG    Collection Time: 07/30/20  6:10 AM   Result Value Ref Range    Sodium 144 136 - 145 mmol/L    Potassium reflex Magnesium 3.7 3.5 - 5.1 mmol/L    Chloride 105 99 - 110 mmol/L    CO2 19 (L) 21 - 32 mmol/L    Anion Gap 20 (H) 3 - 16    Glucose 153 (H) 70 - 99 mg/dL    BUN 11 7 - 20 mg/dL    CREATININE 0.9 0.9 - 1.3 mg/dL    GFR Non-African American >60 >60    GFR African American >60 >60    Calcium 9.5 8.3 - 10.6 mg/dL   CBC auto differential    Collection Time: 07/30/20  6:10 AM   Result Value Ref Range    WBC 8.5 4.0 - 11.0 K/uL    RBC 4.38 4.20 - 5.90 M/uL    Hemoglobin 12.0 (L) 13.5 - 17.5 g/dL    Hematocrit 36.1 (L) 40.5 - 52.5 %    MCV 82.5 80.0 - 100.0 fL    MCH 27.4 26.0 - 34.0 pg    MCHC 33.2 31.0 - 36.0 g/dL    RDW 16.9 (H) 12.4 - 15.4 %    Platelets 982 858 - 624 K/uL    MPV 7.2 5.0 - 10.5 fL    Neutrophils % 69.1 %    Lymphocytes % 23.5 %    Monocytes % 4.4 %    Eosinophils % 2.2 %    Basophils % 0.8 %    Neutrophils Absolute 5.9 1.7 - 7.7 K/uL    Lymphocytes Absolute 2.0 1.0 - 5.1 K/uL    Monocytes Absolute 0.4 0.0 - 1.3 K/uL    Eosinophils Absolute 0.2 0.0 - 0.6 K/uL    Basophils Absolute 0.1 0.0 - 0.2 K/uL   Magnesium    Collection Time: 07/30/20  6:10 AM   Result Value Ref Range    Magnesium 1.60 (L) 1.80 - 2.40 mg/dL   Phosphorus    Collection Time: 07/30/20  6:10 AM   Result Value Ref Range    Phosphorus 2.6 2.5 - 4.9 mg/dL   POCT Glucose % 1.3 %    Neutrophils Absolute 5.0 1.7 - 7.7 K/uL    Lymphocytes Absolute 2.1 1.0 - 5.1 K/uL    Monocytes Absolute 0.4 0.0 - 1.3 K/uL    Eosinophils Absolute 0.2 0.0 - 0.6 K/uL    Basophils Absolute 0.1 0.0 - 0.2 K/uL   Magnesium    Collection Time: 07/31/20  5:45 AM   Result Value Ref Range    Magnesium 1.70 (L) 1.80 - 2.40 mg/dL   Phosphorus    Collection Time: 07/31/20  5:45 AM   Result Value Ref Range    Phosphorus 3.2 2.5 - 4.9 mg/dL   POCT Glucose    Collection Time: 07/31/20  8:01 AM   Result Value Ref Range    POC Glucose 145 (H) 70 - 99 mg/dl    Performed on ACCU-CHEK    POCT Glucose    Collection Time: 07/31/20  9:51 AM   Result Value Ref Range    POC Glucose 185 (H) 70 - 99 mg/dl    Performed on ACCU-CHEK    POCT Glucose    Collection Time: 07/31/20 11:50 AM   Result Value Ref Range    POC Glucose 169 (H) 70 - 99 mg/dl    Performed on ACCU-CHEK    POCT Glucose    Collection Time: 07/31/20  5:20 PM   Result Value Ref Range    POC Glucose 271 (H) 70 - 99 mg/dl    Performed on ACCU-CHEK    POCT Glucose    Collection Time: 07/31/20  8:12 PM   Result Value Ref Range    POC Glucose 151 (H) 70 - 99 mg/dl    Performed on ACCU-CHEK    POCT Glucose    Collection Time: 08/01/20 12:05 AM   Result Value Ref Range    POC Glucose 130 (H) 70 - 99 mg/dl    Performed on ACCU-CHEK    POCT Glucose    Collection Time: 08/01/20  4:03 AM   Result Value Ref Range    POC Glucose 151 (H) 70 - 99 mg/dl    Performed on ACCU-CHEK    Basic Metabolic Panel w/ Reflex to MG    Collection Time: 08/01/20  7:09 AM   Result Value Ref Range    Sodium 147 (H) 136 - 145 mmol/L    Potassium reflex Magnesium 3.3 (L) 3.5 - 5.1 mmol/L    Chloride 109 99 - 110 mmol/L    CO2 24 21 - 32 mmol/L    Anion Gap 14 3 - 16    Glucose 138 (H) 70 - 99 mg/dL    BUN 10 7 - 20 mg/dL    CREATININE 0.8 (L) 0.9 - 1.3 mg/dL    GFR Non-African American >60 >60    GFR African American >60 >60    Calcium 9.6 8.3 - 10.6 mg/dL   CBC auto differential    Collection Time: 08/01/20  7:09 AM   Result Value Ref Range    WBC 7.4 4.0 - 11.0 K/uL    RBC 4.28 4.20 - 5.90 M/uL    Hemoglobin 11.8 (L) 13.5 - 17.5 g/dL    Hematocrit 35.3 (L) 40.5 - 52.5 %    MCV 82.5 80.0 - 100.0 fL    MCH 27.5 26.0 - 34.0 pg    MCHC 33.3 31.0 - 36.0 g/dL    RDW 16.6 (H) 12.4 - 15.4 %    Platelets 043 643 - 338 K/uL    MPV 7.7 5.0 - 10.5 fL    Neutrophils % 70.1 %    Lymphocytes % 22.2 %    Monocytes % 4.9 %    Eosinophils % 2.1 %    Basophils % 0.7 %    Neutrophils Absolute 5.2 1.7 - 7.7 K/uL    Lymphocytes Absolute 1.6 1.0 - 5.1 K/uL    Monocytes Absolute 0.4 0.0 - 1.3 K/uL    Eosinophils Absolute 0.2 0.0 - 0.6 K/uL    Basophils Absolute 0.1 0.0 - 0.2 K/uL   Magnesium    Collection Time: 08/01/20  7:09 AM   Result Value Ref Range    Magnesium 1.90 1.80 - 2.40 mg/dL   Phosphorus    Collection Time: 08/01/20  7:09 AM   Result Value Ref Range    Phosphorus 2.8 2.5 - 4.9 mg/dL   POCT Glucose    Collection Time: 08/01/20  7:49 AM   Result Value Ref Range    POC Glucose 110 (H) 70 - 99 mg/dl    Performed on ACCU-CHEK    POCT Glucose    Collection Time: 08/01/20 11:42 AM   Result Value Ref Range    POC Glucose 152 (H) 70 - 99 mg/dl    Performed on ACCU-CHEK        Current Facility-Administered Medications   Medication Dose Route Frequency Provider Last Rate Last Dose    OLANZapine (ZYPREXA) tablet 5 mg  5 mg Oral Nightly Connie Paris MD   Stopped at 07/31/20 0900    insulin glargine (LANTUS) injection vial 10 Units  10 Units Subcutaneous Nightly Esequiel García MD   10 Units at 07/31/20 2202    OLANZapine (ZYPREXA) injection 5 mg  5 mg Intramuscular Q8H PRN Esequiel García MD   5 mg at 07/26/20 2147    losartan (COZAAR) tablet 100 mg  100 mg Oral Daily Esequiel García MD   Stopped at 07/29/20 1117    insulin lispro (HUMALOG) injection vial 0-12 Units  0-12 Units Subcutaneous Q4H ESTEPHANIA Velazquez - CNP   2 Units at 08/01/20 0502    sodium chloride flush 0.9 % injection 10 mL  10 mL Intravenous 2 times per day France Jerome APRN - CNP   10 mL at 08/01/20 0945    sodium chloride flush 0.9 % injection 10 mL  10 mL Intravenous PRN France Jerome APRN - CNP        acetaminophen (TYLENOL) tablet 650 mg  650 mg Oral Q4H PRN Rose Nogueira MD   650 mg at 07/24/20 3647    Or    acetaminophen (TYLENOL) suppository 650 mg  650 mg Rectal Q4H PRN Rose Nogueira MD        polyethylene glycol [facility]) packet 17 g  17 g Oral Daily PRN Rose Nogueira MD        [facility]etron [facility]) injection 4 mg  4 mg Intravenous Q4H PRN Rose Nogueira MD        glucose (GLUTOSE) 40 % oral gel 15 g  15 g Oral PRN Rose Nogueira MD        dextrose 50 % IV solution  12.5 g Intravenous PRN Rose Nogueira MD   12.5 g at 07/16/20 1955    glucagon (rDNA) injection 1 mg  1 mg Intramuscular PRN Rose Nogueira MD        dextrose 5 % solution  100 mL/hr Intravenous PRN Rose Nogueira MD        enoxaparin (LOVENOX) injection 40 mg  40 mg Subcutaneous Daily Rose Nogueira MD   40 mg at 08/01/20 0943    pantoprazole (PROTONIX) injection 40 mg  40 mg Intravenous Daily France Jerome APRN - CNP   40 mg at 08/01/20 6893    And    sodium chloride (PF) 0.9 % injection 10 mL  10 mL Intravenous Daily France Queenie, APRN - CNP   10 mL at 08/01/20 0945    magnesium sulfate 1 g in dextrose 5% 100 mL IVPB  1 g Intravenous PRN France Haoles, APRN - CNP   Stopped at 07/30/20 1502    potassium chloride 20 mEq/50 mL IVPB (Central Line)  20 mEq Intravenous PRN France Hoales, APRN - CNP 50 mL/hr at 08/01/20 1138 20 mEq at 08/01/20 1138    sodium phosphate 11.07 mmol in dextrose 5 % 250 mL IVPB  0.16 mmol/kg Intravenous PRN France Queenie, APRN - CNP        Or    sodium phosphate 22.11 mmol in dextrose 5 % 250 mL IVPB  0.32 mmol/kg Intravenous PRN France Jerome, APRN - CNP         ROS:  Could not assess, no speech. MSE:  [place] covers, not rocking today  Motor - no rigidity, no tremors, no abnormal body movements.  Does not seem to purposefully resist any passive movements of his arms. A-sleepy  M-can't assess, no speech  S-impaired  I/J-impaired  T-no speech. No resp to int stim. Recs:  1. AMS-  There is a possibility that there is an element of catatonic delirium given what seems like a favorable response yesterday evening to lorazepam, however today we are not seeing a response. I'll ask his RN to notify me later today if we see him noticeably perk up this afternoon in which case we may consider scheduling lorazepam in 1mg doses. Agree with holding olanzapine if overly sedated. Will need to keep in mind that antipsychotics have a tendency to worsen catatonia. I will add some additional delirium labs to r/o other contributing possibilities: TSH, B12, folate, repeat UA w/relfex.

## 2020-08-02 LAB
ANION GAP SERPL CALCULATED.3IONS-SCNC: 16 MMOL/L (ref 3–16)
BASOPHILS ABSOLUTE: 0.1 K/UL (ref 0–0.2)
BASOPHILS RELATIVE PERCENT: 0.9 %
BUN BLDV-MCNC: 9 MG/DL (ref 7–20)
CALCIUM SERPL-MCNC: 9.6 MG/DL (ref 8.3–10.6)
CHLORIDE BLD-SCNC: 108 MMOL/L (ref 99–110)
CO2: 24 MMOL/L (ref 21–32)
CREAT SERPL-MCNC: 0.9 MG/DL (ref 0.9–1.3)
EOSINOPHILS ABSOLUTE: 0.2 K/UL (ref 0–0.6)
EOSINOPHILS RELATIVE PERCENT: 2.8 %
GFR AFRICAN AMERICAN: >60
GFR NON-AFRICAN AMERICAN: >60
GLUCOSE BLD-MCNC: 115 MG/DL (ref 70–99)
GLUCOSE BLD-MCNC: 117 MG/DL (ref 70–99)
GLUCOSE BLD-MCNC: 118 MG/DL (ref 70–99)
GLUCOSE BLD-MCNC: 146 MG/DL (ref 70–99)
GLUCOSE BLD-MCNC: 151 MG/DL (ref 70–99)
GLUCOSE BLD-MCNC: 206 MG/DL (ref 70–99)
GLUCOSE BLD-MCNC: 91 MG/DL (ref 70–99)
HCT VFR BLD CALC: 35.2 % (ref 40.5–52.5)
HEMOGLOBIN: 11.9 G/DL (ref 13.5–17.5)
LYMPHOCYTES ABSOLUTE: 2.3 K/UL (ref 1–5.1)
LYMPHOCYTES RELATIVE PERCENT: 29.2 %
MAGNESIUM: 1.7 MG/DL (ref 1.8–2.4)
MCH RBC QN AUTO: 28 PG (ref 26–34)
MCHC RBC AUTO-ENTMCNC: 33.8 G/DL (ref 31–36)
MCV RBC AUTO: 82.7 FL (ref 80–100)
MONOCYTES ABSOLUTE: 0.4 K/UL (ref 0–1.3)
MONOCYTES RELATIVE PERCENT: 5.7 %
NEUTROPHILS ABSOLUTE: 4.8 K/UL (ref 1.7–7.7)
NEUTROPHILS RELATIVE PERCENT: 61.4 %
PDW BLD-RTO: 16.7 % (ref 12.4–15.4)
PERFORMED ON: ABNORMAL
PERFORMED ON: NORMAL
PHOSPHORUS: 3.3 MG/DL (ref 2.5–4.9)
PLATELET # BLD: 320 K/UL (ref 135–450)
PMV BLD AUTO: 7.4 FL (ref 5–10.5)
POTASSIUM REFLEX MAGNESIUM: 3.3 MMOL/L (ref 3.5–5.1)
POTASSIUM SERPL-SCNC: 3.3 MMOL/L (ref 3.5–5.1)
RBC # BLD: 4.26 M/UL (ref 4.2–5.9)
SODIUM BLD-SCNC: 148 MMOL/L (ref 136–145)
URINE CULTURE, ROUTINE: NORMAL
WBC # BLD: 7.8 K/UL (ref 4–11)

## 2020-08-02 PROCEDURE — 6370000000 HC RX 637 (ALT 250 FOR IP): Performed by: HOSPITALIST

## 2020-08-02 PROCEDURE — 80048 BASIC METABOLIC PNL TOTAL CA: CPT

## 2020-08-02 PROCEDURE — 6360000002 HC RX W HCPCS: Performed by: NURSE PRACTITIONER

## 2020-08-02 PROCEDURE — 1200000000 HC SEMI PRIVATE

## 2020-08-02 PROCEDURE — 83735 ASSAY OF MAGNESIUM: CPT

## 2020-08-02 PROCEDURE — 94760 N-INVAS EAR/PLS OXIMETRY 1: CPT

## 2020-08-02 PROCEDURE — C9113 INJ PANTOPRAZOLE SODIUM, VIA: HCPCS | Performed by: NURSE PRACTITIONER

## 2020-08-02 PROCEDURE — 84100 ASSAY OF PHOSPHORUS: CPT

## 2020-08-02 PROCEDURE — 85025 COMPLETE CBC W/AUTO DIFF WBC: CPT

## 2020-08-02 PROCEDURE — 6360000002 HC RX W HCPCS: Performed by: INTERNAL MEDICINE

## 2020-08-02 PROCEDURE — 6370000000 HC RX 637 (ALT 250 FOR IP): Performed by: NURSE PRACTITIONER

## 2020-08-02 PROCEDURE — 84132 ASSAY OF SERUM POTASSIUM: CPT

## 2020-08-02 PROCEDURE — 2580000003 HC RX 258: Performed by: NURSE PRACTITIONER

## 2020-08-02 PROCEDURE — 6370000000 HC RX 637 (ALT 250 FOR IP): Performed by: INTERNAL MEDICINE

## 2020-08-02 RX ORDER — LORAZEPAM 2 MG/ML
0.5 INJECTION INTRAMUSCULAR EVERY 6 HOURS
Status: DISCONTINUED | OUTPATIENT
Start: 2020-08-02 | End: 2020-08-04

## 2020-08-02 RX ORDER — MAGNESIUM SULFATE IN WATER 40 MG/ML
2 INJECTION, SOLUTION INTRAVENOUS ONCE
Status: COMPLETED | OUTPATIENT
Start: 2020-08-02 | End: 2020-08-02

## 2020-08-02 RX ADMIN — Medication 10 ML: at 10:12

## 2020-08-02 RX ADMIN — LORAZEPAM 0.5 MG: 2 INJECTION INTRAMUSCULAR; INTRAVENOUS at 22:32

## 2020-08-02 RX ADMIN — INSULIN LISPRO 4 UNITS: 100 INJECTION, SOLUTION INTRAVENOUS; SUBCUTANEOUS at 23:13

## 2020-08-02 RX ADMIN — LORAZEPAM 1 MG: 2 INJECTION INTRAMUSCULAR; INTRAVENOUS at 10:01

## 2020-08-02 RX ADMIN — PANTOPRAZOLE SODIUM 40 MG: 40 INJECTION, POWDER, FOR SOLUTION INTRAVENOUS at 10:01

## 2020-08-02 RX ADMIN — SODIUM CHLORIDE, PRESERVATIVE FREE 10 ML: 5 INJECTION INTRAVENOUS at 10:11

## 2020-08-02 RX ADMIN — INSULIN GLARGINE 10 UNITS: 100 INJECTION, SOLUTION SUBCUTANEOUS at 23:11

## 2020-08-02 RX ADMIN — LOSARTAN POTASSIUM 100 MG: 100 TABLET, FILM COATED ORAL at 10:01

## 2020-08-02 RX ADMIN — SODIUM CHLORIDE, PRESERVATIVE FREE 10 ML: 5 INJECTION INTRAVENOUS at 22:40

## 2020-08-02 RX ADMIN — ENOXAPARIN SODIUM 40 MG: 40 INJECTION SUBCUTANEOUS at 10:01

## 2020-08-02 RX ADMIN — MAGNESIUM SULFATE HEPTAHYDRATE 2 G: 40 INJECTION, SOLUTION INTRAVENOUS at 14:52

## 2020-08-02 RX ADMIN — LORAZEPAM 0.5 MG: 2 INJECTION INTRAMUSCULAR; INTRAVENOUS at 16:45

## 2020-08-02 ASSESSMENT — PAIN SCALES - WONG BAKER
WONGBAKER_NUMERICALRESPONSE: 0
WONGBAKER_NUMERICALRESPONSE: 0

## 2020-08-02 ASSESSMENT — PAIN SCALES - GENERAL
PAINLEVEL_OUTOF10: 0
PAINLEVEL_OUTOF10: 0

## 2020-08-02 NOTE — PROGRESS NOTES
Blood drawn from PICC for lab studies, sluggish blood return. Patient is sitting in the chair, nonverbal, impulsive. BP running low 90/66, 89/62 and 93/62. Patient attempted to eat a couple of bites of soup.  Call out to hospitalist, regarding low BP and am holding Ativan until here from [address]

## 2020-08-02 NOTE — PLAN OF CARE
Problem: Pain:  Description: Pain management should include both nonpharmacologic and pharmacologic interventions.   Goal: Pain level will decrease  Description: Pain level will decrease  8/2/2020 1117 by Chalo Tony RN  Outcome: Ongoing  8/2/2020 1117 by Chalo Tony RN  Outcome: Ongoing  Goal: Control of acute pain  Description: Control of acute pain  8/2/2020 1117 by Chalo Tony RN  Outcome: Ongoing  8/2/2020 1117 by Chalo Tony RN  Outcome: Ongoing  Goal: Control of chronic pain  Description: Control of chronic pain  8/2/2020 1117 by Chalo Tony RN  Outcome: Ongoing  8/2/2020 1117 by Chalo Tony RN  Outcome: Ongoing     Problem: Nutrition  Goal: Optimal nutrition therapy  8/2/2020 1117 by Chalo Tony RN  Outcome: Ongoing  8/2/2020 1117 by Chalo Tony RN  Outcome: Ongoing     Problem: Skin Integrity:  Goal: Will show no infection signs and symptoms  Description: Will show no infection signs and symptoms  8/2/2020 1117 by Chalo Tony RN  Outcome: Ongoing  8/2/2020 1117 by Chalo Tony RN  Outcome: Ongoing  Goal: Absence of new skin breakdown  Description: Absence of new skin breakdown  8/2/2020 1117 by Chalo Tony RN  Outcome: Ongoing  8/2/2020 1117 by Chalo Tony RN  Outcome: Ongoing     Problem: Falls - Risk of:  Goal: Will remain free from falls  Description: Will remain free from falls  8/2/2020 1117 by Chalo Tony RN  Outcome: Ongoing  8/2/2020 1117 by Chalo Tony RN  Outcome: Ongoing  Goal: Absence of physical injury  Description: Absence of physical injury  8/2/2020 1117 by Chalo Tony RN  Outcome: Ongoing  8/2/2020 1117 by Chalo Tony RN  Outcome: Ongoing     Problem: Nutrition Deficit:  Goal: Ability to achieve adequate nutritional intake will improve  Description: Ability to achieve adequate nutritional intake will improve  8/2/2020 1117 by Chalo Tony RN  Outcome: Ongoing  8/2/2020 1117 by Jose Rodriguez RN  Outcome: Ongoing     Problem: Mental Status - Impaired:  Goal: Mental status will improve  Description: Mental status will improve  8/2/2020 1117 by Jose Rodriguez RN  Outcome: Ongoing  8/2/2020 1117 by Jose Rodriguez RN  Outcome: Ongoing

## 2020-08-02 NOTE — PROGRESS NOTES
Patient is lying in bed, nonverbal at this time. Patient had an assisted fall around 1900, landed on t;he safety companions foot. Patient has received a CT scan and the hospitalist has been notified. BP elevated - M.D. aware regarding the BP at [id number] of 151/92 and patient refused BP medication at that time.

## 2020-08-02 NOTE — PROGRESS NOTES
Hospitalist Progress Note      PCP: Yodit Huston MD    Date of Admission: 7/15/2020    Chief Complaint: altered mental status    Hospital Course:  58-year-old M Hx poorly controlled DM who was admitted unresponsive at home with glucose of 25.  He was reported to have accidental versus intentional overdose of insulin. He was intubated. He Self extubated to 2 L nasal cannula by coughing. Patient was evaluated by neurology and no obvious etiology was found  EEG is not consistent with seizure disorder. No significant findings on CSF. Patient's respiratory status improved. Patient is considered to be possibly PRES. nurses report patient's behavior as aggressive and agitated at times. Psychiatric services consulted  7/31 patient was given an Ativan challenge. Approximately 6 hours after he received Ativan the patient woke up was eating with the family he did not receive any more medication that evening and was again in a catatonic state by midnight. 8/1 patient was given 2 mg of Ativan at 1030. At 130 he woke up he is alert and talking. He ate his entire lunch. I will initiate a scheduled Ativan order of 1 mg every 6. I did go up and speak with the wife. The patient is walking around the room she brought him into vegetables.        Subjective: Patient was seen and examined. Again he is nonresponsive to pain or verbal stimuli. The nurse held his Ativan last night secondary to his blood pressure. We will again give him 1 mg now and decrease the dose to 0.5 mg to see how he tolerates that.         Medications:  Reviewed    Infusion Medications    dextrose       Scheduled Medications    LORazepam  1 mg Intravenous Q6H    OLANZapine  5 mg Oral Nightly    insulin glargine  10 Units Subcutaneous Nightly    losartan  100 mg Oral Daily    insulin lispro  0-12 Units Subcutaneous Q4H    sodium chloride flush  10 mL Intravenous 2 times per day    enoxaparin  40 mg Subcutaneous Daily    pantoprazole  40 mg Intravenous Daily    And    sodium chloride (PF)  10 mL Intravenous Daily     PRN Meds: OLANZapine, sodium chloride flush, acetaminophen **OR** acetaminophen, polyethylene glycol, ondansetron, glucose, dextrose, glucagon (rDNA), dextrose, magnesium sulfate, potassium chloride, sodium phosphate IVPB **OR** sodium phosphate IVPB      Intake/Output Summary (Last 24 hours) at 8/2/2020 0824  Last data filed at 8/2/2020 0526  Gross per 24 hour   Intake 780 ml   Output 625 ml   Net 155 ml       Physical Exam Performed:    BP 90/66   Pulse 77   Temp 97.6 °F (36.4 °C) (Axillary)   Resp 16   Ht 6' 2\" (1.88 m)   Wt 136 lb 14.5 oz (62.1 kg)   SpO2 98%   BMI 17.58 kg/m²     Pt did not let me examine him as he would keep changing his position and lie on opposite side without answering any questions. My observation is as below    General appearance: No apparent distress, seemed to willingly avoid interation  HEENT: NC/AT,EOMI  Respiratory/CVS: Lungs clear to auscultate respirations even, rate regular  Abdomen: Does not withdrawal to pain bowel sounds present x4  Musculoskeletal: no obvious deformity noted  Neurologic:  Moving all extremities, no focal motor deficits. Psychiatric: uncooperative with exam  Skin: No breakdown noted to buttocks hips heels or spine. Capillary Refill: Brisk,< 3 seconds   Peripheral Pulses: +2 palpable, equal bilaterally    Labs:   Recent Labs     07/31/20  0545 08/01/20  0709 08/02/20  0526   WBC 7.8 7.4 7.8   HGB 11.7* 11.8* 11.9*   HCT 34.7* 35.3* 35.2*    326 320     Recent Labs     07/31/20  0545 08/01/20  0709 08/02/20  0526   * 147* 148*   K 3.8 3.3* 3.3*  3.3*   * 109 108   CO2 21 24 24   BUN 10 10 9   CREATININE 0.7* 0.8* 0.9   CALCIUM 9.4 9.6 9.6   PHOS 3.2 2.8 3.3     No results for input(s): AST, ALT, BILIDIR, BILITOT, ALKPHOS in the last 72 hours. No results for input(s): INR in the last 72 hours.   No results for input(s): CKTOTAL, was ambulating within the room complained of being hungry. However around midnight he became catatonic again. -8/1-Ativan was given at 1030 approximately 130 this afternoon the patient again woke up and is complaining of being hungry. I will initiate Ativan 1 mg every 6. If this becomes too much we will back down on the dosing.  -Ativan was held overnight secondary to low blood pressure.   I will give him 1 mg now and then decrease the dose to 0.5 every 6     Agitation and aggressive behavior  Psychiatric following  on Zyprexa as needed     Diabetes mellitus  Severe hypoglycemia on admission-resolved  Insulin overdose-unsure if accidental or intentional     Continue ISS     Hypertension  Continue meds    Weightloss  Initial wt 152lbs today 136lbs  -Nutritional consult  -Daily weights  -We will add phosphorus, potassium and magnesium levels to be addressed in the morning-replace electrolytes per protocol      DVT Prophylaxis: lovenox  Diet: DIET DENTAL SOFT;  Code Status: Full Code    PT/OT Eval Status: pending    Dispo - pending clinical course    ESTEPHANIA Hurtado - CNP

## 2020-08-02 NOTE — PROGRESS NOTES
This PCA unable to obtain vital signs due to excessive movement and combative behavior of patient. Pt repeatedly attempting to get up and is unable to be redirected. Pt attempting to exit bed without assistance removing blankets, therefore creating a fall risk. Pt is pushing this PCA when she tried to assist pt. Santa Brunson RN notified.      Electronically signed by Mariaa Silver on 8/2/2020 at 2:39 PM

## 2020-08-03 ENCOUNTER — TELEPHONE (OUTPATIENT)
Dept: PSYCHIATRY | Age: 45
End: 2020-08-03

## 2020-08-03 PROBLEM — E43 SEVERE MALNUTRITION (HCC): Chronic | Status: ACTIVE | Noted: 2020-08-03

## 2020-08-03 LAB
ANION GAP SERPL CALCULATED.3IONS-SCNC: 17 MMOL/L (ref 3–16)
BASOPHILS ABSOLUTE: 0.1 K/UL (ref 0–0.2)
BASOPHILS RELATIVE PERCENT: 1 %
BUN BLDV-MCNC: 11 MG/DL (ref 7–20)
CALCIUM SERPL-MCNC: 9.4 MG/DL (ref 8.3–10.6)
CHLORIDE BLD-SCNC: 107 MMOL/L (ref 99–110)
CO2: 24 MMOL/L (ref 21–32)
CREAT SERPL-MCNC: 0.8 MG/DL (ref 0.9–1.3)
EOSINOPHILS ABSOLUTE: 0.2 K/UL (ref 0–0.6)
EOSINOPHILS RELATIVE PERCENT: 3.5 %
GFR AFRICAN AMERICAN: >60
GFR NON-AFRICAN AMERICAN: >60
GLUCOSE BLD-MCNC: 105 MG/DL (ref 70–99)
GLUCOSE BLD-MCNC: 183 MG/DL (ref 70–99)
GLUCOSE BLD-MCNC: 217 MG/DL (ref 70–99)
GLUCOSE BLD-MCNC: 256 MG/DL (ref 70–99)
GLUCOSE BLD-MCNC: 86 MG/DL (ref 70–99)
GLUCOSE BLD-MCNC: 93 MG/DL (ref 70–99)
HCT VFR BLD CALC: 38 % (ref 40.5–52.5)
HEMOGLOBIN: 12.5 G/DL (ref 13.5–17.5)
LYMPHOCYTES ABSOLUTE: 2.4 K/UL (ref 1–5.1)
LYMPHOCYTES RELATIVE PERCENT: 34.6 %
MAGNESIUM: 1.8 MG/DL (ref 1.8–2.4)
MCH RBC QN AUTO: 27.5 PG (ref 26–34)
MCHC RBC AUTO-ENTMCNC: 33 G/DL (ref 31–36)
MCV RBC AUTO: 83.2 FL (ref 80–100)
MONOCYTES ABSOLUTE: 0.3 K/UL (ref 0–1.3)
MONOCYTES RELATIVE PERCENT: 4.9 %
NEUTROPHILS ABSOLUTE: 3.8 K/UL (ref 1.7–7.7)
NEUTROPHILS RELATIVE PERCENT: 56 %
PDW BLD-RTO: 16.4 % (ref 12.4–15.4)
PERFORMED ON: ABNORMAL
PERFORMED ON: NORMAL
PHOSPHORUS: 3.8 MG/DL (ref 2.5–4.9)
PLATELET # BLD: 348 K/UL (ref 135–450)
PMV BLD AUTO: 8 FL (ref 5–10.5)
POTASSIUM REFLEX MAGNESIUM: 3.1 MMOL/L (ref 3.5–5.1)
RBC # BLD: 4.57 M/UL (ref 4.2–5.9)
SODIUM BLD-SCNC: 148 MMOL/L (ref 136–145)
WBC # BLD: 6.9 K/UL (ref 4–11)

## 2020-08-03 PROCEDURE — 1200000000 HC SEMI PRIVATE

## 2020-08-03 PROCEDURE — 85025 COMPLETE CBC W/AUTO DIFF WBC: CPT

## 2020-08-03 PROCEDURE — C9113 INJ PANTOPRAZOLE SODIUM, VIA: HCPCS | Performed by: NURSE PRACTITIONER

## 2020-08-03 PROCEDURE — 6360000002 HC RX W HCPCS: Performed by: INTERNAL MEDICINE

## 2020-08-03 PROCEDURE — 83735 ASSAY OF MAGNESIUM: CPT

## 2020-08-03 PROCEDURE — 94760 N-INVAS EAR/PLS OXIMETRY 1: CPT

## 2020-08-03 PROCEDURE — 2580000003 HC RX 258: Performed by: NURSE PRACTITIONER

## 2020-08-03 PROCEDURE — 6370000000 HC RX 637 (ALT 250 FOR IP): Performed by: PSYCHIATRY & NEUROLOGY

## 2020-08-03 PROCEDURE — 99231 SBSQ HOSP IP/OBS SF/LOW 25: CPT | Performed by: PSYCHIATRY & NEUROLOGY

## 2020-08-03 PROCEDURE — 6360000002 HC RX W HCPCS: Performed by: NURSE PRACTITIONER

## 2020-08-03 PROCEDURE — 80048 BASIC METABOLIC PNL TOTAL CA: CPT

## 2020-08-03 PROCEDURE — 6370000000 HC RX 637 (ALT 250 FOR IP): Performed by: INTERNAL MEDICINE

## 2020-08-03 PROCEDURE — 84100 ASSAY OF PHOSPHORUS: CPT

## 2020-08-03 PROCEDURE — 6370000000 HC RX 637 (ALT 250 FOR IP): Performed by: NURSE PRACTITIONER

## 2020-08-03 RX ORDER — ZOLPIDEM TARTRATE 5 MG/1
10 TABLET ORAL DAILY
Status: DISCONTINUED | OUTPATIENT
Start: 2020-08-03 | End: 2020-08-07

## 2020-08-03 RX ADMIN — SODIUM CHLORIDE, PRESERVATIVE FREE 10 ML: 5 INJECTION INTRAVENOUS at 10:31

## 2020-08-03 RX ADMIN — INSULIN LISPRO 2 UNITS: 100 INJECTION, SOLUTION INTRAVENOUS; SUBCUTANEOUS at 10:35

## 2020-08-03 RX ADMIN — LORAZEPAM 0.5 MG: 2 INJECTION INTRAMUSCULAR; INTRAVENOUS at 22:00

## 2020-08-03 RX ADMIN — INSULIN LISPRO 6 UNITS: 100 INJECTION, SOLUTION INTRAVENOUS; SUBCUTANEOUS at 21:17

## 2020-08-03 RX ADMIN — LORAZEPAM 0.5 MG: 2 INJECTION INTRAMUSCULAR; INTRAVENOUS at 10:30

## 2020-08-03 RX ADMIN — LORAZEPAM 0.5 MG: 2 INJECTION INTRAMUSCULAR; INTRAVENOUS at 05:58

## 2020-08-03 RX ADMIN — LORAZEPAM 0.5 MG: 2 INJECTION INTRAMUSCULAR; INTRAVENOUS at 17:43

## 2020-08-03 RX ADMIN — PANTOPRAZOLE SODIUM 40 MG: 40 INJECTION, POWDER, FOR SOLUTION INTRAVENOUS at 10:30

## 2020-08-03 RX ADMIN — SODIUM CHLORIDE, PRESERVATIVE FREE 10 ML: 5 INJECTION INTRAVENOUS at 23:52

## 2020-08-03 RX ADMIN — ENOXAPARIN SODIUM 40 MG: 40 INJECTION SUBCUTANEOUS at 10:30

## 2020-08-03 RX ADMIN — INSULIN LISPRO 4 UNITS: 100 INJECTION, SOLUTION INTRAVENOUS; SUBCUTANEOUS at 13:08

## 2020-08-03 RX ADMIN — Medication 10 ML: at 10:31

## 2020-08-03 RX ADMIN — INSULIN GLARGINE 10 UNITS: 100 INJECTION, SOLUTION SUBCUTANEOUS at 21:16

## 2020-08-03 RX ADMIN — CEFTRIAXONE 1 G: 1 INJECTION, POWDER, FOR SOLUTION INTRAMUSCULAR; INTRAVENOUS at 13:08

## 2020-08-03 ASSESSMENT — PAIN SCALES - GENERAL: PAINLEVEL_OUTOF10: 0

## 2020-08-03 ASSESSMENT — PAIN SCALES - WONG BAKER: WONGBAKER_NUMERICALRESPONSE: 0

## 2020-08-03 NOTE — PROGRESS NOTES
Blood drawn from right PICC, patient gt up from bed, went to the chair, then the couch and then the bed and back to the chair and then back to bed. Able to Draw blood from PICC and change cap. Patient  Says yes and no to questions and continues to do what he is doing.

## 2020-08-03 NOTE — PROGRESS NOTES
Comprehensive Nutrition Assessment    Type and Reason for Visit:  Reassess    Nutrition Recommendations/Plan:   Continue Dental soft. Start Glucerna TID and Magic Cup BID. May need to consider alternative nutrition. Very little intakes x 10 days. Nutrition Assessment:  Pt continues with very little improvement from  a nutrition standpoint. Pt seesm to be withdrawn and not cooperative at times. Pt with some improvement over the weekend with drinking some liquids and a few bites of food. Pts diet was upgraded to dental soft on 8/1 but continues with very little PO intakes. Pt with no meaningful PO intake x 10 days. Psych on board. Will restart supplement and continue to encourage PO intakes. Noted significant wt loss since admission. Alternative nutrition may need to be considered but pt most likely will not agree. Malnutrition Assessment:  Malnutrition Status:  Severe malnutrition    The following was documented by the Dietitian:  Malnutrition Assessment  Context of Malnutrition: Acute Illness (08/03/20 1038)  Acute Illness - Energy Intake : 7 - 50% or less of estimated energy requirements for 5 or more days (08/03/20 1038)  Acute Illness - Weight Loss : 1 - 1% to 2% over 1 week (08/03/20 1038)  Acute Illness - Body Fat Loss: Unable to assess (08/03/20 1038)  Acute Illness - Muscle Mass Loss: Unable to assess (08/03/20 1038)  Acute Illness - Fluid Accumulation : No significant fluid accumulation (08/03/20 1038)  Acute Illness -  Strength: Not Performed (08/03/20 1038)  Acute Illness - Malnutrition Score: 8 (08/03/20 1038)  Malnutrition Status: Severe malnutrition (08/03/20 1038)        Estimated Daily Nutrient Needs:  Energy (kcal):  3880-2785 kcal (25-30 kcal/kg ABW)  Protein (g):   gm (1.2-2 gm/kg ABW)  Fluid (ml/day):  per MD    Nutrition Related Findings:  BM 7/29;  High Na, low K+, and fluctuating blood sugar      Wounds:  None       Current Nutrition Therapies:    DIET DENTAL SOFT;  Dietary Nutrition Supplements: Diabetic Oral Supplement  Dietary Nutrition Supplements: Frozen Oral Supplement    Anthropometric Measures:  · Height: 6' 2\" (188 cm)  · Current Body Weight: 136 lb (61.7 kg)   · Admission Body Weight: 160 lb (72.6 kg)    · Usual Body Weight: 160 lb (72.6 kg)     · Ideal Body Weight: 190 lbs; % Ideal Body Weight 71.6 %   · BMI: 17.5  · BMI Categories: Underweight (BMI less than 18.5)       Nutrition Diagnosis:   · Inadequate oral intake related to cognitive or neurological impairment as evidenced by poor intake prior to admission      Nutrition Interventions:   Food and/or Nutrient Delivery:  Continue Current Diet, Start Oral Nutrition Supplement  Nutrition Education/Counseling:  No recommendation at this time   Coordination of Nutrition Care:  Continued Inpatient Monitoring    Goals: Tolerate most appropriate nutrition therapy       Nutrition Monitoring and Evaluation:   Behavioral-Environmental Outcomes:  (NA)   Food/Nutrient Intake Outcomes:  Food and Nutrient Intake, Supplement Intake  Physical Signs/Symptoms Outcomes:  Biochemical Data, Chewing or Swallowing, Constipation, Diarrhea, Nausea or Vomiting, Fluid Status or Edema, Nutrition Focused Physical Findings, Skin, Weight     Discharge Planning:     Too soon to determine     Electronically signed by Marti Ulloa RD, LD on 8/3/20 at 10:35 AM EDT    Contact: [phone]

## 2020-08-03 NOTE — PLAN OF CARE
Problem: Nutrition  Goal: Optimal nutrition therapy  8/3/2020 1037 by Melanie Sarmiento RD, LD  Outcome: Ongoing  8/3/2020 0135 by Diane Norton RN  Outcome: Ongoing   Nutrition Problem #1: Inadequate oral intake  Intervention: Food and/or Nutrient Delivery: Continue Current Diet, Start Oral Nutrition Supplement  Nutritional Goals:  Tolerate most appropriate nutrition therapy

## 2020-08-03 NOTE — PLAN OF CARE
Problem: Nutrition  Goal: Optimal nutrition therapy  Outcome: Ongoing     Problem: Skin Integrity:  Goal: Will show no infection signs and symptoms  Description: Will show no infection signs and symptoms  Outcome: Ongoing  Goal: Absence of new skin breakdown  Description: Absence of new skin breakdown  Outcome: Ongoing     Problem: Nutrition Deficit:  Goal: Ability to achieve adequate nutritional intake will improve  Description: Ability to achieve adequate nutritional intake will improve  Outcome: Ongoing     Problem: Mental Status - Impaired:  Goal: Mental status will improve  Description: Mental status will improve  Outcome: Ongoing

## 2020-08-03 NOTE — PROGRESS NOTES
Pt remains sleeping in bed, but will suddenly wake up and then go to the bathroom and chair and then return to bed. This pattern has occurred four times in the past four hours. Pt will try to exit room when walking to the bathroom but able to be redirected. Pt refuses food when offered, but was able to drink.

## 2020-08-03 NOTE — PROGRESS NOTES
Psych Consult Progress Note    08/03/20      Luis Gutiérrez  [id number]  Chief Complaint   Patient presents with    Hypoglycemia     Found unresponsive by girlfriend. Per EMS, pt had blood sugar of \"22 on arrival and was given IM glucagon\". EMS reports blood sugar \"went up to 59\". Pt responsive to pain at this time. I have reviewed recent documentation. Luis Gutiérrez is a 50 y.o. male  With  has a past medical history of Diabetes mellitus ([address] Gastroparesis, and Hypertension. Subjective/Interval Hx: It seems ativan has helped this pt. He's now been on 0.5 mg q 6 hr for a bit. Not as clear, it appears, as when he was on higher dose. Not really getting zyprexa. Did speak to me today, just made the same stereotyped syllable over and over, but did open eyes and make some noise. Quality:  Altered ms  Severity:  Severe  Duration:  Days to weeks  Context:  As above.   Location:  Brain    Objective:  Vitals:    08/03/20 0941   BP: 121/85   Pulse: 79   Resp: 14   Temp: 98.5 °F (36.9 °C)   SpO2: 99%     Recent Results (from the past 168 hour(s))   POCT Glucose    Collection Time: 07/27/20  2:33 PM   Result Value Ref Range    POC Glucose 218 (H) 70 - 99 mg/dl    Performed on ACCU-CHEK    POCT Glucose    Collection Time: 07/27/20  9:35 PM   Result Value Ref Range    POC Glucose 344 (H) 70 - 99 mg/dl    Performed on ACCU-CHEK    Basic Metabolic Panel w/ Reflex to MG    Collection Time: 07/28/20  5:30 AM   Result Value Ref Range    Sodium 139 136 - 145 mmol/L    Potassium reflex Magnesium 4.7 3.5 - 5.1 mmol/L    Chloride 101 99 - 110 mmol/L    CO2 11 (LL) 21 - 32 mmol/L    Anion Gap 27 (H) 3 - 16    Glucose 202 (H) 70 - 99 mg/dL    BUN 16 7 - 20 mg/dL    CREATININE 1.2 0.9 - 1.3 mg/dL    GFR Non-African American >60 >60    GFR African American >60 >60    Calcium 9.5 8.3 - 10.6 mg/dL   CBC auto differential    Collection Time: 07/28/20  5:30 AM   Result Value Ref Range    WBC 15.8 (H) 4.0 - 11.0 K/uL    RBC mmol/L    Anion Gap 20 (H) 3 - 16    Glucose 109 (H) 70 - 99 mg/dL    BUN 12 7 - 20 mg/dL    CREATININE 0.9 0.9 - 1.3 mg/dL    GFR Non-African American >60 >60    GFR African American >60 >60    Calcium 9.3 8.3 - 10.6 mg/dL   CBC auto differential    Collection Time: 07/29/20  4:00 AM   Result Value Ref Range    WBC 11.2 (H) 4.0 - 11.0 K/uL    RBC 4.20 4. 20 - 5.90 M/uL    Hemoglobin 11.5 (L) 13.5 - 17.5 g/dL    Hematocrit 34.9 (L) 40.5 - 52.5 %    MCV 83.3 80.0 - 100.0 fL    MCH 27.4 26.0 - 34.0 pg    MCHC 32.9 31.0 - 36.0 g/dL    RDW 16.5 (H) 12.4 - 15.4 %    Platelets 351 461 - 978 K/uL    MPV 7.3 5.0 - 10.5 fL    Neutrophils % 70.6 %    Lymphocytes % 22.6 %    Monocytes % 4.0 %    Eosinophils % 2.1 %    Basophils % 0.7 %    Neutrophils Absolute 7.9 (H) 1.7 - 7.7 K/uL    Lymphocytes Absolute 2.5 1.0 - 5.1 K/uL    Monocytes Absolute 0.4 0.0 - 1.3 K/uL    Eosinophils Absolute 0.2 0.0 - 0.6 K/uL    Basophils Absolute 0.1 0.0 - 0.2 K/uL   Magnesium    Collection Time: 07/29/20  4:00 AM   Result Value Ref Range    Magnesium 1.70 (L) 1.80 - 2.40 mg/dL   Phosphorus    Collection Time: 07/29/20  4:00 AM   Result Value Ref Range    Phosphorus 1.9 (L) 2.5 - 4.9 mg/dL   POCT Glucose    Collection Time: 07/29/20  4:37 AM   Result Value Ref Range    POC Glucose 111 (H) 70 - 99 mg/dl    Performed on ACCU-CHEK    POCT Glucose    Collection Time: 07/29/20  7:29 AM   Result Value Ref Range    POC Glucose 148 (H) 70 - 99 mg/dl    Performed on ACCU-CHEK    POCT Glucose    Collection Time: 07/29/20 11:28 AM   Result Value Ref Range    POC Glucose 158 (H) 70 - 99 mg/dl    Performed on ACCU-CHEK    POCT Glucose    Collection Time: 07/29/20  2:34 PM   Result Value Ref Range    POC Glucose 178 (H) 70 - 99 mg/dl    Performed on ACCU-CHEK    POCT Glucose    Collection Time: 07/29/20  4:36 PM   Result Value Ref Range    POC Glucose 186 (H) 70 - 99 mg/dl    Performed on ACCU-CHEK    POCT Glucose    Collection Time: 07/29/20  7:52 PM   Result 07/30/20 11:20 AM   Result Value Ref Range    POC Glucose 152 (H) 70 - 99 mg/dl    Performed on ACCU-CHEK    POCT Glucose    Collection Time: 07/30/20  5:03 PM   Result Value Ref Range    POC Glucose >600 (AA) 70 - 99 mg/dl    Performed on ACCU-CHEK    POCT Glucose    Collection Time: 07/30/20  5:05 PM   Result Value Ref Range    POC Glucose 173 (H) 70 - 99 mg/dl    Performed on ACCU-CHEK    POCT Glucose    Collection Time: 07/30/20  8:26 PM   Result Value Ref Range    POC Glucose 104 (H) 70 - 99 mg/dl    Performed on ACCU-CHEK    POCT Glucose    Collection Time: 07/31/20  1:30 AM   Result Value Ref Range    POC Glucose 151 (H) 70 - 99 mg/dl    Performed on ACCU-CHEK    POCT Glucose    Collection Time: 07/31/20  5:32 AM   Result Value Ref Range    POC Glucose 170 (H) 70 - 99 mg/dl    Performed on ACCU-CHEK    Basic Metabolic Panel w/ Reflex to MG    Collection Time: 07/31/20  5:45 AM   Result Value Ref Range    Sodium 149 (H) 136 - 145 mmol/L    Potassium reflex Magnesium 3.8 3.5 - 5.1 mmol/L    Chloride 111 (H) 99 - 110 mmol/L    CO2 21 21 - 32 mmol/L    Anion Gap 17 (H) 3 - 16    Glucose 171 (H) 70 - 99 mg/dL    BUN 10 7 - 20 mg/dL    CREATININE 0.7 (L) 0.9 - 1.3 mg/dL    GFR Non-African American >60 >60    GFR African American >60 >60    Calcium 9.4 8.3 - 10.6 mg/dL   CBC auto differential    Collection Time: 07/31/20  5:45 AM   Result Value Ref Range    WBC 7.8 4.0 - 11.0 K/uL    RBC 4.17 (L) 4.20 - 5.90 M/uL    Hemoglobin 11.7 (L) 13.5 - 17.5 g/dL    Hematocrit 34.7 (L) 40.5 - 52.5 %    MCV 83.2 80.0 - 100.0 fL    MCH 28.0 26.0 - 34.0 pg    MCHC 33.6 31.0 - 36.0 g/dL    RDW 16.3 (H) 12.4 - 15.4 %    Platelets 516 391 - 891 K/uL    MPV 7.3 5.0 - 10.5 fL    Neutrophils % 64.3 %    Lymphocytes % 26.5 %    Monocytes % 5.1 %    Eosinophils % 2.8 %    Basophils % 1.3 %    Neutrophils Absolute 5.0 1.7 - 7.7 K/uL    Lymphocytes Absolute 2.1 1.0 - 5.1 K/uL    Monocytes Absolute 0.4 0.0 - 1.3 K/uL    Eosinophils Absolute Epithelial Cells, UA 7 (H) 0 - 5 /HPF   Culture, Urine    Collection Time: 08/01/20  2:10 PM    Specimen: Urine, clean catch   Result Value Ref Range    Urine Culture, Routine No growth at 18 to 36 hours    TSH with Reflex    Collection Time: 08/01/20  2:25 PM   Result Value Ref Range    TSH 0.80 0.27 - 4.20 uIU/mL   Vitamin B12 & Folate    Collection Time: 08/01/20  2:25 PM   Result Value Ref Range    Vitamin B-12 680 211 - 911 pg/mL    Folate 14.94 4.78 - 24.20 ng/mL   POCT Glucose    Collection Time: 08/01/20  4:28 PM   Result Value Ref Range    POC Glucose 407 (H) 70 - 99 mg/dl    Performed on ACCU-CHEK    POCT Glucose    Collection Time: 08/01/20  7:30 PM   Result Value Ref Range    POC Glucose 322 (H) 70 - 99 mg/dl    Performed on ACCU-CHEK    POCT Glucose    Collection Time: 08/01/20 10:10 PM   Result Value Ref Range    POC Glucose 246 (H) 70 - 99 mg/dl    Performed on ACCU-CHEK    POCT Glucose    Collection Time: 08/02/20  3:31 AM   Result Value Ref Range    POC Glucose 117 (H) 70 - 99 mg/dl    Performed on ACCU-CHEK    Basic Metabolic Panel w/ Reflex to MG    Collection Time: 08/02/20  5:26 AM   Result Value Ref Range    Sodium 148 (H) 136 - 145 mmol/L    Potassium reflex Magnesium 3.3 (L) 3.5 - 5.1 mmol/L    Chloride 108 99 - 110 mmol/L    CO2 24 21 - 32 mmol/L    Anion Gap 16 3 - 16    Glucose 115 (H) 70 - 99 mg/dL    BUN 9 7 - 20 mg/dL    CREATININE 0.9 0.9 - 1.3 mg/dL    GFR Non-African American >60 >60    GFR African American >60 >60    Calcium 9.6 8.3 - 10.6 mg/dL   CBC auto differential    Collection Time: 08/02/20  5:26 AM   Result Value Ref Range    WBC 7.8 4.0 - 11.0 K/uL    RBC 4.26 4.20 - 5.90 M/uL    Hemoglobin 11.9 (L) 13.5 - 17.5 g/dL    Hematocrit 35.2 (L) 40.5 - 52.5 %    MCV 82.7 80.0 - 100.0 fL    MCH 28.0 26.0 - 34.0 pg    MCHC 33.8 31.0 - 36.0 g/dL    RDW 16.7 (H) 12.4 - 15.4 %    Platelets 033 780 - 888 K/uL    MPV 7.4 5.0 - 10.5 fL    Neutrophils % 61.4 %    Lymphocytes % 29.2 % Monocytes % 5.7 %    Eosinophils % 2.8 %    Basophils % 0.9 %    Neutrophils Absolute 4.8 1.7 - 7.7 K/uL    Lymphocytes Absolute 2.3 1.0 - 5.1 K/uL    Monocytes Absolute 0.4 0.0 - 1.3 K/uL    Eosinophils Absolute 0.2 0.0 - 0.6 K/uL    Basophils Absolute 0.1 0.0 - 0.2 K/uL   Magnesium    Collection Time: 08/02/20  5:26 AM   Result Value Ref Range    Magnesium 1.70 (L) 1.80 - 2.40 mg/dL   Phosphorus    Collection Time: 08/02/20  5:26 AM   Result Value Ref Range    Phosphorus 3.3 2.5 - 4.9 mg/dL   Potassium    Collection Time: 08/02/20  5:26 AM   Result Value Ref Range    Potassium 3.3 (L) 3.5 - 5.1 mmol/L   POCT Glucose    Collection Time: 08/02/20  7:57 AM   Result Value Ref Range    POC Glucose 91 70 - 99 mg/dl    Performed on ACCU-CHEK    POCT Glucose    Collection Time: 08/02/20 11:42 AM   Result Value Ref Range    POC Glucose 118 (H) 70 - 99 mg/dl    Performed on ACCU-CHEK    POCT Glucose    Collection Time: 08/02/20  4:46 PM   Result Value Ref Range    POC Glucose 146 (H) 70 - 99 mg/dl    Performed on ACCU-CHEK    POCT Glucose    Collection Time: 08/02/20  8:11 PM   Result Value Ref Range    POC Glucose 151 (H) 70 - 99 mg/dl    Performed on ACCU-CHEK    POCT Glucose    Collection Time: 08/02/20 11:05 PM   Result Value Ref Range    POC Glucose 206 (H) 70 - 99 mg/dl    Performed on ACCU-CHEK    POCT Glucose    Collection Time: 08/03/20  4:08 AM   Result Value Ref Range    POC Glucose 105 (H) 70 - 99 mg/dl    Performed on ACCU-CHEK    Basic Metabolic Panel w/ Reflex to MG    Collection Time: 08/03/20  6:10 AM   Result Value Ref Range    Sodium 148 (H) 136 - 145 mmol/L    Potassium reflex Magnesium 3.1 (L) 3.5 - 5.1 mmol/L    Chloride 107 99 - 110 mmol/L    CO2 24 21 - 32 mmol/L    Anion Gap 17 (H) 3 - 16    Glucose 93 70 - 99 mg/dL    BUN 11 7 - 20 mg/dL    CREATININE 0.8 (L) 0.9 - 1.3 mg/dL    GFR Non-African American >60 >60    GFR African American >60 >60    Calcium 9.4 8.3 - 10.6 mg/dL   CBC auto differential Collection Time: 08/03/20  6:10 AM   Result Value Ref Range    WBC 6.9 4.0 - 11.0 K/uL    RBC 4.57 4.20 - 5.90 M/uL    Hemoglobin 12.5 (L) 13.5 - 17.5 g/dL    Hematocrit 38.0 (L) 40.5 - 52.5 %    MCV 83.2 80.0 - 100.0 fL    MCH 27.5 26.0 - 34.0 pg    MCHC 33.0 31.0 - 36.0 g/dL    RDW 16.4 (H) 12.4 - 15.4 %    Platelets 528 328 - 033 K/uL    MPV 8.0 5.0 - 10.5 fL    Neutrophils % 56.0 %    Lymphocytes % 34.6 %    Monocytes % 4.9 %    Eosinophils % 3.5 %    Basophils % 1.0 %    Neutrophils Absolute 3.8 1.7 - 7.7 K/uL    Lymphocytes Absolute 2.4 1.0 - 5.1 K/uL    Monocytes Absolute 0.3 0.0 - 1.3 K/uL    Eosinophils Absolute 0.2 0.0 - 0.6 K/uL    Basophils Absolute 0.1 0.0 - 0.2 K/uL   Magnesium    Collection Time: 08/03/20  6:10 AM   Result Value Ref Range    Magnesium 1.80 1.80 - 2.40 mg/dL   Phosphorus    Collection Time: 08/03/20  6:10 AM   Result Value Ref Range    Phosphorus 3.8 2.5 - 4.9 mg/dL   POCT Glucose    Collection Time: 08/03/20  7:54 AM   Result Value Ref Range    POC Glucose 183 (H) 70 - 99 mg/dl    Performed on ACCU-CHEK        Current Facility-Administered Medications   Medication Dose Route Frequency Provider Last Rate Last Dose    LORazepam (ATIVAN) injection 0.5 mg  0.5 mg Intravenous Q6H ESTEPHANIA Carson - CNP   0.5 mg at 08/03/20 1030    OLANZapine (ZYPREXA) tablet 5 mg  5 mg Oral Nightly Cristiane Zendejas MD   Stopped at 07/31/20 0900    insulin glargine (LANTUS) injection vial 10 Units  10 Units Subcutaneous Nightly Pablo Plaza MD   10 Units at 08/02/20 2311    OLANZapine (ZYPREXA) injection 5 mg  5 mg Intramuscular Q8H PRN Pablo Plaza MD   5 mg at 07/26/20 2147    losartan (COZAAR) tablet 100 mg  100 mg Oral Daily Kacy Albarran MD   100 mg at 08/02/20 1001    insulin lispro (HUMALOG) injection vial 0-12 Units  0-12 Units Subcutaneous Q4H Sheila Mcgowan, APRN - CNP   2 Units at 08/03/20 1035    sodium chloride flush 0.9 % injection 10 mL  10 mL Intravenous 2 times per day ESTEPHANIA Cintron CNP   10 mL at 08/03/20 1031    sodium chloride flush 0.9 % injection 10 mL  10 mL Intravenous PRN ESTEPHANIA Cintron CNP        acetaminophen (TYLENOL) tablet 650 mg  650 mg Oral Q4H STANLEY Jarvis MD   650 mg at 07/24/20 8210    Or    acetaminophen (TYLENOL) suppository 650 mg  650 mg Rectal Q4H PRAURE Jarvis MD        polyethylene glycol [facility]) packet 17 g  17 g Oral Daily PRAURE Jarvis MD        ondansetron [facility]) injection 4 mg  4 mg Intravenous Q4H STANLEY Jarvis MD   4 mg at 08/01/20 1606    glucose (GLUTOSE) 40 % oral gel 15 g  15 g Oral PRN Jigna Jarvis MD        dextrose 50 % IV solution  12.5 g Intravenous PRAURE Jarvis MD   12.5 g at 07/16/20 1955    glucagon (rDNA) injection 1 mg  1 mg Intramuscular PRAURE Jarvis MD        dextrose 5 % solution  100 mL/hr Intravenous STANLEY Jarvis MD        enoxaparin (LOVENOX) injection 40 mg  40 mg Subcutaneous Daily Jigna Jarvis MD   40 mg at 08/03/20 1030    pantoprazole (PROTONIX) injection 40 mg  40 mg Intravenous Daily ESTEPHANIA Cintron CNP   40 mg at 08/03/20 1030    And    sodium chloride (PF) 0.9 % injection 10 mL  10 mL Intravenous Daily ESTEPHANIA Cintron CNP   10 mL at 08/03/20 1031    magnesium sulfate 1 g in dextrose 5% 100 mL IVPB  1 g Intravenous PRN ESTEPHANIA Cintron CNP   Stopped at 07/30/20 1502    potassium chloride 20 mEq/50 mL IVPB (Central Line)  20 mEq Intravenous PRN ESTEPHANIA Cintron CNP 50 mL/hr at 08/01/20 1322 20 mEq at 08/01/20 1322    sodium phosphate 11.07 mmol in dextrose 5 % 250 mL IVPB  0.16 mmol/kg Intravenous PRN ESTEPHANIA Cintron CNP        Or    sodium phosphate 22.11 mmol in dextrose 5 % 250 mL IVPB  0.32 mmol/kg Intravenous PRN Garlan Troy, APRN - CNP         ROS:  Could not assess, no understandable speech    MSE:  A-under covers, some repetitive leg movements  A-sleepy  M-can't assess, no understandable speech  S-impaired  I/J-impaired  T-Repeats same syllables    Recs:  1. AMS-I note pt's UA from 8/1/20. Any source of  inflammation can have strange MS side effects in my experience, so it might be worth treating. I'll stop zyprexa scheduled for now, sometimes antipsychotics can make this sort of catatonia worse. We'll try some ambien as I've had good luck with it in the past.  And maybe increase ativan tomorrow if nothing is helping. Does this pt need nutrition support at this point? Fluid support?

## 2020-08-03 NOTE — PROGRESS NOTES
SUMMARY:  Per PCA, , patient has been restless and pulling at telemetry monitor and grabbing at the PCA, was able to be redirected. At 2230, patient is nonverbal, has blankets over his head and jerks the blankets back up when this RN attempts to give IV medication. Able to give Ativan, will retest blood sugar at 2300 and attempt to cover and give Lantus at that time. Patient refused Zyprexa. Able to give Lantus and lispro 4 unit for blood sugar of 206 - patient was compliant breifly, just allowing this RN to complete both injections, then patient jerking his arm and covering up with blankets. Patient refuses to eat anything. Patient appears to have minimal urine output.

## 2020-08-03 NOTE — PLAN OF CARE
Problem: Pain:  Description: Pain management should include both nonpharmacologic and pharmacologic interventions.   Goal: Pain level will decrease  Description: Pain level will decrease  Outcome: Ongoing  Goal: Control of acute pain  Description: Control of acute pain  Outcome: Ongoing  Goal: Control of chronic pain  Description: Control of chronic pain  Outcome: Ongoing     Problem: Nutrition  Goal: Optimal nutrition therapy  8/3/2020 1642 by Leonard Eden RN  Outcome: Ongoing  8/3/2020 1037 by Rell Zavaleta RD, LD  Outcome: Ongoing     Problem: Skin Integrity:  Goal: Will show no infection signs and symptoms  Description: Will show no infection signs and symptoms  Outcome: Ongoing  Goal: Absence of new skin breakdown  Description: Absence of new skin breakdown  Outcome: Ongoing     Problem: Falls - Risk of:  Goal: Will remain free from falls  Description: Will remain free from falls  Outcome: Ongoing  Goal: Absence of physical injury  Description: Absence of physical injury  Outcome: Ongoing     Problem: Nutrition Deficit:  Goal: Ability to achieve adequate nutritional intake will improve  Description: Ability to achieve adequate nutritional intake will improve  Outcome: Ongoing     Problem: Mental Status - Impaired:  Goal: Mental status will improve  Description: Mental status will improve  Outcome: Ongoing

## 2020-08-03 NOTE — PROGRESS NOTES
as mentioned above. Past Medical History:        Diagnosis Date    Diabetes mellitus ([facility] [address]     Gastroparesis     Hypertension        Past Surgical History:        Procedure Laterality Date    BONY PELVIS SURGERY      FOOT AMPUTATION Right 5/13/2020    EMERGENCY; RIGHT INCISION AND DEBRIDEMENT; HALUX AMPUTATION performed by Sandy Claire DPM at [address] 2006    pins and rods- plate    UPPER GASTROINTESTINAL ENDOSCOPY N/A 12/2/2019    EGD BIOPSY performed by Nessa Sanford MD at [facility]:  Ketorolac; Morphine; Morphine and related; Tramadol; Lisinopril; Haloperidol lactate; Toradol [ketorolac tromethamine]; and Vicodin [hydrocodone-acetaminophen]    Past medical and surgical history reviewed. Any changes have been noted. PHYSICAL EXAM:  /71   Pulse 83   Temp 97.9 °F (36.6 °C) (Axillary)   Resp 18   Ht 6' 2\" (1.88 m)   Wt 136 lb 14.5 oz (62.1 kg)   SpO2 100%   BMI 17.58 kg/m²       Intake/Output Summary (Last 24 hours) at 8/3/2020 7145  Last data filed at 8/3/2020 0603  Gross per 24 hour   Intake 0 ml   Output 500 ml   Net -500 ml        General appearance:   No apparent distress, appears stated age. Cooperative. HEENT:  Normocephalic, atraumatic. PERRLA. EOMi. Conjunctivae/corneas clear, no icterus, non-injected. Neck: Supple, with full range of motion. No jugular venous distention. Trachea midline. Respiratory:  Normal respiratory effort. Clear to auscultation, bilaterally without Rales/Wheezes/Rhonchi. Cardiovascular:  Regular rate and rhythm without murmurs, rubs or gallops. Abdomen: Soft, non-tender, non-distended, without rebound or guarding. Normal bowel sounds. Musculoskeletal:  No clubbing, cyanosis or edema bilaterally. Full range of motion without deformity. Skin: Skin color, texture, turgor normal.  No rashes or lesions. Neurologic:  Neurovascularly intact without any focal sensory/motor deficits.  Cranial nerves: II-XII intact, grossly intact. No facial asymmetry, tongue midline. Psychiatric:  Alert. Does not answer any questions, [place] orientation or  thought content   Capillary Refill: Brisk,< 3 seconds   Peripheral Pulses: +2 palpable, equal bilaterally       LABS:    Lab Results   Component Value Date    WBC 6.9 08/03/2020    HGB 12.5 (L) 08/03/2020    HCT 38.0 (L) 08/03/2020    MCV 83.2 08/03/2020     08/03/2020    LYMPHOPCT 34.6 08/03/2020    RBC 4.57 08/03/2020    MCH 27.5 08/03/2020    MCHC 33.0 08/03/2020    RDW 16.4 (H) 08/03/2020       Lab Results   Component Value Date    CREATININE 0.8 (L) 08/03/2020    BUN 11 08/03/2020     (H) 08/03/2020    K 3.1 (L) 08/03/2020     08/03/2020    CO2 24 08/03/2020       Lab Results   Component Value Date    MG 1.80 08/03/2020       Lab Results   Component Value Date    ALT 8 (L) 07/16/2020    AST 17 07/16/2020    ALKPHOS 77 07/16/2020    BILITOT <0.2 07/16/2020        No flowsheet data found. Lab Results   Component Value Date    LABA1C 10.3 05/29/2020       Imaging:  CT HEAD WO CONTRAST   Final Result   No acute intracranial abnormality. Mild cerebral atrophy. XR CHEST PORTABLE   Final Result   No acute findings. NG tube with tip in the stomach. XR CHEST 1 VW   Final Result   Nasogastric tube projects in normal position. No acute cardiopulmonary disease. MRI BRAIN WO CONTRAST   Final Result   Subtle bilateral frontoparietal cortical and subcortical signal abnormality   suggesting mild posterior reversal encephalopathy syndrome. No acute infarct   or hemorrhage. XR CHEST PORTABLE   Final Result   No acute cardiac or pulmonary disease. ET tube 7 cm above the jose carlos. NG side-port at the GE junction. XR CHEST PORTABLE   Final Result   The tip of the endotracheal tube is slightly high in position 9 cm above the   jose carlos. The OG/NG tube should be advanced 10 cm. The lungs are clear. CT Head WO Contrast   Final Result   No acute intracranial abnormality. Paranasal sinusitis. XR CHEST PORTABLE   Final Result   No acute cardiopulmonary disease. IR LUMBAR PUNCTURE FOR DIAGNOSIS    (Results Pending)       Scheduled and prn Medications:    Scheduled Meds:   LORazepam  0.5 mg Intravenous Q6H    OLANZapine  5 mg Oral Nightly    insulin glargine  10 Units Subcutaneous Nightly    losartan  100 mg Oral Daily    insulin lispro  0-12 Units Subcutaneous Q4H    sodium chloride flush  10 mL Intravenous 2 times per day    enoxaparin  40 mg Subcutaneous Daily    pantoprazole  40 mg Intravenous Daily    And    sodium chloride (PF)  10 mL Intravenous Daily     Continuous Infusions:   dextrose       PRN Meds:. OLANZapine, sodium chloride flush, acetaminophen **OR** acetaminophen, polyethylene glycol, ondansetron, glucose, dextrose, glucagon (rDNA), dextrose, magnesium sulfate, potassium chloride, sodium phosphate IVPB **OR** sodium phosphate IVPB    Assessment & Plan:        Persistent metabolic encephalopathy  -psychiatry and neurology following  -Continue with Zyprexa as needed  -MRI brain ordered-unable to obtain  -Prior workup including MRI, EEG and CSF has been unrevealing  -7/31-Ativan challenge given at 130. Approximately 630 the patient awoken and was ambulating within the room complained of being hungry. However around midnight he became catatonic again. -8/1-Ativan was given at 1030 approximately 130 this afternoon the patient again woke up and is complaining of being hungry. I will initiate Ativan 1 mg every 6. This was decreased to 0.5 mg  -Ativan was held overnight secondary to low blood pressure.   I will give him 1 mg now and then decrease the dose to 0.5 every 6     Agitation and aggressive behavior  Psychiatric following  on Zyprexa as needed  - D/C zyprexa  - starting ambien nightly per Psych  - increase ativan to 1mg if not better overnight on ambien     Diabetes mellitus  Severe hypoglycemia on admission-resolved  Insulin overdose-unsure if accidental or intentional     Continue ISS     Hypertension  Continue meds     Weightloss  Initial wt 152lbs today 136lbs  -Nutritional consult  -Daily weights  -We will add phosphorus, potassium and magnesium levels to be addressed in the morning-replace electrolytes per protocol    Bacteruria  - rocephin IV started 8/3 with delirium     Continue current regimen/therapies. Monitor. Adjust medical regimen as appropriate. Body mass index is 17.58 kg/m². The patient and / or the family were informed of the results of any tests, a time was given to answer questions, a plan was proposed and they agreed with plan.       DVT ppx: lovenox      Diet: DIET DENTAL SOFT;    Consults:  IP CONSULT TO PULMONOLOGY  IP CONSULT TO NEUROLOGY  IP CONSULT TO DIETITIAN  IP CONSULT TO PSYCHIATRY  IP CONSULT TO DIETITIAN    DISPO/placement plan: pending 2-3 days depending on mental status    Code Status: Full Code      ESTEPHANIA Barboza - SAVANA  08/03/20

## 2020-08-03 NOTE — TELEPHONE ENCOUNTER
Saritha from [facility] calling to let Dr Aung Barnes know that pt is refusing the Doneen Fabry he prescribed. Call if questions.

## 2020-08-04 LAB
ANION GAP SERPL CALCULATED.3IONS-SCNC: 14 MMOL/L (ref 3–16)
ANION GAP SERPL CALCULATED.3IONS-SCNC: 15 MMOL/L (ref 3–16)
BASOPHILS ABSOLUTE: 0 K/UL (ref 0–0.2)
BASOPHILS RELATIVE PERCENT: 0.8 %
BUN BLDV-MCNC: 10 MG/DL (ref 7–20)
BUN BLDV-MCNC: 8 MG/DL (ref 7–20)
CALCIUM SERPL-MCNC: 9.3 MG/DL (ref 8.3–10.6)
CALCIUM SERPL-MCNC: 9.6 MG/DL (ref 8.3–10.6)
CHLORIDE BLD-SCNC: 103 MMOL/L (ref 99–110)
CHLORIDE BLD-SCNC: 108 MMOL/L (ref 99–110)
CO2: 23 MMOL/L (ref 21–32)
CO2: 25 MMOL/L (ref 21–32)
CREAT SERPL-MCNC: 0.9 MG/DL (ref 0.9–1.3)
CREAT SERPL-MCNC: 0.9 MG/DL (ref 0.9–1.3)
EOSINOPHILS ABSOLUTE: 0.2 K/UL (ref 0–0.6)
EOSINOPHILS RELATIVE PERCENT: 3.3 %
GFR AFRICAN AMERICAN: >60
GFR AFRICAN AMERICAN: >60
GFR NON-AFRICAN AMERICAN: >60
GFR NON-AFRICAN AMERICAN: >60
GLUCOSE BLD-MCNC: 112 MG/DL (ref 70–99)
GLUCOSE BLD-MCNC: 137 MG/DL (ref 70–99)
GLUCOSE BLD-MCNC: 145 MG/DL (ref 70–99)
GLUCOSE BLD-MCNC: 199 MG/DL (ref 70–99)
GLUCOSE BLD-MCNC: 241 MG/DL (ref 70–99)
GLUCOSE BLD-MCNC: 291 MG/DL (ref 70–99)
GLUCOSE BLD-MCNC: 352 MG/DL (ref 70–99)
GLUCOSE BLD-MCNC: 436 MG/DL (ref 70–99)
GLUCOSE BLD-MCNC: 46 MG/DL (ref 70–99)
GLUCOSE BLD-MCNC: 47 MG/DL (ref 70–99)
GLUCOSE BLD-MCNC: 55 MG/DL (ref 70–99)
GLUCOSE BLD-MCNC: 87 MG/DL (ref 70–99)
GLUCOSE BLD-MCNC: 92 MG/DL (ref 70–99)
HCT VFR BLD CALC: 33.2 % (ref 40.5–52.5)
HEMOGLOBIN: 11.1 G/DL (ref 13.5–17.5)
LYMPHOCYTES ABSOLUTE: 1.6 K/UL (ref 1–5.1)
LYMPHOCYTES RELATIVE PERCENT: 25.2 %
MAGNESIUM: 1.7 MG/DL (ref 1.8–2.4)
MAGNESIUM: 1.8 MG/DL (ref 1.8–2.4)
MCH RBC QN AUTO: 27.7 PG (ref 26–34)
MCHC RBC AUTO-ENTMCNC: 33.5 G/DL (ref 31–36)
MCV RBC AUTO: 82.5 FL (ref 80–100)
MONOCYTES ABSOLUTE: 0.3 K/UL (ref 0–1.3)
MONOCYTES RELATIVE PERCENT: 4.5 %
NEUTROPHILS ABSOLUTE: 4.1 K/UL (ref 1.7–7.7)
NEUTROPHILS RELATIVE PERCENT: 66.2 %
PDW BLD-RTO: 16.4 % (ref 12.4–15.4)
PERFORMED ON: ABNORMAL
PERFORMED ON: NORMAL
PERFORMED ON: NORMAL
PHOSPHORUS: 3.9 MG/DL (ref 2.5–4.9)
PLATELET # BLD: 287 K/UL (ref 135–450)
PMV BLD AUTO: 7.9 FL (ref 5–10.5)
POTASSIUM REFLEX MAGNESIUM: 3.3 MMOL/L (ref 3.5–5.1)
POTASSIUM REFLEX MAGNESIUM: 4.7 MMOL/L (ref 3.5–5.1)
RBC # BLD: 4.02 M/UL (ref 4.2–5.9)
SODIUM BLD-SCNC: 143 MMOL/L (ref 136–145)
SODIUM BLD-SCNC: 145 MMOL/L (ref 136–145)
WBC # BLD: 6.2 K/UL (ref 4–11)

## 2020-08-04 PROCEDURE — 6370000000 HC RX 637 (ALT 250 FOR IP): Performed by: NURSE PRACTITIONER

## 2020-08-04 PROCEDURE — 6370000000 HC RX 637 (ALT 250 FOR IP): Performed by: INTERNAL MEDICINE

## 2020-08-04 PROCEDURE — 2580000003 HC RX 258: Performed by: NURSE PRACTITIONER

## 2020-08-04 PROCEDURE — 6360000002 HC RX W HCPCS: Performed by: NURSE PRACTITIONER

## 2020-08-04 PROCEDURE — C9113 INJ PANTOPRAZOLE SODIUM, VIA: HCPCS | Performed by: NURSE PRACTITIONER

## 2020-08-04 PROCEDURE — 84100 ASSAY OF PHOSPHORUS: CPT

## 2020-08-04 PROCEDURE — 80048 BASIC METABOLIC PNL TOTAL CA: CPT

## 2020-08-04 PROCEDURE — 6360000002 HC RX W HCPCS: Performed by: INTERNAL MEDICINE

## 2020-08-04 PROCEDURE — 1200000000 HC SEMI PRIVATE

## 2020-08-04 PROCEDURE — 6360000002 HC RX W HCPCS: Performed by: PSYCHIATRY & NEUROLOGY

## 2020-08-04 PROCEDURE — 6370000000 HC RX 637 (ALT 250 FOR IP): Performed by: PSYCHIATRY & NEUROLOGY

## 2020-08-04 PROCEDURE — 83735 ASSAY OF MAGNESIUM: CPT

## 2020-08-04 PROCEDURE — 99232 SBSQ HOSP IP/OBS MODERATE 35: CPT | Performed by: PSYCHIATRY & NEUROLOGY

## 2020-08-04 PROCEDURE — 85025 COMPLETE CBC W/AUTO DIFF WBC: CPT

## 2020-08-04 RX ORDER — POTASSIUM CHLORIDE 20 MEQ/1
40 TABLET, EXTENDED RELEASE ORAL PRN
Status: DISCONTINUED | OUTPATIENT
Start: 2020-08-04 | End: 2020-08-11 | Stop reason: HOSPADM

## 2020-08-04 RX ORDER — POTASSIUM CHLORIDE 7.45 MG/ML
10 INJECTION INTRAVENOUS PRN
Status: DISCONTINUED | OUTPATIENT
Start: 2020-08-04 | End: 2020-08-11 | Stop reason: HOSPADM

## 2020-08-04 RX ORDER — MAGNESIUM SULFATE 1 G/100ML
1 INJECTION INTRAVENOUS ONCE
Status: COMPLETED | OUTPATIENT
Start: 2020-08-04 | End: 2020-08-04

## 2020-08-04 RX ORDER — LORAZEPAM 2 MG/ML
1 INJECTION INTRAMUSCULAR EVERY 6 HOURS
Status: DISCONTINUED | OUTPATIENT
Start: 2020-08-04 | End: 2020-08-09

## 2020-08-04 RX ADMIN — INSULIN LISPRO 12 UNITS: 100 INJECTION, SOLUTION INTRAVENOUS; SUBCUTANEOUS at 22:13

## 2020-08-04 RX ADMIN — DEXTROSE 15 G: 15 GEL ORAL at 04:27

## 2020-08-04 RX ADMIN — ZOLPIDEM TARTRATE 10 MG: 5 TABLET ORAL at 09:13

## 2020-08-04 RX ADMIN — MAGNESIUM SULFATE HEPTAHYDRATE 1 G: 1 INJECTION, SOLUTION INTRAVENOUS at 12:51

## 2020-08-04 RX ADMIN — POTASSIUM CHLORIDE 20 MEQ: 29.8 INJECTION, SOLUTION INTRAVENOUS at 09:39

## 2020-08-04 RX ADMIN — CEFTRIAXONE 1 G: 1 INJECTION, POWDER, FOR SOLUTION INTRAMUSCULAR; INTRAVENOUS at 14:30

## 2020-08-04 RX ADMIN — LOSARTAN POTASSIUM 100 MG: 100 TABLET, FILM COATED ORAL at 09:13

## 2020-08-04 RX ADMIN — POLYETHYLENE GLYCOL 3350 17 G: 17 POWDER, FOR SOLUTION ORAL at 17:54

## 2020-08-04 RX ADMIN — Medication 10 ML: at 09:14

## 2020-08-04 RX ADMIN — LORAZEPAM 0.5 MG: 2 INJECTION INTRAMUSCULAR; INTRAVENOUS at 05:45

## 2020-08-04 RX ADMIN — GLYCERIN 2 G: 2 SUPPOSITORY RECTAL at 22:13

## 2020-08-04 RX ADMIN — LORAZEPAM 0.5 MG: 2 INJECTION INTRAMUSCULAR; INTRAVENOUS at 10:40

## 2020-08-04 RX ADMIN — INSULIN LISPRO 2 UNITS: 100 INJECTION, SOLUTION INTRAVENOUS; SUBCUTANEOUS at 01:21

## 2020-08-04 RX ADMIN — INSULIN LISPRO 4 UNITS: 100 INJECTION, SOLUTION INTRAVENOUS; SUBCUTANEOUS at 08:30

## 2020-08-04 RX ADMIN — ENOXAPARIN SODIUM 40 MG: 40 INJECTION SUBCUTANEOUS at 09:08

## 2020-08-04 RX ADMIN — SODIUM CHLORIDE, PRESERVATIVE FREE 10 ML: 5 INJECTION INTRAVENOUS at 09:15

## 2020-08-04 RX ADMIN — LORAZEPAM 1 MG: 2 INJECTION INTRAMUSCULAR; INTRAVENOUS at 16:38

## 2020-08-04 RX ADMIN — PANTOPRAZOLE SODIUM 40 MG: 40 INJECTION, POWDER, FOR SOLUTION INTRAVENOUS at 09:08

## 2020-08-04 RX ADMIN — INSULIN LISPRO 10 UNITS: 100 INJECTION, SOLUTION INTRAVENOUS; SUBCUTANEOUS at 16:26

## 2020-08-04 RX ADMIN — INSULIN GLARGINE 10 UNITS: 100 INJECTION, SOLUTION SUBCUTANEOUS at 22:13

## 2020-08-04 ASSESSMENT — PAIN SCALES - GENERAL
PAINLEVEL_OUTOF10: 0

## 2020-08-04 ASSESSMENT — PAIN SCALES - WONG BAKER
WONGBAKER_NUMERICALRESPONSE: 0
WONGBAKER_NUMERICALRESPONSE: 0

## 2020-08-04 NOTE — PROGRESS NOTES
Hospital Medicine Progress Note      Admit Date: 7/15/2020       CC: F/U for altered mental status    HPI: 47-year-old M Hx poorly controlled DM who was admitted unresponsive at home with glucose of 25.  He was reported to have accidental versus intentional overdose of insulin. He was intubated. He Self extubated to 2 L nasal cannula by coughing.                      Patient was evaluated by neurology and no obvious etiology was found  EEG is not consistent with seizure disorder.  No significant findings on CSF. Patient's respiratory status improved.  Patient is considered to be possibly PRES. nurses report patient's behavior as aggressive and agitated at times.  Psychiatric services consulted    MRI 7/16: subtle bilateral frontoparietal cortical/subcortical signal abnormality  MRI from 2/24/20 at : T2 FLAIR abnormality in bilat posterior frontal, parietal and occipital lobes with associated volume loss favoring areas of encephalomalacia and gliosis related to remote insult. Volume loss progression from 2006. Atypical for PRES.    7/31 patient was given an Ativan challenge.  Approximately 6 hours after he received Ativan the patient woke up was eating with the family he did not receive any more medication that evening and was again in a catatonic state by midnight.     8/1 patient was given 2 mg of Ativan at 1030.  At 130 he woke up he is alert and talking. [facility] ate his entire lunch.  I will initiate a scheduled Ativan order of 1 mg every 6.  I did go up and speak with the wife. Gladystine Risk patient is walking around the room she brought him vegetables.     8/3: Lying in bed, keeps pulling covers over head, does not engage or answer any questions. Does not really follow commands for exam.  Discussed with Dr. Jamie Amador who thinks this is likely catatonic delirium. Has seen ambien nightly with results, so will try that and then possibly go up on ativan if not.   will start rocephin for small leuks in u/a for any possible urinary inflammation as etiology of confusion     Started on [place] and ativan challenge with Psych. Continues to eat very little and had one episode of hypoglycemia today. So seizure activity. Per neuro note 8/4: Persistent encephalopathy. He apparently injected himself with abundant amount of insulin prior to arrival. EEG was slow and disorganized. CSF unrevealing. Missed neuro appts at [facility]. Normal EEG in Feb. Likely underlying Psych component. Exam improved with ativan challenge. Clinically improving. Rec: repeat EEG if/when able. No objection to re-starting Depakote. 500mg bid. If fails to continue to improve, transfer for cvEEG and f/u with  Neurology on discharge.      Review of Systems:     Interval History/Subjective: refused Ambien last night. Dr. Devi Forrest notified by RN staff. RN able to crush this morning and give. Consult to Nutrition for poor po intake. Only eating bites here and there. Will monitor labs, so far ok, but if electrolytes become skewed may need IVF. Hopefully he continues to wake up more and eat/drink    Review of Systems:     The patient denied headaches, visual changes, LOC, SOB, CP, ABD pain, N/V/D, skin changes, new or worsening weakness or neuromuscular deficits. Comprehensive ROS negative except as mentioned above. Past Medical History:        Diagnosis Date    Diabetes mellitus ([facility] [address]     Gastroparesis     Hypertension        Past Surgical History:        Procedure Laterality Date    BONY PELVIS SURGERY      FOOT AMPUTATION Right 5/13/2020    EMERGENCY; RIGHT INCISION AND DEBRIDEMENT; HALUX AMPUTATION performed by Curtis Hernandez DPM at [address] 2006    pins and rods- plate    UPPER GASTROINTESTINAL ENDOSCOPY N/A 12/2/2019    EGD BIOPSY performed by Titus Freed MD at [facility]:  Ketorolac; Morphine; Morphine and related; Tramadol; Lisinopril; Haloperidol lactate;  Toradol [ketorolac tromethamine]; and Vicodin [hydrocodone-acetaminophen]    Past medical and surgical history reviewed. Any changes have been noted. PHYSICAL EXAM:  /85   Pulse 72   Temp 97.4 °F (36.3 °C) (Axillary)   Resp 16   Ht 6' 2\" (1.88 m)   Wt 133 lb 6.1 oz (60.5 kg)   SpO2 99%   BMI 17.12 kg/m²       Intake/Output Summary (Last 24 hours) at 8/4/2020 1033  Last data filed at 8/4/2020 8274  Gross per 24 hour   Intake 1300 ml   Output 225 ml   Net 1075 ml        General appearance:   No apparent distress, appears stated age. Cooperative. Mumbled \"ok. \" for me today when asked how he is. HEENT:  Normocephalic, atraumatic. PERRLA. Doesn't follow commands to check EOM; uncooperative; Conjunctivae/corneas clear, no icterus, non-injected. Neck: Supple, with full range of motion. No jugular venous distention. Trachea midline. Respiratory:  Normal respiratory effort. Clear to auscultation, bilaterally without Rales/Wheezes/Rhonchi. Cardiovascular:  Regular rate and rhythm without murmurs, rubs or gallops. Abdomen: Soft, non-tender, non-distended, without rebound or guarding. Normal bowel sounds. Musculoskeletal: HARRISON x4 spontaneously and moves about in bed independently, pulls covers over head; No clubbing, cyanosis or edema bilaterally. Full range of motion without deformity. Skin: Skin color, texture, turgor normal.  No rashes or lesions. Neurologic: [place], no facial asymmetry, tongue midline. Psychiatric:  Alert but does not answer orientation questions, answered \"ok\" only to how he was.  Does not engage but repositions self in bed, pulling covers over head during my exam.   Capillary Refill: Brisk,< 3 seconds   Peripheral Pulses: +2 palpable, equal bilaterally       LABS:    Lab Results   Component Value Date    WBC 6.2 08/04/2020    HGB 11.1 (L) 08/04/2020    HCT 33.2 (L) 08/04/2020    MCV 82.5 08/04/2020     08/04/2020    LYMPHOPCT 25.2 08/04/2020    RBC 4.02 (L) 08/04/2020    MCH 27.7 08/04/2020    MCHC 33.5 08/04/2020    RDW 16.4 (H) 08/04/2020       Lab Results   Component Value Date    CREATININE 0.9 08/04/2020    BUN 10 08/04/2020     08/04/2020    K 3.3 (L) 08/04/2020     08/04/2020    CO2 25 08/04/2020       Lab Results   Component Value Date    MG 1.70 08/04/2020       Lab Results   Component Value Date    ALT 8 (L) 07/16/2020    AST 17 07/16/2020    ALKPHOS 77 07/16/2020    BILITOT <0.2 07/16/2020        No flowsheet data found. Lab Results   Component Value Date    LABA1C 10.3 05/29/2020       Imaging:  CT HEAD WO CONTRAST   Final Result   No acute intracranial abnormality. Mild cerebral atrophy. XR CHEST PORTABLE   Final Result   No acute findings. NG tube with tip in the stomach. XR CHEST 1 VW   Final Result   Nasogastric tube projects in normal position. No acute cardiopulmonary disease. MRI BRAIN WO CONTRAST   Final Result   Subtle bilateral frontoparietal cortical and subcortical signal abnormality   suggesting mild posterior reversal encephalopathy syndrome. No acute infarct   or hemorrhage. XR CHEST PORTABLE   Final Result   No acute cardiac or pulmonary disease. ET tube 7 cm above the jose carlos. NG side-port at the GE junction. XR CHEST PORTABLE   Final Result   The tip of the endotracheal tube is slightly high in position 9 cm above the   jose carlos. The OG/NG tube should be advanced 10 cm. The lungs are clear. CT Head WO Contrast   Final Result   No acute intracranial abnormality. Paranasal sinusitis. XR CHEST PORTABLE   Final Result   No acute cardiopulmonary disease.          IR LUMBAR PUNCTURE FOR DIAGNOSIS    (Results Pending)       Scheduled and prn Medications:    Scheduled Meds:   zolpidem  10 mg Oral Daily    cefTRIAXone (ROCEPHIN) IV  1 g Intravenous Q24H    LORazepam  0.5 mg Intravenous Q6H    insulin glargine  10 Units Subcutaneous Nightly    losartan  100 mg Oral Daily    insulin lispro  0-12 Units Subcutaneous Q4H    sodium chloride flush  10 mL Intravenous 2 times per day    enoxaparin  40 mg Subcutaneous Daily    pantoprazole  40 mg Intravenous Daily    And    sodium chloride (PF)  10 mL Intravenous Daily     Continuous Infusions:   dextrose       PRN Meds:. OLANZapine, sodium chloride flush, acetaminophen **OR** acetaminophen, polyethylene glycol, ondansetron, glucose, dextrose, glucagon (rDNA), dextrose, magnesium sulfate, potassium chloride, sodium phosphate IVPB **OR** sodium phosphate IVPB    Assessment & Plan:        Persistent metabolic encephalopathy  -psychiatry and neurology following  -MRI brain ordered-unable to obtain  -Prior workup including MRI, EEG and CSF has been unrevealing  -Ativan was held overnight secondary to low blood pressure, but since BP ok, was restarted with some results. - increased dose for ativan challenge per Psych  - ambien challenge  - transfer for cvEEG if does not continue to improve        Agitation and aggressive behavior  Psychiatric following  - D/C zyprexa  - ambien and ativan challenge       Diabetes mellitus  Severe hypoglycemia on admission-resolved  Insulin overdose-unsure if accidental or intentional     Continue ISS     Hypertension  Continue meds     Weightloss  Initial wt 152lbs today 136lbs  -Nutritional consult  -Daily weights  - replace electrolytes per protocol  - consult Nutrition for poor PO intake     Bacteruria  - rocephin IV started 8/3 with delirium     Continue current regimen/therapies. Monitor. Adjust medical regimen as appropriate. Body mass index is 17.12 kg/m². The patient and / or the family were informed of the results of any tests, a time was given to answer questions, a plan was proposed and they agreed with plan.       DVT ppx: lovenox      Diet: DIET DENTAL SOFT;  Dietary Nutrition Supplements: Diabetic Oral Supplement  Dietary Nutrition Supplements: Frozen Oral Supplement    Consults:  IP CONSULT TO PULMONOLOGY  IP CONSULT TO NEUROLOGY  IP CONSULT TO DIETITIAN  IP CONSULT TO PSYCHIATRY  IP CONSULT TO DIETITIAN    DISPO/placement plan: pending    Code Status: Full Code      Brigitte Schneider, ESTEPHANIA - SAVANA  08/04/20

## 2020-08-04 NOTE — PROGRESS NOTES
Progress Note  The patient is being evaluated for AMS,  abnormal MRI, s/p hypoglycemia, possible insulin OD? Updates  Started on Ambien and Ativan challenge with psych with some response. Continues to refuse to eat, however per nursing has been drinking some ensure. Per RN no activity concerning for seizure.      Active Ambulatory Problems     Diagnosis Date Noted    Diabetic ketoacidosis without coma associated with diabetes mellitus due to underlying condition ([address]     Chronic abdominal pain 04/12/2010    Gastroparesis 04/12/2010    GERD (gastroesophageal reflux disease) 01/09/2012    Seizure ([address] 07/25/2012    Toe deformity 07/11/2017    Uncontrolled type 1 diabetes mellitus with diabetic peripheral neuropathy ([address] 09/19/2018    Type 1 diabetes mellitus with background diabetic retinopathy ([address] 09/19/2018    Hypoglycemia unawareness in type 1 diabetes mellitus ([address] 09/19/2018    Type 1 diabetes mellitus with hypercholesterolemia ([address] 09/19/2018    Accelerated hypertension 12/07/2013    Acute blood loss anemia 09/25/2018    Acute respiratory failure ([address] 01/08/2014    Candida esophagitis ([address] 09/11/2015    Cholelithiasis 12/10/2013    Cocaine abuse, episodic ([address] 12/08/2013    Cerebral infarction ([address] 01/08/2014    Diabetic polyneuropathy ([address] 01/06/2012    Duodenal ulcer 09/11/2015    Heart failure, diastolic, acute ([address] 20/51/6637    Leg weakness, bilateral 01/08/2014    Cannabis abuse 12/08/2013    Numbness in both legs 01/08/2014    Polysubstance abuse ([address] 09/11/2014    Pulmonary edema 01/08/2014    Diabetic foot ulcer ([address] 05/04/2019    Heart murmur 08/29/2019    Hyperlipidemia 03/25/2020    Osteomyelitis of great toe of right foot ([address] 05/12/2020    Epigastric abdominal pain 05/12/2020    Hypertensive urgency 05/12/2020    Diabetic gastroparesis associated with type 1 diabetes mellitus ([address] 05/26/2020     Resolved Ambulatory Problems     Diagnosis Date Noted    [facility] kidney [facility]) [facility]     Acquired keratoderma 03/28/2011    Dyschromia 06/21/2011    Esophageal reflux 04/12/2010    HTN (hypertension) 01/09/2012    Other constipation 03/28/2011    Other, mixed, or unspecified nondependent drug abuse, unspecified 02/15/2010    Peripheral neuropathy 02/15/2010    Diabetes mellitus ([facility]     Acute dyspnea 01/07/2014    Diabetic peripheral neuropathy ([facility] 09/11/2014    Glucosuria 09/11/2014    Hematemesis with nausea 09/25/2018    Hyperglycemia 09/11/2014    Hypoglycemia 09/10/2015    Hypophosphatemia 12/05/2014    Hypopotassemia 09/10/2015    Hypotension 07/09/2014    Hypoxia 01/07/2014    Intractable nausea and vomiting 12/07/2013    Lactic acidosis 08/04/2015    Leucocytosis 08/04/2015    Leukocytosis 68/69/9294    Metabolic acidosis, increased anion gap (IAG) 09/11/2014    Nausea 09/11/2014    Nausea and vomiting 09/11/2014    RUQ abdominal pain 12/09/2013    Cellulitis of foot 02/06/2020    Hypokalemia 05/12/2020     Past Medical History:   Diagnosis Date    Hypertension        Current Facility-Administered Medications:     potassium chloride (KLOR-CON M) extended release tablet 40 mEq, 40 mEq, Oral, PRN **OR** potassium bicarb-citric acid (EFFER-K) effervescent tablet 40 mEq, 40 mEq, Oral, PRN **OR** potassium chloride 10 mEq/100 mL IVPB (Peripheral Line), 10 mEq, Intravenous, PRN, Cherelle Ortiz APRN - CNP    LORazepam (ATIVAN) injection 1 mg, 1 mg, Intravenous, Q6H, Beverley Logan MD    zolpidem Lio Kub) tablet 10 mg, 10 mg, Oral, Daily, Beverley Logan MD, 10 mg at 08/04/20 0913    cefTRIAXone (ROCEPHIN) 1 g IVPB in 50 mL D5W minibag, 1 g, Intravenous, Q24H, Cherelle Ortiz APRN - CNP, Stopped at 08/03/20 1338    insulin glargine (LANTUS) injection vial 10 Units, 10 Units, Subcutaneous, Nightly, Veronika Chen MD, 10 Units at 08/03/20 2116    OLANZapine (ZYPREXA) injection 5 mg, 5 mg, Intramuscular, Q8H PRN, Milvia Casiano MD, 5 mg at 07/26/20 2147    losartan (COZAAR) tablet 100 mg, 100 mg, Oral, Daily, Milvia Casiano MD, 100 mg at 08/04/20 0913    insulin lispro (HUMALOG) injection vial 0-12 Units, 0-12 Units, Subcutaneous, Q4H, ESTEPHANIA Noel - CNP, 4 Units at 08/04/20 0830    sodium chloride flush 0.9 % injection 10 mL, 10 mL, Intravenous, 2 times per day, ESTEPHANIA Rice - CNP, 10 mL at 08/04/20 0915    sodium chloride flush 0.9 % injection 10 mL, 10 mL, Intravenous, PRN, ESTEPHANIA Rice - CNP    acetaminophen (TYLENOL) tablet 650 mg, 650 mg, Oral, Q4H PRN, 650 mg at 07/24/20 0537 **OR** acetaminophen (TYLENOL) suppository 650 mg, 650 mg, Rectal, Q4H PRN, Maria Teresa Cormier MD    polyethylene glycol [facility]) packet 17 g, 17 g, Oral, Daily PRN, Maria Teresa Cormier MD    ondansetron [facility]) injection 4 mg, 4 mg, Intravenous, Q4H PRN, Maria Teresa Cormier MD, 4 mg at 08/01/20 1606    glucose (GLUTOSE) 40 % oral gel 15 g, 15 g, Oral, PRN, Maria Teresa Cormier MD, 15 g at 08/04/20 0427    dextrose 50 % IV solution, 12.5 g, Intravenous, PRN, Maria Teresa Cormier MD, 12.5 g at 07/16/20 1955    glucagon (rDNA) injection 1 mg, 1 mg, Intramuscular, PRN, Maria Teresa Cormier MD    dextrose 5 % solution, 100 mL/hr, Intravenous, PRN, Maria Teresa Cormier MD    enoxaparin (LOVENOX) injection 40 mg, 40 mg, Subcutaneous, Daily, Maria Teresa Cormier MD, 40 mg at 08/04/20 0908    pantoprazole (PROTONIX) injection 40 mg, 40 mg, Intravenous, Daily, 40 mg at 08/04/20 0908 **AND** sodium chloride (PF) 0.9 % injection 10 mL, 10 mL, Intravenous, Daily, Shannon Wang APRN - CNP, 10 mL at 08/04/20 0914    magnesium sulfate 1 g in dextrose 5% 100 mL IVPB, 1 g, Intravenous, PRN, ESTEPHANIA Rice - CNP, Stopped at 07/30/20 1502    potassium chloride 20 mEq/50 mL IVPB (Central Line), 20 mEq, Intravenous, PRN, Primo Gill APRN - CNP, Last Rate: 50 mL/hr at 08/04/20 0939, 20 mEq at 08/04/20 0939    sodium phosphate 11.07 mmol in dextrose 5 % 250 mL IVPB, 0.16 mmol/kg, Intravenous, PRN **OR** sodium phosphate 22.11 mmol in dextrose 5 % 250 mL IVPB, 0.32 mmol/kg, Intravenous, PRN, Gustavo Eron, APRN - CNP      ROS -   Limited given encephalopathy.  LORazepam  1 mg Intravenous Q6H    zolpidem  10 mg Oral Daily    cefTRIAXone (ROCEPHIN) IV  1 g Intravenous Q24H    insulin glargine  10 Units Subcutaneous Nightly    losartan  100 mg Oral Daily    insulin lispro  0-12 Units Subcutaneous Q4H    sodium chloride flush  10 mL Intravenous 2 times per day    enoxaparin  40 mg Subcutaneous Daily    pantoprazole  40 mg Intravenous Daily    And    sodium chloride (PF)  10 mL Intravenous Daily         dextrose          potassium chloride **OR** potassium alternative oral replacement **OR** potassium chloride, OLANZapine, sodium chloride flush, acetaminophen **OR** acetaminophen, polyethylene glycol, ondansetron, glucose, dextrose, glucagon (rDNA), dextrose, magnesium sulfate, potassium chloride, sodium phosphate IVPB **OR** sodium phosphate IVPB     Exam:  Blood pressure 126/80, pulse 65, temperature 97.4 °F (36.3 °C), temperature source Axillary, resp. rate 16, height 6' 2\" (1.88 m), weight 133 lb 6.1 oz (60.5 kg), SpO2 99 %. Constitutional    Vital signs: BP, HR, and RR reviewed   General Alert, no distress, well-nourished  Eyes: unable to visualize the fundi  Cardiovascular: Limited given patient ability to cooperate. No peripheral edema. Psychiatric: Not interactive. .  Neurologic  Mental status:   orientation limited given encephalopathy. General fund of knowledge:  limited given encephalopathy. Memory: limited given encephalopathy. Attention poor     Language Responded \"hi\" as I approached otherwise grossly non verbal.    Comprehension does not follow commands. Cranial nerves:   CN2: blink to threat bilaterally. CN 3,4,6: extraocular muscles intact, PERRLA @ 4mm  CN5: V1: V2: V3: limited given encephalopathy.   CN7: upper and lower facial symmetric without dysarthria  CN8: limited given encephalopathy. CN11: limited given encephalopathy. Rotates head without any obvious weakness. CN12: limited given encephalopathy. Strength: limited given encephalopathy. Purposely moves 4/4 extremities, antigravity. Ambulates around the room without obvious weakness. Cerebellar/coordination: limited given encephalopathy. Gait: normal gait      Labs  Na: 145  K: 4.7  BUN: 8  Creatinine: 0.9  Glucose: 112    WBC: 6.2      CSF   Tube 1 - WBC 5, RBC 84  Tube 4 - WBC 8, RBC 1  Glucose 137  Protein 50     IgG normal  No bands.       Culture NG  Cryptococcal Ag negative  VDRL non reactive  HSV PCR negative         Studies  MRI brain w/o 7/16/20  Subtle bilateral frontoparietal cortical/subcortical signal abnormality. No acute abnormality.         MRI brain w/wo 2/24/20 - [address]  Small areas of cortical T2/FLAIR abnormality in the bilateral posterior frontal, parietal, and occipital lobes with associated volume loss favoring areas of encephalomalacia and gliosis related to a remote insult. The volume loss has progressed from 2006. This appearance is atypical for PRES     EEG 7/17/20  Moderate to severe background slowing and disorganization.         Impression  1. Persistent encephalopathy.  He was significantly hypoglycemic when he first arrived, apparently after injecting himself w/ an abundant amount of insulin.  Initial MRI brain identified cortical/subcortical signal abnormalities, which by report appears similar to MRI brain from February.  EEG was slow and disorganized.  CSF has been unrevealing.       Apparently there is a hx of seizure, though not well documented. He had planned to see  Neurology, though missed multiple appointments recently. Had a normal EEG in February.     Likely an underlying Psych component. Exam has improved with ativan challenge. Clinically improving     Recommendations  - Repeat EEG if/when able.   - Have no objection to restarting Depakote, which I believe was started in February. Not sure why this was discontinued here. I believe he was on 500 mg BID. Could be helpful for both Psych and possible seizures. Will defer to primary team to order. - If patient fails to continue to improve would recommend transfer for cvEEG. - Follow up with  Neurology upon discharge. Will discuss case with neurology attending and likely sign off at that time. Please do not hesitate to call back with any questions, concerns, or abnormal testing.      Uriel Escobedo, [address] Neurology    A copy of this note was provided for Dr Amarilis Orozco MD

## 2020-08-04 NOTE — PROGRESS NOTES
Psych Consult Progress Note    08/04/20      Tristen Uribe  [id number]  Chief Complaint   Patient presents with    Hypoglycemia     Found unresponsive by girlfriend. Per EMS, pt had blood sugar of \"22 on arrival and was given IM glucagon\". EMS reports blood sugar \"went up to 59\". Pt responsive to pain at this time. I have reviewed recent documentation. Tristen Uribe is a 50 y.o. male  With  has a past medical history of Diabetes mellitus ([address] Gastroparesis, and Hypertension. Subjective/Interval Hx:  A bit more movement for staff today. Not for me. But did actually eat after some ambien this AM.      Quality:  Altered ms  Severity:  Severe   Duration:  Days to weeks  Context:  As above.   Location:  Brain    Objective:  Vitals:    08/04/20 0903   BP:    Pulse:    Resp:    Temp:    SpO2: 99%     Recent Results (from the past 168 hour(s))   POCT Glucose    Collection Time: 07/28/20 12:24 PM   Result Value Ref Range    POC Glucose 187 (H) 70 - 99 mg/dl    Performed on ACCU-CHEK    POCT Glucose    Collection Time: 07/28/20  4:40 PM   Result Value Ref Range    POC Glucose 118 (H) 70 - 99 mg/dl    Performed on ACCU-CHEK    POCT Glucose    Collection Time: 07/28/20  9:25 PM   Result Value Ref Range    POC Glucose 243 (H) 70 - 99 mg/dl    Performed on ACCU-CHEK    POCT Glucose    Collection Time: 07/29/20 12:15 AM   Result Value Ref Range    POC Glucose 188 (H) 70 - 99 mg/dl    Performed on ACCU-CHEK    Basic Metabolic Panel w/ Reflex to MG    Collection Time: 07/29/20  4:00 AM   Result Value Ref Range    Sodium 143 136 - 145 mmol/L    Potassium reflex Magnesium 3.9 3.5 - 5.1 mmol/L    Chloride 106 99 - 110 mmol/L    CO2 17 (L) 21 - 32 mmol/L    Anion Gap 20 (H) 3 - 16    Glucose 109 (H) 70 - 99 mg/dL    BUN 12 7 - 20 mg/dL    CREATININE 0.9 0.9 - 1.3 mg/dL    GFR Non-African American >60 >60    GFR African American >60 >60    Calcium 9.3 8.3 - 10.6 mg/dL   CBC auto differential    Collection Time: 07/29/20 4:00 AM   Result Value Ref Range    WBC 11.2 (H) 4.0 - 11.0 K/uL    RBC 4.20 4. 20 - 5.90 M/uL    Hemoglobin 11.5 (L) 13.5 - 17.5 g/dL    Hematocrit 34.9 (L) 40.5 - 52.5 %    MCV 83.3 80.0 - 100.0 fL    MCH 27.4 26.0 - 34.0 pg    MCHC 32.9 31.0 - 36.0 g/dL    RDW 16.5 (H) 12.4 - 15.4 %    Platelets 131 582 - 940 K/uL    MPV 7.3 5.0 - 10.5 fL    Neutrophils % 70.6 %    Lymphocytes % 22.6 %    Monocytes % 4.0 %    Eosinophils % 2.1 %    Basophils % 0.7 %    Neutrophils Absolute 7.9 (H) 1.7 - 7.7 K/uL    Lymphocytes Absolute 2.5 1.0 - 5.1 K/uL    Monocytes Absolute 0.4 0.0 - 1.3 K/uL    Eosinophils Absolute 0.2 0.0 - 0.6 K/uL    Basophils Absolute 0.1 0.0 - 0.2 K/uL   Magnesium    Collection Time: 07/29/20  4:00 AM   Result Value Ref Range    Magnesium 1.70 (L) 1.80 - 2.40 mg/dL   Phosphorus    Collection Time: 07/29/20  4:00 AM   Result Value Ref Range    Phosphorus 1.9 (L) 2.5 - 4.9 mg/dL   POCT Glucose    Collection Time: 07/29/20  4:37 AM   Result Value Ref Range    POC Glucose 111 (H) 70 - 99 mg/dl    Performed on ACCU-CHEK    POCT Glucose    Collection Time: 07/29/20  7:29 AM   Result Value Ref Range    POC Glucose 148 (H) 70 - 99 mg/dl    Performed on ACCU-CHEK    POCT Glucose    Collection Time: 07/29/20 11:28 AM   Result Value Ref Range    POC Glucose 158 (H) 70 - 99 mg/dl    Performed on ACCU-CHEK    POCT Glucose    Collection Time: 07/29/20  2:34 PM   Result Value Ref Range    POC Glucose 178 (H) 70 - 99 mg/dl    Performed on ACCU-CHEK    POCT Glucose    Collection Time: 07/29/20  4:36 PM   Result Value Ref Range    POC Glucose 186 (H) 70 - 99 mg/dl    Performed on ACCU-CHEK    POCT Glucose    Collection Time: 07/29/20  7:52 PM   Result Value Ref Range    POC Glucose 199 (H) 70 - 99 mg/dl    Performed on ACCU-CHEK    POCT Glucose    Collection Time: 07/30/20 12:11 AM   Result Value Ref Range    POC Glucose 167 (H) 70 - 99 mg/dl    Performed on ACCU-CHEK    POCT Glucose    Collection Time: 07/30/20  3:58 AM Result Value Ref Range    POC Glucose 134 (H) 70 - 99 mg/dl    Performed on ACCU-CHEK    Basic Metabolic Panel w/ Reflex to MG    Collection Time: 07/30/20  6:10 AM   Result Value Ref Range    Sodium 144 136 - 145 mmol/L    Potassium reflex Magnesium 3.7 3.5 - 5.1 mmol/L    Chloride 105 99 - 110 mmol/L    CO2 19 (L) 21 - 32 mmol/L    Anion Gap 20 (H) 3 - 16    Glucose 153 (H) 70 - 99 mg/dL    BUN 11 7 - 20 mg/dL    CREATININE 0.9 0.9 - 1.3 mg/dL    GFR Non-African American >60 >60    GFR African American >60 >60    Calcium 9.5 8.3 - 10.6 mg/dL   CBC auto differential    Collection Time: 07/30/20  6:10 AM   Result Value Ref Range    WBC 8.5 4.0 - 11.0 K/uL    RBC 4.38 4.20 - 5.90 M/uL    Hemoglobin 12.0 (L) 13.5 - 17.5 g/dL    Hematocrit 36.1 (L) 40.5 - 52.5 %    MCV 82.5 80.0 - 100.0 fL    MCH 27.4 26.0 - 34.0 pg    MCHC 33.2 31.0 - 36.0 g/dL    RDW 16.9 (H) 12.4 - 15.4 %    Platelets 269 308 - 570 K/uL    MPV 7.2 5.0 - 10.5 fL    Neutrophils % 69.1 %    Lymphocytes % 23.5 %    Monocytes % 4.4 %    Eosinophils % 2.2 %    Basophils % 0.8 %    Neutrophils Absolute 5.9 1.7 - 7.7 K/uL    Lymphocytes Absolute 2.0 1.0 - 5.1 K/uL    Monocytes Absolute 0.4 0.0 - 1.3 K/uL    Eosinophils Absolute 0.2 0.0 - 0.6 K/uL    Basophils Absolute 0.1 0.0 - 0.2 K/uL   Magnesium    Collection Time: 07/30/20  6:10 AM   Result Value Ref Range    Magnesium 1.60 (L) 1.80 - 2.40 mg/dL   Phosphorus    Collection Time: 07/30/20  6:10 AM   Result Value Ref Range    Phosphorus 2.6 2.5 - 4.9 mg/dL   POCT Glucose    Collection Time: 07/30/20  8:48 AM   Result Value Ref Range    POC Glucose 150 (H) 70 - 99 mg/dl    Performed on ACCU-CHEK    POCT Glucose    Collection Time: 07/30/20 11:20 AM   Result Value Ref Range    POC Glucose 152 (H) 70 - 99 mg/dl    Performed on ACCU-CHEK    POCT Glucose    Collection Time: 07/30/20  5:03 PM   Result Value Ref Range    POC Glucose >600 (AA) 70 - 99 mg/dl    Performed on ACCU-CHEK    POCT Glucose    Collection Time: 07/30/20  5:05 PM   Result Value Ref Range    POC Glucose 173 (H) 70 - 99 mg/dl    Performed on ACCU-CHEK    POCT Glucose    Collection Time: 07/30/20  8:26 PM   Result Value Ref Range    POC Glucose 104 (H) 70 - 99 mg/dl    Performed on ACCU-CHEK    POCT Glucose    Collection Time: 07/31/20  1:30 AM   Result Value Ref Range    POC Glucose 151 (H) 70 - 99 mg/dl    Performed on ACCU-CHEK    POCT Glucose    Collection Time: 07/31/20  5:32 AM   Result Value Ref Range    POC Glucose 170 (H) 70 - 99 mg/dl    Performed on ACCU-CHEK    Basic Metabolic Panel w/ Reflex to MG    Collection Time: 07/31/20  5:45 AM   Result Value Ref Range    Sodium 149 (H) 136 - 145 mmol/L    Potassium reflex Magnesium 3.8 3.5 - 5.1 mmol/L    Chloride 111 (H) 99 - 110 mmol/L    CO2 21 21 - 32 mmol/L    Anion Gap 17 (H) 3 - 16    Glucose 171 (H) 70 - 99 mg/dL    BUN 10 7 - 20 mg/dL    CREATININE 0.7 (L) 0.9 - 1.3 mg/dL    GFR Non-African American >60 >60    GFR African American >60 >60    Calcium 9.4 8.3 - 10.6 mg/dL   CBC auto differential    Collection Time: 07/31/20  5:45 AM   Result Value Ref Range    WBC 7.8 4.0 - 11.0 K/uL    RBC 4.17 (L) 4.20 - 5.90 M/uL    Hemoglobin 11.7 (L) 13.5 - 17.5 g/dL    Hematocrit 34.7 (L) 40.5 - 52.5 %    MCV 83.2 80.0 - 100.0 fL    MCH 28.0 26.0 - 34.0 pg    MCHC 33.6 31.0 - 36.0 g/dL    RDW 16.3 (H) 12.4 - 15.4 %    Platelets 377 940 - 913 K/uL    MPV 7.3 5.0 - 10.5 fL    Neutrophils % 64.3 %    Lymphocytes % 26.5 %    Monocytes % 5.1 %    Eosinophils % 2.8 %    Basophils % 1.3 %    Neutrophils Absolute 5.0 1.7 - 7.7 K/uL    Lymphocytes Absolute 2.1 1.0 - 5.1 K/uL    Monocytes Absolute 0.4 0.0 - 1.3 K/uL    Eosinophils Absolute 0.2 0.0 - 0.6 K/uL    Basophils Absolute 0.1 0.0 - 0.2 K/uL   Magnesium    Collection Time: 07/31/20  5:45 AM   Result Value Ref Range    Magnesium 1.70 (L) 1.80 - 2.40 mg/dL   Phosphorus    Collection Time: 07/31/20  5:45 AM   Result Value Ref Range    Phosphorus 3.2 2.5 - 4.9 %    Basophils % 0.7 %    Neutrophils Absolute 5.2 1.7 - 7.7 K/uL    Lymphocytes Absolute 1.6 1.0 - 5.1 K/uL    Monocytes Absolute 0.4 0.0 - 1.3 K/uL    Eosinophils Absolute 0.2 0.0 - 0.6 K/uL    Basophils Absolute 0.1 0.0 - 0.2 K/uL   Magnesium    Collection Time: 08/01/20  7:09 AM   Result Value Ref Range    Magnesium 1.90 1.80 - 2.40 mg/dL   Phosphorus    Collection Time: 08/01/20  7:09 AM   Result Value Ref Range    Phosphorus 2.8 2.5 - 4.9 mg/dL   POCT Glucose    Collection Time: 08/01/20  7:49 AM   Result Value Ref Range    POC Glucose 110 (H) 70 - 99 mg/dl    Performed on ACCU-CHEK    POCT Glucose    Collection Time: 08/01/20 11:42 AM   Result Value Ref Range    POC Glucose 152 (H) 70 - 99 mg/dl    Performed on ACCU-CHEK    Urine Reflex to Culture    Collection Time: 08/01/20  2:10 PM    Specimen: Urine, clean catch   Result Value Ref Range    Color, UA DK YELLOW Straw/Yellow    Clarity, UA CLOUDY (A) Clear    Glucose, Ur 250 (A) Negative mg/dL    Bilirubin Urine MODERATE (A) Negative    Ketones, Urine 15 (A) Negative mg/dL    Specific Gravity, UA 1.029 1.005 - 1.030    Blood, Urine Negative Negative    pH, UA 6.0 5.0 - 8.0    Protein, UA 30 (A) Negative mg/dL    Urobilinogen, Urine 1.0 <2.0 E.U./dL    Nitrite, Urine Negative Negative    Leukocyte Esterase, Urine SMALL (A) Negative    Microscopic Examination YES     Urine Type Other     Urine Reflex to Culture Yes    Microscopic Urinalysis    Collection Time: 08/01/20  2:10 PM   Result Value Ref Range    Hyaline Casts, UA 3-5 (A) 0 - 2 /LPF    Mucus, UA 2+ (A) None Seen /LPF    Crystals, UA Few Ca.  Oxalate (A) None Seen /HPF    WBC, UA 49 (H) 0 - 5 /HPF    RBC, UA 1 0 - 4 /HPF    Epithelial Cells, UA 7 (H) 0 - 5 /HPF   Culture, Urine    Collection Time: 08/01/20  2:10 PM    Specimen: Urine, clean catch   Result Value Ref Range    Urine Culture, Routine No growth at 18 to 36 hours    TSH with Reflex    Collection Time: 08/01/20  2:25 PM   Result Value Ref Range    TSH 0.80 0.27 - 4.20 uIU/mL   Vitamin B12 & Folate    Collection Time: 08/01/20  2:25 PM   Result Value Ref Range    Vitamin B-12 680 211 - 911 pg/mL    Folate 14.94 4.78 - 24.20 ng/mL   POCT Glucose    Collection Time: 08/01/20  4:28 PM   Result Value Ref Range    POC Glucose 407 (H) 70 - 99 mg/dl    Performed on ACCU-CHEK    POCT Glucose    Collection Time: 08/01/20  7:30 PM   Result Value Ref Range    POC Glucose 322 (H) 70 - 99 mg/dl    Performed on ACCU-CHEK    POCT Glucose    Collection Time: 08/01/20 10:10 PM   Result Value Ref Range    POC Glucose 246 (H) 70 - 99 mg/dl    Performed on ACCU-CHEK    POCT Glucose    Collection Time: 08/02/20  3:31 AM   Result Value Ref Range    POC Glucose 117 (H) 70 - 99 mg/dl    Performed on ACCU-CHEK    Basic Metabolic Panel w/ Reflex to MG    Collection Time: 08/02/20  5:26 AM   Result Value Ref Range    Sodium 148 (H) 136 - 145 mmol/L    Potassium reflex Magnesium 3.3 (L) 3.5 - 5.1 mmol/L    Chloride 108 99 - 110 mmol/L    CO2 24 21 - 32 mmol/L    Anion Gap 16 3 - 16    Glucose 115 (H) 70 - 99 mg/dL    BUN 9 7 - 20 mg/dL    CREATININE 0.9 0.9 - 1.3 mg/dL    GFR Non-African American >60 >60    GFR African American >60 >60    Calcium 9.6 8.3 - 10.6 mg/dL   CBC auto differential    Collection Time: 08/02/20  5:26 AM   Result Value Ref Range    WBC 7.8 4.0 - 11.0 K/uL    RBC 4.26 4.20 - 5.90 M/uL    Hemoglobin 11.9 (L) 13.5 - 17.5 g/dL    Hematocrit 35.2 (L) 40.5 - 52.5 %    MCV 82.7 80.0 - 100.0 fL    MCH 28.0 26.0 - 34.0 pg    MCHC 33.8 31.0 - 36.0 g/dL    RDW 16.7 (H) 12.4 - 15.4 %    Platelets 035 715 - 197 K/uL    MPV 7.4 5.0 - 10.5 fL    Neutrophils % 61.4 %    Lymphocytes % 29.2 %    Monocytes % 5.7 %    Eosinophils % 2.8 %    Basophils % 0.9 %    Neutrophils Absolute 4.8 1.7 - 7.7 K/uL    Lymphocytes Absolute 2.3 1.0 - 5.1 K/uL    Monocytes Absolute 0.4 0.0 - 1.3 K/uL    Eosinophils Absolute 0.2 0.0 - 0.6 K/uL    Basophils Absolute 0.1 0.0 - 0.2 K/uL 36.0 g/dL    RDW 16.4 (H) 12.4 - 15.4 %    Platelets 838 385 - 136 K/uL    MPV 8.0 5.0 - 10.5 fL    Neutrophils % 56.0 %    Lymphocytes % 34.6 %    Monocytes % 4.9 %    Eosinophils % 3.5 %    Basophils % 1.0 %    Neutrophils Absolute 3.8 1.7 - 7.7 K/uL    Lymphocytes Absolute 2.4 1.0 - 5.1 K/uL    Monocytes Absolute 0.3 0.0 - 1.3 K/uL    Eosinophils Absolute 0.2 0.0 - 0.6 K/uL    Basophils Absolute 0.1 0.0 - 0.2 K/uL   Magnesium    Collection Time: 08/03/20  6:10 AM   Result Value Ref Range    Magnesium 1.80 1.80 - 2.40 mg/dL   Phosphorus    Collection Time: 08/03/20  6:10 AM   Result Value Ref Range    Phosphorus 3.8 2.5 - 4.9 mg/dL   POCT Glucose    Collection Time: 08/03/20  7:54 AM   Result Value Ref Range    POC Glucose 183 (H) 70 - 99 mg/dl    Performed on ACCU-CHEK    POCT Glucose    Collection Time: 08/03/20 11:45 AM   Result Value Ref Range    POC Glucose 217 (H) 70 - 99 mg/dl    Performed on ACCU-CHEK    POCT Glucose    Collection Time: 08/03/20  4:01 PM   Result Value Ref Range    POC Glucose 86 70 - 99 mg/dl    Performed on ACCU-CHEK    POCT Glucose    Collection Time: 08/03/20  8:04 PM   Result Value Ref Range    POC Glucose 256 (H) 70 - 99 mg/dl    Performed on ACCU-CHEK    POCT Glucose    Collection Time: 08/04/20 12:01 AM   Result Value Ref Range    POC Glucose 145 (H) 70 - 99 mg/dl    Performed on ACCU-CHEK    POCT Glucose    Collection Time: 08/04/20  4:05 AM   Result Value Ref Range    POC Glucose 47 (LL) 70 - 99 mg/dl    Performed on ACCU-CHEK    POCT Glucose    Collection Time: 08/04/20  4:12 AM   Result Value Ref Range    POC Glucose 46 (LL) 70 - 99 mg/dl    Performed on ACCU-CHEK    POCT Glucose    Collection Time: 08/04/20  4:24 AM   Result Value Ref Range    POC Glucose 55 (L) 70 - 99 mg/dl    Performed on ACCU-CHEK    POCT Glucose    Collection Time: 08/04/20  4:41 AM   Result Value Ref Range    POC Glucose 87 70 - 99 mg/dl    Performed on ACCU-CHEK    POCT Glucose    Collection Time: PRN ESTEPHANIA Avalos CNP        Or    potassium bicarb-citric acid (EFFER-K) effervescent tablet 40 mEq  40 mEq Oral PRN ESTEPHANIA Avalos CNP        Or    potassium chloride 10 mEq/100 mL IVPB (Peripheral Line)  10 mEq Intravenous PRN ESTEPHANIA Avalos CNP        zolpidem (AMBIEN) tablet 10 mg  10 mg Oral Daily Ash Low MD   10 mg at 08/04/20 0913    cefTRIAXone (ROCEPHIN) 1 g IVPB in 50 mL D5W minibag  1 g Intravenous Q24H ESTEPHANIA Avalos CNP   Stopped at 08/03/20 1338    LORazepam (ATIVAN) injection 0.5 mg  0.5 mg Intravenous Q6H ESTEPHANIA Carson CNP   0.5 mg at 08/04/20 1040    insulin glargine (LANTUS) injection vial 10 Units  10 Units Subcutaneous Nightly Jw Larios MD   10 Units at 08/03/20 2116    OLANZapine (ZYPREXA) injection 5 mg  5 mg Intramuscular Q8H PRN wJ Larios MD   5 mg at 07/26/20 2147    losartan (COZAAR) tablet 100 mg  100 mg Oral Daily Kacy Albarran MD   100 mg at 08/04/20 0913    insulin lispro (HUMALOG) injection vial 0-12 Units  0-12 Units Subcutaneous Q4H ESTEPHANIA Jnesen CNP   4 Units at 08/04/20 0830    sodium chloride flush 0.9 % injection 10 mL  10 mL Intravenous 2 times per day ESTEPHANIA Jensen CNP   10 mL at 08/04/20 0915    sodium chloride flush 0.9 % injection 10 mL  10 mL Intravenous PRN ESTEPHANIA Jensen CNP        acetaminophen (TYLENOL) tablet 650 mg  650 mg Oral Q4H PRN Nilo Nelson MD   650 mg at 07/24/20 9282    Or    acetaminophen (TYLENOL) suppository 650 mg  650 mg Rectal Q4H PRN Nilo Nelson MD        polyethylene glycol [facility]) packet 17 g  17 g Oral Daily PRN Nilo Nelson MD        [facility]ron [facility]) injection 4 mg  4 mg Intravenous Q4H PRN Nilo Nelson MD   4 mg at 08/01/20 1606    glucose (GLUTOSE) 40 % oral gel 15 g  15 g Oral PRN Nilo Nelson MD   15 g at 08/04/20 0427    dextrose 50 % IV solution  12.5 g Intravenous PRN Nilo Nelson MD   12.5 g at 07/16/20 1955    glucagon (rDNA) injection 1 mg  1 mg Intramuscular PRN Jason Loomis MD        dextrose 5 % solution  100 mL/hr Intravenous PRN Jason Loomis MD        enoxaparin (LOVENOX) injection 40 mg  40 mg Subcutaneous Daily Jason Loomis MD   40 mg at 08/04/20 0908    pantoprazole (PROTONIX) injection 40 mg  40 mg Intravenous Daily Gearline Ann-Marie, APRN - CNP   40 mg at 08/04/20 0908    And    sodium chloride (PF) 0.9 % injection 10 mL  10 mL Intravenous Daily Gearline Jacobsen, APRN - CNP   10 mL at 08/04/20 0914    magnesium sulfate 1 g in dextrose 5% 100 mL IVPB  1 g Intravenous PRN Gearline Jacobsen, APRN - CNP   Stopped at 07/30/20 1502    potassium chloride 20 mEq/50 mL IVPB (Central Line)  20 mEq Intravenous PRN Gearline Jacobsen, APRN - CNP 50 mL/hr at 08/04/20 0939 20 mEq at 08/04/20 6176    sodium phosphate 11.07 mmol in dextrose 5 % 250 mL IVPB  0.16 mmol/kg Intravenous PRN Gearline Jacobsen, APRN - CNP        Or    sodium phosphate 22.11 mmol in dextrose 5 % 250 mL IVPB  0.32 mmol/kg Intravenous PRN Gearline Jacobsen, APRN - CNP         ROS:  Could not assess, no understandable speech    MSE:  A-under covers, some repetitive leg movements  A-sleepy  M-can't assess, no speech  S-impaired  I/J-impaired  T-No speech today. Recs:  1. Catatonic delirium-Cont Jesús Higinio, it seems to have made some difference. I'll increase ativan back to 1 q6 as long as bp holds up. It's been good lately. Does this pt need nutrition support at this point? Fluid support?

## 2020-08-04 NOTE — PROGRESS NOTES
D: PT. BS 44 at 4am blood sugar check.  He alert and had apple juice, pudding and glucose gel 40% 15g as ordered  A: Dr. Raphael Bullard notified, no new orders  R: BS now 92 at 5am

## 2020-08-04 NOTE — CARE COORDINATION
8/4 [facility] is able to accept when discharged. Will need pre-cert, COVID and HEN's.  Electronically signed by Tim Mendiola RN on 8/4/2020 at 3:32 PM

## 2020-08-04 NOTE — PLAN OF CARE
Problem: Falls - Risk of:  Goal: Will remain free from falls  Description: Will remain free from falls  8/4/2020 0520 by Aleyda Christian RN  Outcome: Ongoing  Note: Pt. Remains alert but is impulsive.  Sitter at bedside, call light in reach and bed alarm on   A: reminded not to get out of bed without assist   R: without falls this shift

## 2020-08-05 LAB
ANION GAP SERPL CALCULATED.3IONS-SCNC: 11 MMOL/L (ref 3–16)
BASOPHILS ABSOLUTE: 0 K/UL (ref 0–0.2)
BASOPHILS RELATIVE PERCENT: 0.6 %
BUN BLDV-MCNC: 7 MG/DL (ref 7–20)
CALCIUM SERPL-MCNC: 9.4 MG/DL (ref 8.3–10.6)
CHLORIDE BLD-SCNC: 106 MMOL/L (ref 99–110)
CO2: 27 MMOL/L (ref 21–32)
CREAT SERPL-MCNC: 0.9 MG/DL (ref 0.9–1.3)
EOSINOPHILS ABSOLUTE: 0.2 K/UL (ref 0–0.6)
EOSINOPHILS RELATIVE PERCENT: 3.1 %
GFR AFRICAN AMERICAN: >60
GFR NON-AFRICAN AMERICAN: >60
GLUCOSE BLD-MCNC: 118 MG/DL (ref 70–99)
GLUCOSE BLD-MCNC: 124 MG/DL (ref 70–99)
GLUCOSE BLD-MCNC: 126 MG/DL (ref 70–99)
GLUCOSE BLD-MCNC: 267 MG/DL (ref 70–99)
GLUCOSE BLD-MCNC: 306 MG/DL (ref 70–99)
GLUCOSE BLD-MCNC: 54 MG/DL (ref 70–99)
GLUCOSE BLD-MCNC: 56 MG/DL (ref 70–99)
GLUCOSE BLD-MCNC: 97 MG/DL (ref 70–99)
HCT VFR BLD CALC: 32.6 % (ref 40.5–52.5)
HEMOGLOBIN: 10.8 G/DL (ref 13.5–17.5)
LYMPHOCYTES ABSOLUTE: 1.9 K/UL (ref 1–5.1)
LYMPHOCYTES RELATIVE PERCENT: 30.8 %
MAGNESIUM: 1.9 MG/DL (ref 1.8–2.4)
MCH RBC QN AUTO: 27.5 PG (ref 26–34)
MCHC RBC AUTO-ENTMCNC: 33.2 G/DL (ref 31–36)
MCV RBC AUTO: 82.7 FL (ref 80–100)
MONOCYTES ABSOLUTE: 0.4 K/UL (ref 0–1.3)
MONOCYTES RELATIVE PERCENT: 6.2 %
NEUTROPHILS ABSOLUTE: 3.7 K/UL (ref 1.7–7.7)
NEUTROPHILS RELATIVE PERCENT: 59.3 %
PDW BLD-RTO: 16.7 % (ref 12.4–15.4)
PERFORMED ON: ABNORMAL
PHOSPHORUS: 5 MG/DL (ref 2.5–4.9)
PLATELET # BLD: 264 K/UL (ref 135–450)
PMV BLD AUTO: 8 FL (ref 5–10.5)
POTASSIUM REFLEX MAGNESIUM: 3.4 MMOL/L (ref 3.5–5.1)
RBC # BLD: 3.94 M/UL (ref 4.2–5.9)
SODIUM BLD-SCNC: 144 MMOL/L (ref 136–145)
WBC # BLD: 6.3 K/UL (ref 4–11)

## 2020-08-05 PROCEDURE — C9113 INJ PANTOPRAZOLE SODIUM, VIA: HCPCS | Performed by: NURSE PRACTITIONER

## 2020-08-05 PROCEDURE — 6370000000 HC RX 637 (ALT 250 FOR IP): Performed by: PSYCHIATRY & NEUROLOGY

## 2020-08-05 PROCEDURE — 6370000000 HC RX 637 (ALT 250 FOR IP): Performed by: NURSE PRACTITIONER

## 2020-08-05 PROCEDURE — 99231 SBSQ HOSP IP/OBS SF/LOW 25: CPT | Performed by: PSYCHIATRY & NEUROLOGY

## 2020-08-05 PROCEDURE — 2580000003 HC RX 258: Performed by: INTERNAL MEDICINE

## 2020-08-05 PROCEDURE — 84100 ASSAY OF PHOSPHORUS: CPT

## 2020-08-05 PROCEDURE — 2580000003 HC RX 258: Performed by: NURSE PRACTITIONER

## 2020-08-05 PROCEDURE — 6370000000 HC RX 637 (ALT 250 FOR IP): Performed by: INTERNAL MEDICINE

## 2020-08-05 PROCEDURE — 83735 ASSAY OF MAGNESIUM: CPT

## 2020-08-05 PROCEDURE — 85025 COMPLETE CBC W/AUTO DIFF WBC: CPT

## 2020-08-05 PROCEDURE — 80048 BASIC METABOLIC PNL TOTAL CA: CPT

## 2020-08-05 PROCEDURE — 6360000002 HC RX W HCPCS: Performed by: NURSE PRACTITIONER

## 2020-08-05 PROCEDURE — 1200000000 HC SEMI PRIVATE

## 2020-08-05 PROCEDURE — 6360000002 HC RX W HCPCS: Performed by: INTERNAL MEDICINE

## 2020-08-05 RX ORDER — DIVALPROEX SODIUM 500 MG/1
500 TABLET, EXTENDED RELEASE ORAL 2 TIMES DAILY
Status: DISCONTINUED | OUTPATIENT
Start: 2020-08-05 | End: 2020-08-06

## 2020-08-05 RX ADMIN — INSULIN LISPRO 6 UNITS: 100 INJECTION, SOLUTION INTRAVENOUS; SUBCUTANEOUS at 13:37

## 2020-08-05 RX ADMIN — CEFTRIAXONE 1 G: 1 INJECTION, POWDER, FOR SOLUTION INTRAMUSCULAR; INTRAVENOUS at 13:37

## 2020-08-05 RX ADMIN — INSULIN LISPRO 8 UNITS: 100 INJECTION, SOLUTION INTRAVENOUS; SUBCUTANEOUS at 18:09

## 2020-08-05 RX ADMIN — INSULIN LISPRO 12 UNITS: 100 INJECTION, SOLUTION INTRAVENOUS; SUBCUTANEOUS at 23:26

## 2020-08-05 RX ADMIN — PANTOPRAZOLE SODIUM 40 MG: 40 INJECTION, POWDER, FOR SOLUTION INTRAVENOUS at 08:17

## 2020-08-05 RX ADMIN — SODIUM CHLORIDE, PRESERVATIVE FREE 10 ML: 5 INJECTION INTRAVENOUS at 08:19

## 2020-08-05 RX ADMIN — ZOLPIDEM TARTRATE 10 MG: 5 TABLET ORAL at 08:17

## 2020-08-05 RX ADMIN — DEXTROSE MONOHYDRATE 12.5 G: 25 INJECTION, SOLUTION INTRAVENOUS at 04:37

## 2020-08-05 RX ADMIN — LOSARTAN POTASSIUM 100 MG: 100 TABLET, FILM COATED ORAL at 08:17

## 2020-08-05 RX ADMIN — ENOXAPARIN SODIUM 40 MG: 40 INJECTION SUBCUTANEOUS at 08:17

## 2020-08-05 RX ADMIN — SODIUM CHLORIDE, PRESERVATIVE FREE 10 ML: 5 INJECTION INTRAVENOUS at 00:06

## 2020-08-05 RX ADMIN — Medication 10 ML: at 08:18

## 2020-08-05 RX ADMIN — POTASSIUM CHLORIDE 40 MEQ: 1500 TABLET, EXTENDED RELEASE ORAL at 08:28

## 2020-08-05 RX ADMIN — INSULIN LISPRO 6 UNITS: 100 INJECTION, SOLUTION INTRAVENOUS; SUBCUTANEOUS at 00:06

## 2020-08-05 ASSESSMENT — PAIN SCALES - GENERAL
PAINLEVEL_OUTOF10: 0

## 2020-08-05 ASSESSMENT — PAIN SCALES - WONG BAKER
WONGBAKER_NUMERICALRESPONSE: 0

## 2020-08-05 NOTE — PROGRESS NOTES
Patient blood glucose level is 54. Patient arouses to voice on assessment and expresses clearer communication saying, \" I am happy today. \" Acknowledged patient's feelings in a  Positive way. Patient smiles. Encourage patient to drink orange juice on breakfast tray and patient tolerates well with assistance from this nurse. Patient able to follow simple commands at this time. Some expressive aphasia still noted. VS stable.  at bedside and feeding patient. Will continue to monitor blood sugar levels. Call light in reach of patient.

## 2020-08-05 NOTE — PLAN OF CARE
Problem: Skin Integrity:  Goal: Will show no infection signs and symptoms  Description: Will show no infection signs and symptoms  Outcome: Ongoing     Problem: Falls - Risk of:  Goal: Will remain free from falls  Description: Will remain free from falls  Outcome: Ongoing

## 2020-08-05 NOTE — PLAN OF CARE
Problem: Pain:  Goal: Pain level will decrease  Description: Pain level will decrease  Outcome: Ongoing     Problem: Nutrition  Goal: Optimal nutrition therapy  Outcome: Ongoing     Problem: Skin Integrity:  Goal: Will show no infection signs and symptoms  Description: Will show no infection signs and symptoms  8/5/2020 1127 by Sarah Geiger RN  Outcome: Ongoing     Problem: Falls - Risk of:  Goal: Will remain free from falls  Description: Will remain free from falls  8/5/2020 1127 by Sarah Geiger RN  Outcome: Ongoing   Wheels of bed locked x 4. Room free from clutter. Non-skid footwear intact. Call light in reach of patient at all times. Bed alarm active.  at side for impulsiveness, high fall risk and inability to follow commands.

## 2020-08-05 NOTE — CONSULTS
Consult for \"Poor intake/appetite\". RD currently following and aware of poor intake and appetite. Noted supplements on board. Pt have been improving with intakes as staff feed him. Encourage to assist feed at all meals. Encourage Glucerna drink at every meal. Will follow up on 8/7.

## 2020-08-05 NOTE — PROGRESS NOTES
Psych Consult Progress Note    08/05/20      Mel Butcher  [id number]  Chief Complaint   Patient presents with    Hypoglycemia     Found unresponsive by girlfriend. Per EMS, pt had blood sugar of \"22 on arrival and was given IM glucagon\". EMS reports blood sugar \"went up to 59\". Pt responsive to pain at this time. I have reviewed recent documentation. Mel Butcher is a 50 y.o. male  With  has a past medical history of Diabetes mellitus ([address] Gastroparesis, and Hypertension. Subjective/Interval Hx:  More interactive if a bit strange and sexually preoccupied in what seems like a disorganized way later yesterday and today. Eating a bit more. Not responsive to me today. Quality:  Altered ms  Severity:  Severe   Duration:  Days to weeks  Context:  As above.   Location:  Brain    Objective:  Vitals:    08/05/20 0815   BP: 108/65   Pulse: 69   Resp:    Temp:    SpO2:      Recent Results (from the past 168 hour(s))   POCT Glucose    Collection Time: 07/29/20 11:28 AM   Result Value Ref Range    POC Glucose 158 (H) 70 - 99 mg/dl    Performed on ACCU-CHEK    POCT Glucose    Collection Time: 07/29/20  2:34 PM   Result Value Ref Range    POC Glucose 178 (H) 70 - 99 mg/dl    Performed on ACCU-CHEK    POCT Glucose    Collection Time: 07/29/20  4:36 PM   Result Value Ref Range    POC Glucose 186 (H) 70 - 99 mg/dl    Performed on ACCU-CHEK    POCT Glucose    Collection Time: 07/29/20  7:52 PM   Result Value Ref Range    POC Glucose 199 (H) 70 - 99 mg/dl    Performed on ACCU-CHEK    POCT Glucose    Collection Time: 07/30/20 12:11 AM   Result Value Ref Range    POC Glucose 167 (H) 70 - 99 mg/dl    Performed on ACCU-CHEK    POCT Glucose    Collection Time: 07/30/20  3:58 AM   Result Value Ref Range    POC Glucose 134 (H) 70 - 99 mg/dl    Performed on ACCU-CHEK    Basic Metabolic Panel w/ Reflex to MG    Collection Time: 07/30/20  6:10 AM   Result Value Ref Range    Sodium 144 136 - 145 mmol/L    Potassium reflex 185 (H) 70 - 99 mg/dl    Performed on ACCU-CHEK    POCT Glucose    Collection Time: 07/31/20 11:50 AM   Result Value Ref Range    POC Glucose 169 (H) 70 - 99 mg/dl    Performed on ACCU-CHEK    POCT Glucose    Collection Time: 07/31/20  5:20 PM   Result Value Ref Range    POC Glucose 271 (H) 70 - 99 mg/dl    Performed on ACCU-CHEK    POCT Glucose    Collection Time: 07/31/20  8:12 PM   Result Value Ref Range    POC Glucose 151 (H) 70 - 99 mg/dl    Performed on ACCU-CHEK    POCT Glucose    Collection Time: 08/01/20 12:05 AM   Result Value Ref Range    POC Glucose 130 (H) 70 - 99 mg/dl    Performed on ACCU-CHEK    POCT Glucose    Collection Time: 08/01/20  4:03 AM   Result Value Ref Range    POC Glucose 151 (H) 70 - 99 mg/dl    Performed on ACCU-CHEK    Basic Metabolic Panel w/ Reflex to MG    Collection Time: 08/01/20  7:09 AM   Result Value Ref Range    Sodium 147 (H) 136 - 145 mmol/L    Potassium reflex Magnesium 3.3 (L) 3.5 - 5.1 mmol/L    Chloride 109 99 - 110 mmol/L    CO2 24 21 - 32 mmol/L    Anion Gap 14 3 - 16    Glucose 138 (H) 70 - 99 mg/dL    BUN 10 7 - 20 mg/dL    CREATININE 0.8 (L) 0.9 - 1.3 mg/dL    GFR Non-African American >60 >60    GFR African American >60 >60    Calcium 9.6 8.3 - 10.6 mg/dL   CBC auto differential    Collection Time: 08/01/20  7:09 AM   Result Value Ref Range    WBC 7.4 4.0 - 11.0 K/uL    RBC 4.28 4.20 - 5.90 M/uL    Hemoglobin 11.8 (L) 13.5 - 17.5 g/dL    Hematocrit 35.3 (L) 40.5 - 52.5 %    MCV 82.5 80.0 - 100.0 fL    MCH 27.5 26.0 - 34.0 pg    MCHC 33.3 31.0 - 36.0 g/dL    RDW 16.6 (H) 12.4 - 15.4 %    Platelets 907 215 - 429 K/uL    MPV 7.7 5.0 - 10.5 fL    Neutrophils % 70.1 %    Lymphocytes % 22.2 %    Monocytes % 4.9 %    Eosinophils % 2.1 %    Basophils % 0.7 %    Neutrophils Absolute 5.2 1.7 - 7.7 K/uL    Lymphocytes Absolute 1.6 1.0 - 5.1 K/uL    Monocytes Absolute 0.4 0.0 - 1.3 K/uL    Eosinophils Absolute 0.2 0.0 - 0.6 K/uL    Basophils Absolute 0.1 0.0 - 0.2 K/uL Magnesium    Collection Time: 08/01/20  7:09 AM   Result Value Ref Range    Magnesium 1.90 1.80 - 2.40 mg/dL   Phosphorus    Collection Time: 08/01/20  7:09 AM   Result Value Ref Range    Phosphorus 2.8 2.5 - 4.9 mg/dL   POCT Glucose    Collection Time: 08/01/20  7:49 AM   Result Value Ref Range    POC Glucose 110 (H) 70 - 99 mg/dl    Performed on ACCU-CHEK    POCT Glucose    Collection Time: 08/01/20 11:42 AM   Result Value Ref Range    POC Glucose 152 (H) 70 - 99 mg/dl    Performed on ACCU-CHEK    Urine Reflex to Culture    Collection Time: 08/01/20  2:10 PM    Specimen: Urine, clean catch   Result Value Ref Range    Color, UA DK YELLOW Straw/Yellow    Clarity, UA CLOUDY (A) Clear    Glucose, Ur 250 (A) Negative mg/dL    Bilirubin Urine MODERATE (A) Negative    Ketones, Urine 15 (A) Negative mg/dL    Specific Gravity, UA 1.029 1.005 - 1.030    Blood, Urine Negative Negative    pH, UA 6.0 5.0 - 8.0    Protein, UA 30 (A) Negative mg/dL    Urobilinogen, Urine 1.0 <2.0 E.U./dL    Nitrite, Urine Negative Negative    Leukocyte Esterase, Urine SMALL (A) Negative    Microscopic Examination YES     Urine Type Other     Urine Reflex to Culture Yes    Microscopic Urinalysis    Collection Time: 08/01/20  2:10 PM   Result Value Ref Range    Hyaline Casts, UA 3-5 (A) 0 - 2 /LPF    Mucus, UA 2+ (A) None Seen /LPF    Crystals, UA Few Ca.  Oxalate (A) None Seen /HPF    WBC, UA 49 (H) 0 - 5 /HPF    RBC, UA 1 0 - 4 /HPF    Epithelial Cells, UA 7 (H) 0 - 5 /HPF   Culture, Urine    Collection Time: 08/01/20  2:10 PM    Specimen: Urine, clean catch   Result Value Ref Range    Urine Culture, Routine No growth at 18 to 36 hours    TSH with Reflex    Collection Time: 08/01/20  2:25 PM   Result Value Ref Range    TSH 0.80 0.27 - 4.20 uIU/mL   Vitamin B12 & Folate    Collection Time: 08/01/20  2:25 PM   Result Value Ref Range    Vitamin B-12 680 211 - 911 pg/mL    Folate 14.94 4.78 - 24.20 ng/mL   POCT Glucose    Collection Time: 08/01/20 4:28 PM   Result Value Ref Range    POC Glucose 407 (H) 70 - 99 mg/dl    Performed on ACCU-CHEK    POCT Glucose    Collection Time: 08/01/20  7:30 PM   Result Value Ref Range    POC Glucose 322 (H) 70 - 99 mg/dl    Performed on ACCU-CHEK    POCT Glucose    Collection Time: 08/01/20 10:10 PM   Result Value Ref Range    POC Glucose 246 (H) 70 - 99 mg/dl    Performed on ACCU-CHEK    POCT Glucose    Collection Time: 08/02/20  3:31 AM   Result Value Ref Range    POC Glucose 117 (H) 70 - 99 mg/dl    Performed on ACCU-CHEK    Basic Metabolic Panel w/ Reflex to MG    Collection Time: 08/02/20  5:26 AM   Result Value Ref Range    Sodium 148 (H) 136 - 145 mmol/L    Potassium reflex Magnesium 3.3 (L) 3.5 - 5.1 mmol/L    Chloride 108 99 - 110 mmol/L    CO2 24 21 - 32 mmol/L    Anion Gap 16 3 - 16    Glucose 115 (H) 70 - 99 mg/dL    BUN 9 7 - 20 mg/dL    CREATININE 0.9 0.9 - 1.3 mg/dL    GFR Non-African American >60 >60    GFR African American >60 >60    Calcium 9.6 8.3 - 10.6 mg/dL   CBC auto differential    Collection Time: 08/02/20  5:26 AM   Result Value Ref Range    WBC 7.8 4.0 - 11.0 K/uL    RBC 4.26 4.20 - 5.90 M/uL    Hemoglobin 11.9 (L) 13.5 - 17.5 g/dL    Hematocrit 35.2 (L) 40.5 - 52.5 %    MCV 82.7 80.0 - 100.0 fL    MCH 28.0 26.0 - 34.0 pg    MCHC 33.8 31.0 - 36.0 g/dL    RDW 16.7 (H) 12.4 - 15.4 %    Platelets 423 824 - 547 K/uL    MPV 7.4 5.0 - 10.5 fL    Neutrophils % 61.4 %    Lymphocytes % 29.2 %    Monocytes % 5.7 %    Eosinophils % 2.8 %    Basophils % 0.9 %    Neutrophils Absolute 4.8 1.7 - 7.7 K/uL    Lymphocytes Absolute 2.3 1.0 - 5.1 K/uL    Monocytes Absolute 0.4 0.0 - 1.3 K/uL    Eosinophils Absolute 0.2 0.0 - 0.6 K/uL    Basophils Absolute 0.1 0.0 - 0.2 K/uL   Magnesium    Collection Time: 08/02/20  5:26 AM   Result Value Ref Range    Magnesium 1.70 (L) 1.80 - 2.40 mg/dL   Phosphorus    Collection Time: 08/02/20  5:26 AM   Result Value Ref Range    Phosphorus 3.3 2.5 - 4.9 mg/dL   Potassium Collection Time: 08/02/20  5:26 AM   Result Value Ref Range    Potassium 3.3 (L) 3.5 - 5.1 mmol/L   POCT Glucose    Collection Time: 08/02/20  7:57 AM   Result Value Ref Range    POC Glucose 91 70 - 99 mg/dl    Performed on ACCU-CHEK    POCT Glucose    Collection Time: 08/02/20 11:42 AM   Result Value Ref Range    POC Glucose 118 (H) 70 - 99 mg/dl    Performed on ACCU-CHEK    POCT Glucose    Collection Time: 08/02/20  4:46 PM   Result Value Ref Range    POC Glucose 146 (H) 70 - 99 mg/dl    Performed on ACCU-CHEK    POCT Glucose    Collection Time: 08/02/20  8:11 PM   Result Value Ref Range    POC Glucose 151 (H) 70 - 99 mg/dl    Performed on ACCU-CHEK    POCT Glucose    Collection Time: 08/02/20 11:05 PM   Result Value Ref Range    POC Glucose 206 (H) 70 - 99 mg/dl    Performed on ACCU-CHEK    POCT Glucose    Collection Time: 08/03/20  4:08 AM   Result Value Ref Range    POC Glucose 105 (H) 70 - 99 mg/dl    Performed on ACCU-CHEK    Basic Metabolic Panel w/ Reflex to MG    Collection Time: 08/03/20  6:10 AM   Result Value Ref Range    Sodium 148 (H) 136 - 145 mmol/L    Potassium reflex Magnesium 3.1 (L) 3.5 - 5.1 mmol/L    Chloride 107 99 - 110 mmol/L    CO2 24 21 - 32 mmol/L    Anion Gap 17 (H) 3 - 16    Glucose 93 70 - 99 mg/dL    BUN 11 7 - 20 mg/dL    CREATININE 0.8 (L) 0.9 - 1.3 mg/dL    GFR Non-African American >60 >60    GFR African American >60 >60    Calcium 9.4 8.3 - 10.6 mg/dL   CBC auto differential    Collection Time: 08/03/20  6:10 AM   Result Value Ref Range    WBC 6.9 4.0 - 11.0 K/uL    RBC 4.57 4.20 - 5.90 M/uL    Hemoglobin 12.5 (L) 13.5 - 17.5 g/dL    Hematocrit 38.0 (L) 40.5 - 52.5 %    MCV 83.2 80.0 - 100.0 fL    MCH 27.5 26.0 - 34.0 pg    MCHC 33.0 31.0 - 36.0 g/dL    RDW 16.4 (H) 12.4 - 15.4 %    Platelets 543 485 - 630 K/uL    MPV 8.0 5.0 - 10.5 fL    Neutrophils % 56.0 %    Lymphocytes % 34.6 %    Monocytes % 4.9 %    Eosinophils % 3.5 %    Basophils % 1.0 %    Neutrophils Absolute 3.8 1.7 - 7.7 K/uL    Lymphocytes Absolute 2.4 1.0 - 5.1 K/uL    Monocytes Absolute 0.3 0.0 - 1.3 K/uL    Eosinophils Absolute 0.2 0.0 - 0.6 K/uL    Basophils Absolute 0.1 0.0 - 0.2 K/uL   Magnesium    Collection Time: 08/03/20  6:10 AM   Result Value Ref Range    Magnesium 1.80 1.80 - 2.40 mg/dL   Phosphorus    Collection Time: 08/03/20  6:10 AM   Result Value Ref Range    Phosphorus 3.8 2.5 - 4.9 mg/dL   POCT Glucose    Collection Time: 08/03/20  7:54 AM   Result Value Ref Range    POC Glucose 183 (H) 70 - 99 mg/dl    Performed on ACCU-CHEK    POCT Glucose    Collection Time: 08/03/20 11:45 AM   Result Value Ref Range    POC Glucose 217 (H) 70 - 99 mg/dl    Performed on ACCU-CHEK    POCT Glucose    Collection Time: 08/03/20  4:01 PM   Result Value Ref Range    POC Glucose 86 70 - 99 mg/dl    Performed on ACCU-CHEK    POCT Glucose    Collection Time: 08/03/20  8:04 PM   Result Value Ref Range    POC Glucose 256 (H) 70 - 99 mg/dl    Performed on ACCU-CHEK    POCT Glucose    Collection Time: 08/04/20 12:01 AM   Result Value Ref Range    POC Glucose 145 (H) 70 - 99 mg/dl    Performed on ACCU-CHEK    POCT Glucose    Collection Time: 08/04/20  4:05 AM   Result Value Ref Range    POC Glucose 47 (LL) 70 - 99 mg/dl    Performed on ACCU-CHEK    POCT Glucose    Collection Time: 08/04/20  4:12 AM   Result Value Ref Range    POC Glucose 46 (LL) 70 - 99 mg/dl    Performed on ACCU-CHEK    POCT Glucose    Collection Time: 08/04/20  4:24 AM   Result Value Ref Range    POC Glucose 55 (L) 70 - 99 mg/dl    Performed on ACCU-CHEK    POCT Glucose    Collection Time: 08/04/20  4:41 AM   Result Value Ref Range    POC Glucose 87 70 - 99 mg/dl    Performed on ACCU-CHEK    POCT Glucose    Collection Time: 08/04/20  5:02 AM   Result Value Ref Range    POC Glucose 92 70 - 99 mg/dl    Performed on ACCU-CHEK    Basic Metabolic Panel w/ Reflex to MG    Collection Time: 08/04/20  5:50 AM   Result Value Ref Range    Sodium 143 136 - 145 mmol/L    Potassium reflex Magnesium 3.3 (L) 3.5 - 5.1 mmol/L    Chloride 103 99 - 110 mmol/L    CO2 25 21 - 32 mmol/L    Anion Gap 15 3 - 16    Glucose 199 (H) 70 - 99 mg/dL    BUN 10 7 - 20 mg/dL    CREATININE 0.9 0.9 - 1.3 mg/dL    GFR Non-African American >60 >60    GFR African American >60 >60    Calcium 9.3 8.3 - 10.6 mg/dL   CBC auto differential    Collection Time: 08/04/20  5:50 AM   Result Value Ref Range    WBC 6.2 4.0 - 11.0 K/uL    RBC 4.02 (L) 4.20 - 5.90 M/uL    Hemoglobin 11.1 (L) 13.5 - 17.5 g/dL    Hematocrit 33.2 (L) 40.5 - 52.5 %    MCV 82.5 80.0 - 100.0 fL    MCH 27.7 26.0 - 34.0 pg    MCHC 33.5 31.0 - 36.0 g/dL    RDW 16.4 (H) 12.4 - 15.4 %    Platelets 317 277 - 883 K/uL    MPV 7.9 5.0 - 10.5 fL    Neutrophils % 66.2 %    Lymphocytes % 25.2 %    Monocytes % 4.5 %    Eosinophils % 3.3 %    Basophils % 0.8 %    Neutrophils Absolute 4.1 1.7 - 7.7 K/uL    Lymphocytes Absolute 1.6 1.0 - 5.1 K/uL    Monocytes Absolute 0.3 0.0 - 1.3 K/uL    Eosinophils Absolute 0.2 0.0 - 0.6 K/uL    Basophils Absolute 0.0 0.0 - 0.2 K/uL   Magnesium    Collection Time: 08/04/20  5:50 AM   Result Value Ref Range    Magnesium 1.70 (L) 1.80 - 2.40 mg/dL   Phosphorus    Collection Time: 08/04/20  5:50 AM   Result Value Ref Range    Phosphorus 3.9 2.5 - 4.9 mg/dL   POCT Glucose    Collection Time: 08/04/20  7:59 AM   Result Value Ref Range    POC Glucose 241 (H) 70 - 99 mg/dl    Performed on ACCU-CHEK    POCT Glucose    Collection Time: 08/04/20 11:17 AM   Result Value Ref Range    POC Glucose 137 (H) 70 - 99 mg/dl    Performed on ACCU-CHEK    Basic Metabolic Panel w/ Reflex to MG    Collection Time: 08/04/20 11:45 AM   Result Value Ref Range    Sodium 145 136 - 145 mmol/L    Potassium reflex Magnesium 4.7 3.5 - 5.1 mmol/L    Chloride 108 99 - 110 mmol/L    CO2 23 21 - 32 mmol/L    Anion Gap 14 3 - 16    Glucose 112 (H) 70 - 99 mg/dL    BUN 8 7 - 20 mg/dL    CREATININE 0.9 0.9 - 1.3 mg/dL    GFR Non-African American >60 >60    GFR  American >60 >60    Calcium 9.6 8.3 - 10.6 mg/dL   Magnesium    Collection Time: 08/04/20 11:45 AM   Result Value Ref Range    Magnesium 1.80 1.80 - 2.40 mg/dL   POCT Glucose    Collection Time: 08/04/20  4:02 PM   Result Value Ref Range    POC Glucose 352 (H) 70 - 99 mg/dl    Performed on ACCU-CHEK    POCT Glucose    Collection Time: 08/04/20 10:04 PM   Result Value Ref Range    POC Glucose 436 (H) 70 - 99 mg/dl    Performed on ACCU-CHEK    POCT Glucose    Collection Time: 08/04/20 11:49 PM   Result Value Ref Range    POC Glucose 291 (H) 70 - 99 mg/dl    Performed on ACCU-CHEK    POCT Glucose    Collection Time: 08/05/20  4:17 AM   Result Value Ref Range    POC Glucose 56 (L) 70 - 99 mg/dl    Performed on ACCU-CHEK    POCT Glucose    Collection Time: 08/05/20  5:01 AM   Result Value Ref Range    POC Glucose 126 (H) 70 - 99 mg/dl    Performed on ACCU-CHEK    POCT Glucose    Collection Time: 08/05/20  5:14 AM   Result Value Ref Range    POC Glucose 118 (H) 70 - 99 mg/dl    Performed on ACCU-CHEK    Basic Metabolic Panel w/ Reflex to MG    Collection Time: 08/05/20  5:45 AM   Result Value Ref Range    Sodium 144 136 - 145 mmol/L    Potassium reflex Magnesium 3.4 (L) 3.5 - 5.1 mmol/L    Chloride 106 99 - 110 mmol/L    CO2 27 21 - 32 mmol/L    Anion Gap 11 3 - 16    Glucose 97 70 - 99 mg/dL    BUN 7 7 - 20 mg/dL    CREATININE 0.9 0.9 - 1.3 mg/dL    GFR Non-African American >60 >60    GFR African American >60 >60    Calcium 9.4 8.3 - 10.6 mg/dL   CBC auto differential    Collection Time: 08/05/20  5:45 AM   Result Value Ref Range    WBC 6.3 4.0 - 11.0 K/uL    RBC 3.94 (L) 4.20 - 5.90 M/uL    Hemoglobin 10.8 (L) 13.5 - 17.5 g/dL    Hematocrit 32.6 (L) 40.5 - 52.5 %    MCV 82.7 80.0 - 100.0 fL    MCH 27.5 26.0 - 34.0 pg    MCHC 33.2 31.0 - 36.0 g/dL    RDW 16.7 (H) 12.4 - 15.4 %    Platelets 865 579 - 615 K/uL    MPV 8.0 5.0 - 10.5 fL    Neutrophils % 59.3 %    Lymphocytes % 30.8 %    Monocytes % 6.2 %    Eosinophils % 3.1 (HUMALOG) injection vial 0-12 Units  0-12 Units Subcutaneous Q4H ESTEPHANIA Amador CNP   6 Units at 08/05/20 0006    sodium chloride flush 0.9 % injection 10 mL  10 mL Intravenous 2 times per day ESTEPHANIA Amador CNP   10 mL at 08/05/20 0819    sodium chloride flush 0.9 % injection 10 mL  10 mL Intravenous PRN ESTEPHANIA Amador CNP        acetaminophen (TYLENOL) tablet 650 mg  650 mg Oral Q4H PRN Alexandria Willis MD   650 mg at 07/24/20 6787    Or    acetaminophen (TYLENOL) suppository 650 mg  650 mg Rectal Q4H PRN Alexandria Willis MD        polyethylene glycol [facility]) packet 17 g  17 g Oral Daily PRN Alexandria Willis MD   17 g at 08/04/20 1754    ondansetron (ZOFRAN) injection 4 mg  4 mg Intravenous Q4H PRN Alexandria Willis MD   4 mg at 08/01/20 1606    glucose (GLUTOSE) 40 % oral gel 15 g  15 g Oral PRN Alexandria Willis MD   15 g at 08/04/20 0427    dextrose 50 % IV solution  12.5 g Intravenous PRN Alexandria Willis MD   12.5 g at 08/05/20 0437    glucagon (rDNA) injection 1 mg  1 mg Intramuscular PRN Alexandria Willis MD        dextrose 5 % solution  100 mL/hr Intravenous PRN Alexandria Willis MD        enoxaparin (LOVENOX) injection 40 mg  40 mg Subcutaneous Daily Alexandria Willis MD   40 mg at 08/05/20 0817    pantoprazole (PROTONIX) injection 40 mg  40 mg Intravenous Daily ESTEPHANIA Amador CNP   40 mg at 08/05/20 8722    And    sodium chloride (PF) 0.9 % injection 10 mL  10 mL Intravenous Daily ESTEPHANIA Amador CNP   10 mL at 08/05/20 0818    magnesium sulfate 1 g in dextrose 5% 100 mL IVPB  1 g Intravenous PRN ESTEPHANIA Amador CNP   Stopped at 07/30/20 1502    potassium chloride 20 mEq/50 mL IVPB (Central Line)  20 mEq Intravenous PRN ESTEPHANIA Amador - CNP 50 mL/hr at 08/04/20 0939 20 mEq at 08/04/20 0939    sodium phosphate 11.07 mmol in dextrose 5 % 250 mL IVPB  0.16 mmol/kg Intravenous PRN ESTEPHANIA Amador CNP        Or    sodium phosphate 22.11 mmol in

## 2020-08-05 NOTE — PROGRESS NOTES
Physician Progress Note      Bhavani Henry  CSN #:                  [id number]  :                       1975  ADMIT DATE:       7/15/2020 8:56 PM  [address] [place] DATE:  RESPONDING  PROVIDER #:        Daxa BEST - CNP          QUERY TEXT:    Patient admitted with Hypoglycemia not sure if intentional poisoning from   Insulin and encephalopathy. If possible, please document in progress notes and   discharge summary if you are evaluating and /or treating any of the   following: The medical record reflects the following:  Risk Factors: DM Poor oral intake BMI 17.1  Clinical Indicators: Dietician consult 8/3 Malnutrition Status:  Severe   malnutrition  The following was documented by the Dietitian:  Malnutrition Assessment  Context of Malnutrition: Acute Illness (20 1038)  Acute Illness - Energy Intake : 7 - 50% or less of estimated energy   requirements for 5 or more days (20 1038)  Acute Illness - Weight Loss : 1 - 1% to 2% over 1 week (20 1038)  Acute Illness - Body Fat Loss: Unable to assess (20 1038)  Acute Illness - Muscle Mass Loss: Unable to assess (20 1038)  Acute Illness - Fluid Accumulation : No significant fluid accumulation   (20 1038)  Acute Illness -  Strength: Not Performed (20 1038)  Acute Illness - Malnutrition Score: 8 (20 1038)  Malnutrition Status: Severe malnutrition (20 1038)  Treatment: Dietician consult and dietary supplements TID, daily weights and   I&Os  Options provided:  -- Protein calorie malnutrition severe  -- Protein calorie malnutrition mild  -- Protein calorie malnutrition moderate  -- Other - I will add my own diagnosis  -- Disagree - Not applicable / Not valid  -- Disagree - Clinically unable to determine / Unknown  -- Refer to Clinical Documentation Reviewer    PROVIDER RESPONSE TEXT:    This patient has mild protein calorie malnutrition.     Query created by: Phylicia Lau on 2020 12:38 PM      Electronically signed by:  Ramy Blue CNP 8/5/2020 1:03 PM

## 2020-08-05 NOTE — PROGRESS NOTES
Hospital Medicine Progress Note      Admit Date: 7/15/2020       CC: F/U for altered mental status    HPI: 24-year-old M Hx poorly controlled DM who was admitted unresponsive at home with glucose of 22. He apparently injected himself with abundant amount of insulin prior to arrival. Jerri Ott was reported to have accidental versus intentional overdose of insulin. He was intubated. He Self extubated to 2 L nasal cannula by coughing.                      Patient was evaluated by neurology and no obvious etiology was found  EEG is not consistent with seizure disorder.  No significant findings on CSF. Patient's respiratory status improved.  Patient is considered to be possibly PRES. Nurses report patient's behavior as aggressive and agitated at times.  Psychiatric services consulted     MRI 7/16: subtle bilateral frontoparietal cortical/subcortical signal abnormality  MRI from 2/24/20 at : T2 FLAIR abnormality in bilat posterior frontal, parietal and occipital lobes with associated volume loss favoring areas of encephalomalacia and gliosis related to remote insult. Volume loss progression from 2006. Atypical for PRES.     7/31 patient was given an Ativan challenge.  Approximately 6 hours after he received Ativan the patient woke up was eating with the family he did not receive any more medication that evening and was again in a catatonic state by midnight.     8/1 patient was given 2 mg of Ativan at 1030.  At 130 he woke up he is alert and talking. Jerri Ott ate his entire lunch.  scheduled Ativan  of 1 mg every 6.  The patient is walking around the room she brought him vegetables.     8/3: Lying in bed, keeps pulling covers over head, does not engage or answer any questions.  Does not really follow commands for exam.  Discussed with Dr. Frank Gamboa who thinks this is likely catatonic delirium.  Has seen ambien nightly with results, so will try that and then possibly go up on ativan if not.  will start rocephin for small Soft, non-tender, non-distended, without rebound or guarding. Normal bowel sounds. Musculoskeletal: HUNTER x4 spontaneously and moves about in bed independently, pulls covers over head; No clubbing, cyanosis or edema bilaterally. Full range of motion without deformity. Skin: Skin color, texture, turgor normal.  No rashes or lesions. Neurologic: ORLANDO, no facial asymmetry, tongue midline. Psychiatric:  Alert but does not answer orientation questions, answered \"ok\" only to how he was. Does not engage but repositions self in bed, pulling covers over head during my exam.   Capillary Refill: Brisk,< 3 seconds   Peripheral Pulses: +2 palpable, equal bilaterally     LABS:    Lab Results   Component Value Date    WBC 6.3 08/05/2020    HGB 10.8 (L) 08/05/2020    HCT 32.6 (L) 08/05/2020    MCV 82.7 08/05/2020     08/05/2020    LYMPHOPCT 30.8 08/05/2020    RBC 3.94 (L) 08/05/2020    MCH 27.5 08/05/2020    MCHC 33.2 08/05/2020    RDW 16.7 (H) 08/05/2020       Lab Results   Component Value Date    CREATININE 0.9 08/05/2020    BUN 7 08/05/2020     08/05/2020    K 3.4 (L) 08/05/2020     08/05/2020    CO2 27 08/05/2020       Lab Results   Component Value Date    MG 1.90 08/05/2020       Lab Results   Component Value Date    ALT 8 (L) 07/16/2020    AST 17 07/16/2020    ALKPHOS 77 07/16/2020    BILITOT <0.2 07/16/2020        No flowsheet data found. Lab Results   Component Value Date    LABA1C 10.3 05/29/2020       Imaging:  CT HEAD WO CONTRAST   Final Result   No acute intracranial abnormality. Mild cerebral atrophy. XR CHEST PORTABLE   Final Result   No acute findings. NG tube with tip in the stomach. XR CHEST 1 VW   Final Result   Nasogastric tube projects in normal position. No acute cardiopulmonary disease.          MRI BRAIN WO CONTRAST   Final Result   Subtle bilateral frontoparietal cortical and subcortical signal abnormality   suggesting mild posterior reversal encephalopathy does not continue to improve         Agitation and aggressive behavior  Psychiatric following  - D/C zyprexa  - ambien and ativan challenge  - restarted depakote 500mg bid 8/5        Diabetes mellitus  Severe hypoglycemia on admission-resolved  Insulin overdose-unsure if accidental or intentional     Continue ISS     Hypertension  Continue meds     Weightloss  Initial wt 152lbs today 136lbs  -Nutritional consult  -Daily weights  - replace electrolytes per protocol  - consult Nutrition for poor PO intake     Bacteruria  - rocephin IV started 8/3 with delirium     Continue current regimen/therapies. Monitor. Adjust medical regimen as appropriate. Body mass index is 17.12 kg/m². The patient and / or the family were informed of the results of any tests, a time was given to answer questions, a plan was proposed and they agreed with plan. DVT ppx: lovenox      Diet: DIET DENTAL SOFT;  Dietary Nutrition Supplements: Diabetic Oral Supplement  Dietary Nutrition Supplements: Frozen Oral Supplement    Consults:  IP CONSULT TO PULMONOLOGY  IP CONSULT TO NEUROLOGY  IP CONSULT TO DIETITIAN  IP CONSULT TO PSYCHIATRY  IP CONSULT TO DIETITIAN  IP CONSULT TO DIETITIAN    DISPO/placement plan: pending. May need psych admission and transfer if no improvement.      Code Status: Full Code      ESTEPHANIA Choi - SAVANA  08/05/20

## 2020-08-05 NOTE — PROGRESS NOTES
Pt at the beginning of shift was walking around with sitter had to be directed back to room not following direction unable to answer any assessment questions has a flat effect pt resting in bed unable to give suppository due to pt unable to comprehend. Pt was asleep when woke up for treatment pt made several times to leave the room but was easily directed back to room and went back to bed. Will continue to monitor.

## 2020-08-06 ENCOUNTER — TELEPHONE (OUTPATIENT)
Dept: PSYCHIATRY | Age: 45
End: 2020-08-06

## 2020-08-06 ENCOUNTER — APPOINTMENT (OUTPATIENT)
Dept: GENERAL RADIOLOGY | Age: 45
DRG: 812 | End: 2020-08-06
Payer: MEDICAID

## 2020-08-06 LAB
ANION GAP SERPL CALCULATED.3IONS-SCNC: 11 MMOL/L (ref 3–16)
BASOPHILS ABSOLUTE: 0 K/UL (ref 0–0.2)
BASOPHILS RELATIVE PERCENT: 0.7 %
BILIRUBIN URINE: NEGATIVE
BLOOD, URINE: NEGATIVE
BUN BLDV-MCNC: 8 MG/DL (ref 7–20)
CALCIUM SERPL-MCNC: 9.4 MG/DL (ref 8.3–10.6)
CHLORIDE BLD-SCNC: 105 MMOL/L (ref 99–110)
CLARITY: CLEAR
CO2: 28 MMOL/L (ref 21–32)
COLOR: YELLOW
CREAT SERPL-MCNC: 0.7 MG/DL (ref 0.9–1.3)
EOSINOPHILS ABSOLUTE: 0.2 K/UL (ref 0–0.6)
EOSINOPHILS RELATIVE PERCENT: 3.4 %
GFR AFRICAN AMERICAN: >60
GFR NON-AFRICAN AMERICAN: >60
GLUCOSE BLD-MCNC: 233 MG/DL (ref 70–99)
GLUCOSE BLD-MCNC: 253 MG/DL (ref 70–99)
GLUCOSE BLD-MCNC: 263 MG/DL (ref 70–99)
GLUCOSE BLD-MCNC: 319 MG/DL (ref 70–99)
GLUCOSE BLD-MCNC: 451 MG/DL (ref 70–99)
GLUCOSE BLD-MCNC: 48 MG/DL (ref 70–99)
GLUCOSE URINE: >=1000 MG/DL
HCT VFR BLD CALC: 36.3 % (ref 40.5–52.5)
HEMOGLOBIN: 11.9 G/DL (ref 13.5–17.5)
KETONES, URINE: ABNORMAL MG/DL
LEUKOCYTE ESTERASE, URINE: NEGATIVE
LYMPHOCYTES ABSOLUTE: 2.9 K/UL (ref 1–5.1)
LYMPHOCYTES RELATIVE PERCENT: 42.1 %
MAGNESIUM: 2 MG/DL (ref 1.8–2.4)
MCH RBC QN AUTO: 27.3 PG (ref 26–34)
MCHC RBC AUTO-ENTMCNC: 32.9 G/DL (ref 31–36)
MCV RBC AUTO: 83 FL (ref 80–100)
MICROSCOPIC EXAMINATION: ABNORMAL
MONOCYTES ABSOLUTE: 0.4 K/UL (ref 0–1.3)
MONOCYTES RELATIVE PERCENT: 5.4 %
NEUTROPHILS ABSOLUTE: 3.3 K/UL (ref 1.7–7.7)
NEUTROPHILS RELATIVE PERCENT: 48.4 %
NITRITE, URINE: NEGATIVE
PDW BLD-RTO: 16.2 % (ref 12.4–15.4)
PERFORMED ON: ABNORMAL
PH UA: 5.5 (ref 5–8)
PHOSPHORUS: 2.7 MG/DL (ref 2.5–4.9)
PLATELET # BLD: 299 K/UL (ref 135–450)
PMV BLD AUTO: 8.4 FL (ref 5–10.5)
POTASSIUM REFLEX MAGNESIUM: 3.9 MMOL/L (ref 3.5–5.1)
PROTEIN UA: NEGATIVE MG/DL
RBC # BLD: 4.37 M/UL (ref 4.2–5.9)
SODIUM BLD-SCNC: 144 MMOL/L (ref 136–145)
SPECIFIC GRAVITY UA: 1.03 (ref 1–1.03)
URINE REFLEX TO CULTURE: ABNORMAL
URINE TYPE: ABNORMAL
UROBILINOGEN, URINE: 0.2 E.U./DL
WBC # BLD: 6.8 K/UL (ref 4–11)

## 2020-08-06 PROCEDURE — 6360000002 HC RX W HCPCS: Performed by: INTERNAL MEDICINE

## 2020-08-06 PROCEDURE — 1200000000 HC SEMI PRIVATE

## 2020-08-06 PROCEDURE — 99231 SBSQ HOSP IP/OBS SF/LOW 25: CPT | Performed by: PSYCHIATRY & NEUROLOGY

## 2020-08-06 PROCEDURE — 81003 URINALYSIS AUTO W/O SCOPE: CPT

## 2020-08-06 PROCEDURE — C9113 INJ PANTOPRAZOLE SODIUM, VIA: HCPCS | Performed by: NURSE PRACTITIONER

## 2020-08-06 PROCEDURE — 6370000000 HC RX 637 (ALT 250 FOR IP): Performed by: NURSE PRACTITIONER

## 2020-08-06 PROCEDURE — 2500000003 HC RX 250 WO HCPCS: Performed by: INTERNAL MEDICINE

## 2020-08-06 PROCEDURE — 2580000003 HC RX 258: Performed by: NURSE PRACTITIONER

## 2020-08-06 PROCEDURE — 85025 COMPLETE CBC W/AUTO DIFF WBC: CPT

## 2020-08-06 PROCEDURE — 6360000002 HC RX W HCPCS: Performed by: NURSE PRACTITIONER

## 2020-08-06 PROCEDURE — 6360000002 HC RX W HCPCS: Performed by: PSYCHIATRY & NEUROLOGY

## 2020-08-06 PROCEDURE — 83735 ASSAY OF MAGNESIUM: CPT

## 2020-08-06 PROCEDURE — 36415 COLL VENOUS BLD VENIPUNCTURE: CPT

## 2020-08-06 PROCEDURE — 84100 ASSAY OF PHOSPHORUS: CPT

## 2020-08-06 PROCEDURE — 80048 BASIC METABOLIC PNL TOTAL CA: CPT

## 2020-08-06 RX ORDER — INSULIN GLARGINE 100 [IU]/ML
8 INJECTION, SOLUTION SUBCUTANEOUS NIGHTLY
Status: DISCONTINUED | OUTPATIENT
Start: 2020-08-06 | End: 2020-08-08

## 2020-08-06 RX ORDER — DIVALPROEX SODIUM 125 MG/1
500 CAPSULE, COATED PELLETS ORAL 2 TIMES DAILY
Status: DISCONTINUED | OUTPATIENT
Start: 2020-08-06 | End: 2020-08-11 | Stop reason: HOSPADM

## 2020-08-06 RX ORDER — ZIPRASIDONE MESYLATE 20 MG/ML
10 INJECTION, POWDER, LYOPHILIZED, FOR SOLUTION INTRAMUSCULAR EVERY 12 HOURS PRN
Status: DISCONTINUED | OUTPATIENT
Start: 2020-08-06 | End: 2020-08-11 | Stop reason: HOSPADM

## 2020-08-06 RX ADMIN — CEFTRIAXONE 1 G: 1 INJECTION, POWDER, FOR SOLUTION INTRAMUSCULAR; INTRAVENOUS at 14:51

## 2020-08-06 RX ADMIN — LORAZEPAM 1 MG: 2 INJECTION INTRAMUSCULAR; INTRAVENOUS at 11:25

## 2020-08-06 RX ADMIN — ENOXAPARIN SODIUM 40 MG: 40 INJECTION SUBCUTANEOUS at 10:40

## 2020-08-06 RX ADMIN — INSULIN LISPRO 3 UNITS: 100 INJECTION, SOLUTION INTRAVENOUS; SUBCUTANEOUS at 17:40

## 2020-08-06 RX ADMIN — INSULIN LISPRO 6 UNITS: 100 INJECTION, SOLUTION INTRAVENOUS; SUBCUTANEOUS at 04:07

## 2020-08-06 RX ADMIN — Medication 10 ML: at 11:26

## 2020-08-06 RX ADMIN — SODIUM CHLORIDE, PRESERVATIVE FREE 10 ML: 5 INJECTION INTRAVENOUS at 21:52

## 2020-08-06 RX ADMIN — PANTOPRAZOLE SODIUM 40 MG: 40 INJECTION, POWDER, FOR SOLUTION INTRAVENOUS at 11:26

## 2020-08-06 RX ADMIN — INSULIN LISPRO 4 UNITS: 100 INJECTION, SOLUTION INTRAVENOUS; SUBCUTANEOUS at 11:53

## 2020-08-06 RX ADMIN — INSULIN GLARGINE 8 UNITS: 100 INJECTION, SOLUTION SUBCUTANEOUS at 21:49

## 2020-08-06 RX ADMIN — LORAZEPAM 1 MG: 2 INJECTION INTRAMUSCULAR; INTRAVENOUS at 17:42

## 2020-08-06 RX ADMIN — OLANZAPINE 5 MG: 10 INJECTION, POWDER, FOR SOLUTION INTRAMUSCULAR at 08:07

## 2020-08-06 RX ADMIN — LORAZEPAM 1 MG: 2 INJECTION INTRAMUSCULAR; INTRAVENOUS at 21:49

## 2020-08-06 RX ADMIN — SODIUM CHLORIDE, PRESERVATIVE FREE 10 ML: 5 INJECTION INTRAVENOUS at 11:26

## 2020-08-06 ASSESSMENT — PAIN SCALES - GENERAL
PAINLEVEL_OUTOF10: 0
PAINLEVEL_OUTOF10: 0

## 2020-08-06 NOTE — PROGRESS NOTES
Appetite poor though out the shift despite multiple encouragement.  Electronically signed by Gurwinder Joe RN on 8/6/2020 at 7:20 PM

## 2020-08-06 NOTE — TELEPHONE ENCOUNTER
[facility] calling regarding pt   Pt is in hospital, they are requesting Dr Yennifer Trent call

## 2020-08-06 NOTE — PROGRESS NOTES
Hospital Medicine Progress Note      Admit Date: 7/15/2020       CC: F/U for altered mental status    HPI: 70-year-old M Hx poorly controlled DM who was admitted unresponsive at home with glucose of 22. He apparently injected himself with abundant amount of insulin prior to arrival. Alessandro Swan was reported to have accidental versus intentional overdose of insulin. He was intubated. He Self extubated to 2 L nasal cannula by coughing.                      Patient was evaluated by neurology and no obvious etiology was found  EEG is not consistent with seizure disorder.  No significant findings on CSF. Patient's respiratory status improved.  Patient is considered to be possibly PRES. Nurses report patient's behavior as aggressive and agitated at times.  Psychiatric services consulted     MRI 7/16: subtle bilateral frontoparietal cortical/subcortical signal abnormality  MRI from 2/24/20 at : T2 FLAIR abnormality in bilat posterior frontal, parietal and occipital lobes with associated volume loss favoring areas of encephalomalacia and gliosis related to remote insult.  Volume loss progression from 2006. Atypical for PRES.     7/31 patient was given an Ativan challenge.  Approximately 6 hours after he received Ativan the patient woke up was eating with the family he did not receive any more medication that evening and was again in a catatonic state by midnight.     8/1 patient was given 2 mg of Ativan at 1030.  At 130 he woke up he is alert and talking. Alessandro Swan ate his entire lunch.  scheduled Ativan  of 1 mg every 6.  The patient is walking around the room she brought him vegetables.     8/3: Lying in bed, keeps pulling covers over head, does not engage or answer any questions.  Does not really follow commands for exam.  Discussed with Dr. Ruth Harden who thinks this is likely catatonic delirium.  Has seen ambien nightly with results, so will try that and then possibly go up on ativan if not.  will start rocephin for small leuks in u/a for any possible urinary inflammation as etiology of confusion     Started on Doneen Mouse and ativan challenge with Psych. Continues to eat very little and had one episode of hypoglycemia today. No seizure activity.      8/4: Persistent encephalopathy. EEG was slow and disorganized. CSF unrevealing.  Missed neuro appts at [facility]. Normal EEG in Feb. Likely underlying Psych component. Exam improved with ativan challenge. Clinically improving.     Refusing some oral meds including ambien but RN able to crush and give in applesauce       Dietitian met with patient re poor intake/appetite. Supplements on board. Pt has been improving with intakes as staff feeds him and encouraged to feed at all meals. Encouraged glucerna drinks at every meal. They will see again on Friday if he's still here.      Much more alert today and last night. Reports by RN that he was smiling, talking some and giving hugs and kisses to family in the room. Stated this morning to RN, \"I am happy today. \"  Smiling and tolerating food with assitance . Some expressive aphasia still noted.      Patient is still the same for me thought, with not engaging, rolls over and repositions self in the bed and has purposeful movements. Does not talk at all today or answer questions. Sitter confirms he is not talking much. Makes no eye contact     Restarted depakote 500mg bid and will cont to monitor with ativan and ambien challenge to watch for improvement. Will get urine again soon to re-eval.      May need in-patient Psych if no improvement.      Interval History/Subjective: discussed patient with Dr. Noemi Roy who started Geodon and DC'd Zyprexa. Zyprexa and Ativan not recommended together. Hopefully this helps. We will attempt to get another UA if able, in case this is renal related. Patient still on Rocephin for bacteriuria. Met with Timo Mendoza, diabetic educator regarding adjustments to insulin for better glucose control.   Will restart Lantus at higher dose since patient is refusing Accu-Cheks and insulin, will schedule prandial insulin on top of as needed Humalog for larger meals. Hopefully the Lantus will keep him better controlled for longer, to avoid DKA. Once this is better controlled, he would be more suited for inpatient psych. Otherwise he is stable medically. Review of Systems:       The patient denied headaches, visual changes, LOC, SOB, CP, ABD pain, N/V/D, skin changes, new or worsening weakness or neuromuscular deficits. Comprehensive ROS negative except as mentioned above. Past Medical History:        Diagnosis Date    Diabetes mellitus ([facility] [address]     Gastroparesis     Hypertension        Past Surgical History:        Procedure Laterality Date    BONY PELVIS SURGERY      FOOT AMPUTATION Right 5/13/2020    EMERGENCY; RIGHT INCISION AND DEBRIDEMENT; HALUX AMPUTATION performed by Gayla Perry DPM at [address] 2006    pins and rods- plate    UPPER GASTROINTESTINAL ENDOSCOPY N/A 12/2/2019    EGD BIOPSY performed by Nanette Ayoub MD at [facility]:  Ketorolac; Morphine; Morphine and related; Tramadol; Lisinopril; Haloperidol lactate; Toradol [ketorolac tromethamine]; and Vicodin [hydrocodone-acetaminophen]    Past medical and surgical history reviewed. Any changes have been noted. PHYSICAL EXAM:  BP (!) 83/48 Comment: nurse notified  Pulse 74   Temp 98 °F (36.7 °C) (Axillary)   Resp 14   Ht 6' 2\" (1.88 m)   Wt 138 lb 14.2 oz (63 kg)   SpO2 98%   BMI 17.83 kg/m²       Intake/Output Summary (Last 24 hours) at 8/6/2020 0930  Last data filed at 8/6/2020 0127  Gross per 24 hour   Intake 540 ml   Output 800 ml   Net -260 ml      General appearance:   No apparent distress, appears stated age. Cooperative. Mumbled \"ok. \" for me today when asked how he is. HEENT:  Normocephalic, atraumatic.  PERRLA.  Doesn't follow commands to check EOM; uncooperative;  Conjunctivae/corneas clear, no icterus, non-injected. Neck: Supple, with full range of motion. No jugular venous distention. Trachea midline. Respiratory:  Normal respiratory effort. Clear to auscultation, bilaterally without Rales/Wheezes/Rhonchi. Cardiovascular:  Regular rate and rhythm without murmurs, rubs or gallops. Abdomen: Soft, non-tender, non-distended, without rebound or guarding. Normal bowel sounds. Musculoskeletal: HARRISON x4 spontaneously and moves about in bed independently, pulls covers over head; turned self over onto stomach  Skin: Skin color, texture, turgor normal.  No rashes or lesions. Neurologic: ORLANDO, no facial asymmetry, ton; onur midline. Psychiatric:  does not engage with staff Alert but does not answer orientation questions, answered \"ok\" only to how he was. repositions self in bed, pulling covers over head during my exam.  Agitation  Capillary Refill: Brisk,< 3 seconds   Peripheral Pulses: +2 palpable, equal bilaterally      LABS:    Lab Results   Component Value Date    WBC 6.8 08/06/2020    HGB 11.9 (L) 08/06/2020    HCT 36.3 (L) 08/06/2020    MCV 83.0 08/06/2020     08/06/2020    LYMPHOPCT 42.1 08/06/2020    RBC 4.37 08/06/2020    MCH 27.3 08/06/2020    MCHC 32.9 08/06/2020    RDW 16.2 (H) 08/06/2020       Lab Results   Component Value Date    CREATININE 0.7 (L) 08/06/2020    BUN 8 08/06/2020     08/06/2020    K 3.9 08/06/2020     08/06/2020    CO2 28 08/06/2020       Lab Results   Component Value Date    MG 2.00 08/06/2020       Lab Results   Component Value Date    ALT 8 (L) 07/16/2020    AST 17 07/16/2020    ALKPHOS 77 07/16/2020    BILITOT <0.2 07/16/2020        No flowsheet data found. Lab Results   Component Value Date    LABA1C 10.3 05/29/2020       Imaging:  CT HEAD WO CONTRAST   Final Result   No acute intracranial abnormality. Mild cerebral atrophy. XR CHEST PORTABLE   Final Result   No acute findings. NG tube with tip in the stomach.          XR CHEST 1 VW Final Result   Nasogastric tube projects in normal position. No acute cardiopulmonary disease. MRI BRAIN WO CONTRAST   Final Result   Subtle bilateral frontoparietal cortical and subcortical signal abnormality   suggesting mild posterior reversal encephalopathy syndrome. No acute infarct   or hemorrhage. XR CHEST PORTABLE   Final Result   No acute cardiac or pulmonary disease. ET tube 7 cm above the jose carlos. NG side-port at the GE junction. XR CHEST PORTABLE   Final Result   The tip of the endotracheal tube is slightly high in position 9 cm above the   jose carlos. The OG/NG tube should be advanced 10 cm. The lungs are clear. CT Head WO Contrast   Final Result   No acute intracranial abnormality. Paranasal sinusitis. XR CHEST PORTABLE   Final Result   No acute cardiopulmonary disease.              Scheduled and prn Medications:    Scheduled Meds:   insulin lispro  0-12 Units Subcutaneous TID WC    insulin lispro  0-6 Units Subcutaneous Nightly    divalproex  500 mg Oral BID    LORazepam  1 mg Intravenous Q6H    zolpidem  10 mg Oral Daily    cefTRIAXone (ROCEPHIN) IV  1 g Intravenous Q24H    losartan  100 mg Oral Daily    sodium chloride flush  10 mL Intravenous 2 times per day    enoxaparin  40 mg Subcutaneous Daily    pantoprazole  40 mg Intravenous Daily    And    sodium chloride (PF)  10 mL Intravenous Daily     Continuous Infusions:   dextrose       PRN Meds:.potassium chloride **OR** potassium alternative oral replacement **OR** potassium chloride, OLANZapine, sodium chloride flush, acetaminophen **OR** acetaminophen, polyethylene glycol, ondansetron, glucose, dextrose, glucagon (rDNA), dextrose, magnesium sulfate, potassium chloride, sodium phosphate IVPB **OR** sodium phosphate IVPB    Assessment & Plan:        Persistent metabolic encephalopathy  -psychiatry and neurology following  -MRI brain ordered-unable to obtain  -Prior workup including MRI, EEG and CSF has been unrevealing  -Ativan was held overnight secondary to low blood pressure, but since BP ok, was restarted with some results. - increased dose for ativan challenge per Psych  - ambien challenge  - transfer for cvEEG if does not continue to improve         Agitation and aggressive behavior  Psychiatric following  - D/C zyprexa  - ambien and ativan challenge  - restarted depakote 500mg bid 8/5        Diabetes mellitus  Severe hypoglycemia on admission-resolved  Insulin overdose-unsure if accidental or intentional     Continue ISS     Hypertension  Continue meds     Weightloss  Initial wt 152lbs today 136lbs  -Nutritional consult  -Daily weights  - replace electrolytes per protocol  - consult Nutrition for poor PO intake     Bacteruria  - rocephin IV started 8/3 with delirium     Continue current regimen/therapies. Monitor. Adjust medical regimen as appropriate. Body mass index is 17.83 kg/m². The patient and / or the family were informed of the results of any tests, a time was given to answer questions, a plan was proposed and they agreed with plan. DVT ppx: Lovenox      Diet: DIET DENTAL SOFT;  Dietary Nutrition Supplements: Diabetic Oral Supplement  Dietary Nutrition Supplements: Frozen Oral Supplement    Consults:  IP CONSULT TO PULMONOLOGY  IP CONSULT TO NEUROLOGY  IP CONSULT TO DIETITIAN  IP CONSULT TO PSYCHIATRY  IP CONSULT TO DIETITIAN  IP CONSULT TO DIETITIAN    DISPO/placement plan: Pending. May need psych admission and transfer once medically cleared.     Code Status: Full Code      ESTEPHANIA Herrera - SAVANA  08/06/20

## 2020-08-06 NOTE — PROGRESS NOTES
Patient is slightly calmer and more cooperative with much encouragement. He says \"what? \" when called by name; and responds \"okay\" when asked to do something. However, when it gets to the action, patient would refuse as he would hide his hands again when needed to perform finger sticks and blood pressure checks. It requires much encouragement to perform routine care.  Electronically signed by Ochoa Case RN

## 2020-08-06 NOTE — CARE COORDINATION
[address]  Glucose Variability       NAME: Tello [facility] [address] RECORD NUMBER:  [id number]  AGE: 50 y.o. GENDER: male  : 1975  EPISODE DATE:  2020     Data     Recent Labs     20  0913 20  1204 20  1650 20  0136 20  0359 20  1032   POCGLU 124* 267* 306* 451* 263* 253*     Total doses of insulin =  42 units,  24 units, 8/3 14 units,  26 units     Receptiveness to POCT glucose monitoring and meal intake is intermittent with episodes of refusal and combativeness. Humalog every 4 hours not able to be fully implemented. Nursing reports that Colby frequently refuses meals but yesterday ate food from home including cake. Medications  Scheduled Medications:   insulin lispro  0-12 Units Subcutaneous TID WC    insulin lispro  0-6 Units Subcutaneous Nightly    divalproex  500 mg Oral BID    LORazepam  1 mg Intravenous Q6H    zolpidem  10 mg Oral Daily    cefTRIAXone (ROCEPHIN) IV  1 g Intravenous Q24H    sodium chloride flush  10 mL Intravenous 2 times per day    enoxaparin  40 mg Subcutaneous Daily    pantoprazole  40 mg Intravenous Daily    And    sodium chloride (PF)  10 mL Intravenous Daily       Diet  Current diet/supplement order: DIET DENTAL SOFT;  Dietary Nutrition Supplements: Diabetic Oral Supplement  Dietary Nutrition Supplements: Frozen Oral Supplement     Recorded PO: PO Meals Eaten (%): 26 - 50% last meal in flowsheets PO Supplement (%): 0       Action      Physician/APRN Notified of event: Yes   ESTEPHANIA Car     Recommend conservative Lantus dose 8 units. Low correction scale ac and hs. Humalog prandial 2 - 3 units for 50% of meal.  Ordering the prandial dose prn may help with coordination with erratic eating habits.       Electronically signed by Hui Casey RN on 2020 at 10:59 AM

## 2020-08-06 NOTE — PROGRESS NOTES
Sent secure message to MD Herring about patient's low BP this morning. BP obtained both with Dynamap and manually. Patient asymptomatic. MD asked that we get his BP again in 2 hours.

## 2020-08-06 NOTE — PROGRESS NOTES
Psych Consult Progress Note    08/06/20      Mel Butcher  [id number]  Chief Complaint   Patient presents with    Hypoglycemia     Found unresponsive by girlfriend. Per EMS, pt had blood sugar of \"22 on arrival and was given IM glucagon\". EMS reports blood sugar \"went up to 59\". Pt responsive to pain at this time. I have reviewed recent documentation. Mel Butcher is a 50 y.o. male  With  has a past medical history of Diabetes mellitus ([address] Gastroparesis, and Hypertension. Subjective/Interval Hx:  More agitated and aggressive at times in last 24 hours. Refusing FBS. Required zyprexa this am.  Seems to have helped, we were able to get sugars. Quality:  Altered ms  Severity:  Severe   Duration:  Days to weeks  Context:  As above.   Location:  Brain    Objective:  Vitals:    08/06/20 1021   BP: 109/62   Pulse: 67   Resp: 16   Temp: 97.7 °F (36.5 °C)   SpO2:      Recent Results (from the past 168 hour(s))   POCT Glucose    Collection Time: 07/30/20 11:20 AM   Result Value Ref Range    POC Glucose 152 (H) 70 - 99 mg/dl    Performed on ACCU-CHEK    POCT Glucose    Collection Time: 07/30/20  5:03 PM   Result Value Ref Range    POC Glucose >600 (AA) 70 - 99 mg/dl    Performed on ACCU-CHEK    POCT Glucose    Collection Time: 07/30/20  5:05 PM   Result Value Ref Range    POC Glucose 173 (H) 70 - 99 mg/dl    Performed on ACCU-CHEK    POCT Glucose    Collection Time: 07/30/20  8:26 PM   Result Value Ref Range    POC Glucose 104 (H) 70 - 99 mg/dl    Performed on ACCU-CHEK    POCT Glucose    Collection Time: 07/31/20  1:30 AM   Result Value Ref Range    POC Glucose 151 (H) 70 - 99 mg/dl    Performed on ACCU-CHEK    POCT Glucose    Collection Time: 07/31/20  5:32 AM   Result Value Ref Range    POC Glucose 170 (H) 70 - 99 mg/dl    Performed on ACCU-CHEK    Basic Metabolic Panel w/ Reflex to MG    Collection Time: 07/31/20  5:45 AM   Result Value Ref Range    Sodium 149 (H) 136 - 145 mmol/L    Potassium reflex Magnesium 3.8 3.5 - 5.1 mmol/L    Chloride 111 (H) 99 - 110 mmol/L    CO2 21 21 - 32 mmol/L    Anion Gap 17 (H) 3 - 16    Glucose 171 (H) 70 - 99 mg/dL    BUN 10 7 - 20 mg/dL    CREATININE 0.7 (L) 0.9 - 1.3 mg/dL    GFR Non-African American >60 >60    GFR African American >60 >60    Calcium 9.4 8.3 - 10.6 mg/dL   CBC auto differential    Collection Time: 07/31/20  5:45 AM   Result Value Ref Range    WBC 7.8 4.0 - 11.0 K/uL    RBC 4.17 (L) 4.20 - 5.90 M/uL    Hemoglobin 11.7 (L) 13.5 - 17.5 g/dL    Hematocrit 34.7 (L) 40.5 - 52.5 %    MCV 83.2 80.0 - 100.0 fL    MCH 28.0 26.0 - 34.0 pg    MCHC 33.6 31.0 - 36.0 g/dL    RDW 16.3 (H) 12.4 - 15.4 %    Platelets 857 446 - 115 K/uL    MPV 7.3 5.0 - 10.5 fL    Neutrophils % 64.3 %    Lymphocytes % 26.5 %    Monocytes % 5.1 %    Eosinophils % 2.8 %    Basophils % 1.3 %    Neutrophils Absolute 5.0 1.7 - 7.7 K/uL    Lymphocytes Absolute 2.1 1.0 - 5.1 K/uL    Monocytes Absolute 0.4 0.0 - 1.3 K/uL    Eosinophils Absolute 0.2 0.0 - 0.6 K/uL    Basophils Absolute 0.1 0.0 - 0.2 K/uL   Magnesium    Collection Time: 07/31/20  5:45 AM   Result Value Ref Range    Magnesium 1.70 (L) 1.80 - 2.40 mg/dL   Phosphorus    Collection Time: 07/31/20  5:45 AM   Result Value Ref Range    Phosphorus 3.2 2.5 - 4.9 mg/dL   POCT Glucose    Collection Time: 07/31/20  8:01 AM   Result Value Ref Range    POC Glucose 145 (H) 70 - 99 mg/dl    Performed on ACCU-CHEK    POCT Glucose    Collection Time: 07/31/20  9:51 AM   Result Value Ref Range    POC Glucose 185 (H) 70 - 99 mg/dl    Performed on ACCU-CHEK    POCT Glucose    Collection Time: 07/31/20 11:50 AM   Result Value Ref Range    POC Glucose 169 (H) 70 - 99 mg/dl    Performed on ACCU-CHEK    POCT Glucose    Collection Time: 07/31/20  5:20 PM   Result Value Ref Range    POC Glucose 271 (H) 70 - 99 mg/dl    Performed on ACCU-CHEK    POCT Glucose    Collection Time: 07/31/20  8:12 PM   Result Value Ref Range    POC Glucose 151 (H) 70 - 99 mg/dl Performed on ACCU-CHEK    POCT Glucose    Collection Time: 08/01/20 12:05 AM   Result Value Ref Range    POC Glucose 130 (H) 70 - 99 mg/dl    Performed on ACCU-CHEK    POCT Glucose    Collection Time: 08/01/20  4:03 AM   Result Value Ref Range    POC Glucose 151 (H) 70 - 99 mg/dl    Performed on ACCU-CHEK    Basic Metabolic Panel w/ Reflex to MG    Collection Time: 08/01/20  7:09 AM   Result Value Ref Range    Sodium 147 (H) 136 - 145 mmol/L    Potassium reflex Magnesium 3.3 (L) 3.5 - 5.1 mmol/L    Chloride 109 99 - 110 mmol/L    CO2 24 21 - 32 mmol/L    Anion Gap 14 3 - 16    Glucose 138 (H) 70 - 99 mg/dL    BUN 10 7 - 20 mg/dL    CREATININE 0.8 (L) 0.9 - 1.3 mg/dL    GFR Non-African American >60 >60    GFR African American >60 >60    Calcium 9.6 8.3 - 10.6 mg/dL   CBC auto differential    Collection Time: 08/01/20  7:09 AM   Result Value Ref Range    WBC 7.4 4.0 - 11.0 K/uL    RBC 4.28 4.20 - 5.90 M/uL    Hemoglobin 11.8 (L) 13.5 - 17.5 g/dL    Hematocrit 35.3 (L) 40.5 - 52.5 %    MCV 82.5 80.0 - 100.0 fL    MCH 27.5 26.0 - 34.0 pg    MCHC 33.3 31.0 - 36.0 g/dL    RDW 16.6 (H) 12.4 - 15.4 %    Platelets 318 481 - 598 K/uL    MPV 7.7 5.0 - 10.5 fL    Neutrophils % 70.1 %    Lymphocytes % 22.2 %    Monocytes % 4.9 %    Eosinophils % 2.1 %    Basophils % 0.7 %    Neutrophils Absolute 5.2 1.7 - 7.7 K/uL    Lymphocytes Absolute 1.6 1.0 - 5.1 K/uL    Monocytes Absolute 0.4 0.0 - 1.3 K/uL    Eosinophils Absolute 0.2 0.0 - 0.6 K/uL    Basophils Absolute 0.1 0.0 - 0.2 K/uL   Magnesium    Collection Time: 08/01/20  7:09 AM   Result Value Ref Range    Magnesium 1.90 1.80 - 2.40 mg/dL   Phosphorus    Collection Time: 08/01/20  7:09 AM   Result Value Ref Range    Phosphorus 2.8 2.5 - 4.9 mg/dL   POCT Glucose    Collection Time: 08/01/20  7:49 AM   Result Value Ref Range    POC Glucose 110 (H) 70 - 99 mg/dl    Performed on ACCU-CHEK    POCT Glucose    Collection Time: 08/01/20 11:42 AM   Result Value Ref Range    POC Glucose 152 (H) 70 - 99 mg/dl    Performed on ACCU-CHEK    Urine Reflex to Culture    Collection Time: 08/01/20  2:10 PM    Specimen: Urine, clean catch   Result Value Ref Range    Color, UA DK YELLOW Straw/Yellow    Clarity, UA CLOUDY (A) Clear    Glucose, Ur 250 (A) Negative mg/dL    Bilirubin Urine MODERATE (A) Negative    Ketones, Urine 15 (A) Negative mg/dL    Specific Gravity, UA 1.029 1.005 - 1.030    Blood, Urine Negative Negative    pH, UA 6.0 5.0 - 8.0    Protein, UA 30 (A) Negative mg/dL    Urobilinogen, Urine 1.0 <2.0 E.U./dL    Nitrite, Urine Negative Negative    Leukocyte Esterase, Urine SMALL (A) Negative    Microscopic Examination YES     Urine Type Other     Urine Reflex to Culture Yes    Microscopic Urinalysis    Collection Time: 08/01/20  2:10 PM   Result Value Ref Range    Hyaline Casts, UA 3-5 (A) 0 - 2 /LPF    Mucus, UA 2+ (A) None Seen /LPF    Crystals, UA Few Ca.  Oxalate (A) None Seen /HPF    WBC, UA 49 (H) 0 - 5 /HPF    RBC, UA 1 0 - 4 /HPF    Epithelial Cells, UA 7 (H) 0 - 5 /HPF   Culture, Urine    Collection Time: 08/01/20  2:10 PM    Specimen: Urine, clean catch   Result Value Ref Range    Urine Culture, Routine No growth at 18 to 36 hours    TSH with Reflex    Collection Time: 08/01/20  2:25 PM   Result Value Ref Range    TSH 0.80 0.27 - 4.20 uIU/mL   Vitamin B12 & Folate    Collection Time: 08/01/20  2:25 PM   Result Value Ref Range    Vitamin B-12 680 211 - 911 pg/mL    Folate 14.94 4.78 - 24.20 ng/mL   POCT Glucose    Collection Time: 08/01/20  4:28 PM   Result Value Ref Range    POC Glucose 407 (H) 70 - 99 mg/dl    Performed on ACCU-CHEK    POCT Glucose    Collection Time: 08/01/20  7:30 PM   Result Value Ref Range    POC Glucose 322 (H) 70 - 99 mg/dl    Performed on ACCU-CHEK    POCT Glucose    Collection Time: 08/01/20 10:10 PM   Result Value Ref Range    POC Glucose 246 (H) 70 - 99 mg/dl    Performed on ACCU-CHEK    POCT Glucose    Collection Time: 08/02/20  3:31 AM   Result Value Ref Range    POC Glucose 117 (H) 70 - 99 mg/dl    Performed on ACCU-CHEK    Basic Metabolic Panel w/ Reflex to MG    Collection Time: 08/02/20  5:26 AM   Result Value Ref Range    Sodium 148 (H) 136 - 145 mmol/L    Potassium reflex Magnesium 3.3 (L) 3.5 - 5.1 mmol/L    Chloride 108 99 - 110 mmol/L    CO2 24 21 - 32 mmol/L    Anion Gap 16 3 - 16    Glucose 115 (H) 70 - 99 mg/dL    BUN 9 7 - 20 mg/dL    CREATININE 0.9 0.9 - 1.3 mg/dL    GFR Non-African American >60 >60    GFR African American >60 >60    Calcium 9.6 8.3 - 10.6 mg/dL   CBC auto differential    Collection Time: 08/02/20  5:26 AM   Result Value Ref Range    WBC 7.8 4.0 - 11.0 K/uL    RBC 4.26 4.20 - 5.90 M/uL    Hemoglobin 11.9 (L) 13.5 - 17.5 g/dL    Hematocrit 35.2 (L) 40.5 - 52.5 %    MCV 82.7 80.0 - 100.0 fL    MCH 28.0 26.0 - 34.0 pg    MCHC 33.8 31.0 - 36.0 g/dL    RDW 16.7 (H) 12.4 - 15.4 %    Platelets 907 288 - 390 K/uL    MPV 7.4 5.0 - 10.5 fL    Neutrophils % 61.4 %    Lymphocytes % 29.2 %    Monocytes % 5.7 %    Eosinophils % 2.8 %    Basophils % 0.9 %    Neutrophils Absolute 4.8 1.7 - 7.7 K/uL    Lymphocytes Absolute 2.3 1.0 - 5.1 K/uL    Monocytes Absolute 0.4 0.0 - 1.3 K/uL    Eosinophils Absolute 0.2 0.0 - 0.6 K/uL    Basophils Absolute 0.1 0.0 - 0.2 K/uL   Magnesium    Collection Time: 08/02/20  5:26 AM   Result Value Ref Range    Magnesium 1.70 (L) 1.80 - 2.40 mg/dL   Phosphorus    Collection Time: 08/02/20  5:26 AM   Result Value Ref Range    Phosphorus 3.3 2.5 - 4.9 mg/dL   Potassium    Collection Time: 08/02/20  5:26 AM   Result Value Ref Range    Potassium 3.3 (L) 3.5 - 5.1 mmol/L   POCT Glucose    Collection Time: 08/02/20  7:57 AM   Result Value Ref Range    POC Glucose 91 70 - 99 mg/dl    Performed on ACCU-CHEK    POCT Glucose    Collection Time: 08/02/20 11:42 AM   Result Value Ref Range    POC Glucose 118 (H) 70 - 99 mg/dl    Performed on ACCU-CHEK    POCT Glucose    Collection Time: 08/02/20  4:46 PM   Result Value Ref Range    POC Glucose 146 (H) 70 - 99 mg/dl    Performed on ACCU-CHEK    POCT Glucose    Collection Time: 08/02/20  8:11 PM   Result Value Ref Range    POC Glucose 151 (H) 70 - 99 mg/dl    Performed on ACCU-CHEK    POCT Glucose    Collection Time: 08/02/20 11:05 PM   Result Value Ref Range    POC Glucose 206 (H) 70 - 99 mg/dl    Performed on ACCU-CHEK    POCT Glucose    Collection Time: 08/03/20  4:08 AM   Result Value Ref Range    POC Glucose 105 (H) 70 - 99 mg/dl    Performed on ACCU-CHEK    Basic Metabolic Panel w/ Reflex to MG    Collection Time: 08/03/20  6:10 AM   Result Value Ref Range    Sodium 148 (H) 136 - 145 mmol/L    Potassium reflex Magnesium 3.1 (L) 3.5 - 5.1 mmol/L    Chloride 107 99 - 110 mmol/L    CO2 24 21 - 32 mmol/L    Anion Gap 17 (H) 3 - 16    Glucose 93 70 - 99 mg/dL    BUN 11 7 - 20 mg/dL    CREATININE 0.8 (L) 0.9 - 1.3 mg/dL    GFR Non-African American >60 >60    GFR African American >60 >60    Calcium 9.4 8.3 - 10.6 mg/dL   CBC auto differential    Collection Time: 08/03/20  6:10 AM   Result Value Ref Range    WBC 6.9 4.0 - 11.0 K/uL    RBC 4.57 4.20 - 5.90 M/uL    Hemoglobin 12.5 (L) 13.5 - 17.5 g/dL    Hematocrit 38.0 (L) 40.5 - 52.5 %    MCV 83.2 80.0 - 100.0 fL    MCH 27.5 26.0 - 34.0 pg    MCHC 33.0 31.0 - 36.0 g/dL    RDW 16.4 (H) 12.4 - 15.4 %    Platelets 574 911 - 763 K/uL    MPV 8.0 5.0 - 10.5 fL    Neutrophils % 56.0 %    Lymphocytes % 34.6 %    Monocytes % 4.9 %    Eosinophils % 3.5 %    Basophils % 1.0 %    Neutrophils Absolute 3.8 1.7 - 7.7 K/uL    Lymphocytes Absolute 2.4 1.0 - 5.1 K/uL    Monocytes Absolute 0.3 0.0 - 1.3 K/uL    Eosinophils Absolute 0.2 0.0 - 0.6 K/uL    Basophils Absolute 0.1 0.0 - 0.2 K/uL   Magnesium    Collection Time: 08/03/20  6:10 AM   Result Value Ref Range    Magnesium 1.80 1.80 - 2.40 mg/dL   Phosphorus    Collection Time: 08/03/20  6:10 AM   Result Value Ref Range    Phosphorus 3.8 2.5 - 4.9 mg/dL   POCT Glucose    Collection Time: 08/03/20  7:54 AM   Result Value Ref Range    POC Glucose 183 (H) 70 - 99 mg/dl    Performed on ACCU-CHEK    POCT Glucose    Collection Time: 08/03/20 11:45 AM   Result Value Ref Range    POC Glucose 217 (H) 70 - 99 mg/dl    Performed on ACCU-CHEK    POCT Glucose    Collection Time: 08/03/20  4:01 PM   Result Value Ref Range    POC Glucose 86 70 - 99 mg/dl    Performed on ACCU-CHEK    POCT Glucose    Collection Time: 08/03/20  8:04 PM   Result Value Ref Range    POC Glucose 256 (H) 70 - 99 mg/dl    Performed on ACCU-CHEK    POCT Glucose    Collection Time: 08/04/20 12:01 AM   Result Value Ref Range    POC Glucose 145 (H) 70 - 99 mg/dl    Performed on ACCU-CHEK    POCT Glucose    Collection Time: 08/04/20  4:05 AM   Result Value Ref Range    POC Glucose 47 (LL) 70 - 99 mg/dl    Performed on ACCU-CHEK    POCT Glucose    Collection Time: 08/04/20  4:12 AM   Result Value Ref Range    POC Glucose 46 (LL) 70 - 99 mg/dl    Performed on ACCU-CHEK    POCT Glucose    Collection Time: 08/04/20  4:24 AM   Result Value Ref Range    POC Glucose 55 (L) 70 - 99 mg/dl    Performed on ACCU-CHEK    POCT Glucose    Collection Time: 08/04/20  4:41 AM   Result Value Ref Range    POC Glucose 87 70 - 99 mg/dl    Performed on ACCU-CHEK    POCT Glucose    Collection Time: 08/04/20  5:02 AM   Result Value Ref Range    POC Glucose 92 70 - 99 mg/dl    Performed on ACCU-CHEK    Basic Metabolic Panel w/ Reflex to MG    Collection Time: 08/04/20  5:50 AM   Result Value Ref Range    Sodium 143 136 - 145 mmol/L    Potassium reflex Magnesium 3.3 (L) 3.5 - 5.1 mmol/L    Chloride 103 99 - 110 mmol/L    CO2 25 21 - 32 mmol/L    Anion Gap 15 3 - 16    Glucose 199 (H) 70 - 99 mg/dL    BUN 10 7 - 20 mg/dL    CREATININE 0.9 0.9 - 1.3 mg/dL    GFR Non-African American >60 >60    GFR African American >60 >60    Calcium 9.3 8.3 - 10.6 mg/dL   CBC auto differential    Collection Time: 08/04/20  5:50 AM   Result Value Ref Range    WBC 6.2 4.0 - 11.0 K/uL    RBC 4.02 (L) 4.20 - 5.90 M/uL Hemoglobin 11.1 (L) 13.5 - 17.5 g/dL    Hematocrit 33.2 (L) 40.5 - 52.5 %    MCV 82.5 80.0 - 100.0 fL    MCH 27.7 26.0 - 34.0 pg    MCHC 33.5 31.0 - 36.0 g/dL    RDW 16.4 (H) 12.4 - 15.4 %    Platelets 087 896 - 461 K/uL    MPV 7.9 5.0 - 10.5 fL    Neutrophils % 66.2 %    Lymphocytes % 25.2 %    Monocytes % 4.5 %    Eosinophils % 3.3 %    Basophils % 0.8 %    Neutrophils Absolute 4.1 1.7 - 7.7 K/uL    Lymphocytes Absolute 1.6 1.0 - 5.1 K/uL    Monocytes Absolute 0.3 0.0 - 1.3 K/uL    Eosinophils Absolute 0.2 0.0 - 0.6 K/uL    Basophils Absolute 0.0 0.0 - 0.2 K/uL   Magnesium    Collection Time: 08/04/20  5:50 AM   Result Value Ref Range    Magnesium 1.70 (L) 1.80 - 2.40 mg/dL   Phosphorus    Collection Time: 08/04/20  5:50 AM   Result Value Ref Range    Phosphorus 3.9 2.5 - 4.9 mg/dL   POCT Glucose    Collection Time: 08/04/20  7:59 AM   Result Value Ref Range    POC Glucose 241 (H) 70 - 99 mg/dl    Performed on ACCU-CHEK    POCT Glucose    Collection Time: 08/04/20 11:17 AM   Result Value Ref Range    POC Glucose 137 (H) 70 - 99 mg/dl    Performed on ACCU-CHEK    Basic Metabolic Panel w/ Reflex to MG    Collection Time: 08/04/20 11:45 AM   Result Value Ref Range    Sodium 145 136 - 145 mmol/L    Potassium reflex Magnesium 4.7 3.5 - 5.1 mmol/L    Chloride 108 99 - 110 mmol/L    CO2 23 21 - 32 mmol/L    Anion Gap 14 3 - 16    Glucose 112 (H) 70 - 99 mg/dL    BUN 8 7 - 20 mg/dL    CREATININE 0.9 0.9 - 1.3 mg/dL    GFR Non-African American >60 >60    GFR African American >60 >60    Calcium 9.6 8.3 - 10.6 mg/dL   Magnesium    Collection Time: 08/04/20 11:45 AM   Result Value Ref Range    Magnesium 1.80 1.80 - 2.40 mg/dL   POCT Glucose    Collection Time: 08/04/20  4:02 PM   Result Value Ref Range    POC Glucose 352 (H) 70 - 99 mg/dl    Performed on ACCU-CHEK    POCT Glucose    Collection Time: 08/04/20 10:04 PM   Result Value Ref Range    POC Glucose 436 (H) 70 - 99 mg/dl    Performed on ACCU-CHEK    POCT Glucose Collection Time: 08/04/20 11:49 PM   Result Value Ref Range    POC Glucose 291 (H) 70 - 99 mg/dl    Performed on ACCU-CHEK    POCT Glucose    Collection Time: 08/05/20  4:17 AM   Result Value Ref Range    POC Glucose 56 (L) 70 - 99 mg/dl    Performed on ACCU-CHEK    POCT Glucose    Collection Time: 08/05/20  5:01 AM   Result Value Ref Range    POC Glucose 126 (H) 70 - 99 mg/dl    Performed on ACCU-CHEK    POCT Glucose    Collection Time: 08/05/20  5:14 AM   Result Value Ref Range    POC Glucose 118 (H) 70 - 99 mg/dl    Performed on ACCU-CHEK    Basic Metabolic Panel w/ Reflex to MG    Collection Time: 08/05/20  5:45 AM   Result Value Ref Range    Sodium 144 136 - 145 mmol/L    Potassium reflex Magnesium 3.4 (L) 3.5 - 5.1 mmol/L    Chloride 106 99 - 110 mmol/L    CO2 27 21 - 32 mmol/L    Anion Gap 11 3 - 16    Glucose 97 70 - 99 mg/dL    BUN 7 7 - 20 mg/dL    CREATININE 0.9 0.9 - 1.3 mg/dL    GFR Non-African American >60 >60    GFR African American >60 >60    Calcium 9.4 8.3 - 10.6 mg/dL   CBC auto differential    Collection Time: 08/05/20  5:45 AM   Result Value Ref Range    WBC 6.3 4.0 - 11.0 K/uL    RBC 3.94 (L) 4.20 - 5.90 M/uL    Hemoglobin 10.8 (L) 13.5 - 17.5 g/dL    Hematocrit 32.6 (L) 40.5 - 52.5 %    MCV 82.7 80.0 - 100.0 fL    MCH 27.5 26.0 - 34.0 pg    MCHC 33.2 31.0 - 36.0 g/dL    RDW 16.7 (H) 12.4 - 15.4 %    Platelets 762 448 - 310 K/uL    MPV 8.0 5.0 - 10.5 fL    Neutrophils % 59.3 %    Lymphocytes % 30.8 %    Monocytes % 6.2 %    Eosinophils % 3.1 %    Basophils % 0.6 %    Neutrophils Absolute 3.7 1.7 - 7.7 K/uL    Lymphocytes Absolute 1.9 1.0 - 5.1 K/uL    Monocytes Absolute 0.4 0.0 - 1.3 K/uL    Eosinophils Absolute 0.2 0.0 - 0.6 K/uL    Basophils Absolute 0.0 0.0 - 0.2 K/uL   Magnesium    Collection Time: 08/05/20  5:45 AM   Result Value Ref Range    Magnesium 1.90 1.80 - 2.40 mg/dL   Phosphorus    Collection Time: 08/05/20  5:45 AM   Result Value Ref Range    Phosphorus 5.0 (H) 2.5 - 4.9 mg/dL POCT Glucose    Collection Time: 08/05/20  7:50 AM   Result Value Ref Range    POC Glucose 54 (L) 70 - 99 mg/dl    Performed on ACCU-CHEK    POCT Glucose    Collection Time: 08/05/20  9:13 AM   Result Value Ref Range    POC Glucose 124 (H) 70 - 99 mg/dl    Performed on ACCU-CHEK    POCT Glucose    Collection Time: 08/05/20 12:04 PM   Result Value Ref Range    POC Glucose 267 (H) 70 - 99 mg/dl    Performed on ACCU-CHEK    POCT Glucose    Collection Time: 08/05/20  4:50 PM   Result Value Ref Range    POC Glucose 306 (H) 70 - 99 mg/dl    Performed on ACCU-CHEK    POCT Glucose    Collection Time: 08/06/20  1:36 AM   Result Value Ref Range    POC Glucose 451 (H) 70 - 99 mg/dl    Performed on ACCU-CHEK    POCT Glucose    Collection Time: 08/06/20  3:59 AM   Result Value Ref Range    POC Glucose 263 (H) 70 - 99 mg/dl    Performed on ACCU-CHEK    Basic Metabolic Panel w/ Reflex to MG    Collection Time: 08/06/20  6:50 AM   Result Value Ref Range    Sodium 144 136 - 145 mmol/L    Potassium reflex Magnesium 3.9 3.5 - 5.1 mmol/L    Chloride 105 99 - 110 mmol/L    CO2 28 21 - 32 mmol/L    Anion Gap 11 3 - 16    Glucose 48 (LL) 70 - 99 mg/dL    BUN 8 7 - 20 mg/dL    CREATININE 0.7 (L) 0.9 - 1.3 mg/dL    GFR Non-African American >60 >60    GFR African American >60 >60    Calcium 9.4 8.3 - 10.6 mg/dL   CBC auto differential    Collection Time: 08/06/20  6:50 AM   Result Value Ref Range    WBC 6.8 4.0 - 11.0 K/uL    RBC 4.37 4.20 - 5.90 M/uL    Hemoglobin 11.9 (L) 13.5 - 17.5 g/dL    Hematocrit 36.3 (L) 40.5 - 52.5 %    MCV 83.0 80.0 - 100.0 fL    MCH 27.3 26.0 - 34.0 pg    MCHC 32.9 31.0 - 36.0 g/dL    RDW 16.2 (H) 12.4 - 15.4 %    Platelets 880 409 - 162 K/uL    MPV 8.4 5.0 - 10.5 fL    Neutrophils % 48.4 %    Lymphocytes % 42.1 %    Monocytes % 5.4 %    Eosinophils % 3.4 %    Basophils % 0.7 %    Neutrophils Absolute 3.3 1.7 - 7.7 K/uL    Lymphocytes Absolute 2.9 1.0 - 5.1 K/uL    Monocytes Absolute 0.4 0.0 - 1.3 K/uL Intravenous PRN ESTEPHANIA Cintron CNP        acetaminophen (TYLENOL) tablet 650 mg  650 mg Oral Q4H STANLEY Jarvis MD   650 mg at 07/24/20 2410    Or    acetaminophen (TYLENOL) suppository 650 mg  650 mg Rectal Q4H STANLEY Jarvis MD        polyethylene glycol [facility]) packet 17 g  17 g Oral Daily STANLEY Jarvis MD   17 g at 08/04/20 1754    ondansetron (ZOFRAN) injection 4 mg  4 mg Intravenous Q4H STANLEY Jarvis MD   4 mg at 08/01/20 1606    glucose (GLUTOSE) 40 % oral gel 15 g  15 g Oral STANLEY Jarvis MD   15 g at 08/04/20 0427    dextrose 50 % IV solution  12.5 g Intravenous STANLEY Jarvis MD   12.5 g at 08/05/20 0437    glucagon (rDNA) injection 1 mg  1 mg Intramuscular STANLEY Jarvis MD        dextrose 5 % solution  100 mL/hr Intravenous STANLEY Jarvis MD        enoxaparin (LOVENOX) injection 40 mg  40 mg Subcutaneous Daily Jigna Jarvis MD   Stopped at 08/06/20 1040    pantoprazole (PROTONIX) injection 40 mg  40 mg Intravenous Daily ESTEPHANIA Cintron CNP   40 mg at 08/05/20 6581    And    sodium chloride (PF) 0.9 % injection 10 mL  10 mL Intravenous Daily ESTEPHANIA Cintron CNP   10 mL at 08/05/20 0818    magnesium sulfate 1 g in dextrose 5% 100 mL IVPB  1 g Intravenous PRN ESTEPHANIA Cintron CNP   Stopped at 07/30/20 1502    potassium chloride 20 mEq/50 mL IVPB (Central Line)  20 mEq Intravenous PRN ESTEPHANIA Cintron CNP 50 mL/hr at 08/04/20 0939 20 mEq at 08/04/20 2087    sodium phosphate 11.07 mmol in dextrose 5 % 250 mL IVPB  0.16 mmol/kg Intravenous PRN ESTEPHANIA Cintron CNP        Or    sodium phosphate 22.11 mmol in dextrose 5 % 250 mL IVPB  0.32 mmol/kg Intravenous PRN ESTEPHANIA Cintron CNP         ROS:  Could not assess, no speech    MSE:  A-under covers, no gown, did roll around and sit up for a second as he changed positions today.     A-less sleepy  M-can't assess, no speech  S-impaired  I/J-impaired  T-No speech today. 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 Because of concerns about ativan interacting with zyprexa, I'll switch out to geodon so we can use higher doses in case pt gets violent. Sometimes antipsychotics can make catatonia worse, so we'll need to proceed cautiously. We'll try geodon 10 im, see how this works, maybe increase to 20 if it seems to help.  bid from neuro. At this point if medically clear anytime soon, this pt will need psych admit.

## 2020-08-06 NOTE — PROGRESS NOTES
Patient pulled tele monitor out. Not agreeing to place back. SOUMYA Sánchez Nurse notified. N.O to d/c tele.  Electronically signed by Tasneem Hernandez RN on 8/6/2020 at 10:04 AM

## 2020-08-06 NOTE — PLAN OF CARE
Problem: Pain:  Goal: Pain level will decrease  Description: Pain level will decrease  8/6/2020 1326 by Venkat Michel RN  Outcome: Ongoing  8/6/2020 1326 by Venkat Michel RN  Outcome: Ongoing  Goal: Control of acute pain  Description: Control of acute pain  8/6/2020 1326 by Venkat Michel RN  Outcome: Ongoing  8/6/2020 1326 by Venkat Michel RN  Outcome: Ongoing  Goal: Control of chronic pain  Description: Control of chronic pain  8/6/2020 1326 by Venkat Michel RN  Outcome: Ongoing  8/6/2020 1326 by Venkat Michel RN  Outcome: Ongoing     Problem: Nutrition  Goal: Optimal nutrition therapy  8/6/2020 1326 by Venkat Michel RN  Outcome: Ongoing  8/6/2020 1326 by Venkat Michel RN  Outcome: Ongoing     Problem: Skin Integrity:  Goal: Will show no infection signs and symptoms  Description: Will show no infection signs and symptoms  8/6/2020 1326 by Venkat Michel RN  Outcome: Ongoing  8/6/2020 1326 by Venkat Michel RN  Outcome: Ongoing  Goal: Absence of new skin breakdown  Description: Absence of new skin breakdown  8/6/2020 1326 by Venkat Michel RN  Outcome: Ongoing  8/6/2020 1326 by Venkat Michel RN  Outcome: Ongoing     Problem: Falls - Risk of:  Goal: Will remain free from falls  Description: Will remain free from falls  8/6/2020 1326 by Venkat Michel RN  Outcome: Ongoing  8/6/2020 1326 by Venkat Michel RN  Outcome: Ongoing  Goal: Absence of physical injury  Description: Absence of physical injury  8/6/2020 1326 by Venkat Michel RN  Outcome: Ongoing  8/6/2020 1326 by Venkat Michel RN  Outcome: Ongoing     Problem: Nutrition Deficit:  Goal: Ability to achieve adequate nutritional intake will improve  Description: Ability to achieve adequate nutritional intake will improve  8/6/2020 1326 by Venkat Michel RN  Outcome: Ongoing  8/6/2020 1326 by Venkat Michel RN  Outcome: Ongoing     Problem: Mental Status - Impaired:  Goal: Mental status will improve  Description: Mental status will improve  8/6/2020 1326 by Garnell Michel, RN  Outcome: Ongoing  8/6/2020 1326 by Venkat Michel, RN  Outcome: Ongoing

## 2020-08-06 NOTE — PROGRESS NOTES
Lidia Childs NP is at [facility].  Electronically signed by Dilip Duque RN on 8/6/2020 at 10:15 AM

## 2020-08-06 NOTE — PLAN OF CARE
Problem: Pain:  Goal: Pain level will decrease  Description: Pain level will decrease  8/5/2020 2315 by Frank Kim RN  Outcome: Ongoing  Note: Patient reported no pain  8/5/2020 1127 by Abelardo Guevara RN  Outcome: Ongoing  Goal: Control of acute pain  Description: Control of acute pain  8/5/2020 2315 by Frank Kim RN  Outcome: Ongoing  8/5/2020 1127 by Abelardo Guevara RN  Outcome: Ongoing  Goal: Control of chronic pain  Description: Control of chronic pain  8/5/2020 2315 by Frank Kim RN  Outcome: Ongoing  8/5/2020 1127 by Abelardo Guevara RN  Outcome: Ongoing     Problem: Nutrition  Goal: Optimal nutrition therapy  8/5/2020 2315 by Frank Kim RN  Outcome: Not Met This Shift  Note: Patient's appetite waxes and wanes. Patient ate well earlier in the day but was not interested in eating this evening  8/5/2020 1127 by Abelardo Guevara RN  Outcome: Ongoing     Problem: Skin Integrity:  Goal: Will show no infection signs and symptoms  Description: Will show no infection signs and symptoms  8/5/2020 2315 by Frank Kim RN  Outcome: Ongoing  8/5/2020 1127 by Abelardo Guevara RN  Outcome: Ongoing  Goal: Absence of new skin breakdown  Description: Absence of new skin breakdown  8/5/2020 2315 by Frank Kim RN  Outcome: Ongoing  8/5/2020 1127 by Abelardo Guevara RN  Outcome: Ongoing     Problem: Falls - Risk of:  Goal: Will remain free from falls  Description: Will remain free from falls  8/5/2020 2315 by Frank Kim RN  Outcome: Ongoing  Note: Patient has  as standby assist to the bathroom.    8/5/2020 1127 by Abelardo Guevara RN  Outcome: Ongoing  Goal: Absence of physical injury  Description: Absence of physical injury  8/5/2020 2315 by Frank Kim RN  Outcome: Ongoing  8/5/2020 1127 by Abelardo Guevara RN  Outcome: Ongoing     Problem: Nutrition Deficit:  Goal: Ability to achieve adequate nutritional intake will improve  Description: Ability to achieve adequate nutritional intake will improve  8/5/2020 1127 by Josh Wilson RN  Outcome: Ongoing

## 2020-08-06 NOTE — CARE COORDINATION
8/6 Per Psych, pt. will need Psych Admit when medically clear. Electronically signed by Jennifer Last RN on 8/6/2020 at 2:52 PM
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
## 2020-08-07 ENCOUNTER — APPOINTMENT (OUTPATIENT)
Dept: GENERAL RADIOLOGY | Age: 45
DRG: 812 | End: 2020-08-07
Payer: MEDICAID

## 2020-08-07 ENCOUNTER — APPOINTMENT (OUTPATIENT)
Dept: MRI IMAGING | Age: 45
DRG: 812 | End: 2020-08-07
Payer: MEDICAID

## 2020-08-07 LAB
ANION GAP SERPL CALCULATED.3IONS-SCNC: 8 MMOL/L (ref 3–16)
BASOPHILS ABSOLUTE: 0.1 K/UL (ref 0–0.2)
BASOPHILS RELATIVE PERCENT: 0.9 %
BUN BLDV-MCNC: 12 MG/DL (ref 7–20)
CALCIUM SERPL-MCNC: 9.3 MG/DL (ref 8.3–10.6)
CHLORIDE BLD-SCNC: 100 MMOL/L (ref 99–110)
CO2: 29 MMOL/L (ref 21–32)
CREAT SERPL-MCNC: 0.9 MG/DL (ref 0.9–1.3)
EOSINOPHILS ABSOLUTE: 0.2 K/UL (ref 0–0.6)
EOSINOPHILS RELATIVE PERCENT: 2.9 %
GFR AFRICAN AMERICAN: >60
GFR NON-AFRICAN AMERICAN: >60
GLUCOSE BLD-MCNC: 203 MG/DL (ref 70–99)
GLUCOSE BLD-MCNC: 227 MG/DL (ref 70–99)
GLUCOSE BLD-MCNC: 273 MG/DL (ref 70–99)
GLUCOSE BLD-MCNC: 285 MG/DL (ref 70–99)
GLUCOSE BLD-MCNC: 331 MG/DL (ref 70–99)
GLUCOSE BLD-MCNC: 339 MG/DL (ref 70–99)
HCT VFR BLD CALC: 33.4 % (ref 40.5–52.5)
HEMOGLOBIN: 11.1 G/DL (ref 13.5–17.5)
LYMPHOCYTES ABSOLUTE: 1.9 K/UL (ref 1–5.1)
LYMPHOCYTES RELATIVE PERCENT: 33.8 %
MAGNESIUM: 1.9 MG/DL (ref 1.8–2.4)
MCH RBC QN AUTO: 27.6 PG (ref 26–34)
MCHC RBC AUTO-ENTMCNC: 33.2 G/DL (ref 31–36)
MCV RBC AUTO: 82.9 FL (ref 80–100)
MONOCYTES ABSOLUTE: 0.2 K/UL (ref 0–1.3)
MONOCYTES RELATIVE PERCENT: 4.4 %
NEUTROPHILS ABSOLUTE: 3.3 K/UL (ref 1.7–7.7)
NEUTROPHILS RELATIVE PERCENT: 58 %
PDW BLD-RTO: 16.4 % (ref 12.4–15.4)
PERFORMED ON: ABNORMAL
PHOSPHORUS: 2.1 MG/DL (ref 2.5–4.9)
PLATELET # BLD: 230 K/UL (ref 135–450)
PMV BLD AUTO: 8.7 FL (ref 5–10.5)
POTASSIUM REFLEX MAGNESIUM: 4.3 MMOL/L (ref 3.5–5.1)
RBC # BLD: 4.03 M/UL (ref 4.2–5.9)
SODIUM BLD-SCNC: 137 MMOL/L (ref 136–145)
WBC # BLD: 5.7 K/UL (ref 4–11)

## 2020-08-07 PROCEDURE — 80048 BASIC METABOLIC PNL TOTAL CA: CPT

## 2020-08-07 PROCEDURE — C9113 INJ PANTOPRAZOLE SODIUM, VIA: HCPCS | Performed by: NURSE PRACTITIONER

## 2020-08-07 PROCEDURE — 83735 ASSAY OF MAGNESIUM: CPT

## 2020-08-07 PROCEDURE — 6370000000 HC RX 637 (ALT 250 FOR IP): Performed by: NURSE PRACTITIONER

## 2020-08-07 PROCEDURE — 6360000002 HC RX W HCPCS: Performed by: PSYCHIATRY & NEUROLOGY

## 2020-08-07 PROCEDURE — 6370000000 HC RX 637 (ALT 250 FOR IP): Performed by: PSYCHIATRY & NEUROLOGY

## 2020-08-07 PROCEDURE — 6360000002 HC RX W HCPCS: Performed by: NURSE PRACTITIONER

## 2020-08-07 PROCEDURE — 2580000003 HC RX 258: Performed by: NURSE PRACTITIONER

## 2020-08-07 PROCEDURE — 2500000003 HC RX 250 WO HCPCS: Performed by: NURSE PRACTITIONER

## 2020-08-07 PROCEDURE — 85025 COMPLETE CBC W/AUTO DIFF WBC: CPT

## 2020-08-07 PROCEDURE — 84100 ASSAY OF PHOSPHORUS: CPT

## 2020-08-07 PROCEDURE — 6360000002 HC RX W HCPCS: Performed by: INTERNAL MEDICINE

## 2020-08-07 PROCEDURE — 99233 SBSQ HOSP IP/OBS HIGH 50: CPT | Performed by: PSYCHIATRY & NEUROLOGY

## 2020-08-07 PROCEDURE — 1200000000 HC SEMI PRIVATE

## 2020-08-07 PROCEDURE — 73630 X-RAY EXAM OF FOOT: CPT

## 2020-08-07 PROCEDURE — 36592 COLLECT BLOOD FROM PICC: CPT

## 2020-08-07 PROCEDURE — 94760 N-INVAS EAR/PLS OXIMETRY 1: CPT

## 2020-08-07 RX ORDER — SODIUM CHLORIDE 0.9 % (FLUSH) 0.9 %
10 SYRINGE (ML) INJECTION EVERY 12 HOURS SCHEDULED
Status: DISCONTINUED | OUTPATIENT
Start: 2020-08-07 | End: 2020-08-11 | Stop reason: HOSPADM

## 2020-08-07 RX ORDER — LACTOBACILLUS RHAMNOSUS GG 10B CELL
1 CAPSULE ORAL 2 TIMES DAILY WITH MEALS
Status: DISCONTINUED | OUTPATIENT
Start: 2020-08-07 | End: 2020-08-11 | Stop reason: HOSPADM

## 2020-08-07 RX ORDER — LIDOCAINE HYDROCHLORIDE 10 MG/ML
5 INJECTION, SOLUTION EPIDURAL; INFILTRATION; INTRACAUDAL; PERINEURAL ONCE
Status: DISCONTINUED | OUTPATIENT
Start: 2020-08-07 | End: 2020-08-11 | Stop reason: HOSPADM

## 2020-08-07 RX ORDER — SODIUM CHLORIDE 0.9 % (FLUSH) 0.9 %
10 SYRINGE (ML) INJECTION PRN
Status: DISCONTINUED | OUTPATIENT
Start: 2020-08-07 | End: 2020-08-11 | Stop reason: HOSPADM

## 2020-08-07 RX ORDER — OLANZAPINE 5 MG/1
5 TABLET, ORALLY DISINTEGRATING ORAL NIGHTLY
Status: DISCONTINUED | OUTPATIENT
Start: 2020-08-07 | End: 2020-08-10

## 2020-08-07 RX ORDER — ZOLPIDEM TARTRATE 5 MG/1
5 TABLET ORAL 2 TIMES DAILY
Status: DISCONTINUED | OUTPATIENT
Start: 2020-08-07 | End: 2020-08-11 | Stop reason: HOSPADM

## 2020-08-07 RX ADMIN — Medication 10 ML: at 08:19

## 2020-08-07 RX ADMIN — INSULIN GLARGINE 8 UNITS: 100 INJECTION, SOLUTION SUBCUTANEOUS at 21:08

## 2020-08-07 RX ADMIN — DIVALPROEX SODIUM 500 MG: 125 CAPSULE ORAL at 21:08

## 2020-08-07 RX ADMIN — INSULIN LISPRO 4 UNITS: 100 INJECTION, SOLUTION INTRAVENOUS; SUBCUTANEOUS at 16:38

## 2020-08-07 RX ADMIN — ENOXAPARIN SODIUM 40 MG: 40 INJECTION SUBCUTANEOUS at 08:07

## 2020-08-07 RX ADMIN — CEFTRIAXONE 1 G: 1 INJECTION, POWDER, FOR SOLUTION INTRAMUSCULAR; INTRAVENOUS at 15:34

## 2020-08-07 RX ADMIN — SODIUM CHLORIDE, PRESERVATIVE FREE 10 ML: 5 INJECTION INTRAVENOUS at 08:20

## 2020-08-07 RX ADMIN — DIVALPROEX SODIUM 500 MG: 125 CAPSULE ORAL at 08:19

## 2020-08-07 RX ADMIN — SODIUM PHOSPHATE, MONOBASIC, MONOHYDRATE 15 MMOL: 276; 142 INJECTION, SOLUTION INTRAVENOUS at 11:12

## 2020-08-07 RX ADMIN — OLANZAPINE 5 MG: 5 TABLET, ORALLY DISINTEGRATING ORAL at 14:40

## 2020-08-07 RX ADMIN — LORAZEPAM 1 MG: 2 INJECTION INTRAMUSCULAR; INTRAVENOUS at 04:36

## 2020-08-07 RX ADMIN — ZOLPIDEM TARTRATE 5 MG: 5 TABLET ORAL at 21:08

## 2020-08-07 RX ADMIN — INSULIN LISPRO 2 UNITS: 100 INJECTION, SOLUTION INTRAVENOUS; SUBCUTANEOUS at 11:26

## 2020-08-07 RX ADMIN — LORAZEPAM 1 MG: 2 INJECTION INTRAMUSCULAR; INTRAVENOUS at 11:23

## 2020-08-07 RX ADMIN — ZOLPIDEM TARTRATE 10 MG: 5 TABLET ORAL at 08:19

## 2020-08-07 RX ADMIN — PANTOPRAZOLE SODIUM 40 MG: 40 INJECTION, POWDER, FOR SOLUTION INTRAVENOUS at 08:07

## 2020-08-07 RX ADMIN — INSULIN LISPRO 2 UNITS: 100 INJECTION, SOLUTION INTRAVENOUS; SUBCUTANEOUS at 08:05

## 2020-08-07 RX ADMIN — LORAZEPAM 1 MG: 2 INJECTION INTRAMUSCULAR; INTRAVENOUS at 16:33

## 2020-08-07 NOTE — PLAN OF CARE
Problem: Pain:  Goal: Pain level will decrease  Description: Pain level will decrease  8/7/2020 0044 by Claudia Euceda RN  Outcome: Met This Shift  Note: Patient reports no pain this shift. Problem: Pain:  Goal: Control of acute pain  Description: Control of acute pain  8/7/2020 0044 by Claudia Euceda RN  Outcome: Met This Shift     Problem: Pain:  Goal: Control of chronic pain  Description: Control of chronic pain  8/7/2020 0044 by Claudia Euceda RN  Outcome: Met This Shift     Problem: Nutrition  Goal: Optimal nutrition therapy  8/7/2020 0044 by Claudia Euceda RN  Outcome: Ongoing     Problem: Skin Integrity:  Goal: Will show no infection signs and symptoms  Description: Will show no infection signs and symptoms  8/7/2020 0044 by Claudia Euceda RN  Outcome: Met This Shift     Problem: Skin Integrity:  Goal: Absence of new skin breakdown  Description: Absence of new skin breakdown  8/7/2020 0044 by Claudia Euceda RN  Outcome: Met This Shift     Problem: Falls - Risk of:  Goal: Will remain free from falls  Description: Will remain free from falls  8/7/2020 0044 by Claudia Euceda RN  Outcome: Ongoing     Problem: Nutrition Deficit:  Goal: Ability to achieve adequate nutritional intake will improve  Description: Ability to achieve adequate nutritional intake will improve  8/7/2020 0044 by Claudia Euceda RN  Outcome: Not Met This Shift     Problem: Mental Status - Impaired:  Goal: Mental status will improve  Description: Mental status will improve  8/7/2020 0044 by Claudia Euceda RN  Outcome: Not Met This Shift  Note: Patient still hiding underneath the blankets and refusing fingers sticks and sometimes vitals.

## 2020-08-07 NOTE — PROGRESS NOTES
Eden called this nurse to room re: patient was quietly sitting in a chair and all of the sudden grabbed his PICC and pulled it out. This nurse into room to assess; site is not bleeding. Pressure applied to prevent bleeding. Pressure dressing applied after proper cleaning. Catheter noted on a floor; tip is intact. NP Mj Yañez notified. N.O for a new PICC in place and team notified. Patient's spouse Dmitri Wu made aware.  Electronically signed by Donna Marina RN on 8/7/2020 at 7:06 PM

## 2020-08-07 NOTE — PROGRESS NOTES
BP discussed with Dr. Barbara Angel. Ativan orders adjusted.  Electronically signed by Lucretia Sanchez RN on 8/7/2020 at 12:37 PM

## 2020-08-07 NOTE — PROGRESS NOTES
Hospital Medicine Progress Note      Admit Date: 7/15/2020         Overnight Events: No    CC: F/U for Hypoglycemia, catatonia    HPI: The patient is a 50 yrs old male, who  has a past medical history of Diabetes mellitus ([address] Gastroparesis, and Hypertension. He presented to [facility] ER after being found at home unresponsive with a blood glucose of 22. There was some question as to whether this was accidental or an an intentional insulin overdose. The patient was admitted for further workup and treatment. He required intubation, though he later self-extubated by coughing. Because of his mental status/catatonia, he was evaluated by neurology. No obvious etiology for his presentation was noted. EEG was not consistent with seizure and CSF was without acute findings. There was some consideration for PRES. Psych was consulted. MRI on 7/16 did show a subtle frontoparietal cortical/subcortical signal abnormality. There was some volume loss favoring encephalomalacia and gliosis - which would be atypical for PRES. On 7/31, the patient was given Ativan (for ativan challenge). Around 6 hours after this, he awoke and ate with his family. He later returned to his catatonic state. Psych felt that the patient was exhibiting catatonic delirium. Ativan was scheduled, as was [place]. There was consideration for whether or not the patient would require IP psych. Medications were adjusted by Dr. Iain Herring. Rocephin was initiated because of leukocytes in urine - in case there was any inflammatory cause for his presentation. Depakote was resumed for behavior. Interval History/Subjective: The patient is nonverbal at the time of our encounter. He rolled over and pretended I was not there. Review of Systems:     Unable to perform comprehensive ROS due to mental status.     Past Medical History:        Diagnosis Date    Diabetes mellitus ([address]     Gastroparesis     Hypertension        Past Surgical History:        Procedure Laterality Date    BONY PELVIS SURGERY      FOOT AMPUTATION Right 5/13/2020    EMERGENCY; RIGHT INCISION AND DEBRIDEMENT; HALUX AMPUTATION performed by Michael Painter DPM at [address] Left 2006    pins and rods- plate    UPPER GASTROINTESTINAL ENDOSCOPY N/A 12/2/2019    EGD BIOPSY performed by Tere Lazo MD at [facility]:  Ketorolac; Morphine; Morphine and related; Tramadol; Lisinopril; Haloperidol lactate; Toradol [ketorolac tromethamine]; and Vicodin [hydrocodone-acetaminophen]    Past medical and surgical history reviewed. Any changes have been noted. PHYSICAL EXAM:  BP 96/68   Pulse 77   Temp 98.4 °F (36.9 °C) (Axillary)   Resp 14   Ht 6' 2\" (1.88 m)   Wt 130 lb 4.7 oz (59.1 kg)   SpO2 98%   BMI 16.73 kg/m²       Intake/Output Summary (Last 24 hours) at 8/7/2020 1036  Last data filed at 8/7/2020 0830  Gross per 24 hour   Intake 480 ml   Output 650 ml   Net -170 ml       General: Alert. Resting in bed in no apparent distress. Patient monitor at bedside. HEENT: Normocephalic. Atraumatic. Pupils equal and reactive. EOM intact. Oral mucosa pink/moist/intact. Neck: Supple. Symmetrical. Trachea midline. Lungs: Clear to auscultation bilaterally. Respirations even and unlabored. Chest: Exam unremarkable. Cardiac: S1/S2 noted. Regular Rhythm and rate. Abdomen/GI: Soft. Non-tender. Non-distended. BS+. Extremities: PP+. Atraumatic. No redness/cyanosis/edema noted. Brisk cap refill. Skin: Dry and intact. No lesions noted. Neuro: Grossly intact.  No focal deficits noted, though difficult to say for sure given lack of participation     LABS:    Lab Results   Component Value Date    WBC 5.7 08/07/2020    HGB 11.1 (L) 08/07/2020    HCT 33.4 (L) 08/07/2020    MCV 82.9 08/07/2020     08/07/2020    LYMPHOPCT 33.8 08/07/2020    RBC 4.03 (L) 08/07/2020    MCH 27.6 08/07/2020    MCHC 33.2 08/07/2020    RDW 16.4 (H) 08/07/2020       Lab Results   Component Value Date    CREATININE 0.9 08/07/2020    BUN 12 08/07/2020     08/07/2020    K 4.3 08/07/2020     08/07/2020    CO2 29 08/07/2020       Lab Results   Component Value Date    MG 1.90 08/07/2020       Lab Results   Component Value Date    ALT 8 (L) 07/16/2020    AST 17 07/16/2020    ALKPHOS 77 07/16/2020    BILITOT <0.2 07/16/2020        No flowsheet data found. Lab Results   Component Value Date    LABA1C 10.3 05/29/2020       Imaging:  XR FOOT LEFT (MIN 3 VIEWS)         CT HEAD WO CONTRAST   Final Result   No acute intracranial abnormality. Mild cerebral atrophy. XR CHEST PORTABLE   Final Result   No acute findings. NG tube with tip in the stomach. XR CHEST 1 VW   Final Result   Nasogastric tube projects in normal position. No acute cardiopulmonary disease. MRI BRAIN WO CONTRAST   Final Result   Subtle bilateral frontoparietal cortical and subcortical signal abnormality   suggesting mild posterior reversal encephalopathy syndrome. No acute infarct   or hemorrhage. XR CHEST PORTABLE   Final Result   No acute cardiac or pulmonary disease. ET tube 7 cm above the jose carlos. NG side-port at the GE junction. XR CHEST PORTABLE   Final Result   The tip of the endotracheal tube is slightly high in position 9 cm above the   jose carlos. The OG/NG tube should be advanced 10 cm. The lungs are clear. CT Head WO Contrast   Final Result   No acute intracranial abnormality. Paranasal sinusitis. XR CHEST PORTABLE   Final Result   No acute cardiopulmonary disease.              Scheduled and prn Medications:    Scheduled Meds:   sodium phosphate IVPB  15 mmol Intravenous Once    divalproex  500 mg Oral BID    insulin glargine  8 Units Subcutaneous Nightly    insulin lispro  0-6 Units Subcutaneous TID     LORazepam  1 mg Intravenous Q6H    zolpidem  10 mg Oral Daily    cefTRIAXone (ROCEPHIN) IV  1 g Intravenous Q24H    sodium chloride flush  10 mL Intravenous 2 times per day    enoxaparin  40 mg Subcutaneous Daily    pantoprazole  40 mg Intravenous Daily    And    sodium chloride (PF)  10 mL Intravenous Daily     Continuous Infusions:   dextrose       PRN Meds:.ziprasidone, insulin lispro, potassium chloride **OR** potassium alternative oral replacement **OR** potassium chloride, sodium chloride flush, acetaminophen **OR** acetaminophen, polyethylene glycol, ondansetron, glucose, dextrose, glucagon (rDNA), dextrose, magnesium sulfate, potassium chloride, sodium phosphate IVPB **OR** sodium phosphate IVPB    Assessment & Plan:        Persistent metabolic encephalopathy v catatonic delirium  Psychiatry and neurology following  MRI brain noted  -Prior workup including MRI, EEG and CSF has been unrevealing  Ativan and ambien challenge per psych  - transfer for cvEEG if does not continue to improve   Abx for leukocytes in urine.     Hypophosphatemia  IV replacement ordered  Follow labs  Replete as needed     Agitation and aggressive behavior  Psychiatric following  Zyprexa discontinued  Ambien and ativan challenge  Depakote 500mg bid 8/5     Diabetes mellitus  Severe hypoglycemia on admission-resolved  Insulin overdose-unsure if accidental or intentional     Lantus  Humalog SSI  Monitor FSBG  Titrate insulin PRN     HTN  Monitor BP  Initiate/titrate medications as needed.     Severe protein calorie malnutrition/weight loss  2/2 poor oral intake  Initial wt 152lbs  Daily weights  Dietician evaluation  Follow labs and vitals  Encourage PO intake  Dietary supplements  Monitor electrolytes and replete as needed.     Bacteruria  - rocephin IV started 8/3 with delirium     Continue current regimen/therapies. Monitor. Adjust medical regimen as appropriate. Body mass index is 16.73 kg/m².     The patient and / or the family were informed of the results of any tests, a time was given to answer questions, a plan was proposed and they agreed with plan.    DVT prophylaxis: [x] Lovenox  [] SQ Heparin  [] SCDs because of  [] warfarin/oral direct thrombin inhibitor [] Encourage ambulation    GI prophylaxis: [x] PPI/[id number]  [] not indicated    Probiotic if on abx: [x] Yes [] No [] Not Indicated    Diet: DIET DENTAL SOFT;  Dietary Nutrition Supplements: Diabetic Oral Supplement  Dietary Nutrition Supplements: Frozen Oral Supplement    Consults:  IP CONSULT TO PULMONOLOGY  IP CONSULT TO NEUROLOGY  IP CONSULT TO DIETITIAN  IP CONSULT TO PSYCHIATRY  IP CONSULT TO DIETITIAN  IP CONSULT TO DIETITIAN  IP CONSULT TO DIABETES EDUCATOR    Disposition:  [] Home  [] Home with home health [] Rehab [] Psych [] SNF  [] LTAC  [] Long term nursing home or group home [] Transfer to ICU  [] Transfer to PCU [x] Other: TBD.      Code Status: Full Code    ELOS: TBD      ESTEPHANIA Sewell NP  08/07/20

## 2020-08-07 NOTE — PROGRESS NOTES
At 4 pm, patient is more alert and awake; walking hallways, impulsive, talkative. Able to answer some questions appropriately and engage in a meaningful conversation to some extent. Requires multiple redirect with activities as hugging and attempting to kiss staff members.

## 2020-08-07 NOTE — PROGRESS NOTES
Sitter reported that patient was sitting in the chair attempting to put both legs in one pant leg; slid to right knee and got himself right back up. No injuries per assessment; no skin discoloration, skin is intact. Patient denies pain. Chair noted with all wheels locked; sitter by the patient AAT. NP Mj Yañez notified. Call placed to patient's wife Shania Corinna; no response at this time. This nurse assisted the patient with dressing. Educated the sitter to assist the patient with ADL's as much as needed.  Electronically signed by Kojo Claire RN on 8/7/2020 at 7:30 PM

## 2020-08-07 NOTE — PROGRESS NOTES
Psych Consult Progress Note    08/07/20      Diane Gu  [id number]  Chief Complaint   Patient presents with    Hypoglycemia     Found unresponsive by girlfriend. Per EMS, pt had blood sugar of \"22 on arrival and was given IM glucagon\". EMS reports blood sugar \"went up to 59\". Pt responsive to pain at this time. I have reviewed recent documentation. Diane Gu is a 50 y.o. male  With  has a past medical history of Diabetes mellitus ([address] Gastroparesis, and Hypertension. Subjective/Interval Hx:  At a bit more, even though it was under the covers. Said, \"That's what I'm talking about\" to RN. Non-communicative with me. Tends to be more active in the pm.  Apparently repetitively says \"mom\" when stimulated by mom when she comes in. Repeat ua yesterday s infxn/inflammation. Quality:  Altered ms  Severity:  Severe   Duration:  Days to weeks  Context:  As above.   Location:  Brain    Objective:  Vitals:    08/07/20 0937   BP: 96/68   Pulse: 77   Resp: 14   Temp: 98.4 °F (36.9 °C)   SpO2: 98%     Recent Results (from the past 168 hour(s))   POCT Glucose    Collection Time: 07/31/20 11:50 AM   Result Value Ref Range    POC Glucose 169 (H) 70 - 99 mg/dl    Performed on ACCU-CHEK    POCT Glucose    Collection Time: 07/31/20  5:20 PM   Result Value Ref Range    POC Glucose 271 (H) 70 - 99 mg/dl    Performed on ACCU-CHEK    POCT Glucose    Collection Time: 07/31/20  8:12 PM   Result Value Ref Range    POC Glucose 151 (H) 70 - 99 mg/dl    Performed on ACCU-CHEK    POCT Glucose    Collection Time: 08/01/20 12:05 AM   Result Value Ref Range    POC Glucose 130 (H) 70 - 99 mg/dl    Performed on ACCU-CHEK    POCT Glucose    Collection Time: 08/01/20  4:03 AM   Result Value Ref Range    POC Glucose 151 (H) 70 - 99 mg/dl    Performed on ACCU-CHEK    Basic Metabolic Panel w/ Reflex to MG    Collection Time: 08/01/20  7:09 AM   Result Value Ref Range    Sodium 147 (H) 136 - 145 mmol/L    Potassium reflex Magnesium 3.3 (L) 3.5 - 5.1 mmol/L    Chloride 109 99 - 110 mmol/L    CO2 24 21 - 32 mmol/L    Anion Gap 14 3 - 16    Glucose 138 (H) 70 - 99 mg/dL    BUN 10 7 - 20 mg/dL    CREATININE 0.8 (L) 0.9 - 1.3 mg/dL    GFR Non-African American >60 >60    GFR African American >60 >60    Calcium 9.6 8.3 - 10.6 mg/dL   CBC auto differential    Collection Time: 08/01/20  7:09 AM   Result Value Ref Range    WBC 7.4 4.0 - 11.0 K/uL    RBC 4.28 4.20 - 5.90 M/uL    Hemoglobin 11.8 (L) 13.5 - 17.5 g/dL    Hematocrit 35.3 (L) 40.5 - 52.5 %    MCV 82.5 80.0 - 100.0 fL    MCH 27.5 26.0 - 34.0 pg    MCHC 33.3 31.0 - 36.0 g/dL    RDW 16.6 (H) 12.4 - 15.4 %    Platelets 141 045 - 391 K/uL    MPV 7.7 5.0 - 10.5 fL    Neutrophils % 70.1 %    Lymphocytes % 22.2 %    Monocytes % 4.9 %    Eosinophils % 2.1 %    Basophils % 0.7 %    Neutrophils Absolute 5.2 1.7 - 7.7 K/uL    Lymphocytes Absolute 1.6 1.0 - 5.1 K/uL    Monocytes Absolute 0.4 0.0 - 1.3 K/uL    Eosinophils Absolute 0.2 0.0 - 0.6 K/uL    Basophils Absolute 0.1 0.0 - 0.2 K/uL   Magnesium    Collection Time: 08/01/20  7:09 AM   Result Value Ref Range    Magnesium 1.90 1.80 - 2.40 mg/dL   Phosphorus    Collection Time: 08/01/20  7:09 AM   Result Value Ref Range    Phosphorus 2.8 2.5 - 4.9 mg/dL   POCT Glucose    Collection Time: 08/01/20  7:49 AM   Result Value Ref Range    POC Glucose 110 (H) 70 - 99 mg/dl    Performed on ACCU-CHEK    POCT Glucose    Collection Time: 08/01/20 11:42 AM   Result Value Ref Range    POC Glucose 152 (H) 70 - 99 mg/dl    Performed on ACCU-CHEK    Urine Reflex to Culture    Collection Time: 08/01/20  2:10 PM    Specimen: Urine, clean catch   Result Value Ref Range    Color, UA DK YELLOW Straw/Yellow    Clarity, UA CLOUDY (A) Clear    Glucose, Ur 250 (A) Negative mg/dL    Bilirubin Urine MODERATE (A) Negative    Ketones, Urine 15 (A) Negative mg/dL    Specific Gravity, UA 1.029 1.005 - 1.030    Blood, Urine Negative Negative    pH, UA 6.0 5.0 - 8.0 Protein, UA 30 (A) Negative mg/dL    Urobilinogen, Urine 1.0 <2.0 E.U./dL    Nitrite, Urine Negative Negative    Leukocyte Esterase, Urine SMALL (A) Negative    Microscopic Examination YES     Urine Type Other     Urine Reflex to Culture Yes    Microscopic Urinalysis    Collection Time: 08/01/20  2:10 PM   Result Value Ref Range    Hyaline Casts, UA 3-5 (A) 0 - 2 /LPF    Mucus, UA 2+ (A) None Seen /LPF    Crystals, UA Few Ca.  Oxalate (A) None Seen /HPF    WBC, UA 49 (H) 0 - 5 /HPF    RBC, UA 1 0 - 4 /HPF    Epithelial Cells, UA 7 (H) 0 - 5 /HPF   Culture, Urine    Collection Time: 08/01/20  2:10 PM    Specimen: Urine, clean catch   Result Value Ref Range    Urine Culture, Routine No growth at 18 to 36 hours    TSH with Reflex    Collection Time: 08/01/20  2:25 PM   Result Value Ref Range    TSH 0.80 0.27 - 4.20 uIU/mL   Vitamin B12 & Folate    Collection Time: 08/01/20  2:25 PM   Result Value Ref Range    Vitamin B-12 680 211 - 911 pg/mL    Folate 14.94 4.78 - 24.20 ng/mL   POCT Glucose    Collection Time: 08/01/20  4:28 PM   Result Value Ref Range    POC Glucose 407 (H) 70 - 99 mg/dl    Performed on ACCU-CHEK    POCT Glucose    Collection Time: 08/01/20  7:30 PM   Result Value Ref Range    POC Glucose 322 (H) 70 - 99 mg/dl    Performed on ACCU-CHEK    POCT Glucose    Collection Time: 08/01/20 10:10 PM   Result Value Ref Range    POC Glucose 246 (H) 70 - 99 mg/dl    Performed on ACCU-CHEK    POCT Glucose    Collection Time: 08/02/20  3:31 AM   Result Value Ref Range    POC Glucose 117 (H) 70 - 99 mg/dl    Performed on ACCU-CHEK    Basic Metabolic Panel w/ Reflex to MG    Collection Time: 08/02/20  5:26 AM   Result Value Ref Range    Sodium 148 (H) 136 - 145 mmol/L    Potassium reflex Magnesium 3.3 (L) 3.5 - 5.1 mmol/L    Chloride 108 99 - 110 mmol/L    CO2 24 21 - 32 mmol/L    Anion Gap 16 3 - 16    Glucose 115 (H) 70 - 99 mg/dL    BUN 9 7 - 20 mg/dL    CREATININE 0.9 0.9 - 1.3 mg/dL    GFR Non-African American >60 >60    GFR African American >60 >60    Calcium 9.6 8.3 - 10.6 mg/dL   CBC auto differential    Collection Time: 08/02/20  5:26 AM   Result Value Ref Range    WBC 7.8 4.0 - 11.0 K/uL    RBC 4.26 4.20 - 5.90 M/uL    Hemoglobin 11.9 (L) 13.5 - 17.5 g/dL    Hematocrit 35.2 (L) 40.5 - 52.5 %    MCV 82.7 80.0 - 100.0 fL    MCH 28.0 26.0 - 34.0 pg    MCHC 33.8 31.0 - 36.0 g/dL    RDW 16.7 (H) 12.4 - 15.4 %    Platelets 206 682 - 384 K/uL    MPV 7.4 5.0 - 10.5 fL    Neutrophils % 61.4 %    Lymphocytes % 29.2 %    Monocytes % 5.7 %    Eosinophils % 2.8 %    Basophils % 0.9 %    Neutrophils Absolute 4.8 1.7 - 7.7 K/uL    Lymphocytes Absolute 2.3 1.0 - 5.1 K/uL    Monocytes Absolute 0.4 0.0 - 1.3 K/uL    Eosinophils Absolute 0.2 0.0 - 0.6 K/uL    Basophils Absolute 0.1 0.0 - 0.2 K/uL   Magnesium    Collection Time: 08/02/20  5:26 AM   Result Value Ref Range    Magnesium 1.70 (L) 1.80 - 2.40 mg/dL   Phosphorus    Collection Time: 08/02/20  5:26 AM   Result Value Ref Range    Phosphorus 3.3 2.5 - 4.9 mg/dL   Potassium    Collection Time: 08/02/20  5:26 AM   Result Value Ref Range    Potassium 3.3 (L) 3.5 - 5.1 mmol/L   POCT Glucose    Collection Time: 08/02/20  7:57 AM   Result Value Ref Range    POC Glucose 91 70 - 99 mg/dl    Performed on ACCU-CHEK    POCT Glucose    Collection Time: 08/02/20 11:42 AM   Result Value Ref Range    POC Glucose 118 (H) 70 - 99 mg/dl    Performed on ACCU-CHEK    POCT Glucose    Collection Time: 08/02/20  4:46 PM   Result Value Ref Range    POC Glucose 146 (H) 70 - 99 mg/dl    Performed on ACCU-CHEK    POCT Glucose    Collection Time: 08/02/20  8:11 PM   Result Value Ref Range    POC Glucose 151 (H) 70 - 99 mg/dl    Performed on ACCU-CHEK    POCT Glucose    Collection Time: 08/02/20 11:05 PM   Result Value Ref Range    POC Glucose 206 (H) 70 - 99 mg/dl    Performed on ACCU-CHEK    POCT Glucose    Collection Time: 08/03/20  4:08 AM   Result Value Ref Range    POC Glucose 105 (H) 70 - 99 mg/dl Performed on ACCU-CHEK    Basic Metabolic Panel w/ Reflex to MG    Collection Time: 08/03/20  6:10 AM   Result Value Ref Range    Sodium 148 (H) 136 - 145 mmol/L    Potassium reflex Magnesium 3.1 (L) 3.5 - 5.1 mmol/L    Chloride 107 99 - 110 mmol/L    CO2 24 21 - 32 mmol/L    Anion Gap 17 (H) 3 - 16    Glucose 93 70 - 99 mg/dL    BUN 11 7 - 20 mg/dL    CREATININE 0.8 (L) 0.9 - 1.3 mg/dL    GFR Non-African American >60 >60    GFR African American >60 >60    Calcium 9.4 8.3 - 10.6 mg/dL   CBC auto differential    Collection Time: 08/03/20  6:10 AM   Result Value Ref Range    WBC 6.9 4.0 - 11.0 K/uL    RBC 4.57 4.20 - 5.90 M/uL    Hemoglobin 12.5 (L) 13.5 - 17.5 g/dL    Hematocrit 38.0 (L) 40.5 - 52.5 %    MCV 83.2 80.0 - 100.0 fL    MCH 27.5 26.0 - 34.0 pg    MCHC 33.0 31.0 - 36.0 g/dL    RDW 16.4 (H) 12.4 - 15.4 %    Platelets 294 311 - 705 K/uL    MPV 8.0 5.0 - 10.5 fL    Neutrophils % 56.0 %    Lymphocytes % 34.6 %    Monocytes % 4.9 %    Eosinophils % 3.5 %    Basophils % 1.0 %    Neutrophils Absolute 3.8 1.7 - 7.7 K/uL    Lymphocytes Absolute 2.4 1.0 - 5.1 K/uL    Monocytes Absolute 0.3 0.0 - 1.3 K/uL    Eosinophils Absolute 0.2 0.0 - 0.6 K/uL    Basophils Absolute 0.1 0.0 - 0.2 K/uL   Magnesium    Collection Time: 08/03/20  6:10 AM   Result Value Ref Range    Magnesium 1.80 1.80 - 2.40 mg/dL   Phosphorus    Collection Time: 08/03/20  6:10 AM   Result Value Ref Range    Phosphorus 3.8 2.5 - 4.9 mg/dL   POCT Glucose    Collection Time: 08/03/20  7:54 AM   Result Value Ref Range    POC Glucose 183 (H) 70 - 99 mg/dl    Performed on ACCU-CHEK    POCT Glucose    Collection Time: 08/03/20 11:45 AM   Result Value Ref Range    POC Glucose 217 (H) 70 - 99 mg/dl    Performed on ACCU-CHEK    POCT Glucose    Collection Time: 08/03/20  4:01 PM   Result Value Ref Range    POC Glucose 86 70 - 99 mg/dl    Performed on ACCU-CHEK    POCT Glucose    Collection Time: 08/03/20  8:04 PM   Result Value Ref Range    POC Glucose 256 (H) 70 - 99 mg/dl    Performed on ACCU-CHEK    POCT Glucose    Collection Time: 08/04/20 12:01 AM   Result Value Ref Range    POC Glucose 145 (H) 70 - 99 mg/dl    Performed on ACCU-CHEK    POCT Glucose    Collection Time: 08/04/20  4:05 AM   Result Value Ref Range    POC Glucose 47 (LL) 70 - 99 mg/dl    Performed on ACCU-CHEK    POCT Glucose    Collection Time: 08/04/20  4:12 AM   Result Value Ref Range    POC Glucose 46 (LL) 70 - 99 mg/dl    Performed on ACCU-CHEK    POCT Glucose    Collection Time: 08/04/20  4:24 AM   Result Value Ref Range    POC Glucose 55 (L) 70 - 99 mg/dl    Performed on ACCU-CHEK    POCT Glucose    Collection Time: 08/04/20  4:41 AM   Result Value Ref Range    POC Glucose 87 70 - 99 mg/dl    Performed on ACCU-CHEK    POCT Glucose    Collection Time: 08/04/20  5:02 AM   Result Value Ref Range    POC Glucose 92 70 - 99 mg/dl    Performed on ACCU-CHEK    Basic Metabolic Panel w/ Reflex to MG    Collection Time: 08/04/20  5:50 AM   Result Value Ref Range    Sodium 143 136 - 145 mmol/L    Potassium reflex Magnesium 3.3 (L) 3.5 - 5.1 mmol/L    Chloride 103 99 - 110 mmol/L    CO2 25 21 - 32 mmol/L    Anion Gap 15 3 - 16    Glucose 199 (H) 70 - 99 mg/dL    BUN 10 7 - 20 mg/dL    CREATININE 0.9 0.9 - 1.3 mg/dL    GFR Non-African American >60 >60    GFR African American >60 >60    Calcium 9.3 8.3 - 10.6 mg/dL   CBC auto differential    Collection Time: 08/04/20  5:50 AM   Result Value Ref Range    WBC 6.2 4.0 - 11.0 K/uL    RBC 4.02 (L) 4.20 - 5.90 M/uL    Hemoglobin 11.1 (L) 13.5 - 17.5 g/dL    Hematocrit 33.2 (L) 40.5 - 52.5 %    MCV 82.5 80.0 - 100.0 fL    MCH 27.7 26.0 - 34.0 pg    MCHC 33.5 31.0 - 36.0 g/dL    RDW 16.4 (H) 12.4 - 15.4 %    Platelets 404 673 - 181 K/uL    MPV 7.9 5.0 - 10.5 fL    Neutrophils % 66.2 %    Lymphocytes % 25.2 %    Monocytes % 4.5 %    Eosinophils % 3.3 %    Basophils % 0.8 %    Neutrophils Absolute 4.1 1.7 - 7.7 K/uL    Lymphocytes Absolute 1.6 1.0 - 5.1 K/uL    Monocytes Absolute 0.3 0.0 - 1.3 K/uL    Eosinophils Absolute 0.2 0.0 - 0.6 K/uL    Basophils Absolute 0.0 0.0 - 0.2 K/uL   Magnesium    Collection Time: 08/04/20  5:50 AM   Result Value Ref Range    Magnesium 1.70 (L) 1.80 - 2.40 mg/dL   Phosphorus    Collection Time: 08/04/20  5:50 AM   Result Value Ref Range    Phosphorus 3.9 2.5 - 4.9 mg/dL   POCT Glucose    Collection Time: 08/04/20  7:59 AM   Result Value Ref Range    POC Glucose 241 (H) 70 - 99 mg/dl    Performed on ACCU-CHEK    POCT Glucose    Collection Time: 08/04/20 11:17 AM   Result Value Ref Range    POC Glucose 137 (H) 70 - 99 mg/dl    Performed on ACCU-CHEK    Basic Metabolic Panel w/ Reflex to MG    Collection Time: 08/04/20 11:45 AM   Result Value Ref Range    Sodium 145 136 - 145 mmol/L    Potassium reflex Magnesium 4.7 3.5 - 5.1 mmol/L    Chloride 108 99 - 110 mmol/L    CO2 23 21 - 32 mmol/L    Anion Gap 14 3 - 16    Glucose 112 (H) 70 - 99 mg/dL    BUN 8 7 - 20 mg/dL    CREATININE 0.9 0.9 - 1.3 mg/dL    GFR Non-African American >60 >60    GFR African American >60 >60    Calcium 9.6 8.3 - 10.6 mg/dL   Magnesium    Collection Time: 08/04/20 11:45 AM   Result Value Ref Range    Magnesium 1.80 1.80 - 2.40 mg/dL   POCT Glucose    Collection Time: 08/04/20  4:02 PM   Result Value Ref Range    POC Glucose 352 (H) 70 - 99 mg/dl    Performed on ACCU-CHEK    POCT Glucose    Collection Time: 08/04/20 10:04 PM   Result Value Ref Range    POC Glucose 436 (H) 70 - 99 mg/dl    Performed on ACCU-CHEK    POCT Glucose    Collection Time: 08/04/20 11:49 PM   Result Value Ref Range    POC Glucose 291 (H) 70 - 99 mg/dl    Performed on ACCU-CHEK    POCT Glucose    Collection Time: 08/05/20  4:17 AM   Result Value Ref Range    POC Glucose 56 (L) 70 - 99 mg/dl    Performed on ACCU-CHEK    POCT Glucose    Collection Time: 08/05/20  5:01 AM   Result Value Ref Range    POC Glucose 126 (H) 70 - 99 mg/dl    Performed on ACCU-CHEK    POCT Glucose    Collection Time: 08/05/20  5:14 AM Result Value Ref Range    POC Glucose 118 (H) 70 - 99 mg/dl    Performed on ACCU-CHEK    Basic Metabolic Panel w/ Reflex to MG    Collection Time: 08/05/20  5:45 AM   Result Value Ref Range    Sodium 144 136 - 145 mmol/L    Potassium reflex Magnesium 3.4 (L) 3.5 - 5.1 mmol/L    Chloride 106 99 - 110 mmol/L    CO2 27 21 - 32 mmol/L    Anion Gap 11 3 - 16    Glucose 97 70 - 99 mg/dL    BUN 7 7 - 20 mg/dL    CREATININE 0.9 0.9 - 1.3 mg/dL    GFR Non-African American >60 >60    GFR African American >60 >60    Calcium 9.4 8.3 - 10.6 mg/dL   CBC auto differential    Collection Time: 08/05/20  5:45 AM   Result Value Ref Range    WBC 6.3 4.0 - 11.0 K/uL    RBC 3.94 (L) 4.20 - 5.90 M/uL    Hemoglobin 10.8 (L) 13.5 - 17.5 g/dL    Hematocrit 32.6 (L) 40.5 - 52.5 %    MCV 82.7 80.0 - 100.0 fL    MCH 27.5 26.0 - 34.0 pg    MCHC 33.2 31.0 - 36.0 g/dL    RDW 16.7 (H) 12.4 - 15.4 %    Platelets 310 738 - 319 K/uL    MPV 8.0 5.0 - 10.5 fL    Neutrophils % 59.3 %    Lymphocytes % 30.8 %    Monocytes % 6.2 %    Eosinophils % 3.1 %    Basophils % 0.6 %    Neutrophils Absolute 3.7 1.7 - 7.7 K/uL    Lymphocytes Absolute 1.9 1.0 - 5.1 K/uL    Monocytes Absolute 0.4 0.0 - 1.3 K/uL    Eosinophils Absolute 0.2 0.0 - 0.6 K/uL    Basophils Absolute 0.0 0.0 - 0.2 K/uL   Magnesium    Collection Time: 08/05/20  5:45 AM   Result Value Ref Range    Magnesium 1.90 1.80 - 2.40 mg/dL   Phosphorus    Collection Time: 08/05/20  5:45 AM   Result Value Ref Range    Phosphorus 5.0 (H) 2.5 - 4.9 mg/dL   POCT Glucose    Collection Time: 08/05/20  7:50 AM   Result Value Ref Range    POC Glucose 54 (L) 70 - 99 mg/dl    Performed on ACCU-CHEK    POCT Glucose    Collection Time: 08/05/20  9:13 AM   Result Value Ref Range    POC Glucose 124 (H) 70 - 99 mg/dl    Performed on ACCU-CHEK    POCT Glucose    Collection Time: 08/05/20 12:04 PM   Result Value Ref Range    POC Glucose 267 (H) 70 - 99 mg/dl    Performed on ACCU-CHEK    POCT Glucose    Collection Time: 08/05/20  4:50 PM   Result Value Ref Range    POC Glucose 306 (H) 70 - 99 mg/dl    Performed on ACCU-CHEK    POCT Glucose    Collection Time: 08/06/20  1:36 AM   Result Value Ref Range    POC Glucose 451 (H) 70 - 99 mg/dl    Performed on ACCU-CHEK    POCT Glucose    Collection Time: 08/06/20  3:59 AM   Result Value Ref Range    POC Glucose 263 (H) 70 - 99 mg/dl    Performed on ACCU-CHEK    Basic Metabolic Panel w/ Reflex to MG    Collection Time: 08/06/20  6:50 AM   Result Value Ref Range    Sodium 144 136 - 145 mmol/L    Potassium reflex Magnesium 3.9 3.5 - 5.1 mmol/L    Chloride 105 99 - 110 mmol/L    CO2 28 21 - 32 mmol/L    Anion Gap 11 3 - 16    Glucose 48 (LL) 70 - 99 mg/dL    BUN 8 7 - 20 mg/dL    CREATININE 0.7 (L) 0.9 - 1.3 mg/dL    GFR Non-African American >60 >60    GFR African American >60 >60    Calcium 9.4 8.3 - 10.6 mg/dL   CBC auto differential    Collection Time: 08/06/20  6:50 AM   Result Value Ref Range    WBC 6.8 4.0 - 11.0 K/uL    RBC 4.37 4.20 - 5.90 M/uL    Hemoglobin 11.9 (L) 13.5 - 17.5 g/dL    Hematocrit 36.3 (L) 40.5 - 52.5 %    MCV 83.0 80.0 - 100.0 fL    MCH 27.3 26.0 - 34.0 pg    MCHC 32.9 31.0 - 36.0 g/dL    RDW 16.2 (H) 12.4 - 15.4 %    Platelets 990 311 - 717 K/uL    MPV 8.4 5.0 - 10.5 fL    Neutrophils % 48.4 %    Lymphocytes % 42.1 %    Monocytes % 5.4 %    Eosinophils % 3.4 %    Basophils % 0.7 %    Neutrophils Absolute 3.3 1.7 - 7.7 K/uL    Lymphocytes Absolute 2.9 1.0 - 5.1 K/uL    Monocytes Absolute 0.4 0.0 - 1.3 K/uL    Eosinophils Absolute 0.2 0.0 - 0.6 K/uL    Basophils Absolute 0.0 0.0 - 0.2 K/uL   Magnesium    Collection Time: 08/06/20  6:50 AM   Result Value Ref Range    Magnesium 2.00 1.80 - 2.40 mg/dL   Phosphorus    Collection Time: 08/06/20  6:50 AM   Result Value Ref Range    Phosphorus 2.7 2.5 - 4.9 mg/dL   POCT Glucose    Collection Time: 08/06/20 10:32 AM   Result Value Ref Range    POC Glucose 253 (H) 70 - 99 mg/dl    Performed on ACCU-CHEK    POCT Glucose Collection Time: 08/06/20 11:32 AM   Result Value Ref Range    POC Glucose 319 (H) 70 - 99 mg/dl    Performed on ACCU-CHEK    Urine Reflex to Culture    Collection Time: 08/06/20  2:54 PM    Specimen: Urine, clean catch   Result Value Ref Range    Color, UA YELLOW Straw/Yellow    Clarity, UA Clear Clear    Glucose, Ur >=1000 (A) Negative mg/dL    Bilirubin Urine Negative Negative    Ketones, Urine TRACE (A) Negative mg/dL    Specific Gravity, UA 1.028 1.005 - 1.030    Blood, Urine Negative Negative    pH, UA 5.5 5.0 - 8.0    Protein, UA Negative Negative mg/dL    Urobilinogen, Urine 0.2 <2.0 E.U./dL    Nitrite, Urine Negative Negative    Leukocyte Esterase, Urine Negative Negative    Microscopic Examination Not Indicated     Urine Type NotGiven     Urine Reflex to Culture Not Indicated    POCT Glucose    Collection Time: 08/06/20  3:25 PM   Result Value Ref Range    POC Glucose 233 (H) 70 - 99 mg/dl    Performed on ACCU-CHEK    POCT Glucose    Collection Time: 08/06/20  5:22 PM   Result Value Ref Range    POC Glucose 273 (H) 70 - 99 mg/dl    Performed on ACCU-CHEK    Basic Metabolic Panel w/ Reflex to MG    Collection Time: 08/07/20  4:30 AM   Result Value Ref Range    Sodium 137 136 - 145 mmol/L    Potassium reflex Magnesium 4.3 3.5 - 5.1 mmol/L    Chloride 100 99 - 110 mmol/L    CO2 29 21 - 32 mmol/L    Anion Gap 8 3 - 16    Glucose 285 (H) 70 - 99 mg/dL    BUN 12 7 - 20 mg/dL    CREATININE 0.9 0.9 - 1.3 mg/dL    GFR Non-African American >60 >60    GFR African American >60 >60    Calcium 9.3 8.3 - 10.6 mg/dL   CBC auto differential    Collection Time: 08/07/20  4:30 AM   Result Value Ref Range    WBC 5.7 4.0 - 11.0 K/uL    RBC 4.03 (L) 4.20 - 5.90 M/uL    Hemoglobin 11.1 (L) 13.5 - 17.5 g/dL    Hematocrit 33.4 (L) 40.5 - 52.5 %    MCV 82.9 80.0 - 100.0 fL    MCH 27.6 26.0 - 34.0 pg    MCHC 33.2 31.0 - 36.0 g/dL    RDW 16.4 (H) 12.4 - 15.4 %    Platelets 207 465 - 628 K/uL    MPV 8.7 5.0 - 10.5 fL    Neutrophils % 58.0 %    Lymphocytes % 33.8 %    Monocytes % 4.4 %    Eosinophils % 2.9 %    Basophils % 0.9 %    Neutrophils Absolute 3.3 1.7 - 7.7 K/uL    Lymphocytes Absolute 1.9 1.0 - 5.1 K/uL    Monocytes Absolute 0.2 0.0 - 1.3 K/uL    Eosinophils Absolute 0.2 0.0 - 0.6 K/uL    Basophils Absolute 0.1 0.0 - 0.2 K/uL   Magnesium    Collection Time: 08/07/20  4:30 AM   Result Value Ref Range    Magnesium 1.90 1.80 - 2.40 mg/dL   Phosphorus    Collection Time: 08/07/20  4:30 AM   Result Value Ref Range    Phosphorus 2.1 (L) 2.5 - 4.9 mg/dL   POCT Glucose    Collection Time: 08/07/20  7:38 AM   Result Value Ref Range    POC Glucose 203 (H) 70 - 99 mg/dl    Performed on ACCU-CHEK    POCT Glucose    Collection Time: 08/07/20 11:20 AM   Result Value Ref Range    POC Glucose 227 (H) 70 - 99 mg/dl    Performed on ACCU-CHEK        Current Facility-Administered Medications   Medication Dose Route Frequency Provider Last Rate Last Dose    sodium phosphate 15 mmol in dextrose 5 % 250 mL IVPB  15 mmol Intravenous Once ESTEPHANIA Ross NP 62.5 mL/hr at 08/07/20 1112 15 mmol at 08/07/20 1112    divalproex (DEPAKOTE SPRINKLE) capsule 500 mg  500 mg Oral BID ESTEPHANIA Wilkins CNP   500 mg at 08/07/20 7140    ziprasidone (GEODON) injection 10 mg  10 mg Intramuscular Q12H PRN Tracy Hodges MD        insulin glargine (LANTUS) injection vial 8 Units  8 Units Subcutaneous Nightly ESTEPHANIA Wilkins CNP   8 Units at 08/06/20 2149    insulin lispro (HUMALOG) injection vial 2 Units  2 Units Subcutaneous PRN ESTEPHANIA Wilkins CNP        insulin lispro (HUMALOG) injection vial 0-6 Units  0-6 Units Subcutaneous TID  ESTEPHANIA Wilkins CNP   2 Units at 08/07/20 1126    potassium chloride (KLOR-CON M) extended release tablet 40 mEq  40 mEq Oral PRN ESTEPHANIA Wilkins CNP   40 mEq at 08/05/20 7220    Or    potassium bicarb-citric acid (EFFER-K) effervescent tablet 40 mEq  40 mEq Oral PRN Avalos Bonus, APRN - CNP        Or    potassium chloride 10 mEq/100 mL IVPB (Peripheral Line)  10 mEq Intravenous PRN Orville Kocher, APRN - CNP        LORazepam (ATIVAN) injection 1 mg  1 mg Intravenous Q6H Remington Escamilla MD   1 mg at 08/07/20 0436    zolpidem (AMBIEN) tablet 10 mg  10 mg Oral Daily Remington Escamilla MD   10 mg at 08/07/20 0819    cefTRIAXone (ROCEPHIN) 1 g IVPB in 50 mL D5W minibag  1 g Intravenous Q24H Orville Kocher, APRN - CNP   Stopped at 08/06/20 1521    sodium chloride flush 0.9 % injection 10 mL  10 mL Intravenous 2 times per day Skyla Rocha APRN - CNP   10 mL at 08/07/20 0820    sodium chloride flush 0.9 % injection 10 mL  10 mL Intravenous PRN ESTEPHANIA Hdez CNP        acetaminophen (TYLENOL) tablet 650 mg  650 mg Oral Q4H PRN Yari Ernandez MD   650 mg at 07/24/20 6851    Or    acetaminophen (TYLENOL) suppository 650 mg  650 mg Rectal Q4H PRN Yari Ernandez MD        polyethylene glycol [facility]) packet 17 g  17 g Oral Daily PRN Yari Ernandez MD   17 g at 08/04/20 1754    ondansetron (ZOFRAN) injection 4 mg  4 mg Intravenous Q4H PRN Yari Ernandez MD   4 mg at 08/01/20 1606    glucose (GLUTOSE) 40 % oral gel 15 g  15 g Oral PRN Yari Ernandez MD   15 g at 08/04/20 0427    dextrose 50 % IV solution  12.5 g Intravenous PRN Yari Ernandez MD   12.5 g at 08/05/20 0437    glucagon (rDNA) injection 1 mg  1 mg Intramuscular PRN Yari Ernandez MD        dextrose 5 % solution  100 mL/hr Intravenous PRN Yari Ernandez MD        enoxaparin (LOVENOX) injection 40 mg  40 mg Subcutaneous Daily Yari Ernandez MD   40 mg at 08/07/20 0807    pantoprazole (PROTONIX) injection 40 mg  40 mg Intravenous Daily ESTEPHANIA Hdez - CNP   40 mg at 08/07/20 7766    And    sodium chloride (PF) 0.9 % injection 10 mL  10 mL Intravenous Daily ESTEPHANIA Hdez - CNP   10 mL at 08/07/20 0819    magnesium sulfate 1 g in dextrose 5% 100 mL IVPB  1 g Intravenous PRN Gladis ELAINE Sandro Powell, APRN - CNP   Stopped at 07/30/20 1502    potassium chloride 20 mEq/50 mL IVPB (Central Line)  20 mEq Intravenous PRN Inez Hayden APRN - CNP 50 mL/hr at 08/04/20 0939 20 mEq at 08/04/20 0939    sodium phosphate 11.07 mmol in dextrose 5 % 250 mL IVPB  0.16 mmol/kg Intravenous PRN Inez Hayden APRN - CNP        Or    sodium phosphate 22.11 mmol in dextrose 5 % 250 mL IVPB  0.32 mmol/kg Intravenous PRN Inez Hayden APRN - CNP         ROS:  Could not assess, no speech    MSE:  A-under covers, no gown, little movement  A-sleepy? M-can't assess, no speech  S-impaired  I/J-impaired  T-No speech today. Recs:  1. Catatonic delirium-Perhaps pt is too sedated now in the AM?  I'll split ambien to 5 bid. Cont ativan 1 q6. We'll try a bit of zyprexa zydis 5 as pt isn't getting much better in last couple days. I'll try to get another MRI and EEG. Sometimes antipsychotics can make catatonia worse, so we'll need to proceed cautiously. Geodon 10 im prn for agitation, see how this works, maybe increase to 20 if it seems to help.  bid from neuro. At this point if medically clear anytime soon, this pt will need psych admit.

## 2020-08-07 NOTE — PROGRESS NOTES
Order for PICC line from [address] Gagandeep Waterman NP due to pt pulling out his original PICC line in [facility]. Unable to place PICC line at this time. Upon arrival into pt's room, pt is visually agitated and confused. Pt not oriented at all to the situation and has blanket over his head and arms and will not hold his arm out at all to allow for placement. Pt not oriented enough to follow commands. Pt will either need much more sedation or will need to be an IR referral if he needs a central line.

## 2020-08-07 NOTE — PLAN OF CARE
status will improve  8/7/2020 1223 by Donna Marina RN  Outcome: Ongoing  8/7/2020 0044 by Monica Deng RN  Outcome: Not Met This Shift  Note: Patient still hiding underneath the blankets and refusing fingers sticks and sometimes vitals.

## 2020-08-07 NOTE — PLAN OF CARE
Problem: Nutrition  Goal: Optimal nutrition therapy  8/7/2020 1052 by Milli Almaguer RD, LD  Outcome: Ongoing  8/7/2020 0044 by Yuki Bridges RN  Outcome: Ongoing   Nutrition Problem #1: Inadequate oral intake  Intervention: Food and/or Nutrient Delivery: Continue Current Diet, Continue Oral Nutrition Supplement  Nutritional Goals:  Tolerate most appropriate nutrition therapy

## 2020-08-07 NOTE — PROGRESS NOTES
Comprehensive Nutrition Assessment    Type and Reason for Visit:  Reassess    Nutrition Recommendations/Plan:   Continue Dental soft diet. Glucerna and magic cup ordered. May need to consider nutrition support d/t <50% intakes >2 weeks. Nutrition Assessment:  Pt continues with not much improvement from nutrition standpoint. Pt intakes are varied depending on pt's mood. Pt had been refusing finger stick for BS. Noted elevated glu levels. Wt has been trending down since admit. Nutrition support would be appropriate d/t intakes <50%.     Malnutrition Assessment:  Malnutrition Status:  Severe malnutrition    Context:  Acute Illness     Findings of the 6 clinical characteristics of malnutrition:  Energy Intake:  7 - 50% or less of estimated energy requirements for 5 or more days  Weight Loss:  1 - 1% to 2% over 1 week     Body Fat Loss:  Unable to assess     Muscle Mass Loss:  Unable to assess    Fluid Accumulation:  No significant fluid accumulation     Strength:  Not Performed    Estimated Daily Nutrient Needs:  Energy (kcal):  9810-1813 kcal (25-30 kcal/kg ABW)  Protein (g):   gm (1.2-2 gm/kg ABW)  Fluid (ml/day):  per MD    Nutrition Related Findings:  BM 8/6; elevated gluc      Wounds:  None       Current Nutrition Therapies:    DIET DENTAL SOFT;  Dietary Nutrition Supplements: Diabetic Oral Supplement  Dietary Nutrition Supplements: Frozen Oral Supplement    Anthropometric Measures:  · Height: 6' 2\" (188 cm)  · Current Body Weight: 130 lb (59 kg)   · Admission Body Weight: 160 lb (72.6 kg)    · Usual Body Weight: 160 lb (72.6 kg)     · Ideal Body Weight: 190 lbs; % Ideal Body Weight 68.4 %   · BMI: 16.7  · BMI Categories: Underweight (BMI less than 18.5)       Nutrition Diagnosis:   · Inadequate oral intake related to cognitive or neurological impairment as evidenced by poor intake prior to admission      Nutrition Interventions:   Food and/or Nutrient Delivery:  Continue Current Diet, Continue

## 2020-08-07 NOTE — PROGRESS NOTES
Suyapa took note of expression and speech during shift. . When asked if he played sports in high school, patient replied \"No, I didn't\" and when told he was fast he laughed. Down at MRI, patient stated \"I just want to leave. \" When asked if he wanted to eat some of his lunch now or wait for dinner patient stated \"I want to wait. \" He has started to say thank you when helped with tasks, like wiping. It seems that there is a little bit better communication, and some mild understanding. When he jumps up to go sit on the toilet, he will wait if told firmly to wait so sitter can grab IV pole.      Per suyapa who has been with him multiple times, today he has showed improvement in accurately saying yes and no to food when he wants it and he is showing more body awareness (Untangling the IV from around his legs whenever he lies down)

## 2020-08-07 NOTE — PROGRESS NOTES
Patient is in better moods this morning. He tolerated all med with no issues. He is tolerating breakfast while this nurse feeds him; wants the blanket to be over his head while eating. Patient stated \"that what I am talking about\" when given a spoon of hash browns. Fall prec in place. Cont monitoring.  Electronically signed by Gurwinder Joe RN on 8/7/2020 at 8:22 AM

## 2020-08-08 LAB
GLUCOSE BLD-MCNC: 135 MG/DL (ref 70–99)
GLUCOSE BLD-MCNC: 155 MG/DL (ref 70–99)
GLUCOSE BLD-MCNC: 226 MG/DL (ref 70–99)
GLUCOSE BLD-MCNC: 275 MG/DL (ref 70–99)
GLUCOSE BLD-MCNC: 448 MG/DL (ref 70–99)
GLUCOSE BLD-MCNC: 463 MG/DL (ref 70–99)
GLUCOSE BLD-MCNC: 475 MG/DL (ref 70–99)
GLUCOSE BLD-MCNC: 504 MG/DL (ref 70–99)
PERFORMED ON: ABNORMAL

## 2020-08-08 PROCEDURE — 6360000002 HC RX W HCPCS: Performed by: NURSE PRACTITIONER

## 2020-08-08 PROCEDURE — 6360000002 HC RX W HCPCS: Performed by: INTERNAL MEDICINE

## 2020-08-08 PROCEDURE — 6370000000 HC RX 637 (ALT 250 FOR IP): Performed by: NURSE PRACTITIONER

## 2020-08-08 PROCEDURE — 1200000000 HC SEMI PRIVATE

## 2020-08-08 PROCEDURE — 6370000000 HC RX 637 (ALT 250 FOR IP): Performed by: PSYCHIATRY & NEUROLOGY

## 2020-08-08 RX ORDER — LORAZEPAM 2 MG/ML
1 INJECTION INTRAMUSCULAR ONCE
Status: COMPLETED | OUTPATIENT
Start: 2020-08-08 | End: 2020-08-08

## 2020-08-08 RX ORDER — INSULIN GLARGINE 100 [IU]/ML
10 INJECTION, SOLUTION SUBCUTANEOUS NIGHTLY
Status: DISCONTINUED | OUTPATIENT
Start: 2020-08-08 | End: 2020-08-11 | Stop reason: HOSPADM

## 2020-08-08 RX ADMIN — ZOLPIDEM TARTRATE 5 MG: 5 TABLET ORAL at 21:36

## 2020-08-08 RX ADMIN — Medication 1 CAPSULE: at 17:20

## 2020-08-08 RX ADMIN — DIVALPROEX SODIUM 500 MG: 125 CAPSULE ORAL at 12:00

## 2020-08-08 RX ADMIN — INSULIN HUMAN 10 UNITS: 100 INJECTION, SOLUTION PARENTERAL at 02:56

## 2020-08-08 RX ADMIN — INSULIN LISPRO 6 UNITS: 100 INJECTION, SOLUTION INTRAVENOUS; SUBCUTANEOUS at 21:53

## 2020-08-08 RX ADMIN — INSULIN LISPRO 4 UNITS: 100 INJECTION, SOLUTION INTRAVENOUS; SUBCUTANEOUS at 17:20

## 2020-08-08 RX ADMIN — OLANZAPINE 5 MG: 5 TABLET, ORALLY DISINTEGRATING ORAL at 21:37

## 2020-08-08 RX ADMIN — INSULIN GLARGINE 10 UNITS: 100 INJECTION, SOLUTION SUBCUTANEOUS at 21:37

## 2020-08-08 RX ADMIN — DIVALPROEX SODIUM 500 MG: 125 CAPSULE ORAL at 21:36

## 2020-08-08 RX ADMIN — LORAZEPAM 1 MG: 2 INJECTION INTRAMUSCULAR; INTRAVENOUS at 10:19

## 2020-08-08 RX ADMIN — INSULIN LISPRO 6 UNITS: 100 INJECTION, SOLUTION INTRAVENOUS; SUBCUTANEOUS at 17:19

## 2020-08-08 RX ADMIN — INSULIN LISPRO 2 UNITS: 100 INJECTION, SOLUTION INTRAVENOUS; SUBCUTANEOUS at 08:49

## 2020-08-08 RX ADMIN — ENOXAPARIN SODIUM 40 MG: 40 INJECTION SUBCUTANEOUS at 09:39

## 2020-08-08 RX ADMIN — ZOLPIDEM TARTRATE 5 MG: 5 TABLET ORAL at 12:00

## 2020-08-08 ASSESSMENT — PAIN SCALES - GENERAL
PAINLEVEL_OUTOF10: 0
PAINLEVEL_OUTOF10: 0

## 2020-08-08 ASSESSMENT — PAIN SCALES - WONG BAKER
WONGBAKER_NUMERICALRESPONSE: 0

## 2020-08-08 NOTE — PROGRESS NOTES
status. Past Medical History:        Diagnosis Date    Diabetes mellitus ([facility] [address]     Gastroparesis     Hypertension        Past Surgical History:        Procedure Laterality Date    BONY PELVIS SURGERY      FOOT AMPUTATION Right 5/13/2020    EMERGENCY; RIGHT INCISION AND DEBRIDEMENT; HALUX AMPUTATION performed by Curtis Hernandez DPM at [address] 2006    pins and rods- plate    UPPER GASTROINTESTINAL ENDOSCOPY N/A 12/2/2019    EGD BIOPSY performed by Titus Freed MD at [facility]:  Ketorolac; Morphine; Morphine and related; Tramadol; Lisinopril; Haloperidol lactate; Toradol [ketorolac tromethamine]; and Vicodin [hydrocodone-acetaminophen]    Past medical and surgical history reviewed. Any changes have been noted. PHYSICAL EXAM:  BP 95/61   Pulse 90   Temp 98.1 °F (36.7 °C) (Axillary)   Resp 16   Ht 6' 2\" (1.88 m)   Wt 127 lb 13.9 oz (58 kg)   SpO2 96%   BMI 16.42 kg/m²       Intake/Output Summary (Last 24 hours) at 8/8/2020 0930  Last data filed at 8/8/2020 0504  Gross per 24 hour   Intake 1080 ml   Output --   Net 1080 ml     General: Alert. Resting in bed in no apparent distress. Rolls away from people with the cover over his head. RN at bedside. HEENT: Normocephalic. Atraumatic. Pupils equal and reactive. EOM intact. Oral mucosa pink/moist/intact. Neck: Supple. Symmetrical. Trachea midline. Lungs: Clear to auscultation bilaterally. Respirations even and unlabored. Chest: Exam unremarkable. Cardiac: S1/S2 noted. Regular Rhythm and rate. Abdomen/GI: Soft. Non-tender. Non-distended. BS+. Extremities: PP+. Atraumatic. No redness/cyanosis/edema noted. Brisk cap refill. Skin: Dry and intact. No lesions noted. Neuro: Grossly intact.  No focal deficits noted, though difficult to say for sure given lack of participation     LABS:    Lab Results   Component Value Date    WBC 5.7 08/07/2020    HGB 11.1 (L) 08/07/2020    HCT 33.4 (L) 08/07/2020    MCV

## 2020-08-08 NOTE — PROGRESS NOTES
Patient refused oral medication this morning. Nurse explained and attempted x 3 to administer oral medications. Pt yelled \" no\" and covered his head. Patient also refused to allow nurse to reinsert IV. Sitter remains at bedside. New order received from NP.  Will continue to monitor

## 2020-08-08 NOTE — PROGRESS NOTES
Pt is refusing this nurse to place IV at this time, also refusing all night medications. MD is made aware.

## 2020-08-08 NOTE — PROGRESS NOTES
Patient remains alert unable to assess orientation. able  Follows simple directions at times. Speech is incomprehensible. Pt is up ambulating in [place] with sitter, attempting to go in occupied rooms, was able to re-direct . Patient remains without IV access,d/t pt pulling away when access is attempted. Np is aware.

## 2020-08-08 NOTE — PROGRESS NOTES
AAO1 No falls noted this shift. Patient ambulates with x1 staff assistance without difficulty. Bed kept in low position. Safe environment maintained. Bedside table & call light in reach. Uses call light appropriately when needing assistance. Vitals signs are stable.   Intake and output are being recorded, will continue to monitor

## 2020-08-08 NOTE — PROGRESS NOTES
Patient prefers to reman naked at all times. Takes off a gown and pants as soon as assisted to put them on. Educate ineffective. Patient remains in his room and walks completely naked.

## 2020-08-08 NOTE — PLAN OF CARE
Problem: Pain:  Goal: Pain level will decrease  Description: Pain level will decrease  8/7/2020 2349 by Teri Ross RN  Outcome: Ongoing  Note: Pain /discomfort being managed with PRN analgesics per MD orders. Patient able to express presence and absence of pain and rate pain appropriately using numerical scale.      8/7/2020 1223 by Gurwinder Joe RN  Outcome: Ongoing  Goal: Control of acute pain  Description: Control of acute pain  8/7/2020 2349 by Teri Ross RN  Outcome: Ongoing  8/7/2020 1223 by Gurwinder Joe RN  Outcome: Ongoing  Goal: Control of chronic pain  Description: Control of chronic pain  8/7/2020 2349 by Teri Ross RN  Outcome: Ongoing  8/7/2020 1223 by Gurwinder Joe RN  Outcome: Ongoing     Problem: Nutrition  Goal: Optimal nutrition therapy  8/7/2020 2349 by Teri Ross RN  Outcome: Ongoing  8/7/2020 1223 by Gurwinder Joe RN  Outcome: Ongoing  8/7/2020 1052 by Milli Almaguer RD, LD  Outcome: Ongoing     Problem: Skin Integrity:  Goal: Will show no infection signs and symptoms  Description: Will show no infection signs and symptoms  8/7/2020 2349 by Teri Ross RN  Outcome: Ongoing  8/7/2020 1223 by Gurwinder Joe RN  Outcome: Ongoing  Goal: Absence of new skin breakdown  Description: Absence of new skin breakdown  8/7/2020 2349 by Teri Ross RN  Outcome: Ongoing  8/7/2020 1223 by Gurwinder Joe RN  Outcome: Ongoing     Problem: Falls - Risk of:  Goal: Will remain free from falls  Description: Will remain free from falls  8/7/2020 2349 by Teri Ross RN  Outcome: Ongoing  8/7/2020 1223 by Gurwinder Joe RN  Outcome: Ongoing  Goal: Absence of physical injury  Description: Absence of physical injury  8/7/2020 2349 by Teri Ross RN  Outcome: Ongoing  8/7/2020 1223 by Gurwinder Joe RN  Outcome: Ongoing     Problem: Nutrition Deficit:  Goal: Ability to achieve adequate nutritional intake will improve  Description: Ability to achieve adequate nutritional intake will improve  8/7/2020 2349 by Kathleen Gomez RN  Outcome: Ongoing  8/7/2020 1223 by Lorri Rangel RN  Outcome: Ongoing     Problem: Mental Status - Impaired:  Goal: Mental status will improve  Description: Mental status will improve  8/7/2020 2349 by Kathleen Gomez RN  Outcome: Ongoing  8/7/2020 1223 by Lorri Rangel RN  Outcome: Ongoing

## 2020-08-08 NOTE — PROGRESS NOTES
Checking on patient Q2H for nutrition needs, hygiene needs, comfort measures, mobility, fall risk interventions, and safe environment. All precautions and interventions in place. Call light/telephone in reach.

## 2020-08-09 LAB
ANION GAP SERPL CALCULATED.3IONS-SCNC: 11 MMOL/L (ref 3–16)
BASOPHILS ABSOLUTE: 0 K/UL (ref 0–0.2)
BASOPHILS RELATIVE PERCENT: 0.7 %
BUN BLDV-MCNC: 11 MG/DL (ref 7–20)
CALCIUM SERPL-MCNC: 9.2 MG/DL (ref 8.3–10.6)
CHLORIDE BLD-SCNC: 100 MMOL/L (ref 99–110)
CO2: 27 MMOL/L (ref 21–32)
CREAT SERPL-MCNC: 0.7 MG/DL (ref 0.9–1.3)
EOSINOPHILS ABSOLUTE: 0.2 K/UL (ref 0–0.6)
EOSINOPHILS RELATIVE PERCENT: 2.5 %
GFR AFRICAN AMERICAN: >60
GFR NON-AFRICAN AMERICAN: >60
GLUCOSE BLD-MCNC: 145 MG/DL (ref 70–99)
GLUCOSE BLD-MCNC: 148 MG/DL (ref 70–99)
GLUCOSE BLD-MCNC: 153 MG/DL (ref 70–99)
GLUCOSE BLD-MCNC: 195 MG/DL (ref 70–99)
GLUCOSE BLD-MCNC: 231 MG/DL (ref 70–99)
GLUCOSE BLD-MCNC: 51 MG/DL (ref 70–99)
GLUCOSE BLD-MCNC: 78 MG/DL (ref 70–99)
HCT VFR BLD CALC: 34.2 % (ref 40.5–52.5)
HEMOGLOBIN: 11.5 G/DL (ref 13.5–17.5)
LYMPHOCYTES ABSOLUTE: 2.6 K/UL (ref 1–5.1)
LYMPHOCYTES RELATIVE PERCENT: 39.5 %
MAGNESIUM: 1.9 MG/DL (ref 1.8–2.4)
MCH RBC QN AUTO: 27.4 PG (ref 26–34)
MCHC RBC AUTO-ENTMCNC: 33.5 G/DL (ref 31–36)
MCV RBC AUTO: 81.9 FL (ref 80–100)
MONOCYTES ABSOLUTE: 0.4 K/UL (ref 0–1.3)
MONOCYTES RELATIVE PERCENT: 5.5 %
NEUTROPHILS ABSOLUTE: 3.5 K/UL (ref 1.7–7.7)
NEUTROPHILS RELATIVE PERCENT: 51.8 %
PDW BLD-RTO: 16.2 % (ref 12.4–15.4)
PERFORMED ON: ABNORMAL
PERFORMED ON: NORMAL
PHOSPHORUS: 3.8 MG/DL (ref 2.5–4.9)
PLATELET # BLD: 259 K/UL (ref 135–450)
PMV BLD AUTO: 8.3 FL (ref 5–10.5)
POTASSIUM REFLEX MAGNESIUM: 3.8 MMOL/L (ref 3.5–5.1)
RBC # BLD: 4.18 M/UL (ref 4.2–5.9)
SODIUM BLD-SCNC: 138 MMOL/L (ref 136–145)
WBC # BLD: 6.7 K/UL (ref 4–11)

## 2020-08-09 PROCEDURE — 2580000003 HC RX 258: Performed by: NURSE PRACTITIONER

## 2020-08-09 PROCEDURE — 6370000000 HC RX 637 (ALT 250 FOR IP): Performed by: PSYCHIATRY & NEUROLOGY

## 2020-08-09 PROCEDURE — 6370000000 HC RX 637 (ALT 250 FOR IP): Performed by: NURSE PRACTITIONER

## 2020-08-09 PROCEDURE — 94760 N-INVAS EAR/PLS OXIMETRY 1: CPT

## 2020-08-09 PROCEDURE — 1200000000 HC SEMI PRIVATE

## 2020-08-09 PROCEDURE — 80048 BASIC METABOLIC PNL TOTAL CA: CPT

## 2020-08-09 PROCEDURE — 83735 ASSAY OF MAGNESIUM: CPT

## 2020-08-09 PROCEDURE — 85025 COMPLETE CBC W/AUTO DIFF WBC: CPT

## 2020-08-09 PROCEDURE — 99231 SBSQ HOSP IP/OBS SF/LOW 25: CPT | Performed by: PSYCHIATRY & NEUROLOGY

## 2020-08-09 PROCEDURE — 6360000002 HC RX W HCPCS: Performed by: INTERNAL MEDICINE

## 2020-08-09 PROCEDURE — 36415 COLL VENOUS BLD VENIPUNCTURE: CPT

## 2020-08-09 PROCEDURE — 6370000000 HC RX 637 (ALT 250 FOR IP): Performed by: INTERNAL MEDICINE

## 2020-08-09 PROCEDURE — 84100 ASSAY OF PHOSPHORUS: CPT

## 2020-08-09 RX ORDER — LORAZEPAM 0.5 MG/1
0.5 TABLET ORAL EVERY 6 HOURS SCHEDULED
Status: DISCONTINUED | OUTPATIENT
Start: 2020-08-09 | End: 2020-08-11 | Stop reason: HOSPADM

## 2020-08-09 RX ADMIN — ENOXAPARIN SODIUM 40 MG: 40 INJECTION SUBCUTANEOUS at 09:07

## 2020-08-09 RX ADMIN — DEXTROSE 15 G: 15 GEL ORAL at 17:11

## 2020-08-09 RX ADMIN — ZOLPIDEM TARTRATE 5 MG: 5 TABLET ORAL at 09:05

## 2020-08-09 RX ADMIN — DIVALPROEX SODIUM 500 MG: 125 CAPSULE ORAL at 09:05

## 2020-08-09 RX ADMIN — LORAZEPAM 0.5 MG: 0.5 TABLET ORAL at 22:55

## 2020-08-09 RX ADMIN — INSULIN LISPRO 4 UNITS: 100 INJECTION, SOLUTION INTRAVENOUS; SUBCUTANEOUS at 12:22

## 2020-08-09 RX ADMIN — ZOLPIDEM TARTRATE 5 MG: 5 TABLET ORAL at 22:47

## 2020-08-09 RX ADMIN — Medication 1 CAPSULE: at 17:41

## 2020-08-09 RX ADMIN — INSULIN LISPRO 4 UNITS: 100 INJECTION, SOLUTION INTRAVENOUS; SUBCUTANEOUS at 09:17

## 2020-08-09 RX ADMIN — INSULIN LISPRO 2 UNITS: 100 INJECTION, SOLUTION INTRAVENOUS; SUBCUTANEOUS at 22:48

## 2020-08-09 RX ADMIN — INSULIN LISPRO 1 UNITS: 100 INJECTION, SOLUTION INTRAVENOUS; SUBCUTANEOUS at 12:21

## 2020-08-09 RX ADMIN — Medication 1 CAPSULE: at 09:17

## 2020-08-09 RX ADMIN — LORAZEPAM 0.5 MG: 0.5 TABLET ORAL at 17:41

## 2020-08-09 RX ADMIN — INSULIN GLARGINE 10 UNITS: 100 INJECTION, SOLUTION SUBCUTANEOUS at 22:48

## 2020-08-09 RX ADMIN — Medication 10 ML: at 22:50

## 2020-08-09 RX ADMIN — OLANZAPINE 5 MG: 5 TABLET, ORALLY DISINTEGRATING ORAL at 22:47

## 2020-08-09 RX ADMIN — INSULIN LISPRO 1 UNITS: 100 INJECTION, SOLUTION INTRAVENOUS; SUBCUTANEOUS at 08:00

## 2020-08-09 RX ADMIN — DIVALPROEX SODIUM 500 MG: 125 CAPSULE ORAL at 22:47

## 2020-08-09 ASSESSMENT — PAIN SCALES - WONG BAKER
WONGBAKER_NUMERICALRESPONSE: 0

## 2020-08-09 NOTE — PLAN OF CARE
Problem: Pain:  Goal: Pain level will decrease  Description: Pain level will decrease  Outcome: Ongoing  Goal: Control of acute pain  Description: Control of acute pain  Outcome: Ongoing  Goal: Control of chronic pain  Description: Control of chronic pain  Outcome: Ongoing     Problem: Nutrition  Goal: Optimal nutrition therapy  Outcome: Ongoing     Problem: Skin Integrity:  Goal: Will show no infection signs and symptoms  Description: Will show no infection signs and symptoms  Outcome: Ongoing  Goal: Absence of new skin breakdown  Description: Absence of new skin breakdown  Outcome: Ongoing     Problem: Falls - Risk of:  Goal: Will remain free from falls  Description: Will remain free from falls  Outcome: Ongoing  Goal: Absence of physical injury  Description: Absence of physical injury  Outcome: Ongoing     Problem: Nutrition Deficit:  Goal: Ability to achieve adequate nutritional intake will improve  Description: Ability to achieve adequate nutritional intake will improve  Outcome: Ongoing     Problem: Mental Status - Impaired:  Goal: Mental status will improve  Description: Mental status will improve  Outcome: Ongoing

## 2020-08-09 NOTE — PROGRESS NOTES
Hospital Medicine Progress Note      Admit Date: 7/15/2020         Overnight Events: No    CC: F/U for Hypoglycemia, catatonia    HPI: The patient is a 50 yrs old male, who  has a past medical history of Diabetes mellitus ([address] Gastroparesis, and Hypertension. He presented to [facility] ER after being found at home unresponsive with a blood glucose of 22. There was some question as to whether this was accidental or an an intentional insulin overdose. The patient was admitted for further workup and treatment. He required intubation, though he later self-extubated by coughing. Because of his mental status/catatonia, he was evaluated by neurology. No obvious etiology for his presentation was noted. EEG was not consistent with seizure and CSF was without acute findings. There was some consideration for PRES. Psych was consulted. MRI on 7/16 did show a subtle frontoparietal cortical/subcortical signal abnormality. There was some volume loss favoring encephalomalacia and gliosis - which would be atypical for PRES. On 7/31, the patient was given Ativan (for ativan challenge). Around 6 hours after this, he awoke and ate with his family. He later returned to his catatonic state. Psych felt that the patient was exhibiting catatonic delirium. Ativan was scheduled, as was [place]. There was consideration for whether or not the patient would require IP psych. Medications were adjusted by Dr. Noemi Roy. Rocephin was initiated because of leukocytes in urine - in case there was any inflammatory cause for his presentation. Depakote was resumed for behavior. The patient pulled his PICC on 8/7/20 overnight and new access was not able to be established. Ativan was given IM. On 8/9/20 - the patient awoke in the morning, took his pills and walked around with staff. Medications were further adjusted by psych. Interval History/Subjective: The patient remains nonverbal at the time of our encounter.      Review of Systems:     Unable to perform comprehensive ROS due to lack of patient participation/nonverbal    Past Medical History:        Diagnosis Date    Diabetes mellitus ([facility] [address]     Gastroparesis     Hypertension        Past Surgical History:        Procedure Laterality Date    BONY PELVIS SURGERY      FOOT AMPUTATION Right 5/13/2020    EMERGENCY; RIGHT INCISION AND DEBRIDEMENT; HALUX AMPUTATION performed by Marlene Gay DPM at [address] 2006    pins and rods- plate    UPPER GASTROINTESTINAL ENDOSCOPY N/A 12/2/2019    EGD BIOPSY performed by Tere Henry MD at [facility]:  Ketorolac; Morphine; Morphine and related; Tramadol; Lisinopril; Haloperidol lactate; Toradol [ketorolac tromethamine]; and Vicodin [hydrocodone-acetaminophen]    Past medical and surgical history reviewed. Any changes have been noted. PHYSICAL EXAM:  /67   Pulse 82   Temp 96.9 °F (36.1 °C) (Axillary)   Resp 18   Ht 6' 2\" (1.88 m)   Wt 127 lb 13.9 oz (58 kg)   SpO2 100%   BMI 16.42 kg/m²       Intake/Output Summary (Last 24 hours) at 8/9/2020 1008  Last data filed at 8/9/2020 0931  Gross per 24 hour   Intake 900 ml   Output --   Net 900 ml       General: Alert. Nonverbal at the time of our encounter. Sitting up at bedside in NAD. HEENT: Normocephalic. Atraumatic. Pupils equal and reactive. EOM intact. Oral mucosa pink/moist/intact. Neck: Supple. Symmetrical. Trachea midline. Lungs: Clear to auscultation bilaterally. Respirations even and unlabored. Chest: Exam unremarkable. Cardiac: S1/S2 noted. Regular Rhythm and rate. Abdomen/GI: Soft. Non-tender. Non-distended. BS+. Extremities: PP+. Atraumatic. No redness/cyanosis/edema noted. Brisk cap refill. Skin: Dry and intact. No lesions noted. Neuro: Grossly intact.  No focal deficits noted, though difficult to say for sure given lack of participation     LABS:    Lab Results   Component Value Date    WBC 6.7 08/09/2020    HGB 11.5 (L) 08/09/2020    HCT 34.2 (L) 08/09/2020    MCV 81.9 08/09/2020     08/09/2020    LYMPHOPCT 39.5 08/09/2020    RBC 4.18 (L) 08/09/2020    MCH 27.4 08/09/2020    MCHC 33.5 08/09/2020    RDW 16.2 (H) 08/09/2020       Lab Results   Component Value Date    CREATININE 0.7 (L) 08/09/2020    BUN 11 08/09/2020     08/09/2020    K 3.8 08/09/2020     08/09/2020    CO2 27 08/09/2020       Lab Results   Component Value Date    MG 1.90 08/09/2020       Lab Results   Component Value Date    ALT 8 (L) 07/16/2020    AST 17 07/16/2020    ALKPHOS 77 07/16/2020    BILITOT <0.2 07/16/2020        No flowsheet data found. Lab Results   Component Value Date    LABA1C 10.3 05/29/2020       Imaging:  XR FOOT LEFT (MIN 3 VIEWS)   Final Result   Soft tissue swelling of the 1st toe but no associated fracture. Subacute partially healed fractures of the 2nd and 4th proximal phalanges,   new since February 2020. CT HEAD WO CONTRAST   Final Result   No acute intracranial abnormality. Mild cerebral atrophy. XR CHEST PORTABLE   Final Result   No acute findings. NG tube with tip in the stomach. XR CHEST 1 VW   Final Result   Nasogastric tube projects in normal position. No acute cardiopulmonary disease. MRI BRAIN WO CONTRAST   Final Result   Subtle bilateral frontoparietal cortical and subcortical signal abnormality   suggesting mild posterior reversal encephalopathy syndrome. No acute infarct   or hemorrhage. XR CHEST PORTABLE   Final Result   No acute cardiac or pulmonary disease. ET tube 7 cm above the jose carlos. NG side-port at the GE junction. XR CHEST PORTABLE   Final Result   The tip of the endotracheal tube is slightly high in position 9 cm above the   jose carlos. The OG/NG tube should be advanced 10 cm. The lungs are clear. CT Head WO Contrast   Final Result   No acute intracranial abnormality. Paranasal sinusitis.          XR CHEST PORTABLE   Final mental status is improving  Depakote 500mg bid 8/5     Diabetes mellitus  Severe hypoglycemia on admission-resolved  Insulin overdose-unsure if accidental or intentional     Lantus  Humalog SSI  Prandial humalog   Monitor FSBG  Titrate insulin PRN     HTN  Monitor BP  Initiate/titrate medications as needed.     Severe protein calorie malnutrition/weight loss  2/2 poor oral intake  Initial wt 152lbs  Daily weights  Dietician evaluation  Follow labs and vitals  Encourage PO intake  Dietary supplements  Monitor electrolytes and replete as needed.     Bacteruria  - rocephin IV started 8/3 with delirium - Completed 8/8/20    Continue current regimen/therapies. Monitor. Adjust medical regimen as appropriate. Body mass index is 16.42 kg/m². The patient and / or the family were informed of the results of any tests, a time was given to answer questions, a plan was proposed and they agreed with plan. DVT prophylaxis: [x] Lovenox  [] SQ Heparin  [] SCDs because of  [] warfarin/oral direct thrombin inhibitor [] Encourage ambulation    GI prophylaxis: [x] [id number]  [] not indicated    Probiotic if on abx: [x] Yes [] No [] Not Indicated    Diet: DIET DENTAL SOFT;  Dietary Nutrition Supplements: Diabetic Oral Supplement  Dietary Nutrition Supplements: Frozen Oral Supplement    Consults:  IP CONSULT TO PULMONOLOGY  IP CONSULT TO NEUROLOGY  IP CONSULT TO DIETITIAN  IP CONSULT TO PSYCHIATRY  IP CONSULT TO DIETITIAN  IP CONSULT TO DIETITIAN  IP CONSULT TO DIABETES EDUCATOR    Disposition:  [] Home  [] Home with home health [] Rehab [] Psych [] SNF  [] LTAC  [] Long term nursing home or group home [] Transfer to ICU  [] Transfer to PCU [x] Other: TBD.      Code Status: Full Code    ELOS: TBD      ESTEPHANIA Ross - SOUMYA  08/09/20

## 2020-08-09 NOTE — PROGRESS NOTES
Rechecked fsbs 78 . No s/s hypoglycemia noted. patient is up in chair eating dinner, sitter at bedside.

## 2020-08-09 NOTE — PROGRESS NOTES
Psych Consult Progress Note    08/09/20      Hoa Wheatley  [id number]  Chief Complaint   Patient presents with    Hypoglycemia     Found unresponsive by girlfriend. Per EMS, pt had blood sugar of \"22 on arrival and was given IM glucagon\". EMS reports blood sugar \"went up to 59\". Pt responsive to pain at this time. I have reviewed recent documentation. Hoa Wheatley is a 50 y.o. male  With  has a past medical history of Diabetes mellitus ([address] Gastroparesis, and Hypertension. Subjective/Interval Hx:  Pulled out PICC over the weekend. Too agitated to be placed back in and has been without IV lorazepam scheduled. Did get one dose of 1mg IM yesterday AM. Seems there is a period of response after lorazepam. He continues to have bizzare behavior - non-communicative, trying to get up and leave randomly, sporadic crying, resistive behavior refusing to wear clothes. Did not comply with MRI. He is eating well per sitter's report. At times per chart he is having somewhat normal conversations with staff. He is non-communicative with me except for a couple yes/no questions. Quality:  Altered ms  Severity:  Severe   Duration:  Days to weeks  Context:  As above.   Location:  Brain    Objective:  Vitals:    08/09/20 1127   BP: 91/65   Pulse:    Resp: 18   Temp: 97.6 °F (36.4 °C)   SpO2: 100%     Recent Results (from the past 168 hour(s))   POCT Glucose    Collection Time: 08/02/20  4:46 PM   Result Value Ref Range    POC Glucose 146 (H) 70 - 99 mg/dl    Performed on ACCU-CHEK    POCT Glucose    Collection Time: 08/02/20  8:11 PM   Result Value Ref Range    POC Glucose 151 (H) 70 - 99 mg/dl    Performed on ACCU-CHEK    POCT Glucose    Collection Time: 08/02/20 11:05 PM   Result Value Ref Range    POC Glucose 206 (H) 70 - 99 mg/dl    Performed on ACCU-CHEK    POCT Glucose    Collection Time: 08/03/20  4:08 AM   Result Value Ref Range    POC Glucose 105 (H) 70 - 99 mg/dl    Performed on ACCU-CHEK    Basic ACCU-CHEK    POCT Glucose    Collection Time: 08/04/20 12:01 AM   Result Value Ref Range    POC Glucose 145 (H) 70 - 99 mg/dl    Performed on ACCU-CHEK    POCT Glucose    Collection Time: 08/04/20  4:05 AM   Result Value Ref Range    POC Glucose 47 (LL) 70 - 99 mg/dl    Performed on ACCU-CHEK    POCT Glucose    Collection Time: 08/04/20  4:12 AM   Result Value Ref Range    POC Glucose 46 (LL) 70 - 99 mg/dl    Performed on ACCU-CHEK    POCT Glucose    Collection Time: 08/04/20  4:24 AM   Result Value Ref Range    POC Glucose 55 (L) 70 - 99 mg/dl    Performed on ACCU-CHEK    POCT Glucose    Collection Time: 08/04/20  4:41 AM   Result Value Ref Range    POC Glucose 87 70 - 99 mg/dl    Performed on ACCU-CHEK    POCT Glucose    Collection Time: 08/04/20  5:02 AM   Result Value Ref Range    POC Glucose 92 70 - 99 mg/dl    Performed on ACCU-CHEK    Basic Metabolic Panel w/ Reflex to MG    Collection Time: 08/04/20  5:50 AM   Result Value Ref Range    Sodium 143 136 - 145 mmol/L    Potassium reflex Magnesium 3.3 (L) 3.5 - 5.1 mmol/L    Chloride 103 99 - 110 mmol/L    CO2 25 21 - 32 mmol/L    Anion Gap 15 3 - 16    Glucose 199 (H) 70 - 99 mg/dL    BUN 10 7 - 20 mg/dL    CREATININE 0.9 0.9 - 1.3 mg/dL    GFR Non-African American >60 >60    GFR African American >60 >60    Calcium 9.3 8.3 - 10.6 mg/dL   CBC auto differential    Collection Time: 08/04/20  5:50 AM   Result Value Ref Range    WBC 6.2 4.0 - 11.0 K/uL    RBC 4.02 (L) 4.20 - 5.90 M/uL    Hemoglobin 11.1 (L) 13.5 - 17.5 g/dL    Hematocrit 33.2 (L) 40.5 - 52.5 %    MCV 82.5 80.0 - 100.0 fL    MCH 27.7 26.0 - 34.0 pg    MCHC 33.5 31.0 - 36.0 g/dL    RDW 16.4 (H) 12.4 - 15.4 %    Platelets 467 123 - 408 K/uL    MPV 7.9 5.0 - 10.5 fL    Neutrophils % 66.2 %    Lymphocytes % 25.2 %    Monocytes % 4.5 %    Eosinophils % 3.3 %    Basophils % 0.8 %    Neutrophils Absolute 4.1 1.7 - 7.7 K/uL    Lymphocytes Absolute 1.6 1.0 - 5.1 K/uL    Monocytes Absolute 0.3 0.0 - 1.3 K/uL Eosinophils Absolute 0.2 0.0 - 0.6 K/uL    Basophils Absolute 0.0 0.0 - 0.2 K/uL   Magnesium    Collection Time: 08/04/20  5:50 AM   Result Value Ref Range    Magnesium 1.70 (L) 1.80 - 2.40 mg/dL   Phosphorus    Collection Time: 08/04/20  5:50 AM   Result Value Ref Range    Phosphorus 3.9 2.5 - 4.9 mg/dL   POCT Glucose    Collection Time: 08/04/20  7:59 AM   Result Value Ref Range    POC Glucose 241 (H) 70 - 99 mg/dl    Performed on ACCU-CHEK    POCT Glucose    Collection Time: 08/04/20 11:17 AM   Result Value Ref Range    POC Glucose 137 (H) 70 - 99 mg/dl    Performed on ACCU-CHEK    Basic Metabolic Panel w/ Reflex to MG    Collection Time: 08/04/20 11:45 AM   Result Value Ref Range    Sodium 145 136 - 145 mmol/L    Potassium reflex Magnesium 4.7 3.5 - 5.1 mmol/L    Chloride 108 99 - 110 mmol/L    CO2 23 21 - 32 mmol/L    Anion Gap 14 3 - 16    Glucose 112 (H) 70 - 99 mg/dL    BUN 8 7 - 20 mg/dL    CREATININE 0.9 0.9 - 1.3 mg/dL    GFR Non-African American >60 >60    GFR African American >60 >60    Calcium 9.6 8.3 - 10.6 mg/dL   Magnesium    Collection Time: 08/04/20 11:45 AM   Result Value Ref Range    Magnesium 1.80 1.80 - 2.40 mg/dL   POCT Glucose    Collection Time: 08/04/20  4:02 PM   Result Value Ref Range    POC Glucose 352 (H) 70 - 99 mg/dl    Performed on ACCU-CHEK    POCT Glucose    Collection Time: 08/04/20 10:04 PM   Result Value Ref Range    POC Glucose 436 (H) 70 - 99 mg/dl    Performed on ACCU-CHEK    POCT Glucose    Collection Time: 08/04/20 11:49 PM   Result Value Ref Range    POC Glucose 291 (H) 70 - 99 mg/dl    Performed on ACCU-CHEK    POCT Glucose    Collection Time: 08/05/20  4:17 AM   Result Value Ref Range    POC Glucose 56 (L) 70 - 99 mg/dl    Performed on ACCU-CHEK    POCT Glucose    Collection Time: 08/05/20  5:01 AM   Result Value Ref Range    POC Glucose 126 (H) 70 - 99 mg/dl    Performed on ACCU-CHEK    POCT Glucose    Collection Time: 08/05/20  5:14 AM   Result Value Ref Range    POC Glucose 118 (H) 70 - 99 mg/dl    Performed on ACCU-CHEK    Basic Metabolic Panel w/ Reflex to MG    Collection Time: 08/05/20  5:45 AM   Result Value Ref Range    Sodium 144 136 - 145 mmol/L    Potassium reflex Magnesium 3.4 (L) 3.5 - 5.1 mmol/L    Chloride 106 99 - 110 mmol/L    CO2 27 21 - 32 mmol/L    Anion Gap 11 3 - 16    Glucose 97 70 - 99 mg/dL    BUN 7 7 - 20 mg/dL    CREATININE 0.9 0.9 - 1.3 mg/dL    GFR Non-African American >60 >60    GFR African American >60 >60    Calcium 9.4 8.3 - 10.6 mg/dL   CBC auto differential    Collection Time: 08/05/20  5:45 AM   Result Value Ref Range    WBC 6.3 4.0 - 11.0 K/uL    RBC 3.94 (L) 4.20 - 5.90 M/uL    Hemoglobin 10.8 (L) 13.5 - 17.5 g/dL    Hematocrit 32.6 (L) 40.5 - 52.5 %    MCV 82.7 80.0 - 100.0 fL    MCH 27.5 26.0 - 34.0 pg    MCHC 33.2 31.0 - 36.0 g/dL    RDW 16.7 (H) 12.4 - 15.4 %    Platelets 920 050 - 356 K/uL    MPV 8.0 5.0 - 10.5 fL    Neutrophils % 59.3 %    Lymphocytes % 30.8 %    Monocytes % 6.2 %    Eosinophils % 3.1 %    Basophils % 0.6 %    Neutrophils Absolute 3.7 1.7 - 7.7 K/uL    Lymphocytes Absolute 1.9 1.0 - 5.1 K/uL    Monocytes Absolute 0.4 0.0 - 1.3 K/uL    Eosinophils Absolute 0.2 0.0 - 0.6 K/uL    Basophils Absolute 0.0 0.0 - 0.2 K/uL   Magnesium    Collection Time: 08/05/20  5:45 AM   Result Value Ref Range    Magnesium 1.90 1.80 - 2.40 mg/dL   Phosphorus    Collection Time: 08/05/20  5:45 AM   Result Value Ref Range    Phosphorus 5.0 (H) 2.5 - 4.9 mg/dL   POCT Glucose    Collection Time: 08/05/20  7:50 AM   Result Value Ref Range    POC Glucose 54 (L) 70 - 99 mg/dl    Performed on ACCU-CHEK    POCT Glucose    Collection Time: 08/05/20  9:13 AM   Result Value Ref Range    POC Glucose 124 (H) 70 - 99 mg/dl    Performed on ACCU-CHEK    POCT Glucose    Collection Time: 08/05/20 12:04 PM   Result Value Ref Range    POC Glucose 267 (H) 70 - 99 mg/dl    Performed on ACCU-CHEK    POCT Glucose    Collection Time: 08/05/20  4:50 PM   Result Value Ref Range    POC Glucose 306 (H) 70 - 99 mg/dl    Performed on ACCU-CHEK    POCT Glucose    Collection Time: 08/06/20  1:36 AM   Result Value Ref Range    POC Glucose 451 (H) 70 - 99 mg/dl    Performed on ACCU-CHEK    POCT Glucose    Collection Time: 08/06/20  3:59 AM   Result Value Ref Range    POC Glucose 263 (H) 70 - 99 mg/dl    Performed on ACCU-CHEK    Basic Metabolic Panel w/ Reflex to MG    Collection Time: 08/06/20  6:50 AM   Result Value Ref Range    Sodium 144 136 - 145 mmol/L    Potassium reflex Magnesium 3.9 3.5 - 5.1 mmol/L    Chloride 105 99 - 110 mmol/L    CO2 28 21 - 32 mmol/L    Anion Gap 11 3 - 16    Glucose 48 (LL) 70 - 99 mg/dL    BUN 8 7 - 20 mg/dL    CREATININE 0.7 (L) 0.9 - 1.3 mg/dL    GFR Non-African American >60 >60    GFR African American >60 >60    Calcium 9.4 8.3 - 10.6 mg/dL   CBC auto differential    Collection Time: 08/06/20  6:50 AM   Result Value Ref Range    WBC 6.8 4.0 - 11.0 K/uL    RBC 4.37 4.20 - 5.90 M/uL    Hemoglobin 11.9 (L) 13.5 - 17.5 g/dL    Hematocrit 36.3 (L) 40.5 - 52.5 %    MCV 83.0 80.0 - 100.0 fL    MCH 27.3 26.0 - 34.0 pg    MCHC 32.9 31.0 - 36.0 g/dL    RDW 16.2 (H) 12.4 - 15.4 %    Platelets 479 051 - 189 K/uL    MPV 8.4 5.0 - 10.5 fL    Neutrophils % 48.4 %    Lymphocytes % 42.1 %    Monocytes % 5.4 %    Eosinophils % 3.4 %    Basophils % 0.7 %    Neutrophils Absolute 3.3 1.7 - 7.7 K/uL    Lymphocytes Absolute 2.9 1.0 - 5.1 K/uL    Monocytes Absolute 0.4 0.0 - 1.3 K/uL    Eosinophils Absolute 0.2 0.0 - 0.6 K/uL    Basophils Absolute 0.0 0.0 - 0.2 K/uL   Magnesium    Collection Time: 08/06/20  6:50 AM   Result Value Ref Range    Magnesium 2.00 1.80 - 2.40 mg/dL   Phosphorus    Collection Time: 08/06/20  6:50 AM   Result Value Ref Range    Phosphorus 2.7 2.5 - 4.9 mg/dL   POCT Glucose    Collection Time: 08/06/20 10:32 AM   Result Value Ref Range    POC Glucose 253 (H) 70 - 99 mg/dl    Performed on ACCU-CHEK    POCT Glucose    Collection Time: 08/06/20 11:32 AM Result Value Ref Range    POC Glucose 319 (H) 70 - 99 mg/dl    Performed on ACCU-CHEK    Urine Reflex to Culture    Collection Time: 08/06/20  2:54 PM    Specimen: Urine, clean catch   Result Value Ref Range    Color, UA YELLOW Straw/Yellow    Clarity, UA Clear Clear    Glucose, Ur >=1000 (A) Negative mg/dL    Bilirubin Urine Negative Negative    Ketones, Urine TRACE (A) Negative mg/dL    Specific Gravity, UA 1.028 1.005 - 1.030    Blood, Urine Negative Negative    pH, UA 5.5 5.0 - 8.0    Protein, UA Negative Negative mg/dL    Urobilinogen, Urine 0.2 <2.0 E.U./dL    Nitrite, Urine Negative Negative    Leukocyte Esterase, Urine Negative Negative    Microscopic Examination Not Indicated     Urine Type NotGiven     Urine Reflex to Culture Not Indicated    POCT Glucose    Collection Time: 08/06/20  3:25 PM   Result Value Ref Range    POC Glucose 233 (H) 70 - 99 mg/dl    Performed on ACCU-CHEK    POCT Glucose    Collection Time: 08/06/20  5:22 PM   Result Value Ref Range    POC Glucose 273 (H) 70 - 99 mg/dl    Performed on ACCU-CHEK    Basic Metabolic Panel w/ Reflex to MG    Collection Time: 08/07/20  4:30 AM   Result Value Ref Range    Sodium 137 136 - 145 mmol/L    Potassium reflex Magnesium 4.3 3.5 - 5.1 mmol/L    Chloride 100 99 - 110 mmol/L    CO2 29 21 - 32 mmol/L    Anion Gap 8 3 - 16    Glucose 285 (H) 70 - 99 mg/dL    BUN 12 7 - 20 mg/dL    CREATININE 0.9 0.9 - 1.3 mg/dL    GFR Non-African American >60 >60    GFR African American >60 >60    Calcium 9.3 8.3 - 10.6 mg/dL   CBC auto differential    Collection Time: 08/07/20  4:30 AM   Result Value Ref Range    WBC 5.7 4.0 - 11.0 K/uL    RBC 4.03 (L) 4.20 - 5.90 M/uL    Hemoglobin 11.1 (L) 13.5 - 17.5 g/dL    Hematocrit 33.4 (L) 40.5 - 52.5 %    MCV 82.9 80.0 - 100.0 fL    MCH 27.6 26.0 - 34.0 pg    MCHC 33.2 31.0 - 36.0 g/dL    RDW 16.4 (H) 12.4 - 15.4 %    Platelets 723 048 - 778 K/uL    MPV 8.7 5.0 - 10.5 fL    Neutrophils % 58.0 %    Lymphocytes % 33.8 % Monocytes % 4.4 %    Eosinophils % 2.9 %    Basophils % 0.9 %    Neutrophils Absolute 3.3 1.7 - 7.7 K/uL    Lymphocytes Absolute 1.9 1.0 - 5.1 K/uL    Monocytes Absolute 0.2 0.0 - 1.3 K/uL    Eosinophils Absolute 0.2 0.0 - 0.6 K/uL    Basophils Absolute 0.1 0.0 - 0.2 K/uL   Magnesium    Collection Time: 08/07/20  4:30 AM   Result Value Ref Range    Magnesium 1.90 1.80 - 2.40 mg/dL   Phosphorus    Collection Time: 08/07/20  4:30 AM   Result Value Ref Range    Phosphorus 2.1 (L) 2.5 - 4.9 mg/dL   POCT Glucose    Collection Time: 08/07/20  7:38 AM   Result Value Ref Range    POC Glucose 203 (H) 70 - 99 mg/dl    Performed on ACCU-CHEK    POCT Glucose    Collection Time: 08/07/20 11:20 AM   Result Value Ref Range    POC Glucose 227 (H) 70 - 99 mg/dl    Performed on ACCU-CHEK    POCT Glucose    Collection Time: 08/07/20  4:29 PM   Result Value Ref Range    POC Glucose 339 (H) 70 - 99 mg/dl    Performed on ACCU-CHEK    POCT Glucose    Collection Time: 08/07/20  8:23 PM   Result Value Ref Range    POC Glucose 331 (H) 70 - 99 mg/dl    Performed on ACCU-CHEK    POCT Glucose    Collection Time: 08/08/20  2:05 AM   Result Value Ref Range    POC Glucose 504 (H) 70 - 99 mg/dl    Performed on ACCU-CHEK    POCT Glucose    Collection Time: 08/08/20  6:14 AM   Result Value Ref Range    POC Glucose 275 (H) 70 - 99 mg/dl    Performed on ACCU-CHEK    POCT Glucose    Collection Time: 08/08/20  7:34 AM   Result Value Ref Range    POC Glucose 226 (H) 70 - 99 mg/dl    Performed on ACCU-CHEK    POCT Glucose    Collection Time: 08/08/20 11:43 AM   Result Value Ref Range    POC Glucose 135 (H) 70 - 99 mg/dl    Performed on ACCU-CHEK    POCT Glucose    Collection Time: 08/08/20 12:20 PM   Result Value Ref Range    POC Glucose 155 (H) 70 - 99 mg/dl    Performed on ACCU-CHEK    POCT Glucose    Collection Time: 08/08/20  5:07 PM   Result Value Ref Range    POC Glucose 475 (H) 70 - 99 mg/dl    Performed on ACCU-CHEK    POCT Glucose    Collection Time: 08/08/20  8:32 PM   Result Value Ref Range    POC Glucose 448 (H) 70 - 99 mg/dl    Performed on ACCU-CHEK    POCT Glucose    Collection Time: 08/08/20  8:34 PM   Result Value Ref Range    POC Glucose 463 (H) 70 - 99 mg/dl    Performed on ACCU-CHEK    POCT Glucose    Collection Time: 08/09/20  6:23 AM   Result Value Ref Range    POC Glucose 145 (H) 70 - 99 mg/dl    Performed on ACCU-CHEK    Basic Metabolic Panel w/ Reflex to MG    Collection Time: 08/09/20  6:24 AM   Result Value Ref Range    Sodium 138 136 - 145 mmol/L    Potassium reflex Magnesium 3.8 3.5 - 5.1 mmol/L    Chloride 100 99 - 110 mmol/L    CO2 27 21 - 32 mmol/L    Anion Gap 11 3 - 16    Glucose 148 (H) 70 - 99 mg/dL    BUN 11 7 - 20 mg/dL    CREATININE 0.7 (L) 0.9 - 1.3 mg/dL    GFR Non-African American >60 >60    GFR African American >60 >60    Calcium 9.2 8.3 - 10.6 mg/dL   CBC auto differential    Collection Time: 08/09/20  6:24 AM   Result Value Ref Range    WBC 6.7 4.0 - 11.0 K/uL    RBC 4.18 (L) 4.20 - 5.90 M/uL    Hemoglobin 11.5 (L) 13.5 - 17.5 g/dL    Hematocrit 34.2 (L) 40.5 - 52.5 %    MCV 81.9 80.0 - 100.0 fL    MCH 27.4 26.0 - 34.0 pg    MCHC 33.5 31.0 - 36.0 g/dL    RDW 16.2 (H) 12.4 - 15.4 %    Platelets 297 052 - 734 K/uL    MPV 8.3 5.0 - 10.5 fL    Neutrophils % 51.8 %    Lymphocytes % 39.5 %    Monocytes % 5.5 %    Eosinophils % 2.5 %    Basophils % 0.7 %    Neutrophils Absolute 3.5 1.7 - 7.7 K/uL    Lymphocytes Absolute 2.6 1.0 - 5.1 K/uL    Monocytes Absolute 0.4 0.0 - 1.3 K/uL    Eosinophils Absolute 0.2 0.0 - 0.6 K/uL    Basophils Absolute 0.0 0.0 - 0.2 K/uL   Magnesium    Collection Time: 08/09/20  6:24 AM   Result Value Ref Range    Magnesium 1.90 1.80 - 2.40 mg/dL   Phosphorus    Collection Time: 08/09/20  6:24 AM   Result Value Ref Range    Phosphorus 3.8 2.5 - 4.9 mg/dL   POCT Glucose    Collection Time: 08/09/20 11:24 AM   Result Value Ref Range    POC Glucose 153 (H) 70 - 99 mg/dl    Performed on ACCU-CHEK Current Facility-Administered Medications   Medication Dose Route Frequency Provider Last Rate Last Dose    insulin glargine (LANTUS) injection vial 10 Units  10 Units Subcutaneous Nightly Bobbetta Miu, APRN - NP   10 Units at 08/08/20 2137    insulin lispro (HUMALOG) injection vial 4 Units  4 Units Subcutaneous TID WC Bobbetta Miu, APRN - NP   4 Units at 08/09/20 0917    insulin lispro (HUMALOG) injection vial 0-6 Units  0-6 Units Subcutaneous Nightly Daniela Pean, APRN - CNP   6 Units at 08/08/20 2153    zolpidem (AMBIEN) tablet 5 mg  5 mg Oral BID Case Tello MD   5 mg at 08/09/20 0905    OLANZapine zydis (ZYPREXA) disintegrating tablet 5 mg  5 mg Oral Nightly Case Tello MD   5 mg at 08/08/20 2137    lactobacillus (CULTURELLE) capsule 1 capsule  1 capsule Oral BID  Bobbetta Miu, APRN - NP   1 capsule at 08/09/20 0917    lidocaine PF 1 % injection 5 mL  5 mL Intradermal Once Bobbetta Miu, APRN - NP        sodium chloride flush 0.9 % injection 10 mL  10 mL Intravenous 2 times per day Bobbetta Miu, APRN - NP        sodium chloride flush 0.9 % injection 10 mL  10 mL Intravenous PRN Bobbetta Miu, APRN - NP        divalproex (DEPAKOTE SPRINKLE) capsule 500 mg  500 mg Oral BID Marcus Leaks, APRN - CNP   500 mg at 08/09/20 4970    ziprasidone (GEODON) injection 10 mg  10 mg Intramuscular Q12H PRN Case Tello MD        insulin lispro (HUMALOG) injection vial 2 Units  2 Units Subcutaneous PRN Marcus Leaks, APRN - CNP        insulin lispro (HUMALOG) injection vial 0-6 Units  0-6 Units Subcutaneous TID WC Marcus Leaks, APRN - CNP   1 Units at 08/09/20 0800    potassium chloride (KLOR-CON M) extended release tablet 40 mEq  40 mEq Oral PRN Marcus Leaks, APRN - CNP   40 mEq at 08/05/20 9264    Or    potassium bicarb-citric acid (EFFER-K) effervescent tablet 40 mEq  40 mEq Oral PRN Marcus Leaks, APRN - CNP        Or    potassium chloride 10 mEq/100 mL IVPB (Peripheral Line)  10 mEq Intravenous PRN ESTEPHANIA Wilkins CNP        LORazepam (ATIVAN) injection 1 mg  1 mg Intravenous Q6H Tracy Hodges MD   1 mg at 08/07/20 1633    sodium chloride flush 0.9 % injection 10 mL  10 mL Intravenous 2 times per day ESTEPHANIA Concepcion CNP   10 mL at 08/07/20 0820    sodium chloride flush 0.9 % injection 10 mL  10 mL Intravenous PRN ESTEPHANIA Concepcion CNP        acetaminophen (TYLENOL) tablet 650 mg  650 mg Oral Q4H PRN Marleny Engel MD   650 mg at 07/24/20 1562    Or    acetaminophen (TYLENOL) suppository 650 mg  650 mg Rectal Q4H PRN Marleny Engel MD        polyethylene glycol [facility]) packet 17 g  17 g Oral Daily PRN Marleny Engel MD   17 g at 08/04/20 1754    ondansetron (ZOFRAN) injection 4 mg  4 mg Intravenous Q4H PRN Marleny Engel MD   4 mg at 08/01/20 1606    glucose (GLUTOSE) 40 % oral gel 15 g  15 g Oral PRN Marleny Engel MD   15 g at 08/04/20 0427    dextrose 50 % IV solution  12.5 g Intravenous PRN Marleny Engel MD   12.5 g at 08/05/20 0437    glucagon (rDNA) injection 1 mg  1 mg Intramuscular PRN Marleny Engel MD        dextrose 5 % solution  100 mL/hr Intravenous PRN Marleny Engel MD        enoxaparin (LOVENOX) injection 40 mg  40 mg Subcutaneous Daily Marleny Engel MD   40 mg at 08/09/20 0913    pantoprazole (PROTONIX) injection 40 mg  40 mg Intravenous Daily ESTEPHANIA Concepcion CNP   40 mg at 08/07/20 2476    And    sodium chloride (PF) 0.9 % injection 10 mL  10 mL Intravenous Daily ESTEPHANIA Concepcion CNP   10 mL at 08/07/20 0819    magnesium sulfate 1 g in dextrose 5% 100 mL IVPB  1 g Intravenous PRN ESTEPHANIA Concepcion CNP   Stopped at 07/30/20 1502    potassium chloride 20 mEq/50 mL IVPB (Central Line)  20 mEq Intravenous PRN ESTEPHANIA Concepcion - CNP 50 mL/hr at 08/04/20 0939 20 mEq at 08/04/20 0939    sodium phosphate 11.07 mmol in dextrose 5 % 250 mL IVPB  0.16 mmol/kg Intravenous PRN ESTEPHANIA Muniz CNP        Or    sodium phosphate 22.11 mmol in dextrose 5 % 250 mL IVPB  0.32 mmol/kg Intravenous PRN ESTEPHANIA Muniz CNP         ROS:  Could not assess, no speech. He denies any pain    MSE:  A-in chair, under sheet, naked. Motor - Moving limbs spontaneuously  A- sleepy? M-can't assess, no speech  S-impaired  I/J-impaired  T-No speech today. Recs:  1. Catatonic delirium- seems to have some improvement over the weekend with level of alertness, less sedation, in the setting of not getting lorazepam that he was routinely getting. However, seems still lorazepam was providing a benefit when he'd get it. Perhaps the zyprexa is providing some benefit as well. - Continue ambien 5 bid. - will reduce lorazepam to 0.5mg PO q6h    -  continue zyprexa zydis 5mg qhs.     - Unable to do MRI, EEG d/t poor cooperation, may try to re-attempt in the next couple days if he doesn't continue to improve. - Geodon 10 im prn for agitation, see how this works, maybe increase to 20 if it seems to help. -  bid from neuro. Dispo: At this point if medically clear anytime soon, this pt will need psych admit.

## 2020-08-09 NOTE — PLAN OF CARE
Problem: Falls - Risk of:  Goal: Will remain free from falls  Description: Will remain free from falls  8/8/2020 2209 by Katelyn Spear RN  Outcome: Met This Shift  8/8/2020 1145 by Lupe Fry RN  Outcome: Ongoing  Goal: Absence of physical injury  Description: Absence of physical injury  8/8/2020 1145 by Lupe Fry RN  Outcome: Ongoing

## 2020-08-09 NOTE — PROGRESS NOTES
Patient remains alert and oriented to self, unable to assess orientation. Able to follow  Simple directions. speech is incomprehensible, However, pt answers yes/no to questions. \" are you hungry, Thirsty,in pain etc.Meds taken without incident, Appetite fair for meals. No acute distress noted. Sitter remains at bedside.

## 2020-08-09 NOTE — PROGRESS NOTES
Pt resting in bed, eyes closed. Respirations even and unlabored. No signs of distress noted at this time. Sitter at bedside. Bed alarm on, call light within reach. Will continue to monitor.

## 2020-08-10 LAB
ANION GAP SERPL CALCULATED.3IONS-SCNC: 12 MMOL/L (ref 3–16)
BASOPHILS ABSOLUTE: 0 K/UL (ref 0–0.2)
BASOPHILS RELATIVE PERCENT: 0.4 %
BUN BLDV-MCNC: 5 MG/DL (ref 7–20)
CALCIUM SERPL-MCNC: 9.5 MG/DL (ref 8.3–10.6)
CHLORIDE BLD-SCNC: 100 MMOL/L (ref 99–110)
CO2: 28 MMOL/L (ref 21–32)
CREAT SERPL-MCNC: 0.7 MG/DL (ref 0.9–1.3)
EOSINOPHILS ABSOLUTE: 0.1 K/UL (ref 0–0.6)
EOSINOPHILS RELATIVE PERCENT: 1.9 %
GFR AFRICAN AMERICAN: >60
GFR NON-AFRICAN AMERICAN: >60
GLUCOSE BLD-MCNC: 112 MG/DL (ref 70–99)
GLUCOSE BLD-MCNC: 125 MG/DL (ref 70–99)
GLUCOSE BLD-MCNC: 157 MG/DL (ref 70–99)
GLUCOSE BLD-MCNC: 275 MG/DL (ref 70–99)
GLUCOSE BLD-MCNC: 391 MG/DL (ref 70–99)
GLUCOSE BLD-MCNC: 55 MG/DL (ref 70–99)
GLUCOSE BLD-MCNC: 74 MG/DL (ref 70–99)
GLUCOSE BLD-MCNC: 82 MG/DL (ref 70–99)
GLUCOSE BLD-MCNC: 91 MG/DL (ref 70–99)
HCT VFR BLD CALC: 37.5 % (ref 40.5–52.5)
HEMOGLOBIN: 12.3 G/DL (ref 13.5–17.5)
LYMPHOCYTES ABSOLUTE: 1.8 K/UL (ref 1–5.1)
LYMPHOCYTES RELATIVE PERCENT: 26.4 %
MAGNESIUM: 1.8 MG/DL (ref 1.8–2.4)
MCH RBC QN AUTO: 27.4 PG (ref 26–34)
MCHC RBC AUTO-ENTMCNC: 33 G/DL (ref 31–36)
MCV RBC AUTO: 83.1 FL (ref 80–100)
MONOCYTES ABSOLUTE: 0.3 K/UL (ref 0–1.3)
MONOCYTES RELATIVE PERCENT: 5 %
NEUTROPHILS ABSOLUTE: 4.4 K/UL (ref 1.7–7.7)
NEUTROPHILS RELATIVE PERCENT: 66.3 %
PDW BLD-RTO: 16.2 % (ref 12.4–15.4)
PERFORMED ON: ABNORMAL
PERFORMED ON: NORMAL
PHOSPHORUS: 4.1 MG/DL (ref 2.5–4.9)
PLATELET # BLD: 256 K/UL (ref 135–450)
PMV BLD AUTO: 8.1 FL (ref 5–10.5)
POTASSIUM REFLEX MAGNESIUM: 3.5 MMOL/L (ref 3.5–5.1)
RBC # BLD: 4.51 M/UL (ref 4.2–5.9)
SODIUM BLD-SCNC: 140 MMOL/L (ref 136–145)
WBC # BLD: 6.7 K/UL (ref 4–11)

## 2020-08-10 PROCEDURE — 6360000002 HC RX W HCPCS: Performed by: INTERNAL MEDICINE

## 2020-08-10 PROCEDURE — 6360000002 HC RX W HCPCS: Performed by: NURSE PRACTITIONER

## 2020-08-10 PROCEDURE — 6370000000 HC RX 637 (ALT 250 FOR IP): Performed by: PSYCHIATRY & NEUROLOGY

## 2020-08-10 PROCEDURE — 1200000000 HC SEMI PRIVATE

## 2020-08-10 PROCEDURE — 6370000000 HC RX 637 (ALT 250 FOR IP): Performed by: NURSE PRACTITIONER

## 2020-08-10 PROCEDURE — 84100 ASSAY OF PHOSPHORUS: CPT

## 2020-08-10 PROCEDURE — 95819 EEG AWAKE AND ASLEEP: CPT

## 2020-08-10 PROCEDURE — C9113 INJ PANTOPRAZOLE SODIUM, VIA: HCPCS | Performed by: NURSE PRACTITIONER

## 2020-08-10 PROCEDURE — 85025 COMPLETE CBC W/AUTO DIFF WBC: CPT

## 2020-08-10 PROCEDURE — 94760 N-INVAS EAR/PLS OXIMETRY 1: CPT

## 2020-08-10 PROCEDURE — 6360000002 HC RX W HCPCS: Performed by: PSYCHIATRY & NEUROLOGY

## 2020-08-10 PROCEDURE — 2580000003 HC RX 258: Performed by: NURSE PRACTITIONER

## 2020-08-10 PROCEDURE — 83735 ASSAY OF MAGNESIUM: CPT

## 2020-08-10 PROCEDURE — 80048 BASIC METABOLIC PNL TOTAL CA: CPT

## 2020-08-10 PROCEDURE — 36415 COLL VENOUS BLD VENIPUNCTURE: CPT

## 2020-08-10 PROCEDURE — 99231 SBSQ HOSP IP/OBS SF/LOW 25: CPT | Performed by: PSYCHIATRY & NEUROLOGY

## 2020-08-10 RX ORDER — OLANZAPINE 5 MG/1
10 TABLET, ORALLY DISINTEGRATING ORAL NIGHTLY
Status: DISCONTINUED | OUTPATIENT
Start: 2020-08-10 | End: 2020-08-11 | Stop reason: HOSPADM

## 2020-08-10 RX ADMIN — ZIPRASIDONE MESYLATE 10 MG: 20 INJECTION, POWDER, LYOPHILIZED, FOR SOLUTION INTRAMUSCULAR at 22:44

## 2020-08-10 RX ADMIN — INSULIN LISPRO 5 UNITS: 100 INJECTION, SOLUTION INTRAVENOUS; SUBCUTANEOUS at 16:38

## 2020-08-10 RX ADMIN — ZOLPIDEM TARTRATE 5 MG: 5 TABLET ORAL at 22:20

## 2020-08-10 RX ADMIN — LORAZEPAM 0.5 MG: 0.5 TABLET ORAL at 18:26

## 2020-08-10 RX ADMIN — ZOLPIDEM TARTRATE 5 MG: 5 TABLET ORAL at 09:35

## 2020-08-10 RX ADMIN — INSULIN LISPRO 4 UNITS: 100 INJECTION, SOLUTION INTRAVENOUS; SUBCUTANEOUS at 18:26

## 2020-08-10 RX ADMIN — DIVALPROEX SODIUM 500 MG: 125 CAPSULE ORAL at 22:20

## 2020-08-10 RX ADMIN — LORAZEPAM 0.5 MG: 0.5 TABLET ORAL at 22:20

## 2020-08-10 RX ADMIN — DIVALPROEX SODIUM 500 MG: 125 CAPSULE ORAL at 09:35

## 2020-08-10 RX ADMIN — LORAZEPAM 0.5 MG: 0.5 TABLET ORAL at 12:32

## 2020-08-10 RX ADMIN — LORAZEPAM 0.5 MG: 0.5 TABLET ORAL at 06:16

## 2020-08-10 NOTE — PLAN OF CARE
Problem: Pain:  Goal: Pain level will decrease  Description: Pain level will decrease  8/10/2020 0236 by Judah Pleitez RN  Outcome: Ongoing  8/9/2020 1244 by Milad Soliz RN  Outcome: Ongoing  Goal: Control of acute pain  Description: Control of acute pain  8/10/2020 0236 by Judah lPeitez RN  Outcome: Ongoing  8/9/2020 1244 by Milad Soliz RN  Outcome: Ongoing  Goal: Control of chronic pain  Description: Control of chronic pain  8/10/2020 0236 by Judah Pleitez RN  Outcome: Ongoing  8/9/2020 1244 by Milad Soliz RN  Outcome: Ongoing     Problem: Nutrition  Goal: Optimal nutrition therapy  8/10/2020 0236 by uJdah Pleitez RN  Outcome: Ongoing  8/9/2020 1244 by Milad Soliz RN  Outcome: Ongoing     Problem: Skin Integrity:  Goal: Will show no infection signs and symptoms  Description: Will show no infection signs and symptoms  8/10/2020 0236 by Judah Pleitez RN  Outcome: Ongoing  8/9/2020 1244 by Milad Soliz RN  Outcome: Ongoing  Goal: Absence of new skin breakdown  Description: Absence of new skin breakdown  8/10/2020 0236 by Judah Pleitez RN  Outcome: Ongoing  8/9/2020 1244 by Milad Soliz RN  Outcome: Ongoing     Problem: Falls - Risk of:  Goal: Will remain free from falls  Description: Will remain free from falls  8/10/2020 0236 by Judah Pleitez RN  Outcome: Ongoing  8/9/2020 1244 by Milad Soliz RN  Outcome: Ongoing  Goal: Absence of physical injury  Description: Absence of physical injury  8/10/2020 0236 by Judah Pleitez RN  Outcome: Ongoing  8/9/2020 1244 by Milad Soliz RN  Outcome: Ongoing     Problem: Nutrition Deficit:  Goal: Ability to achieve adequate nutritional intake will improve  Description: Ability to achieve adequate nutritional intake will improve  8/10/2020 0236 by Judah Pleitez RN  Outcome: Ongoing  8/9/2020 1244 by Milad Soliz RN  Outcome: Ongoing     Problem: Mental Status - Impaired:  Goal: Mental status will improve  Description: Mental status will improve  8/10/2020 0236 by Alex Tompkins RN  Outcome: Ongoing  8/9/2020 1244 by Loni Headley RN  Outcome: Ongoing

## 2020-08-10 NOTE — PROGRESS NOTES
Pt agitated walking the [place] naked. Code violet called. Pt placed back in room. Message sent to Brittany Mai NP for restraints. Restraint order obtained. Pt calm and in bed, will re-evaluate for need for restraints at this time, before placement.

## 2020-08-10 NOTE — PROGRESS NOTES
Psych Consult Progress Note    08/10/20      Guerda Eller  [id number]  Chief Complaint   Patient presents with    Hypoglycemia     Found unresponsive by girlfriend. Per EMS, pt had blood sugar of \"22 on arrival and was given IM glucagon\". EMS reports blood sugar \"went up to 59\". Pt responsive to pain at this time. I have reviewed recent documentation. Guerda Eller is a 50 y.o. male  With  has a past medical history of Diabetes mellitus ([address] Gastroparesis, and Hypertension. Subjective/Interval Hx: Pulled out own access over the weekend. Meds have been inconsistent because of adherence. Not talking much now. Quality:  Altered ms  Severity:  Severe   Duration:  Days to weeks  Context:  As above.   Location:  Brain    Objective:  Vitals:    08/10/20 1018   BP: 103/63   Pulse: 78   Resp: 16   Temp: 97.6 °F (36.4 °C)   SpO2:      Recent Results (from the past 168 hour(s))   POCT Glucose    Collection Time: 08/03/20 11:45 AM   Result Value Ref Range    POC Glucose 217 (H) 70 - 99 mg/dl    Performed on ACCU-CHEK    POCT Glucose    Collection Time: 08/03/20  4:01 PM   Result Value Ref Range    POC Glucose 86 70 - 99 mg/dl    Performed on ACCU-CHEK    POCT Glucose    Collection Time: 08/03/20  8:04 PM   Result Value Ref Range    POC Glucose 256 (H) 70 - 99 mg/dl    Performed on ACCU-CHEK    POCT Glucose    Collection Time: 08/04/20 12:01 AM   Result Value Ref Range    POC Glucose 145 (H) 70 - 99 mg/dl    Performed on ACCU-CHEK    POCT Glucose    Collection Time: 08/04/20  4:05 AM   Result Value Ref Range    POC Glucose 47 (LL) 70 - 99 mg/dl    Performed on ACCU-CHEK    POCT Glucose    Collection Time: 08/04/20  4:12 AM   Result Value Ref Range    POC Glucose 46 (LL) 70 - 99 mg/dl    Performed on ACCU-CHEK    POCT Glucose    Collection Time: 08/04/20  4:24 AM   Result Value Ref Range    POC Glucose 55 (L) 70 - 99 mg/dl    Performed on ACCU-CHEK    POCT Glucose    Collection Time: 08/04/20  4:41 AM Result Value Ref Range    POC Glucose 87 70 - 99 mg/dl    Performed on ACCU-CHEK    POCT Glucose    Collection Time: 08/04/20  5:02 AM   Result Value Ref Range    POC Glucose 92 70 - 99 mg/dl    Performed on ACCU-CHEK    Basic Metabolic Panel w/ Reflex to MG    Collection Time: 08/04/20  5:50 AM   Result Value Ref Range    Sodium 143 136 - 145 mmol/L    Potassium reflex Magnesium 3.3 (L) 3.5 - 5.1 mmol/L    Chloride 103 99 - 110 mmol/L    CO2 25 21 - 32 mmol/L    Anion Gap 15 3 - 16    Glucose 199 (H) 70 - 99 mg/dL    BUN 10 7 - 20 mg/dL    CREATININE 0.9 0.9 - 1.3 mg/dL    GFR Non-African American >60 >60    GFR African American >60 >60    Calcium 9.3 8.3 - 10.6 mg/dL   CBC auto differential    Collection Time: 08/04/20  5:50 AM   Result Value Ref Range    WBC 6.2 4.0 - 11.0 K/uL    RBC 4.02 (L) 4.20 - 5.90 M/uL    Hemoglobin 11.1 (L) 13.5 - 17.5 g/dL    Hematocrit 33.2 (L) 40.5 - 52.5 %    MCV 82.5 80.0 - 100.0 fL    MCH 27.7 26.0 - 34.0 pg    MCHC 33.5 31.0 - 36.0 g/dL    RDW 16.4 (H) 12.4 - 15.4 %    Platelets 911 428 - 222 K/uL    MPV 7.9 5.0 - 10.5 fL    Neutrophils % 66.2 %    Lymphocytes % 25.2 %    Monocytes % 4.5 %    Eosinophils % 3.3 %    Basophils % 0.8 %    Neutrophils Absolute 4.1 1.7 - 7.7 K/uL    Lymphocytes Absolute 1.6 1.0 - 5.1 K/uL    Monocytes Absolute 0.3 0.0 - 1.3 K/uL    Eosinophils Absolute 0.2 0.0 - 0.6 K/uL    Basophils Absolute 0.0 0.0 - 0.2 K/uL   Magnesium    Collection Time: 08/04/20  5:50 AM   Result Value Ref Range    Magnesium 1.70 (L) 1.80 - 2.40 mg/dL   Phosphorus    Collection Time: 08/04/20  5:50 AM   Result Value Ref Range    Phosphorus 3.9 2.5 - 4.9 mg/dL   POCT Glucose    Collection Time: 08/04/20  7:59 AM   Result Value Ref Range    POC Glucose 241 (H) 70 - 99 mg/dl    Performed on ACCU-CHEK    POCT Glucose    Collection Time: 08/04/20 11:17 AM   Result Value Ref Range    POC Glucose 137 (H) 70 - 99 mg/dl    Performed on ACCU-CHEK    Basic Metabolic Panel w/ Reflex to MG Collection Time: 08/04/20 11:45 AM   Result Value Ref Range    Sodium 145 136 - 145 mmol/L    Potassium reflex Magnesium 4.7 3.5 - 5.1 mmol/L    Chloride 108 99 - 110 mmol/L    CO2 23 21 - 32 mmol/L    Anion Gap 14 3 - 16    Glucose 112 (H) 70 - 99 mg/dL    BUN 8 7 - 20 mg/dL    CREATININE 0.9 0.9 - 1.3 mg/dL    GFR Non-African American >60 >60    GFR African American >60 >60    Calcium 9.6 8.3 - 10.6 mg/dL   Magnesium    Collection Time: 08/04/20 11:45 AM   Result Value Ref Range    Magnesium 1.80 1.80 - 2.40 mg/dL   POCT Glucose    Collection Time: 08/04/20  4:02 PM   Result Value Ref Range    POC Glucose 352 (H) 70 - 99 mg/dl    Performed on ACCU-CHEK    POCT Glucose    Collection Time: 08/04/20 10:04 PM   Result Value Ref Range    POC Glucose 436 (H) 70 - 99 mg/dl    Performed on ACCU-CHEK    POCT Glucose    Collection Time: 08/04/20 11:49 PM   Result Value Ref Range    POC Glucose 291 (H) 70 - 99 mg/dl    Performed on ACCU-CHEK    POCT Glucose    Collection Time: 08/05/20  4:17 AM   Result Value Ref Range    POC Glucose 56 (L) 70 - 99 mg/dl    Performed on ACCU-CHEK    POCT Glucose    Collection Time: 08/05/20  5:01 AM   Result Value Ref Range    POC Glucose 126 (H) 70 - 99 mg/dl    Performed on ACCU-CHEK    POCT Glucose    Collection Time: 08/05/20  5:14 AM   Result Value Ref Range    POC Glucose 118 (H) 70 - 99 mg/dl    Performed on ACCU-CHEK    Basic Metabolic Panel w/ Reflex to MG    Collection Time: 08/05/20  5:45 AM   Result Value Ref Range    Sodium 144 136 - 145 mmol/L    Potassium reflex Magnesium 3.4 (L) 3.5 - 5.1 mmol/L    Chloride 106 99 - 110 mmol/L    CO2 27 21 - 32 mmol/L    Anion Gap 11 3 - 16    Glucose 97 70 - 99 mg/dL    BUN 7 7 - 20 mg/dL    CREATININE 0.9 0.9 - 1.3 mg/dL    GFR Non-African American >60 >60    GFR African American >60 >60    Calcium 9.4 8.3 - 10.6 mg/dL   CBC auto differential    Collection Time: 08/05/20  5:45 AM   Result Value Ref Range    WBC 6.3 4.0 - 11.0 K/uL    RBC Glucose 48 (LL) 70 - 99 mg/dL    BUN 8 7 - 20 mg/dL    CREATININE 0.7 (L) 0.9 - 1.3 mg/dL    GFR Non-African American >60 >60    GFR African American >60 >60    Calcium 9.4 8.3 - 10.6 mg/dL   CBC auto differential    Collection Time: 08/06/20  6:50 AM   Result Value Ref Range    WBC 6.8 4.0 - 11.0 K/uL    RBC 4.37 4.20 - 5.90 M/uL    Hemoglobin 11.9 (L) 13.5 - 17.5 g/dL    Hematocrit 36.3 (L) 40.5 - 52.5 %    MCV 83.0 80.0 - 100.0 fL    MCH 27.3 26.0 - 34.0 pg    MCHC 32.9 31.0 - 36.0 g/dL    RDW 16.2 (H) 12.4 - 15.4 %    Platelets 698 761 - 446 K/uL    MPV 8.4 5.0 - 10.5 fL    Neutrophils % 48.4 %    Lymphocytes % 42.1 %    Monocytes % 5.4 %    Eosinophils % 3.4 %    Basophils % 0.7 %    Neutrophils Absolute 3.3 1.7 - 7.7 K/uL    Lymphocytes Absolute 2.9 1.0 - 5.1 K/uL    Monocytes Absolute 0.4 0.0 - 1.3 K/uL    Eosinophils Absolute 0.2 0.0 - 0.6 K/uL    Basophils Absolute 0.0 0.0 - 0.2 K/uL   Magnesium    Collection Time: 08/06/20  6:50 AM   Result Value Ref Range    Magnesium 2.00 1.80 - 2.40 mg/dL   Phosphorus    Collection Time: 08/06/20  6:50 AM   Result Value Ref Range    Phosphorus 2.7 2.5 - 4.9 mg/dL   POCT Glucose    Collection Time: 08/06/20 10:32 AM   Result Value Ref Range    POC Glucose 253 (H) 70 - 99 mg/dl    Performed on ACCU-CHEK    POCT Glucose    Collection Time: 08/06/20 11:32 AM   Result Value Ref Range    POC Glucose 319 (H) 70 - 99 mg/dl    Performed on ACCU-CHEK    Urine Reflex to Culture    Collection Time: 08/06/20  2:54 PM    Specimen: Urine, clean catch   Result Value Ref Range    Color, UA YELLOW Straw/Yellow    Clarity, UA Clear Clear    Glucose, Ur >=1000 (A) Negative mg/dL    Bilirubin Urine Negative Negative    Ketones, Urine TRACE (A) Negative mg/dL    Specific Gravity, UA 1.028 1.005 - 1.030    Blood, Urine Negative Negative    pH, UA 5.5 5.0 - 8.0    Protein, UA Negative Negative mg/dL    Urobilinogen, Urine 0.2 <2.0 E.U./dL    Nitrite, Urine Negative Negative    Leukocyte CO2 27 21 - 32 mmol/L    Anion Gap 11 3 - 16    Glucose 148 (H) 70 - 99 mg/dL    BUN 11 7 - 20 mg/dL    CREATININE 0.7 (L) 0.9 - 1.3 mg/dL    GFR Non-African American >60 >60    GFR African American >60 >60    Calcium 9.2 8.3 - 10.6 mg/dL   CBC auto differential    Collection Time: 08/09/20  6:24 AM   Result Value Ref Range    WBC 6.7 4.0 - 11.0 K/uL    RBC 4.18 (L) 4.20 - 5.90 M/uL    Hemoglobin 11.5 (L) 13.5 - 17.5 g/dL    Hematocrit 34.2 (L) 40.5 - 52.5 %    MCV 81.9 80.0 - 100.0 fL    MCH 27.4 26.0 - 34.0 pg    MCHC 33.5 31.0 - 36.0 g/dL    RDW 16.2 (H) 12.4 - 15.4 %    Platelets 659 145 - 856 K/uL    MPV 8.3 5.0 - 10.5 fL    Neutrophils % 51.8 %    Lymphocytes % 39.5 %    Monocytes % 5.5 %    Eosinophils % 2.5 %    Basophils % 0.7 %    Neutrophils Absolute 3.5 1.7 - 7.7 K/uL    Lymphocytes Absolute 2.6 1.0 - 5.1 K/uL    Monocytes Absolute 0.4 0.0 - 1.3 K/uL    Eosinophils Absolute 0.2 0.0 - 0.6 K/uL    Basophils Absolute 0.0 0.0 - 0.2 K/uL   Magnesium    Collection Time: 08/09/20  6:24 AM   Result Value Ref Range    Magnesium 1.90 1.80 - 2.40 mg/dL   Phosphorus    Collection Time: 08/09/20  6:24 AM   Result Value Ref Range    Phosphorus 3.8 2.5 - 4.9 mg/dL   POCT Glucose    Collection Time: 08/09/20 11:24 AM   Result Value Ref Range    POC Glucose 153 (H) 70 - 99 mg/dl    Performed on ACCU-CHEK    POCT Glucose    Collection Time: 08/09/20  5:03 PM   Result Value Ref Range    POC Glucose 51 (L) 70 - 99 mg/dl    Performed on ACCU-CHEK    POCT Glucose    Collection Time: 08/09/20  5:27 PM   Result Value Ref Range    POC Glucose 78 70 - 99 mg/dl    Performed on ACCU-CHEK    POCT Glucose    Collection Time: 08/09/20  6:21 PM   Result Value Ref Range    POC Glucose 195 (H) 70 - 99 mg/dl    Performed on ACCU-CHEK    POCT Glucose    Collection Time: 08/09/20  8:51 PM   Result Value Ref Range    POC Glucose 231 (H) 70 - 99 mg/dl    Performed on ACCU-CHEK    POCT Glucose    Collection Time: 08/10/20  2:01 AM   Result Value Ref Range    POC Glucose 125 (H) 70 - 99 mg/dl    Performed on ACCU-CHEK    POCT Glucose    Collection Time: 08/10/20  7:37 AM   Result Value Ref Range    POC Glucose 74 70 - 99 mg/dl    Performed on ACCU-CHEK    POCT Glucose    Collection Time: 08/10/20  9:06 AM   Result Value Ref Range    POC Glucose 55 (L) 70 - 99 mg/dl    Performed on ACCU-CHEK    POCT Glucose    Collection Time: 08/10/20  9:27 AM   Result Value Ref Range    POC Glucose 82 70 - 99 mg/dl    Performed on ACCU-CHEK    Basic Metabolic Panel w/ Reflex to MG    Collection Time: 08/10/20 10:11 AM   Result Value Ref Range    Sodium 140 136 - 145 mmol/L    Potassium reflex Magnesium 3.5 3.5 - 5.1 mmol/L    Chloride 100 99 - 110 mmol/L    CO2 28 21 - 32 mmol/L    Anion Gap 12 3 - 16    Glucose 112 (H) 70 - 99 mg/dL    BUN 5 (L) 7 - 20 mg/dL    CREATININE 0.7 (L) 0.9 - 1.3 mg/dL    GFR Non-African American >60 >60    GFR African American >60 >60    Calcium 9.5 8.3 - 10.6 mg/dL   CBC auto differential    Collection Time: 08/10/20 10:11 AM   Result Value Ref Range    WBC 6.7 4.0 - 11.0 K/uL    RBC 4.51 4.20 - 5.90 M/uL    Hemoglobin 12.3 (L) 13.5 - 17.5 g/dL    Hematocrit 37.5 (L) 40.5 - 52.5 %    MCV 83.1 80.0 - 100.0 fL    MCH 27.4 26.0 - 34.0 pg    MCHC 33.0 31.0 - 36.0 g/dL    RDW 16.2 (H) 12.4 - 15.4 %    Platelets 507 174 - 420 K/uL    MPV 8.1 5.0 - 10.5 fL    Neutrophils % 66.3 %    Lymphocytes % 26.4 %    Monocytes % 5.0 %    Eosinophils % 1.9 %    Basophils % 0.4 %    Neutrophils Absolute 4.4 1.7 - 7.7 K/uL    Lymphocytes Absolute 1.8 1.0 - 5.1 K/uL    Monocytes Absolute 0.3 0.0 - 1.3 K/uL    Eosinophils Absolute 0.1 0.0 - 0.6 K/uL    Basophils Absolute 0.0 0.0 - 0.2 K/uL   Magnesium    Collection Time: 08/10/20 10:11 AM   Result Value Ref Range    Magnesium 1.80 1.80 - 2.40 mg/dL   Phosphorus    Collection Time: 08/10/20 10:11 AM   Result Value Ref Range    Phosphorus 4.1 2.5 - 4.9 mg/dL       Current Facility-Administered Medications Medication Dose Route Frequency Provider Last Rate Last Dose    OLANZapine zydis (ZYPREXA) disintegrating tablet 10 mg  10 mg Oral Nightly Fredrick Horton MD        LORazepam (ATIVAN) tablet 0.5 mg  0.5 mg Oral 4 times per day Taqueria Bettencourt MD   0.5 mg at 08/10/20 0616    insulin glargine (LANTUS) injection vial 10 Units  10 Units Subcutaneous Nightly ESTEPHANIA Hooks NP   10 Units at 08/09/20 2248    insulin lispro (HUMALOG) injection vial 4 Units  4 Units Subcutaneous TID  ESTEPHANIA Hooks NP   4 Units at 08/09/20 1222    insulin lispro (HUMALOG) injection vial 0-6 Units  0-6 Units Subcutaneous Nightly ESTEPHANIA Francisco CNP   2 Units at 08/09/20 2248    zolpidem (AMBIEN) tablet 5 mg  5 mg Oral BID Fredrick Horton MD   5 mg at 08/10/20 0935    lactobacillus (CULTURELLE) capsule 1 capsule  1 capsule Oral BID  ESTEPHANIA Hooks NP   1 capsule at 08/09/20 1741    lidocaine PF 1 % injection 5 mL  5 mL Intradermal Once ESTEPHANIA Hooks NP        sodium chloride flush 0.9 % injection 10 mL  10 mL Intravenous 2 times per day ESTEPHANIA Hooks NP   10 mL at 08/09/20 2250    sodium chloride flush 0.9 % injection 10 mL  10 mL Intravenous PRN ESTEPHANIA Hooks NP        divalproex (DEPAKOTE SPRINKLE) capsule 500 mg  500 mg Oral BID ESTEPHANIA Allison CNP   500 mg at 08/10/20 0935    ziprasidone (GEODON) injection 10 mg  10 mg Intramuscular Q12H PRN Fredrick Horton MD        insulin lispro (HUMALOG) injection vial 2 Units  2 Units Subcutaneous PRN ESTEPHANIA Allison CNP        insulin lispro (HUMALOG) injection vial 0-6 Units  0-6 Units Subcutaneous TID  ESTEPHANIA Allison CNP   1 Units at 08/09/20 1221    potassium chloride (KLOR-CON M) extended release tablet 40 mEq  40 mEq Oral PRN ESTEPHANIA Allison CNP   40 mEq at 08/05/20 2321    Or    potassium bicarb-citric acid (EFFER-K) effervescent tablet 40 mEq  40 mEq [facility] sodium phosphate 22.11 mmol in dextrose 5 % 250 mL IVPB  0.32 mmol/kg Intravenous PRN Shannon Wang, APRN - CNP         ROS:  Could not assess, no speech    MSE:  A-under covers, no gown, little movement  A-sleepy? M-can't assess, no speech  S-impaired  I/J-impaired  T-No speech today. Recs:  1. Catatonic delirium-Perhaps pt is too sedated now in the AM?  I'll split ambien to 5 bid. Cont ativan 0.5 qid. We'll try to increase zyprexa zydis to 10 qhs. I'd like an MRI, but we can't seem to keep pt calm enough to perform. Getting EEG this AM.  Geodon 10 im prn for agitation, see how this works, maybe increase to 20 if it seems to help.  bid from neuro. At this point if medically clear anytime soon, this pt will need psych admit. I suspect he'll need a forced meds order in the future.

## 2020-08-10 NOTE — PROGRESS NOTES
Pt resting in bed. All meds given per [facility]. Tolerated well. Pt calm at this time. Sitter at bed side. Will continue to monitor.

## 2020-08-10 NOTE — PLAN OF CARE
Problem: Pain:  Goal: Pain level will decrease  Description: Pain level will decrease  8/10/2020 1053 by Jeff Peñaloza RN  Outcome: Ongoing  Note:   Will continue to use the pain scale (0-10) to measure pt's pain and monitor their needs. Will assess patients pain with assessments and interactions. Pt will notify RN of any changes in pain and where. Will continue to perform therapeutic comfort measures and give pain medications as prescribed/needed. Problem: Pain:  Goal: Control of acute pain  Description: Control of acute pain  8/10/2020 1053 by Jeff Peñaloza RN  Outcome: Ongoing     Problem: Skin Integrity:  Goal: Will show no infection signs and symptoms  Description: Will show no infection signs and symptoms  8/10/2020 1053 by Jeff Peñaloza RN  Outcome: Ongoing  Note:   Pt will continue daily activities as tolerated and alert RN to any pain and skin changes. RN will continue to assess skin condition, note changes, and take preventative measures against skin breakdown such as movement, foam/silicone dressings on bony prominences, and making sure pt has adequate nutrition. Problem: Skin Integrity:  Goal: Absence of new skin breakdown  Description: Absence of new skin breakdown  8/10/2020 1053 by Jeff Peañloza RN  Outcome: Ongoing     Problem: Falls - Risk of:  Goal: Will remain free from falls  Description: Will remain free from falls  8/10/2020 1053 by Jeff Peñaloza RN  Outcome: Ongoing  Note:   Fall risk preventions in place. Bed in lowest position, call light within reach, non-skid socks on when ambulating, bed alarm on, bed wheels locked. Pt. Educated and agreeable on usage of call light before ambulation.        Problem: Falls - Risk of:  Goal: Absence of physical injury  Description: Absence of physical injury  8/10/2020 1053 by Jeff Peñaloza RN  Outcome: Ongoing     Problem: Nutrition Deficit:  Goal: Ability to achieve adequate nutritional intake will improve  Description: Ability to achieve adequate nutritional intake will improve  8/10/2020 1053 by Gisela Andino RN  Outcome: Ongoing     Problem: Mental Status - Impaired:  Goal: Mental status will improve  Description: Mental status will improve  8/10/2020 1053 by Gisela Andino RN  Outcome: Ongoing

## 2020-08-10 NOTE — PROGRESS NOTES
Pt morning BS was 74, morning insulin was held. Breakfast is in the room but pt is refusing to eat at this time. BS recheck was 55, was able to convince pt to drink 480mls of apple juice - BS is now 82. Pt is currently drinking the Glycerin. Will continue to monitor pts BS. Pt refused most of his morning medicine. Only took his Ambien, will reassess willingness to take morning medicine.  No IV access - MD aware    Electronically signed by Ebony Canada RN on 8/10/2020 at 10:27 AM

## 2020-08-10 NOTE — PROGRESS NOTES
Bizarre behavior, non-communicative. Did not comply with MRI. Patient's glucose better controlled. Will work on getting Psych admit today/tomorrow. Discussed with wife and updated on situation. Updated that the etiology is unclear exactly as to the why he still has the delirium but given his randomness of speech and eating seems more behavioral now, hence the need for psych admit. she states she still doesn't know if he injected himself with more insulin on purpose or not. He was just at a dance with one of his daughters a month ago, functioning normally. She states she has 13 children, some of which are her own and some are step children and she says if he is \"faking any of this, I will kill him. \"    Verbalized understanding the need for him to go to Psych at this point. Review of Systems:       The patient denied headaches, visual changes, LOC, SOB, CP, ABD pain, N/V/D, skin changes, new or worsening weakness or neuromuscular deficits. Comprehensive ROS negative except as mentioned above. Past Medical History:        Diagnosis Date    Diabetes mellitus ([facility] [address]     Gastroparesis     Hypertension        Past Surgical History:        Procedure Laterality Date    BONY PELVIS SURGERY      FOOT AMPUTATION Right 5/13/2020    EMERGENCY; RIGHT INCISION AND DEBRIDEMENT; HALUX AMPUTATION performed by Braulio Mart DPM at [address] 2006    pins and rods- plate    UPPER GASTROINTESTINAL ENDOSCOPY N/A 12/2/2019    EGD BIOPSY performed by Azael Morrow MD at [facility]:  Ketorolac; Morphine; Morphine and related; Tramadol; Lisinopril; Haloperidol lactate; Toradol [ketorolac tromethamine]; and Vicodin [hydrocodone-acetaminophen]    Past medical and surgical history reviewed. Any changes have been noted.      PHYSICAL EXAM:  /65   Pulse 67   Temp 97.5 °F (36.4 °C) (Axillary)   Resp 18   Ht 6' 2\" (1.88 m)   Wt 127 lb 13.9 oz (58 kg)   SpO2 99%   BMI 16.42 kg/m²       Intake/Output Summary (Last 24 hours) at 8/10/2020 1007  Last data filed at 8/10/2020 2266  Gross per 24 hour   Intake 840 ml   Output --   Net 840 ml        General appearance:   No apparent distress, appears stated age. Cooperative. HEENT:  Normocephalic, atraumatic. PERRLA. EOMi. Conjunctivae/corneas clear, no icterus, non-injected. Neck: Supple, with full range of motion. No jugular venous distention. Trachea midline. Respiratory:  Normal respiratory effort. Clear to auscultation, bilaterally without Rales/Wheezes/Rhonchi. Cardiovascular:  Regular rate and rhythm without murmurs, rubs or gallops. Abdomen: Soft, non-tender, non-distended, without rebound or guarding. Normal bowel sounds. Musculoskeletal:  No clubbing, cyanosis or edema bilaterally. Full range of motion without deformity. Skin: Skin color, texture, turgor normal.  No rashes or lesions. Neurologic:  Neurovascularly intact without any focal sensory/motor deficits. Cranial nerves: II-XII intact, grossly intact. No facial asymmetry, tongue midline. Psychiatric: very minimally engaging. Does not cooperate in exam or follow any commands. Pulls covers over head during exam. Moves purposefully and spontaneously in the bed, rolling over, changing positions and does not open eyes or make eye contact.   Capillary Refill: Brisk,< 3 seconds   Peripheral Pulses: +2 palpable, equal bilaterally       LABS:    Lab Results   Component Value Date    WBC 6.7 08/09/2020    HGB 11.5 (L) 08/09/2020    HCT 34.2 (L) 08/09/2020    MCV 81.9 08/09/2020     08/09/2020    LYMPHOPCT 39.5 08/09/2020    RBC 4.18 (L) 08/09/2020    MCH 27.4 08/09/2020    MCHC 33.5 08/09/2020    RDW 16.2 (H) 08/09/2020       Lab Results   Component Value Date    CREATININE 0.7 (L) 08/09/2020    BUN 11 08/09/2020     08/09/2020    K 3.8 08/09/2020     08/09/2020    CO2 27 08/09/2020       Lab Results   Component Value Date    MG 1.90 08/09/2020 mL Intradermal Once    sodium chloride flush  10 mL Intravenous 2 times per day    divalproex  500 mg Oral BID    insulin lispro  0-6 Units Subcutaneous TID WC    sodium chloride flush  10 mL Intravenous 2 times per day    enoxaparin  40 mg Subcutaneous Daily    pantoprazole  40 mg Intravenous Daily    And    sodium chloride (PF)  10 mL Intravenous Daily     Continuous Infusions:   dextrose       PRN Meds:.sodium chloride flush, ziprasidone, insulin lispro, potassium chloride **OR** potassium alternative oral replacement **OR** potassium chloride, sodium chloride flush, acetaminophen **OR** acetaminophen, polyethylene glycol, ondansetron, glucose, dextrose, glucagon (rDNA), dextrose, magnesium sulfate, potassium chloride, sodium phosphate IVPB **OR** sodium phosphate IVPB    Assessment & Plan:           Persistent metabolic encephalopathy v catatonic delirium - improving  Psychiatry and neurology following  MRI brain noted  -Prior workup including MRI, EEG and CSF has been unrevealing  Ativan and ambien challenge per psych  - transfer for cvEEG if does not continue to improve   Abx for leukocytes in urine.  - Lost IV access  - IM ativan administered 8/8/20  - The patient awoke on 8/9/20 - took his medicine, walked the halls with staff.  Meds were further adjusted by psych  - ativan 0.5mg q6h + zyprexa 5mg qhs, geodon 10mg IM PRN agitation (may need to incr to 20mg if needed for agitation)+ depakote 500mg bid (per neuro)     Hypophosphatemia - resolved  Has been replaced  Follow labs  Replete as needed     Agitation and aggressive behavior  Psychiatric following  Ambien and ativan challenge  - mental status is improving  Depakote 500mg bid 8/5     Diabetes mellitus  Severe hypoglycemia on admission-resolved  Insulin overdose-unsure if accidental or intentional     Lantus  Humalog SSI  Prandial humalog   Monitor FSBG  Titrate insulin PRN     HTN  Monitor BP  Initiate/titrate medications as needed.     Severe protein calorie malnutrition/weight loss  2/2 poor oral intake  Initial wt 152lbs  Daily weights  Dietician evaluation  Follow labs and vitals  Encourage PO intake  Dietary supplements  Monitor electrolytes and replete as needed.     Bacteruria  - rocephin IV started 8/3 with delirium - Completed 8/8/20    Continue current regimen/therapies. Monitor. Adjust medical regimen as appropriate. Body mass index is 16.42 kg/m². The patient and / or the family were informed of the results of any tests, a time was given to answer questions, a plan was proposed and they agreed with plan. DVT ppx: loenox  GI ppx: yes    Diet: DIET DENTAL SOFT;  Dietary Nutrition Supplements: Diabetic Oral Supplement  Dietary Nutrition Supplements: Frozen Oral Supplement    Consults:  IP CONSULT TO PULMONOLOGY  IP CONSULT TO NEUROLOGY  IP CONSULT TO DIETITIAN  IP CONSULT TO PSYCHIATRY  IP CONSULT TO DIETITIAN  IP CONSULT TO DIETITIAN  IP CONSULT TO DIABETES EDUCATOR    DISPO/placement plan:  Discharged and admit to psych.     Code Status: Full Code      Orville Kocher, APRN - [place]  08/10/20

## 2020-08-10 NOTE — CARE COORDINATION
8/10 Patient has new orders for discharge and Transfer to a Psych facility. Wife notified. Await transfer center response.  Electronically signed by Yadira Franz RN on 8/10/2020 at 1:26 PM

## 2020-08-10 NOTE — PROGRESS NOTES
Pt agitated, refusing blood sugar check and vitals. No acute sign of distress noted.  Will continue to monitor

## 2020-08-10 NOTE — DISCHARGE INSTR - COC
Continuity of Care Form    Patient Name: Delilah Eckert   :  1975  MRN:  [id number]    Admit date:  7/15/2020  Discharge date:  2020    Code Status Order: Full Code   Advance Directives:   [facility] Documentation     Date/Time Healthcare Directive Type of Healthcare Directive Copy in [facility] [address] Agent's Name Healthcare Agent's Phone Number    20 0047  No, patient does not have an advance directive for healthcare treatment -- -- -- -- --          Admitting Physician:  Jim Oliver MD  PCP: Lobo Narayanan MD    Discharging Nurse: Hui Barker RN   [address] Unit/Room#: W7K-3129/4327-62  Discharging Unit Phone Number: [phone]    Emergency Contact:   Extended Emergency Contact Information  Primary Emergency Contact: [place]  Address: [facility], [address] [facility] [address] [facility] Phone: [phone]  Mobile Phone: [phone]  Relation: Parent  Secondary Emergency Contact:  [facility] [address] Phone: [phone]  Relation: Domestic Partner   needed?  No    Past Surgical History:  Past Surgical History:   Procedure Laterality Date    BONY PELVIS SURGERY      FOOT AMPUTATION Right 2020    EMERGENCY; RIGHT INCISION AND DEBRIDEMENT; HALUX AMPUTATION performed by Cheryle Munoz DPM at [address] 2006    pins and rods- plate    UPPER GASTROINTESTINAL ENDOSCOPY N/A 2019    EGD BIOPSY performed by Malik Mortensen MD at [facility] ENDOSCOPY       Immunization History:   Immunization History   Administered Date(s) Administered    Tdap (Boostrix, Adacel) 2017       Active Problems:  Patient Active Problem List   Diagnosis Code    Diabetic ketoacidosis without coma associated with diabetes mellitus due to underlying condition ([facility] [address] E08.10    Chronic abdominal pain R10.9, G89.29    Gastroparesis K31.84    GERD (gastroesophageal reflux disease) K21.9    Seizure ([facility] R56.9    Toe deformity M20.60    Uncontrolled type 1 diabetes mellitus with diabetic peripheral neuropathy ([facility]) E10.42, E10.65    Type 1 diabetes mellitus with background diabetic retinopathy ([facility] E10.3299    Hypoglycemia unawareness in type 1 diabetes mellitus ([facility] E10.649    Type 1 diabetes mellitus with hypercholesterolemia ([facility]) E10.69, E78.00    Accelerated hypertension I10    Acute blood loss anemia D62    Acute respiratory failure ([facility]) J96.00    Candida esophagitis ([facility]) B37.81    Cholelithiasis K80.20    Cocaine abuse, episodic ([facility]) F14.10    Cerebral infarction ([facility]) I63.9    Diabetic polyneuropathy ([facility]) E11.42    Duodenal ulcer K26.9    Heart failure, diastolic, acute ([facility]) L79.68    Leg weakness, bilateral R29.898    Cannabis abuse F12.10    Numbness in both legs R20.0    Polysubstance abuse ([facility]) F19.10    Pulmonary edema J81.1    Diabetic foot ulcer ([facility]) E11.621, L97.509    Heart murmur R01.1    Hyperlipidemia E78.5    Osteomyelitis of great toe of right foot ([facility]) M86.9    Epigastric abdominal pain R10.13    Hypertensive urgency I16.0    Diabetic gastroparesis associated with type 1 diabetes mellitus ([facility]) E10.43, K31.84    Acute respiratory failure with hypoxia ([facility]) J96.01    Hypoglycemia E16.2    Altered mental status R41.82    Severe malnutrition ([facility]) E43       Isolation/Infection:   Isolation          No Isolation        Unreconciled External Infections     Infection Onset Last Indicated Last Received Source    No mapped external infections found    .     Unmapped Infections (1)      MRSA 08/04/12 08/04/12 07/17/20             Patient Infection Status     Infection Onset Added Last Indicated Last Indicated By Review Planned Expiration Resolved Resolved By    None active    Resolved    COVID-19 Rule Out 05/12/20 05/12/20 05/12/20 COVID-19 (Ordered)   05/12/20 Rule-Out Test Resulted    C-diff Rule Out 05/12/20 05/12/20 05/13/20 Clostridium Difficile Toxin/Antigen (Ordered)   05/13/20 Rule-Out Test Resulted    C-diff Rule Out  12/30/19 12/30/19 Clostridium Difficile Toxin/Antigen (Ordered)   12/31/19 Ying Arevalo RN          Nurse Assessment:  Last Vital Signs: /82   Pulse 84   Temp 97.8 °F (36.6 °C) (Axillary)   Resp 16   Ht 6' 2\" (1.88 m)   Wt 127 lb 13.9 oz (58 kg)   SpO2 99%   BMI 16.42 kg/m²     Last documented pain score (0-10 scale): Pain Level: 0  Last Weight:   Wt Readings from Last 1 Encounters:   08/09/20 127 lb 13.9 oz (58 kg)     Mental Status:  disoriented and alert    IV Access:  - None    Nursing Mobility/ADLs:  Walking   Assisted  Transfer  Assisted  Bathing  Assisted  Dressing  Assisted  Toileting  Assisted  Feeding  Assisted  Med Admin  Assisted  Med Delivery   whole    Wound Care Documentation and Therapy:  Wound 03/25/20 Toe (Comment  which one) Right (Active)   Number of days: 138        Elimination:  Continence:   · Bowel: Yes  · Bladder: Yes  Urinary Catheter: None   Colostomy/Ileostomy/Ileal Conduit: No       Date of Last BM: 08/11/2020    Intake/Output Summary (Last 24 hours) at 8/10/2020 1401  Last data filed at 8/10/2020 1051  Gross per 24 hour   Intake 1320 ml   Output --   Net 1320 ml     I/O last 3 completed shifts: In: 1080 [P.O.:1080]  Out: -     Safety Concerns:     History of Falls (last 30 days) and At Risk for Falls    Impairments/Disabilities:      None    Nutrition Therapy:  Current Nutrition Therapy:   - Oral Diet:  Dental Soft, Diabetic oral supplement    Routes of Feeding: Oral  Liquids: No Restrictions  Daily Fluid Restriction: no  Last Modified Barium Swallow with Video (Video Swallowing Test): not done    Treatments at the Time of Hospital Discharge:   Respiratory Treatments: none  Oxygen Therapy:  is not on home oxygen therapy.   Ventilator:    - No ventilator support    Rehab Therapies: Physical Therapy and Occupational Therapy  Weight Bearing Status/Restrictions: No weight bearing restirctions  Other Medical Equipment (for information only, NOT a DME order):  none  Other Treatments:   Diabetes education and support with focus on blood glucose monitoring and hyper/hypoglycemia prevention. Consistent meal intake and timing of insulin/blood glucose monitoring has been challenging. (Electronically signed by Lenard Vanegas RN on 8/10/2020 at 2:04 PM)       Patient's personal belongings (please select all that are sent with patient):  Glasses    RN SIGNATURE:  Electronically signed by Raisa Arana RN on 8/11/20 at 8:23 PM EDT    CASE MANAGEMENT/SOCIAL WORK SECTION    Inpatient Status Date: ***    Readmission Risk Assessment Score:  Readmission Risk              Risk of Unplanned Readmission:        61           Discharging to Facility/ Agency   · Name:   · Address:  · Phone:  · Fax:    Dialysis Facility (if applicable)   · Name:  · Address:  · Dialysis Schedule:  · Phone:  · Fax:    / signature: {Esignature:[id number]}    PHYSICIAN SECTION    Prognosis: {Prognosis:5032972957}    Condition at Discharge: [address] [place] [address] Patient Condition:[id number]}    Rehab Potential (if transferring to Rehab): {Prognosis:0371981816}    Recommended Labs or Other Treatments After Discharge: ***    Physician Certification: I certify the above information and transfer of Roxane Velazquez  is necessary for the continuing treatment of the diagnosis listed and that he requires {Admit to Appropriate Level of Care:04151} for {GREATER/LESS:147726166} 30 days.      Update Admission H&P: {CHP DME Changes in [facility]:573714540}    PHYSICIAN SIGNATURE:  {Esignature:[id number]}

## 2020-08-10 NOTE — PROGRESS NOTES
Psych Consult Progress Note    08/10/20      Sky Oneill  [id number]  Chief Complaint   Patient presents with    Hypoglycemia     Found unresponsive by girlfriend. Per EMS, pt had blood sugar of \"22 on arrival and was given IM glucagon\". EMS reports blood sugar \"went up to 59\". Pt responsive to pain at this time. I have reviewed recent documentation. Sky Oneill is a 50 y.o. male  With  has a past medical history of Diabetes mellitus ([address] Gastroparesis, and Hypertension. Subjective/Interval Hx: Pulled out own access over the weekend. Meds have been inconsistent because of adherence. Quality:  Altered ms  Severity:  Severe   Duration:  Days to weeks  Context:  As above.   Location:  Brain    Objective:  Vitals:    08/10/20 1018   BP: 103/63   Pulse: 78   Resp: 16   Temp: 97.6 °F (36.4 °C)   SpO2:      Recent Results (from the past 168 hour(s))   POCT Glucose    Collection Time: 08/03/20 11:45 AM   Result Value Ref Range    POC Glucose 217 (H) 70 - 99 mg/dl    Performed on ACCU-CHEK    POCT Glucose    Collection Time: 08/03/20  4:01 PM   Result Value Ref Range    POC Glucose 86 70 - 99 mg/dl    Performed on ACCU-CHEK    POCT Glucose    Collection Time: 08/03/20  8:04 PM   Result Value Ref Range    POC Glucose 256 (H) 70 - 99 mg/dl    Performed on ACCU-CHEK    POCT Glucose    Collection Time: 08/04/20 12:01 AM   Result Value Ref Range    POC Glucose 145 (H) 70 - 99 mg/dl    Performed on ACCU-CHEK    POCT Glucose    Collection Time: 08/04/20  4:05 AM   Result Value Ref Range    POC Glucose 47 (LL) 70 - 99 mg/dl    Performed on ACCU-CHEK    POCT Glucose    Collection Time: 08/04/20  4:12 AM   Result Value Ref Range    POC Glucose 46 (LL) 70 - 99 mg/dl    Performed on ACCU-CHEK    POCT Glucose    Collection Time: 08/04/20  4:24 AM   Result Value Ref Range    POC Glucose 55 (L) 70 - 99 mg/dl    Performed on ACCU-CHEK    POCT Glucose    Collection Time: 08/04/20  4:41 AM   Result Value Ref Range POC Glucose 87 70 - 99 mg/dl    Performed on ACCU-CHEK    POCT Glucose    Collection Time: 08/04/20  5:02 AM   Result Value Ref Range    POC Glucose 92 70 - 99 mg/dl    Performed on ACCU-CHEK    Basic Metabolic Panel w/ Reflex to MG    Collection Time: 08/04/20  5:50 AM   Result Value Ref Range    Sodium 143 136 - 145 mmol/L    Potassium reflex Magnesium 3.3 (L) 3.5 - 5.1 mmol/L    Chloride 103 99 - 110 mmol/L    CO2 25 21 - 32 mmol/L    Anion Gap 15 3 - 16    Glucose 199 (H) 70 - 99 mg/dL    BUN 10 7 - 20 mg/dL    CREATININE 0.9 0.9 - 1.3 mg/dL    GFR Non-African American >60 >60    GFR African American >60 >60    Calcium 9.3 8.3 - 10.6 mg/dL   CBC auto differential    Collection Time: 08/04/20  5:50 AM   Result Value Ref Range    WBC 6.2 4.0 - 11.0 K/uL    RBC 4.02 (L) 4.20 - 5.90 M/uL    Hemoglobin 11.1 (L) 13.5 - 17.5 g/dL    Hematocrit 33.2 (L) 40.5 - 52.5 %    MCV 82.5 80.0 - 100.0 fL    MCH 27.7 26.0 - 34.0 pg    MCHC 33.5 31.0 - 36.0 g/dL    RDW 16.4 (H) 12.4 - 15.4 %    Platelets 107 411 - 782 K/uL    MPV 7.9 5.0 - 10.5 fL    Neutrophils % 66.2 %    Lymphocytes % 25.2 %    Monocytes % 4.5 %    Eosinophils % 3.3 %    Basophils % 0.8 %    Neutrophils Absolute 4.1 1.7 - 7.7 K/uL    Lymphocytes Absolute 1.6 1.0 - 5.1 K/uL    Monocytes Absolute 0.3 0.0 - 1.3 K/uL    Eosinophils Absolute 0.2 0.0 - 0.6 K/uL    Basophils Absolute 0.0 0.0 - 0.2 K/uL   Magnesium    Collection Time: 08/04/20  5:50 AM   Result Value Ref Range    Magnesium 1.70 (L) 1.80 - 2.40 mg/dL   Phosphorus    Collection Time: 08/04/20  5:50 AM   Result Value Ref Range    Phosphorus 3.9 2.5 - 4.9 mg/dL   POCT Glucose    Collection Time: 08/04/20  7:59 AM   Result Value Ref Range    POC Glucose 241 (H) 70 - 99 mg/dl    Performed on ACCU-CHEK    POCT Glucose    Collection Time: 08/04/20 11:17 AM   Result Value Ref Range    POC Glucose 137 (H) 70 - 99 mg/dl    Performed on ACCU-CHEK    Basic Metabolic Panel w/ Reflex to MG    Collection Time: 08/04/20 11:45 AM   Result Value Ref Range    Sodium 145 136 - 145 mmol/L    Potassium reflex Magnesium 4.7 3.5 - 5.1 mmol/L    Chloride 108 99 - 110 mmol/L    CO2 23 21 - 32 mmol/L    Anion Gap 14 3 - 16    Glucose 112 (H) 70 - 99 mg/dL    BUN 8 7 - 20 mg/dL    CREATININE 0.9 0.9 - 1.3 mg/dL    GFR Non-African American >60 >60    GFR African American >60 >60    Calcium 9.6 8.3 - 10.6 mg/dL   Magnesium    Collection Time: 08/04/20 11:45 AM   Result Value Ref Range    Magnesium 1.80 1.80 - 2.40 mg/dL   POCT Glucose    Collection Time: 08/04/20  4:02 PM   Result Value Ref Range    POC Glucose 352 (H) 70 - 99 mg/dl    Performed on ACCU-CHEK    POCT Glucose    Collection Time: 08/04/20 10:04 PM   Result Value Ref Range    POC Glucose 436 (H) 70 - 99 mg/dl    Performed on ACCU-CHEK    POCT Glucose    Collection Time: 08/04/20 11:49 PM   Result Value Ref Range    POC Glucose 291 (H) 70 - 99 mg/dl    Performed on ACCU-CHEK    POCT Glucose    Collection Time: 08/05/20  4:17 AM   Result Value Ref Range    POC Glucose 56 (L) 70 - 99 mg/dl    Performed on ACCU-CHEK    POCT Glucose    Collection Time: 08/05/20  5:01 AM   Result Value Ref Range    POC Glucose 126 (H) 70 - 99 mg/dl    Performed on ACCU-CHEK    POCT Glucose    Collection Time: 08/05/20  5:14 AM   Result Value Ref Range    POC Glucose 118 (H) 70 - 99 mg/dl    Performed on ACCU-CHEK    Basic Metabolic Panel w/ Reflex to MG    Collection Time: 08/05/20  5:45 AM   Result Value Ref Range    Sodium 144 136 - 145 mmol/L    Potassium reflex Magnesium 3.4 (L) 3.5 - 5.1 mmol/L    Chloride 106 99 - 110 mmol/L    CO2 27 21 - 32 mmol/L    Anion Gap 11 3 - 16    Glucose 97 70 - 99 mg/dL    BUN 7 7 - 20 mg/dL    CREATININE 0.9 0.9 - 1.3 mg/dL    GFR Non-African American >60 >60    GFR African American >60 >60    Calcium 9.4 8.3 - 10.6 mg/dL   CBC auto differential    Collection Time: 08/05/20  5:45 AM   Result Value Ref Range    WBC 6.3 4.0 - 11.0 K/uL    RBC 3.94 (L) 4.20 - 5.90 M/uL    Hemoglobin 10.8 (L) 13.5 - 17.5 g/dL    Hematocrit 32.6 (L) 40.5 - 52.5 %    MCV 82.7 80.0 - 100.0 fL    MCH 27.5 26.0 - 34.0 pg    MCHC 33.2 31.0 - 36.0 g/dL    RDW 16.7 (H) 12.4 - 15.4 %    Platelets 605 717 - 021 K/uL    MPV 8.0 5.0 - 10.5 fL    Neutrophils % 59.3 %    Lymphocytes % 30.8 %    Monocytes % 6.2 %    Eosinophils % 3.1 %    Basophils % 0.6 %    Neutrophils Absolute 3.7 1.7 - 7.7 K/uL    Lymphocytes Absolute 1.9 1.0 - 5.1 K/uL    Monocytes Absolute 0.4 0.0 - 1.3 K/uL    Eosinophils Absolute 0.2 0.0 - 0.6 K/uL    Basophils Absolute 0.0 0.0 - 0.2 K/uL   Magnesium    Collection Time: 08/05/20  5:45 AM   Result Value Ref Range    Magnesium 1.90 1.80 - 2.40 mg/dL   Phosphorus    Collection Time: 08/05/20  5:45 AM   Result Value Ref Range    Phosphorus 5.0 (H) 2.5 - 4.9 mg/dL   POCT Glucose    Collection Time: 08/05/20  7:50 AM   Result Value Ref Range    POC Glucose 54 (L) 70 - 99 mg/dl    Performed on ACCU-CHEK    POCT Glucose    Collection Time: 08/05/20  9:13 AM   Result Value Ref Range    POC Glucose 124 (H) 70 - 99 mg/dl    Performed on ACCU-CHEK    POCT Glucose    Collection Time: 08/05/20 12:04 PM   Result Value Ref Range    POC Glucose 267 (H) 70 - 99 mg/dl    Performed on ACCU-CHEK    POCT Glucose    Collection Time: 08/05/20  4:50 PM   Result Value Ref Range    POC Glucose 306 (H) 70 - 99 mg/dl    Performed on ACCU-CHEK    POCT Glucose    Collection Time: 08/06/20  1:36 AM   Result Value Ref Range    POC Glucose 451 (H) 70 - 99 mg/dl    Performed on ACCU-CHEK    POCT Glucose    Collection Time: 08/06/20  3:59 AM   Result Value Ref Range    POC Glucose 263 (H) 70 - 99 mg/dl    Performed on ACCU-CHEK    Basic Metabolic Panel w/ Reflex to MG    Collection Time: 08/06/20  6:50 AM   Result Value Ref Range    Sodium 144 136 - 145 mmol/L    Potassium reflex Magnesium 3.9 3.5 - 5.1 mmol/L    Chloride 105 99 - 110 mmol/L    CO2 28 21 - 32 mmol/L    Anion Gap 11 3 - 16    Glucose 48 (LL) 70 - 99 mg/dL    BUN 8 7 - 20 mg/dL    CREATININE 0.7 (L) 0.9 - 1.3 mg/dL    GFR Non-African American >60 >60    GFR African American >60 >60    Calcium 9.4 8.3 - 10.6 mg/dL   CBC auto differential    Collection Time: 08/06/20  6:50 AM   Result Value Ref Range    WBC 6.8 4.0 - 11.0 K/uL    RBC 4.37 4.20 - 5.90 M/uL    Hemoglobin 11.9 (L) 13.5 - 17.5 g/dL    Hematocrit 36.3 (L) 40.5 - 52.5 %    MCV 83.0 80.0 - 100.0 fL    MCH 27.3 26.0 - 34.0 pg    MCHC 32.9 31.0 - 36.0 g/dL    RDW 16.2 (H) 12.4 - 15.4 %    Platelets 641 274 - 203 K/uL    MPV 8.4 5.0 - 10.5 fL    Neutrophils % 48.4 %    Lymphocytes % 42.1 %    Monocytes % 5.4 %    Eosinophils % 3.4 %    Basophils % 0.7 %    Neutrophils Absolute 3.3 1.7 - 7.7 K/uL    Lymphocytes Absolute 2.9 1.0 - 5.1 K/uL    Monocytes Absolute 0.4 0.0 - 1.3 K/uL    Eosinophils Absolute 0.2 0.0 - 0.6 K/uL    Basophils Absolute 0.0 0.0 - 0.2 K/uL   Magnesium    Collection Time: 08/06/20  6:50 AM   Result Value Ref Range    Magnesium 2.00 1.80 - 2.40 mg/dL   Phosphorus    Collection Time: 08/06/20  6:50 AM   Result Value Ref Range    Phosphorus 2.7 2.5 - 4.9 mg/dL   POCT Glucose    Collection Time: 08/06/20 10:32 AM   Result Value Ref Range    POC Glucose 253 (H) 70 - 99 mg/dl    Performed on ACCU-CHEK    POCT Glucose    Collection Time: 08/06/20 11:32 AM   Result Value Ref Range    POC Glucose 319 (H) 70 - 99 mg/dl    Performed on ACCU-CHEK    Urine Reflex to Culture    Collection Time: 08/06/20  2:54 PM    Specimen: Urine, clean catch   Result Value Ref Range    Color, UA YELLOW Straw/Yellow    Clarity, UA Clear Clear    Glucose, Ur >=1000 (A) Negative mg/dL    Bilirubin Urine Negative Negative    Ketones, Urine TRACE (A) Negative mg/dL    Specific Gravity, UA 1.028 1.005 - 1.030    Blood, Urine Negative Negative    pH, UA 5.5 5.0 - 8.0    Protein, UA Negative Negative mg/dL    Urobilinogen, Urine 0.2 <2.0 E.U./dL    Nitrite, Urine Negative Negative    Leukocyte Esterase, Urine Negative Negative    Microscopic Examination Not Indicated     Urine Type NotGiven     Urine Reflex to Culture Not Indicated    POCT Glucose    Collection Time: 08/06/20  3:25 PM   Result Value Ref Range    POC Glucose 233 (H) 70 - 99 mg/dl    Performed on ACCU-CHEK    POCT Glucose    Collection Time: 08/06/20  5:22 PM   Result Value Ref Range    POC Glucose 273 (H) 70 - 99 mg/dl    Performed on ACCU-CHEK    Basic Metabolic Panel w/ Reflex to MG    Collection Time: 08/07/20  4:30 AM   Result Value Ref Range    Sodium 137 136 - 145 mmol/L    Potassium reflex Magnesium 4.3 3.5 - 5.1 mmol/L    Chloride 100 99 - 110 mmol/L    CO2 29 21 - 32 mmol/L    Anion Gap 8 3 - 16    Glucose 285 (H) 70 - 99 mg/dL    BUN 12 7 - 20 mg/dL    CREATININE 0.9 0.9 - 1.3 mg/dL    GFR Non-African American >60 >60    GFR African American >60 >60    Calcium 9.3 8.3 - 10.6 mg/dL   CBC auto differential    Collection Time: 08/07/20  4:30 AM   Result Value Ref Range    WBC 5.7 4.0 - 11.0 K/uL    RBC 4.03 (L) 4.20 - 5.90 M/uL    Hemoglobin 11.1 (L) 13.5 - 17.5 g/dL    Hematocrit 33.4 (L) 40.5 - 52.5 %    MCV 82.9 80.0 - 100.0 fL    MCH 27.6 26.0 - 34.0 pg    MCHC 33.2 31.0 - 36.0 g/dL    RDW 16.4 (H) 12.4 - 15.4 %    Platelets 148 845 - 919 K/uL    MPV 8.7 5.0 - 10.5 fL    Neutrophils % 58.0 %    Lymphocytes % 33.8 %    Monocytes % 4.4 %    Eosinophils % 2.9 %    Basophils % 0.9 %    Neutrophils Absolute 3.3 1.7 - 7.7 K/uL    Lymphocytes Absolute 1.9 1.0 - 5.1 K/uL    Monocytes Absolute 0.2 0.0 - 1.3 K/uL    Eosinophils Absolute 0.2 0.0 - 0.6 K/uL    Basophils Absolute 0.1 0.0 - 0.2 K/uL   Magnesium    Collection Time: 08/07/20  4:30 AM   Result Value Ref Range    Magnesium 1.90 1.80 - 2.40 mg/dL   Phosphorus    Collection Time: 08/07/20  4:30 AM   Result Value Ref Range    Phosphorus 2.1 (L) 2.5 - 4.9 mg/dL   POCT Glucose    Collection Time: 08/07/20  7:38 AM   Result Value Ref Range    POC Glucose 203 (H) 70 - 99 mg/dl    Performed on ACCU-CHEK    POCT Glucose    Collection Time: 08/07/20 11:20 AM   Result Value Ref Range    POC Glucose 227 (H) 70 - 99 mg/dl    Performed on ACCU-CHEK    POCT Glucose    Collection Time: 08/07/20  4:29 PM   Result Value Ref Range    POC Glucose 339 (H) 70 - 99 mg/dl    Performed on ACCU-CHEK    POCT Glucose    Collection Time: 08/07/20  8:23 PM   Result Value Ref Range    POC Glucose 331 (H) 70 - 99 mg/dl    Performed on ACCU-CHEK    POCT Glucose    Collection Time: 08/08/20  2:05 AM   Result Value Ref Range    POC Glucose 504 (H) 70 - 99 mg/dl    Performed on ACCU-CHEK    POCT Glucose    Collection Time: 08/08/20  6:14 AM   Result Value Ref Range    POC Glucose 275 (H) 70 - 99 mg/dl    Performed on ACCU-CHEK    POCT Glucose    Collection Time: 08/08/20  7:34 AM   Result Value Ref Range    POC Glucose 226 (H) 70 - 99 mg/dl    Performed on ACCU-CHEK    POCT Glucose    Collection Time: 08/08/20 11:43 AM   Result Value Ref Range    POC Glucose 135 (H) 70 - 99 mg/dl    Performed on ACCU-CHEK    POCT Glucose    Collection Time: 08/08/20 12:20 PM   Result Value Ref Range    POC Glucose 155 (H) 70 - 99 mg/dl    Performed on ACCU-CHEK    POCT Glucose    Collection Time: 08/08/20  5:07 PM   Result Value Ref Range    POC Glucose 475 (H) 70 - 99 mg/dl    Performed on ACCU-CHEK    POCT Glucose    Collection Time: 08/08/20  8:32 PM   Result Value Ref Range    POC Glucose 448 (H) 70 - 99 mg/dl    Performed on ACCU-CHEK    POCT Glucose    Collection Time: 08/08/20  8:34 PM   Result Value Ref Range    POC Glucose 463 (H) 70 - 99 mg/dl    Performed on ACCU-CHEK    POCT Glucose    Collection Time: 08/09/20  6:23 AM   Result Value Ref Range    POC Glucose 145 (H) 70 - 99 mg/dl    Performed on ACCU-CHEK    Basic Metabolic Panel w/ Reflex to MG    Collection Time: 08/09/20  6:24 AM   Result Value Ref Range    Sodium 138 136 - 145 mmol/L    Potassium reflex Magnesium 3.8 3.5 - 5.1 mmol/L    Chloride 100 99 - 110 mmol/L    CO2 27 21 - 32 mmol/L    Anion Gap 11 3 - 16    Glucose 148 (H) 70 - 99 mg/dL    BUN 11 7 - 20 mg/dL    CREATININE 0.7 (L) 0.9 - 1.3 mg/dL    GFR Non-African American >60 >60    GFR African American >60 >60    Calcium 9.2 8.3 - 10.6 mg/dL   CBC auto differential    Collection Time: 08/09/20  6:24 AM   Result Value Ref Range    WBC 6.7 4.0 - 11.0 K/uL    RBC 4.18 (L) 4.20 - 5.90 M/uL    Hemoglobin 11.5 (L) 13.5 - 17.5 g/dL    Hematocrit 34.2 (L) 40.5 - 52.5 %    MCV 81.9 80.0 - 100.0 fL    MCH 27.4 26.0 - 34.0 pg    MCHC 33.5 31.0 - 36.0 g/dL    RDW 16.2 (H) 12.4 - 15.4 %    Platelets 417 991 - 219 K/uL    MPV 8.3 5.0 - 10.5 fL    Neutrophils % 51.8 %    Lymphocytes % 39.5 %    Monocytes % 5.5 %    Eosinophils % 2.5 %    Basophils % 0.7 %    Neutrophils Absolute 3.5 1.7 - 7.7 K/uL    Lymphocytes Absolute 2.6 1.0 - 5.1 K/uL    Monocytes Absolute 0.4 0.0 - 1.3 K/uL    Eosinophils Absolute 0.2 0.0 - 0.6 K/uL    Basophils Absolute 0.0 0.0 - 0.2 K/uL   Magnesium    Collection Time: 08/09/20  6:24 AM   Result Value Ref Range    Magnesium 1.90 1.80 - 2.40 mg/dL   Phosphorus    Collection Time: 08/09/20  6:24 AM   Result Value Ref Range    Phosphorus 3.8 2.5 - 4.9 mg/dL   POCT Glucose    Collection Time: 08/09/20 11:24 AM   Result Value Ref Range    POC Glucose 153 (H) 70 - 99 mg/dl    Performed on ACCU-CHEK    POCT Glucose    Collection Time: 08/09/20  5:03 PM   Result Value Ref Range    POC Glucose 51 (L) 70 - 99 mg/dl    Performed on ACCU-CHEK    POCT Glucose    Collection Time: 08/09/20  5:27 PM   Result Value Ref Range    POC Glucose 78 70 - 99 mg/dl    Performed on ACCU-CHEK    POCT Glucose    Collection Time: 08/09/20  6:21 PM   Result Value Ref Range    POC Glucose 195 (H) 70 - 99 mg/dl    Performed on ACCU-CHEK    POCT Glucose    Collection Time: 08/09/20  8:51 PM   Result Value Ref Range    POC Glucose 231 (H) 70 - 99 mg/dl    Performed on ACCU-CHEK    POCT Glucose    Collection Time: 08/10/20  2:01 AM   Result Value Ref Range    POC Glucose 125 (H) 70 - 99 mg/dl    Performed on ACCU-CHEK    POCT Glucose    Collection Time: 08/10/20  7:37 AM   Result Value Ref Range    POC Glucose 74 70 - 99 mg/dl    Performed on ACCU-CHEK    POCT Glucose    Collection Time: 08/10/20  9:06 AM   Result Value Ref Range    POC Glucose 55 (L) 70 - 99 mg/dl    Performed on ACCU-CHEK    POCT Glucose    Collection Time: 08/10/20  9:27 AM   Result Value Ref Range    POC Glucose 82 70 - 99 mg/dl    Performed on ACCU-CHEK    Basic Metabolic Panel w/ Reflex to MG    Collection Time: 08/10/20 10:11 AM   Result Value Ref Range    Sodium 140 136 - 145 mmol/L    Potassium reflex Magnesium 3.5 3.5 - 5.1 mmol/L    Chloride 100 99 - 110 mmol/L    CO2 28 21 - 32 mmol/L    Anion Gap 12 3 - 16    Glucose 112 (H) 70 - 99 mg/dL    BUN 5 (L) 7 - 20 mg/dL    CREATININE 0.7 (L) 0.9 - 1.3 mg/dL    GFR Non-African American >60 >60    GFR African American >60 >60    Calcium 9.5 8.3 - 10.6 mg/dL   CBC auto differential    Collection Time: 08/10/20 10:11 AM   Result Value Ref Range    WBC 6.7 4.0 - 11.0 K/uL    RBC 4.51 4.20 - 5.90 M/uL    Hemoglobin 12.3 (L) 13.5 - 17.5 g/dL    Hematocrit 37.5 (L) 40.5 - 52.5 %    MCV 83.1 80.0 - 100.0 fL    MCH 27.4 26.0 - 34.0 pg    MCHC 33.0 31.0 - 36.0 g/dL    RDW 16.2 (H) 12.4 - 15.4 %    Platelets 046 030 - 013 K/uL    MPV 8.1 5.0 - 10.5 fL    Neutrophils % 66.3 %    Lymphocytes % 26.4 %    Monocytes % 5.0 %    Eosinophils % 1.9 %    Basophils % 0.4 %    Neutrophils Absolute 4.4 1.7 - 7.7 K/uL    Lymphocytes Absolute 1.8 1.0 - 5.1 K/uL    Monocytes Absolute 0.3 0.0 - 1.3 K/uL    Eosinophils Absolute 0.1 0.0 - 0.6 K/uL    Basophils Absolute 0.0 0.0 - 0.2 K/uL   Magnesium    Collection Time: 08/10/20 10:11 AM   Result Value Ref Range    Magnesium 1.80 1.80 - 2.40 mg/dL   Phosphorus    Collection Time: 08/10/20 10:11 AM   Result Value Ref Range    Phosphorus 4.1 2.5 - 4.9 mg/dL       Current Facility-Administered Medications   Medication Dose Route Frequency Provider Last Rate Last Dose    LORazepam (ATIVAN) tablet 0.5 mg  0.5 mg Oral 4 times per day Hugh Borges MD   0.5 mg at 08/10/20 0616    insulin glargine (LANTUS) injection vial 10 Units  10 Units Subcutaneous Nightly ESTEPHANIA Rivera NP   10 Units at 08/09/20 2248    insulin lispro (HUMALOG) injection vial 4 Units  4 Units Subcutaneous TID  ESTEPHANIA Rivera NP   4 Units at 08/09/20 1222    insulin lispro (HUMALOG) injection vial 0-6 Units  0-6 Units Subcutaneous Nightly ESTEPHANIA May CNP   2 Units at 08/09/20 2248    zolpidem (AMBIEN) tablet 5 mg  5 mg Oral BID Remington Escamilla MD   5 mg at 08/10/20 0935    OLANZapine zydis (ZYPREXA) disintegrating tablet 5 mg  5 mg Oral Nightly Remington Escamilla MD   5 mg at 08/09/20 2247    lactobacillus (CULTURELLE) capsule 1 capsule  1 capsule Oral BID  ESTEPHANIA Rivera NP   1 capsule at 08/09/20 1741    lidocaine PF 1 % injection 5 mL  5 mL Intradermal Once ESTEPHANIA Rivera NP        sodium chloride flush 0.9 % injection 10 mL  10 mL Intravenous 2 times per day ESTEPHANIA Rivera NP   10 mL at 08/09/20 2250    sodium chloride flush 0.9 % injection 10 mL  10 mL Intravenous PRN ESTEPHANIA Rivera NP        divalproex (DEPAKOTE SPRINKLE) capsule 500 mg  500 mg Oral BID Orville Kocher, APRN - CNP   500 mg at 08/10/20 0935    ziprasidone (GEODON) injection 10 mg  10 mg Intramuscular Q12H PRN Remington Escamilla MD        insulin lispro (HUMALOG) injection vial 2 Units  2 Units Subcutaneous PRN Orville Kocher, APRN - CNP        insulin lispro (HUMALOG) injection vial 0-6 Units  0-6 Units Subcutaneous TID  Orville Kocher, APRN - CNP   1 Units at 08/09/20 1221    potassium chloride (KLOR-CON M) extended release tablet 40 mEq  40 mEq Oral PRN Orville Kocher, APRN - CNP   40 mEq at 08/05/20 2480    Or    potassium bicarb-citric acid (EFFER-K) effervescent tablet 40 mEq  40 mEq Oral PRN Orville Kocher, APRN - CNP Or    potassium chloride 10 mEq/100 mL IVPB (Peripheral Line)  10 mEq Intravenous PRN ESTEPHANIA Sharma CNP        sodium chloride flush 0.9 % injection 10 mL  10 mL Intravenous 2 times per day ESTEPHANIA Hanson CNP   10 mL at 08/07/20 0820    sodium chloride flush 0.9 % injection 10 mL  10 mL Intravenous PRN ESTEPHANIA Hanson CNP        acetaminophen (TYLENOL) tablet 650 mg  650 mg Oral Q4H PRN Sary Witt MD   650 mg at 07/24/20 9330    Or    acetaminophen (TYLENOL) suppository 650 mg  650 mg Rectal Q4H PRN Sary Witt MD        polyethylene glycol [facility]) packet 17 g  17 g Oral Daily PRN Sary Witt MD   17 g at 08/04/20 1754    ondansetron (ZOFRAN) injection 4 mg  4 mg Intravenous Q4H PRN Sary Witt MD   4 mg at 08/01/20 1606    glucose (GLUTOSE) 40 % oral gel 15 g  15 g Oral PRN Sary Witt MD   15 g at 08/09/20 1711    dextrose 50 % IV solution  12.5 g Intravenous PRN Sary Witt MD   12.5 g at 08/05/20 0437    glucagon (rDNA) injection 1 mg  1 mg Intramuscular PRN Sary Witt MD        dextrose 5 % solution  100 mL/hr Intravenous PRN Sary Witt MD        enoxaparin (LOVENOX) injection 40 mg  40 mg Subcutaneous Daily Sary Witt MD   40 mg at 08/09/20 0176    pantoprazole (PROTONIX) injection 40 mg  40 mg Intravenous Daily ESTEPHANIA Hanson CNP   40 mg at 08/07/20 5406    And    sodium chloride (PF) 0.9 % injection 10 mL  10 mL Intravenous Daily ESTEPHANIA Hanson CNP   10 mL at 08/07/20 0819    magnesium sulfate 1 g in dextrose 5% 100 mL IVPB  1 g Intravenous PRN ESTEPHANIA Hanson CNP   Stopped at 07/30/20 1502    potassium chloride 20 mEq/50 mL IVPB (Central Line)  20 mEq Intravenous PRN ESTEPHANIA Hanson CNP 50 mL/hr at 08/04/20 0939 20 mEq at 08/04/20 0939    sodium phosphate 11.07 mmol in dextrose 5 % 250 mL IVPB  0.16 mmol/kg Intravenous PRN ESTEPHANIA Hanson CNP        Or    sodium phosphate 22.11 mmol

## 2020-08-11 ENCOUNTER — HOSPITAL ENCOUNTER (INPATIENT)
Age: 45
LOS: 14 days | Discharge: NURSING, REHAB OR LONG-TERM CARE | DRG: 042 | End: 2020-08-25
Attending: PSYCHIATRY & NEUROLOGY | Admitting: PSYCHIATRY & NEUROLOGY
Payer: MEDICAID

## 2020-08-11 VITALS
OXYGEN SATURATION: 100 % | HEART RATE: 92 BPM | DIASTOLIC BLOOD PRESSURE: 75 MMHG | WEIGHT: 127.87 LBS | TEMPERATURE: 98.1 F | HEIGHT: 74 IN | SYSTOLIC BLOOD PRESSURE: 123 MMHG | BODY MASS INDEX: 16.41 KG/M2 | RESPIRATION RATE: 17 BRPM

## 2020-08-11 PROBLEM — F29 PSYCHOSIS (HCC): Status: ACTIVE | Noted: 2020-08-11

## 2020-08-11 LAB
ANION GAP SERPL CALCULATED.3IONS-SCNC: 12 MMOL/L (ref 3–16)
BASOPHILS ABSOLUTE: 0.1 K/UL (ref 0–0.2)
BASOPHILS RELATIVE PERCENT: 0.9 %
BUN BLDV-MCNC: 11 MG/DL (ref 7–20)
CALCIUM SERPL-MCNC: 9.6 MG/DL (ref 8.3–10.6)
CHLORIDE BLD-SCNC: 100 MMOL/L (ref 99–110)
CO2: 25 MMOL/L (ref 21–32)
CREAT SERPL-MCNC: 0.9 MG/DL (ref 0.9–1.3)
EOSINOPHILS ABSOLUTE: 0.1 K/UL (ref 0–0.6)
EOSINOPHILS RELATIVE PERCENT: 2 %
GFR AFRICAN AMERICAN: >60
GFR NON-AFRICAN AMERICAN: >60
GLUCOSE BLD-MCNC: 159 MG/DL (ref 70–99)
GLUCOSE BLD-MCNC: 270 MG/DL (ref 70–99)
GLUCOSE BLD-MCNC: 305 MG/DL (ref 70–99)
GLUCOSE BLD-MCNC: 327 MG/DL (ref 70–99)
GLUCOSE BLD-MCNC: 329 MG/DL (ref 70–99)
GLUCOSE BLD-MCNC: 381 MG/DL (ref 70–99)
HCT VFR BLD CALC: 38.5 % (ref 40.5–52.5)
HEMOGLOBIN: 12.5 G/DL (ref 13.5–17.5)
LYMPHOCYTES ABSOLUTE: 2.4 K/UL (ref 1–5.1)
LYMPHOCYTES RELATIVE PERCENT: 39.6 %
MAGNESIUM: 1.9 MG/DL (ref 1.8–2.4)
MCH RBC QN AUTO: 27.2 PG (ref 26–34)
MCHC RBC AUTO-ENTMCNC: 32.6 G/DL (ref 31–36)
MCV RBC AUTO: 83.5 FL (ref 80–100)
MONOCYTES ABSOLUTE: 0.4 K/UL (ref 0–1.3)
MONOCYTES RELATIVE PERCENT: 6 %
NEUTROPHILS ABSOLUTE: 3.1 K/UL (ref 1.7–7.7)
NEUTROPHILS RELATIVE PERCENT: 51.5 %
PDW BLD-RTO: 16.4 % (ref 12.4–15.4)
PERFORMED ON: ABNORMAL
PHOSPHORUS: 3.1 MG/DL (ref 2.5–4.9)
PLATELET # BLD: 228 K/UL (ref 135–450)
PMV BLD AUTO: 8.3 FL (ref 5–10.5)
POTASSIUM REFLEX MAGNESIUM: 4.1 MMOL/L (ref 3.5–5.1)
RBC # BLD: 4.61 M/UL (ref 4.2–5.9)
SARS-COV-2, NAAT: NOT DETECTED
SODIUM BLD-SCNC: 137 MMOL/L (ref 136–145)
WBC # BLD: 5.9 K/UL (ref 4–11)

## 2020-08-11 PROCEDURE — 6370000000 HC RX 637 (ALT 250 FOR IP): Performed by: PSYCHIATRY & NEUROLOGY

## 2020-08-11 PROCEDURE — 84100 ASSAY OF PHOSPHORUS: CPT

## 2020-08-11 PROCEDURE — 2580000003 HC RX 258: Performed by: STUDENT IN AN ORGANIZED HEALTH CARE EDUCATION/TRAINING PROGRAM

## 2020-08-11 PROCEDURE — 36415 COLL VENOUS BLD VENIPUNCTURE: CPT

## 2020-08-11 PROCEDURE — 6370000000 HC RX 637 (ALT 250 FOR IP): Performed by: NURSE PRACTITIONER

## 2020-08-11 PROCEDURE — 83735 ASSAY OF MAGNESIUM: CPT

## 2020-08-11 PROCEDURE — 80048 BASIC METABOLIC PNL TOTAL CA: CPT

## 2020-08-11 PROCEDURE — 99231 SBSQ HOSP IP/OBS SF/LOW 25: CPT | Performed by: PSYCHIATRY & NEUROLOGY

## 2020-08-11 PROCEDURE — 85025 COMPLETE CBC W/AUTO DIFF WBC: CPT

## 2020-08-11 PROCEDURE — 6360000002 HC RX W HCPCS: Performed by: INTERNAL MEDICINE

## 2020-08-11 PROCEDURE — 94760 N-INVAS EAR/PLS OXIMETRY 1: CPT

## 2020-08-11 PROCEDURE — 1240000000 HC EMOTIONAL WELLNESS R&B

## 2020-08-11 PROCEDURE — U0002 COVID-19 LAB TEST NON-CDC: HCPCS

## 2020-08-11 PROCEDURE — 6360000002 HC RX W HCPCS: Performed by: STUDENT IN AN ORGANIZED HEALTH CARE EDUCATION/TRAINING PROGRAM

## 2020-08-11 RX ORDER — DIVALPROEX SODIUM 125 MG/1
500 CAPSULE, COATED PELLETS ORAL 2 TIMES DAILY
Qty: 60 CAPSULE | Refills: 3 | Status: ON HOLD | OUTPATIENT
Start: 2020-08-11 | End: 2020-08-25 | Stop reason: HOSPADM

## 2020-08-11 RX ORDER — POLYETHYLENE GLYCOL 3350 17 G/17G
17 POWDER, FOR SOLUTION ORAL DAILY PRN
Qty: 527 G | Refills: 1 | Status: ON HOLD | OUTPATIENT
Start: 2020-08-11 | End: 2020-08-25 | Stop reason: HOSPADM

## 2020-08-11 RX ORDER — INSULIN GLARGINE 100 [IU]/ML
10 INJECTION, SOLUTION SUBCUTANEOUS NIGHTLY
Qty: 1 VIAL | Refills: 3 | Status: ON HOLD | OUTPATIENT
Start: 2020-08-11 | End: 2020-08-25 | Stop reason: HOSPADM

## 2020-08-11 RX ORDER — LACTOBACILLUS RHAMNOSUS GG 10B CELL
1 CAPSULE ORAL 2 TIMES DAILY WITH MEALS
Qty: 20 CAPSULE | Refills: 0 | Status: ON HOLD | OUTPATIENT
Start: 2020-08-11 | End: 2020-08-25 | Stop reason: SDUPTHER

## 2020-08-11 RX ORDER — OLANZAPINE 10 MG/1
10 TABLET ORAL
Status: COMPLETED | OUTPATIENT
Start: 2020-08-11 | End: 2020-08-11

## 2020-08-11 RX ORDER — ZOLPIDEM TARTRATE 5 MG/1
5 TABLET ORAL 2 TIMES DAILY
Qty: 28 TABLET | Refills: 0 | Status: ON HOLD | OUTPATIENT
Start: 2020-08-11 | End: 2020-08-25 | Stop reason: HOSPADM

## 2020-08-11 RX ORDER — ACETAMINOPHEN 325 MG/1
650 TABLET ORAL EVERY 4 HOURS PRN
Status: DISCONTINUED | OUTPATIENT
Start: 2020-08-11 | End: 2020-08-25 | Stop reason: HOSPADM

## 2020-08-11 RX ORDER — OLANZAPINE 10 MG/1
10 INJECTION, POWDER, LYOPHILIZED, FOR SOLUTION INTRAMUSCULAR
Status: COMPLETED | OUTPATIENT
Start: 2020-08-11 | End: 2020-08-11

## 2020-08-11 RX ORDER — ZIPRASIDONE MESYLATE 20 MG/ML
20 INJECTION, POWDER, LYOPHILIZED, FOR SOLUTION INTRAMUSCULAR ONCE
Status: COMPLETED | OUTPATIENT
Start: 2020-08-11 | End: 2020-08-11

## 2020-08-11 RX ORDER — BENZTROPINE MESYLATE 1 MG/ML
2 INJECTION INTRAMUSCULAR; INTRAVENOUS 2 TIMES DAILY PRN
Status: DISCONTINUED | OUTPATIENT
Start: 2020-08-11 | End: 2020-08-25 | Stop reason: HOSPADM

## 2020-08-11 RX ORDER — TRAZODONE HYDROCHLORIDE 50 MG/1
50 TABLET ORAL NIGHTLY PRN
Status: DISCONTINUED | OUTPATIENT
Start: 2020-08-12 | End: 2020-08-12

## 2020-08-11 RX ORDER — OLANZAPINE 10 MG/1
10 TABLET, ORALLY DISINTEGRATING ORAL NIGHTLY
Qty: 30 TABLET | Refills: 3 | Status: ON HOLD | OUTPATIENT
Start: 2020-08-11 | End: 2020-08-25 | Stop reason: HOSPADM

## 2020-08-11 RX ORDER — MAGNESIUM HYDROXIDE/ALUMINUM HYDROXICE/SIMETHICONE 120; 1200; 1200 MG/30ML; MG/30ML; MG/30ML
30 SUSPENSION ORAL EVERY 6 HOURS PRN
Status: DISCONTINUED | OUTPATIENT
Start: 2020-08-11 | End: 2020-08-25 | Stop reason: HOSPADM

## 2020-08-11 RX ORDER — HYDROXYZINE PAMOATE 25 MG/1
50 CAPSULE ORAL 3 TIMES DAILY PRN
Status: DISCONTINUED | OUTPATIENT
Start: 2020-08-11 | End: 2020-08-15

## 2020-08-11 RX ADMIN — WATER 1.2 ML: 1 INJECTION INTRAMUSCULAR; INTRAVENOUS; SUBCUTANEOUS at 08:04

## 2020-08-11 RX ADMIN — OLANZAPINE 10 MG: 10 TABLET, FILM COATED ORAL at 23:46

## 2020-08-11 RX ADMIN — INSULIN LISPRO 3 UNITS: 100 INJECTION, SOLUTION INTRAVENOUS; SUBCUTANEOUS at 20:27

## 2020-08-11 RX ADMIN — Medication 1 CAPSULE: at 18:13

## 2020-08-11 RX ADMIN — DIVALPROEX SODIUM 500 MG: 125 CAPSULE ORAL at 20:26

## 2020-08-11 RX ADMIN — OLANZAPINE 10 MG: 5 TABLET, ORALLY DISINTEGRATING ORAL at 20:26

## 2020-08-11 RX ADMIN — INSULIN LISPRO 4 UNITS: 100 INJECTION, SOLUTION INTRAVENOUS; SUBCUTANEOUS at 09:11

## 2020-08-11 RX ADMIN — ZOLPIDEM TARTRATE 5 MG: 5 TABLET ORAL at 20:26

## 2020-08-11 RX ADMIN — LORAZEPAM 0.5 MG: 0.5 TABLET ORAL at 06:42

## 2020-08-11 RX ADMIN — DIVALPROEX SODIUM 500 MG: 125 CAPSULE ORAL at 09:10

## 2020-08-11 RX ADMIN — LORAZEPAM 0.5 MG: 0.5 TABLET ORAL at 19:40

## 2020-08-11 RX ADMIN — ENOXAPARIN SODIUM 40 MG: 40 INJECTION SUBCUTANEOUS at 09:10

## 2020-08-11 RX ADMIN — INSULIN LISPRO 4 UNITS: 100 INJECTION, SOLUTION INTRAVENOUS; SUBCUTANEOUS at 09:10

## 2020-08-11 RX ADMIN — INSULIN LISPRO 2 UNITS: 100 INJECTION, SOLUTION INTRAVENOUS; SUBCUTANEOUS at 18:13

## 2020-08-11 RX ADMIN — INSULIN LISPRO 4 UNITS: 100 INJECTION, SOLUTION INTRAVENOUS; SUBCUTANEOUS at 18:13

## 2020-08-11 RX ADMIN — ZIPRASIDONE MESYLATE 20 MG: 20 INJECTION, POWDER, LYOPHILIZED, FOR SOLUTION INTRAMUSCULAR at 07:15

## 2020-08-11 RX ADMIN — INSULIN LISPRO 4 UNITS: 100 INJECTION, SOLUTION INTRAVENOUS; SUBCUTANEOUS at 12:39

## 2020-08-11 RX ADMIN — INSULIN LISPRO 5 UNITS: 100 INJECTION, SOLUTION INTRAVENOUS; SUBCUTANEOUS at 12:37

## 2020-08-11 RX ADMIN — LORAZEPAM 0.5 MG: 0.5 TABLET ORAL at 14:37

## 2020-08-11 RX ADMIN — INSULIN GLARGINE 10 UNITS: 100 INJECTION, SOLUTION SUBCUTANEOUS at 01:03

## 2020-08-11 RX ADMIN — INSULIN GLARGINE 10 UNITS: 100 INJECTION, SOLUTION SUBCUTANEOUS at 20:27

## 2020-08-11 RX ADMIN — ZOLPIDEM TARTRATE 5 MG: 5 TABLET ORAL at 09:10

## 2020-08-11 NOTE — PROGRESS NOTES
Psych Consult Progress Note    08/11/20      Ariana Salas  [id number]  Chief Complaint   Patient presents with    Hypoglycemia     Found unresponsive by girlfriend. Per EMS, pt had blood sugar of \"22 on arrival and was given IM glucagon\". EMS reports blood sugar \"went up to 59\". Pt responsive to pain at this time. I have reviewed recent documentation. Ariana Salas is a 50 y.o. male  With  has a past medical history of Diabetes mellitus ([address] Gastroparesis, and Hypertension. Subjective/Interval Hx: Getting consistent ativan and vpa po, which is good. Aggressive this am, wasn't really speaking, just sort of cursing and I guess trying to push past staff? Required geodon. Did speak to me today! When I asked if he needed anything he said \"No.\"  Nodded consistently yes and no about not being hungry, not needing another blanket. Seemed confused about being in restraints    Quality:  Altered ms  Severity:  Severe   Duration:  Days to weeks  Context:  As above.   Location:  Brain    Objective:  Vitals:    08/11/20 0803   BP:    Pulse:    Resp:    Temp:    SpO2: 99%     Recent Results (from the past 168 hour(s))   POCT Glucose    Collection Time: 08/04/20 11:17 AM   Result Value Ref Range    POC Glucose 137 (H) 70 - 99 mg/dl    Performed on ACCU-CHEK    Basic Metabolic Panel w/ Reflex to MG    Collection Time: 08/04/20 11:45 AM   Result Value Ref Range    Sodium 145 136 - 145 mmol/L    Potassium reflex Magnesium 4.7 3.5 - 5.1 mmol/L    Chloride 108 99 - 110 mmol/L    CO2 23 21 - 32 mmol/L    Anion Gap 14 3 - 16    Glucose 112 (H) 70 - 99 mg/dL    BUN 8 7 - 20 mg/dL    CREATININE 0.9 0.9 - 1.3 mg/dL    GFR Non-African American >60 >60    GFR African American >60 >60    Calcium 9.6 8.3 - 10.6 mg/dL   Magnesium    Collection Time: 08/04/20 11:45 AM   Result Value Ref Range    Magnesium 1.80 1.80 - 2.40 mg/dL   POCT Glucose    Collection Time: 08/04/20  4:02 PM   Result Value Ref Range    POC Glucose 352 (H) 70 - 99 mg/dl    Performed on ACCU-CHEK    POCT Glucose    Collection Time: 08/04/20 10:04 PM   Result Value Ref Range    POC Glucose 436 (H) 70 - 99 mg/dl    Performed on ACCU-CHEK    POCT Glucose    Collection Time: 08/04/20 11:49 PM   Result Value Ref Range    POC Glucose 291 (H) 70 - 99 mg/dl    Performed on ACCU-CHEK    POCT Glucose    Collection Time: 08/05/20  4:17 AM   Result Value Ref Range    POC Glucose 56 (L) 70 - 99 mg/dl    Performed on ACCU-CHEK    POCT Glucose    Collection Time: 08/05/20  5:01 AM   Result Value Ref Range    POC Glucose 126 (H) 70 - 99 mg/dl    Performed on ACCU-CHEK    POCT Glucose    Collection Time: 08/05/20  5:14 AM   Result Value Ref Range    POC Glucose 118 (H) 70 - 99 mg/dl    Performed on ACCU-CHEK    Basic Metabolic Panel w/ Reflex to MG    Collection Time: 08/05/20  5:45 AM   Result Value Ref Range    Sodium 144 136 - 145 mmol/L    Potassium reflex Magnesium 3.4 (L) 3.5 - 5.1 mmol/L    Chloride 106 99 - 110 mmol/L    CO2 27 21 - 32 mmol/L    Anion Gap 11 3 - 16    Glucose 97 70 - 99 mg/dL    BUN 7 7 - 20 mg/dL    CREATININE 0.9 0.9 - 1.3 mg/dL    GFR Non-African American >60 >60    GFR African American >60 >60    Calcium 9.4 8.3 - 10.6 mg/dL   CBC auto differential    Collection Time: 08/05/20  5:45 AM   Result Value Ref Range    WBC 6.3 4.0 - 11.0 K/uL    RBC 3.94 (L) 4.20 - 5.90 M/uL    Hemoglobin 10.8 (L) 13.5 - 17.5 g/dL    Hematocrit 32.6 (L) 40.5 - 52.5 %    MCV 82.7 80.0 - 100.0 fL    MCH 27.5 26.0 - 34.0 pg    MCHC 33.2 31.0 - 36.0 g/dL    RDW 16.7 (H) 12.4 - 15.4 %    Platelets 589 440 - 108 K/uL    MPV 8.0 5.0 - 10.5 fL    Neutrophils % 59.3 %    Lymphocytes % 30.8 %    Monocytes % 6.2 %    Eosinophils % 3.1 %    Basophils % 0.6 %    Neutrophils Absolute 3.7 1.7 - 7.7 K/uL    Lymphocytes Absolute 1.9 1.0 - 5.1 K/uL    Monocytes Absolute 0.4 0.0 - 1.3 K/uL    Eosinophils Absolute 0.2 0.0 - 0.6 K/uL    Basophils Absolute 0.0 0.0 - 0.2 K/uL   Magnesium    Collection Time: 08/05/20  5:45 AM   Result Value Ref Range    Magnesium 1.90 1.80 - 2.40 mg/dL   Phosphorus    Collection Time: 08/05/20  5:45 AM   Result Value Ref Range    Phosphorus 5.0 (H) 2.5 - 4.9 mg/dL   POCT Glucose    Collection Time: 08/05/20  7:50 AM   Result Value Ref Range    POC Glucose 54 (L) 70 - 99 mg/dl    Performed on ACCU-CHEK    POCT Glucose    Collection Time: 08/05/20  9:13 AM   Result Value Ref Range    POC Glucose 124 (H) 70 - 99 mg/dl    Performed on ACCU-CHEK    POCT Glucose    Collection Time: 08/05/20 12:04 PM   Result Value Ref Range    POC Glucose 267 (H) 70 - 99 mg/dl    Performed on ACCU-CHEK    POCT Glucose    Collection Time: 08/05/20  4:50 PM   Result Value Ref Range    POC Glucose 306 (H) 70 - 99 mg/dl    Performed on ACCU-CHEK    POCT Glucose    Collection Time: 08/06/20  1:36 AM   Result Value Ref Range    POC Glucose 451 (H) 70 - 99 mg/dl    Performed on ACCU-CHEK    POCT Glucose    Collection Time: 08/06/20  3:59 AM   Result Value Ref Range    POC Glucose 263 (H) 70 - 99 mg/dl    Performed on ACCU-CHEK    Basic Metabolic Panel w/ Reflex to MG    Collection Time: 08/06/20  6:50 AM   Result Value Ref Range    Sodium 144 136 - 145 mmol/L    Potassium reflex Magnesium 3.9 3.5 - 5.1 mmol/L    Chloride 105 99 - 110 mmol/L    CO2 28 21 - 32 mmol/L    Anion Gap 11 3 - 16    Glucose 48 (LL) 70 - 99 mg/dL    BUN 8 7 - 20 mg/dL    CREATININE 0.7 (L) 0.9 - 1.3 mg/dL    GFR Non-African American >60 >60    GFR African American >60 >60    Calcium 9.4 8.3 - 10.6 mg/dL   CBC auto differential    Collection Time: 08/06/20  6:50 AM   Result Value Ref Range    WBC 6.8 4.0 - 11.0 K/uL    RBC 4.37 4.20 - 5.90 M/uL    Hemoglobin 11.9 (L) 13.5 - 17.5 g/dL    Hematocrit 36.3 (L) 40.5 - 52.5 %    MCV 83.0 80.0 - 100.0 fL    MCH 27.3 26.0 - 34.0 pg    MCHC 32.9 31.0 - 36.0 g/dL    RDW 16.2 (H) 12.4 - 15.4 %    Platelets 228 490 - 418 K/uL    MPV 8.4 5.0 - 10.5 fL    Neutrophils % 48.4 %    Lymphocytes % 42.1 % mmol/L    CO2 29 21 - 32 mmol/L    Anion Gap 8 3 - 16    Glucose 285 (H) 70 - 99 mg/dL    BUN 12 7 - 20 mg/dL    CREATININE 0.9 0.9 - 1.3 mg/dL    GFR Non-African American >60 >60    GFR African American >60 >60    Calcium 9.3 8.3 - 10.6 mg/dL   CBC auto differential    Collection Time: 08/07/20  4:30 AM   Result Value Ref Range    WBC 5.7 4.0 - 11.0 K/uL    RBC 4.03 (L) 4.20 - 5.90 M/uL    Hemoglobin 11.1 (L) 13.5 - 17.5 g/dL    Hematocrit 33.4 (L) 40.5 - 52.5 %    MCV 82.9 80.0 - 100.0 fL    MCH 27.6 26.0 - 34.0 pg    MCHC 33.2 31.0 - 36.0 g/dL    RDW 16.4 (H) 12.4 - 15.4 %    Platelets 358 604 - 695 K/uL    MPV 8.7 5.0 - 10.5 fL    Neutrophils % 58.0 %    Lymphocytes % 33.8 %    Monocytes % 4.4 %    Eosinophils % 2.9 %    Basophils % 0.9 %    Neutrophils Absolute 3.3 1.7 - 7.7 K/uL    Lymphocytes Absolute 1.9 1.0 - 5.1 K/uL    Monocytes Absolute 0.2 0.0 - 1.3 K/uL    Eosinophils Absolute 0.2 0.0 - 0.6 K/uL    Basophils Absolute 0.1 0.0 - 0.2 K/uL   Magnesium    Collection Time: 08/07/20  4:30 AM   Result Value Ref Range    Magnesium 1.90 1.80 - 2.40 mg/dL   Phosphorus    Collection Time: 08/07/20  4:30 AM   Result Value Ref Range    Phosphorus 2.1 (L) 2.5 - 4.9 mg/dL   POCT Glucose    Collection Time: 08/07/20  7:38 AM   Result Value Ref Range    POC Glucose 203 (H) 70 - 99 mg/dl    Performed on ACCU-CHEK    POCT Glucose    Collection Time: 08/07/20 11:20 AM   Result Value Ref Range    POC Glucose 227 (H) 70 - 99 mg/dl    Performed on ACCU-CHEK    POCT Glucose    Collection Time: 08/07/20  4:29 PM   Result Value Ref Range    POC Glucose 339 (H) 70 - 99 mg/dl    Performed on ACCU-CHEK    POCT Glucose    Collection Time: 08/07/20  8:23 PM   Result Value Ref Range    POC Glucose 331 (H) 70 - 99 mg/dl    Performed on ACCU-CHEK    POCT Glucose    Collection Time: 08/08/20  2:05 AM   Result Value Ref Range    POC Glucose 504 (H) 70 - 99 mg/dl    Performed on ACCU-CHEK    POCT Glucose    Collection Time: 08/08/20  6:14 AM   Result Value Ref Range    POC Glucose 275 (H) 70 - 99 mg/dl    Performed on ACCU-CHEK    POCT Glucose    Collection Time: 08/08/20  7:34 AM   Result Value Ref Range    POC Glucose 226 (H) 70 - 99 mg/dl    Performed on ACCU-CHEK    POCT Glucose    Collection Time: 08/08/20 11:43 AM   Result Value Ref Range    POC Glucose 135 (H) 70 - 99 mg/dl    Performed on ACCU-CHEK    POCT Glucose    Collection Time: 08/08/20 12:20 PM   Result Value Ref Range    POC Glucose 155 (H) 70 - 99 mg/dl    Performed on ACCU-CHEK    POCT Glucose    Collection Time: 08/08/20  5:07 PM   Result Value Ref Range    POC Glucose 475 (H) 70 - 99 mg/dl    Performed on ACCU-CHEK    POCT Glucose    Collection Time: 08/08/20  8:32 PM   Result Value Ref Range    POC Glucose 448 (H) 70 - 99 mg/dl    Performed on ACCU-CHEK    POCT Glucose    Collection Time: 08/08/20  8:34 PM   Result Value Ref Range    POC Glucose 463 (H) 70 - 99 mg/dl    Performed on ACCU-CHEK    POCT Glucose    Collection Time: 08/09/20  6:23 AM   Result Value Ref Range    POC Glucose 145 (H) 70 - 99 mg/dl    Performed on ACCU-CHEK    Basic Metabolic Panel w/ Reflex to MG    Collection Time: 08/09/20  6:24 AM   Result Value Ref Range    Sodium 138 136 - 145 mmol/L    Potassium reflex Magnesium 3.8 3.5 - 5.1 mmol/L    Chloride 100 99 - 110 mmol/L    CO2 27 21 - 32 mmol/L    Anion Gap 11 3 - 16    Glucose 148 (H) 70 - 99 mg/dL    BUN 11 7 - 20 mg/dL    CREATININE 0.7 (L) 0.9 - 1.3 mg/dL    GFR Non-African American >60 >60    GFR African American >60 >60    Calcium 9.2 8.3 - 10.6 mg/dL   CBC auto differential    Collection Time: 08/09/20  6:24 AM   Result Value Ref Range    WBC 6.7 4.0 - 11.0 K/uL    RBC 4.18 (L) 4.20 - 5.90 M/uL    Hemoglobin 11.5 (L) 13.5 - 17.5 g/dL    Hematocrit 34.2 (L) 40.5 - 52.5 %    MCV 81.9 80.0 - 100.0 fL    MCH 27.4 26.0 - 34.0 pg    MCHC 33.5 31.0 - 36.0 g/dL    RDW 16.2 (H) 12.4 - 15.4 %    Platelets 916 137 - 850 K/uL    MPV 8.3 5.0 - 10.5 fL Neutrophils % 51.8 %    Lymphocytes % 39.5 %    Monocytes % 5.5 %    Eosinophils % 2.5 %    Basophils % 0.7 %    Neutrophils Absolute 3.5 1.7 - 7.7 K/uL    Lymphocytes Absolute 2.6 1.0 - 5.1 K/uL    Monocytes Absolute 0.4 0.0 - 1.3 K/uL    Eosinophils Absolute 0.2 0.0 - 0.6 K/uL    Basophils Absolute 0.0 0.0 - 0.2 K/uL   Magnesium    Collection Time: 08/09/20  6:24 AM   Result Value Ref Range    Magnesium 1.90 1.80 - 2.40 mg/dL   Phosphorus    Collection Time: 08/09/20  6:24 AM   Result Value Ref Range    Phosphorus 3.8 2.5 - 4.9 mg/dL   POCT Glucose    Collection Time: 08/09/20 11:24 AM   Result Value Ref Range    POC Glucose 153 (H) 70 - 99 mg/dl    Performed on ACCU-CHEK    POCT Glucose    Collection Time: 08/09/20  5:03 PM   Result Value Ref Range    POC Glucose 51 (L) 70 - 99 mg/dl    Performed on ACCU-CHEK    POCT Glucose    Collection Time: 08/09/20  5:27 PM   Result Value Ref Range    POC Glucose 78 70 - 99 mg/dl    Performed on ACCU-CHEK    POCT Glucose    Collection Time: 08/09/20  6:21 PM   Result Value Ref Range    POC Glucose 195 (H) 70 - 99 mg/dl    Performed on ACCU-CHEK    POCT Glucose    Collection Time: 08/09/20  8:51 PM   Result Value Ref Range    POC Glucose 231 (H) 70 - 99 mg/dl    Performed on ACCU-CHEK    POCT Glucose    Collection Time: 08/10/20  2:01 AM   Result Value Ref Range    POC Glucose 125 (H) 70 - 99 mg/dl    Performed on ACCU-CHEK    POCT Glucose    Collection Time: 08/10/20  7:37 AM   Result Value Ref Range    POC Glucose 74 70 - 99 mg/dl    Performed on ACCU-CHEK    POCT Glucose    Collection Time: 08/10/20  9:06 AM   Result Value Ref Range    POC Glucose 55 (L) 70 - 99 mg/dl    Performed on ACCU-CHEK    POCT Glucose    Collection Time: 08/10/20  9:27 AM   Result Value Ref Range    POC Glucose 82 70 - 99 mg/dl    Performed on ACCU-CHEK    Basic Metabolic Panel w/ Reflex to MG    Collection Time: 08/10/20 10:11 AM   Result Value Ref Range    Sodium 140 136 - 145 mmol/L    Potassium reflex Magnesium 3.5 3.5 - 5.1 mmol/L    Chloride 100 99 - 110 mmol/L    CO2 28 21 - 32 mmol/L    Anion Gap 12 3 - 16    Glucose 112 (H) 70 - 99 mg/dL    BUN 5 (L) 7 - 20 mg/dL    CREATININE 0.7 (L) 0.9 - 1.3 mg/dL    GFR Non-African American >60 >60    GFR African American >60 >60    Calcium 9.5 8.3 - 10.6 mg/dL   CBC auto differential    Collection Time: 08/10/20 10:11 AM   Result Value Ref Range    WBC 6.7 4.0 - 11.0 K/uL    RBC 4.51 4.20 - 5.90 M/uL    Hemoglobin 12.3 (L) 13.5 - 17.5 g/dL    Hematocrit 37.5 (L) 40.5 - 52.5 %    MCV 83.1 80.0 - 100.0 fL    MCH 27.4 26.0 - 34.0 pg    MCHC 33.0 31.0 - 36.0 g/dL    RDW 16.2 (H) 12.4 - 15.4 %    Platelets 042 212 - 284 K/uL    MPV 8.1 5.0 - 10.5 fL    Neutrophils % 66.3 %    Lymphocytes % 26.4 %    Monocytes % 5.0 %    Eosinophils % 1.9 %    Basophils % 0.4 %    Neutrophils Absolute 4.4 1.7 - 7.7 K/uL    Lymphocytes Absolute 1.8 1.0 - 5.1 K/uL    Monocytes Absolute 0.3 0.0 - 1.3 K/uL    Eosinophils Absolute 0.1 0.0 - 0.6 K/uL    Basophils Absolute 0.0 0.0 - 0.2 K/uL   Magnesium    Collection Time: 08/10/20 10:11 AM   Result Value Ref Range    Magnesium 1.80 1.80 - 2.40 mg/dL   Phosphorus    Collection Time: 08/10/20 10:11 AM   Result Value Ref Range    Phosphorus 4.1 2.5 - 4.9 mg/dL   POCT Glucose    Collection Time: 08/10/20 11:33 AM   Result Value Ref Range    POC Glucose 157 (H) 70 - 99 mg/dl    Performed on ACCU-CHEK    POCT Glucose    Collection Time: 08/10/20  4:24 PM   Result Value Ref Range    POC Glucose 391 (H) 70 - 99 mg/dl    Performed on ACCU-CHEK    POCT Glucose    Collection Time: 08/10/20  6:15 PM   Result Value Ref Range    POC Glucose 275 (H) 70 - 99 mg/dl    Performed on ACCU-CHEK    POCT Glucose    Collection Time: 08/10/20  8:52 PM   Result Value Ref Range    POC Glucose 91 70 - 99 mg/dl    Performed on ACCU-CHEK    POCT Glucose    Collection Time: 08/11/20  7:26 AM   Result Value Ref Range    POC Glucose 305 (H) 70 - 99 mg/dl    Performed on ACCU-CHEK    Basic Metabolic Panel w/ Reflex to MG    Collection Time: 08/11/20  7:44 AM   Result Value Ref Range    Sodium 137 136 - 145 mmol/L    Potassium reflex Magnesium 4.1 3.5 - 5.1 mmol/L    Chloride 100 99 - 110 mmol/L    CO2 25 21 - 32 mmol/L    Anion Gap 12 3 - 16    Glucose 327 (H) 70 - 99 mg/dL    BUN 11 7 - 20 mg/dL    CREATININE 0.9 0.9 - 1.3 mg/dL    GFR Non-African American >60 >60    GFR African American >60 >60    Calcium 9.6 8.3 - 10.6 mg/dL   CBC auto differential    Collection Time: 08/11/20  7:44 AM   Result Value Ref Range    WBC 5.9 4.0 - 11.0 K/uL    RBC 4.61 4.20 - 5.90 M/uL    Hemoglobin 12.5 (L) 13.5 - 17.5 g/dL    Hematocrit 38.5 (L) 40.5 - 52.5 %    MCV 83.5 80.0 - 100.0 fL    MCH 27.2 26.0 - 34.0 pg    MCHC 32.6 31.0 - 36.0 g/dL    RDW 16.4 (H) 12.4 - 15.4 %    Platelets 582 158 - 172 K/uL    MPV 8.3 5.0 - 10.5 fL    Neutrophils % 51.5 %    Lymphocytes % 39.6 %    Monocytes % 6.0 %    Eosinophils % 2.0 %    Basophils % 0.9 %    Neutrophils Absolute 3.1 1.7 - 7.7 K/uL    Lymphocytes Absolute 2.4 1.0 - 5.1 K/uL    Monocytes Absolute 0.4 0.0 - 1.3 K/uL    Eosinophils Absolute 0.1 0.0 - 0.6 K/uL    Basophils Absolute 0.1 0.0 - 0.2 K/uL   Magnesium    Collection Time: 08/11/20  7:44 AM   Result Value Ref Range    Magnesium 1.90 1.80 - 2.40 mg/dL   Phosphorus    Collection Time: 08/11/20  7:44 AM   Result Value Ref Range    Phosphorus 3.1 2.5 - 4.9 mg/dL       Current Facility-Administered Medications   Medication Dose Route Frequency Provider Last Rate Last Dose    OLANZapine zydis (ZYPREXA) disintegrating tablet 10 mg  10 mg Oral Nightly Mony Painting MD        LORazepam (ATIVAN) tablet 0.5 mg  0.5 mg Oral 4 times per day Argelia Jerez MD   0.5 mg at 08/11/20 0642    insulin glargine (LANTUS) injection vial 10 Units  10 Units Subcutaneous Nightly ESTEPHANIA Elizalde NP   10 Units at 08/11/20 0103    insulin lispro (HUMALOG) injection vial 4 Units  4 Units Subcutaneous TID  ESTEPHANIA Ross NP   4 Units at 08/11/20 0911    insulin lispro (HUMALOG) injection vial 0-6 Units  0-6 Units Subcutaneous Nightly Carlos ESTEPHANIA Leyva CNP   2 Units at 08/09/20 2248    zolpidem (AMBIEN) tablet 5 mg  5 mg Oral BID Tracy Hodges MD   5 mg at 08/11/20 0910    lactobacillus (CULTURELLE) capsule 1 capsule  1 capsule Oral BID  ESTEPHANIA Ross NP   1 capsule at 08/09/20 1741    lidocaine PF 1 % injection 5 mL  5 mL Intradermal Once ESTEPHANIA Ross NP        sodium chloride flush 0.9 % injection 10 mL  10 mL Intravenous 2 times per day ESTEPHANIA Ross NP   10 mL at 08/09/20 2250    sodium chloride flush 0.9 % injection 10 mL  10 mL Intravenous PRN ESTEPHANIA Ross NP        divalproex (DEPAKOTE SPRINKLE) capsule 500 mg  500 mg Oral BID ESTEPHANIA Wilkins CNP   500 mg at 08/11/20 0910    ziprasidone (GEODON) injection 10 mg  10 mg Intramuscular Q12H PRN Tracy Hodges MD   10 mg at 08/10/20 2244    insulin lispro (HUMALOG) injection vial 2 Units  2 Units Subcutaneous PRN ESTEPHANIA Wilkins CNP        insulin lispro (HUMALOG) injection vial 0-6 Units  0-6 Units Subcutaneous TID  ESTEPHANIA Wilkins CNP   4 Units at 08/11/20 0910    potassium chloride (KLOR-CON M) extended release tablet 40 mEq  40 mEq Oral PRN ESTEPHANIA Wilkins CNP   40 mEq at 08/05/20 7776    Or    potassium bicarb-citric acid (EFFER-K) effervescent tablet 40 mEq  40 mEq Oral PRN ESTEPHANIA Wilkins CNP        Or    potassium chloride 10 mEq/100 mL IVPB (Peripheral Line)  10 mEq Intravenous PRN ESTEPHANIA Wilkins CNP        sodium chloride flush 0.9 % injection 10 mL  10 mL Intravenous 2 times per day ESTEPHANIA Mederos CNP   10 mL at 08/07/20 0820    sodium chloride flush 0.9 % injection 10 mL  10 mL Intravenous PRN ESTEPHANIA Mederos - CNP        acetaminophen (TYLENOL) tablet 650 mg  650 mg Oral Q4H PRN Marleny Engel MD   650 mg at 07/24/20 0537    Or    acetaminophen (TYLENOL) suppository 650 mg  650 mg Rectal Q4H PRN Nilo Nelson MD        polyethylene glycol [facility]) packet 17 g  17 g Oral Daily PRN Nilo Nelson MD   17 g at 08/04/20 1754    ondansetron (ZOFRAN) injection 4 mg  4 mg Intravenous Q4H PRN Nilo Nelson MD   4 mg at 08/01/20 1606    glucose (GLUTOSE) 40 % oral gel 15 g  15 g Oral PRN Nilo Nelson MD   15 g at 08/09/20 1711    dextrose 50 % IV solution  12.5 g Intravenous PRN Nilo Nelson MD   12.5 g at 08/05/20 0437    glucagon (rDNA) injection 1 mg  1 mg Intramuscular PRAURE Nelson MD        dextrose 5 % solution  100 mL/hr Intravenous PRN Nilo Nelson MD        enoxaparin (LOVENOX) injection 40 mg  40 mg Subcutaneous Daily Nilo Nelson MD   40 mg at 08/11/20 0910    pantoprazole (PROTONIX) injection 40 mg  40 mg Intravenous Daily Collie Amen, APRN - CNP   40 mg at 08/07/20 3193    And    sodium chloride (PF) 0.9 % injection 10 mL  10 mL Intravenous Daily Collie Amen, APRN - CNP   10 mL at 08/07/20 0819    magnesium sulfate 1 g in dextrose 5% 100 mL IVPB  1 g Intravenous PRN Collie Amen, APRN - CNP   Stopped at 07/30/20 1502    potassium chloride 20 mEq/50 mL IVPB (Central Line)  20 mEq Intravenous PRN Collie Amen, APRN - CNP 50 mL/hr at 08/04/20 0939 20 mEq at 08/04/20 0939    sodium phosphate 11.07 mmol in dextrose 5 % 250 mL IVPB  0.16 mmol/kg Intravenous PRN Collie Amen, APRN - CNP        Or    sodium phosphate 22.11 mmol in dextrose 5 % 250 mL IVPB  0.32 mmol/kg Intravenous PRN Collie Amen, APRN - CNP         ROS:  No pain    MSE:  Alray Nissen covers, in restraints, some strange, reflexive foot movements? A-arousable! M-apparently a bit agitated earlier? S-arousable! I/J-impaired  T-1 word answer, or nodding. No resp to int stim. Repeat EEG 8/11/20=>The background slowing and disorganization is  nonspecific and consistent with a mild encephalopathy. This may be seen  in multiple settings including toxic, metabolic, or degenerative  conditions as well as with medication effect. No definite epileptiform  activity was present during the recording. If seizures remain a  clinical concern, then a repeat EEG may be of further benefit. Clinical  correlation is advised. Recs:  1. Catatonic delirium-Improved! Ambien 5 bid. Cont ativan 0.5 qid. Cont increase zyprexa zydis to 10 qhs. I'd like an MRI, but we can't seem to keep pt calm enough to perform. EEG seems to reflect less slowing, which may be a good sign. Geodon 10 im prn for agitation, see how this works, maybe increase to 20 if it seems to help.  bid from neuro. At this point this pt will need psych admit. I suspect he'll need a forced meds order in the future.

## 2020-08-11 NOTE — PROGRESS NOTES
CLARK (mother of pt) called, requesting information on the pt - no information given at this time but this nurse took ivy's number down to verify what the POA would like shared with Korina. Called Nima - she gave the nurse the okay to speak with mother. Requested nurse only to give information pertaining to the pts health today. Did not want nurse to mention transport/ discharge or anything of this matter. Will call Korina back.      Electronically signed by Ebony Canada RN on 8/11/2020 at 4:25 PM

## 2020-08-11 NOTE — CARE COORDINATION
8/11 Call received from access center. Patient has been accepted to [facility]. Await MD to fill out Psych hold documents for bed assignment.  Electronically signed by Khushboo Almaguer RN on 8/11/2020 at 3:04 PM

## 2020-08-11 NOTE — PROGRESS NOTES
Hospital Medicine Progress Note      Admit Date: 7/15/2020       CC: F/U for hypoglycemia; delirium     HPI: The patient is a 50 yrs old male, who  has a past medical history of Diabetes mellitus ([address] Gastroparesis, and Hypertension. He presented to [facility] ER after being found at home unresponsive with a blood glucose of 22. There was some question as to whether this was accidental or an an intentional insulin overdose. The patient was admitted for further workup and treatment. He required intubation, though he later self-extubated by coughing. Because of his mental status/catatonia, he was evaluated by neurology. No obvious etiology for his presentation was noted. EEG was not consistent with seizure and CSF was without acute findings. There was some consideration for PRES. Psych was consulted. MRI on 7/16 did show a subtle frontoparietal cortical/subcortical signal abnormality. There was some volume loss favoring encephalomalacia and gliosis - which would be atypical for PRES. On 7/31, the patient was given Ativan (for ativan challenge). Around 6 hours after this, he awoke and ate with his family. He later returned to his catatonic state. Psych felt that the patient was exhibiting catatonic delirium. Ativan was scheduled, as was [place]. There was consideration for whether or not the patient would require IP psych. Medications were adjusted by Dr. Frank Gamboa. Rocephin was initiated because of leukocytes in urine - in case there was any inflammatory cause for his presentation. Depakote was resumed for behavior. The patient pulled his PICC on 8/7/20 overnight and new access was not able to be established. Ativan was given IM. On 8/9/20 - the patient awoke in the morning, took his pills and walked around with staff. Medications were further adjusted by psych.        Interval History/Subjective: pulled PICC out over the weekend, too agitated to be placed back in and without IV ativan. rec'd IM dose.   Bizarre behavior, non-communicative. Did not comply with MRI.      Patient's glucose better controlled. Will work on getting Psych admit today/tomorrow.     Discussed with wife and updated on situation. Updated that the etiology is unclear exactly as to the why he still has the delirium but given his randomness of speech and eating seems more behavioral now, hence the need for psych admit. she states she still doesn't know if he injected himself with more insulin on purpose or not. He was just at a dance with one of his daughters a month ago, functioning normally. She states she has 13 children, some of which are her own and some are step children and she says if he is \"faking any of this, I will kill him. \"     Verbalized understanding the need for him to go to Psych at this point.        Interval History/Subjective: awaiting acceptance to Psych    Review of Systems:       The patient denied headaches, visual changes, LOC, SOB, CP, ABD pain, N/V/D, skin changes, new or worsening weakness or neuromuscular deficits. Comprehensive ROS negative except as mentioned above. Past Medical History:        Diagnosis Date    Diabetes mellitus ([address]     Gastroparesis     Hypertension        Past Surgical History:        Procedure Laterality Date    BONY PELVIS SURGERY      FOOT AMPUTATION Right 5/13/2020    EMERGENCY; RIGHT INCISION AND DEBRIDEMENT; HALUX AMPUTATION performed by Yan Valdez DPM at [address] 2006    pins and rods- plate    UPPER GASTROINTESTINAL ENDOSCOPY N/A 12/2/2019    EGD BIOPSY performed by Wilma Solomon MD at [facility]:  Ketorolac; Morphine; Morphine and related; Tramadol; Lisinopril; Haloperidol lactate; Toradol [ketorolac tromethamine]; and Vicodin [hydrocodone-acetaminophen]    Past medical and surgical history reviewed. Any changes have been noted.      PHYSICAL EXAM:  /70   Pulse 79   Temp 98.1 °F (36.7 °C) (Axillary)   Resp 16   Ht 6' 2\" (1.88 m)   Wt 127 lb 13.9 oz (58 kg)   SpO2 99%   BMI 16.42 kg/m²       Intake/Output Summary (Last 24 hours) at 8/11/2020 0850  Last data filed at 8/10/2020 1400  Gross per 24 hour   Intake 960 ml   Output --   Net 960 ml        General appearance:   No apparent distress, appears stated age. Cooperative. HEENT:  Normocephalic, atraumatic. PERRLA. EOMi. Conjunctivae/corneas clear, no icterus, non-injected. Neck: Supple, with full range of motion. No jugular venous distention. Trachea midline. Respiratory:  Normal respiratory effort. Clear to auscultation, bilaterally without Rales/Wheezes/Rhonchi. Cardiovascular:  Regular rate and rhythm without murmurs, rubs or gallops. Abdomen: Soft, non-tender, non-distended, without rebound or guarding. Normal bowel sounds. Musculoskeletal:  No clubbing, cyanosis or edema bilaterally. Full range of motion without deformity. Skin: Skin color, texture, turgor normal.  No rashes or lesions. Neurologic:  Neurovascularly intact without any focal sensory/motor deficits. Cranial nerves: II-XII intact, grossly intact. No facial asymmetry, tongue midline.    Psychiatric:  Alert and oriented, thought content appropriate  Capillary Refill: Brisk,< 3 seconds   Peripheral Pulses: +2 palpable, equal bilaterally       LABS:    Lab Results   Component Value Date    WBC 5.9 08/11/2020    HGB 12.5 (L) 08/11/2020    HCT 38.5 (L) 08/11/2020    MCV 83.5 08/11/2020     08/11/2020    LYMPHOPCT 39.6 08/11/2020    RBC 4.61 08/11/2020    MCH 27.2 08/11/2020    MCHC 32.6 08/11/2020    RDW 16.4 (H) 08/11/2020       Lab Results   Component Value Date    CREATININE 0.7 (L) 08/10/2020    BUN 5 (L) 08/10/2020     08/10/2020    K 3.5 08/10/2020     08/10/2020    CO2 28 08/10/2020       Lab Results   Component Value Date    MG 1.80 08/10/2020       Lab Results   Component Value Date    ALT 8 (L) 07/16/2020    AST 17 07/16/2020    ALKPHOS 77 07/16/2020    BILITOT <0.2 07/16/2020 No flowsheet data found. Lab Results   Component Value Date    LABA1C 10.3 05/29/2020       Imaging:  XR FOOT LEFT (MIN 3 VIEWS)   Final Result   Soft tissue swelling of the 1st toe but no associated fracture. Subacute partially healed fractures of the 2nd and 4th proximal phalanges,   new since February 2020. CT HEAD WO CONTRAST   Final Result   No acute intracranial abnormality. Mild cerebral atrophy. XR CHEST PORTABLE   Final Result   No acute findings. NG tube with tip in the stomach. XR CHEST 1 VW   Final Result   Nasogastric tube projects in normal position. No acute cardiopulmonary disease. MRI BRAIN WO CONTRAST   Final Result   Subtle bilateral frontoparietal cortical and subcortical signal abnormality   suggesting mild posterior reversal encephalopathy syndrome. No acute infarct   or hemorrhage. XR CHEST PORTABLE   Final Result   No acute cardiac or pulmonary disease. ET tube 7 cm above the jose carlos. NG side-port at the GE junction. XR CHEST PORTABLE   Final Result   The tip of the endotracheal tube is slightly high in position 9 cm above the   jose carlos. The OG/NG tube should be advanced 10 cm. The lungs are clear. CT Head WO Contrast   Final Result   No acute intracranial abnormality. Paranasal sinusitis. XR CHEST PORTABLE   Final Result   No acute cardiopulmonary disease.              Scheduled and prn Medications:    Scheduled Meds:   OLANZapine zydis  10 mg Oral Nightly    LORazepam  0.5 mg Oral 4 times per day    insulin glargine  10 Units Subcutaneous Nightly    insulin lispro  4 Units Subcutaneous TID WC    insulin lispro  0-6 Units Subcutaneous Nightly    zolpidem  5 mg Oral BID    lactobacillus  1 capsule Oral BID WC    lidocaine 1 % injection  5 mL Intradermal Once    sodium chloride flush  10 mL Intravenous 2 times per day    divalproex  500 mg Oral BID    insulin lispro  0-6 Units Subcutaneous TID WC    sodium chloride flush  10 mL Intravenous 2 times per day    enoxaparin  40 mg Subcutaneous Daily    pantoprazole  40 mg Intravenous Daily    And    sodium chloride (PF)  10 mL Intravenous Daily     Continuous Infusions:   dextrose       PRN Meds:.sodium chloride flush, ziprasidone, insulin lispro, potassium chloride **OR** potassium alternative oral replacement **OR** potassium chloride, sodium chloride flush, acetaminophen **OR** acetaminophen, polyethylene glycol, ondansetron, glucose, dextrose, glucagon (rDNA), dextrose, magnesium sulfate, potassium chloride, sodium phosphate IVPB **OR** sodium phosphate IVPB    Assessment & Plan:           Persistent metabolic encephalopathy v catatonic delirium - improving  Psychiatry and neurology following  MRI brain noted  -Prior workup including MRI, EEG and CSF has been unrevealing  Ativan and ambien challenge per psych  - transfer for cvEEG if does not continue to improve   Abx for leukocytes in urine.  - Lost IV access  - IM ativan administered 8/8/20  - The patient awoke on 8/9/20 - took his medicine, walked the halls with staff.  Meds were further adjusted by psych  - ativan 0.5mg q6h + zyprexa 5mg qhs, geodon 10mg IM PRN agitation (may need to incr to 20mg if needed for agitation)+ depakote 500mg bid (per neuro)     Hypophosphatemia - resolved  Has been replaced  Follow labs  Replete as needed     Agitation and aggressive behavior  Psychiatric following  Ambien and ativan challenge  - mental status is improving  Depakote 500mg bid 8/5     Diabetes mellitus  Severe hypoglycemia on admission-resolved  Insulin overdose-unsure if accidental or intentional     Lantus  Humalog SSI  Prandial humalog   Monitor FSBG  Titrate insulin PRN     HTN  Monitor BP  Initiate/titrate medications as needed.     Severe protein calorie malnutrition/weight loss  2/2 poor oral intake  Initial wt 152lbs  Daily weights  Dietician evaluation  Follow labs and vitals  Encourage PO intake  Dietary supplements  Monitor electrolytes and replete as needed.     Bacteruria  - rocephin IV started 8/3 with delirium - Completed 8/8/20    Continue current regimen/therapies. Monitor. Adjust medical regimen as appropriate. Body mass index is 16.42 kg/m². The patient and / or the family were informed of the results of any tests, a time was given to answer questions, a plan was proposed and they agreed with plan.       DVT ppx: lovenox  GI ppx: yes    Diet: DIET DENTAL SOFT;  Dietary Nutrition Supplements: Diabetic Oral Supplement  Dietary Nutrition Supplements: Frozen Oral Supplement    Consults:  IP CONSULT TO PULMONOLOGY  IP CONSULT TO NEUROLOGY  IP CONSULT TO DIETITIAN  IP CONSULT TO PSYCHIATRY  IP CONSULT TO DIETITIAN  IP CONSULT TO DIETITIAN  IP CONSULT TO DIABETES EDUCATOR    DISPO/placement plan: d/c and admit to psych    Code Status: Full Code      Derek Guardado, ESTEPHANIA - SAVANA  08/11/20

## 2020-08-11 NOTE — PLAN OF CARE
Problem: Mental Status - Impaired:  Goal: Mental status will improve  Description: Mental status will improve  Outcome: Ongoing  Note: D: Pt very agitated this morning, pushing the PCA out of the room and physically aggressive with staff  A: code violet called, given Geodon 20mg IM as ordered and posey vest, bilateral wrist and bilateral ankle restraints applied as ordered

## 2020-08-11 NOTE — DISCHARGE SUMMARY
Hospital Medicine Discharge Summary    Patient ID: Mikki Esparza      Patient's PCP: Lucretia Duncan MD    Admit Date: 7/15/2020     Discharge Date:   8/11/2020    Admitting Physician: Afsaneh Patel MD     Discharge Physician: ESTEPHANIA Vela - CNP       Discharge Diagnoses: Active Hospital Problems    Diagnosis Date Noted    Severe malnutrition ([facility] [E43] 08/03/2020    Acute respiratory failure with hypoxia ([facility] [J96.01] 07/16/2020    Hypoglycemia [E16.2]     Altered mental status [R41.82]        The patient was seen and examined on day of discharge and this discharge summary is in conjunction with any daily progress note from day of discharge. PCP/SNF to follow up: PCP PRN; monitor BP, as pt had some SBPs in the 100s while in-pt. ok to restart amlodipine on d/c. Did not restart his losartan, however. Discharge Instructions/Follow-up:  Cont insulin as below with other meds in med rec. Hospital Course: The patient is a 50 yrs old male, who  has a past medical history of Diabetes mellitus ([facility] Gastroparesis,  Hypertension, cocaine and cannabis abuse, polysubstance abuse, heart murmur, psychosis. He presented to [facility] ER after being found at home unresponsive with a blood glucose of 22. There was some question as to whether this was accidental or an an intentional insulin overdose. The patient was admitted for further workup and treatment. He required intubation, though he later self-extubated by coughing. Because of his mental status/catatonia, he was evaluated by neurology. No obvious etiology for his presentation was noted. EEG was not consistent with seizure and CSF was without acute findings. There was some consideration for PRES. Psych was consulted. MRI on 7/16 did show a subtle frontoparietal cortical/subcortical signal abnormality. There was some volume loss favoring encephalomalacia and gliosis - which would be atypical for PRES.  On 7/31, the patient was given Ativan (for ativan challenge). Around 6 hours after this, he awoke and ate with his family. He later returned to his catatonic state. Psych felt that the patient was exhibiting catatonic delirium. Ativan was scheduled, as was [place]. There was consideration for whether or not the patient would require IP psych. Medications were adjusted by Dr. Laura Whitehead with advancing the ativan and [place] challenge. Rocephin was initiated because of leukocytes in urine - in case there was any inflammatory cause for his presentation. Depakote was resumed for behavior. The patient pulled his PICC on 8/7/20 overnight and new access was not able to be established. Ativan was given IM. On 8/9/20 - the patient awoke in the morning, took his pills and walked around with staff. Medications were further adjusted by psych. At times he was reported to be improving with his conversation and engagement with staff and wife, but reverts back to his one to three word answers most of the time. He continued to move about in the room independently at times and spontaneously and purposefully, pulling covers over his head during exams and interviews, flipping over onto his stomach to sleep, mumbling some words as answers to questions that were either incomprehensible or brief and inappropriate for the question. Continues to appear confused. has walked around the room naked and attempted to leave the room at times on his own but is able to be redirected. He has been restraint free for a couple of days. At one point, he  pulled out his PICC and was  too agitated to be placed back in and without IV ativan. rec'd IM dose.  Bizarre behavior, non-communicative.  Did not comply with MRI. Repeat EEG showed slowing without epileptiform seizures.     Patient's glucose better controlled. Adjustments made to insulin based on his intake.  Was requiring a lot of prompting and encouragement to eat, and he was refusing insulin and glucometer sticks so lantus was increased for longer acting coverage and adjustments made to prandial insulin. SSI was increased to med-dose today with higher sugars for better control. Will cont to hold his losartan for BP as he was having lower pressures at times, but can restart his home amlodipine for HTN with continued monitoring going forward.      Discussed with wife and updated on situation. Updated that the etiology is unclear exactly as to the why he still has the delirium but given his randomness of speech and eating seems more behavioral now, hence the need for psych admit. she states she still doesn't know if he injected himself with more insulin on purpose or not. He was just at a dance with one of his daughters a month ago, functioning normally. She states she has 13 children, some of which are her own and some are step children and she says if he is \"faking any of this, I will kill him. \" states \"I don't understand why my  is a retard now. \"      She does verbalize understanding the need for him to go to Psych at this point.     Significant Diagnostic Studies:   Imaging:  XR FOOT LEFT (MIN 3 VIEWS)   Final Result   Soft tissue swelling of the 1st toe but no associated fracture.       Subacute partially healed fractures of the 2nd and 4th proximal phalanges,   new since February 2020.           CT HEAD WO CONTRAST   Final Result   No acute intracranial abnormality.       Mild cerebral atrophy.           XR CHEST PORTABLE   Final Result   No acute findings. NG tube with tip in the stomach.           XR CHEST 1 VW   Final Result   Nasogastric tube projects in normal position.       No acute cardiopulmonary disease.           MRI BRAIN WO CONTRAST   Final Result   Subtle bilateral frontoparietal cortical and subcortical signal abnormality   suggesting mild posterior reversal encephalopathy syndrome.   No acute infarct   or hemorrhage.           XR CHEST PORTABLE   Final Result   No acute cardiac or pulmonary disease.       ET tube 7 cm above the jose carlos.       NG side-port at the GE junction.           XR CHEST PORTABLE   Final Result   The tip of the endotracheal tube is slightly high in position 9 cm above the   jose carlos.       The OG/NG tube should be advanced 10 cm.       The lungs are clear.           CT Head WO Contrast   Final Result   No acute intracranial abnormality.       Paranasal sinusitis.           XR CHEST PORTABLE   Final Result   No acute cardiopulmonary disease.                 Exam:     /88   Pulse 79   Temp 97.9 °F (36.6 °C)   Resp 18   Ht 6' 2\" (1.88 m)   Wt 127 lb 13.9 oz (58 kg)   SpO2 100%   BMI 16.42 kg/m²     General appearance: No apparent distress, appears stated age and cooperative. HEENT: Pupils equal, round, and reactive to light. Conjunctivae/corneas clear. Neck: Supple, with full range of motion. No jugular venous distention. Trachea midline. Respiratory:  Normal respiratory effort. Clear to auscultation, bilaterally without Rales/Wheezes/Rhonchi. Cardiovascular: Regular rate and rhythm with normal S1/S2 without murmurs, rubs or gallops. Abdomen: Soft, non-tender, non-distended with normal bowel sounds. Musculoskelatal: No clubbing, cyanosis or edema bilaterally. Full range of motion without deformity. Skin: Skin color, texture, turgor normal.  No rashes or lesions. Neurologic:  Neurovascularly intact without any focal sensory/motor deficits. Cranial nerves: II-XII intact, grossly non-focal.  Psychiatric: Alert but does not answer orientation questions, only says one and two word answers. Consults:     IP CONSULT TO PULMONOLOGY  IP CONSULT TO NEUROLOGY  IP CONSULT TO DIETITIAN  IP CONSULT TO PSYCHIATRY  IP CONSULT TO DIETITIAN  IP CONSULT TO DIETITIAN  IP CONSULT TO DIABETES EDUCATOR    Disposition:  Discharge to Psych     Code Status:  Full Code     Activity: activity as tolerated    Diet: diabetic diet    Condition on Discharge: stable    Labs:  For convenience and continuity at follow-up the following most recent labs are provided:      CBC:    Lab Results   Component Value Date    WBC 5.9 08/11/2020    HGB 12.5 08/11/2020    HCT 38.5 08/11/2020     08/11/2020       Renal:    Lab Results   Component Value Date     08/11/2020    K 4.1 08/11/2020     08/11/2020    CO2 25 08/11/2020    BUN 11 08/11/2020    CREATININE 0.9 08/11/2020    CALCIUM 9.6 08/11/2020    PHOS 3.1 08/11/2020       Discharge Medications:     Current Discharge Medication List           Details   divalproex (DEPAKOTE SPRINKLE) 125 MG capsule Take 4 capsules by mouth 2 times daily  Qty: 60 capsule, Refills: 3      lactobacillus (CULTURELLE) capsule Take 1 capsule by mouth 2 times daily (with meals)  Qty: 20 capsule, Refills: 0      polyethylene glycol (GLYCOLAX) 17 g packet Take 17 g by mouth daily as needed for Constipation  Qty: 527 g, Refills: 1      zolpidem (AMBIEN) 5 MG tablet Take 1 tablet by mouth 2 times daily for 14 days. Qty: 28 tablet, Refills: 0    Associated Diagnoses: Delirium      OLANZapine zydis (ZYPREXA) 10 MG disintegrating tablet Take 1 tablet by mouth nightly  Qty: 30 tablet, Refills: 3              Details   insulin glargine (LANTUS) 100 UNIT/ML injection vial Inject 10 Units into the skin nightly  Qty: 1 vial, Refills: 3      !! insulin lispro (HUMALOG) 100 UNIT/ML injection vial Inject 2 Units into the skin as needed (as needed with significant carb intake or > 50% of meals)  Qty: 1 vial, Refills: 3      !! insulin lispro (HUMALOG) 100 UNIT/ML injection vial Inject 0-12 Units into the skin 3 times daily (with meals)  Qty: 1 vial, Refills: 3      !! insulin lispro (HUMALOG) 100 UNIT/ML injection vial Inject 0-6 Units into the skin nightly  Qty: 1 vial, Refills: 3       !! - Potential duplicate medications found. Please discuss with provider.            Details   promethazine (PHENERGAN) 12.5 MG tablet Take 12.5 mg by mouth every 6 hours as needed for Nausea      atorvastatin (LIPITOR) 40 MG tablet Take 1 tablet by mouth daily  Qty: 30 tablet, Refills: 3    Associated Diagnoses: Dyslipidemia due to type 1 diabetes mellitus (HCC)      amLODIPine (NORVASC) 2.5 MG tablet Take 1 tablet by mouth daily  Qty: 90 tablet, Refills: 1    Associated Diagnoses: Essential hypertension      pantoprazole (PROTONIX) 20 MG tablet Take 2 tablets by mouth daily  Qty: 30 tablet, Refills: 0      aspirin 81 MG chewable tablet Take 1 tablet by mouth daily  Qty: 30 tablet, Refills: 0      !! blood glucose monitor strips Test 3 times a day & as needed for symptoms of irregular blood glucose. Qty: 100 strip, Refills: 2      !! blood glucose monitor strips Check blood sugars 4-5 times per day  Qty: 150 strip, Refills: 0    Associated Diagnoses: Uncontrolled type 1 diabetes mellitus with diabetic peripheral neuropathy ([address] Type 1 diabetes mellitus with hypoglycemia and without coma ([address] Hypoglycemia unawareness in type 1 diabetes mellitus ([address] Essential hypertension; Dyslipidemia due to type 1 diabetes mellitus (HCC)      Continuous Blood Gluc Sensor (FREESTYLE HINA 14 DAY SENSOR) MISC Use for personal CGMS. Replace every 2 weeks. Qty: 2 each, Refills: 0    Associated Diagnoses: Uncontrolled type 1 diabetes mellitus with diabetic peripheral neuropathy ([address] Type 1 diabetes mellitus with hypoglycemia and without coma ([address] Hypoglycemia unawareness in type 1 diabetes mellitus ([address] Essential hypertension; Dyslipidemia due to type 1 diabetes mellitus (HCC)      glucose monitoring kit (FREESTYLE) monitoring kit 1 kit by Does not apply route daily  Qty: 1 kit, Refills: 0    Associated Diagnoses: Uncontrolled type 1 diabetes mellitus with diabetic peripheral neuropathy ([address] Type 1 diabetes mellitus with hypoglycemia and without coma ([address] Hypoglycemia unawareness in type 1 diabetes mellitus ([address]  Essential hypertension; Dyslipidemia due to type 1 diabetes mellitus (HCC)      Lancets MISC 1 each by Does not apply route daily Check blood sugars 4-5 times per day  Qty: 150 each, Refills: 0    Associated Diagnoses: Uncontrolled type 1 diabetes mellitus with diabetic peripheral neuropathy ([facility] Type 1 diabetes mellitus with hypoglycemia and without coma ([facility] [address] Hypoglycemia unawareness in type 1 diabetes mellitus ([facility] [address] Essential hypertension; Dyslipidemia due to type 1 diabetes mellitus (HCC)      Insulin Syringe-Needle U-100 (ULTRA-THIN II INS SYR SHORT) 31G X 5/16\" 1 ML MISC 1 each by Does not apply route 3 times daily  Qty: 100 each, Refills: 0       !! - Potential duplicate medications found. Please discuss with provider. Time Spent on discharge is more than 45 minutes in the examination, evaluation, counseling and review of medications and discharge plan. Signed:    ESTEPHANIA Sarkar - CNP   8/11/2020      Thank you Jorge Sims MD for the opportunity to be involved in this patient's care. If you have any questions or concerns please feel free to contact me at [phone].

## 2020-08-11 NOTE — PROCEDURES
[address] [facility]                          ELECTROENCEPHALOGRAM REPORT    PATIENT NAME: Connie Leone                      :        1975  MED REC NO:   [id number]                          ROOM:       3250  ACCOUNT NO:   [de-identified]                           ADMIT DATE: 07/15/2020  PROVIDER:     Carlos Eduardo Freed DO    DATE OF EE/10/2020    REFERRING PROVIDER:  Trini Mix MD    REASON FOR STUDY:  Altered mental status. BRIEF HISTORY AND NEUROLOGIC FINDINGS:  The patient is a 77-year-old  male being evaluated for reported altered mental status. MEDICATIONS:  The patient's medications are not listed. EEG FINDINGS:  This is a 20-channel digital EEG performed utilizing  bipolar and referential montages. Wakefulness, drowsiness and stage II  sleep were obtained during the recording. During maximum wakefulness,  there was a moderate voltage, symmetric, somewhat disorganized though  reactive 6-7 Hz posterior background rhythm. The anterior background  consists of low voltage fast frequencies. Drowsiness is manifested by  attenuation of the waking background rhythms. Stage II sleep was  manifested by sleep spindles and K-complexes. Photic stimulation was not performed due to the patient's inability to  follow commands appropriately. Hyperventilation exercise was not  performed due to the patient's clinical history. A 1-channel EKG rhythm strip was reviewed and showed no obvious cardiac  dysrhythmias. EEG DIAGNOSIS:  This EEG is abnormal due to the presence of mild  background slowing and disorganization. CLINICAL INTERPRETATION:  The background slowing and disorganization is  nonspecific and consistent with a mild encephalopathy. This may be seen  in multiple settings including toxic, metabolic, or degenerative  conditions as well as with medication effect.   No definite

## 2020-08-11 NOTE — CARE COORDINATION
8/11 Call placed to [address] [place] [address] ([phone]) regarding Psych placement. The patient is being considered for [facility] (in-patient psych). They will return call when the patient has been accepted for transfer.  Patient is currently in restraints since 0715 this am. Electronically signed by Eleazar Hussein RN on 8/11/2020 at 9:24 AM

## 2020-08-12 ENCOUNTER — APPOINTMENT (OUTPATIENT)
Dept: CT IMAGING | Age: 45
DRG: 042 | End: 2020-08-12
Attending: PSYCHIATRY & NEUROLOGY
Payer: MEDICAID

## 2020-08-12 PROBLEM — T50.902A INTENTIONAL DRUG OVERDOSE (HCC): Status: ACTIVE | Noted: 2020-08-12

## 2020-08-12 LAB
GLUCOSE BLD-MCNC: 102 MG/DL (ref 70–99)
GLUCOSE BLD-MCNC: 113 MG/DL (ref 70–99)
GLUCOSE BLD-MCNC: 485 MG/DL (ref 70–99)
GLUCOSE BLD-MCNC: 593 MG/DL (ref 70–99)
GLUCOSE BLD-MCNC: >600 MG/DL (ref 70–99)
PERFORMED ON: ABNORMAL

## 2020-08-12 PROCEDURE — 99223 1ST HOSP IP/OBS HIGH 75: CPT | Performed by: PSYCHIATRY & NEUROLOGY

## 2020-08-12 PROCEDURE — 1240000000 HC EMOTIONAL WELLNESS R&B

## 2020-08-12 PROCEDURE — 6370000000 HC RX 637 (ALT 250 FOR IP): Performed by: NURSE PRACTITIONER

## 2020-08-12 PROCEDURE — 99221 1ST HOSP IP/OBS SF/LOW 40: CPT | Performed by: NURSE PRACTITIONER

## 2020-08-12 PROCEDURE — 6370000000 HC RX 637 (ALT 250 FOR IP): Performed by: PSYCHIATRY & NEUROLOGY

## 2020-08-12 PROCEDURE — 6360000002 HC RX W HCPCS: Performed by: NURSE PRACTITIONER

## 2020-08-12 PROCEDURE — 70450 CT HEAD/BRAIN W/O DYE: CPT

## 2020-08-12 RX ORDER — PANTOPRAZOLE SODIUM 40 MG/1
40 TABLET, DELAYED RELEASE ORAL DAILY
Status: DISCONTINUED | OUTPATIENT
Start: 2020-08-12 | End: 2020-08-25 | Stop reason: HOSPADM

## 2020-08-12 RX ORDER — DEXTROSE MONOHYDRATE 25 G/50ML
12.5 INJECTION, SOLUTION INTRAVENOUS PRN
Status: DISCONTINUED | OUTPATIENT
Start: 2020-08-12 | End: 2020-08-25 | Stop reason: HOSPADM

## 2020-08-12 RX ORDER — OLANZAPINE 10 MG/1
10 TABLET, ORALLY DISINTEGRATING ORAL EVERY 6 HOURS PRN
Status: DISCONTINUED | OUTPATIENT
Start: 2020-08-12 | End: 2020-08-15

## 2020-08-12 RX ORDER — LORAZEPAM 2 MG/1
2 TABLET ORAL EVERY 6 HOURS PRN
Status: DISCONTINUED | OUTPATIENT
Start: 2020-08-12 | End: 2020-08-16

## 2020-08-12 RX ORDER — OLANZAPINE 10 MG/1
10 INJECTION, POWDER, LYOPHILIZED, FOR SOLUTION INTRAMUSCULAR EVERY 6 HOURS PRN
Status: DISCONTINUED | OUTPATIENT
Start: 2020-08-12 | End: 2020-08-15

## 2020-08-12 RX ORDER — LORAZEPAM 2 MG/ML
2 INJECTION INTRAMUSCULAR EVERY 6 HOURS PRN
Status: DISCONTINUED | OUTPATIENT
Start: 2020-08-12 | End: 2020-08-16

## 2020-08-12 RX ORDER — OLANZAPINE 10 MG/1
10 TABLET, ORALLY DISINTEGRATING ORAL ONCE
Status: COMPLETED | OUTPATIENT
Start: 2020-08-12 | End: 2020-08-12

## 2020-08-12 RX ORDER — LORAZEPAM 2 MG/ML
1 INJECTION INTRAMUSCULAR ONCE
Status: DISCONTINUED | OUTPATIENT
Start: 2020-08-12 | End: 2020-08-12

## 2020-08-12 RX ORDER — QUETIAPINE FUMARATE 100 MG/1
100 TABLET, FILM COATED ORAL NIGHTLY
Status: DISCONTINUED | OUTPATIENT
Start: 2020-08-12 | End: 2020-08-16

## 2020-08-12 RX ORDER — TRAZODONE HYDROCHLORIDE 100 MG/1
100 TABLET ORAL NIGHTLY
Status: DISCONTINUED | OUTPATIENT
Start: 2020-08-12 | End: 2020-08-12

## 2020-08-12 RX ORDER — ZIPRASIDONE MESYLATE 20 MG/ML
10 INJECTION, POWDER, LYOPHILIZED, FOR SOLUTION INTRAMUSCULAR ONCE
Status: COMPLETED | OUTPATIENT
Start: 2020-08-12 | End: 2020-08-12

## 2020-08-12 RX ORDER — NICOTINE POLACRILEX 4 MG
15 LOZENGE BUCCAL PRN
Status: DISCONTINUED | OUTPATIENT
Start: 2020-08-12 | End: 2020-08-25 | Stop reason: HOSPADM

## 2020-08-12 RX ORDER — DEXTROSE MONOHYDRATE 50 MG/ML
100 INJECTION, SOLUTION INTRAVENOUS PRN
Status: DISCONTINUED | OUTPATIENT
Start: 2020-08-12 | End: 2020-08-25 | Stop reason: HOSPADM

## 2020-08-12 RX ORDER — ATORVASTATIN CALCIUM 40 MG/1
40 TABLET, FILM COATED ORAL DAILY
Status: DISCONTINUED | OUTPATIENT
Start: 2020-08-12 | End: 2020-08-25 | Stop reason: HOSPADM

## 2020-08-12 RX ORDER — ASPIRIN 81 MG/1
81 TABLET, CHEWABLE ORAL DAILY
Status: DISCONTINUED | OUTPATIENT
Start: 2020-08-12 | End: 2020-08-25 | Stop reason: HOSPADM

## 2020-08-12 RX ORDER — AMLODIPINE BESYLATE 2.5 MG/1
2.5 TABLET ORAL DAILY
Status: DISCONTINUED | OUTPATIENT
Start: 2020-08-12 | End: 2020-08-25 | Stop reason: HOSPADM

## 2020-08-12 RX ORDER — INSULIN GLARGINE 100 [IU]/ML
10 INJECTION, SOLUTION SUBCUTANEOUS NIGHTLY
Status: DISCONTINUED | OUTPATIENT
Start: 2020-08-12 | End: 2020-08-24

## 2020-08-12 RX ORDER — LORAZEPAM 2 MG/ML
1 INJECTION INTRAMUSCULAR ONCE
Status: COMPLETED | OUTPATIENT
Start: 2020-08-12 | End: 2020-08-12

## 2020-08-12 RX ADMIN — ATORVASTATIN CALCIUM 40 MG: 40 TABLET, FILM COATED ORAL at 17:15

## 2020-08-12 RX ADMIN — AMLODIPINE BESYLATE 2.5 MG: 2.5 TABLET ORAL at 17:21

## 2020-08-12 RX ADMIN — INSULIN LISPRO 12 UNITS: 100 INJECTION, SOLUTION INTRAVENOUS; SUBCUTANEOUS at 17:10

## 2020-08-12 RX ADMIN — QUETIAPINE FUMARATE 100 MG: 100 TABLET ORAL at 23:24

## 2020-08-12 RX ADMIN — PANTOPRAZOLE SODIUM 40 MG: 40 TABLET, DELAYED RELEASE ORAL at 17:14

## 2020-08-12 RX ADMIN — INSULIN GLARGINE 10 UNITS: 100 INJECTION, SOLUTION SUBCUTANEOUS at 21:15

## 2020-08-12 RX ADMIN — LORAZEPAM 2 MG: 2 TABLET ORAL at 21:12

## 2020-08-12 RX ADMIN — ASPIRIN 81 MG 81 MG: 81 TABLET ORAL at 17:15

## 2020-08-12 RX ADMIN — LORAZEPAM 2 MG: 2 TABLET ORAL at 13:04

## 2020-08-12 RX ADMIN — HYDROXYZINE PAMOATE 50 MG: 25 CAPSULE ORAL at 01:00

## 2020-08-12 RX ADMIN — ZIPRASIDONE MESYLATE 10 MG: 20 INJECTION, POWDER, LYOPHILIZED, FOR SOLUTION INTRAMUSCULAR at 01:39

## 2020-08-12 RX ADMIN — OLANZAPINE 10 MG: 10 TABLET, ORALLY DISINTEGRATING ORAL at 11:16

## 2020-08-12 RX ADMIN — LORAZEPAM 1 MG: 2 INJECTION INTRAMUSCULAR; INTRAVENOUS at 01:30

## 2020-08-12 ASSESSMENT — SLEEP AND FATIGUE QUESTIONNAIRES
AVERAGE NUMBER OF SLEEP HOURS: 4
DO YOU USE A SLEEP AID: NO
DO YOU HAVE DIFFICULTY SLEEPING: NO
DO YOU USE A SLEEP AID: COMMENT
AVERAGE NUMBER OF SLEEP HOURS: 4
DO YOU HAVE DIFFICULTY SLEEPING: NO

## 2020-08-12 ASSESSMENT — LIFESTYLE VARIABLES: HISTORY_ALCOHOL_USE: YES

## 2020-08-12 NOTE — PROGRESS NOTES
Patient is medicated with ativan 1 mg and geodon 10 mg IM to left deltoid muscle mass. He was resistant initially to injection staff stood with him and medication was administered to left deltoid muscle mass without difficulty. Patient tolerated injection well. He continued to pace unit, attempted to get out of the small side of the unit. Continued to redirect and reassure patient that he is safe and that he will be seen by the doctor in the morning. Will continue to monitor patient.

## 2020-08-12 NOTE — BH NOTE
Notified medical and Dr. Sabrina Harry about 2 high blood glucose readings. Stat glucose ordered by Dr. Sabrina Harry. Ac Espino notified. Joel Butler wants nursing to give him 12 units of Humalog now (medium sliding scale) and recheck in an hour. Clarified that Ac Espino wanted us to give Humalog before the stat lab result. Joel Butler said yes, give insulin before lab result and recheck in an hour. Insulin administered by his nurse. Will continue to monitor.

## 2020-08-12 NOTE — PROGRESS NOTES
Patient is a poor historian with thought blocking and poverty of content. Initially patient was shown his room and he was resting in bed, unable to answer admissions questions in any detail. Denied SI/HI by saying \"No\" After settling patient into bed,he got up and began to try to open assorted doors on the unit. He seemed confused where his room was located, name was printed and placed at room and patient was shown his room with his name on it. He went to the room and laid down in the bed. Patient denied any needs, was cooperative with vital signs. Will continue to monitor behavior.

## 2020-08-12 NOTE — PROGRESS NOTES
Patient was ambulated to the small side of the unit due to going into other patients room, trying to get out of the unit by shaking the doors and waking other patients.

## 2020-08-12 NOTE — PROGRESS NOTES
Patient has been resting quietly in bed, but doesn't appear to have settled into any appreciable amount of sleep this shift. He has rested quietly in bed, occasionally moving his legs and repositioning himself frequently.

## 2020-08-12 NOTE — PROGRESS NOTES
Patient is noted to lay down in bed then will get up and go to the bathroom then to bed, then out to the nurses station. He was offered and accepted turkey sandwich. Drank some water and went back to bed. Increase in verbalization, still disoriented to place, time and situation. Patient less agitated at this time, medication somewhat effective. Reoriented but patient unable to retain information. Will continue to monitor.

## 2020-08-12 NOTE — PROGRESS NOTES
...   `[facility] [place]  Admission Note     Admission Type:   Admission Type: Involuntary    Reason for admission:  Reason for Admission: patient overdosed on insulin an depakote Conflicting information regarding if this was a suicide attempt or accidental hypoglycemic event. Patient had required intubation 7/15 as he was found down by girlfriend and con was called had a fingerstick of 22 and intubated in ICU then med-surg.  Mental decline following this episode, Jose Dudley had been following at [facility].     PATIENT STRENGTHS:  Strengths: ([place])    Patient Strengths and Limitations:  Limitations: ([place])    Addictive Behavior:   Addictive Behavior  In the past 3 months, have you felt or has someone told you that you have a problem with:  : Other(Comments)([place])  Do you have a history of Chemical Use?: ([place])  Do you have a history of Alcohol Use?: ([place])  Do you have a history of Street Drug Abuse?: Comment([place])  Histroy of Prescripton Drug Abuse?: Comment([place])    Medical Problems:   Past Medical History:   Diagnosis Date    Diabetes mellitus ([facility] [address]     Gastroparesis     Hypertension        Status EXAM:  Status and Exam  Normal: No  Facial Expression: Flat, Sad, Worried  Affect: Blunt, Unstable  Level of Consciousness: Alert(oriented to name only)  Mood:Normal: No  Mood: Anxious, Suspicious, Despair, Helpless, Sad, Worthless, low self-esteem  Motor Activity:Normal: No  Motor Activity: Increased, Repetitive Acts(general restlessness)  Interview Behavior: Evasive, Impulsive, Uncooperative/Withdrawn(unable to contribute in meaningful manner for admission.)  Preception: [place] to Person(oriented to place, time and situation, patient unable to retain information.)  Attention:Normal: No  Attention: Distractible, Unable to Concentrate  Thought Processes: Blocking  Thought Content:Normal: No  Thought Content: Preoccupations, Poverty of Content(brief one word answers initially, then was crying \"mama\" several times)  Hallucinations: None(acts as if he's had a lot on his mind and is unable to focus due to his family stressors that are on his mind.)  Delusions: ([place])  Memory:Normal: No  Memory: Poor Recent, Poor Remote, Other(See comment)(poor historian at time of admission)  Insight and Judgment: No  Insight and Judgment: Poor Judgment, Poor Insight  Present Suicidal Ideation: No(when asked if he would hurt hinself, patient replied ,\"No\". patient is close to the nurses station for close monitoring.)  Present Homicidal Ideation: No(when asked if he would hurt others while on the BHI, patient responded, \"No\". )    Tobacco Screening:  Practical Counseling, on admission, rosalina X, if applicable and completed (first 3 are required if patient doesn't refuse):            ( )  Recognizing danger situations (included triggers and roadblocks)                    ( )  Coping skills (new ways to manage stress, exercise, relaxation techniques, changing routine, distraction)                                                           ( )  Basic information about quitting (benefits of quitting, techniques in how to quit, available resources  ( ) [facility]                                           ( ) Patient refused counseling  ( ) Patient has not smoked in the last 30 days    Metabolic Screening:    Lab Results   Component Value Date    LABA1C 10.3 05/29/2020       Lab Results   Component Value Date    CHOL 232 (H) 03/26/2018     Lab Results   Component Value Date    TRIG 66 03/26/2018     Lab Results   Component Value Date    HDL 86 (H) 06/17/2020     (H) 03/26/2018     No components found for: LDLCAL  Lab Results   Component Value Date    LABVLDL 15 06/17/2020    LABVLDL 13 03/26/2018         Body mass index is 16.31 kg/m².     BP Readings from Last 2 Encounters:   08/12/20 130/88   08/11/20 123/75           No medications were brought to hospital. Patient oriented to surroundings and program expectations and copy of patient rights given. Received admission packet:  NO.  Consents reviewed, signed NO. Refused NO - unable to understand where he is or why he is here. Alert and oriented to self only upon arrival to the [facility]. Patient unable to verbalize understanding that he is in the hospital or why he is in the hospital.Patient education on precautions: explained, but no understanding of precautions was observed or noted. Mary Huerta RN

## 2020-08-12 NOTE — FLOWSHEET NOTE
08/12/20 1449   Activities of Daily Living   Patient Requires assistance with daily self-care activities? (Per report, pt is unable to care for most ADLs at this time, including bathing, meal planning, food prep, grocery shopping, housecleaning, transportation, and laundry. Unclear if this is pt's baseline and if he has required assistance before admission.)   Leisure Activity 1   3 Favorite Leisure Activities Per collateral call completed by Collin Birmingham pt's stacy reports pt enjoys listening to music   Frequency   ([place] due to pt's current mental status)   Last time   ([place] due to pt's current mental status)   Barriers to participating    ([place] due to pt's current mental status)   Leisure Activity 2   Favorite Leisure Activities  Per collateral call completed by Collin Birmingham pt'steve east reports pt enjoys being social and being with friends/family. Frequency    ([place] due to pt's current mental status)   Last time    ([place] due to pt's current mental status)   Barriers to participating    ([place] due to pt's current mental status)   Leisure Activity 3   [address] due to pt's current mental status   Frequency    ([place] due to pt's current mental status)   Last time    ([place] due to pt's current mental status)   Barriers to participating    ([place] due to pt's current mental status)   Social   Patient reports spending the majority of their free time   ([place] due to pt's current mental status)   Patient verbalizes a preference for spending free time with a group  (Per collateral, pt presents as enjoying time with friends/family and being in a group)   Patients perception of support system   ([place] due to pt's current mental status)   Patients perception of barriers to socializing with others include(s) no perceived barriers  (Per collateral.)   Social Details Support system: Unclear at this time. Conflicting information regarding perception of support system.  Per chart review, pt's wife, Kuldeep Allred, girlfriend, Bill Boyle, and mother Maricel Hill are all involved. Pt was on a no visitor list due to differing stories from his family. Beliefs & Coping   Has difficulty dealing with feelings     ([place] due to pt's current mental status)   Internalizes feelings/Keeps feelings in   ([place] due to pt's current mental status)   Externalizes feelings through aggressiveness or poor temper control    ([place] due to pt's current mental status)   Feels uncomfortable around others    ([place] due to pt's current mental status)   Has difficulty talking to others    ([place] due to pt's current mental status)   Depends on others for direction or decisions   ([place] due to pt's current mental status)   Difficulty dealing with anger of others    ([place] due to pt's current mental status)   Difficulty dealing with own anger    ([place] due to pt's current mental status)   Difficulty managing stress   ([place] due to pt's current mental status)   Frequently has difficulty with relationships  Yes  (Per chart review, pt's relationships present as strained. Wife and girlfriend involved and conflicting information.)   which,who,where in family   Has recently perceived/experienced loss, disappointment, humiliation or failure    ([place] due to pt's current mental status)   General perception about self   ([place] due to pt's current mental status)   Attitude about abilities   ([place] due to pt's current mental status)   Locus of Control    ([place] due to pt's current mental status)   Belief about recovery   ([place] due to pt's current mental status)   Patient Identified Strengths  Per collateral obtained by [facility] [place], pt is outgoing and social   Patient Identified Limitations  Per chart review, bizarre behaviors   Perception of most stressful event prior to hospitalization Per chart review, \"patient overdosed on insulin an depakote Conflicting information regarding if this was a suicide attempt or accidental hypoglycemic event. Patient had required intubation 7/15 as he was found down by girlfriend and squad was called had a fingerstick of 22 and intubated in ICU then med-surg. Mental decline following this episode\"   Perception of changes needed Per chart review, medication compliance and stabilization   Strengths and Limitations   Strengths Demonstrates basic social skills  (Per chart review)   Limitations Perceives need for assistance with self-care; External locus of control  (per chart review)       MT-BC attempted to meet with pt to complete AT/OT Leisure Assessment. At time of attempt, pt was observed pacing the unit and banging on the doors. MT-BC attempted to ask pt multiple questions and pt answered \"no\" and \"thank you\" to each one. MT-BC completed assessment per chart review, largely [place] due to current mental status and inability to find applicable information in pt's chart. Therapy staff will re-attempt assessment.      Mitchell Yañez, MT-BC

## 2020-08-12 NOTE — PROGRESS NOTES
Pt discharged and transferred to [facility]. VS stable, all lines removed. Pt is calm and agreeable to transfer. All paperwork given.

## 2020-08-12 NOTE — BH NOTE
Patient continues to have sever psychomotor agitation and continues to exit seek and pace the small side of the unit. He has bizarre behaviors. Restless and fidgety. Unable to sit still for long periods of time. Zyprexa appears to be ineffective. He was given Ativan 2 mg PO. He begins to cry and apologize to this writer. He states he would like some medication to help him rest.  He does not appear to understand to understand one step direction. Clanging. Continues to hit doors in attempt to open them. Patient allowed this writer to check his blood sugar -  FSBS is 454. Will notify Isaac Hansen LPN and medical provider.

## 2020-08-12 NOTE — BH NOTE
When ask if patient would like breakfast tray patient stated \"NO\" when write took lid off and sat on counter for patient to see patient begin to eat breakfast. Patient ate 100% of breakfast and drank one juice and diet cola. Writer attempted to ask orientation and assessment questions and patient just sat down at table on small side of milieu and put head down.

## 2020-08-12 NOTE — BH NOTE
Patient resting in bed occasionally repositioning self turning side to side keeping head covered. Writer encouraged patient multiple to time to eat breakfast taking breakfast tray in room patient would only answer with word \" no\" to any question ask. Patient is alert and oriented to name only.

## 2020-08-12 NOTE — PROGRESS NOTES
Patient arrived to the [facility] via stretcher from [facility] via ambulance transfer. Patient I alert to name only. Vital signs obtained.

## 2020-08-12 NOTE — PLAN OF CARE
Problem: Pain:  Goal: Pain level will decrease  Description: Pain level will decrease  8/11/2020 2143 by Roxane Thomson RN  Outcome: Completed  8/11/2020 1128 by Lela Quarles RN  Outcome: Ongoing  Note:   Will continue to use the pain scale (0-10) to measure pt's pain and monitor their needs. Will assess patients pain with assessments and interactions. Pt will notify RN of any changes in pain and where. Will continue to perform therapeutic comfort measures and give pain medications as prescribed/needed. Goal: Control of acute pain  Description: Control of acute pain  8/11/2020 2143 by Roxane Thomson RN  Outcome: Completed  8/11/2020 1128 by Lela Quarles RN  Outcome: Ongoing  Goal: Control of chronic pain  Description: Control of chronic pain  8/11/2020 2143 by Roxane Thomson RN  Outcome: Completed  8/11/2020 1128 by Lela Quarles RN  Outcome: Ongoing     Problem: Nutrition  Goal: Optimal nutrition therapy  8/11/2020 2143 by Roxane Thomson RN  Outcome: Completed  8/11/2020 1128 by Lela Quarles RN  Outcome: Ongoing     Problem: Skin Integrity:  Goal: Will show no infection signs and symptoms  Description: Will show no infection signs and symptoms  8/11/2020 2143 by Roxane Thomson RN  Outcome: Completed  8/11/2020 1128 by Lela Quarles RN  Outcome: Ongoing  Note:   Pt will continue daily activities as tolerated and alert RN to any pain and skin changes. RN will continue to assess skin condition, note changes, and take preventative measures against skin breakdown such as movement, foam/silicone dressings on bony prominences, and making sure pt has adequate nutrition.        Goal: Absence of new skin breakdown  Description: Absence of new skin breakdown  8/11/2020 2143 by Roxane Thomson RN  Outcome: Completed  8/11/2020 1128 by Lela Quarles RN  Outcome: Ongoing     Problem: Falls - Risk of:  Goal: Will remain free from falls  Description: Will remain free from falls  8/11/2020 2143 by Roxane Thomson RN  Outcome: Completed  8/11/2020 1128 by Gisela Andino RN  Outcome: Ongoing  Note: Fall risk preventions in place. Bed in lowest position, call light within reach, non-skid socks on when ambulating, bed alarm on, bed wheels locked. Pt. Educated and agreeable on usage of call light before ambulation.     Goal: Absence of physical injury  Description: Absence of physical injury  8/11/2020 2143 by Angélica Montes RN  Outcome: Completed  8/11/2020 1128 by Gisela Andino RN  Outcome: Ongoing     Problem: Nutrition Deficit:  Goal: Ability to achieve adequate nutritional intake will improve  Description: Ability to achieve adequate nutritional intake will improve  8/11/2020 2143 by Angélica Montes RN  Outcome: Completed  8/11/2020 1128 by Gisela Andino RN  Outcome: Ongoing     Problem: Mental Status - Impaired:  Goal: Mental status will improve  Description: Mental status will improve  8/11/2020 2143 by Angélica Montes RN  Outcome: Completed  8/11/2020 1128 by Gisela Andino RN  Outcome: Ongoing  8/11/2020 0746 by Luis Badillo RN  Outcome: Ongoing  Note: D: Pt very agitated this morning, pushing the PCA out of the room and physically aggressive with staff  A: code violet called, given Geodon 20mg IM as ordered and posey vest, bilateral wrist and bilateral ankle restraints applied as ordered

## 2020-08-12 NOTE — BH NOTE
Purposeful Rounding    Patient Location Patient room    Patient willing to engage in conversationNo    Presentation/behavior Guarded/Suspicious, Agitated and Imulsive    Affect irritable, Constricted, Anxious, Irritable and Restless    Concerns reported: pt continuing to need redirect from entering in other patient rooms and exit seeking    PRN medications given:yes    Effectiveness of PRN medication given: yes    Environmental assessment Clear path to bathroom , Adequate lighting and No safety hazards noted    Fall prevention interventions in place: Yellow non-skid socks on

## 2020-08-12 NOTE — H&P
Hospital Medicine History & Physical      PCP: Sumaya Raman MD    Date of Admission: 8/11/2020    Date of Service: Pt seen/examined on 8/12/2020     Chief Complaint:  Psychosis    History Of Present Illness:    Patient refused to participate in HPI. The patient is a 50 y.o. male with hypertension, DM, and gastroparesis who presented to [facility] ED after being found unresponsive at home. He had a glucose of 22. there was question whether or not he took an intentional vs unintentional dose of insulin. He was admitted to [facility] 7/15-8/11. There was some concern for PRES. He was evaluated by neurology. Patient was medically cleared for admission to [facility]. This note serves as an admission medical H&P.    PCP: Sumaya Raman MD  Tobacco use: current  ETOH use: denies  Illicit drug use: Cannabis      Past Medical History:        Diagnosis Date    Diabetes mellitus ([address]     Gastroparesis     Hypertension        Past Surgical History:        Procedure Laterality Date    BONY PELVIS SURGERY      FOOT AMPUTATION Right 5/13/2020    EMERGENCY; RIGHT INCISION AND DEBRIDEMENT; HALUX AMPUTATION performed by Segundo Quinn DPM at [address] 2006    pins and rods- plate    UPPER GASTROINTESTINAL ENDOSCOPY N/A 12/2/2019    EGD BIOPSY performed by Elliott Arthur MD at [facility]       Medications Prior to Admission:    Prior to Admission medications    Medication Sig Start Date End Date Taking?  Authorizing Provider   divalproex (DEPAKOTE SPRINKLE) 125 MG capsule Take 4 capsules by mouth 2 times daily 8/11/20   ESTEPHANIA De La Rosa CNP   insulin glargine (LANTUS) 100 UNIT/ML injection vial Inject 10 Units into the skin nightly 8/11/20   ESTEPHANIA De La Rosa CNP   insulin lispro (HUMALOG) 100 UNIT/ML injection vial Inject 2 Units into the skin as needed (as needed with significant carb intake or > 50% of meals) 8/11/20   ESTEPHANIA De La Rosa CNP   lactobacillus (CULTURELLE) capsule Take 1 capsule by mouth 2 times daily (with meals) 8/11/20   ESTEPHANIA Fuentes CNP   polyethylene glycol (GLYCOLAX) 17 g packet Take 17 g by mouth daily as needed for Constipation 8/11/20 9/10/20  ESTEPHANIA Fuentes CNP   zolpidem (AMBIEN) 5 MG tablet Take 1 tablet by mouth 2 times daily for 14 days. 8/11/20 8/25/20  ESTEPHANIA Fuentes CNP   OLANZapine zydis (ZYPREXA) 10 MG disintegrating tablet Take 1 tablet by mouth nightly 8/11/20   ESTEPHANIA Fuentes CNP   insulin lispro (HUMALOG) 100 UNIT/ML injection vial Inject 0-12 Units into the skin 3 times daily (with meals) 8/11/20   ESTEPHANIA Fuentes CNP   insulin lispro (HUMALOG) 100 UNIT/ML injection vial Inject 0-6 Units into the skin nightly 8/11/20   ESTEPHANIA Fuentes CNP   promethazine (PHENERGAN) 12.5 MG tablet Take 12.5 mg by mouth every 6 hours as needed for Nausea    Historical Provider, MD   atorvastatin (LIPITOR) 40 MG tablet Take 1 tablet by mouth daily 6/16/20 7/17/20  Eliezer Oliver MD   blood glucose monitor strips Test 3 times a day & as needed for symptoms of irregular blood glucose. 6/16/20   Eliezer Oliver MD   amLODIPine (NORVASC) 2.5 MG tablet Take 1 tablet by mouth daily 6/16/20   Eliezer Oliver MD   pantoprazole (PROTONIX) 20 MG tablet Take 2 tablets by mouth daily 5/26/20   Dottie Matthew MD   aspirin 81 MG chewable tablet Take 1 tablet by mouth daily 5/15/20   Harry Del Rio MD   blood glucose monitor strips Check blood sugars 4-5 times per day 5/15/20   Harry Del Rio MD   Continuous Blood Gluc Sensor (FREESTYLE HINA 14 DAY SENSOR) [facility] Use for personal CGMS. Replace every 2 weeks.  5/15/20   Harry Dle Rio MD   glucose monitoring kit (FREESTYLE) monitoring kit 1 kit by Does not apply route daily 5/15/20   Harry Del Rio MD   Lancets MISC 1 each by Does not apply route daily Check blood sugars 4-5 times per day 5/15/20   Harry Del Rio MD   Insulin Syringe-Needle U-100 (ULTRA-THIN II INS SYR SHORT) 31G X 5/16\" 1 ML MISC 1 each by Does not apply route 3 times daily 5/15/20   Clark Hare MD       Allergies:  Ketorolac; Morphine; Morphine and related; Tramadol; Lisinopril; Haloperidol lactate; Toradol [ketorolac tromethamine]; and Vicodin [hydrocodone-acetaminophen]    Social History:     TOBACCO:   reports that he has been smoking cigars. He started smoking about 11 years ago. He has a 22.00 pack-year smoking history. He has never used smokeless tobacco.  ETOH:   reports previous alcohol use. Family History:   Positive as follows:        Problem Relation Age of Onset    Diabetes Mother     Heart Disease Mother     High Blood Pressure Mother     Asthma Brother     Other Father         kidney disease    No Known Problems Maternal Grandmother     No Known Problems Maternal Grandfather     No Known Problems Paternal Grandmother     No Known Problems Paternal Grandfather        REVIEW OF SYSTEMS:       Patient refused    PHYSICAL EXAM: Deferred.  Patient is agitated, refused    /88   Pulse 94   Temp 97.9 °F (36.6 °C) (Infrared)   Resp 18   Ht 6' 2\" (1.88 m)   Wt 127 lb (57.6 kg)   BMI 16.31 kg/m²       CBC:   Recent Labs     08/10/20  1011 08/11/20  0744   WBC 6.7 5.9   HGB 12.3* 12.5*   HCT 37.5* 38.5*   MCV 83.1 83.5    228     BMP:   Recent Labs     08/10/20  1011 08/11/20  0744    137   K 3.5 4.1    100   CO2 28 25   PHOS 4.1 3.1   BUN 5* 11   CREATININE 0.7* 0.9       U/A:    Lab Results   Component Value Date    COLORU YELLOW 08/06/2020    WBCUA 49 08/01/2020    RBCUA 1 08/01/2020    MUCUS 2+ 08/01/2020    BACTERIA 1+ 12/15/2010    CLARITYU Clear 08/06/2020    SPECGRAV 1.028 08/06/2020    LEUKOCYTESUR Negative 08/06/2020    BLOODU Negative 08/06/2020    GLUCOSEU >=1000 08/06/2020    GLUCOSEU >=1000 12/15/2010       ABG    Lab Results   Component Value Date    UCE1WLH 25.9 07/16/2020    BEART 2.5 07/16/2020 C7BMQDQI 98.0 07/16/2020    PHART 7.476 07/16/2020    THGBART 13.9 04/10/2010    DHT8QOR 35.2 07/16/2020    PO2ART 118.0 07/16/2020    VCN9VLM 27.0 07/16/2020       CULTURES  COVID: not detected    RADIOLOGY  CT head 8/1  No acute intracranial abnormality.         Mild cerebral atrophy. MRI Brain 7/16  Subtle bilateral frontoparietal cortical and subcortical signal abnormality    suggesting mild posterior reversal encephalopathy syndrome.  No acute infarct    or hemorrhage. Pertinent previous results reviewed   EEG  This EEG is abnormal due to the presence of mild  background slowing and disorganization. ASSESSMENT/PLAN:  Hypertension  - BP stable initially. Refused BP check this morning  - continue Amlodipine. DM type 1  - Blood glucose varies  - monitor closely  - continue Lantus 10 units nightly, medium dose correction insulin coverage. Tobacco Dependence  - Recommended cessation    Cannabis use  - recommended cessation    Psychosis  - management per psychiatry. Pt was admitted to [facility] 7/15-8/1. He was found unresponsive, hypoglycemia. Question of intentional vs unintentional insulin overdose. He was evaluated by neurology for altered mental status/catatonia. EEG not consistent with seizure. PRES was considered. MRI did show a subtle frontoparietal cortical/subcortical signal abnormality. There was some volume loss favoring encephalomalacia and gliosis.       Grace STAPLES  8/12/2020 11:02 AM

## 2020-08-12 NOTE — PROGRESS NOTES
Patient was offered and eventually took zyprex 10 mg po for agitation. Will monitor for effectiveness.

## 2020-08-12 NOTE — BH NOTE
Purposeful Rounding    Patient Location Day room    Patient willing to engage in conversationNo    Presentation/behavior Guarded/Suspicious and Disorganized    Affect irritable, Constricted and Restless    Concerns reported: wondering and pacing unit exit seeking    PRN medications given:      Effectiveness of PRN medication given:    Environmental assessment Clear path to bathroom , Adequate lighting and No safety hazards noted    Fall prevention interventions in place: Yellow non-skid socks on
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
## 2020-08-12 NOTE — PROGRESS NOTES
Received a call from patients mother:Korina Gilman, she said that she has power of  for her son. She gave information that he son had been \"doing good\" before this happened. She relates that his blood sugar dropped and that the patient was on life support before he was removed from life support. Mother denies any mental health history for son. She said that she is unhappy that he is in the \"Psych parkinson\". She also related that he has been  for seven years from his wife. When asked to bring a copy of Power of  into the hospital, patient's mother replied that she only has verbal power of . Explained that I would leave her number for social work so they can call if any needs. Isaias Peralta number [phone]. Per report from [facility], patient has intricate family member issues as he is  and is currently living with another woman. Was informed by [facility] that  would call the [facility] to update on patient's family history on Wednesday.
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
## 2020-08-12 NOTE — PROGRESS NOTES
Patient is noted to be resting quietly in bed, occasionally noted to move legs and reposition himself. Medication noted to be effective.

## 2020-08-12 NOTE — BH NOTE
Patient is up out of bed walked out of room and was pacing small side of unit. Patient attempted to get into nursing station doors was unable to redirect patient then went into another patients room staff attemtpted to redirect patient that was not his room patient stood up and walked out of room and attempted to open exit doors and nursing station door again. When ask if patient knew where he was patient answered with word \"NO\". Patient was informed he was at [address]. Patient avoids eye contact and walked back to his room and go into his bed and covered up.

## 2020-08-12 NOTE — BH NOTE
Purposeful Rounding    Patient Location Patient room    Patient willing to engage in conversationYes    Presentation/behavior: pt is resting in bed at this time    Affect : resting    Concerns reported:     PRN medications given: yes Ativan     Effectiveness of PRN medication given:yes    Environmental assessment Clear path to bathroom , Adequate lighting and No safety hazards noted    Fall prevention interventions in place: Yellow non-skid socks on

## 2020-08-13 LAB
GLUCOSE BLD-MCNC: 103 MG/DL (ref 70–99)
GLUCOSE BLD-MCNC: 140 MG/DL (ref 70–99)
GLUCOSE BLD-MCNC: 184 MG/DL (ref 70–99)
GLUCOSE BLD-MCNC: 194 MG/DL (ref 70–99)
GLUCOSE BLD-MCNC: 219 MG/DL (ref 70–99)
GLUCOSE BLD-MCNC: 320 MG/DL (ref 70–99)
GLUCOSE BLD-MCNC: 454 MG/DL (ref 70–99)
GLUCOSE BLD-MCNC: 70 MG/DL (ref 70–99)
GLUCOSE BLD-MCNC: 93 MG/DL (ref 70–99)
PERFORMED ON: ABNORMAL
PERFORMED ON: NORMAL
PERFORMED ON: NORMAL

## 2020-08-13 PROCEDURE — 6370000000 HC RX 637 (ALT 250 FOR IP): Performed by: PSYCHIATRY & NEUROLOGY

## 2020-08-13 PROCEDURE — 1240000000 HC EMOTIONAL WELLNESS R&B

## 2020-08-13 PROCEDURE — 99233 SBSQ HOSP IP/OBS HIGH 50: CPT | Performed by: PSYCHIATRY & NEUROLOGY

## 2020-08-13 PROCEDURE — 99232 SBSQ HOSP IP/OBS MODERATE 35: CPT | Performed by: PHYSICIAN ASSISTANT

## 2020-08-13 PROCEDURE — 6370000000 HC RX 637 (ALT 250 FOR IP): Performed by: NURSE PRACTITIONER

## 2020-08-13 RX ORDER — LORAZEPAM 1 MG/1
1 TABLET ORAL 4 TIMES DAILY
Status: DISCONTINUED | OUTPATIENT
Start: 2020-08-13 | End: 2020-08-16

## 2020-08-13 RX ADMIN — LORAZEPAM 1 MG: 1 TABLET ORAL at 12:33

## 2020-08-13 RX ADMIN — LORAZEPAM 1 MG: 1 TABLET ORAL at 17:41

## 2020-08-13 RX ADMIN — QUETIAPINE FUMARATE 100 MG: 100 TABLET ORAL at 20:49

## 2020-08-13 RX ADMIN — INSULIN GLARGINE 10 UNITS: 100 INJECTION, SOLUTION SUBCUTANEOUS at 20:39

## 2020-08-13 RX ADMIN — AMLODIPINE BESYLATE 2.5 MG: 2.5 TABLET ORAL at 08:54

## 2020-08-13 RX ADMIN — PANTOPRAZOLE SODIUM 40 MG: 40 TABLET, DELAYED RELEASE ORAL at 08:54

## 2020-08-13 RX ADMIN — LORAZEPAM 1 MG: 1 TABLET ORAL at 23:11

## 2020-08-13 RX ADMIN — ASPIRIN 81 MG 81 MG: 81 TABLET ORAL at 08:54

## 2020-08-13 RX ADMIN — ATORVASTATIN CALCIUM 40 MG: 40 TABLET, FILM COATED ORAL at 08:54

## 2020-08-13 NOTE — BH NOTE
Purposeful Rounding    Patient Location Day room    Patient willing to engage in conversationYes    Presentation/behavior Combative    Affect Flat/blunted    Concerns reported:     PRN medications given:    Effectiveness of PRN medication given:    Environmental assessment Clear path to bathroom , Adequate lighting and No safety hazards noted    Fall prevention interventions in place: Yellow non-skid socks on

## 2020-08-13 NOTE — BH NOTE
Purposeful Rounding    Patient Location Patient room    Patient willing to engage in conversationYes    Presentation/behavior Disorganized    Affect Constricted    Concerns reported:     PRN medications given:    Effectiveness of PRN medication given:    Environmental assessment Clear path to bathroom , Adequate lighting and No safety hazards noted    Fall prevention interventions in place: Yellow non-skid socks on

## 2020-08-13 NOTE — PROGRESS NOTES
Pt having difficulty with high blood  sugars on day shift. Sania Case LPN  and report from the day. She gave this nurse the Austin Edwards blood sugar which was 486 mg/dl and asked if this nurse would perfect serve the hospitalist for further instructions. 2009 Rola served Dr. Deidra Archer. Gave him the information from eating. Blood sugars, and insulin given on day shift. He got back and said he was changing pt over to high dose sliding scale. Also since the blood sugar was around dinner time at 485, Dr. Citlaly Caraballo wanted 18 units of Humalog given. 2040 pt was sitting in a chair in the day room, wrapped up with an elastic fitted sheet. He stood up and took probably 2 steps and fell. This nurse and Socorro Gibbons were close to pt behind the Team Station working. Could not tell if the pt had the sheet over his head at the time of the fall. Pt helped up and did not appear to be hurt. Moving all extremities. Pt is confused and looses focus very easily. Pupils appear equal but there was a possibility that the pt hit his head. /62. Pulse 95. Morteza Smith call Dr. Vinay Ortega, the doctor on call for [facility] tonight. CT scan ordered and pt made a 1:1 for safety. [facility] put on yellow socks. Pt walking without any limping or apparent discomfort. Approx 0915 Pt had Humalog 18 units Subcut into left upper rear arm. Also given Ativan 2 mg po to help him get through the scan since pt is restless. Pt went down for CT scan at approx 2130 accompanied by [facility] from [facility] and [facility] and returned shortly thereafter. Pt was sleepy upon return to the unit and laid down in his bed. Pt slept off and on for a while. Continues as 1:1.   2320 Pt's blood sugar was 102. Given 4 oz of juice and half a Nutrigrain bar. Pt took without difficulty. Tried again to do neuro check. Pt would barely squeeze this nurses hands, but was equal. Pupils small and equal.  Moving all extremities.   No change in pt's behavior, movement, or verbalizations since prior to fall. Pt remains confused.

## 2020-08-13 NOTE — PROGRESS NOTES
Progress Note    Admit Date:  8/11/2020    Subjective:  Mr. Cesar Campos is seen laying in bed. He denies any complaints. He is a poor historian. He was transferred from [facility] on the evening of 8/11 and it appears his nightly lantus dose was missed and then he did not receive insulin until 1700 on 8/12- subsequently his BG was >600. He was treated with multiple doses of Humalog (see MAR). This morning blood sugars much better controlled in the 100s. Nurse tells me that he has been eating his meals. Objective:   /68   Pulse 72   Temp 97.5 °F (36.4 °C) (Temporal)   Resp 16   Ht 6' 2\" (1.88 m)   Wt 127 lb (57.6 kg)   SpO2 99%   BMI 16.31 kg/m²      No intake or output data in the 24 hours ending 08/13/20 1421    Physical Exam:    Gen: Frail appearing -American male laying in bed. No distress. Alert. Eyes: PERRL. No sclera icterus. No conjunctival injection. ENT: No discharge. Neck: No JVD. Trachea midline. Resp: No accessory muscle use. No crackles. No wheezes. No rhonchi. Skin: Warm and dry. No nodule on exposed extremities. No rash on exposed extremities. M/S: No cyanosis. No joint deformity. No clubbing. Neuro: Awake.  Grossly nonfocal    Psych: Per psychiatry team       Scheduled Meds:   LORazepam  1 mg Oral 4x Daily    insulin lispro  2 Units Subcutaneous TID WC    QUEtiapine  100 mg Oral Nightly    amLODIPine  2.5 mg Oral Daily    aspirin  81 mg Oral Daily    atorvastatin  40 mg Oral Daily    insulin glargine  10 Units Subcutaneous Nightly    pantoprazole  40 mg Oral Daily       Continuous Infusions:   dextrose      dextrose         PRN Meds:  OLANZapine zydis **OR** OLANZapine, LORazepam **OR** LORazepam, glucose, dextrose, glucagon (rDNA), dextrose, glucose, dextrose, glucagon (rDNA), dextrose, acetaminophen, sterile water, benztropine mesylate, magnesium hydroxide, aluminum & magnesium hydroxide-simethicone, hydrOXYzine      Data:  CBC:   Recent Labs

## 2020-08-13 NOTE — PROGRESS NOTES
8-13-20 0300  Pt awakened for FSBS. Skin w/d. Blood sugar 70 at this time. Pt does awaken easily but doesn't want to be awakened. Pt fought not having blood taken and then when FSBS was low fought having to sit up to eat/drink. Pt given 8 oz of orange juice and 1 3/4 Nutrigrain bars which were used since pt was sleepy and the bars are soft and easy to swallow. 0350 FSBS repeated with outcome of 194 mg/dl. Pt did fight some at not want to be bothered but did return to sleep.

## 2020-08-13 NOTE — PROGRESS NOTES
AttendingDepartment of Psychiatry   Progress Note  Chief Complaint: Alejandro Meraz continues to struggle with maintaining normal glucose levels. Fluctuated last evening between 600 and 72. He is still pacing but has more purpose to his pacing than yesterday. He is making better eye contact. Appears to respond better to Ativan than antipsychotics. Able to not be incontinent with urine but defecated on bathroom floor. DC 1:1 today as it was placed due to a fall last night. Patient's chart was reviewed and collaborated with  about the treatment plan. SUBJECTIVE:    Patient is feeling unchanged. Suicidal ideation:  [place]. Patient [place] medication side effects. ROS: Patient has new complaints: no  Sleeping adequately:  Yes   Appetite adequate: Yes  Attending groups: No:   Visitors:No    OBJECTIVE    Physical  VITALS:  /68   Pulse 72   Temp 97.5 °F (36.4 °C) (Temporal)   Resp 16   Ht 6' 2\" (1.88 m)   Wt 127 lb (57.6 kg)   SpO2 99%   BMI 16.31 kg/m²     Mental Status Examination:  Patients appearance was ill-appearing. Thoughts are Paucity of Ideas and Illogical. Homicidal ideations none. No abnormal movements, tics or mannerisms. Memory impaired Aims 0. Concentration Poor. Alert and oriented X 4. Insight and Judgement impaired insight. Patient was uncooperative.  Patient gait abnormal. Mood constricted, affect flat affect Hallucinations [place], suicidal ideations no specific plan to harm self Speech delayed  Data  Labs:   Admission on 08/11/2020   Component Date Value Ref Range Status    POC Glucose 08/12/2020 113* 70 - 99 mg/dl Final    Performed on 08/12/2020 ACCU-CHEK   Final    POC Glucose 08/12/2020 >600* 70 - 99 mg/dl Final    Performed on 08/12/2020 ACCU-CHEK   Final    POC Glucose 08/12/2020 >600* 70 - 99 mg/dl Final    Performed on 08/12/2020 ACCU-CHEK   Final    POC Glucose 08/12/2020 >600* 70 - 99 mg/dl Final    Performed on 08/12/2020 ACCU-CHEK   Final    POC Glucose 08/12/2020 >600* 70 - 99 mg/dl Final    Performed on 08/12/2020 ACCU-CHEK   Final    POC Glucose 08/12/2020 593* 70 - 99 mg/dl Final    Performed on 08/12/2020 ACCU-CHEK   Final    POC Glucose 08/12/2020 485* 70 - 99 mg/dl Final    Performed on 08/12/2020 ACCU-CHEK   Final    POC Glucose 08/12/2020 102* 70 - 99 mg/dl Final    Performed on 08/12/2020 ACCU-CHEK   Final    POC Glucose 08/13/2020 70  70 - 99 mg/dl Final    Performed on 08/13/2020 ACCU-CHEK   Final    POC Glucose 08/13/2020 194* 70 - 99 mg/dl Final    Performed on 08/13/2020 ACCU-CHEK   Final    POC Glucose 08/13/2020 140* 70 - 99 mg/dl Final    Performed on 08/13/2020 ACCU-CHEK   Final    POC Glucose 08/12/2020 454* 70 - 99 mg/dl Final    Performed on 08/12/2020 ACCU-CHEK   Final    POC Glucose 08/13/2020 93  70 - 99 mg/dl Final    Performed on 08/13/2020 ACCU-CHEK   Final    POC Glucose 08/13/2020 103* 70 - 99 mg/dl Final    Performed on 08/13/2020 ACCU-CHEK   Final    POC Glucose 08/13/2020 219* 70 - 99 mg/dl Final    Performed on 08/13/2020 ACCU-CHEK   Final            Medications  Current Facility-Administered Medications: LORazepam (ATIVAN) tablet 1 mg, 1 mg, Oral, 4x Daily  insulin lispro (HUMALOG) injection vial 2 Units, 2 Units, Subcutaneous, TID WC  OLANZapine zydis (ZYPREXA) disintegrating tablet 10 mg, 10 mg, Oral, Q6H PRN **OR** OLANZapine (ZYPREXA) injection 10 mg, 10 mg, Intramuscular, Q6H PRN  LORazepam (ATIVAN) tablet 2 mg, 2 mg, Oral, Q6H PRN **OR** LORazepam (ATIVAN) injection 2 mg, 2 mg, Intramuscular, Q6H PRN  QUEtiapine (SEROQUEL) tablet 100 mg, 100 mg, Oral, Nightly  amLODIPine (NORVASC) tablet 2.5 mg, 2.5 mg, Oral, Daily  aspirin chewable tablet 81 mg, 81 mg, Oral, Daily  atorvastatin (LIPITOR) tablet 40 mg, 40 mg, Oral, Daily  insulin glargine (LANTUS) injection vial 10 Units, 10 Units, Subcutaneous, Nightly  pantoprazole (PROTONIX) tablet 40 mg, 40 mg, Oral, Daily  glucose (GLUTOSE) 40 % oral gel 15 g, 15 g, Oral, PRN  dextrose 50 % IV solution, 12.5 g, Intravenous, PRN  glucagon (rDNA) injection 1 mg, 1 mg, Intramuscular, PRN  dextrose 5 % solution, 100 mL/hr, Intravenous, PRN  glucose (GLUTOSE) 40 % oral gel 15 g, 15 g, Oral, PRN  dextrose 50 % IV solution, 12.5 g, Intravenous, PRN  glucagon (rDNA) injection 1 mg, 1 mg, Intramuscular, PRN  dextrose 5 % solution, 100 mL/hr, Intravenous, PRN  acetaminophen (TYLENOL) tablet 650 mg, 650 mg, Oral, Q4H PRN  sterile water injection 2.1 mL, 2.1 mL, Intramuscular, Q4H PRN  benztropine mesylate (COGENTIN) injection 2 mg, 2 mg, Intramuscular, BID PRN  magnesium hydroxide (MILK OF MAGNESIA) 400 MG/5ML suspension 30 mL, 30 mL, Oral, Daily PRN  aluminum & magnesium hydroxide-simethicone (MAALOX) 200-200-20 MG/5ML suspension 30 mL, 30 mL, Oral, Q6H PRN  hydrOXYzine (VISTARIL) capsule 50 mg, 50 mg, Oral, TID PRN    ASSESSMENT AND PLAN    Principal Problem:    Psychosis ([facility] [address]  Active Problems:    Hypertension, essential    Uncontrolled type 1 diabetes mellitus with diabetic peripheral neuropathy (HCC)    Altered mental status    Intentional drug overdose ([facility] [address]  Resolved Problems:    * No resolved hospital problems. *       1. Patient s symptoms are improving. Added Ativan 1 mg QID   2. Probable discharge is next week  3. Discharge planning is incomplete  4. Suicidal ideation is [place] due to confusion  5. Total time with patient was 40 minutes and more than 50 % of that time was spent counseling the patient on their symptoms, treatment and expected goals.

## 2020-08-13 NOTE — BH NOTE
[facility]  Initial Interdisciplinary Treatment Plan NOTE    Review Date & Time: 8/12/2020 0915    Patient was not in treatment team    Admission Type:   Admission Type: Involuntary    Reason for admission:  Reason for Admission: patient overdosed on insulin an depakote      Estimated Length of Stay Update:  5-7 days  Estimated Discharge Date Update: 8/20/20    PATIENT STRENGTHS:  Patient Strengths Strengths: No significant Physical Illness, Positive Support  Patient Strengths and Limitations:Limitations: Perceives need for assistance with self-care, External locus of control(per chart review)  Addictive Behavior:Addictive Behavior  In the past 3 months, have you felt or has someone told you that you have a problem with:  : None  Do you have a history of Chemical Use?: No  Do you have a history of Alcohol Use?: Yes  Do you have a history of Street Drug Abuse?: Yes  Histroy of Prescripton Drug Abuse?: Yes  Medical Problems:  Past Medical History:   Diagnosis Date    Diabetes mellitus ([facility] [address]     Gastroparesis     Hypertension        EDUCATION:   Learner Progress Toward Treatment Goals: Reviewed goals and plan of care    Method: Individual    Outcome: Needs reinforcement and No evidence of Learning    PATIENT GOALS: Patient did not attend goals group r/t mental status    PLAN/TREATMENT RECOMMENDATIONS UPDATE: Patient will take medication as prescribed, eat 75% of meals, attend groups, participate in milieu activities, participate in treatment team and care planning for discharge and follow up.     GOALS UPDATE:   Time frame for Short-Term Goals: 1-2 days     Rhonda Abbasi RN

## 2020-08-13 NOTE — BH NOTE
Purposeful Rounding    Patient Location Patient room    Patient willing to engage in conversationNo    Presentation/behavior Disorganized    Affect Flat/blunted    Concerns reported:     PRN medications given:    Effectiveness of PRN medication given:    Environmental assessment Clear path to bathroom , Adequate lighting and No safety hazards noted    Fall prevention interventions in place: Yellow non-skid socks on

## 2020-08-13 NOTE — PLAN OF CARE
Problem: Mood - Altered:  Goal: Mood stable  Description: Mood stable  8/13/2020 1456 by Rashaad Griffin LPN  Outcome: Ongoing     Problem: Metabolic:  Goal: Ability to maintain appropriate glucose levels will improve  Description: Ability to maintain appropriate glucose levels will improve  Outcome: Ongoing     Problem: Nutritional:  Goal: Maintenance of adequate nutrition will improve  Description: Maintenance of adequate nutrition will improve  Outcome: Ongoing   Sushma Combs has been resting in his bed most of the shift. Patient has been somewhat more redirectable with ADLS. Patient is alert and oriented to self only. Patient has been cooperative with blood sugar finger sticks and medications. Patients gait as been steady and patient has not had any incontinence patient has been up to bathroom independently. Will continue to monitor for safety.

## 2020-08-13 NOTE — H&P
[address]                 [address] [address]                              HISTORY AND PHYSICAL    PATIENT NAME: Nicolasa Goodell                      :        1975  MED REC NO:   [id number]                          ROOM:       [id number]  ACCOUNT NO:   [de-identified]                           ADMIT DATE: 2020  PROVIDER:     Betzy Hill. Jahaira Armstrong MD    CHIEF COMPLAINT:  Altered mental status. HISTORY OF PRESENT ILLNESS:  The patient is a 66-year-old Counts include 234 beds at the [facility]  American male, who presented on 07/15/2020 to [facility] after an  overdose with Depakote and insulin. He was found unresponsive by his  girlfriend and per EMS, he had a blood sugar of 22 on arrival and was  given IM glucagon. He was then intubated and was hospitalized for  approximately three weeks and then Psychiatry was consulted, Dr. Sissy Ling  and Dr. Enrique Leavitt during his stay at [facility].  At that point, they felt  like he may have suffered some type of an anoxic injury to his brain and  there were also comments that he may have had a catatonic delirium. He  recovered minimally. He was very agitated, pulling out access over the  weekend prior to coming here. He was unable to hold a coherent  conversation. He was also seen by Neurology at [facility] and they  reviewed MRI brain images, which showed increased signal at multiple  regions which is nonspecific and maybe seen in hypoglycemic  encephalopathy and in postictal state. It is not clear when the patient  was last at baseline, although there is collateral from his significant  other, with Neortiz Dominique at [address] [facility], that stated that he was  functional prior to this event and was very engaged, good sense of humor  and did not show any of these deficits that he now currently exhibits. He has had poorly controlled diabetes at baseline and apparently had  injected himself with a significant amount of long-acting insulin.    Family reports history of seizures, although no other details. He was  supposed to see the [facility] Neurology for evaluation of possible seizures;  however, he did not show up. EEG from 03/2020 was unremarkable. When I met with the patient today, he was in bed. He eventually pulled  the covers off and got up and appeared to be exhibiting signs of  akathisia and he was very restless and was pacing in the unit and was  difficult to redirect. He would answer no to most questions and did not  appear to comprehend most questions and was disoriented to place and  time. He was given Ativan as well as Zyprexa during first night and  morning. He appeared to have minimal effect overall. He is not able to  discuss any self-harm actions that led to the hospitalization nor was he  able to carry out any type of conversation and saying no at times. He  made minimal eye contact and would look through you as he walked away to  the Nurses' station and other parts of the unit. PRIOR PSYCHIATRIC TREATMENT:  Unknown. MEDICAL HISTORY:  Significant for diabetes, gastroparesis, hypertension. CURRENT MEDICATIONS:  Reviewed. He is on Seroquel 100 mg at night as  well as he is on insulin. He is also on Neurontin 300 mg three times a  day; Cozaar 50 mg daily; Pepcid 20 mg daily; Depakote 500 mg b.i.d.,  Depakote level in the ED on admission was less than 2.8; Norvasc 2.5 mg  daily; Protonix 20 mg two tablets daily; aspirin 81 mg daily. ALLERGIES TO MEDICATIONS:  MORPHINE, TRAMADOL, LISINOPRIL, HALDOL,  TORADOL, VICODIN. FAMILY PSYCHIATRIC HISTORY:  Unknown. DRUG AND ALCOHOL USE:  He smokes two packs a day for 11 years. It is  not clear as to how much alcohol and illicit drugs he uses. He does  smoke marijuana approximately three times a week according to report. SOCIAL HISTORY:  Apparently, he is  and has more than 10  children. Apparently, he has a fiancee as well.     REVIEW OF SYSTEMS:  Pertinent positive on the HPI, otherwise negative. PHYSICAL EXAMINATION:  [place] hospitalist at [facility]), on 08/11/2020. VITAL SIGNS:  Reviewed. Diastolic blood pressure 558/25 on 08/10/2020,  pulse 78, respirations 16, temperature 97. 6. MENTAL STATUS EXAMINATION:  See history of present illness. He was  uncooperative, but did not appear to be able to comprehend questions  being asked and did not appear to be purposefully defiant and  oppositional, although he was very fidgety, pacing. Unable to assess  for suicidality, homicidality, or auditory or visual hallucinations. Thoughts were illogical.  Speech was minimal and only would say \"no\". Unable to assess fund of knowledge, memory. Attention span and  concentration were poor. Did not answer question regarding mood. Affect appeared to be somewhat constricted. Showed excessive body  movement with minimal purpose. 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 AXIS II:  Deferred. AXIS III:  1. Recent overdose. 2.  Diabetes. 3.  Gastroparesis. AXIS IV:  Severe. AXIS V:  20. PLAN:  1. We will continue to monitor for his altered mental status. I am  hopeful that with antipsychotic medications and benzodiazepine use that  the patient will begin to settle and hopefully begin to communicate and  show coherent thought. 2.  My fear is that this may be his baseline going forward due to the  overdose and injury to his brain. 3.  We will refer to nursing home placement. May also refer to acute  rehab unit after OT/PT evaluations. 5.  Continue to obtain collateral from family members. 6.  Estimated length of stay, five to seven days. I spent approximately 70 minutes on this evaluation with more than 50%  of the time discussing patient care and treatment options.         Margaret Segovia MD    D: 08/12/2020 22:55:57       T: 08/13/2020 1:38:56     [email or web address]  Job#: [id number]     Doc#: [id number]

## 2020-08-13 NOTE — PROGRESS NOTES
Occupational Therapy      Attempted OT eval.  Hold OT eval today per nsg, secondary to pt not appropriate at this time.     Timur Taylor, OTR/L  #[id number]

## 2020-08-13 NOTE — BH NOTE
Purposeful Rounding    Patient Location Patient room    Patient willing to engage in conversationNo    Presentation/behavior Agitated    Affect irritable    Concerns reported:     PRN medications given:    Effectiveness of PRN medication given:    Environmental assessment Clear path to bathroom , Adequate lighting and No safety hazards noted    Fall prevention interventions in place: Yellow non-skid socks on

## 2020-08-13 NOTE — BH NOTE
Patient 1:1 discontinued at this time per Dr. Rosalba Kowalski. Patient sleeping in bed at this time.

## 2020-08-13 NOTE — BH NOTE
Purposeful Rounding    Patient Location Patient room    Patient willing to engage in conversationNo    Presentation/behavior pt resting in bed    Affect : pt resting    Concerns reported:     PRN medications given:    Effectiveness of PRN medication given:    Environmental assessment Clear path to bathroom , Adequate lighting and No safety hazards noted    Fall prevention interventions in place: Yellow non-skid socks on

## 2020-08-14 LAB
GLUCOSE BLD-MCNC: 108 MG/DL (ref 70–99)
GLUCOSE BLD-MCNC: 138 MG/DL (ref 70–99)
GLUCOSE BLD-MCNC: 168 MG/DL (ref 70–99)
GLUCOSE BLD-MCNC: 184 MG/DL (ref 70–99)
GLUCOSE BLD-MCNC: 412 MG/DL (ref 70–99)
PERFORMED ON: ABNORMAL

## 2020-08-14 PROCEDURE — 99233 SBSQ HOSP IP/OBS HIGH 50: CPT | Performed by: PSYCHIATRY & NEUROLOGY

## 2020-08-14 PROCEDURE — 6360000002 HC RX W HCPCS: Performed by: PSYCHIATRY & NEUROLOGY

## 2020-08-14 PROCEDURE — 6370000000 HC RX 637 (ALT 250 FOR IP): Performed by: NURSE PRACTITIONER

## 2020-08-14 PROCEDURE — 2500000003 HC RX 250 WO HCPCS: Performed by: PSYCHIATRY & NEUROLOGY

## 2020-08-14 PROCEDURE — 1240000000 HC EMOTIONAL WELLNESS R&B

## 2020-08-14 PROCEDURE — 2580000003 HC RX 258: Performed by: NURSE PRACTITIONER

## 2020-08-14 PROCEDURE — 6370000000 HC RX 637 (ALT 250 FOR IP): Performed by: PSYCHIATRY & NEUROLOGY

## 2020-08-14 RX ORDER — DIPHENHYDRAMINE HYDROCHLORIDE 50 MG/ML
INJECTION INTRAMUSCULAR; INTRAVENOUS
Status: DISPENSED
Start: 2020-08-14 | End: 2020-08-15

## 2020-08-14 RX ORDER — OLANZAPINE 10 MG/1
5 INJECTION, POWDER, LYOPHILIZED, FOR SOLUTION INTRAMUSCULAR ONCE
Status: COMPLETED | OUTPATIENT
Start: 2020-08-14 | End: 2020-08-14

## 2020-08-14 RX ORDER — DIPHENHYDRAMINE HYDROCHLORIDE 50 MG/ML
50 INJECTION INTRAMUSCULAR; INTRAVENOUS ONCE
Status: COMPLETED | OUTPATIENT
Start: 2020-08-14 | End: 2020-08-14

## 2020-08-14 RX ADMIN — WATER 2.1 ML: 1 INJECTION INTRAMUSCULAR; INTRAVENOUS; SUBCUTANEOUS at 19:34

## 2020-08-14 RX ADMIN — LORAZEPAM 1 MG: 1 TABLET ORAL at 08:35

## 2020-08-14 RX ADMIN — DIPHENHYDRAMINE HYDROCHLORIDE 50 MG: 50 INJECTION, SOLUTION INTRAMUSCULAR; INTRAVENOUS at 19:35

## 2020-08-14 RX ADMIN — PANTOPRAZOLE SODIUM 40 MG: 40 TABLET, DELAYED RELEASE ORAL at 08:32

## 2020-08-14 RX ADMIN — LORAZEPAM 1 MG: 1 TABLET ORAL at 11:46

## 2020-08-14 RX ADMIN — LORAZEPAM 1 MG: 1 TABLET ORAL at 20:13

## 2020-08-14 RX ADMIN — OLANZAPINE 5 MG: 10 INJECTION, POWDER, FOR SOLUTION INTRAMUSCULAR at 19:34

## 2020-08-14 RX ADMIN — INSULIN GLARGINE 10 UNITS: 100 INJECTION, SOLUTION SUBCUTANEOUS at 20:26

## 2020-08-14 RX ADMIN — LORAZEPAM 2 MG: 2 INJECTION INTRAMUSCULAR; INTRAVENOUS at 22:13

## 2020-08-14 RX ADMIN — OLANZAPINE 10 MG: 10 TABLET, ORALLY DISINTEGRATING ORAL at 18:37

## 2020-08-14 RX ADMIN — ATORVASTATIN CALCIUM 40 MG: 40 TABLET, FILM COATED ORAL at 08:32

## 2020-08-14 RX ADMIN — AMLODIPINE BESYLATE 2.5 MG: 2.5 TABLET ORAL at 08:36

## 2020-08-14 RX ADMIN — ASPIRIN 81 MG 81 MG: 81 TABLET ORAL at 08:32

## 2020-08-14 RX ADMIN — LORAZEPAM 1 MG: 1 TABLET ORAL at 16:54

## 2020-08-14 RX ADMIN — QUETIAPINE FUMARATE 100 MG: 100 TABLET ORAL at 20:13

## 2020-08-14 ASSESSMENT — PAIN SCALES - GENERAL: PAINLEVEL_OUTOF10: 0

## 2020-08-14 NOTE — PROGRESS NOTES
8-13-20 pt's O2 sat was difficult to obtain and read out as 86%. Pt asymptomatic with no change in color or behavior. . Pt was asked if he would wear O2 if needed, or if he would just wear at bedtime. He answered no to both. Waited and pt was rechecked later in shift and O2 sat was 92%.

## 2020-08-14 NOTE — BH NOTE
Patient presenting with disorganized thinking and bizarre behavior. Pt approached nursing station stating, \"I need something red. I need red. Do you have anything red? \" Writer offered and gave pt red marker. Pt took cap off marker and started putting it in his mouth. This writer immediately obtained marker from pt and explained putting the marker in his mouth was not appropriate. Pt walked away from station and went back to bed.

## 2020-08-14 NOTE — BH NOTE
PRN zydis 10mg given for increased agitation. Pt pacing unit, trying all doors, intrusive of peers and trying to hug them, going into other pts' rooms. Pt repeatedly stating, \"I want to go home. \" Will continue to monitor efficacy of medication.

## 2020-08-14 NOTE — BH NOTE
Patient reported to Jama gallagher Check that he was not feeling well and got sick. This RN took emesis basin to pt. No emesis noted in pt's toilet. Will continue to monitor. Pt states he feels like his blood sugar is off. BSFS obtained and is: 108. Will continue to monitor.

## 2020-08-14 NOTE — PROGRESS NOTES
Department of Psychiatry  Attending Progress Note  Chief Complaint: psychosis/AMS  Onelia Roberts remains confused and restless. He has shown minimal improvement from admission as she is making some eye contact an dis more verbal although he is disoriented to place, address of his home and unable to give  Me the names of his ex wife and stacy'. He grabbed at his paper gown pants repeatedly, and then would place hands down his pants. Appears akathisia like. According to stacy , his presentation, post overdose, is dramatically different from prior to the OD. Tolerating the Ativan thus far and does not appear to be any more disinhibited than prior to Ativan. Patient's chart was reviewed and collaborated with  about the treatment plan. SUBJECTIVE:    Patient is feeling unchanged. Suicidal ideation:  [place]. Patient [place] medication side effects. ROS: Patient has new complaints: no  Sleeping adequately:  Yes   Appetite adequate: Yes  Attending groups: No:   Visitors:No    OBJECTIVE    Physical  VITALS:  /72   Pulse 82   Temp 97.3 °F (36.3 °C) (Temporal)   Resp 16   Ht 6' 2\" (1.88 m)   Wt 127 lb (57.6 kg)   SpO2 94%   BMI 16.31 kg/m²     Mental Status Examination:  Patients appearance was ill-appearing. Thoughts are Illogical. Homicidal ideations none. No abnormal movements, tics or mannerisms. Memory impaired Aims 0. Concentration Poor. Alert and oriented X 4. Insight and Judgement impaired insight. Patient was distracted.  Patient gait normal. Mood labile, affect labile affect Hallucinations [place], suicidal ideations no specific plan to harm self Speech increased latency of response  Data  Labs:   Admission on 08/11/2020   Component Date Value Ref Range Status    POC Glucose 08/12/2020 113* 70 - 99 mg/dl Final    Performed on 08/12/2020 ACCU-CHEK   Final    POC Glucose 08/12/2020 >600* 70 - 99 mg/dl Final    Performed on 08/12/2020 ACCU-CHEK   Final    POC Glucose 08/12/2020 >600* 70 - 99 mg/dl Final    Performed on 08/12/2020 ACCU-CHEK   Final    POC Glucose 08/12/2020 >600* 70 - 99 mg/dl Final    Performed on 08/12/2020 ACCU-CHEK   Final    POC Glucose 08/12/2020 >600* 70 - 99 mg/dl Final    Performed on 08/12/2020 ACCU-CHEK   Final    POC Glucose 08/12/2020 593* 70 - 99 mg/dl Final    Performed on 08/12/2020 ACCU-CHEK   Final    POC Glucose 08/12/2020 485* 70 - 99 mg/dl Final    Performed on 08/12/2020 ACCU-CHEK   Final    POC Glucose 08/12/2020 102* 70 - 99 mg/dl Final    Performed on 08/12/2020 ACCU-CHEK   Final    POC Glucose 08/13/2020 70  70 - 99 mg/dl Final    Performed on 08/13/2020 ACCU-CHEK   Final    POC Glucose 08/13/2020 194* 70 - 99 mg/dl Final    Performed on 08/13/2020 ACCU-CHEK   Final    POC Glucose 08/13/2020 140* 70 - 99 mg/dl Final    Performed on 08/13/2020 ACCU-CHEK   Final    POC Glucose 08/12/2020 454* 70 - 99 mg/dl Final    Performed on 08/12/2020 ACCU-CHEK   Final    POC Glucose 08/13/2020 93  70 - 99 mg/dl Final    Performed on 08/13/2020 ACCU-CHEK   Final    POC Glucose 08/13/2020 103* 70 - 99 mg/dl Final    Performed on 08/13/2020 ACCU-CHEK   Final    POC Glucose 08/13/2020 219* 70 - 99 mg/dl Final    Performed on 08/13/2020 ACCU-CHEK   Final    POC Glucose 08/13/2020 184* 70 - 99 mg/dl Final    Performed on 08/13/2020 ACCU-CHEK   Final    POC Glucose 08/13/2020 320* 70 - 99 mg/dl Final    Performed on 08/13/2020 ACCU-CHEK   Final    POC Glucose 08/14/2020 184* 70 - 99 mg/dl Final    Performed on 08/14/2020 ACCU-CHEK   Final    POC Glucose 08/14/2020 138* 70 - 99 mg/dl Final    Performed on 08/14/2020 ACCU-CHEK   Final    POC Glucose 08/14/2020 108* 70 - 99 mg/dl Final    Performed on 08/14/2020 ACCU-CHEK   Final            Medications  Current Facility-Administered Medications: LORazepam (ATIVAN) tablet 1 mg, 1 mg, Oral, 4x Daily  insulin lispro (HUMALOG) injection vial 2 Units, 2 Units, Subcutaneous, TID WC  insulin lispro (HUMALOG) injection vial 0-6 Units, 0-6 Units, Subcutaneous, TID WC  insulin lispro (HUMALOG) injection vial 0-3 Units, 0-3 Units, Subcutaneous, Nightly  OLANZapine zydis (ZYPREXA) disintegrating tablet 10 mg, 10 mg, Oral, Q6H PRN **OR** OLANZapine (ZYPREXA) injection 10 mg, 10 mg, Intramuscular, Q6H PRN  LORazepam (ATIVAN) tablet 2 mg, 2 mg, Oral, Q6H PRN **OR** LORazepam (ATIVAN) injection 2 mg, 2 mg, Intramuscular, Q6H PRN  QUEtiapine (SEROQUEL) tablet 100 mg, 100 mg, Oral, Nightly  amLODIPine (NORVASC) tablet 2.5 mg, 2.5 mg, Oral, Daily  aspirin chewable tablet 81 mg, 81 mg, Oral, Daily  atorvastatin (LIPITOR) tablet 40 mg, 40 mg, Oral, Daily  insulin glargine (LANTUS) injection vial 10 Units, 10 Units, Subcutaneous, Nightly  pantoprazole (PROTONIX) tablet 40 mg, 40 mg, Oral, Daily  glucose (GLUTOSE) 40 % oral gel 15 g, 15 g, Oral, PRN  dextrose 50 % IV solution, 12.5 g, Intravenous, PRN  glucagon (rDNA) injection 1 mg, 1 mg, Intramuscular, PRN  dextrose 5 % solution, 100 mL/hr, Intravenous, PRN  glucose (GLUTOSE) 40 % oral gel 15 g, 15 g, Oral, PRN  dextrose 50 % IV solution, 12.5 g, Intravenous, PRN  glucagon (rDNA) injection 1 mg, 1 mg, Intramuscular, PRN  dextrose 5 % solution, 100 mL/hr, Intravenous, PRN  acetaminophen (TYLENOL) tablet 650 mg, 650 mg, Oral, Q4H PRN  sterile water injection 2.1 mL, 2.1 mL, Intramuscular, Q4H PRN  benztropine mesylate (COGENTIN) injection 2 mg, 2 mg, Intramuscular, BID PRN  magnesium hydroxide (MILK OF MAGNESIA) 400 MG/5ML suspension 30 mL, 30 mL, Oral, Daily PRN  aluminum & magnesium hydroxide-simethicone (MAALOX) 200-200-20 MG/5ML suspension 30 mL, 30 mL, Oral, Q6H PRN  hydrOXYzine (VISTARIL) capsule 50 mg, 50 mg, Oral, TID PRN    ASSESSMENT AND PLAN    Principal Problem:    Psychosis (HCC)  Active Problems:    Hypertension, essential    Uncontrolled type 1 diabetes mellitus with diabetic peripheral neuropathy (HCC)    Altered mental status    Intentional drug overdose [facility])  Resolved Problems:    * No resolved hospital problems. *       1. Patient s symptoms   Are relatively improved  2. Probable discharge is next week  3. Discharge planning is incomplete  4. Suicidal ideation is none  5. Total time with patient was 40 minutes and more than 50 % of that time was spent counseling the patient on their symptoms, treatment and expected goals.

## 2020-08-15 LAB
A/G RATIO: 1.7 (ref 1.1–2.2)
ALBUMIN SERPL-MCNC: 4.7 G/DL (ref 3.4–5)
ALP BLD-CCNC: 94 U/L (ref 40–129)
ALT SERPL-CCNC: 23 U/L (ref 10–40)
ANION GAP SERPL CALCULATED.3IONS-SCNC: 15 MMOL/L (ref 3–16)
AST SERPL-CCNC: 38 U/L (ref 15–37)
BILIRUB SERPL-MCNC: <0.2 MG/DL (ref 0–1)
BUN BLDV-MCNC: 15 MG/DL (ref 7–20)
CALCIUM SERPL-MCNC: 9.7 MG/DL (ref 8.3–10.6)
CHLORIDE BLD-SCNC: 100 MMOL/L (ref 99–110)
CO2: 23 MMOL/L (ref 21–32)
CREAT SERPL-MCNC: 0.8 MG/DL (ref 0.9–1.3)
GFR AFRICAN AMERICAN: >60
GFR NON-AFRICAN AMERICAN: >60
GLOBULIN: 2.8 G/DL
GLUCOSE BLD-MCNC: 257 MG/DL (ref 70–99)
GLUCOSE BLD-MCNC: 317 MG/DL (ref 70–99)
GLUCOSE BLD-MCNC: 406 MG/DL (ref 70–99)
GLUCOSE BLD-MCNC: 453 MG/DL (ref 70–99)
GLUCOSE BLD-MCNC: 96 MG/DL (ref 70–99)
HCT VFR BLD CALC: 34.5 % (ref 40.5–52.5)
HEMOGLOBIN: 11.2 G/DL (ref 13.5–17.5)
MCH RBC QN AUTO: 27.6 PG (ref 26–34)
MCHC RBC AUTO-ENTMCNC: 32.5 G/DL (ref 31–36)
MCV RBC AUTO: 85 FL (ref 80–100)
PDW BLD-RTO: 16.7 % (ref 12.4–15.4)
PERFORMED ON: ABNORMAL
PERFORMED ON: NORMAL
PLATELET # BLD: 254 K/UL (ref 135–450)
PMV BLD AUTO: 8.3 FL (ref 5–10.5)
POTASSIUM SERPL-SCNC: 4.4 MMOL/L (ref 3.5–5.1)
RBC # BLD: 4.06 M/UL (ref 4.2–5.9)
SODIUM BLD-SCNC: 138 MMOL/L (ref 136–145)
TOTAL CK: 942 U/L (ref 39–308)
TOTAL PROTEIN: 7.5 G/DL (ref 6.4–8.2)
WBC # BLD: 9.3 K/UL (ref 4–11)

## 2020-08-15 PROCEDURE — 6360000002 HC RX W HCPCS: Performed by: PSYCHIATRY & NEUROLOGY

## 2020-08-15 PROCEDURE — 86780 TREPONEMA PALLIDUM: CPT

## 2020-08-15 PROCEDURE — 80053 COMPREHEN METABOLIC PANEL: CPT

## 2020-08-15 PROCEDURE — 85027 COMPLETE CBC AUTOMATED: CPT

## 2020-08-15 PROCEDURE — 99233 SBSQ HOSP IP/OBS HIGH 50: CPT | Performed by: NURSE PRACTITIONER

## 2020-08-15 PROCEDURE — 6370000000 HC RX 637 (ALT 250 FOR IP): Performed by: NURSE PRACTITIONER

## 2020-08-15 PROCEDURE — 36415 COLL VENOUS BLD VENIPUNCTURE: CPT

## 2020-08-15 PROCEDURE — 1240000000 HC EMOTIONAL WELLNESS R&B

## 2020-08-15 PROCEDURE — 6360000002 HC RX W HCPCS: Performed by: NURSE PRACTITIONER

## 2020-08-15 PROCEDURE — 82550 ASSAY OF CK (CPK): CPT

## 2020-08-15 PROCEDURE — 6370000000 HC RX 637 (ALT 250 FOR IP): Performed by: PSYCHIATRY & NEUROLOGY

## 2020-08-15 RX ORDER — ZIPRASIDONE MESYLATE 20 MG/ML
10 INJECTION, POWDER, LYOPHILIZED, FOR SOLUTION INTRAMUSCULAR EVERY 6 HOURS PRN
Status: DISCONTINUED | OUTPATIENT
Start: 2020-08-15 | End: 2020-08-16

## 2020-08-15 RX ORDER — BENZTROPINE MESYLATE 1 MG/1
2 TABLET ORAL ONCE
Status: COMPLETED | OUTPATIENT
Start: 2020-08-15 | End: 2020-08-15

## 2020-08-15 RX ORDER — ZIPRASIDONE MESYLATE 20 MG/ML
20 INJECTION, POWDER, LYOPHILIZED, FOR SOLUTION INTRAMUSCULAR ONCE
Status: COMPLETED | OUTPATIENT
Start: 2020-08-15 | End: 2020-08-15

## 2020-08-15 RX ADMIN — AMLODIPINE BESYLATE 2.5 MG: 2.5 TABLET ORAL at 09:31

## 2020-08-15 RX ADMIN — OLANZAPINE 10 MG: 10 TABLET, ORALLY DISINTEGRATING ORAL at 17:51

## 2020-08-15 RX ADMIN — ZIPRASIDONE MESYLATE 10 MG: 20 INJECTION, POWDER, LYOPHILIZED, FOR SOLUTION INTRAMUSCULAR at 22:03

## 2020-08-15 RX ADMIN — ASPIRIN 81 MG 81 MG: 81 TABLET ORAL at 09:31

## 2020-08-15 RX ADMIN — QUETIAPINE FUMARATE 100 MG: 100 TABLET ORAL at 19:23

## 2020-08-15 RX ADMIN — ZIPRASIDONE MESYLATE 20 MG: 20 INJECTION, POWDER, LYOPHILIZED, FOR SOLUTION INTRAMUSCULAR at 12:19

## 2020-08-15 RX ADMIN — INSULIN GLARGINE 10 UNITS: 100 INJECTION, SOLUTION SUBCUTANEOUS at 19:51

## 2020-08-15 RX ADMIN — LORAZEPAM 1 MG: 1 TABLET ORAL at 09:31

## 2020-08-15 RX ADMIN — LORAZEPAM 2 MG: 2 INJECTION INTRAMUSCULAR; INTRAVENOUS at 06:25

## 2020-08-15 RX ADMIN — ATORVASTATIN CALCIUM 40 MG: 40 TABLET, FILM COATED ORAL at 09:31

## 2020-08-15 RX ADMIN — PANTOPRAZOLE SODIUM 40 MG: 40 TABLET, DELAYED RELEASE ORAL at 09:31

## 2020-08-15 RX ADMIN — BENZTROPINE MESYLATE 2 MG: 1 TABLET ORAL at 10:46

## 2020-08-15 NOTE — BH NOTE
Client attempted to touch writers face and kiss. Client states, dont you love me?   writer explained to client that I am his nurse and here to take care of him. Client came out to the nurses station again and was licking lips in a provacative way towards writer. Writer redirected the behavior and informed client that was not appropriate. Client continues to state, \" I love you. \" continues to make kissy noises

## 2020-08-15 NOTE — BH NOTE
Patient continues to throw mattress around in seclusion room, banging mattress on ground. Requested to obtain vital signs patient stated, \" fuck the vitals. \" patient is currently refusing to talk with RN, Suzanne Campuzano.

## 2020-08-15 NOTE — BH NOTE
Pt aggressively exit seeking, pounding and bashing on doors. Pt minimally redirectable returning to behavior within seconds. Pt appears fearful and does not seem to be able to control his behavior. PRN emergency medication was administered per [facility]. PT presently in seclusion with RN 1:1, pt pounding on seclusion door.

## 2020-08-15 NOTE — PROGRESS NOTES
Patient started banging on the door, to the other unit  & demanding to leave. Patient was also trying to go into other patient's room. Patient declined offer of ativan 2 mg po. Patient was given ativan 2 mg IM & was cooperative with this. Security is on the unit at this time.   Estevan Lomeli R.N.

## 2020-08-15 NOTE — BH NOTE
Pt given po cogentin per MAR. 30-40 min following administration pts speech became notably more clear, gait more fluid, and grinding of molars was not audible. During this same time frame post cogentin, pts behavior and near panic level of anxiety worsened.  ultimately requiring a return to seclusion and additional medications (see MAR)

## 2020-08-15 NOTE — BH NOTE
Client remains restless at times, pacing around the unit wrapped in a blanket. Returned to room and got into bed.

## 2020-08-15 NOTE — PLAN OF CARE
Patient was asleep from 23:00- 06:00 & was awaken by another patient. Patient was went into another patient's bathroom & would not leave until after he urinated. Patient asking to go home &  is now restless wandering in the halls. Patient continues with rambling speech & difficult to understand. Patient pushing on the door, between the 2 units.  Remi Lomeli R.N.

## 2020-08-15 NOTE — BH NOTE
Spoke with lab, reports the syphilis test will be sent out to [facility], per lab,most likely will not have results until Monday.

## 2020-08-15 NOTE — PROGRESS NOTES
Patient slept briefly from 20:50-21:20. Patient was awake walking in the halls & becoming agitated. Patient stared kicking the doors between the 2 units. Patient was redirected from doing that twice. Patient was demanding to leave. Patient was given ativan 2 mg IM at 22:15. Patient is resting in bed at this time.  Melchor Lomeli R.N.

## 2020-08-15 NOTE — BH NOTE
Client is back in seclusion, continues to bang on the door and windows continuously. client is also banging his head. client is not able to demonstrate safe behavior at this time.

## 2020-08-15 NOTE — BH NOTE
Client continues to slowly escalate, client is very restless, sitting, standing, laying down, taking clothes off and off, blankets on and off. Client continues to pace around small side with writer, patient is not able to be left alone, checking doors, slamming the doors, continues to repeat, \" Im ready. \" clients speech is noted to be garbled.  Client is noted to parrot others, appears to attempt speak and appears to be thinking about what to say but cannot get the words out

## 2020-08-15 NOTE — BH NOTE
New orders noted per Nicole Espino, client was walked into seclusion for safety, client hopped up on the nursing station.

## 2020-08-15 NOTE — BH NOTE
Attempted to talk to mom, patient said, \" you cannot talk to my mom, she is evil and she knows your mom and your mom is evil. \"     Mom reports she is going to be dropping off glasses and gospel music for him to listen to during the day. Mom reports, \" he is used to seeing us at that hospital. I need to talk to the doctor now. \"   Mom reports, \" he was not acting like that before he came to the hospital. I need to talk to the  tomorrow.     [phone]- CAROLINE- grady

## 2020-08-15 NOTE — BH NOTE
Client continues to reach over the desk and grab at times, continues to ask, \" dont your trust me? \" client states, \" Im peeing now. \" client continues to call writer by names that are related to his family.

## 2020-08-15 NOTE — BH NOTE
Writer and pt discussed his recent period of unrestrained seclusion. Pt nodded head as though in understanding. Writer asked pt what we were presently discussing, to which pt replied, \"yes, I'm not sure. \"  Pt was asked if he had any questions about why he was put in seclusion, pt replied, \"I was confused. \"   Pt walked away from debrief at that time.
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
## 2020-08-15 NOTE — PROGRESS NOTES
Patient continued to bang on the door between the 2 units & would not stop with direction. Patient was assisted to the seclusion room with assist of 3 staff at 07:01. Dr Leslie Boudreaux & clinical , Raisa Coronado. was notified. Patient continues awake & banging on the seclusion door. Patient will stop for brief periods.  Neyda Lomeli R.N.

## 2020-08-15 NOTE — BH NOTE
Client is wandering around with a blanket over his head. Client rambles nonsensically at times, difficult to understand. Client lays down for a few minutes and then gets up again and pacing. Client continues to say this he is sorry and \" ready to tap out. \" client states, \" im ready to leave here and end my life. \" client keeps saying, \" im ready. \"   Difficult to assess clients behavior and statements. Client reports he does not know where is he or what date it is. Client is not longer in seclusion.  Client ate breakfast and continues to wander

## 2020-08-15 NOTE — PROGRESS NOTES
see if this is more effective than current PRNs ordered. Labs ordered: Syphilis antibody cascading reflex, CBC, CMP, CK    Suicidal ideation: [place] at time of assessment but patient made statement to RN that \" im ready to leave here and end my life. \"  Homicidal ideation: [place]  Medication side effects: Possible akathisia?     ROS: Patient has new complaints: None verbalized but he has been experiencing behaviors resulting in PRN medication administration and seclusion    Current Medications Ordered:   LORazepam  1 mg Oral 4x Daily    insulin lispro  2 Units Subcutaneous TID WC    insulin lispro  0-6 Units Subcutaneous TID WC    insulin lispro  0-3 Units Subcutaneous Nightly    QUEtiapine  100 mg Oral Nightly    amLODIPine  2.5 mg Oral Daily    aspirin  81 mg Oral Daily    atorvastatin  40 mg Oral Daily    insulin glargine  10 Units Subcutaneous Nightly    pantoprazole  40 mg Oral Daily      PRN Meds: OLANZapine zydis **OR** OLANZapine, LORazepam **OR** LORazepam, glucose, dextrose, glucagon (rDNA), dextrose, glucose, dextrose, glucagon (rDNA), dextrose, acetaminophen, sterile water, benztropine mesylate, magnesium hydroxide, aluminum & magnesium hydroxide-simethicone, hydrOXYzine     Objective:     PE:    BP (!) 171/104   Pulse 117   Temp 97.1 °F (36.2 °C)   Resp 16   Ht 6' 2\" (1.88 m)   Wt 127 lb (57.6 kg)   SpO2 95% Comment: room air  BMI 16.31 kg/m²       Motor / Gait: psychomotor agitation and agitated    Mental Status Examination:    Appearance: AAM, appears stated age, wandering in dayroom, wearing personal attire, poor grooming and hygiene   Behavior/Attitude Toward Examiner: Unable to engage, uncooperative, poor eye contact  Speech: Mumbling, garbled, difficult to understand  Mood: Unable to verbalize current mood when asked  Affect:  Mood congruent   Thought Processes: Illogical, disorganized  Thought Content: Unable to fully assess due to current presentation  Perceptions: Unable to fully assess due to current presentation  Attention: Impaired  Abstraction: Unable to fully assess due to current presentation  Cognition: Unable to fully assess due to current presentation  Insight: Unable to fully assess due to current presentation  Judgment: Poor judgment      LAB: Reviewed labs from last 24 hours      Dx:   Primary Psychiatric (DSM V) Diagnosis: Psychosis, unspecified   Secondary Psychiatric (DSM V) Diagnoses: None   Chemical Dependency Diagnoses: Tobacco dependence, cannabis use  Active Medical Diagnoses: HTN, DM type I    All conditions detailed above are being treated while patient is hospitalized. Tx Plan: Generally: prevent self injury/aggression towards others, stabilize mood/anxiety/psychotic/behavioral disturbance, establish/maintain aftercare, increase coping mechanisms, improve medication compliance. All conditions present on admission are being treated while pt is hospitalized. Legal Status: Involuntary     Primary Psychiatric Issues:   1. Psychosis, unspecified  - PRN Cogentin was administered at 1046 and, per [facility], 30-40 min following administration pts speech became notably more clear, gait more fluid, and grinding of molars was not audible. During this same time frame post cogentin, pts behavior and near panic level of anxiety worsened. ultimately requiring a return to seclusion and additional medications. - PRN Geodon was given at 1219; will monitor and assess to see if this is more effective than current PRNs ordered. - Labs ordered: Syphilis antibody cascading reflex, CBC, CMP, CK    Chemical Dependency Issues:  - Tobacco dependence  - Cannabis use     Medical Problems:  Internal medicine has been consulted. Appreciate recs. Hypertension  - BP stable initially.  Refused BP check this morning  - continue Amlodipine.      DM type 1  - Blood glucose varies  - monitor closely  - continue Lantus 10 units nightly, medium dose correction insulin coverage.      Code Status: Full Code    Disposition:    - Housing: Unclear as to where he has been living; unable to answer today  - Current outpatient follow-up: None known   - Discharge planning is incomplete    Estimated Length of Stay: 7-10 days     Criteria for Discharge: Not suicidal, not homicidal, not grossly psychotic, behavioral disturbance improved, sleeping well, mood/affect stable, eating well, aftercare arranged. Total face to face time with patient was 30 minutes and more than 50 % of that time was spent counseling the patient on their symptoms, treatment and expected goals.     Sharad Montaño, MPH, APRN, PMHNP-BC  08/15/20

## 2020-08-16 LAB
EKG ATRIAL RATE: 88 BPM
EKG DIAGNOSIS: NORMAL
EKG P AXIS: 78 DEGREES
EKG P-R INTERVAL: 150 MS
EKG Q-T INTERVAL: 392 MS
EKG QRS DURATION: 82 MS
EKG QTC CALCULATION (BAZETT): 474 MS
EKG R AXIS: 50 DEGREES
EKG T AXIS: 64 DEGREES
EKG VENTRICULAR RATE: 88 BPM
GLUCOSE BLD-MCNC: 119 MG/DL (ref 70–99)
GLUCOSE BLD-MCNC: 190 MG/DL (ref 70–99)
GLUCOSE BLD-MCNC: 209 MG/DL (ref 70–99)
GLUCOSE BLD-MCNC: 267 MG/DL (ref 70–99)
GLUCOSE BLD-MCNC: 279 MG/DL (ref 70–99)
GLUCOSE BLD-MCNC: 319 MG/DL (ref 70–99)
PERFORMED ON: ABNORMAL
TOTAL SYPHILLIS IGG/IGM: NORMAL

## 2020-08-16 PROCEDURE — 93005 ELECTROCARDIOGRAM TRACING: CPT | Performed by: PSYCHIATRY & NEUROLOGY

## 2020-08-16 PROCEDURE — 6370000000 HC RX 637 (ALT 250 FOR IP): Performed by: PSYCHIATRY & NEUROLOGY

## 2020-08-16 PROCEDURE — 6360000002 HC RX W HCPCS: Performed by: PSYCHIATRY & NEUROLOGY

## 2020-08-16 PROCEDURE — 93010 ELECTROCARDIOGRAM REPORT: CPT | Performed by: INTERNAL MEDICINE

## 2020-08-16 PROCEDURE — 1240000000 HC EMOTIONAL WELLNESS R&B

## 2020-08-16 RX ORDER — POLYETHYLENE GLYCOL 3350 17 G
2 POWDER IN PACKET (EA) ORAL PRN
Status: DISCONTINUED | OUTPATIENT
Start: 2020-08-16 | End: 2020-08-25 | Stop reason: HOSPADM

## 2020-08-16 RX ORDER — HALOPERIDOL 10 MG/1
10 TABLET ORAL 2 TIMES DAILY
Status: DISCONTINUED | OUTPATIENT
Start: 2020-08-16 | End: 2020-08-25 | Stop reason: HOSPADM

## 2020-08-16 RX ORDER — DIVALPROEX SODIUM 250 MG/1
1000 TABLET, EXTENDED RELEASE ORAL 2 TIMES DAILY
Status: DISCONTINUED | OUTPATIENT
Start: 2020-08-16 | End: 2020-08-25 | Stop reason: HOSPADM

## 2020-08-16 RX ORDER — HALOPERIDOL 5 MG/ML
5 INJECTION INTRAMUSCULAR EVERY 6 HOURS PRN
Status: DISCONTINUED | OUTPATIENT
Start: 2020-08-16 | End: 2020-08-16

## 2020-08-16 RX ORDER — NICOTINE 21 MG/24HR
1 PATCH, TRANSDERMAL 24 HOURS TRANSDERMAL DAILY
Status: DISCONTINUED | OUTPATIENT
Start: 2020-08-16 | End: 2020-08-25 | Stop reason: HOSPADM

## 2020-08-16 RX ORDER — DIPHENHYDRAMINE HYDROCHLORIDE 50 MG/ML
50 INJECTION INTRAMUSCULAR; INTRAVENOUS ONCE
Status: DISCONTINUED | OUTPATIENT
Start: 2020-08-16 | End: 2020-08-16

## 2020-08-16 RX ORDER — DIPHENHYDRAMINE HYDROCHLORIDE 50 MG/ML
50 INJECTION INTRAMUSCULAR; INTRAVENOUS EVERY 6 HOURS PRN
Status: DISCONTINUED | OUTPATIENT
Start: 2020-08-16 | End: 2020-08-22

## 2020-08-16 RX ORDER — HALOPERIDOL 5 MG/ML
10 INJECTION INTRAMUSCULAR EVERY 6 HOURS PRN
Status: DISCONTINUED | OUTPATIENT
Start: 2020-08-16 | End: 2020-08-22

## 2020-08-16 RX ORDER — HALOPERIDOL 5 MG/ML
5 INJECTION INTRAMUSCULAR ONCE
Status: DISCONTINUED | OUTPATIENT
Start: 2020-08-16 | End: 2020-08-16

## 2020-08-16 RX ORDER — DIPHENHYDRAMINE HYDROCHLORIDE 50 MG/ML
50 INJECTION INTRAMUSCULAR; INTRAVENOUS ONCE
Status: COMPLETED | OUTPATIENT
Start: 2020-08-16 | End: 2020-08-16

## 2020-08-16 RX ORDER — DIPHENHYDRAMINE HCL 25 MG
50 TABLET ORAL 2 TIMES DAILY
Status: DISCONTINUED | OUTPATIENT
Start: 2020-08-16 | End: 2020-08-25 | Stop reason: HOSPADM

## 2020-08-16 RX ADMIN — DIPHENHYDRAMINE HYDROCHLORIDE 50 MG: 50 INJECTION, SOLUTION INTRAMUSCULAR; INTRAVENOUS at 09:20

## 2020-08-16 RX ADMIN — ZIPRASIDONE MESYLATE 10 MG: 20 INJECTION, POWDER, LYOPHILIZED, FOR SOLUTION INTRAMUSCULAR at 04:55

## 2020-08-16 RX ADMIN — INSULIN GLARGINE 10 UNITS: 100 INJECTION, SOLUTION SUBCUTANEOUS at 22:27

## 2020-08-16 RX ADMIN — DIPHENHYDRAMINE HCL 50 MG: 25 TABLET ORAL at 22:29

## 2020-08-16 RX ADMIN — HALOPERIDOL 10 MG: 10 TABLET ORAL at 22:28

## 2020-08-16 RX ADMIN — HALOPERIDOL LACTATE 5 MG: 5 INJECTION, SOLUTION INTRAMUSCULAR at 09:19

## 2020-08-16 RX ADMIN — NICOTINE POLACRILEX 2 MG: 2 LOZENGE ORAL at 02:53

## 2020-08-16 RX ADMIN — DIVALPROEX SODIUM 1000 MG: 500 TABLET, EXTENDED RELEASE ORAL at 22:29

## 2020-08-16 NOTE — BH NOTE
Client does not appear to be responding to Haldol, moving constantly, sitting up in the bed with restraints on, kicking feet constantly.  Dr. Wilver Fairchild aware

## 2020-08-16 NOTE — BH NOTE
\" you better hope, hope hope, HOPE!, what happened to you baby, what happened to us? Im going to be quiet, my feet care calling, frozen feet, im going to bus bust them up, RAR, im going to die right here, sit, sit, man its on, I will pop \"     RN at bedside obtaining vital signs.       150/88  85

## 2020-08-16 NOTE — BH NOTE
Restraints loosened, arms repositioned, and patient repositioned higher up in bed. Pt confused and periodically restless and agitated aeb sitting up and pulling against restraints.

## 2020-08-16 NOTE — BH NOTE
Removed with wrist and leg opposite to assess clients ability to stay calm. Tolerating well.  Sleepy

## 2020-08-16 NOTE — PLAN OF CARE
Problem: Restraint Use - Violent/Self-Destructive Behavior:  Goal: Absence of restraint-related injury  Description: Absence of restraint-related injury  Outcome: Met This Shift     Problem: Pain:  Goal: Pain level will decrease  Description: Pain level will decrease  Outcome: Ongoing     Problem: Anxiety:  Goal: Level of anxiety will decrease  Description: Level of anxiety will decrease  Outcome: Not Met This Shift

## 2020-08-16 NOTE — PROGRESS NOTES
Patient exhibiting behavior disturbance as evidenced by exit seeking and kicking double doors. Patient pacing and wandering into other patient rooms. Patient not responding to staff attempts to re-direct. Placed call to Dr. Asif Vanessa. Received new order for locked seclusion. Charge nurse aware and placed initial order. Will continue to monitor for safety.

## 2020-08-16 NOTE — BH NOTE
Restraint 1 Hour RN assessment      Colby Virk was placed in 4 point leather restraints at aggressive behavior, slamming doors due to Behavioral management problems. Currently patient is agiatated and has reacted poorly to being placed in restraints. Patient medical history includes       Diagnosis Date    Diabetes mellitus ([address]     Gastroparesis     Hypertension     Current mental status confused, agitated. At this time the patient  does meet criteria for continued restraint AEB Anxious, Agitated, Excessive Activity/Restless, Disorganized, Imulsive and Resistive behavior. The room has been assessed for safety and potentially harmful objects. Patient has been assessed for comfort and current needs. Respirations 18. Skin Skin color, tempature, turgor normal. No rashes or lesions. Current vitals include see flowsheets.     Most recent lab values include:   Admission on 08/11/2020   Component Date Value Ref Range Status    POC Glucose 08/12/2020 113* 70 - 99 mg/dl Final    Performed on 08/12/2020 ACCU-CHEK   Final    POC Glucose 08/12/2020 >600* 70 - 99 mg/dl Final    Performed on 08/12/2020 ACCU-CHEK   Final    POC Glucose 08/12/2020 >600* 70 - 99 mg/dl Final    Performed on 08/12/2020 ACCU-CHEK   Final    POC Glucose 08/12/2020 >600* 70 - 99 mg/dl Final    Performed on 08/12/2020 ACCU-CHEK   Final    POC Glucose 08/12/2020 >600* 70 - 99 mg/dl Final    Performed on 08/12/2020 ACCU-CHEK   Final    POC Glucose 08/12/2020 593* 70 - 99 mg/dl Final    Performed on 08/12/2020 ACCU-CHEK   Final    POC Glucose 08/12/2020 485* 70 - 99 mg/dl Final    Performed on 08/12/2020 ACCU-CHEK   Final    POC Glucose 08/12/2020 102* 70 - 99 mg/dl Final    Performed on 08/12/2020 ACCU-CHEK   Final    POC Glucose 08/13/2020 70  70 - 99 mg/dl Final    Performed on 08/13/2020 ACCU-CHEK   Final    POC Glucose 08/13/2020 194* 70 - 99 mg/dl Final    Performed on 08/13/2020 ACCU-CHEK   Final    POC Glucose 08/13/2020 140* 70 - 99 mg/dl Final    Performed on 08/13/2020 ACCU-CHEK   Final    POC Glucose 08/12/2020 454* 70 - 99 mg/dl Final    Performed on 08/12/2020 ACCU-CHEK   Final    POC Glucose 08/13/2020 93  70 - 99 mg/dl Final    Performed on 08/13/2020 ACCU-CHEK   Final    POC Glucose 08/13/2020 103* 70 - 99 mg/dl Final    Performed on 08/13/2020 ACCU-CHEK   Final    POC Glucose 08/13/2020 219* 70 - 99 mg/dl Final    Performed on 08/13/2020 ACCU-CHEK   Final    POC Glucose 08/13/2020 184* 70 - 99 mg/dl Final    Performed on 08/13/2020 ACCU-CHEK   Final    POC Glucose 08/13/2020 320* 70 - 99 mg/dl Final    Performed on 08/13/2020 ACCU-CHEK   Final    POC Glucose 08/14/2020 184* 70 - 99 mg/dl Final    Performed on 08/14/2020 ACCU-CHEK   Final    POC Glucose 08/14/2020 138* 70 - 99 mg/dl Final    Performed on 08/14/2020 ACCU-CHEK   Final    POC Glucose 08/14/2020 108* 70 - 99 mg/dl Final    Performed on 08/14/2020 ACCU-CHEK   Final    POC Glucose 08/14/2020 412* 70 - 99 mg/dl Final    Performed on 08/14/2020 ACCU-CHEK   Final    POC Glucose 08/14/2020 168* 70 - 99 mg/dl Final    Performed on 08/14/2020 ACCU-CHEK   Final    POC Glucose 08/15/2020 96  70 - 99 mg/dl Final    Performed on 08/15/2020 ACCU-CHEK   Final    Total Syphillis IgG/IgM 08/15/2020 Non-Reactive  Non-reactive Final    WBC 08/15/2020 9.3  4.0 - 11.0 K/uL Final    RBC 08/15/2020 4.06* 4.20 - 5.90 M/uL Final    Hemoglobin 08/15/2020 11.2* 13.5 - 17.5 g/dL Final    Hematocrit 08/15/2020 34.5* 40.5 - 52.5 % Final    MCV 08/15/2020 85.0  80.0 - 100.0 fL Final    MCH 08/15/2020 27.6  26.0 - 34.0 pg Final    MCHC 08/15/2020 32.5  31.0 - 36.0 g/dL Final    RDW 08/15/2020 16.7* 12.4 - 15.4 % Final    Platelets 33/25/3810 254  135 - 450 K/uL Final    MPV 08/15/2020 8.3  5.0 - 10.5 fL Final    Sodium 08/15/2020 138  136 - 145 mmol/L Final    Potassium 08/15/2020 4.4  3.5 - 5.1 mmol/L Final    Chloride 08/15/2020 100  99 - 110 mmol/L Final    CO2 08/15/2020 23  21 - 32 mmol/L Final    Anion Gap 08/15/2020 15  3 - 16 Final    Glucose 08/15/2020 317* 70 - 99 mg/dL Final    BUN 08/15/2020 15  7 - 20 mg/dL Final    CREATININE 08/15/2020 0.8* 0.9 - 1.3 mg/dL Final    GFR Non- 08/15/2020 >60  >60 Final    Comment: >60 mL/min/1.73m2 EGFR, calc. for ages 25 and older using the  MDRD formula (not corrected for weight), is valid for stable  renal function.  GFR  08/15/2020 >60  >60 Final    Comment: Chronic Kidney Disease: less than 60 ml/min/1.73 sq.m. Kidney Failure: less than 15 ml/min/1.73 sq.m. Results valid for patients 18 years and older.  Calcium 08/15/2020 9.7  8.3 - 10.6 mg/dL Final    Total Protein 08/15/2020 7.5  6.4 - 8.2 g/dL Final    Alb 08/15/2020 4.7  3.4 - 5.0 g/dL Final    Albumin/Globulin Ratio 08/15/2020 1.7  1.1 - 2.2 Final    Total Bilirubin 08/15/2020 <0.2  0.0 - 1.0 mg/dL Final    Alkaline Phosphatase 08/15/2020 94  40 - 129 U/L Final    ALT 08/15/2020 23  10 - 40 U/L Final    AST 08/15/2020 38* 15 - 37 U/L Final    Comment: Specimen hemolysis has exceeded the interference as defined by Roche. Value may be falsely increased. Suggest reorder and recollection if  clinically indicated.       Globulin 08/15/2020 2.8  g/dL Final    Total CK 08/15/2020 942* 39 - 308 U/L Final    POC Glucose 08/15/2020 406* 70 - 99 mg/dl Final    Performed on 08/15/2020 ACCU-CHEK   Final    POC Glucose 08/15/2020 257* 70 - 99 mg/dl Final    Performed on 08/15/2020 ACCU-CHEK   Final    POC Glucose 08/15/2020 453* 70 - 99 mg/dl Final    Performed on 08/15/2020 ACCU-CHEK   Final    Ventricular Rate 08/16/2020 88  BPM Final    Atrial Rate 08/16/2020 88  BPM Final    P-R Interval 08/16/2020 150  ms Final    QRS Duration 08/16/2020 82  ms Final    Q-T Interval 08/16/2020 392  ms Final    QTc Calculation (Bazett) 08/16/2020 474  ms Final    P Axis 08/16/2020 78  degrees Final    R Axis 08/16/2020 50  degrees Final    T Axis 08/16/2020 64  degrees Final    Diagnosis 08/16/2020 Normal sinus rhythmNormal ECGWhen compared with ECG of 11-MAY-2020 23:59,Vent. rate has increased BY  35 BPMConfirmed by ACACIA LUCAS MD ([id number]) on 8/16/2020 11:02:40 AM   Final    POC Glucose 08/16/2020 209* 70 - 99 mg/dl Final    Performed on 08/16/2020 ACCU-CHEK   Final    POC Glucose 08/16/2020 267* 70 - 99 mg/dl Final    Performed on 08/16/2020 ACCU-CHEK   Final    POC Glucose 08/16/2020 190* 70 - 99 mg/dl Final    Performed on 08/16/2020 ACCU-CHEK   Final    POC Glucose 08/16/2020 119* 70 - 99 mg/dl Final    Performed on 08/16/2020 ACCU-CHEK   Final    POC Glucose 08/16/2020 319* 70 - 99 mg/dl Final    Performed on 08/16/2020 ACCU-CHEK   Final         Patient presentation and results of RN assessment has been discussed with the on call physician.

## 2020-08-16 NOTE — PROGRESS NOTES
Placed call to Faith Jean [phone] per Dr. Jhonathan Vasquez would like to inquire about patient's allergy to Haldol. Unable to leave message on voice mail per mailbox is full. Home number listed is non-working number.

## 2020-08-16 NOTE — BH NOTE
Pt ate 100% of evening meal.  Pt anxiety and agitation much improved compared to beginning of shift. Pt able to sit still for extended period. Cognitive deficits and confusion persist.  Pt indicated that he wanted to administer his own insulin and was provided with an prep-wipe. Pt dunked wipe into a spoonful of tea with intent to consume. Writer was able to intercede. Insulin administered by writer.

## 2020-08-16 NOTE — FLOWSHEET NOTE
08/16/20 1510   Status and Exam   Normal No   Facial Expression Flat   Affect Constricted   Level of Consciousness Confused   Mood:Normal No   Mood Anxious;Irritable   Motor Activity:Normal No   Motor Activity Agitated   Interview Behavior Cooperative; Impulsive; Uncooperative/Withdrawn   Preception [place] to Person   Attention:Normal No   Attention Unable to Concentrate   Thought Processes Loose Assoc.    Thought Content:Normal No   Thought Content Poverty of Content;Preoccupations   Hallucinations None  (denies, possible AH)   Delusions Yes   Delusions Persecution   Memory:Normal No   Memory Poor Recent   Insight and Judgment No   Insight and Judgment Poor Judgment;Poor Insight;Unrealistic   Present Suicidal Ideation No   Present Homicidal Ideation No

## 2020-08-16 NOTE — BH NOTE
New orders noted per Dr. Leora Nicolas. Client is currently in 4 point restraints, in seclusion. Door is open with writer at bedside.

## 2020-08-16 NOTE — PLAN OF CARE
Problem: Falls - Risk of:  Goal: Will remain free from falls  Description: Will remain free from falls  Outcome: Ongoing   Aleja Mckay has remained free from falls thus far this shift. Patient continues to exit seek as evidenced by trying doors. Patient presents with garbled speech. States he wants to leave. Difficult to redirect. Irritable at times.

## 2020-08-16 NOTE — PROGRESS NOTES
Patient's mother Venkat Thornton) phoned to check on patient. Asked patient's mother if patient had a severe reaction to Haldol or just mild itching. Mother states she was not sure but that patient's girlfriend Anika Lerma) would know more about patient's medications. Stated we should phone her to ask about the Haldol. Pavel Reza [phone].

## 2020-08-16 NOTE — PROGRESS NOTES
Placed call to Dr. Miya Dawson to report patient status. Patient has been difficult to re-direct and trying to enter other patient rooms. Intrusive with staff and other patients as evidenced by trying to grab and hug. Received new order for locked seclusion at 0625. Will monitor.

## 2020-08-16 NOTE — BH NOTE
Restraint 1 Hour RN assessment      Colby Nick was placed in locked seclusion at 2015 due to Behavioral management problems. Currently patient is agitated as evidenced by yelling and pounding on door with fist and has reacted poorly to being placed in seclusion. Patient medical history includes        Diagnosis Date    Diabetes mellitus ([facility] [address]     Gastroparesis     Hypertension     with consideration given to diabetes in regards to restraint risk. Current mental status is highly agitated, pounding on door and yelling at staff. At this time the patient  does meet criteria for continued restraint AEB Agitated and Disorganized behavior. The room has been assessed for safety and potentially harmful objects. Patient has been assessed for comfort and current needs. Respirations 18. Skin Skin color, tempature, turgor normal. No rashes or lesions. Current vitals include unable to assess.     Most recent lab values include:   Admission on 08/11/2020   Component Date Value Ref Range Status    POC Glucose 08/12/2020 113* 70 - 99 mg/dl Final    Performed on 08/12/2020 ACCU-CHEK   Final    POC Glucose 08/12/2020 >600* 70 - 99 mg/dl Final    Performed on 08/12/2020 ACCU-CHEK   Final    POC Glucose 08/12/2020 >600* 70 - 99 mg/dl Final    Performed on 08/12/2020 ACCU-CHEK   Final    POC Glucose 08/12/2020 >600* 70 - 99 mg/dl Final    Performed on 08/12/2020 ACCU-CHEK   Final    POC Glucose 08/12/2020 >600* 70 - 99 mg/dl Final    Performed on 08/12/2020 ACCU-CHEK   Final    POC Glucose 08/12/2020 593* 70 - 99 mg/dl Final    Performed on 08/12/2020 ACCU-CHEK   Final    POC Glucose 08/12/2020 485* 70 - 99 mg/dl Final    Performed on 08/12/2020 ACCU-CHEK   Final    POC Glucose 08/12/2020 102* 70 - 99 mg/dl Final    Performed on 08/12/2020 ACCU-CHEK   Final    POC Glucose 08/13/2020 70  70 - 99 mg/dl Final    Performed on 08/13/2020 ACCU-CHEK   Final    POC Glucose 08/13/2020 194* 70 - 99 mg/dl Final    Performed on 08/13/2020 ACCU-CHEK   Final    POC Glucose 08/13/2020 140* 70 - 99 mg/dl Final    Performed on 08/13/2020 ACCU-CHEK   Final    POC Glucose 08/12/2020 454* 70 - 99 mg/dl Final    Performed on 08/12/2020 ACCU-CHEK   Final    POC Glucose 08/13/2020 93  70 - 99 mg/dl Final    Performed on 08/13/2020 ACCU-CHEK   Final    POC Glucose 08/13/2020 103* 70 - 99 mg/dl Final    Performed on 08/13/2020 ACCU-CHEK   Final    POC Glucose 08/13/2020 219* 70 - 99 mg/dl Final    Performed on 08/13/2020 ACCU-CHEK   Final    POC Glucose 08/13/2020 184* 70 - 99 mg/dl Final    Performed on 08/13/2020 ACCU-CHEK   Final    POC Glucose 08/13/2020 320* 70 - 99 mg/dl Final    Performed on 08/13/2020 ACCU-CHEK   Final    POC Glucose 08/14/2020 184* 70 - 99 mg/dl Final    Performed on 08/14/2020 ACCU-CHEK   Final    POC Glucose 08/14/2020 138* 70 - 99 mg/dl Final    Performed on 08/14/2020 ACCU-CHEK   Final    POC Glucose 08/14/2020 108* 70 - 99 mg/dl Final    Performed on 08/14/2020 ACCU-CHEK   Final    POC Glucose 08/14/2020 412* 70 - 99 mg/dl Final    Performed on 08/14/2020 ACCU-CHEK   Final    POC Glucose 08/14/2020 168* 70 - 99 mg/dl Final    Performed on 08/14/2020 ACCU-CHEK   Final    POC Glucose 08/15/2020 96  70 - 99 mg/dl Final    Performed on 08/15/2020 ACCU-CHEK   Final    WBC 08/15/2020 9.3  4.0 - 11.0 K/uL Final    RBC 08/15/2020 4.06* 4.20 - 5.90 M/uL Final    Hemoglobin 08/15/2020 11.2* 13.5 - 17.5 g/dL Final    Hematocrit 08/15/2020 34.5* 40.5 - 52.5 % Final    MCV 08/15/2020 85.0  80.0 - 100.0 fL Final    MCH 08/15/2020 27.6  26.0 - 34.0 pg Final    MCHC 08/15/2020 32.5  31.0 - 36.0 g/dL Final    RDW 08/15/2020 16.7* 12.4 - 15.4 % Final    Platelets 37/15/6162 254  135 - 450 K/uL Final    MPV 08/15/2020 8.3  5.0 - 10.5 fL Final    Sodium 08/15/2020 138  136 - 145 mmol/L Final    Potassium 08/15/2020 4.4  3.5 - 5.1 mmol/L Final    Chloride 08/15/2020 100  99 - 110 mmol/L Final    CO2 08/15/2020 23  21 - 32 mmol/L Final    Anion Gap 08/15/2020 15  3 - 16 Final    Glucose 08/15/2020 317* 70 - 99 mg/dL Final    BUN 08/15/2020 15  7 - 20 mg/dL Final    CREATININE 08/15/2020 0.8* 0.9 - 1.3 mg/dL Final    GFR Non- 08/15/2020 >60  >60 Final    Comment: >60 mL/min/1.73m2 EGFR, calc. for ages 25 and older using the  MDRD formula (not corrected for weight), is valid for stable  renal function.  GFR  08/15/2020 >60  >60 Final    Comment: Chronic Kidney Disease: less than 60 ml/min/1.73 sq.m. Kidney Failure: less than 15 ml/min/1.73 sq.m. Results valid for patients 18 years and older.  Calcium 08/15/2020 9.7  8.3 - 10.6 mg/dL Final    Total Protein 08/15/2020 7.5  6.4 - 8.2 g/dL Final    Alb 08/15/2020 4.7  3.4 - 5.0 g/dL Final    Albumin/Globulin Ratio 08/15/2020 1.7  1.1 - 2.2 Final    Total Bilirubin 08/15/2020 <0.2  0.0 - 1.0 mg/dL Final    Alkaline Phosphatase 08/15/2020 94  40 - 129 U/L Final    ALT 08/15/2020 23  10 - 40 U/L Final    AST 08/15/2020 38* 15 - 37 U/L Final    Comment: Specimen hemolysis has exceeded the interference as defined by Roche. Value may be falsely increased. Suggest reorder and recollection if  clinically indicated.  Globulin 08/15/2020 2.8  g/dL Final    Total CK 08/15/2020 942* 39 - 308 U/L Final    POC Glucose 08/15/2020 406* 70 - 99 mg/dl Final    Performed on 08/15/2020 ACCU-CHEK   Final    POC Glucose 08/15/2020 257* 70 - 99 mg/dl Final    Performed on 08/15/2020 ACCU-CHEK   Final    POC Glucose 08/15/2020 453* 70 - 99 mg/dl Final    Performed on 08/15/2020 ACCU-CHEK   Final         Patient presentation and results of RN assessment has been discussed with the on call physician.

## 2020-08-17 LAB
GLUCOSE BLD-MCNC: 107 MG/DL (ref 70–99)
GLUCOSE BLD-MCNC: 221 MG/DL (ref 70–99)
GLUCOSE BLD-MCNC: 228 MG/DL (ref 70–99)
GLUCOSE BLD-MCNC: 302 MG/DL (ref 70–99)
GLUCOSE BLD-MCNC: 311 MG/DL (ref 70–99)
GLUCOSE BLD-MCNC: 368 MG/DL (ref 70–99)
GLUCOSE BLD-MCNC: 45 MG/DL (ref 70–99)
PERFORMED ON: ABNORMAL

## 2020-08-17 PROCEDURE — 99233 SBSQ HOSP IP/OBS HIGH 50: CPT | Performed by: PSYCHIATRY & NEUROLOGY

## 2020-08-17 PROCEDURE — 1240000000 HC EMOTIONAL WELLNESS R&B

## 2020-08-17 PROCEDURE — 6370000000 HC RX 637 (ALT 250 FOR IP): Performed by: PSYCHIATRY & NEUROLOGY

## 2020-08-17 PROCEDURE — 6360000002 HC RX W HCPCS: Performed by: PSYCHIATRY & NEUROLOGY

## 2020-08-17 PROCEDURE — 6370000000 HC RX 637 (ALT 250 FOR IP): Performed by: NURSE PRACTITIONER

## 2020-08-17 RX ADMIN — DIPHENHYDRAMINE HYDROCHLORIDE 50 MG: 50 INJECTION, SOLUTION INTRAMUSCULAR; INTRAVENOUS at 02:45

## 2020-08-17 RX ADMIN — ATORVASTATIN CALCIUM 40 MG: 40 TABLET, FILM COATED ORAL at 08:18

## 2020-08-17 RX ADMIN — ALUMINUM HYDROXIDE, MAGNESIUM HYDROXIDE, AND SIMETHICONE 30 ML: 200; 200; 20 SUSPENSION ORAL at 05:15

## 2020-08-17 RX ADMIN — DIPHENHYDRAMINE HCL 50 MG: 25 TABLET ORAL at 20:00

## 2020-08-17 RX ADMIN — DIVALPROEX SODIUM 1000 MG: 500 TABLET, EXTENDED RELEASE ORAL at 20:00

## 2020-08-17 RX ADMIN — ASPIRIN 81 MG 81 MG: 81 TABLET ORAL at 08:18

## 2020-08-17 RX ADMIN — DIVALPROEX SODIUM 1000 MG: 500 TABLET, EXTENDED RELEASE ORAL at 08:18

## 2020-08-17 RX ADMIN — HALOPERIDOL 10 MG: 10 TABLET ORAL at 20:01

## 2020-08-17 RX ADMIN — INSULIN GLARGINE 10 UNITS: 100 INJECTION, SOLUTION SUBCUTANEOUS at 20:21

## 2020-08-17 RX ADMIN — HALOPERIDOL LACTATE 10 MG: 5 INJECTION, SOLUTION INTRAMUSCULAR at 02:45

## 2020-08-17 RX ADMIN — AMLODIPINE BESYLATE 2.5 MG: 2.5 TABLET ORAL at 08:18

## 2020-08-17 RX ADMIN — PANTOPRAZOLE SODIUM 40 MG: 40 TABLET, DELAYED RELEASE ORAL at 08:18

## 2020-08-17 RX ADMIN — HALOPERIDOL 10 MG: 10 TABLET ORAL at 08:18

## 2020-08-17 RX ADMIN — DIPHENHYDRAMINE HCL 50 MG: 25 TABLET ORAL at 08:18

## 2020-08-17 ASSESSMENT — PAIN DESCRIPTION - PAIN TYPE: TYPE: OTHER (COMMENT)

## 2020-08-17 ASSESSMENT — PAIN SCALES - GENERAL: PAINLEVEL_OUTOF10: 3

## 2020-08-17 NOTE — BH NOTE
Patient oriented to self only. Could not remember his birthdate or where he was. Patient reoriented to place. Patient was able to take a phone call from his girlfriend and remembered who she was.

## 2020-08-17 NOTE — BH NOTE
PRN medication follow-up (see MAR): respirations >12, resting, no labored breathing, no further intervention needed at this time.

## 2020-08-17 NOTE — PROGRESS NOTES
Occupational Therapy      Attempted OT eval.  Hold OT today per nsg, secondary to pt's psychosis.     Tien Rangel, OTR/L  #[id number]

## 2020-08-17 NOTE — PLAN OF CARE
Problem: Anxiety:  Goal: Level of anxiety will decrease  Description: Level of anxiety will decrease  8/17/2020 1423 by Arnel Hilliard  Outcome: Ongoing  Patient isolated to room and bed this shift. Oriented to self only. Reoriented several times through out the shift. Patient was medication complaint. BS , prior to lunch 45, 107 after lunch. Patient denies SI/HI/AH/VH, but remains confused. Able to take a phone call from family member. Patient calm and cooperative, slept off and on this shift. Able to awaken easily. Problem: Falls - Risk of:  Goal: Will remain free from falls  Description: Will remain free from falls  8/17/2020 1423 by Arnel Hilliard  Outcome: Met This Shift   Patient was free from falls this shift. Problem: Mood - Altered:  Goal: Mood stable  Description: Mood stable  Outcome: Ongoing  Patient isolated to self and bed this shift. Medication compliant. Patient oriented to self only. Patient did not have any incontinent episodes this shift. Problem: Restraint Use - Violent/Self-Destructive Behavior:  Goal: Absence of restraint indications  Description: Absence of restraint indications  Outcome: Ongoing   Patient had no behaviors that warranted restraints or seclusion this shift.

## 2020-08-17 NOTE — PLAN OF CARE
Problem: Anxiety:  Goal: Level of anxiety will decrease  Description: Pt communicating when anxiety and agitation are increasing; offering meds and injections to help and each time he has said that he wants them to feel better; approachable, communicating needs, and cooperative to current time. Outcome: Ongoing       Problem: Falls - Risk of:  Goal: Will remain free from falls  Description: Pt free from falls to current time, but still jumps up quickly to use the restroom or to change clothes.   Outcome: Ongoing

## 2020-08-17 NOTE — PROGRESS NOTES
Pt blood sugar 45, pt set up juice given pt drinks without difficulty, lunch tray set up pt eating at this time, no further issues at this time.

## 2020-08-17 NOTE — BH NOTE
Documentation completed and filed for probate hearing with [facility]. Writer is awaiting hearing date from [email or web address].  Anticipated hearing date is Thursday 8/20/2020 @ 8:30am.    Timothy Stephens MSW, LSW

## 2020-08-17 NOTE — PROGRESS NOTES
Department of Psychiatry  Attending Progress Note  Chief Complaint: Mohinder Baxter continues to show disorientation, agitation at times and an inability to follow directions at times. He does not know where he is and how long in the this hospital. Cannot provide information about home, relationships. He was agitated over the weekend and required restraints/seclusion. DC 1:1 this AM.   Blood sugars are erratic. C/O abdominal pain today . Ativan was reduced over the weekend due to concerns that it was disinhibiting. Now on Depakote 1000 mg BID and Haldol 10 mg BID. Appears slightly sedated. There is a high probability that his current state is closer to baseline than initially thought.'  Will need to probate due to patient's inability to make informed decisions. Patient's chart was reviewed and collaborated with  about the treatment plan. SUBJECTIVE:    Patient is feeling unchanged. Suicidal ideation:  denies suicidal ideation. Patient does not have medication side effects. ROS: Patient has new complaints: no  Sleeping adequately:  Yes   Appetite adequate: Yes  Attending groups: No:   Visitors:No    OBJECTIVE    Physical  VITALS:  /81   Pulse 89   Temp 97.5 °F (36.4 °C) (Temporal)   Resp 16   Ht 6' 2\" (1.88 m)   Wt 127 lb (57.6 kg)   SpO2 98%   BMI 16.31 kg/m²     Mental Status Examination:  Patients appearance was ill-appearing. Thoughts are Illogical. Homicidal ideations none. No abnormal movements, tics or mannerisms. Memory impaired A[place] 0. Concentration Poor. Alert and oriented X 4. Insight and Judgement impaired insight. Patient was uncooperative.  Patient gait abnormal. Mood labile, affect labile affect Hallucinations Absent, suicidal ideations no specific plan to harm self Speech increased latency of response  Data  Labs:   Admission on 08/11/2020   Component Date Value Ref Range Status    POC Glucose 08/12/2020 113* 70 - 99 mg/dl Final    Performed on 08/12/2020 ACCU-CHEK   Final    POC Glucose 08/12/2020 >600* 70 - 99 mg/dl Final    Performed on 08/12/2020 ACCU-CHEK   Final    POC Glucose 08/12/2020 >600* 70 - 99 mg/dl Final    Performed on 08/12/2020 ACCU-CHEK   Final    POC Glucose 08/12/2020 >600* 70 - 99 mg/dl Final    Performed on 08/12/2020 ACCU-CHEK   Final    POC Glucose 08/12/2020 >600* 70 - 99 mg/dl Final    Performed on 08/12/2020 ACCU-CHEK   Final    POC Glucose 08/12/2020 593* 70 - 99 mg/dl Final    Performed on 08/12/2020 ACCU-CHEK   Final    POC Glucose 08/12/2020 485* 70 - 99 mg/dl Final    Performed on 08/12/2020 ACCU-CHEK   Final    POC Glucose 08/12/2020 102* 70 - 99 mg/dl Final    Performed on 08/12/2020 ACCU-CHEK   Final    POC Glucose 08/13/2020 70  70 - 99 mg/dl Final    Performed on 08/13/2020 ACCU-CHEK   Final    POC Glucose 08/13/2020 194* 70 - 99 mg/dl Final    Performed on 08/13/2020 ACCU-CHEK   Final    POC Glucose 08/13/2020 140* 70 - 99 mg/dl Final    Performed on 08/13/2020 ACCU-CHEK   Final    POC Glucose 08/12/2020 454* 70 - 99 mg/dl Final    Performed on 08/12/2020 ACCU-CHEK   Final    POC Glucose 08/13/2020 93  70 - 99 mg/dl Final    Performed on 08/13/2020 ACCU-CHEK   Final    POC Glucose 08/13/2020 103* 70 - 99 mg/dl Final    Performed on 08/13/2020 ACCU-CHEK   Final    POC Glucose 08/13/2020 219* 70 - 99 mg/dl Final    Performed on 08/13/2020 ACCU-CHEK   Final    POC Glucose 08/13/2020 184* 70 - 99 mg/dl Final    Performed on 08/13/2020 ACCU-CHEK   Final    POC Glucose 08/13/2020 320* 70 - 99 mg/dl Final    Performed on 08/13/2020 ACCU-CHEK   Final    POC Glucose 08/14/2020 184* 70 - 99 mg/dl Final    Performed on 08/14/2020 ACCU-CHEK   Final    POC Glucose 08/14/2020 138* 70 - 99 mg/dl Final    Performed on 08/14/2020 ACCU-CHEK   Final    POC Glucose 08/14/2020 108* 70 - 99 mg/dl Final    Performed on 08/14/2020 ACCU-CHEK   Final    POC Glucose 08/14/2020 412* 70 - 99 mg/dl Final    Performed 10 - 40 U/L Final    AST 08/15/2020 38* 15 - 37 U/L Final    Comment: Specimen hemolysis has exceeded the interference as defined by Roche. Value may be falsely increased. Suggest reorder and recollection if  clinically indicated.  Globulin 08/15/2020 2.8  g/dL Final    Total CK 08/15/2020 942* 39 - 308 U/L Final    POC Glucose 08/15/2020 406* 70 - 99 mg/dl Final    Performed on 08/15/2020 ACCU-CHEK   Final    POC Glucose 08/15/2020 257* 70 - 99 mg/dl Final    Performed on 08/15/2020 ACCU-CHEK   Final    POC Glucose 08/15/2020 453* 70 - 99 mg/dl Final    Performed on 08/15/2020 ACCU-CHEK   Final    Ventricular Rate 08/16/2020 88  BPM Final    Atrial Rate 08/16/2020 88  BPM Final    P-R Interval 08/16/2020 150  ms Final    QRS Duration 08/16/2020 82  ms Final    Q-T Interval 08/16/2020 392  ms Final    QTc Calculation (Bazett) 08/16/2020 474  ms Final    P Axis 08/16/2020 78  degrees Final    R Axis 08/16/2020 50  degrees Final    T Axis 08/16/2020 64  degrees Final    Diagnosis 08/16/2020 Normal sinus rhythmNormal ECGWhen compared with ECG of 11-MAY-2020 23:59,Vent.  rate has increased BY  35 BPMConfirmed by ACACIA LUCAS MD ([id number]) on 8/16/2020 11:02:40 AM   Final    POC Glucose 08/16/2020 209* 70 - 99 mg/dl Final    Performed on 08/16/2020 ACCU-CHEK   Final    POC Glucose 08/16/2020 267* 70 - 99 mg/dl Final    Performed on 08/16/2020 ACCU-CHEK   Final    POC Glucose 08/16/2020 190* 70 - 99 mg/dl Final    Performed on 08/16/2020 ACCU-CHEK   Final    POC Glucose 08/16/2020 119* 70 - 99 mg/dl Final    Performed on 08/16/2020 ACCU-CHEK   Final    POC Glucose 08/16/2020 319* 70 - 99 mg/dl Final    Performed on 08/16/2020 ACCU-CHEK   Final    POC Glucose 08/16/2020 279* 70 - 99 mg/dl Final    Performed on 08/16/2020 ACCU-CHEK   Final    POC Glucose 08/17/2020 228* 70 - 99 mg/dl Final    Performed on 08/17/2020 ACCU-CHEK   Final    POC Glucose 08/17/2020 311* 70 - 99 mg/dl Final    Performed on 08/17/2020 ACCU-CHEK   Final    POC Glucose 08/17/2020 45* 70 - 99 mg/dl Final    Performed on 08/17/2020 ACCU-CHEK   Final    POC Glucose 08/17/2020 107* 70 - 99 mg/dl Final    Performed on 08/17/2020 ACCU-CHEK   Final            Medications  Current Facility-Administered Medications: nicotine (NICODERM CQ) 21 MG/24HR 1 patch, 1 patch, Transdermal, Daily  nicotine polacrilex (COMMIT) lozenge 2 mg, 2 mg, Oral, PRN  divalproex (DEPAKOTE ER) extended release tablet 1,000 mg, 1,000 mg, Oral, BID  diphenhydrAMINE (BENADRYL) injection 50 mg, 50 mg, Intravenous, Q6H PRN **AND** haloperidol lactate (HALDOL) injection 10 mg, 10 mg, Intramuscular, Q6H PRN  haloperidol (HALDOL) tablet 10 mg, 10 mg, Oral, BID **AND** diphenhydrAMINE (BENADRYL) tablet 50 mg, 50 mg, Oral, BID  insulin lispro (HUMALOG) injection vial 2 Units, 2 Units, Subcutaneous, TID WC  insulin lispro (HUMALOG) injection vial 0-6 Units, 0-6 Units, Subcutaneous, TID WC  insulin lispro (HUMALOG) injection vial 0-3 Units, 0-3 Units, Subcutaneous, Nightly  amLODIPine (NORVASC) tablet 2.5 mg, 2.5 mg, Oral, Daily  aspirin chewable tablet 81 mg, 81 mg, Oral, Daily  atorvastatin (LIPITOR) tablet 40 mg, 40 mg, Oral, Daily  insulin glargine (LANTUS) injection vial 10 Units, 10 Units, Subcutaneous, Nightly  pantoprazole (PROTONIX) tablet 40 mg, 40 mg, Oral, Daily  glucose (GLUTOSE) 40 % oral gel 15 g, 15 g, Oral, PRN  dextrose 50 % IV solution, 12.5 g, Intravenous, PRN  glucagon (rDNA) injection 1 mg, 1 mg, Intramuscular, PRN  dextrose 5 % solution, 100 mL/hr, Intravenous, PRN  glucose (GLUTOSE) 40 % oral gel 15 g, 15 g, Oral, PRN  dextrose 50 % IV solution, 12.5 g, Intravenous, PRN  glucagon (rDNA) injection 1 mg, 1 mg, Intramuscular, PRN  dextrose 5 % solution, 100 mL/hr, Intravenous, PRN  acetaminophen (TYLENOL) tablet 650 mg, 650 mg, Oral, Q4H PRN  sterile water injection 2.1 mL, 2.1 mL, Intramuscular, Q4H PRN  benztropine mesylate (COGENTIN) injection 2 mg, 2 mg, Intramuscular, BID PRN  magnesium hydroxide (MILK OF MAGNESIA) 400 MG/5ML suspension 30 mL, 30 mL, Oral, Daily PRN  aluminum & magnesium hydroxide-simethicone (MAALOX) 200-200-20 MG/5ML suspension 30 mL, 30 mL, Oral, Q6H PRN    ASSESSMENT AND PLAN    Principal Problem:    Psychosis ([facility] [address]  Active Problems:    Hypertension, essential    Uncontrolled type 1 diabetes mellitus with diabetic peripheral neuropathy ([facility] [address]    Altered mental status    Intentional drug overdose ([facility] [address]  Resolved Problems:    * No resolved hospital problems. *       1. Patient s symptoms   show no change  2. Probable discharge is next week  3. Discharge planning is incomplete  4. Suicidal ideation is none  5. Total time with patient was 40 minutes and more than 50 % of that time was spent counseling the patient on their symptoms, treatment and expected goals.

## 2020-08-17 NOTE — BH NOTE
Patient in bed. Respirations >12, resting, no labored breathing, no intervention needed at this time.

## 2020-08-17 NOTE — PROGRESS NOTES
Pt blood sugar rechecked 120, eats 100% of lunch drinks 240 of juice, pt denies any needs at this time.

## 2020-08-18 ENCOUNTER — APPOINTMENT (OUTPATIENT)
Dept: MRI IMAGING | Age: 45
DRG: 042 | End: 2020-08-18
Attending: PSYCHIATRY & NEUROLOGY
Payer: MEDICAID

## 2020-08-18 PROBLEM — F02.A18 MAJOR NEUROCOGNITIVE DISORDER, DUE TO ANOTHER MEDICAL CONDITION, WITH BEHAVIORAL DISTURBANCE, MILD: Status: ACTIVE | Noted: 2020-08-18

## 2020-08-18 LAB
GLUCOSE BLD-MCNC: 120 MG/DL (ref 70–99)
GLUCOSE BLD-MCNC: 135 MG/DL (ref 70–99)
GLUCOSE BLD-MCNC: 164 MG/DL (ref 70–99)
GLUCOSE BLD-MCNC: 203 MG/DL (ref 70–99)
GLUCOSE BLD-MCNC: 457 MG/DL (ref 70–99)
PERFORMED ON: ABNORMAL

## 2020-08-18 PROCEDURE — 6370000000 HC RX 637 (ALT 250 FOR IP): Performed by: PSYCHIATRY & NEUROLOGY

## 2020-08-18 PROCEDURE — 99233 SBSQ HOSP IP/OBS HIGH 50: CPT | Performed by: PSYCHIATRY & NEUROLOGY

## 2020-08-18 PROCEDURE — 6370000000 HC RX 637 (ALT 250 FOR IP): Performed by: NURSE PRACTITIONER

## 2020-08-18 PROCEDURE — 70551 MRI BRAIN STEM W/O DYE: CPT

## 2020-08-18 PROCEDURE — 1240000000 HC EMOTIONAL WELLNESS R&B

## 2020-08-18 RX ORDER — LORAZEPAM 2 MG/1
2 TABLET ORAL ONCE
Status: COMPLETED | OUTPATIENT
Start: 2020-08-18 | End: 2020-08-18

## 2020-08-18 RX ADMIN — LORAZEPAM 2 MG: 2 TABLET ORAL at 11:06

## 2020-08-18 RX ADMIN — HALOPERIDOL 10 MG: 10 TABLET ORAL at 09:09

## 2020-08-18 RX ADMIN — AMLODIPINE BESYLATE 2.5 MG: 2.5 TABLET ORAL at 09:39

## 2020-08-18 RX ADMIN — DIPHENHYDRAMINE HCL 50 MG: 25 TABLET ORAL at 21:38

## 2020-08-18 RX ADMIN — DIVALPROEX SODIUM 1000 MG: 500 TABLET, EXTENDED RELEASE ORAL at 21:54

## 2020-08-18 RX ADMIN — PANTOPRAZOLE SODIUM 40 MG: 40 TABLET, DELAYED RELEASE ORAL at 09:09

## 2020-08-18 RX ADMIN — DIPHENHYDRAMINE HCL 50 MG: 25 TABLET ORAL at 09:08

## 2020-08-18 RX ADMIN — ASPIRIN 81 MG 81 MG: 81 TABLET ORAL at 09:38

## 2020-08-18 RX ADMIN — INSULIN GLARGINE 10 UNITS: 100 INJECTION, SOLUTION SUBCUTANEOUS at 21:56

## 2020-08-18 RX ADMIN — HALOPERIDOL 10 MG: 10 TABLET ORAL at 21:38

## 2020-08-18 RX ADMIN — ATORVASTATIN CALCIUM 40 MG: 40 TABLET, FILM COATED ORAL at 09:08

## 2020-08-18 RX ADMIN — DIVALPROEX SODIUM 1000 MG: 500 TABLET, EXTENDED RELEASE ORAL at 09:09

## 2020-08-18 NOTE — PLAN OF CARE
Problem: Anxiety:  Goal: Level of anxiety will decrease  Description: Level of anxiety will decrease  Outcome: Met This Shift   Patient cooperative, follows directions without problems. Patient unable to express his thoughts. He will start to say something but is unable to finish a thought. Patient did well with MRI, after being told what to expect prior. Patient seemed happy to speak with Hank Guzman who called him about nursing home placement. All meals and drinks finished today.

## 2020-08-18 NOTE — PROGRESS NOTES
Occupational Therapy      Attempted OT eval.  Hold OT today per nsg, secondary to pt's psychosis.     Tien Rangel, OTR/L  #[id number]

## 2020-08-18 NOTE — BH NOTE
Patient talking to self. Talking about leaving. States he feels his sugar is low. At dinner patients FSBS was 45 @ [id number]. On recheck @ [phone] pt was 135. He states he is hungry, given sugar free pudding. Ate 100% of dinner. Having a hard time finding words when speaking and is stumbling over speech frequently. Talking nonsensically.

## 2020-08-18 NOTE — BH NOTE
Patient stated he was hungry. Sugar free pudding was offered and given. Pt observed trying to eat pudding with plastic still on the spoon and required assistance to remove it.

## 2020-08-18 NOTE — BH NOTE
Patients FSBS 457 at dinner. Patient was able to verbalize \"that's not good. \"  He does appear somewhat clearer and is talking more but remains disorganized and oriented to self only.

## 2020-08-18 NOTE — BH NOTE
PASRR completed and filed due to need for skilled nursing placement upon discharge. Anticipated Level II assessment by [facility] within 48 hours. [facility] identified as possible placement while medically admitted. Writer left contact information for Alley Fournier with [facility] for call back regarding consideration of placement. Writer spoke with Wojciech [facility] who will be completing pt's Level II assessment this evening for review. She anticipated that pt is a \"slam dunk\" for referral to skilled nursing, but stated the official ruling may take 24-48 hours.     Michele Ran MSW, LSW
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
## 2020-08-19 LAB
GLUCOSE BLD-MCNC: 262 MG/DL (ref 70–99)
GLUCOSE BLD-MCNC: 387 MG/DL (ref 70–99)
GLUCOSE BLD-MCNC: 465 MG/DL (ref 70–99)
GLUCOSE BLD-MCNC: 72 MG/DL (ref 70–99)
PERFORMED ON: ABNORMAL
PERFORMED ON: NORMAL

## 2020-08-19 PROCEDURE — 6370000000 HC RX 637 (ALT 250 FOR IP): Performed by: NURSE PRACTITIONER

## 2020-08-19 PROCEDURE — 1240000000 HC EMOTIONAL WELLNESS R&B

## 2020-08-19 PROCEDURE — 97165 OT EVAL LOW COMPLEX 30 MIN: CPT

## 2020-08-19 PROCEDURE — 99233 SBSQ HOSP IP/OBS HIGH 50: CPT | Performed by: PSYCHIATRY & NEUROLOGY

## 2020-08-19 PROCEDURE — 97535 SELF CARE MNGMENT TRAINING: CPT

## 2020-08-19 PROCEDURE — 6370000000 HC RX 637 (ALT 250 FOR IP): Performed by: PSYCHIATRY & NEUROLOGY

## 2020-08-19 RX ADMIN — HALOPERIDOL 10 MG: 10 TABLET ORAL at 21:07

## 2020-08-19 RX ADMIN — DIPHENHYDRAMINE HCL 50 MG: 25 TABLET ORAL at 21:07

## 2020-08-19 RX ADMIN — PANTOPRAZOLE SODIUM 40 MG: 40 TABLET, DELAYED RELEASE ORAL at 09:09

## 2020-08-19 RX ADMIN — ATORVASTATIN CALCIUM 40 MG: 40 TABLET, FILM COATED ORAL at 09:09

## 2020-08-19 RX ADMIN — INSULIN GLARGINE 10 UNITS: 100 INJECTION, SOLUTION SUBCUTANEOUS at 20:35

## 2020-08-19 RX ADMIN — HALOPERIDOL 10 MG: 10 TABLET ORAL at 09:09

## 2020-08-19 RX ADMIN — AMLODIPINE BESYLATE 2.5 MG: 2.5 TABLET ORAL at 09:09

## 2020-08-19 RX ADMIN — INSULIN GLARGINE 10 UNITS: 100 INJECTION, SOLUTION SUBCUTANEOUS at 10:00

## 2020-08-19 RX ADMIN — DIPHENHYDRAMINE HCL 50 MG: 25 TABLET ORAL at 09:09

## 2020-08-19 RX ADMIN — DIVALPROEX SODIUM 1000 MG: 500 TABLET, EXTENDED RELEASE ORAL at 09:09

## 2020-08-19 RX ADMIN — ASPIRIN 81 MG 81 MG: 81 TABLET ORAL at 09:09

## 2020-08-19 NOTE — BH NOTE
Writer spoke with pt's mother and stacye to provide updates and answer any questions. Writer explained PASRR process and hope to assist pt into a SNF upon discharge if approved and appropriate. Mother expressed concerns over pt's return to baseline and writer encouraged her that pt is getting better each day, but that full rehab potential is unknown at this time. Mother acknowledged this. She shared that they have been trying to reach pt, but he is frequently sleeping and they are told that he cannot come to the phone. Writer shared that he will speak with staff to encourage pt to call when he is next available. Writer and kita discussed range of rehab options for pt depending on his rehab potential as gauged by physical and occupational therapy. Beth Alonzo is hopeful that pt continues to make gains and could come home with some home health if possible. She stated that when they talk on the phone pt continues to ask for her to come get him. Writer shared that pt is typically not that communicative with staff and that pt working with PT/OT and showing more function is what we are looking for.     Hedy Cabrera MSW, LSW

## 2020-08-19 NOTE — PROGRESS NOTES
Department of Psychiatry  Attending Progress Note  Chief Complaint: Francisco J Johnson has shown minimal change over the past few days. He appears less agitated and not pacing the unit. He continues to be disoriented to place, date, LOS, and reason for being here. PASRR filed and hopefully we will be able to place [place] in a SNF. He spends most of the day in bed. He is able to be awakened. Probate hearing tomorrrow. Patient's chart was reviewed and collaborated with  about the treatment plan. SUBJECTIVE:    Patient is feeling unchanged. Suicidal ideation:  denies suicidal ideation. Patient does not have medication side effects. ROS: Patient has new complaints: no  Sleeping adequately:  Yes   Appetite adequate: Yes  Attending groups: No:   Visitors:No    OBJECTIVE    Physical  VITALS:  /73   Pulse 82   Temp 97.3 °F (36.3 °C) (Temporal)   Resp 16   Ht 6' 2\" (1.88 m)   Wt 127 lb (57.6 kg)   SpO2 97%   BMI 16.31 kg/m²     Mental Status Examination:  Patients appearance was ill-appearing. Thoughts are Paucity of Ideas. Homicidal ideations none. No abnormal movements, tics or mannerisms. Memory impaired [place] 0. Concentration Poor. Alert and oriented X 4. Insight and Judgement impaired insight. Patient was cooperative.  Patient gait normal. Mood constricted, affect flat affect Hallucinations [place], suicidal ideations no specific plan to harm self Speech increased latency of response  Data  Labs:   Admission on 08/11/2020   Component Date Value Ref Range Status    POC Glucose 08/12/2020 113* 70 - 99 mg/dl Final    Performed on 08/12/2020 ACCU-CHEK   Final    POC Glucose 08/12/2020 >600* 70 - 99 mg/dl Final    Performed on 08/12/2020 ACCU-CHEK   Final    POC Glucose 08/12/2020 >600* 70 - 99 mg/dl Final    Performed on 08/12/2020 ACCU-CHEK   Final    POC Glucose 08/12/2020 >600* 70 - 99 mg/dl Final    Performed on 08/12/2020 ACCU-CHEK   Final    POC Glucose 08/12/2020 >600* 70 - 99 mg/dl Final    Performed on 08/12/2020 ACCU-CHEK   Final    POC Glucose 08/12/2020 593* 70 - 99 mg/dl Final    Performed on 08/12/2020 ACCU-CHEK   Final    POC Glucose 08/12/2020 485* 70 - 99 mg/dl Final    Performed on 08/12/2020 ACCU-CHEK   Final    POC Glucose 08/12/2020 102* 70 - 99 mg/dl Final    Performed on 08/12/2020 ACCU-CHEK   Final    POC Glucose 08/13/2020 70  70 - 99 mg/dl Final    Performed on 08/13/2020 ACCU-CHEK   Final    POC Glucose 08/13/2020 194* 70 - 99 mg/dl Final    Performed on 08/13/2020 ACCU-CHEK   Final    POC Glucose 08/13/2020 140* 70 - 99 mg/dl Final    Performed on 08/13/2020 ACCU-CHEK   Final    POC Glucose 08/12/2020 454* 70 - 99 mg/dl Final    Performed on 08/12/2020 ACCU-CHEK   Final    POC Glucose 08/13/2020 93  70 - 99 mg/dl Final    Performed on 08/13/2020 ACCU-CHEK   Final    POC Glucose 08/13/2020 103* 70 - 99 mg/dl Final    Performed on 08/13/2020 ACCU-CHEK   Final    POC Glucose 08/13/2020 219* 70 - 99 mg/dl Final    Performed on 08/13/2020 ACCU-CHEK   Final    POC Glucose 08/13/2020 184* 70 - 99 mg/dl Final    Performed on 08/13/2020 ACCU-CHEK   Final    POC Glucose 08/13/2020 320* 70 - 99 mg/dl Final    Performed on 08/13/2020 ACCU-CHEK   Final    POC Glucose 08/14/2020 184* 70 - 99 mg/dl Final    Performed on 08/14/2020 ACCU-CHEK   Final    POC Glucose 08/14/2020 138* 70 - 99 mg/dl Final    Performed on 08/14/2020 ACCU-CHEK   Final    POC Glucose 08/14/2020 108* 70 - 99 mg/dl Final    Performed on 08/14/2020 ACCU-CHEK   Final    POC Glucose 08/14/2020 412* 70 - 99 mg/dl Final    Performed on 08/14/2020 ACCU-CHEK   Final    POC Glucose 08/14/2020 168* 70 - 99 mg/dl Final    Performed on 08/14/2020 ACCU-CHEK   Final    POC Glucose 08/15/2020 96  70 - 99 mg/dl Final    Performed on 08/15/2020 ACCU-CHEK   Final    Total Syphillis IgG/IgM 08/15/2020 Non-Reactive  Non-reactive Final    WBC 08/15/2020 9.3  4.0 - 11.0 K/uL Final    RBC 08/15/2020 4.06* 4.20 - 5.90 M/uL Final    Hemoglobin 08/15/2020 11.2* 13.5 - 17.5 g/dL Final    Hematocrit 08/15/2020 34.5* 40.5 - 52.5 % Final    MCV 08/15/2020 85.0  80.0 - 100.0 fL Final    MCH 08/15/2020 27.6  26.0 - 34.0 pg Final    MCHC 08/15/2020 32.5  31.0 - 36.0 g/dL Final    RDW 08/15/2020 16.7* 12.4 - 15.4 % Final    Platelets 65/56/1060 254  135 - 450 K/uL Final    MPV 08/15/2020 8.3  5.0 - 10.5 fL Final    Sodium 08/15/2020 138  136 - 145 mmol/L Final    Potassium 08/15/2020 4.4  3.5 - 5.1 mmol/L Final    Chloride 08/15/2020 100  99 - 110 mmol/L Final    CO2 08/15/2020 23  21 - 32 mmol/L Final    Anion Gap 08/15/2020 15  3 - 16 Final    Glucose 08/15/2020 317* 70 - 99 mg/dL Final    BUN 08/15/2020 15  7 - 20 mg/dL Final    CREATININE 08/15/2020 0.8* 0.9 - 1.3 mg/dL Final    GFR Non- 08/15/2020 >60  >60 Final    Comment: >60 mL/min/1.73m2 EGFR, calc. for ages 25 and older using the  MDRD formula (not corrected for weight), is valid for stable  renal function.  GFR  08/15/2020 >60  >60 Final    Comment: Chronic Kidney Disease: less than 60 ml/min/1.73 sq.m. Kidney Failure: less than 15 ml/min/1.73 sq.m. Results valid for patients 18 years and older.  Calcium 08/15/2020 9.7  8.3 - 10.6 mg/dL Final    Total Protein 08/15/2020 7.5  6.4 - 8.2 g/dL Final    Alb 08/15/2020 4.7  3.4 - 5.0 g/dL Final    Albumin/Globulin Ratio 08/15/2020 1.7  1.1 - 2.2 Final    Total Bilirubin 08/15/2020 <0.2  0.0 - 1.0 mg/dL Final    Alkaline Phosphatase 08/15/2020 94  40 - 129 U/L Final    ALT 08/15/2020 23  10 - 40 U/L Final    AST 08/15/2020 38* 15 - 37 U/L Final    Comment: Specimen hemolysis has exceeded the interference as defined by Roche. Value may be falsely increased. Suggest reorder and recollection if  clinically indicated.       Globulin 08/15/2020 2.8  g/dL Final    Total CK 08/15/2020 942* 39 - 308 U/L Final    POC Glucose 08/15/2020 406* 70 - 99 mg/dl Final    Performed on 08/15/2020 ACCU-CHEK   Final    POC Glucose 08/15/2020 257* 70 - 99 mg/dl Final    Performed on 08/15/2020 ACCU-CHEK   Final    POC Glucose 08/15/2020 453* 70 - 99 mg/dl Final    Performed on 08/15/2020 ACCU-CHEK   Final    Ventricular Rate 08/16/2020 88  BPM Final    Atrial Rate 08/16/2020 88  BPM Final    P-R Interval 08/16/2020 150  ms Final    QRS Duration 08/16/2020 82  ms Final    Q-T Interval 08/16/2020 392  ms Final    QTc Calculation (Bazett) 08/16/2020 474  ms Final    P Axis 08/16/2020 78  degrees Final    R Axis 08/16/2020 50  degrees Final    T Axis 08/16/2020 64  degrees Final    Diagnosis 08/16/2020 Normal sinus rhythmNormal ECGWhen compared with ECG of 11-MAY-2020 23:59,Vent.  rate has increased BY  35 BPMConfirmed by ACACIA LUCAS MD ([id number]) on 8/16/2020 11:02:40 AM   Final    POC Glucose 08/16/2020 209* 70 - 99 mg/dl Final    Performed on 08/16/2020 ACCU-CHEK   Final    POC Glucose 08/16/2020 267* 70 - 99 mg/dl Final    Performed on 08/16/2020 ACCU-CHEK   Final    POC Glucose 08/16/2020 190* 70 - 99 mg/dl Final    Performed on 08/16/2020 ACCU-CHEK   Final    POC Glucose 08/16/2020 119* 70 - 99 mg/dl Final    Performed on 08/16/2020 ACCU-CHEK   Final    POC Glucose 08/16/2020 319* 70 - 99 mg/dl Final    Performed on 08/16/2020 ACCU-CHEK   Final    POC Glucose 08/16/2020 279* 70 - 99 mg/dl Final    Performed on 08/16/2020 ACCU-CHEK   Final    POC Glucose 08/17/2020 228* 70 - 99 mg/dl Final    Performed on 08/17/2020 ACCU-CHEK   Final    POC Glucose 08/17/2020 311* 70 - 99 mg/dl Final    Performed on 08/17/2020 ACCU-CHEK   Final    POC Glucose 08/17/2020 45* 70 - 99 mg/dl Final    Performed on 08/17/2020 ACCU-CHEK   Final    POC Glucose 08/17/2020 107* 70 - 99 mg/dl Final    Performed on 08/17/2020 ACCU-CHEK   Final    POC Glucose 08/17/2020 302* 70 - 99 mg/dl Final    Performed on 08/17/2020 ACCU-CHEK   Final    POC Glucose 08/17/2020 368* 70 - 99 mg/dl Final    Performed on 08/17/2020 ACCU-CHEK   Final    POC Glucose 08/17/2020 221* 70 - 99 mg/dl Final    Performed on 08/17/2020 ACCU-CHEK   Final    POC Glucose 08/18/2020 203* 70 - 99 mg/dl Final    Performed on 08/18/2020 ACCU-CHEK   Final    POC Glucose 08/18/2020 164* 70 - 99 mg/dl Final    Performed on 08/18/2020 ACCU-CHEK   Final    POC Glucose 08/18/2020 457* 70 - 99 mg/dl Final    Performed on 08/18/2020 ACCU-CHEK   Final    POC Glucose 08/18/2020 135* 70 - 99 mg/dl Final    Performed on 08/18/2020 ACCU-CHEK   Final    POC Glucose 08/18/2020 120* 70 - 99 mg/dl Final    Performed on 08/18/2020 ACCU-CHEK   Final    POC Glucose 08/19/2020 262* 70 - 99 mg/dl Final    Performed on 08/19/2020 ACCU-CHEK   Final    POC Glucose 08/19/2020 72  70 - 99 mg/dl Final    Performed on 08/19/2020 ACCU-CHEK   Final            Medications  Current Facility-Administered Medications: nicotine (NICODERM CQ) 21 MG/24HR 1 patch, 1 patch, Transdermal, Daily  nicotine polacrilex (COMMIT) lozenge 2 mg, 2 mg, Oral, PRN  divalproex (DEPAKOTE ER) extended release tablet 1,000 mg, 1,000 mg, Oral, BID  diphenhydrAMINE (BENADRYL) injection 50 mg, 50 mg, Intravenous, Q6H PRN **AND** haloperidol lactate (HALDOL) injection 10 mg, 10 mg, Intramuscular, Q6H PRN  haloperidol (HALDOL) tablet 10 mg, 10 mg, Oral, BID **AND** diphenhydrAMINE (BENADRYL) tablet 50 mg, 50 mg, Oral, BID  insulin lispro (HUMALOG) injection vial 2 Units, 2 Units, Subcutaneous, TID WC  insulin lispro (HUMALOG) injection vial 0-6 Units, 0-6 Units, Subcutaneous, TID WC  insulin lispro (HUMALOG) injection vial 0-3 Units, 0-3 Units, Subcutaneous, Nightly  amLODIPine (NORVASC) tablet 2.5 mg, 2.5 mg, Oral, Daily  aspirin chewable tablet 81 mg, 81 mg, Oral, Daily  atorvastatin (LIPITOR) tablet 40 mg, 40 mg, Oral, Daily  insulin glargine (LANTUS) injection vial 10 Units, 10 Units, Subcutaneous, Nightly  pantoprazole (PROTONIX) tablet 40 mg, 40 mg, Oral, Daily  glucose (GLUTOSE) 40 % oral gel 15 g, 15 g, Oral, PRN  dextrose 50 % IV solution, 12.5 g, Intravenous, PRN  glucagon (rDNA) injection 1 mg, 1 mg, Intramuscular, PRN  dextrose 5 % solution, 100 mL/hr, Intravenous, PRN  glucose (GLUTOSE) 40 % oral gel 15 g, 15 g, Oral, PRN  dextrose 50 % IV solution, 12.5 g, Intravenous, PRN  glucagon (rDNA) injection 1 mg, 1 mg, Intramuscular, PRN  dextrose 5 % solution, 100 mL/hr, Intravenous, PRN  acetaminophen (TYLENOL) tablet 650 mg, 650 mg, Oral, Q4H PRN  sterile water injection 2.1 mL, 2.1 mL, Intramuscular, Q4H PRN  benztropine mesylate (COGENTIN) injection 2 mg, 2 mg, Intramuscular, BID PRN  magnesium hydroxide (MILK OF MAGNESIA) 400 MG/5ML suspension 30 mL, 30 mL, Oral, Daily PRN  aluminum & magnesium hydroxide-simethicone (MAALOX) 200-200-20 MG/5ML suspension 30 mL, 30 mL, Oral, Q6H PRN    ASSESSMENT AND PLAN    Principal Problem:    Major neurocognitive disorder, due to another medical condition, with behavioral disturbance, mild (HCC)  Active Problems:    Hypertension, essential    Uncontrolled type 1 diabetes mellitus with diabetic peripheral neuropathy (HCC)    Altered mental status    Intentional drug overdose ([facility] [address]  Resolved Problems:    * No resolved hospital problems. *       1. Patient s symptoms   are improving  2. Probable discharge is next week  3. Discharge planning is incomplete  4. Suicidal ideation is none  5. Total time with patient was 40 minutes and more than 50 % of that time was spent counseling the patient on their symptoms, treatment and expected goals.

## 2020-08-19 NOTE — PROGRESS NOTES
Department of Psychiatry  Nurse Practitioner Progress Note  Chief Complaint: [place] has been more manageable although he is disoriented and unable to be in program. He is in bed frequently. MRI brain is similar to prior MRI. Evidence of encephalomalacia, frontal and parietal lobes bilaterally and R occipital lobe. Prior ischemia or traumatic insult. Blood sugar continues to be brittle with major swings. 45 at dinner tonight and 457 at 5 pm.   He was eating pudding with the plastic still on the spoon. PASRR completed and filed for a skilled NH. Patient's chart was reviewed and collaborated with  about the treatment plan. SUBJECTIVE:    Patient is feeling better. Suicidal ideation:  [place]. Patient does not have medication side effects. ROS: Patient has new complaints: no  Sleeping adequately:  Yes   Appetite adequate: Yes  Attending groups: No:   Visitors:No    OBJECTIVE    Physical  VITALS:  /80   Pulse 72   Temp 97.9 °F (36.6 °C) (Temporal)   Resp 16   Ht 6' 2\" (1.88 m)   Wt 127 lb (57.6 kg)   SpO2 97%   BMI 16.31 kg/m²     Mental Status Examination:  Patients appearance was ill-appearing. Thoughts are Illogical. Homicidal ideations none. No abnormal movements, tics or mannerisms. Memory impaired [place] 0. Concentration Poor. Alert and oriented X 4. Insight and Judgement impaired insight. Patient impaired  was uncooperative.  Patient gait normal. Mood labile, affect labile affect Hallucinations Absent, suicidal ideations no specific plan to harm self Speech increased latency of response  Data  Labs:   Admission on 08/11/2020   Component Date Value Ref Range Status    POC Glucose 08/12/2020 113* 70 - 99 mg/dl Final    Performed on 08/12/2020 ACCU-CHEK   Final    POC Glucose 08/12/2020 >600* 70 - 99 mg/dl Final    Performed on 08/12/2020 ACCU-CHEK   Final    POC Glucose 08/12/2020 >600* 70 - 99 mg/dl Final    Performed on 08/12/2020 ACCU-CHEK   Final    POC Glucose 08/12/2020 >600* 70 - 99 mg/dl Final    Performed on 08/12/2020 ACCU-CHEK   Final    POC Glucose 08/12/2020 >600* 70 - 99 mg/dl Final    Performed on 08/12/2020 ACCU-CHEK   Final    POC Glucose 08/12/2020 593* 70 - 99 mg/dl Final    Performed on 08/12/2020 ACCU-CHEK   Final    POC Glucose 08/12/2020 485* 70 - 99 mg/dl Final    Performed on 08/12/2020 ACCU-CHEK   Final    POC Glucose 08/12/2020 102* 70 - 99 mg/dl Final    Performed on 08/12/2020 ACCU-CHEK   Final    POC Glucose 08/13/2020 70  70 - 99 mg/dl Final    Performed on 08/13/2020 ACCU-CHEK   Final    POC Glucose 08/13/2020 194* 70 - 99 mg/dl Final    Performed on 08/13/2020 ACCU-CHEK   Final    POC Glucose 08/13/2020 140* 70 - 99 mg/dl Final    Performed on 08/13/2020 ACCU-CHEK   Final    POC Glucose 08/12/2020 454* 70 - 99 mg/dl Final    Performed on 08/12/2020 ACCU-CHEK   Final    POC Glucose 08/13/2020 93  70 - 99 mg/dl Final    Performed on 08/13/2020 ACCU-CHEK   Final    POC Glucose 08/13/2020 103* 70 - 99 mg/dl Final    Performed on 08/13/2020 ACCU-CHEK   Final    POC Glucose 08/13/2020 219* 70 - 99 mg/dl Final    Performed on 08/13/2020 ACCU-CHEK   Final    POC Glucose 08/13/2020 184* 70 - 99 mg/dl Final    Performed on 08/13/2020 ACCU-CHEK   Final    POC Glucose 08/13/2020 320* 70 - 99 mg/dl Final    Performed on 08/13/2020 ACCU-CHEK   Final    POC Glucose 08/14/2020 184* 70 - 99 mg/dl Final    Performed on 08/14/2020 ACCU-CHEK   Final    POC Glucose 08/14/2020 138* 70 - 99 mg/dl Final    Performed on 08/14/2020 ACCU-CHEK   Final    POC Glucose 08/14/2020 108* 70 - 99 mg/dl Final    Performed on 08/14/2020 ACCU-CHEK   Final    POC Glucose 08/14/2020 412* 70 - 99 mg/dl Final    Performed on 08/14/2020 ACCU-CHEK   Final    POC Glucose 08/14/2020 168* 70 - 99 mg/dl Final    Performed on 08/14/2020 ACCU-CHEK   Final    POC Glucose 08/15/2020 96  70 - 99 mg/dl Final    Performed on 08/15/2020 ACCU-CHEK   Final    Total Syphillis IgG/IgM 08/15/2020 Non-Reactive  Non-reactive Final    WBC 08/15/2020 9.3  4.0 - 11.0 K/uL Final    RBC 08/15/2020 4.06* 4.20 - 5.90 M/uL Final    Hemoglobin 08/15/2020 11.2* 13.5 - 17.5 g/dL Final    Hematocrit 08/15/2020 34.5* 40.5 - 52.5 % Final    MCV 08/15/2020 85.0  80.0 - 100.0 fL Final    MCH 08/15/2020 27.6  26.0 - 34.0 pg Final    MCHC 08/15/2020 32.5  31.0 - 36.0 g/dL Final    RDW 08/15/2020 16.7* 12.4 - 15.4 % Final    Platelets 19/94/1232 254  135 - 450 K/uL Final    MPV 08/15/2020 8.3  5.0 - 10.5 fL Final    Sodium 08/15/2020 138  136 - 145 mmol/L Final    Potassium 08/15/2020 4.4  3.5 - 5.1 mmol/L Final    Chloride 08/15/2020 100  99 - 110 mmol/L Final    CO2 08/15/2020 23  21 - 32 mmol/L Final    Anion Gap 08/15/2020 15  3 - 16 Final    Glucose 08/15/2020 317* 70 - 99 mg/dL Final    BUN 08/15/2020 15  7 - 20 mg/dL Final    CREATININE 08/15/2020 0.8* 0.9 - 1.3 mg/dL Final    GFR Non- 08/15/2020 >60  >60 Final    Comment: >60 mL/min/1.73m2 EGFR, calc. for ages 25 and older using the  MDRD formula (not corrected for weight), is valid for stable  renal function.  GFR  08/15/2020 >60  >60 Final    Comment: Chronic Kidney Disease: less than 60 ml/min/1.73 sq.m. Kidney Failure: less than 15 ml/min/1.73 sq.m. Results valid for patients 18 years and older.  Calcium 08/15/2020 9.7  8.3 - 10.6 mg/dL Final    Total Protein 08/15/2020 7.5  6.4 - 8.2 g/dL Final    Alb 08/15/2020 4.7  3.4 - 5.0 g/dL Final    Albumin/Globulin Ratio 08/15/2020 1.7  1.1 - 2.2 Final    Total Bilirubin 08/15/2020 <0.2  0.0 - 1.0 mg/dL Final    Alkaline Phosphatase 08/15/2020 94  40 - 129 U/L Final    ALT 08/15/2020 23  10 - 40 U/L Final    AST 08/15/2020 38* 15 - 37 U/L Final    Comment: Specimen hemolysis has exceeded the interference as defined by Roche. Value may be falsely increased.  Suggest reorder and recollection if  clinically ACCU-CHEK   Final    POC Glucose 08/17/2020 302* 70 - 99 mg/dl Final    Performed on 08/17/2020 ACCU-CHEK   Final    POC Glucose 08/17/2020 368* 70 - 99 mg/dl Final    Performed on 08/17/2020 ACCU-CHEK   Final    POC Glucose 08/17/2020 221* 70 - 99 mg/dl Final    Performed on 08/17/2020 ACCU-CHEK   Final    POC Glucose 08/18/2020 203* 70 - 99 mg/dl Final    Performed on 08/18/2020 ACCU-CHEK   Final    POC Glucose 08/18/2020 164* 70 - 99 mg/dl Final    Performed on 08/18/2020 ACCU-CHEK   Final    POC Glucose 08/18/2020 457* 70 - 99 mg/dl Final    Performed on 08/18/2020 ACCU-CHEK   Final    POC Glucose 08/18/2020 135* 70 - 99 mg/dl Final    Performed on 08/18/2020 ACCU-CHEK   Final    POC Glucose 08/18/2020 120* 70 - 99 mg/dl Final    Performed on 08/18/2020 ACCU-CHEK   Final            Medications  Current Facility-Administered Medications: nicotine (NICODERM CQ) 21 MG/24HR 1 patch, 1 patch, Transdermal, Daily  nicotine polacrilex (COMMIT) lozenge 2 mg, 2 mg, Oral, PRN  divalproex (DEPAKOTE ER) extended release tablet 1,000 mg, 1,000 mg, Oral, BID  diphenhydrAMINE (BENADRYL) injection 50 mg, 50 mg, Intravenous, Q6H PRN **AND** haloperidol lactate (HALDOL) injection 10 mg, 10 mg, Intramuscular, Q6H PRN  haloperidol (HALDOL) tablet 10 mg, 10 mg, Oral, BID **AND** diphenhydrAMINE (BENADRYL) tablet 50 mg, 50 mg, Oral, BID  insulin lispro (HUMALOG) injection vial 2 Units, 2 Units, Subcutaneous, TID WC  insulin lispro (HUMALOG) injection vial 0-6 Units, 0-6 Units, Subcutaneous, TID WC  insulin lispro (HUMALOG) injection vial 0-3 Units, 0-3 Units, Subcutaneous, Nightly  amLODIPine (NORVASC) tablet 2.5 mg, 2.5 mg, Oral, Daily  aspirin chewable tablet 81 mg, 81 mg, Oral, Daily  atorvastatin (LIPITOR) tablet 40 mg, 40 mg, Oral, Daily  insulin glargine (LANTUS) injection vial 10 Units, 10 Units, Subcutaneous, Nightly  pantoprazole (PROTONIX) tablet 40 mg, 40 mg, Oral, Daily  glucose (GLUTOSE) 40 % oral gel 15 g, 15 g, Oral, PRN  dextrose 50 % IV solution, 12.5 g, Intravenous, PRN  glucagon (rDNA) injection 1 mg, 1 mg, Intramuscular, PRN  dextrose 5 % solution, 100 mL/hr, Intravenous, PRN  glucose (GLUTOSE) 40 % oral gel 15 g, 15 g, Oral, PRN  dextrose 50 % IV solution, 12.5 g, Intravenous, PRN  glucagon (rDNA) injection 1 mg, 1 mg, Intramuscular, PRN  dextrose 5 % solution, 100 mL/hr, Intravenous, PRN  acetaminophen (TYLENOL) tablet 650 mg, 650 mg, Oral, Q4H PRN  sterile water injection 2.1 mL, 2.1 mL, Intramuscular, Q4H PRN  benztropine mesylate (COGENTIN) injection 2 mg, 2 mg, Intramuscular, BID PRN  magnesium hydroxide (MILK OF MAGNESIA) 400 MG/5ML suspension 30 mL, 30 mL, Oral, Daily PRN  aluminum & magnesium hydroxide-simethicone (MAALOX) 200-200-20 MG/5ML suspension 30 mL, 30 mL, Oral, Q6H PRN    ASSESSMENT AND PLAN    Principal Problem:    Major neurocognitive disorder, due to another medical condition, with behavioral disturbance, mild (HCC)  Active Problems:    Hypertension, essential    Uncontrolled type 1 diabetes mellitus with diabetic peripheral neuropathy (HCC)    Altered mental status    Intentional drug overdose ([facility] [address]  Resolved Problems:    * No resolved hospital problems. *       1. Patient s symptoms   are improving  2. Probable discharge is next week  3. Discharge planning is incomplete  4. Suicidal ideation is none  5. Total time with patient was 40 minutes and more than 50 % of that time was spent counseling the patient on their symptoms, treatment and expected goals.

## 2020-08-19 NOTE — PLAN OF CARE
Pt calm and cooperative with care, denies SI, HI, and AHV. PT continues to be isolative to self, in his room all. Pt eating in his room brings his tray to nurses station, asks appropriately for drinks, towels, and new pants. pt calm and cooperative with med's and care, letting staff do finger sticks before meals.

## 2020-08-19 NOTE — PLAN OF CARE
Patient has been mostly regressed to his room & bed, sleeping at intervals. Patient was able to tell me his name & birthday, without difficulty, but was unable to answer any thing else. No agitation or anxiety noted. Patient was medication compliant.  Jeni Lomeli R.N.

## 2020-08-19 NOTE — PROGRESS NOTES
Inpatient Occupational Therapy  Evaluation and Treatment    Unit:  [facility]  Date:  8/19/2020  Patient Name:    Mercedes Gonzalez  Admitting diagnosis:  Psychosis, unspecified psychosis type [facility]) Laurence Barrow Date:  8/11/2020  Precautions/Restrictions/WB Status/ Lines/ Wounds/ Oxygen:  Up as tolerated; fall precautions  Treatment Time:  13:12-14:18  Treatment Number:  1    Patient Goals for Therapy:  \"I'm ready to go home now. \"    Recommendations for Nsg:   Assist for ADLs (including assist/supervision for self-feeding). Pt. Has poor safety awareness. Discharge Recommendations:   [x]SNF    Pt. With recent evidence of encephalomalacia, frontal and parietal lobes bilaterally and R occipital lobe. Pt. Presents with decreased cognition, poor communication skills, decreased self-care, and poor safety. Pt. Would benefit from skill OT/PT/ST to improve physical functioning or safety. Pt. Requires 24/7 assist/supervision. DME needs for discharge:   NA     AM-PAC Score: 14     Home Health S4 Level: [x] NA   [] Level 1- Standard  []  Level 2- Social  [] Level 3- Safety  []  Level 4- Sick     ACLS:  Pt. Unwilling to reach for the ACLS. \"I'm cold. \" Then pt covered his head. Asked nsg to increase temperature in patient's room. Applied second cover. HPI  Per chart note by Hu Lemons MD Date of Service:  7/15/2020    Mercedes Gonzalez is a 50 y.o. male who presents to the emergency department with hypoglycemia. History from EMS is that they were contacted when they arrived on scene the patient's glucose was low. He received an amp of glucagon in route. His last sugar on arrival here was in the mid to upper 60s. He remains altered and unable to provide history.       History per EMS is that the patient's girlfriend noticed the patient had sonorous respirations and she could not awaken the patient. She called 911.   PMHx:  Diabetes Mellitus, Gastroparesis, HTN    Per chart note by Harman Reyes MD Date of Service:  2020  Principal Problem:    Major neurocognitive disorder, due to another medical condition, with behavioral disturbance, mild (HCC)  Active Problems:    Hypertension, essential    Uncontrolled type 1 diabetes mellitus with diabetic peripheral neuropathy ([facility] [address]    Altered mental status    Intentional drug overdose ([facility] [address]    Preadmission Environment:  Pt. Poor historian. Pt. Lives   [] alone  [x]with mother and girlfriend. Per pt. He dose not have children. Per chart pt has children. Pt. Later stated that he dose not live with mother and girlfriend. Home environment:   []Apartment   []one story  [x]two story house. Pt's stated that his room is downstairs. Steps to enter first floor:    [] No steps     [x]  2 Steps to enter   [] Railings      Preadmission Status / PLOF:  History of falls   []Yes  [x]No  Pt. Drove and had his own car. [x]Yes  []No   Pt Fully independent for ADLs/IADLs. [x]Yes  []No    Pt. Required assistance from family for:  []Bathing []Dressing []Cooking []Cleaning  []Laundry  []Other :   Pt. Fully independent for transfers and gait and walked with: []No Device  []Walker   []Cane  Sleep Hygiene:  Pt. Stated he slept well. Income:  Pt. 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 Leisure Interests:  Walks, watch sports on TV  Stressors:  \"I'm afraid of everything. \"  Pt. Reassured of safety and emotional support provided. Coping Skills:  Pt. Unable to state. Pain  []Yes  [x]No  Rating:  Location:  Pain Medicine Status: [x] Denies need  [] Pain med requested  [] RN notified. Cognition    A&Ox person/. Pt. Disoriented to place, date and situation. Pt. Presents with difficulty following one step commands. Pt. Presents with lack of participation stating he was cold and afraid.   Requested temperature be elevated in room, applied 2nd blanket and provided emotional support and assurance of safety. Pt. Responded well however presented with limited participation. Decreased awareness of errors. Poor safety safety awareness. Decreased insight into limitations/situation. Pt. Presented with speaking using only 1-3 words at a time; however was able to state on time \"I need to get someone to take me to a place. \"  Pt. Focused on going home. Presented as pleasant however has decreased participation. Upper Extremity ROM:    WFL, pt able to perform all bed mobility, transfers, and gait without ROM limitation. Upper Extremity Strength:    WFL, pt able to perform all bed mobility, transfers, and gait without strength limitation. Upper Extremity Sensation:    Unable to formally assess. Skin Integrity:  [facility]     Coordination and Tone:  Decreased accuracy. Balance  Static Sitting:  [x] Good [] Fair [] Poor  Dynamic Sitting:  [x] Good [] Fair [] Poor  Static Standing: [x] Good [] Fair [] Poor  Dynamic Standing: [x] Good [] Fair [] Poor    Bed mobility:  Independent  Supine to sit:  Sit to supine:  Scooting to head of bed:  Scooting in sitting:  Rolling:  Bridging:    Transfers:    Sit to stand:   Independent  Stand to sit: Independent  Bed/Chair to/from chair:  independent     Self Care: Toileting:  Pt. Refused. Grooming:  Pt. Refused. Dressing:  Max assist  Self-Feeding:  Pt. Required supervision and vc for safety, food choices, eating smaller bites, and identify correct eating utensil. Pt. Requires set up for cutting food. Pt. Caroline Salines returning to bed during tx session between activities. Exercise / Activities Initiated:   Pt. Educated on role of OT. Pt. Participated in:  Eval, ADL retraining and safety. Assessment of Deficits:   Pt demonstrated decreased activity tolerance, decreased safety awareness, decreased cognition, decreased communication skills,decreased coordination, and decreased ADL/IADL status. Pt. Limited during evaluation by decreased cognition.     At end of evaluation, pt. In room. Goal(s) : To be met in 3 Visits:  1). Pt. To complete ACLS. 2). Pt. To demo 2 grooming activities standing at sink with min assist.     To be met in 5 Visits:  1). Pt. To 15 min turn taking activity with min vc.  2). Pt. To demo min assist for dressing. 3). Pt. To demo ability to make 3 requests from nursing with min prompts. 4). Pt. To demo supervision for toileting. Rehabilitation Potential:  Good for goals listed above. Strengths for achieving goals include:  PLOF  Barriers to achieving goals include:  Decreased Cognition     Plan: To be seen 2-5x/week while in acute care setting for therapeutic exercises/act, ADL retraining, NMR and patient/caregiver ed/instruction.      Timed Code Treatment Minutes:   56  minutes    Total Treatment Time:    66  minutes    Signature and License Number    Juno Pan OTR/L  #[id number]        If patient discharges from this facility prior to next visit, this note will serve as the Discharge Summary

## 2020-08-20 LAB
GLUCOSE BLD-MCNC: 241 MG/DL (ref 70–99)
GLUCOSE BLD-MCNC: 308 MG/DL (ref 70–99)
GLUCOSE BLD-MCNC: 442 MG/DL (ref 70–99)
GLUCOSE BLD-MCNC: 496 MG/DL (ref 70–99)
PERFORMED ON: ABNORMAL

## 2020-08-20 PROCEDURE — 6370000000 HC RX 637 (ALT 250 FOR IP): Performed by: PSYCHIATRY & NEUROLOGY

## 2020-08-20 PROCEDURE — 97530 THERAPEUTIC ACTIVITIES: CPT

## 2020-08-20 PROCEDURE — 1240000000 HC EMOTIONAL WELLNESS R&B

## 2020-08-20 PROCEDURE — 99233 SBSQ HOSP IP/OBS HIGH 50: CPT | Performed by: PSYCHIATRY & NEUROLOGY

## 2020-08-20 PROCEDURE — 6370000000 HC RX 637 (ALT 250 FOR IP): Performed by: NURSE PRACTITIONER

## 2020-08-20 PROCEDURE — 97535 SELF CARE MNGMENT TRAINING: CPT

## 2020-08-20 RX ADMIN — DIVALPROEX SODIUM 1000 MG: 500 TABLET, EXTENDED RELEASE ORAL at 08:29

## 2020-08-20 RX ADMIN — DIPHENHYDRAMINE HCL 50 MG: 25 TABLET ORAL at 20:50

## 2020-08-20 RX ADMIN — ASPIRIN 81 MG 81 MG: 81 TABLET ORAL at 08:24

## 2020-08-20 RX ADMIN — HALOPERIDOL 10 MG: 10 TABLET ORAL at 08:24

## 2020-08-20 RX ADMIN — AMLODIPINE BESYLATE 2.5 MG: 2.5 TABLET ORAL at 08:24

## 2020-08-20 RX ADMIN — ATORVASTATIN CALCIUM 40 MG: 40 TABLET, FILM COATED ORAL at 08:24

## 2020-08-20 RX ADMIN — DIVALPROEX SODIUM 1000 MG: 500 TABLET, EXTENDED RELEASE ORAL at 22:12

## 2020-08-20 RX ADMIN — HALOPERIDOL 10 MG: 10 TABLET ORAL at 20:50

## 2020-08-20 RX ADMIN — INSULIN GLARGINE 10 UNITS: 100 INJECTION, SOLUTION SUBCUTANEOUS at 20:45

## 2020-08-20 RX ADMIN — DIPHENHYDRAMINE HCL 50 MG: 25 TABLET ORAL at 08:23

## 2020-08-20 RX ADMIN — PANTOPRAZOLE SODIUM 40 MG: 40 TABLET, DELAYED RELEASE ORAL at 08:24

## 2020-08-20 NOTE — PLAN OF CARE
Problem: Mood - Altered:  Goal: Mood stable  Description: Mood stable  Outcome: Ongoing  Note: Affect flat. Becomes fixated on food \"I'm hungry. I don't want to die here. \" FSBS monitored. Fluids maintained. Voiding without difficulty. Will attempt to complete a task then gets up and walks away. Visual impairment. Gait steady. Has been cooperative.

## 2020-08-20 NOTE — BH NOTE
Awake, asking for food. Given a sandwich and milk. Pt moans out periodically. He has had very restless sleep tonight.

## 2020-08-20 NOTE — BH NOTE
Writer spoke with Benjamin Dallas from [facility] for referral. Clinical information sent for consideration of placement.     Ivania Bella MSW, LSW

## 2020-08-20 NOTE — PROGRESS NOTES
Department of Psychiatry  Attending Progress Note  Chief Complaint: Bronson Pugh was present for probate hearing today . He was pleasant nad cooperative. He was in agreement to move to a SNF at [place]. Court continued case until next hearing 9/10. Colby remains disoriented, erratic blood sugars, and at times resistant to take meds which appears to be rather random when it occurs. PASRR approved and awaiting SNF. Patient's chart was reviewed and collaborated with  about the treatment plan. SUBJECTIVE:    Patient is feeling better. Suicidal ideation:  denies suicidal ideation. Patient does not have medication side effects. ROS: Patient has new complaints: no  Sleeping adequately:  Yes   Appetite adequate: Yes  Attending groups: No:   Visitors:No    OBJECTIVE    Physical  VITALS:  BP (!) 145/87   Pulse 71   Temp 97.5 °F (36.4 °C) (Temporal)   Resp 16   Ht 6' 2\" (1.88 m)   Wt 127 lb (57.6 kg)   SpO2 100%   BMI 16.31 kg/m²     Mental Status Examination:  Patients appearance was ill-appearing. Thoughts are Illogical. Homicidal ideations none. No abnormal movements, tics or mannerisms. Memory impaired [place] 0. Concentration Poor. Alert and oriented X 4. Insight and Judgement impaired insight. Patient was cooperative.  Patient gait normal. Mood constricted, affect flat affect Hallucinations Absent, suicidal ideations no specific plan to harm self Speech increased latency of response  Data  Labs:   Admission on 08/11/2020   Component Date Value Ref Range Status    POC Glucose 08/12/2020 113* 70 - 99 mg/dl Final    Performed on 08/12/2020 ACCU-CHEK   Final    POC Glucose 08/12/2020 >600* 70 - 99 mg/dl Final    Performed on 08/12/2020 ACCU-CHEK   Final    POC Glucose 08/12/2020 >600* 70 - 99 mg/dl Final    Performed on 08/12/2020 ACCU-CHEK   Final    POC Glucose 08/12/2020 >600* 70 - 99 mg/dl Final    Performed on 08/12/2020 ACCU-CHEK   Final    POC Glucose 08/12/2020 >600* 70 - 99 mg/dl Final  Performed on 08/12/2020 ACCU-CHEK   Final    POC Glucose 08/12/2020 593* 70 - 99 mg/dl Final    Performed on 08/12/2020 ACCU-CHEK   Final    POC Glucose 08/12/2020 485* 70 - 99 mg/dl Final    Performed on 08/12/2020 ACCU-CHEK   Final    POC Glucose 08/12/2020 102* 70 - 99 mg/dl Final    Performed on 08/12/2020 ACCU-CHEK   Final    POC Glucose 08/13/2020 70  70 - 99 mg/dl Final    Performed on 08/13/2020 ACCU-CHEK   Final    POC Glucose 08/13/2020 194* 70 - 99 mg/dl Final    Performed on 08/13/2020 ACCU-CHEK   Final    POC Glucose 08/13/2020 140* 70 - 99 mg/dl Final    Performed on 08/13/2020 ACCU-CHEK   Final    POC Glucose 08/12/2020 454* 70 - 99 mg/dl Final    Performed on 08/12/2020 ACCU-CHEK   Final    POC Glucose 08/13/2020 93  70 - 99 mg/dl Final    Performed on 08/13/2020 ACCU-CHEK   Final    POC Glucose 08/13/2020 103* 70 - 99 mg/dl Final    Performed on 08/13/2020 ACCU-CHEK   Final    POC Glucose 08/13/2020 219* 70 - 99 mg/dl Final    Performed on 08/13/2020 ACCU-CHEK   Final    POC Glucose 08/13/2020 184* 70 - 99 mg/dl Final    Performed on 08/13/2020 ACCU-CHEK   Final    POC Glucose 08/13/2020 320* 70 - 99 mg/dl Final    Performed on 08/13/2020 ACCU-CHEK   Final    POC Glucose 08/14/2020 184* 70 - 99 mg/dl Final    Performed on 08/14/2020 ACCU-CHEK   Final    POC Glucose 08/14/2020 138* 70 - 99 mg/dl Final    Performed on 08/14/2020 ACCU-CHEK   Final    POC Glucose 08/14/2020 108* 70 - 99 mg/dl Final    Performed on 08/14/2020 ACCU-CHEK   Final    POC Glucose 08/14/2020 412* 70 - 99 mg/dl Final    Performed on 08/14/2020 ACCU-CHEK   Final    POC Glucose 08/14/2020 168* 70 - 99 mg/dl Final    Performed on 08/14/2020 ACCU-CHEK   Final    POC Glucose 08/15/2020 96  70 - 99 mg/dl Final    Performed on 08/15/2020 ACCU-CHEK   Final    Total Syphillis IgG/IgM 08/15/2020 Non-Reactive  Non-reactive Final    WBC 08/15/2020 9.3  4.0 - 11.0 K/uL Final    RBC 08/15/2020 4.06* 4.20 - 5.90 M/uL Final    Hemoglobin 08/15/2020 11.2* 13.5 - 17.5 g/dL Final    Hematocrit 08/15/2020 34.5* 40.5 - 52.5 % Final    MCV 08/15/2020 85.0  80.0 - 100.0 fL Final    MCH 08/15/2020 27.6  26.0 - 34.0 pg Final    MCHC 08/15/2020 32.5  31.0 - 36.0 g/dL Final    RDW 08/15/2020 16.7* 12.4 - 15.4 % Final    Platelets 26/54/2801 254  135 - 450 K/uL Final    MPV 08/15/2020 8.3  5.0 - 10.5 fL Final    Sodium 08/15/2020 138  136 - 145 mmol/L Final    Potassium 08/15/2020 4.4  3.5 - 5.1 mmol/L Final    Chloride 08/15/2020 100  99 - 110 mmol/L Final    CO2 08/15/2020 23  21 - 32 mmol/L Final    Anion Gap 08/15/2020 15  3 - 16 Final    Glucose 08/15/2020 317* 70 - 99 mg/dL Final    BUN 08/15/2020 15  7 - 20 mg/dL Final    CREATININE 08/15/2020 0.8* 0.9 - 1.3 mg/dL Final    GFR Non- 08/15/2020 >60  >60 Final    Comment: >60 mL/min/1.73m2 EGFR, calc. for ages 25 and older using the  MDRD formula (not corrected for weight), is valid for stable  renal function.  GFR  08/15/2020 >60  >60 Final    Comment: Chronic Kidney Disease: less than 60 ml/min/1.73 sq.m. Kidney Failure: less than 15 ml/min/1.73 sq.m. Results valid for patients 18 years and older.  Calcium 08/15/2020 9.7  8.3 - 10.6 mg/dL Final    Total Protein 08/15/2020 7.5  6.4 - 8.2 g/dL Final    Alb 08/15/2020 4.7  3.4 - 5.0 g/dL Final    Albumin/Globulin Ratio 08/15/2020 1.7  1.1 - 2.2 Final    Total Bilirubin 08/15/2020 <0.2  0.0 - 1.0 mg/dL Final    Alkaline Phosphatase 08/15/2020 94  40 - 129 U/L Final    ALT 08/15/2020 23  10 - 40 U/L Final    AST 08/15/2020 38* 15 - 37 U/L Final    Comment: Specimen hemolysis has exceeded the interference as defined by Roche. Value may be falsely increased. Suggest reorder and recollection if  clinically indicated.       Globulin 08/15/2020 2.8  g/dL Final    Total CK 08/15/2020 942* 39 - 308 U/L Final    POC Glucose 08/15/2020 406* 70 - 99 mg/dl Final    Performed on 08/15/2020 ACCU-CHEK   Final    POC Glucose 08/15/2020 257* 70 - 99 mg/dl Final    Performed on 08/15/2020 ACCU-CHEK   Final    POC Glucose 08/15/2020 453* 70 - 99 mg/dl Final    Performed on 08/15/2020 ACCU-CHEK   Final    Ventricular Rate 08/16/2020 88  BPM Final    Atrial Rate 08/16/2020 88  BPM Final    P-R Interval 08/16/2020 150  ms Final    QRS Duration 08/16/2020 82  ms Final    Q-T Interval 08/16/2020 392  ms Final    QTc Calculation (Bazett) 08/16/2020 474  ms Final    P Axis 08/16/2020 78  degrees Final    R Axis 08/16/2020 50  degrees Final    T Axis 08/16/2020 64  degrees Final    Diagnosis 08/16/2020 Normal sinus rhythmNormal ECGWhen compared with ECG of 11-MAY-2020 23:59,Vent.  rate has increased BY  35 BPMConfirmed by ACACIA LUCAS MD ([id number]) on 8/16/2020 11:02:40 AM   Final    POC Glucose 08/16/2020 209* 70 - 99 mg/dl Final    Performed on 08/16/2020 ACCU-CHEK   Final    POC Glucose 08/16/2020 267* 70 - 99 mg/dl Final    Performed on 08/16/2020 ACCU-CHEK   Final    POC Glucose 08/16/2020 190* 70 - 99 mg/dl Final    Performed on 08/16/2020 ACCU-CHEK   Final    POC Glucose 08/16/2020 119* 70 - 99 mg/dl Final    Performed on 08/16/2020 ACCU-CHEK   Final    POC Glucose 08/16/2020 319* 70 - 99 mg/dl Final    Performed on 08/16/2020 ACCU-CHEK   Final    POC Glucose 08/16/2020 279* 70 - 99 mg/dl Final    Performed on 08/16/2020 ACCU-CHEK   Final    POC Glucose 08/17/2020 228* 70 - 99 mg/dl Final    Performed on 08/17/2020 ACCU-CHEK   Final    POC Glucose 08/17/2020 311* 70 - 99 mg/dl Final    Performed on 08/17/2020 ACCU-CHEK   Final    POC Glucose 08/17/2020 45* 70 - 99 mg/dl Final    Performed on 08/17/2020 ACCU-CHEK   Final    POC Glucose 08/17/2020 107* 70 - 99 mg/dl Final    Performed on 08/17/2020 ACCU-CHEK   Final    POC Glucose 08/17/2020 302* 70 - 99 mg/dl Final    Performed on 08/17/2020 ACCU-CHEK   Final    POC Glucose 08/17/2020 368* 70 - Daily  atorvastatin (LIPITOR) tablet 40 mg, 40 mg, Oral, Daily  insulin glargine (LANTUS) injection vial 10 Units, 10 Units, Subcutaneous, Nightly  pantoprazole (PROTONIX) tablet 40 mg, 40 mg, Oral, Daily  glucose (GLUTOSE) 40 % oral gel 15 g, 15 g, Oral, PRN  dextrose 50 % IV solution, 12.5 g, Intravenous, PRN  glucagon (rDNA) injection 1 mg, 1 mg, Intramuscular, PRN  dextrose 5 % solution, 100 mL/hr, Intravenous, PRN  glucose (GLUTOSE) 40 % oral gel 15 g, 15 g, Oral, PRN  dextrose 50 % IV solution, 12.5 g, Intravenous, PRN  glucagon (rDNA) injection 1 mg, 1 mg, Intramuscular, PRN  dextrose 5 % solution, 100 mL/hr, Intravenous, PRN  acetaminophen (TYLENOL) tablet 650 mg, 650 mg, Oral, Q4H PRN  sterile water injection 2.1 mL, 2.1 mL, Intramuscular, Q4H PRN  benztropine mesylate (COGENTIN) injection 2 mg, 2 mg, Intramuscular, BID PRN  magnesium hydroxide (MILK OF MAGNESIA) 400 MG/5ML suspension 30 mL, 30 mL, Oral, Daily PRN  aluminum & magnesium hydroxide-simethicone (MAALOX) 200-200-20 MG/5ML suspension 30 mL, 30 mL, Oral, Q6H PRN    ASSESSMENT AND PLAN    Principal Problem:    Major neurocognitive disorder, due to another medical condition, with behavioral disturbance, mild (HCC)  Active Problems:    Hypertension, essential    Uncontrolled type 1 diabetes mellitus with diabetic peripheral neuropathy (HCC)    Altered mental status    Intentional drug overdose ([facility] [address]  Resolved Problems:    * No resolved hospital problems. *       1. Patient s symptoms   are improving  2. Probable discharge is next week  3. Discharge planning is incomplete. SNF pending  4. Suicidal ideation is none  5. Total time with patient was 40 minutes and more than 50 % of that time was spent counseling the patient on their symptoms, treatment and expected goals.

## 2020-08-20 NOTE — PROGRESS NOTES
Inpatient Occupational Therapy  Evaluation and Treatment    Unit:  [facility]  Date:  8/20/2020  Patient Name:    Kolby Muñoz  Admitting diagnosis:  Psychosis, unspecified psychosis type [facility]) Lise Goldberg Date:  8/11/2020  Precautions/Restrictions/WB Status/ Lines/ Wounds/ Oxygen:  Up as tolerated; fall precautions  Treatment Time:  14:40-15:06  Treatment Number:  2    Subjective:  Pt. Received in room and agreeable to therapy session in gathering room. Recommendations for Nsg:   Assist for ADLs (including assist/supervision for self-feeding). Pt. Has poor safety awareness. Nsg notified. Discharge Recommendations:   [x]SNF    Pt. With recent evidence of encephalomalacia, frontal and parietal lobes bilaterally and R occipital lobe. Pt. Presents with decreased cognition, poor communication skills, decreased self-care, and poor safety. Pt. Would benefit from skill OT/PT/ST to improve physical functioning or safety. Pt. Requires 24/7 assist/supervision. DME needs for discharge:   NA     AM-PAC Score: 14     Home Health S4 Level: [x] NA   [] Level 1- Standard  []  Level 2- Social  [] Level 3- Safety  []  Level 4- Sick     ACLS:  TBA    HPI  Per chart note by Maximiliano Ag MD Date of Service:  7/15/2020    Kolby Muñoz is a 50 y.o. male who presents to the emergency department with hypoglycemia. History from EMS is that they were contacted when they arrived on scene the patient's glucose was low. He received an amp of glucagon in route. His last sugar on arrival here was in the mid to upper 60s. He remains altered and unable to provide history.       History per EMS is that the patient's girlfriend noticed the patient had sonorous respirations and she could not awaken the patient. She called 911.   PMHx:  Diabetes Mellitus, Gastroparesis, HTN    Per chart note by Landon Shannon MD Date of Service:  8/19/2020  Principal Problem:    Major neurocognitive disorder, due to another medical condition, with behavioral disturbance, mild (HCC)  Active Problems:    Hypertension, essential    Uncontrolled type 1 diabetes mellitus with diabetic peripheral neuropathy (HCC)    Altered mental status    Intentional drug overdose ([facility] [address]    Pain  []Yes  [x]No  Rating:  Location:  Pain Medicine Status: [x] Denies need  [] Pain med requested  [] RN notified. Cognition    A&Ox person/. Pt. Disoriented to place, date and situation. Pt. Presents with difficulty following one step commands. Decreased awareness of errors. Poor safety safety awareness. Decreased insight into limitations/situation. Pt. intensely focused on food. Presented as pleasant however has decreased participation. Poor attention    ADL Retraining:  Self-Feeding: mod vc to remind patient dried food is in cup. Pt. Required vc to pick cup up several times. Pt. At times would walk away into room and then say I'm hungry. Pt. Reminded of food then returned to eat. Therapeutic Activity:  Pt. Participated in building tower with jenga blocks. Pt. Place 2 blocks and required max verbal and tactile cues to attempt placing more blocks. Pt. Attempted to put 2 blocks in his mouth. Pt. Has poor awareness of errors, and poor recall. Assessment:   Pt participated in self-feeding and command following activity. Pt. Required breaks from activity walking into room then returned upon request and when reminded there was more food at the table in gathering room. Pt. Hyper focused on being hungry when attempting to stack jenga blocks. Pt. Presented with difficulty following one step commands. Pt. Presents with poor cognition, poor recall, poor command following, poor safety awareness and poor awareness of errors. At end of tx, pt. In room. Goal(s) : To be met in 3 Visits:  1). Pt. To complete ACLS. 2). Pt. To demo 2 grooming activities standing at sink with min assist.     To be met in 5 Visits:  1).  Pt. To 15 min turn taking activity with min vc.  2). Pt. To demo min assist for dressing. 3). Pt. To demo ability to make 3 requests from nursing with min prompts. 4). Pt. To demo supervision for toileting. Plan:  Continue OT per POC.     Timed Code Treatment Minutes:   26 minutes    Total Treatment Time:    26 minutes    Signature and License Number    Timur Taylor OTR/L  #[id number]        If patient discharges from this facility prior to next visit, this note will serve as the Discharge Summary

## 2020-08-20 NOTE — PLAN OF CARE
Problem: Pain:  Goal: Control of acute pain  Description: Control of acute pain  Outcome: Ongoing     Problem: Falls - Risk of:  Goal: Will remain free from falls  Description: Will remain free from falls  Outcome: Ongoing     Problem: Falls - Risk of:  Goal: Absence of physical injury  Description: Absence of physical injury  Outcome: Ongoing   Colby was visible in the milieu, in and out of his room. Padmini Mott is difficult to understand as he mumbles and speaks very softly. He denied any SI,HI or AVH and any pain. He refused to allow staff to take his VS, stating \"I don't need them\". He came to the station and was agreeable to take his HS meds but when given to him, he drank the cup of water and refused the meds, just walked away from the desk and would not return as requested. He did later take all except the 4 depakote tablets- seemed very suspicious of the pills, and kept shaking his head no. FSBS was elevated and he received coverage along with his Lantus ordered. Tolerated well.

## 2020-08-20 NOTE — BH NOTE
While completing safety checks this writer witnessed pt standing at the nurses station without any pants or underwear. Pt was very disorganized and oriented to person only. He was redirected to his room and given a pair of pants;  Pt's nurse assisted pt with getting pants on.

## 2020-08-21 LAB
GLUCOSE BLD-MCNC: 115 MG/DL (ref 70–99)
GLUCOSE BLD-MCNC: 289 MG/DL (ref 70–99)
GLUCOSE BLD-MCNC: 295 MG/DL (ref 70–99)
GLUCOSE BLD-MCNC: 351 MG/DL (ref 70–99)
PERFORMED ON: ABNORMAL

## 2020-08-21 PROCEDURE — 1240000000 HC EMOTIONAL WELLNESS R&B

## 2020-08-21 PROCEDURE — 6370000000 HC RX 637 (ALT 250 FOR IP): Performed by: PSYCHIATRY & NEUROLOGY

## 2020-08-21 PROCEDURE — 99233 SBSQ HOSP IP/OBS HIGH 50: CPT | Performed by: PSYCHIATRY & NEUROLOGY

## 2020-08-21 PROCEDURE — 6370000000 HC RX 637 (ALT 250 FOR IP): Performed by: NURSE PRACTITIONER

## 2020-08-21 RX ORDER — TRAZODONE HYDROCHLORIDE 50 MG/1
50 TABLET ORAL NIGHTLY PRN
Status: DISCONTINUED | OUTPATIENT
Start: 2020-08-21 | End: 2020-08-25 | Stop reason: HOSPADM

## 2020-08-21 RX ADMIN — PANTOPRAZOLE SODIUM 40 MG: 40 TABLET, DELAYED RELEASE ORAL at 08:26

## 2020-08-21 RX ADMIN — ASPIRIN 81 MG 81 MG: 81 TABLET ORAL at 08:26

## 2020-08-21 RX ADMIN — MAGNESIUM HYDROXIDE 30 ML: 400 SUSPENSION ORAL at 00:04

## 2020-08-21 RX ADMIN — DIPHENHYDRAMINE HCL 50 MG: 25 TABLET ORAL at 08:26

## 2020-08-21 RX ADMIN — ATORVASTATIN CALCIUM 40 MG: 40 TABLET, FILM COATED ORAL at 08:26

## 2020-08-21 RX ADMIN — TRAZODONE HYDROCHLORIDE 50 MG: 50 TABLET ORAL at 23:53

## 2020-08-21 RX ADMIN — HALOPERIDOL 10 MG: 10 TABLET ORAL at 08:26

## 2020-08-21 RX ADMIN — DIPHENHYDRAMINE HCL 50 MG: 25 TABLET ORAL at 20:24

## 2020-08-21 RX ADMIN — AMLODIPINE BESYLATE 2.5 MG: 2.5 TABLET ORAL at 08:26

## 2020-08-21 RX ADMIN — DIVALPROEX SODIUM 1000 MG: 500 TABLET, EXTENDED RELEASE ORAL at 20:24

## 2020-08-21 RX ADMIN — ACETAMINOPHEN 650 MG: 325 TABLET ORAL at 07:01

## 2020-08-21 RX ADMIN — ALUMINUM HYDROXIDE, MAGNESIUM HYDROXIDE, AND SIMETHICONE 30 ML: 200; 200; 20 SUSPENSION ORAL at 00:04

## 2020-08-21 RX ADMIN — INSULIN GLARGINE 10 UNITS: 100 INJECTION, SOLUTION SUBCUTANEOUS at 20:38

## 2020-08-21 RX ADMIN — HALOPERIDOL 10 MG: 10 TABLET ORAL at 20:24

## 2020-08-21 RX ADMIN — NICOTINE POLACRILEX 2 MG: 2 LOZENGE ORAL at 22:33

## 2020-08-21 RX ADMIN — DIVALPROEX SODIUM 1000 MG: 500 TABLET, EXTENDED RELEASE ORAL at 08:26

## 2020-08-21 ASSESSMENT — PAIN SCALES - GENERAL: PAINLEVEL_OUTOF10: 0

## 2020-08-21 NOTE — PLAN OF CARE
Pt continues to be isolative to room and self, eating meals in his room, refusing to go to groups. Pt denies SI,HI,and AVH, unable during 1on1 tell this nurse why he is here or where he is. Will continue to monitor.

## 2020-08-21 NOTE — BH NOTE
Writer spoke with Lizzy Merrill from [facility] at [address] [place] [address] who shared that pt was approved for admission pending precertification. She reported that [facility] will require at minimum a rapid COVID test prior to admission. She anticipated that precert will complete next week. UPDATE: Writer spoke with pt's mother regarding acceptance into [facility]. Mother vehemently refused to accept this option for pt citing her previous experience there years ago. Writer utilized active listening while mother ventilated feelings and offered for someone from [facility] to speak about their facility. Writer had Sana contact the family. Sana shared that pt's mother and stacye were educated about new ownership and changes to the program since mother's experience. She stated that the family was more open to placement there, but still concerned about visitation despite education about nursing home visitation during [place] pandemic. They will continue to move forward with the precertification and offered virtual tour to family to ease their concern. Writer will continue to send clinical information for back up facilities should pt decline [facility].     Alan Azar MSW, LSW

## 2020-08-21 NOTE — PROGRESS NOTES
Department of Psychiatry  Attending Progress Note  Chief Complaint: Rosa Isela Black remains in room and has minimal interaction . No overall changes from earlier in the week. SN[facility] identified[facility] but family is not in favor of that placement and [facility] Patient's chart was reviewed and collaborated with  about the treatment plan. SUBJECTIVE:    Patient is feeling unchanged. Suicidal ideation:  denies suicidal ideation. Patient does not have medication side effects. ROS: Patient has new complaints: no  Sleeping adequately:  Yes   Appetite adequate: Yes  Attending groups: No:   Visitors:No    OBJECTIVE    Physical  VITALS:  /80   Pulse 71   Temp 97.3 °F (36.3 °C)   Resp 16   Ht 6' 2\" (1.88 m)   Wt 127 lb (57.6 kg)   SpO2 100%   BMI 16.31 kg/m²     Mental Status Examination:  Patients appearance was ill-appearing. Thoughts are Illogical. Homicidal ideations none. No abnormal movements, tics or mannerisms. Memory impaired Aims 0. Concentration Poor. Alert and oriented X 4. Insight and Judgement impaired insight. Patient was cooperative.  Patient gait normal. Mood constricted, affect flat affect Hallucinations Absent, suicidal ideations no specific plan to harm self Speech increased latency of response  Data  Labs:   Admission on 08/11/2020   Component Date Value Ref Range Status    POC Glucose 08/12/2020 113* 70 - 99 mg/dl Final    Performed on 08/12/2020 ACCU-CHEK   Final    POC Glucose 08/12/2020 >600* 70 - 99 mg/dl Final    Performed on 08/12/2020 ACCU-CHEK   Final    POC Glucose 08/12/2020 >600* 70 - 99 mg/dl Final    Performed on 08/12/2020 ACCU-CHEK   Final    POC Glucose 08/12/2020 >600* 70 - 99 mg/dl Final    Performed on 08/12/2020 ACCU-CHEK   Final    POC Glucose 08/12/2020 >600* 70 - 99 mg/dl Final    Performed on 08/12/2020 ACCU-CHEK   Final    POC Glucose 08/12/2020 593* 70 - 99 mg/dl Final    Performed on 08/12/2020 ACCU-CHEK   Final    POC Glucose 08/12/2020 485* 70 - 99 mg/dl Final    Performed on 08/12/2020 ACCU-CHEK   Final    POC Glucose 08/12/2020 102* 70 - 99 mg/dl Final    Performed on 08/12/2020 ACCU-CHEK   Final    POC Glucose 08/13/2020 70  70 - 99 mg/dl Final    Performed on 08/13/2020 ACCU-CHEK   Final    POC Glucose 08/13/2020 194* 70 - 99 mg/dl Final    Performed on 08/13/2020 ACCU-CHEK   Final    POC Glucose 08/13/2020 140* 70 - 99 mg/dl Final    Performed on 08/13/2020 ACCU-CHEK   Final    POC Glucose 08/12/2020 454* 70 - 99 mg/dl Final    Performed on 08/12/2020 ACCU-CHEK   Final    POC Glucose 08/13/2020 93  70 - 99 mg/dl Final    Performed on 08/13/2020 ACCU-CHEK   Final    POC Glucose 08/13/2020 103* 70 - 99 mg/dl Final    Performed on 08/13/2020 ACCU-CHEK   Final    POC Glucose 08/13/2020 219* 70 - 99 mg/dl Final    Performed on 08/13/2020 ACCU-CHEK   Final    POC Glucose 08/13/2020 184* 70 - 99 mg/dl Final    Performed on 08/13/2020 ACCU-CHEK   Final    POC Glucose 08/13/2020 320* 70 - 99 mg/dl Final    Performed on 08/13/2020 ACCU-CHEK   Final    POC Glucose 08/14/2020 184* 70 - 99 mg/dl Final    Performed on 08/14/2020 ACCU-CHEK   Final    POC Glucose 08/14/2020 138* 70 - 99 mg/dl Final    Performed on 08/14/2020 ACCU-CHEK   Final    POC Glucose 08/14/2020 108* 70 - 99 mg/dl Final    Performed on 08/14/2020 ACCU-CHEK   Final    POC Glucose 08/14/2020 412* 70 - 99 mg/dl Final    Performed on 08/14/2020 ACCU-CHEK   Final    POC Glucose 08/14/2020 168* 70 - 99 mg/dl Final    Performed on 08/14/2020 ACCU-CHEK   Final    POC Glucose 08/15/2020 96  70 - 99 mg/dl Final    Performed on 08/15/2020 ACCU-CHEK   Final    Total Syphillis IgG/IgM 08/15/2020 Non-Reactive  Non-reactive Final    WBC 08/15/2020 9.3  4.0 - 11.0 K/uL Final    RBC 08/15/2020 4.06* 4.20 - 5.90 M/uL Final    Hemoglobin 08/15/2020 11.2* 13.5 - 17.5 g/dL Final    Hematocrit 08/15/2020 34.5* 40.5 - 52.5 % Final    MCV 08/15/2020 85.0  80.0 - 100.0 fL Final    MCH 08/15/2020 27.6  26.0 - 34.0 pg Final    MCHC 08/15/2020 32.5  31.0 - 36.0 g/dL Final    RDW 08/15/2020 16.7* 12.4 - 15.4 % Final    Platelets 16/71/0650 254  135 - 450 K/uL Final    MPV 08/15/2020 8.3  5.0 - 10.5 fL Final    Sodium 08/15/2020 138  136 - 145 mmol/L Final    Potassium 08/15/2020 4.4  3.5 - 5.1 mmol/L Final    Chloride 08/15/2020 100  99 - 110 mmol/L Final    CO2 08/15/2020 23  21 - 32 mmol/L Final    Anion Gap 08/15/2020 15  3 - 16 Final    Glucose 08/15/2020 317* 70 - 99 mg/dL Final    BUN 08/15/2020 15  7 - 20 mg/dL Final    CREATININE 08/15/2020 0.8* 0.9 - 1.3 mg/dL Final    GFR Non- 08/15/2020 >60  >60 Final    Comment: >60 mL/min/1.73m2 EGFR, calc. for ages 25 and older using the  MDRD formula (not corrected for weight), is valid for stable  renal function.  GFR  08/15/2020 >60  >60 Final    Comment: Chronic Kidney Disease: less than 60 ml/min/1.73 sq.m. Kidney Failure: less than 15 ml/min/1.73 sq.m. Results valid for patients 18 years and older.  Calcium 08/15/2020 9.7  8.3 - 10.6 mg/dL Final    Total Protein 08/15/2020 7.5  6.4 - 8.2 g/dL Final    Alb 08/15/2020 4.7  3.4 - 5.0 g/dL Final    Albumin/Globulin Ratio 08/15/2020 1.7  1.1 - 2.2 Final    Total Bilirubin 08/15/2020 <0.2  0.0 - 1.0 mg/dL Final    Alkaline Phosphatase 08/15/2020 94  40 - 129 U/L Final    ALT 08/15/2020 23  10 - 40 U/L Final    AST 08/15/2020 38* 15 - 37 U/L Final    Comment: Specimen hemolysis has exceeded the interference as defined by Roche. Value may be falsely increased. Suggest reorder and recollection if  clinically indicated.       Globulin 08/15/2020 2.8  g/dL Final    Total CK 08/15/2020 942* 39 - 308 U/L Final    POC Glucose 08/15/2020 406* 70 - 99 mg/dl Final    Performed on 08/15/2020 ACCU-CHEK   Final    POC Glucose 08/15/2020 257* 70 - 99 mg/dl Final    Performed on PRN  haloperidol (HALDOL) tablet 10 mg, 10 mg, Oral, BID **AND** diphenhydrAMINE (BENADRYL) tablet 50 mg, 50 mg, Oral, BID  insulin lispro (HUMALOG) injection vial 2 Units, 2 Units, Subcutaneous, TID WC  insulin lispro (HUMALOG) injection vial 0-6 Units, 0-6 Units, Subcutaneous, TID WC  insulin lispro (HUMALOG) injection vial 0-3 Units, 0-3 Units, Subcutaneous, Nightly  amLODIPine (NORVASC) tablet 2.5 mg, 2.5 mg, Oral, Daily  aspirin chewable tablet 81 mg, 81 mg, Oral, Daily  atorvastatin (LIPITOR) tablet 40 mg, 40 mg, Oral, Daily  insulin glargine (LANTUS) injection vial 10 Units, 10 Units, Subcutaneous, Nightly  pantoprazole (PROTONIX) tablet 40 mg, 40 mg, Oral, Daily  glucose (GLUTOSE) 40 % oral gel 15 g, 15 g, Oral, PRN  dextrose 50 % IV solution, 12.5 g, Intravenous, PRN  glucagon (rDNA) injection 1 mg, 1 mg, Intramuscular, PRN  dextrose 5 % solution, 100 mL/hr, Intravenous, PRN  glucose (GLUTOSE) 40 % oral gel 15 g, 15 g, Oral, PRN  dextrose 50 % IV solution, 12.5 g, Intravenous, PRN  glucagon (rDNA) injection 1 mg, 1 mg, Intramuscular, PRN  dextrose 5 % solution, 100 mL/hr, Intravenous, PRN  acetaminophen (TYLENOL) tablet 650 mg, 650 mg, Oral, Q4H PRN  sterile water injection 2.1 mL, 2.1 mL, Intramuscular, Q4H PRN  benztropine mesylate (COGENTIN) injection 2 mg, 2 mg, Intramuscular, BID PRN  magnesium hydroxide (MILK OF MAGNESIA) 400 MG/5ML suspension 30 mL, 30 mL, Oral, Daily PRN  aluminum & magnesium hydroxide-simethicone (MAALOX) 200-200-20 MG/5ML suspension 30 mL, 30 mL, Oral, Q6H PRN    ASSESSMENT AND PLAN    Principal Problem:    Major neurocognitive disorder, due to another medical condition, with behavioral disturbance, mild (HCC)  Active Problems:    Hypertension, essential    Uncontrolled type 1 diabetes mellitus with diabetic peripheral neuropathy (HCC)    Altered mental status    Intentional drug overdose ([facility] [address]  Resolved Problems:    * No resolved hospital problems. *       1. Patient s symptoms show no change  2. Probable discharge is next week  3. Discharge planning is incomplete  4. Suicidal ideation is none  5. Total time with patient was 40 minutes and more than 50 % of that time was spent counseling the patient on their symptoms, treatment and expected goals.

## 2020-08-21 NOTE — PROGRESS NOTES
Patient awake during rounds he asked for y attention. He said , \"it's the . Janette Long Janette Long potato chips. .. my employer. .wanted bags of them. .. then rubbed his stomach, it hurts. Asked if he had an upset stomach and indigestion, he said \"yes\". I offered Maalox and he said . \"Yes\", I asked him if he had a bowel movement recently he said \"I don't remember. \" He said \"yes when asked if he needed to have a bowel movement. Based on patient complaint patient was medicated with Maalox 30 ml and milk of magnesia 30 mls. Patient took medications as offered to him. Patient just said , \"Thanks you. I... Janette Long I just don't understand. ... \"Encouraged patient to increase head of bed up to keep acid reflux down. Will continue to monitor patient.

## 2020-08-21 NOTE — PLAN OF CARE
Problem: Falls - Risk of:  Goal: Will remain free from falls  Description: Will remain free from falls  Outcome: Ongoing   Pt has been free from falls so far this shift. He has seemed steady on his feet. Problem: Activity:  Goal: Risk for activity intolerance will decrease  Description: Risk for activity intolerance will decrease  Outcome: Ongoing   Pt's HS FSBS was 308 mg/dl. Given 2 units of Humalog subcut in rear of left upper arm. Pt was also given his regular Lantus 10 units. Problem: Mood - Altered:  Goal: Mood stable  Description: Mood stable  8/21/2020 0428 by Leobardo Reyez RN  Outcome: Ongoing   Pt was unable to finish most thoughts. He would start to say something and seemed to just forget. He would get frustrated. Pt denied hearing voices. He does seem preoccupied. Pt has been less preoccupied with food overall this shift. However, his HS snack he ate very rapidly. He fell asleep around 2100 but was awake starting at about midnight. He was moan as if in pain. Given Maalox and MOM per NATI Stock. Checked bowel sounds which were active throughout. Abd seemed slightly taunt as if he had flatus. No rebound tenderness. Pt did say it hurt a bit when abd first touched but when checked thoroughly he did not seem uncomfortable. Pt afebrile. Pt did have a large BM in the early evening. Another pt was pacing and being loud, which also seemed to contribute to poor sleep. Pt had one other brief episode of moaning once or twice, then said he felt better and slept quite a bit after that.

## 2020-08-22 LAB
GLUCOSE BLD-MCNC: 173 MG/DL (ref 70–99)
GLUCOSE BLD-MCNC: 229 MG/DL (ref 70–99)
GLUCOSE BLD-MCNC: 285 MG/DL (ref 70–99)
GLUCOSE BLD-MCNC: 320 MG/DL (ref 70–99)
PERFORMED ON: ABNORMAL

## 2020-08-22 PROCEDURE — 6370000000 HC RX 637 (ALT 250 FOR IP): Performed by: PSYCHIATRY & NEUROLOGY

## 2020-08-22 PROCEDURE — 6360000002 HC RX W HCPCS: Performed by: PSYCHIATRY & NEUROLOGY

## 2020-08-22 PROCEDURE — 99233 SBSQ HOSP IP/OBS HIGH 50: CPT | Performed by: PSYCHIATRY & NEUROLOGY

## 2020-08-22 PROCEDURE — 6370000000 HC RX 637 (ALT 250 FOR IP): Performed by: NURSE PRACTITIONER

## 2020-08-22 PROCEDURE — 1240000000 HC EMOTIONAL WELLNESS R&B

## 2020-08-22 RX ORDER — DIPHENHYDRAMINE HYDROCHLORIDE 50 MG/ML
50 INJECTION INTRAMUSCULAR; INTRAVENOUS EVERY 6 HOURS PRN
Status: DISCONTINUED | OUTPATIENT
Start: 2020-08-22 | End: 2020-08-25 | Stop reason: HOSPADM

## 2020-08-22 RX ORDER — HALOPERIDOL 10 MG/1
10 TABLET ORAL EVERY 6 HOURS PRN
Status: DISCONTINUED | OUTPATIENT
Start: 2020-08-22 | End: 2020-08-25 | Stop reason: HOSPADM

## 2020-08-22 RX ORDER — CHLORPROMAZINE HYDROCHLORIDE 25 MG/1
50 TABLET, FILM COATED ORAL
Status: COMPLETED | OUTPATIENT
Start: 2020-08-22 | End: 2020-08-22

## 2020-08-22 RX ORDER — DIPHENHYDRAMINE HCL 25 MG
50 TABLET ORAL EVERY 6 HOURS PRN
Status: DISCONTINUED | OUTPATIENT
Start: 2020-08-22 | End: 2020-08-25 | Stop reason: HOSPADM

## 2020-08-22 RX ORDER — HALOPERIDOL 5 MG/ML
10 INJECTION INTRAMUSCULAR EVERY 6 HOURS PRN
Status: DISCONTINUED | OUTPATIENT
Start: 2020-08-22 | End: 2020-08-25 | Stop reason: HOSPADM

## 2020-08-22 RX ADMIN — INSULIN GLARGINE 10 UNITS: 100 INJECTION, SOLUTION SUBCUTANEOUS at 20:49

## 2020-08-22 RX ADMIN — HALOPERIDOL 10 MG: 10 TABLET ORAL at 09:41

## 2020-08-22 RX ADMIN — ATORVASTATIN CALCIUM 40 MG: 40 TABLET, FILM COATED ORAL at 09:41

## 2020-08-22 RX ADMIN — DIPHENHYDRAMINE HCL 50 MG: 25 TABLET ORAL at 09:41

## 2020-08-22 RX ADMIN — TRAZODONE HYDROCHLORIDE 50 MG: 50 TABLET ORAL at 22:29

## 2020-08-22 RX ADMIN — ASPIRIN 81 MG 81 MG: 81 TABLET ORAL at 09:44

## 2020-08-22 RX ADMIN — DIVALPROEX SODIUM 1000 MG: 500 TABLET, EXTENDED RELEASE ORAL at 21:09

## 2020-08-22 RX ADMIN — AMLODIPINE BESYLATE 2.5 MG: 2.5 TABLET ORAL at 09:41

## 2020-08-22 RX ADMIN — PANTOPRAZOLE SODIUM 40 MG: 40 TABLET, DELAYED RELEASE ORAL at 09:42

## 2020-08-22 RX ADMIN — DIVALPROEX SODIUM 1000 MG: 500 TABLET, EXTENDED RELEASE ORAL at 09:41

## 2020-08-22 RX ADMIN — DIPHENHYDRAMINE HYDROCHLORIDE 50 MG: 50 INJECTION, SOLUTION INTRAMUSCULAR; INTRAVENOUS at 19:57

## 2020-08-22 RX ADMIN — CHLORPROMAZINE HYDROCHLORIDE 50 MG: 25 TABLET, SUGAR COATED ORAL at 11:56

## 2020-08-22 RX ADMIN — HALOPERIDOL LACTATE 10 MG: 5 INJECTION, SOLUTION INTRAMUSCULAR at 19:57

## 2020-08-22 NOTE — PLAN OF CARE
Problem: Mood - Altered:  Goal: Mood stable  Description: Mood stable  Outcome: Ongoing  Cecil Lui has been isolative to his room this shift. Medication compliant and pleasant on approach. Asked for something to help him sleep. Placed call to Dr. Rebecca Strange and received new order for Trazodone 50 mg PRN at hs. PRN Trazodone given at 2353 as requested for sleep. Med effective.  Patient denies current SI/HI, denies A/V/H.

## 2020-08-22 NOTE — FLOWSHEET NOTE
08/22/20 1403   Status and Exam   Normal No   Facial Expression Worried   Affect Unstable;Constricted   Level of Consciousness Alert   Mood:Normal No   Mood Depressed; Labile; Sad   Motor Activity:Normal Yes   Interview Behavior Cooperative   Preception [place] to Person   Attention:Normal No   Attention Unable to Concentrate   Thought Processes Tangential   Thought Content:Normal No   Thought Content Preoccupations; Poverty of Content   Hallucinations None   Delusions No   Memory:Normal No   Memory Poor Recent;Confabulation   Insight and Judgment No   Insight and Judgment Poor Insight;Poor Judgment   Present Suicidal Ideation No   Present Homicidal Ideation No

## 2020-08-22 NOTE — BH NOTE
Pt c/o \"feeling like my sugar is low. \"  poc blood glucose checked, level at 229. Pt had first dose of thorazine earlier this day.

## 2020-08-22 NOTE — PROGRESS NOTES
Department of Psychiatry  Attending Progress Note  Chief Complaint: Nena Fontanez remains in room and has minimal interactions with others. No overall changes from yesterday or earlier this week. Patient is not able to form coherent sentences. Patient's chart was reviewed and collaborated with  about the treatment plan. Later in morning he was more agitated and was trying to leave there unit. Spoke with Ni Cristobal  And will add Thorazine 50 mg once and reeval    SUBJECTIVE:    Patient is feeling unchanged. Suicidal ideation: denies suicidal ideation  Patient denies    ROS: Patient has new complaints: no  Sleeping adequately:  Yes  Appetite adequate: yes  Attending groups: no  Visitors:no    OBJECTIVE    Physical  VITALS:  /64   Pulse 81   Temp 98.5 °F (36.9 °C) (Oral)   Resp 16   Ht 6' 2\" (1.88 m)   Wt 127 lb (57.6 kg)   SpO2 98%   BMI 16.31 kg/m²     Mental Status Examination:  Patients appearance ill-appearing and hospital attire. Thoughts are illogical. Homicidal ideations no  No abnormal movements, tics or mannerisms. Memory imparied Aims 0. Concentration poor. Alert and oriented X 4. Insight and Judgement impaired insight  . Patient gait normal  Bed bound . Mood constricted, affect flat and congruent with mood Hallucinations Absent  , suicidal ideations no plan Speech increased latency of response.      Data  Labs:   Admission on 08/11/2020   Component Date Value Ref Range Status    POC Glucose 08/12/2020 113* 70 - 99 mg/dl Final    Performed on 08/12/2020 ACCU-CHEK   Final    POC Glucose 08/12/2020 >600* 70 - 99 mg/dl Final    Performed on 08/12/2020 ACCU-CHEK   Final    POC Glucose 08/12/2020 >600* 70 - 99 mg/dl Final    Performed on 08/12/2020 ACCU-CHEK   Final    POC Glucose 08/12/2020 >600* 70 - 99 mg/dl Final    Performed on 08/12/2020 ACCU-CHEK   Final    POC Glucose 08/12/2020 >600* 70 - 99 mg/dl Final    Performed on 08/12/2020 ACCU-CHEK   Final    POC Glucose 08/12/2020 593* 70 - 99 mg/dl Final    Performed on 08/12/2020 ACCU-CHEK   Final    POC Glucose 08/12/2020 485* 70 - 99 mg/dl Final    Performed on 08/12/2020 ACCU-CHEK   Final    POC Glucose 08/12/2020 102* 70 - 99 mg/dl Final    Performed on 08/12/2020 ACCU-CHEK   Final    POC Glucose 08/13/2020 70  70 - 99 mg/dl Final    Performed on 08/13/2020 ACCU-CHEK   Final    POC Glucose 08/13/2020 194* 70 - 99 mg/dl Final    Performed on 08/13/2020 ACCU-CHEK   Final    POC Glucose 08/13/2020 140* 70 - 99 mg/dl Final    Performed on 08/13/2020 ACCU-CHEK   Final    POC Glucose 08/12/2020 454* 70 - 99 mg/dl Final    Performed on 08/12/2020 ACCU-CHEK   Final    POC Glucose 08/13/2020 93  70 - 99 mg/dl Final    Performed on 08/13/2020 ACCU-CHEK   Final    POC Glucose 08/13/2020 103* 70 - 99 mg/dl Final    Performed on 08/13/2020 ACCU-CHEK   Final    POC Glucose 08/13/2020 219* 70 - 99 mg/dl Final    Performed on 08/13/2020 ACCU-CHEK   Final    POC Glucose 08/13/2020 184* 70 - 99 mg/dl Final    Performed on 08/13/2020 ACCU-CHEK   Final    POC Glucose 08/13/2020 320* 70 - 99 mg/dl Final    Performed on 08/13/2020 ACCU-CHEK   Final    POC Glucose 08/14/2020 184* 70 - 99 mg/dl Final    Performed on 08/14/2020 ACCU-CHEK   Final    POC Glucose 08/14/2020 138* 70 - 99 mg/dl Final    Performed on 08/14/2020 ACCU-CHEK   Final    POC Glucose 08/14/2020 108* 70 - 99 mg/dl Final    Performed on 08/14/2020 ACCU-CHEK   Final    POC Glucose 08/14/2020 412* 70 - 99 mg/dl Final    Performed on 08/14/2020 ACCU-CHEK   Final    POC Glucose 08/14/2020 168* 70 - 99 mg/dl Final    Performed on 08/14/2020 ACCU-CHEK   Final    POC Glucose 08/15/2020 96  70 - 99 mg/dl Final    Performed on 08/15/2020 ACCU-CHEK   Final    Total Syphillis IgG/IgM 08/15/2020 Non-Reactive  Non-reactive Final    WBC 08/15/2020 9.3  4.0 - 11.0 K/uL Final    RBC 08/15/2020 4.06* 4.20 - 5.90 M/uL Final    Hemoglobin 08/15/2020 11.2* 13.5 - 17.5 g/dL Final    Hematocrit 08/15/2020 34.5* 40.5 - 52.5 % Final    MCV 08/15/2020 85.0  80.0 - 100.0 fL Final    MCH 08/15/2020 27.6  26.0 - 34.0 pg Final    MCHC 08/15/2020 32.5  31.0 - 36.0 g/dL Final    RDW 08/15/2020 16.7* 12.4 - 15.4 % Final    Platelets 68/39/4355 254  135 - 450 K/uL Final    MPV 08/15/2020 8.3  5.0 - 10.5 fL Final    Sodium 08/15/2020 138  136 - 145 mmol/L Final    Potassium 08/15/2020 4.4  3.5 - 5.1 mmol/L Final    Chloride 08/15/2020 100  99 - 110 mmol/L Final    CO2 08/15/2020 23  21 - 32 mmol/L Final    Anion Gap 08/15/2020 15  3 - 16 Final    Glucose 08/15/2020 317* 70 - 99 mg/dL Final    BUN 08/15/2020 15  7 - 20 mg/dL Final    CREATININE 08/15/2020 0.8* 0.9 - 1.3 mg/dL Final    GFR Non- 08/15/2020 >60  >60 Final    Comment: >60 mL/min/1.73m2 EGFR, calc. for ages 25 and older using the  MDRD formula (not corrected for weight), is valid for stable  renal function.  GFR  08/15/2020 >60  >60 Final    Comment: Chronic Kidney Disease: less than 60 ml/min/1.73 sq.m. Kidney Failure: less than 15 ml/min/1.73 sq.m. Results valid for patients 18 years and older.  Calcium 08/15/2020 9.7  8.3 - 10.6 mg/dL Final    Total Protein 08/15/2020 7.5  6.4 - 8.2 g/dL Final    Alb 08/15/2020 4.7  3.4 - 5.0 g/dL Final    Albumin/Globulin Ratio 08/15/2020 1.7  1.1 - 2.2 Final    Total Bilirubin 08/15/2020 <0.2  0.0 - 1.0 mg/dL Final    Alkaline Phosphatase 08/15/2020 94  40 - 129 U/L Final    ALT 08/15/2020 23  10 - 40 U/L Final    AST 08/15/2020 38* 15 - 37 U/L Final    Comment: Specimen hemolysis has exceeded the interference as defined by Roche. Value may be falsely increased. Suggest reorder and recollection if  clinically indicated.       Globulin 08/15/2020 2.8  g/dL Final    Total CK 08/15/2020 942* 39 - 308 U/L Final    POC Glucose 08/15/2020 406* 70 - 99 mg/dl Final    Performed on 08/15/2020 ACCU-CHEK   Final    POC Glucose 08/15/2020 257* 70 - 99 mg/dl Final    Performed on 08/15/2020 ACCU-CHEK   Final    POC Glucose 08/15/2020 453* 70 - 99 mg/dl Final    Performed on 08/15/2020 ACCU-CHEK   Final    Ventricular Rate 08/16/2020 88  BPM Final    Atrial Rate 08/16/2020 88  BPM Final    P-R Interval 08/16/2020 150  ms Final    QRS Duration 08/16/2020 82  ms Final    Q-T Interval 08/16/2020 392  ms Final    QTc Calculation (Bazett) 08/16/2020 474  ms Final    P Axis 08/16/2020 78  degrees Final    R Axis 08/16/2020 50  degrees Final    T Axis 08/16/2020 64  degrees Final    Diagnosis 08/16/2020 Normal sinus rhythmNormal ECGWhen compared with ECG of 11-MAY-2020 23:59,Vent.  rate has increased BY  35 BPMConfirmed by ACACIA LUCAS MD ([id number]) on 8/16/2020 11:02:40 AM   Final    POC Glucose 08/16/2020 209* 70 - 99 mg/dl Final    Performed on 08/16/2020 ACCU-CHEK   Final    POC Glucose 08/16/2020 267* 70 - 99 mg/dl Final    Performed on 08/16/2020 ACCU-CHEK   Final    POC Glucose 08/16/2020 190* 70 - 99 mg/dl Final    Performed on 08/16/2020 ACCU-CHEK   Final    POC Glucose 08/16/2020 119* 70 - 99 mg/dl Final    Performed on 08/16/2020 ACCU-CHEK   Final    POC Glucose 08/16/2020 319* 70 - 99 mg/dl Final    Performed on 08/16/2020 ACCU-CHEK   Final    POC Glucose 08/16/2020 279* 70 - 99 mg/dl Final    Performed on 08/16/2020 ACCU-CHEK   Final    POC Glucose 08/17/2020 228* 70 - 99 mg/dl Final    Performed on 08/17/2020 ACCU-CHEK   Final    POC Glucose 08/17/2020 311* 70 - 99 mg/dl Final    Performed on 08/17/2020 ACCU-CHEK   Final    POC Glucose 08/17/2020 45* 70 - 99 mg/dl Final    Performed on 08/17/2020 ACCU-CHEK   Final    POC Glucose 08/17/2020 107* 70 - 99 mg/dl Final    Performed on 08/17/2020 ACCU-CHEK   Final    POC Glucose 08/17/2020 302* 70 - 99 mg/dl Final    Performed on 08/17/2020 ACCU-CHEK   Final    POC Glucose 08/17/2020 368* 70 - 99 mg/dl Final    Performed on 08/17/2020 ACCU-CHEK   Final    POC Glucose 08/17/2020 221* 70 - 99 mg/dl Final    Performed on 08/17/2020 ACCU-CHEK   Final    POC Glucose 08/18/2020 203* 70 - 99 mg/dl Final    Performed on 08/18/2020 ACCU-CHEK   Final    POC Glucose 08/18/2020 164* 70 - 99 mg/dl Final    Performed on 08/18/2020 ACCU-CHEK   Final    POC Glucose 08/18/2020 457* 70 - 99 mg/dl Final    Performed on 08/18/2020 ACCU-CHEK   Final    POC Glucose 08/18/2020 135* 70 - 99 mg/dl Final    Performed on 08/18/2020 ACCU-CHEK   Final    POC Glucose 08/18/2020 120* 70 - 99 mg/dl Final    Performed on 08/18/2020 ACCU-CHEK   Final    POC Glucose 08/19/2020 262* 70 - 99 mg/dl Final    Performed on 08/19/2020 ACCU-CHEK   Final    POC Glucose 08/19/2020 72  70 - 99 mg/dl Final    Performed on 08/19/2020 ACCU-CHEK   Final    POC Glucose 08/19/2020 465* 70 - 99 mg/dl Final    Performed on 08/19/2020 ACCU-CHEK   Final    POC Glucose 08/19/2020 387* 70 - 99 mg/dl Final    Performed on 08/19/2020 ACCU-CHEK   Final    POC Glucose 08/20/2020 241* 70 - 99 mg/dl Final    Performed on 08/20/2020 ACCU-CHEK   Final    POC Glucose 08/20/2020 442* 70 - 99 mg/dl Final    Performed on 08/20/2020 ACCU-CHEK   Final    POC Glucose 08/20/2020 496* 70 - 99 mg/dl Final    Performed on 08/20/2020 ACCU-CHEK   Final    POC Glucose 08/20/2020 308* 70 - 99 mg/dl Final    Performed on 08/20/2020 ACCU-CHEK   Final    POC Glucose 08/21/2020 115* 70 - 99 mg/dl Final    Performed on 08/21/2020 ACCU-CHEK   Final    POC Glucose 08/21/2020 295* 70 - 99 mg/dl Final    Performed on 08/21/2020 ACCU-CHEK   Final    POC Glucose 08/21/2020 351* 70 - 99 mg/dl Final    Performed on 08/21/2020 ACCU-CHEK   Final    POC Glucose 08/21/2020 289* 70 - 99 mg/dl Final    Performed on 08/21/2020 ACCU-CHEK   Final    POC Glucose 08/22/2020 285* 70 - 99 mg/dl Final    Performed on 08/22/2020 ACCU-CHEK   Final            Medications  Current Facility-Administered Medications: traZODone (DESYREL) PLAN    Principal Problem:    Major neurocognitive disorder, due to another medical condition, with behavioral disturbance, mild (HCC)  Active Problems:    Hypertension, essential    Uncontrolled type 1 diabetes mellitus with diabetic peripheral neuropathy (HCC)    Altered mental status    Intentional drug overdose ([facility] [address]  Resolved Problems:    * No resolved hospital problems. *       1. Patient s symptoms unchanged  2. Probable discharge next week  3. Discharge planning is incomplete   4. Suicidal ideation is none   5.total time 40 minutes and more than 50% of the time was spent counseling the patient on their symptoms, treatment and expected goals.

## 2020-08-22 NOTE — PLAN OF CARE
Problem: Anxiety:  Goal: Level of anxiety will decrease  Description: Level of anxiety will decrease  Outcome: Ongoing     Problem: Falls - Risk of:  Goal: Will remain free from falls  Description: Will remain free from falls  Outcome: Ongoing     Problem: Falls - Risk of:  Goal: Absence of physical injury  Description: Absence of physical injury  Outcome: Ongoing     Problem: Mood - Altered:  Goal: Mood stable  Description: Mood stable  8/22/2020 1429 by Beba Mendoza RN  Outcome: Ongoing     Problem:  Activity:  Goal: Risk for activity intolerance will decrease  Description: Risk for activity intolerance will decrease  Outcome: Ongoing

## 2020-08-23 LAB
GLUCOSE BLD-MCNC: 127 MG/DL (ref 70–99)
GLUCOSE BLD-MCNC: 204 MG/DL (ref 70–99)
GLUCOSE BLD-MCNC: 296 MG/DL (ref 70–99)
GLUCOSE BLD-MCNC: 349 MG/DL (ref 70–99)
PERFORMED ON: ABNORMAL

## 2020-08-23 PROCEDURE — 6370000000 HC RX 637 (ALT 250 FOR IP): Performed by: PSYCHIATRY & NEUROLOGY

## 2020-08-23 PROCEDURE — 1240000000 HC EMOTIONAL WELLNESS R&B

## 2020-08-23 PROCEDURE — 6370000000 HC RX 637 (ALT 250 FOR IP): Performed by: NURSE PRACTITIONER

## 2020-08-23 RX ADMIN — DIPHENHYDRAMINE HCL 50 MG: 25 TABLET ORAL at 20:53

## 2020-08-23 RX ADMIN — HALOPERIDOL 10 MG: 10 TABLET ORAL at 20:53

## 2020-08-23 RX ADMIN — INSULIN GLARGINE 10 UNITS: 100 INJECTION, SOLUTION SUBCUTANEOUS at 21:01

## 2020-08-23 RX ADMIN — ATORVASTATIN CALCIUM 40 MG: 40 TABLET, FILM COATED ORAL at 08:46

## 2020-08-23 RX ADMIN — DIPHENHYDRAMINE HCL 50 MG: 25 TABLET ORAL at 01:58

## 2020-08-23 RX ADMIN — AMLODIPINE BESYLATE 2.5 MG: 2.5 TABLET ORAL at 08:46

## 2020-08-23 RX ADMIN — ASPIRIN 81 MG 81 MG: 81 TABLET ORAL at 08:45

## 2020-08-23 RX ADMIN — DIPHENHYDRAMINE HCL 50 MG: 25 TABLET ORAL at 08:45

## 2020-08-23 RX ADMIN — DIVALPROEX SODIUM 1000 MG: 500 TABLET, EXTENDED RELEASE ORAL at 08:45

## 2020-08-23 RX ADMIN — PANTOPRAZOLE SODIUM 40 MG: 40 TABLET, DELAYED RELEASE ORAL at 08:46

## 2020-08-23 RX ADMIN — HALOPERIDOL 10 MG: 10 TABLET ORAL at 08:45

## 2020-08-23 RX ADMIN — DIVALPROEX SODIUM 1000 MG: 500 TABLET, EXTENDED RELEASE ORAL at 20:52

## 2020-08-23 NOTE — PROGRESS NOTES
Pt was very upset that didn't get to go home today. Slammed door repeatedly and forcefully into wall despite being asked to stop. Allowed staff to give Haldol and Benadryl IM at [address] but cursed staff,calling derrogatory names. Did not attempt to become physically abusive of staff. Meds were helpful.

## 2020-08-23 NOTE — PROGRESS NOTES
Patient continues awake in bed. Patient has removed all of his clothes & declined to put his pants back on. Patient is covered with a blanket.  Yenny Lomeli R.N.

## 2020-08-23 NOTE — PLAN OF CARE
Problem: Anxiety:  Goal: Level of anxiety will decrease  Description: Level of anxiety will decrease  8/23/2020 1108 by Milvia Garcia RN  Outcome: Ongoing  8/23/2020 0249 by Sergey Taylor RN  Outcome: Ongoing     Problem: Pain:  Goal: Pain level will decrease  Description: Pain level will decrease  Outcome: Ongoing  Goal: Control of acute pain  Description: Control of acute pain  Outcome: Ongoing  Goal: Control of chronic pain  Description: Control of chronic pain  Outcome: Ongoing     Problem: Falls - Risk of:  Goal: Will remain free from falls  Description: Will remain free from falls  Outcome: Ongoing  Goal: Absence of physical injury  Description: Absence of physical injury  Outcome: Ongoing     Problem: Mood - Altered:  Goal: Mood stable  Description: Mood stable  Outcome: Ongoing     Problem:  Activity:  Goal: Risk for activity intolerance will decrease  Description: Risk for activity intolerance will decrease  Outcome: Ongoing     Problem: Coping:  Goal: Ability to adjust to condition or change in health will improve  Description: Ability to adjust to condition or change in health will improve  Outcome: Ongoing     Problem: Health Behavior:  Goal: Ability to identify and utilize available resources and services will improve  Description: Ability to identify and utilize available resources and services will improve  Outcome: Ongoing  Goal: Ability to manage health-related needs will improve  Description: Ability to manage health-related needs will improve  Outcome: Ongoing     Problem: Metabolic:  Goal: Ability to maintain appropriate glucose levels will improve  Description: Ability to maintain appropriate glucose levels will improve  Outcome: Ongoing     Problem: Nutritional:  Goal: Maintenance of adequate nutrition will improve  Description: Maintenance of adequate nutrition will improve  Outcome: Ongoing     Problem: Restraint Use - Violent/Self-Destructive Behavior:  Goal: Absence of restraint indications  Description: Absence of restraint indications  Outcome: Ongoing  Goal: Absence of restraint-related injury  Description: Absence of restraint-related injury  Outcome: Ongoing

## 2020-08-23 NOTE — PLAN OF CARE
Patient has been awake most of the shift. Patient oriented to name when called, couldn't tell writer his name, the date or where he was. Patient kept saying,\"I don't know. \"  Patient with limited verbalization & appeared to have difficulty expressing his thoughts. Patient has been taking his clothes off at intervals. Patient has voided times 2 this shift. Patient has been to the bathroom, 2 other times & did not void or have a bowel movement. Patient ate a sandwich for snack & later had 2 packs of saltines with peanut butter per request & drank a carton of milk. Patient continues awake, but resting in bed at this time, after being given haldol 10 mg po & benadryl 50 mg po at 01:58.  Stef Lomeli R.N.

## 2020-08-23 NOTE — BH NOTE
Pt remains preoccupied with being discharged, making repeated phone calls to so asking to be picked up. Pt requesting to be, \"knocked out,\" until it is time for discharge. No prns given at this time. Pt presently resting in bed.

## 2020-08-23 NOTE — BH NOTE
Pt restless this morning, preoccupied with with wanting to be discharged. Pt does not understand why he is hospitalized. Pt accepted his morning medications without issue. Pt presently resting in room.

## 2020-08-24 ENCOUNTER — APPOINTMENT (OUTPATIENT)
Dept: CT IMAGING | Age: 45
DRG: 042 | End: 2020-08-24
Attending: PSYCHIATRY & NEUROLOGY
Payer: MEDICAID

## 2020-08-24 ENCOUNTER — APPOINTMENT (OUTPATIENT)
Dept: GENERAL RADIOLOGY | Age: 45
DRG: 042 | End: 2020-08-24
Attending: PSYCHIATRY & NEUROLOGY
Payer: MEDICAID

## 2020-08-24 LAB
GLUCOSE BLD-MCNC: 142 MG/DL (ref 70–99)
GLUCOSE BLD-MCNC: 239 MG/DL (ref 70–99)
GLUCOSE BLD-MCNC: 247 MG/DL (ref 70–99)
GLUCOSE BLD-MCNC: 289 MG/DL (ref 70–99)
PERFORMED ON: ABNORMAL

## 2020-08-24 PROCEDURE — 73502 X-RAY EXAM HIP UNI 2-3 VIEWS: CPT

## 2020-08-24 PROCEDURE — 6370000000 HC RX 637 (ALT 250 FOR IP): Performed by: PSYCHIATRY & NEUROLOGY

## 2020-08-24 PROCEDURE — 99233 SBSQ HOSP IP/OBS HIGH 50: CPT | Performed by: PSYCHIATRY & NEUROLOGY

## 2020-08-24 PROCEDURE — 70450 CT HEAD/BRAIN W/O DYE: CPT

## 2020-08-24 PROCEDURE — 6370000000 HC RX 637 (ALT 250 FOR IP): Performed by: NURSE PRACTITIONER

## 2020-08-24 PROCEDURE — 1240000000 HC EMOTIONAL WELLNESS R&B

## 2020-08-24 RX ORDER — INSULIN GLARGINE 100 [IU]/ML
15 INJECTION, SOLUTION SUBCUTANEOUS NIGHTLY
Status: DISCONTINUED | OUTPATIENT
Start: 2020-08-24 | End: 2020-08-25 | Stop reason: HOSPADM

## 2020-08-24 RX ADMIN — INSULIN GLARGINE 15 UNITS: 100 INJECTION, SOLUTION SUBCUTANEOUS at 21:13

## 2020-08-24 RX ADMIN — DIVALPROEX SODIUM 1000 MG: 500 TABLET, EXTENDED RELEASE ORAL at 08:43

## 2020-08-24 RX ADMIN — DIVALPROEX SODIUM 1000 MG: 500 TABLET, EXTENDED RELEASE ORAL at 21:10

## 2020-08-24 RX ADMIN — AMLODIPINE BESYLATE 2.5 MG: 2.5 TABLET ORAL at 08:44

## 2020-08-24 RX ADMIN — TRAZODONE HYDROCHLORIDE 50 MG: 50 TABLET ORAL at 22:01

## 2020-08-24 RX ADMIN — HALOPERIDOL 10 MG: 10 TABLET ORAL at 08:44

## 2020-08-24 RX ADMIN — ACETAMINOPHEN 650 MG: 325 TABLET ORAL at 15:40

## 2020-08-24 RX ADMIN — HALOPERIDOL 10 MG: 10 TABLET ORAL at 21:10

## 2020-08-24 RX ADMIN — ASPIRIN 81 MG 81 MG: 81 TABLET ORAL at 08:46

## 2020-08-24 RX ADMIN — ATORVASTATIN CALCIUM 40 MG: 40 TABLET, FILM COATED ORAL at 08:43

## 2020-08-24 RX ADMIN — TRAZODONE HYDROCHLORIDE 50 MG: 50 TABLET ORAL at 02:19

## 2020-08-24 RX ADMIN — DIPHENHYDRAMINE HCL 50 MG: 25 TABLET ORAL at 21:10

## 2020-08-24 RX ADMIN — ACETAMINOPHEN 650 MG: 325 TABLET ORAL at 21:10

## 2020-08-24 RX ADMIN — DIPHENHYDRAMINE HCL 50 MG: 25 TABLET ORAL at 08:44

## 2020-08-24 RX ADMIN — PANTOPRAZOLE SODIUM 40 MG: 40 TABLET, DELAYED RELEASE ORAL at 08:44

## 2020-08-24 ASSESSMENT — PAIN SCALES - GENERAL
PAINLEVEL_OUTOF10: 4
PAINLEVEL_OUTOF10: 7

## 2020-08-24 NOTE — BH NOTE
[facility] Interdisciplinary Treatment Plan Note     Review Date & Time: 0900 8/24/2020    Patient was not in treatment team.    Admission Type:   Admission Type:  Involuntary    Reason for admission:  Reason for Admission: patient overdosed on insulin an depakote    Estimated Length of Stay Update:  2-3 days   Estimated Discharge Date Update: 8/27/2020    PATIENT STRENGTHS:  Patient Strengths:Strengths: No significant Physical Illness, Positive Support  Patient Strengths and Limitations:Limitations: Perceives need for assistance with self-care, External locus of control(per chart review)  Addictive Behavior:Addictive Behavior  In the past 3 months, have you felt or has someone told you that you have a problem with:  : None  Do you have a history of Chemical Use?: No  Do you have a history of Alcohol Use?: Yes  Do you have a history of Street Drug Abuse?: Yes  Histroy of Prescripton Drug Abuse?: Yes  Medical Problems:   Past Medical History:   Diagnosis Date    Diabetes mellitus ([facility] [address]     Gastroparesis     Hypertension        Risk:  Fall RiskTotal: 93  Elmer Scale Elmer Scale Score: 20  BVC Total: 2      Status EXAM:   Status and Exam  Normal: No  Facial Expression: Avoids Gaze, Flat, Sad  Affect: Constricted, Unstable  Level of Consciousness: Alert  Mood:Normal: No  Mood: Labile, Suspicious, Sad  Motor Activity:Normal: No  Motor Activity: Decreased  Interview Behavior: Impulsive, Evasive  Preception: [place] to Person  Attention:Normal: No  Attention: Distractible, Unable to Concentrate  Thought Processes: Blocking, Perseveration  Thought Content:Normal: No  Thought Content: Paranoia, Delusions  Hallucinations: None  Delusions: Yes  Delusions: Persecution(paranoia)  Memory:Normal: No  Memory: Poor Recent, Poor Remote  Insight and Judgment: No  Insight and Judgment: Unrealistic, Poor Insight, Poor Judgment  Present Suicidal Ideation: No  Present Homicidal Ideation: No    Daily Assessment Last Entry:   Daily Sleep (WDL): Exceptions to WDL         Patient Currently in Pain: No  Daily Nutrition (WDL): Within Defined Limits  Appetite Change: Normal for patient  Barriers to Nutrition: None, Other (Comment)(needs assist with set up)  Level of Assistance: Set up    Patient Monitoring:  Frequency of Checks: 4 times per hour, close    Psychiatric Symptoms:   Depression Symptoms  Depression Symptoms: Feelings of hopelessess, Feelings of helplessness, Psychomotor retardation, Sleep disturbance  Anxiety Symptoms  Anxiety Symptoms: Generalized  Catalina Symptoms  Catalina Symptoms: Pressured speech, Poor judgment, Flight of ideas     Psychosis Symptoms  Delusion Type: Persecutory, Paranoid(denies)    Suicide Risk CSSR-S:       EDUCATION:   Learner Progress Toward Treatment Goals: Reviewed goals and plan of care    Method: Individual    Outcome: Needs reinforcement and No evidence of Learning    PATIENT GOALS: Patient did not attend goals group    PLAN/TREATMENT RECOMMENDATIONS UPDATE: Patient will take medication as prescribed, eat 75% of meals, attend groups, participate in milieu activities, participate in treatment team and care planning for discharge and follow up.     GOALS UPDATE:  Time frame for Short-Term Goals: 1-2 days      Rhonda Abbasi RN

## 2020-08-24 NOTE — PROGRESS NOTES
Patient was awake in the bathroom, in the nude, standing by the sink. Patient had voided, per toilet. Patient was assisted back to bed & with putting his pants on. Patient was given trazodone 50 mg po for sleep. Patient did sit on his bed, but didn't want to lie down. Patent was slow to respond, with limited verbalization.  Wilver Lomeli R.N.

## 2020-08-24 NOTE — BH NOTE
Compliant with medication administration. Has been removing his clothing this morning. Diet taken good, in his assigned room. Cont to call out \"help me\" without being able to verbalize what he needed.

## 2020-08-24 NOTE — PLAN OF CARE
Problem: Mood - Altered:  Goal: Mood stable  Description: Mood stable  8/24/2020 1546 by Bianca Velasquez LPN  Outcome: Ongoing  Note: Flat affect. Min statements \"I can't\" or \"help me\". Compliant with medications. Has been isolative to assigned room. Needed to assist putting clothing on as he cont to take clothing off. Diet and fluids taken good. Able to ambulate in room to and from bathroom. Will state \"I can't stand up\".   8/24/2020 1527 by Bianca Velasquez LPN  Outcome: Ongoing  8/24/2020 0554 by Michael Azar RN  Outcome: Ongoing

## 2020-08-24 NOTE — BH NOTE
Writer acknowledges note from pt's mother over the weekend requesting that pt's information be sent to [facility] for consideration of placement. Writer sent clinical to facility for review. Pt is currently accepted and awaiting precert for [facility] which mother shared she does not want pt to go to depite pt being his own guardian and decision maker. Writer spoke with kita who acknowledged mother's viewpoint, but shared that she is just focused on her own experience and that kita realizes that the hospital is looking for an appropriate place. She asked that we allow a day for the new referral to take a look and then refer pt to [facility] if he is agreeable. Writer spoke with Preethi Frost from [facility] who is taking a look at the clinical and chart information and will call writer back with a decision. Preethi Frost called back and shared that [facility] will not be able to accept pt due to level of care and pt not meeting acute criteria for admission.     Onofre Woods MSW, LSW

## 2020-08-24 NOTE — BH NOTE
Writer was at nurses station assisting another patient when writer hear a loud noise come from patients room. When entering the room writer observed patient Maddy Hopper on right side at the end of the bed and wall. Writer loudly said patients name and checked for responsiveness. Patient was delayed for a few seconds when write called patients name 3 times. When ask if patient had fallen patient stated \"Yes\" when ask if patient had any pain patient reported \"Yes head and right hip pain\". Writer called out for assistance and vitals signs was obtained and patients nurses Ximena notified and in room at this time.

## 2020-08-24 NOTE — PROGRESS NOTES
Department of Psychiatry  Attending Progress Note  Chief Complaint: psychosis  Du Travis will not keep on clothes today. He was naked on the side of his bed. He has minimal insight and has been yelling out\"help me. \" He does not appear to be in distress. Awaiting NH placement. Patent's chart was reviewed and collaborated with  about the treatment plan. SUBJECTIVE:    Patient is feeling unchanged. Suicidal ideation:  denies suicidal ideation. Patient does not have medication side effects. ROS: Patient has new complaints: no  Sleeping adequately:  Yes   Appetite adequate: Yes  Attending groups: No:   Visitors:No    OBJECTIVE    Physical  VITALS:  BP (!) 149/79   Pulse 94   Temp 98 °F (36.7 °C) (Oral)   Resp 16   Ht 6' 2\" (1.88 m)   Wt 127 lb (57.6 kg)   SpO2 98%   BMI 16.31 kg/m²     Mental Status Examination:  Patients appearance was ill-appearing. Thoughts are Illogical. Homicidal ideations none. No abnormal movements, tics or mannerisms. Memory impaired Aims 0. Concentration Poor. Alert and oriented X 4. Insight and Judgement impaired insight. Patient was uncooperative.  Patient gait normal. Mood labile, affect labile affect Hallucinations Absent, suicidal ideations no specific plan to harm self Speech soft  Data  Labs:   Admission on 08/11/2020   Component Date Value Ref Range Status    POC Glucose 08/12/2020 113* 70 - 99 mg/dl Final    Performed on 08/12/2020 ACCU-CHEK   Final    POC Glucose 08/12/2020 >600* 70 - 99 mg/dl Final    Performed on 08/12/2020 ACCU-CHEK   Final    POC Glucose 08/12/2020 >600* 70 - 99 mg/dl Final    Performed on 08/12/2020 ACCU-CHEK   Final    POC Glucose 08/12/2020 >600* 70 - 99 mg/dl Final    Performed on 08/12/2020 ACCU-CHEK   Final    POC Glucose 08/12/2020 >600* 70 - 99 mg/dl Final    Performed on 08/12/2020 ACCU-CHEK   Final    POC Glucose 08/12/2020 593* 70 - 99 mg/dl Final    Performed on 08/12/2020 ACCU-CHEK   Final    POC Glucose 08/12/2020 485* 70 - 99 mg/dl Final    Performed on 08/12/2020 ACCU-CHEK   Final    POC Glucose 08/12/2020 102* 70 - 99 mg/dl Final    Performed on 08/12/2020 ACCU-CHEK   Final    POC Glucose 08/13/2020 70  70 - 99 mg/dl Final    Performed on 08/13/2020 ACCU-CHEK   Final    POC Glucose 08/13/2020 194* 70 - 99 mg/dl Final    Performed on 08/13/2020 ACCU-CHEK   Final    POC Glucose 08/13/2020 140* 70 - 99 mg/dl Final    Performed on 08/13/2020 ACCU-CHEK   Final    POC Glucose 08/12/2020 454* 70 - 99 mg/dl Final    Performed on 08/12/2020 ACCU-CHEK   Final    POC Glucose 08/13/2020 93  70 - 99 mg/dl Final    Performed on 08/13/2020 ACCU-CHEK   Final    POC Glucose 08/13/2020 103* 70 - 99 mg/dl Final    Performed on 08/13/2020 ACCU-CHEK   Final    POC Glucose 08/13/2020 219* 70 - 99 mg/dl Final    Performed on 08/13/2020 ACCU-CHEK   Final    POC Glucose 08/13/2020 184* 70 - 99 mg/dl Final    Performed on 08/13/2020 ACCU-CHEK   Final    POC Glucose 08/13/2020 320* 70 - 99 mg/dl Final    Performed on 08/13/2020 ACCU-CHEK   Final    POC Glucose 08/14/2020 184* 70 - 99 mg/dl Final    Performed on 08/14/2020 ACCU-CHEK   Final    POC Glucose 08/14/2020 138* 70 - 99 mg/dl Final    Performed on 08/14/2020 ACCU-CHEK   Final    POC Glucose 08/14/2020 108* 70 - 99 mg/dl Final    Performed on 08/14/2020 ACCU-CHEK   Final    POC Glucose 08/14/2020 412* 70 - 99 mg/dl Final    Performed on 08/14/2020 ACCU-CHEK   Final    POC Glucose 08/14/2020 168* 70 - 99 mg/dl Final    Performed on 08/14/2020 ACCU-CHEK   Final    POC Glucose 08/15/2020 96  70 - 99 mg/dl Final    Performed on 08/15/2020 ACCU-CHEK   Final    Total Syphillis IgG/IgM 08/15/2020 Non-Reactive  Non-reactive Final    WBC 08/15/2020 9.3  4.0 - 11.0 K/uL Final    RBC 08/15/2020 4.06* 4.20 - 5.90 M/uL Final    Hemoglobin 08/15/2020 11.2* 13.5 - 17.5 g/dL Final    Hematocrit 08/15/2020 34.5* 40.5 - 52.5 % Final    MCV 08/15/2020 85.0  80.0 - 100.0 fL Final    MCH 08/15/2020 27.6  26.0 - 34.0 pg Final    MCHC 08/15/2020 32.5  31.0 - 36.0 g/dL Final    RDW 08/15/2020 16.7* 12.4 - 15.4 % Final    Platelets 69/97/9442 254  135 - 450 K/uL Final    MPV 08/15/2020 8.3  5.0 - 10.5 fL Final    Sodium 08/15/2020 138  136 - 145 mmol/L Final    Potassium 08/15/2020 4.4  3.5 - 5.1 mmol/L Final    Chloride 08/15/2020 100  99 - 110 mmol/L Final    CO2 08/15/2020 23  21 - 32 mmol/L Final    Anion Gap 08/15/2020 15  3 - 16 Final    Glucose 08/15/2020 317* 70 - 99 mg/dL Final    BUN 08/15/2020 15  7 - 20 mg/dL Final    CREATININE 08/15/2020 0.8* 0.9 - 1.3 mg/dL Final    GFR Non- 08/15/2020 >60  >60 Final    Comment: >60 mL/min/1.73m2 EGFR, calc. for ages 25 and older using the  MDRD formula (not corrected for weight), is valid for stable  renal function.  GFR  08/15/2020 >60  >60 Final    Comment: Chronic Kidney Disease: less than 60 ml/min/1.73 sq.m. Kidney Failure: less than 15 ml/min/1.73 sq.m. Results valid for patients 18 years and older.  Calcium 08/15/2020 9.7  8.3 - 10.6 mg/dL Final    Total Protein 08/15/2020 7.5  6.4 - 8.2 g/dL Final    Alb 08/15/2020 4.7  3.4 - 5.0 g/dL Final    Albumin/Globulin Ratio 08/15/2020 1.7  1.1 - 2.2 Final    Total Bilirubin 08/15/2020 <0.2  0.0 - 1.0 mg/dL Final    Alkaline Phosphatase 08/15/2020 94  40 - 129 U/L Final    ALT 08/15/2020 23  10 - 40 U/L Final    AST 08/15/2020 38* 15 - 37 U/L Final    Comment: Specimen hemolysis has exceeded the interference as defined by Roche. Value may be falsely increased. Suggest reorder and recollection if  clinically indicated.       Globulin 08/15/2020 2.8  g/dL Final    Total CK 08/15/2020 942* 39 - 308 U/L Final    POC Glucose 08/15/2020 406* 70 - 99 mg/dl Final    Performed on 08/15/2020 ACCU-CHEK   Final    POC Glucose 08/15/2020 257* 70 - 99 mg/dl Final    Performed on 08/15/2020 ACCU-CHEK   Final    POC Glucose 08/15/2020 453* 70 - 99 mg/dl Final    Performed on 08/15/2020 ACCU-CHEK   Final    Ventricular Rate 08/16/2020 88  BPM Final    Atrial Rate 08/16/2020 88  BPM Final    P-R Interval 08/16/2020 150  ms Final    QRS Duration 08/16/2020 82  ms Final    Q-T Interval 08/16/2020 392  ms Final    QTc Calculation (Bazett) 08/16/2020 474  ms Final    P Axis 08/16/2020 78  degrees Final    R Axis 08/16/2020 50  degrees Final    T Axis 08/16/2020 64  degrees Final    Diagnosis 08/16/2020 Normal sinus rhythmNormal ECGWhen compared with ECG of 11-MAY-2020 23:59,Vent.  rate has increased BY  35 BPMConfirmed by ACACIA LUCAS MD ([id number]) on 8/16/2020 11:02:40 AM   Final    POC Glucose 08/16/2020 209* 70 - 99 mg/dl Final    Performed on 08/16/2020 ACCU-CHEK   Final    POC Glucose 08/16/2020 267* 70 - 99 mg/dl Final    Performed on 08/16/2020 ACCU-CHEK   Final    POC Glucose 08/16/2020 190* 70 - 99 mg/dl Final    Performed on 08/16/2020 ACCU-CHEK   Final    POC Glucose 08/16/2020 119* 70 - 99 mg/dl Final    Performed on 08/16/2020 ACCU-CHEK   Final    POC Glucose 08/16/2020 319* 70 - 99 mg/dl Final    Performed on 08/16/2020 ACCU-CHEK   Final    POC Glucose 08/16/2020 279* 70 - 99 mg/dl Final    Performed on 08/16/2020 ACCU-CHEK   Final    POC Glucose 08/17/2020 228* 70 - 99 mg/dl Final    Performed on 08/17/2020 ACCU-CHEK   Final    POC Glucose 08/17/2020 311* 70 - 99 mg/dl Final    Performed on 08/17/2020 ACCU-CHEK   Final    POC Glucose 08/17/2020 45* 70 - 99 mg/dl Final    Performed on 08/17/2020 ACCU-CHEK   Final    POC Glucose 08/17/2020 107* 70 - 99 mg/dl Final    Performed on 08/17/2020 ACCU-CHEK   Final    POC Glucose 08/17/2020 302* 70 - 99 mg/dl Final    Performed on 08/17/2020 ACCU-CHEK   Final    POC Glucose 08/17/2020 368* 70 - 99 mg/dl Final    Performed on 08/17/2020 ACCU-CHEK   Final    POC Glucose 08/17/2020 221* 70 - 99 mg/dl Final    Performed on 08/17/2020 ACCU-CHEK   Final    POC Glucose 08/18/2020 203* 70 - 99 mg/dl Final    Performed on 08/18/2020 ACCU-CHEK   Final    POC Glucose 08/18/2020 164* 70 - 99 mg/dl Final    Performed on 08/18/2020 ACCU-CHEK   Final    POC Glucose 08/18/2020 457* 70 - 99 mg/dl Final    Performed on 08/18/2020 ACCU-CHEK   Final    POC Glucose 08/18/2020 135* 70 - 99 mg/dl Final    Performed on 08/18/2020 ACCU-CHEK   Final    POC Glucose 08/18/2020 120* 70 - 99 mg/dl Final    Performed on 08/18/2020 ACCU-CHEK   Final    POC Glucose 08/19/2020 262* 70 - 99 mg/dl Final    Performed on 08/19/2020 ACCU-CHEK   Final    POC Glucose 08/19/2020 72  70 - 99 mg/dl Final    Performed on 08/19/2020 ACCU-CHEK   Final    POC Glucose 08/19/2020 465* 70 - 99 mg/dl Final    Performed on 08/19/2020 ACCU-CHEK   Final    POC Glucose 08/19/2020 387* 70 - 99 mg/dl Final    Performed on 08/19/2020 ACCU-CHEK   Final    POC Glucose 08/20/2020 241* 70 - 99 mg/dl Final    Performed on 08/20/2020 ACCU-CHEK   Final    POC Glucose 08/20/2020 442* 70 - 99 mg/dl Final    Performed on 08/20/2020 ACCU-CHEK   Final    POC Glucose 08/20/2020 496* 70 - 99 mg/dl Final    Performed on 08/20/2020 ACCU-CHEK   Final    POC Glucose 08/20/2020 308* 70 - 99 mg/dl Final    Performed on 08/20/2020 ACCU-CHEK   Final    POC Glucose 08/21/2020 115* 70 - 99 mg/dl Final    Performed on 08/21/2020 ACCU-CHEK   Final    POC Glucose 08/21/2020 295* 70 - 99 mg/dl Final    Performed on 08/21/2020 ACCU-CHEK   Final    POC Glucose 08/21/2020 351* 70 - 99 mg/dl Final    Performed on 08/21/2020 ACCU-CHEK   Final    POC Glucose 08/21/2020 289* 70 - 99 mg/dl Final    Performed on 08/21/2020 ACCU-CHEK   Final    POC Glucose 08/22/2020 285* 70 - 99 mg/dl Final    Performed on 08/22/2020 ACCU-CHEK   Final    POC Glucose 08/22/2020 320* 70 - 99 mg/dl Final    Performed on 08/22/2020 ACCU-CHEK   Final    POC Glucose 08/22/2020 229* 70 - 99 mg/dl Final    Performed on 08/22/2020 ACCU-CHEK Final    POC Glucose 08/22/2020 173* 70 - 99 mg/dl Final    Performed on 08/22/2020 ACCU-CHEK   Final    POC Glucose 08/23/2020 127* 70 - 99 mg/dl Final    Performed on 08/23/2020 ACCU-CHEK   Final    POC Glucose 08/23/2020 204* 70 - 99 mg/dl Final    Performed on 08/23/2020 ACCU-CHEK   Final    POC Glucose 08/23/2020 296* 70 - 99 mg/dl Final    Performed on 08/23/2020 ACCU-CHEK   Final    POC Glucose 08/23/2020 349* 70 - 99 mg/dl Final    Performed on 08/23/2020 ACCU-CHEK   Final    POC Glucose 08/24/2020 289* 70 - 99 mg/dl Final    Performed on 08/24/2020 ACCU-CHEK   Final            Medications  Current Facility-Administered Medications: insulin glargine (LANTUS) injection vial 15 Units, 15 Units, Subcutaneous, Nightly  insulin lispro (HUMALOG) injection vial 4 Units, 4 Units, Subcutaneous, TID WC  insulin lispro (HUMALOG) injection vial 0-12 Units, 0-12 Units, Subcutaneous, TID WC  insulin lispro (HUMALOG) injection vial 0-6 Units, 0-6 Units, Subcutaneous, Nightly  diphenhydrAMINE (BENADRYL) tablet 50 mg, 50 mg, Oral, Q6H PRN **OR** haloperidol (HALDOL) tablet 10 mg, 10 mg, Oral, Q6H PRN  diphenhydrAMINE (BENADRYL) injection 50 mg, 50 mg, Intramuscular, Q6H PRN **AND** haloperidol lactate (HALDOL) injection 10 mg, 10 mg, Intramuscular, Q6H PRN  traZODone (DESYREL) tablet 50 mg, 50 mg, Oral, Nightly PRN  nicotine (NICODERM CQ) 21 MG/24HR 1 patch, 1 patch, Transdermal, Daily  nicotine polacrilex (COMMIT) lozenge 2 mg, 2 mg, Oral, PRN  divalproex (DEPAKOTE ER) extended release tablet 1,000 mg, 1,000 mg, Oral, BID  haloperidol (HALDOL) tablet 10 mg, 10 mg, Oral, BID **AND** diphenhydrAMINE (BENADRYL) tablet 50 mg, 50 mg, Oral, BID  amLODIPine (NORVASC) tablet 2.5 mg, 2.5 mg, Oral, Daily  aspirin chewable tablet 81 mg, 81 mg, Oral, Daily  atorvastatin (LIPITOR) tablet 40 mg, 40 mg, Oral, Daily  pantoprazole (PROTONIX) tablet 40 mg, 40 mg, Oral, Daily  glucose (GLUTOSE) 40 % oral gel 15 g, 15 g, Oral, PRN  dextrose 50 % IV solution, 12.5 g, Intravenous, PRN  glucagon (rDNA) injection 1 mg, 1 mg, Intramuscular, PRN  dextrose 5 % solution, 100 mL/hr, Intravenous, PRN  glucose (GLUTOSE) 40 % oral gel 15 g, 15 g, Oral, PRN  dextrose 50 % IV solution, 12.5 g, Intravenous, PRN  glucagon (rDNA) injection 1 mg, 1 mg, Intramuscular, PRN  dextrose 5 % solution, 100 mL/hr, Intravenous, PRN  acetaminophen (TYLENOL) tablet 650 mg, 650 mg, Oral, Q4H PRN  sterile water injection 2.1 mL, 2.1 mL, Intramuscular, Q4H PRN  benztropine mesylate (COGENTIN) injection 2 mg, 2 mg, Intramuscular, BID PRN  magnesium hydroxide (MILK OF MAGNESIA) 400 MG/5ML suspension 30 mL, 30 mL, Oral, Daily PRN  aluminum & magnesium hydroxide-simethicone (MAALOX) 200-200-20 MG/5ML suspension 30 mL, 30 mL, Oral, Q6H PRN    ASSESSMENT AND PLAN    Principal Problem:    Major neurocognitive disorder, due to another medical condition, with behavioral disturbance, mild (HCC)  Active Problems:    Hypertension, essential    Uncontrolled type 1 diabetes mellitus with diabetic peripheral neuropathy (HCC)    Altered mental status    Intentional drug overdose ([facility] [address]  Resolved Problems:    * No resolved hospital problems. *       1. Patient s symptoms   show no change  2. Probable discharge is 1-2 days  3. Discharge planning is incomplete  4. Suicidal ideation is none  5. Total time with patient was 40 minutes and more than 50 % of that time was spent counseling the patient on their symptoms, treatment and expected goals.

## 2020-08-24 NOTE — PLAN OF CARE
Patient has been regressed to his room & mostly his bed all shift. Patient has been mostly non-verbal. When asked orientation questions in 1:1, patient kept answering,\"No, No, No.\"  Patient did talk to his mother briefly, on the phone early in the shift & was verbal & appeared to of responded appropriately. Patient was cooperative, with vitals, blood sugar & medications. Patient only slept about 2 hours, after receiving trazodone at 02:20. Patient continues took to take off his clothes at intervals & requires direction to put clothes back on. Patient ate a sandwich & drank 1 carton of milk for evening snack. Patient has voided x 3 & had 1 moderate formed bowel movement.  Elisabeth Lomeli R.N.

## 2020-08-24 NOTE — PROGRESS NOTES
Writer called to evaluate pt. Due to recent fall, see NATI Lucio LPN focus note. Pt. Awake, alert, yet confused to time, place, and location. Pt. C/o \"both hips hurting and I hit my head\". Charge RN RONNELL Girard Pain has notified Dr. Barrett Power and MD requested imaging of pt. Rt. Hip, CT of head, and sitter at bedside related to recent fall. Xray notified of need for testing. Update given to shift SHERI Mccloud.  [facility]

## 2020-08-25 VITALS
RESPIRATION RATE: 14 BRPM | WEIGHT: 127 LBS | SYSTOLIC BLOOD PRESSURE: 149 MMHG | DIASTOLIC BLOOD PRESSURE: 96 MMHG | OXYGEN SATURATION: 97 % | HEART RATE: 84 BPM | BODY MASS INDEX: 16.3 KG/M2 | TEMPERATURE: 97.3 F | HEIGHT: 74 IN

## 2020-08-25 LAB
GLUCOSE BLD-MCNC: 232 MG/DL (ref 70–99)
GLUCOSE BLD-MCNC: 298 MG/DL (ref 70–99)
GLUCOSE BLD-MCNC: 348 MG/DL (ref 70–99)
PERFORMED ON: ABNORMAL

## 2020-08-25 PROCEDURE — 6370000000 HC RX 637 (ALT 250 FOR IP): Performed by: PSYCHIATRY & NEUROLOGY

## 2020-08-25 PROCEDURE — 6370000000 HC RX 637 (ALT 250 FOR IP): Performed by: NURSE PRACTITIONER

## 2020-08-25 PROCEDURE — 5130000000 HC BRIDGE APPOINTMENT

## 2020-08-25 PROCEDURE — 99239 HOSP IP/OBS DSCHRG MGMT >30: CPT | Performed by: PSYCHIATRY & NEUROLOGY

## 2020-08-25 RX ORDER — AMLODIPINE BESYLATE 2.5 MG/1
2.5 TABLET ORAL DAILY
Qty: 30 TABLET | Refills: 0 | Status: SHIPPED | OUTPATIENT
Start: 2020-08-25 | End: 2020-12-16 | Stop reason: SDUPTHER

## 2020-08-25 RX ORDER — DIVALPROEX SODIUM 500 MG/1
1000 TABLET, EXTENDED RELEASE ORAL 2 TIMES DAILY
Qty: 60 TABLET | Refills: 0 | Status: SHIPPED | OUTPATIENT
Start: 2020-08-25

## 2020-08-25 RX ORDER — ATORVASTATIN CALCIUM 40 MG/1
40 TABLET, FILM COATED ORAL DAILY
Qty: 30 TABLET | Refills: 0 | Status: SHIPPED | OUTPATIENT
Start: 2020-08-25 | End: 2020-09-24

## 2020-08-25 RX ORDER — GLUCOSAMINE HCL/CHONDROITIN SU 500-400 MG
CAPSULE ORAL
Qty: 150 STRIP | Refills: 0 | Status: SHIPPED | OUTPATIENT
Start: 2020-08-25

## 2020-08-25 RX ORDER — LANCETS 30 GAUGE
1 EACH MISCELLANEOUS DAILY
Qty: 150 EACH | Refills: 0 | Status: ON HOLD | OUTPATIENT
Start: 2020-08-25 | End: 2020-12-11 | Stop reason: SDUPTHER

## 2020-08-25 RX ORDER — HALOPERIDOL 10 MG/1
10 TABLET ORAL 2 TIMES DAILY
Qty: 60 TABLET | Refills: 0 | Status: SHIPPED | OUTPATIENT
Start: 2020-08-25

## 2020-08-25 RX ORDER — BLOOD SUGAR DIAGNOSTIC
1 STRIP MISCELLANEOUS 3 TIMES DAILY
Qty: 100 EACH | Refills: 0 | Status: ON HOLD | OUTPATIENT
Start: 2020-08-25 | End: 2020-12-11 | Stop reason: SDUPTHER

## 2020-08-25 RX ORDER — PANTOPRAZOLE SODIUM 40 MG/1
40 TABLET, DELAYED RELEASE ORAL DAILY
Qty: 30 TABLET | Refills: 0 | Status: SHIPPED | OUTPATIENT
Start: 2020-08-25

## 2020-08-25 RX ORDER — INSULIN GLARGINE 100 [IU]/ML
15 INJECTION, SOLUTION SUBCUTANEOUS NIGHTLY
Qty: 1 VIAL | Refills: 0 | Status: SHIPPED | OUTPATIENT
Start: 2020-08-25

## 2020-08-25 RX ORDER — ASPIRIN 81 MG/1
81 TABLET, CHEWABLE ORAL DAILY
Qty: 30 TABLET | Refills: 0 | Status: SHIPPED | OUTPATIENT
Start: 2020-08-25

## 2020-08-25 RX ORDER — LACTOBACILLUS RHAMNOSUS GG 10B CELL
1 CAPSULE ORAL 2 TIMES DAILY WITH MEALS
Qty: 60 CAPSULE | Refills: 0 | Status: SHIPPED | OUTPATIENT
Start: 2020-08-25

## 2020-08-25 RX ADMIN — DIVALPROEX SODIUM 1000 MG: 500 TABLET, EXTENDED RELEASE ORAL at 09:15

## 2020-08-25 RX ADMIN — DIPHENHYDRAMINE HCL 50 MG: 25 TABLET ORAL at 09:16

## 2020-08-25 RX ADMIN — AMLODIPINE BESYLATE 2.5 MG: 2.5 TABLET ORAL at 09:16

## 2020-08-25 RX ADMIN — ATORVASTATIN CALCIUM 40 MG: 40 TABLET, FILM COATED ORAL at 09:15

## 2020-08-25 RX ADMIN — PANTOPRAZOLE SODIUM 40 MG: 40 TABLET, DELAYED RELEASE ORAL at 09:16

## 2020-08-25 RX ADMIN — ACETAMINOPHEN 650 MG: 325 TABLET ORAL at 15:12

## 2020-08-25 RX ADMIN — ASPIRIN 81 MG 81 MG: 81 TABLET ORAL at 09:15

## 2020-08-25 RX ADMIN — HALOPERIDOL 10 MG: 10 TABLET ORAL at 09:19

## 2020-08-25 RX ADMIN — ACETAMINOPHEN 650 MG: 325 TABLET ORAL at 09:39

## 2020-08-25 RX ADMIN — HALOPERIDOL 10 MG: 10 TABLET ORAL at 03:08

## 2020-08-25 ASSESSMENT — PAIN SCALES - GENERAL
PAINLEVEL_OUTOF10: 6
PAINLEVEL_OUTOF10: 6

## 2020-08-25 NOTE — BH NOTE
during hospitalization                  (X)  Recognizing danger situations (included triggers and roadblocks), if not completed on admission                    (X)  Coping skills (new ways to manage stress, exercise, relaxation techniques, changing routine, distraction), if not completed on admission                                                           (X)  Basic information about quitting (benefits of quitting, techniques in how to quit, available resources, if not completed on admission  ( ) Referral for counseling faxed to [facility]   ( ) Patient refused referral  ( ) Patient refused counseling  ( ) Patient refused smoking cessation medication upon discharge

## 2020-08-25 NOTE — BH NOTE
Writer arranged D/C for pt to [facility]. Transportation is arriving at 3:00pm via [email or web address]. Facility was made aware of transport time and writer will have RN call report prior to discharge. Writer spoke with pt's stacye Ms. Herbert Williamson to inform her of discharge and she was thankful and appreciative to writer and hospital staff for taking care of her loved one. She is looking forward to visiting pt at the facility when she is able.     Jack Verdugo MSW, LSW

## 2020-08-25 NOTE — PLAN OF CARE
Problem: Anxiety:  Goal: Level of anxiety will decrease  Description: Level of anxiety will decrease  Outcome: Ongoing     Problem: Falls - Risk of:  Goal: Will remain free from falls  Description: Will remain free from falls  Outcome: Ongoing     Problem: Activity:  Goal: Risk for activity intolerance will decrease  Description: Risk for activity intolerance will decrease  Outcome: Ongoing     Pt with 1:1 sitter. Alert to self only. Reported by sitter that he intentionally keeps trying to \"fall\" out of bed. Pt very evasive with interview very vague answers. Irritable. He does not appear to be in pain and no s/s of distress. Denies SI/HI/AH/VH at this time.

## 2020-08-25 NOTE — BH NOTE
Patient attempted to try to roll out of bed. Patient assisted back into bed. Will continue to monitor.

## 2020-08-25 NOTE — BH NOTE
2201  Pt given Trazodone 50 mg po for c/o sleeplessness. Pt asked his sitter if he could have something to sleep.   Pt continues as a 1:1.

## 2020-08-25 NOTE — DISCHARGE INSTR - COC
Continuity of Care Form    Patient Name: Beckie Yi   :  1975  MRN:  [id number]    Admit date:  2020  Discharge date:  2020    Code Status Order: Full Code   Advance Directives:     Admitting Physician:  Robert Crowe MD  PCP: Rosa Potts MD    Discharging Nurse: Taylor Jackson Unit/Room#: 5640/3815-03  Discharging Unit Phone Number: [phone]    Emergency Contact:   Extended Emergency Contact Information  Primary Emergency Contact: Kevin Ball  Address: [address] [place] [address] Phone: [phone]  Mobile Phone: [phone]  Relation: Parent  Secondary Emergency Contact: Cash Griffith  Mobile Phone: [phone]  Relation: Domestic Partner   needed?  No    Past Surgical History:  Past Surgical History:   Procedure Laterality Date    BONY PELVIS SURGERY      FOOT AMPUTATION Right 2020    EMERGENCY; RIGHT INCISION AND DEBRIDEMENT; HALUX AMPUTATION performed by Midna Cardoso DPM at [address] 2006    pins and rods- plate    UPPER GASTROINTESTINAL ENDOSCOPY N/A 2019    EGD BIOPSY performed by Radha Cho MD at [facility] ENDOSCOPY       Immunization History:   Immunization History   Administered Date(s) Administered    Tdap (Boostrix, Adacel) 2017       Active Problems:  Patient Active Problem List   Diagnosis Code    Diabetic ketoacidosis without coma associated with diabetes mellitus due to underlying condition ([facility] [address] E08.10    Chronic abdominal pain R10.9, G89.29    Gastroparesis K31.84    GERD (gastroesophageal reflux disease) K21.9    Hypertension, essential I10    Seizure ([facility] [address] R56.9    Toe deformity M20.60    Uncontrolled type 1 diabetes mellitus with diabetic peripheral neuropathy (HCC) E10.42, E10.65    Type 1 diabetes mellitus with background diabetic retinopathy ([address] E10.3299    Hypoglycemia unawareness in type 1 diabetes mellitus ([facility] [address] T92.524  Type 1 diabetes mellitus with hypercholesterolemia (HCC) E10.69, E78.00    Accelerated hypertension I10    Acute blood loss anemia D62    Acute respiratory failure ([facility]) J96.00    Candida esophagitis ([facility]) B37.81    Cholelithiasis K80.20    Cocaine abuse, episodic ([facility]) F14.10    Cerebral infarction ([facility]) I63.9    Diabetic polyneuropathy ([facility]) E11.42    Duodenal ulcer K26.9    Heart failure, diastolic, acute ([facility]) A00.17    Leg weakness, bilateral R29.898    Cannabis abuse F12.10    Numbness in both legs R20.0    Polysubstance abuse ([facility]) F19.10    Pulmonary edema J81.1    Diabetic foot ulcer ([facility]) E11.621, L97.509    Heart murmur R01.1    Hyperlipidemia E78.5    Osteomyelitis of great toe of right foot ([facility]) M86.9    Epigastric abdominal pain R10.13    Hypertensive urgency I16.0    Diabetic gastroparesis associated with type 1 diabetes mellitus ([facility]) E10.43, K31.84    Acute respiratory failure with hypoxia ([facility]) J96.01    Hypoglycemia E16.2    Altered mental status R41.82    Severe malnutrition ([facility]) E43    Psychosis ([facility]) F29    Intentional drug overdose ([facility] [address] T50.902A    Major neurocognitive disorder, due to another medical condition, with behavioral disturbance, mild ([facility]) F02.81       Isolation/Infection:   Isolation          No Isolation        Unreconciled External Infections     Infection Onset Last Indicated Last Received Source    No mapped external infections found    .     Unmapped Infections (1)      MRSA 08/04/12 08/04/12 07/17/20             Patient Infection Status     Infection Onset Added Last Indicated Last Indicated By Review Planned Expiration Resolved Resolved By    None active    Resolved    COVID-19 Rule Out 08/11/20 08/11/20 08/11/20 COVID-19 (Ordered)   08/11/20 Rule-Out Test Resulted    COVID-19 Rule Out 05/12/20 05/12/20 05/12/20 COVID-19 (Ordered)   05/12/20 Rule-Out Test Resulted    C-diff Rule Out 05/12/20 05/12/20 05/13/20 Clostridium Difficile Toxin/Antigen (Ordered)   05/13/20 Rule-Out Test Resulted    C-diff Rule Out  12/30/19 12/30/19 Clostridium Difficile Toxin/Antigen (Ordered)   12/31/19 Sophia Suarez RN          Nurse Assessment:  Last Vital Signs: BP (!) 149/96   Pulse 84   Temp 97.3 °F (36.3 °C)   Resp 14   Ht 6' 2\" (1.88 m)   Wt 127 lb (57.6 kg)   SpO2 97%   BMI 16.31 kg/m²     Last documented pain score (0-10 scale): Pain Level: 6  Last Weight:   Wt Readings from Last 1 Encounters:   08/12/20 127 lb (57.6 kg)     Mental Status:  disoriented and disorganized    IV Access:  - None    Nursing Mobility/ADLs:  Walking   Dependent  Transfer  Dependent  Bathing  Dependent  Dressing  Dependent  Toileting  Dependent  Feeding  Dependent  Med Admin  Dependent  Med Delivery   prefers mixed with pudding and applesauce    Wound Care Documentation and Therapy:  Wound 03/25/20 Toe (Comment  which one) Right (Active)   Number of days: 153        Elimination:  Continence:   · Bowel: Yes  · Bladder: Yes  Urinary Catheter: None   Colostomy/Ileostomy/Ileal Conduit: No       Date of Last BM: 8/25/20  No intake or output data in the 24 hours ending 08/25/20 0947  No intake/output data recorded. Safety Concerns:     History of Falls (last 30 days)    Impairments/Disabilities:      Vision and Amputation - toe    Nutrition Therapy:  Current Nutrition Therapy:   Oral Diet, Diabetic, Carb Control    Routes of Feeding: Oral  Liquids: No Restrictions  Daily Fluid Restriction: no  Last Modified Barium Swallow with Video (Video Swallowing Test): not done    Treatments at the Time of Hospital Discharge:   Respiratory Treatments: N/A  Oxygen Therapy:  is not on home oxygen therapy.   Ventilator:    - No ventilator support    Rehab Therapies: Physical Therapy and Occupational Therapy  Weight Bearing Status/Restrictions: No weight bearing restirctions  Other Medical Equipment (for information only, NOT a DME order):  hospital bed  Other Treatments: PT/OT as needed    Patient's personal belongings (please select all that are sent with patient):  None    RN SIGNATURE:  Electronically signed by Merlene Jaimes RN on 8/25/20 at 1:55 PM EDT    CASE MANAGEMENT/SOCIAL WORK SECTION    Inpatient Status Date: Admitted 8/11/2020 - D/C 8/25/2020    Readmission Risk Assessment Score:  Readmission Risk              Risk of Unplanned Readmission:        47           Discharging to Facility/ Agency   · Name: [facility]  · Address: [address] [place] [address]  · Phone: [phone]  · Fax: [phone]    Dialysis Facility (if applicable)   · Name:  · Address:  · Dialysis Schedule:  · Phone:  · Fax:    / signature: Electronically signed by VIET Tucker on 8/25/20 at 10:35 AM EDT    PHYSICIAN SECTION    Prognosis: Poor    Condition at Discharge: guarded    Rehab Potential (if transferring to Rehab): Guarded    Recommended Labs or Other Treatments After Discharge: none. Reeval  And treat PT/OT    Physician Certification: I certify the above information and transfer of Kaveh Floyd  is necessary for the continuing treatment of the diagnosis listed and that he requires [facility] for greater 30 days.      Update Admission H&P: No change in H&P    PHYSICIAN SIGNATURE:  Electronically signed by Wendy Lynn MD on 8/25/20 at 10:38 AM EDT

## 2020-08-25 NOTE — BH NOTE
Dr. Alvaro Deutsch notified about patient fall. Ordered sitter for patient. Ordered right hip x ray and head CT w/o contrast. Clinical notified. Sitter at bedside.

## 2020-08-28 NOTE — DISCHARGE SUMMARY
Discharge Summary   Admit Date: 8/11/2020   Discharge Date:  8/25/2020    Condition at DC stable  Spent over 40 minutes with patient and staff on [facility]   Final Dx: axis I: Major neurocognitive disorder, due to another medical condition, with behavioral disturbance, mild ([address]   Axis 2: No diagnosis  [place] 3: See Medical History    And Present on Admission:   Altered mental status   Uncontrolled type 1 diabetes mellitus with diabetic peripheral neuropathy ([address]   Intentional drug overdose ([address]   Hypertension, essential   Major neurocognitive disorder, due to another medical condition, with behavioral disturbance, mild ([address]     Axis 4: Other psychosocial and environmental problems  Axis 5:  On Admission: 31-40 impairment in reality testing At Discharge: 41-50 serious symptoms   All conditions on Axis 1 and Axis 2 and active problems on Axis 3 were treated while patient was hospitalized. [facility] Problems    Diagnosis Date Noted    Major neurocognitive disorder, due to another medical condition, with behavioral disturbance, mild ([address] [F02.81] 08/18/2020    Intentional drug overdose ([address] Jean Pierre Nisha 08/12/2020    Altered mental status [R41.82]     Uncontrolled type 1 diabetes mellitus with diabetic peripheral neuropathy ([address] [E10.42, E10.65] 09/19/2018    Hypertension, essential [I10] 01/09/2012   )   Condition on DC  Mood and affect are stable and pt is not suicidal   VITALS:  BP (!) 149/96   Pulse 84   Temp 97.3 °F (36.3 °C)   Resp 14   Ht 6' 2\" (1.88 m)   Wt 127 lb (57.6 kg)   SpO2 97%   BMI 16.31 kg/m²   Brief Summary Present Illness   CHIEF COMPLAINT:  Altered mental status.     HISTORY OF PRESENT ILLNESS:  The patient is a 73-year-old [facility]  American male, who presented on 07/15/2020 to [facility] after an  overdose with Depakote and insulin. He was found unresponsive by his  girlfriend and per EMS, he had a blood sugar of 22 on arrival and was  given IM glucagon.   He was then intubated and was hospitalized for  approximately three weeks and then Psychiatry was consulted, Dr. Winston Felton  and Dr. Alberto Lara during his stay at [facility].  At that point, they felt  like he may have suffered some type of an anoxic injury to his brain and  there were also comments that he may have had a catatonic delirium. He  recovered minimally. He was very agitated, pulling out access over the  weekend prior to coming here. He was unable to hold a coherent  conversation. He was also seen by Neurology at [facility] and they  reviewed MRI brain images, which showed increased signal at multiple  regions which is nonspecific and maybe seen in hypoglycemic  encephalopathy and in postictal state. It is not clear when the patient  was last at baseline, although there is collateral from his significant  other, with Eliane Rushing at [address] [facility], that stated that he was  functional prior to this event and was very engaged, good sense of humor  and did not show any of these deficits that he now currently exhibits.     He has had poorly controlled diabetes at baseline and apparently had  injected himself with a significant amount of long-acting insulin. Family reports history of seizures, although no other details. He was  supposed to see the [facility] for evaluation of possible seizures;  however, he did not show up. EEG from 03/2020 was unremarkable.     When I met with the patient today, he was in bed. He eventually pulled  the covers off and got up and appeared to be exhibiting signs of  akathisia and he was very restless and was pacing in the unit and was  difficult to redirect. He would answer no to most questions and did not  appear to comprehend most questions and was disoriented to place and  time. He was given Ativan as well as Zyprexa during first night and  morning. He appeared to have minimal effect overall.   He is not able to  discuss any self-harm actions that led to the hospitalization nor was he  able to carry out any type of conversation and saying no at times. He  made minimal eye contact and would look through you as he walked away to  the Nurses' station and other parts of the unit. Allen Torres has been more manageable although he is disoriented and unable to be in program. He is in bed frequently. MRI brain is similar to prior MRI. Evidence of encephalomalacia, frontal and parietal lobes bilaterally and R occipital lobe. Prior ischemia or traumatic insult. Blood sugar continues to be brittle with major swings. 45 at dinner tonight and 457 at 5 pm.   He was eating pudding with the plastic still on the spoon. PASRR completed and filed for a skilled NH. Patient stabilized minimally in milieu treatment. Patient was discharged to home to continue recovery in the community. Chief Complaint: Chelsea Mcclure was present for probate hearing today . He was pleasant nad cooperative. He was in agreement to move to a SNF at [place]. Court continued case until next hearing 9/10. Colby remains disoriented, erratic blood sugars, and at times resistant to take meds which appears to be rather random when it occurs. PASRR approved and awaiting SNF. Colby remains in room and has minimal interactions with others. No overall changes from yesterday or earlier this week. Patient is not able to form coherent sentences. Patient's chart was reviewed and collaborated with  about the treatment plan. Later in morning he was more agitated and was trying to leave there unit.   D/C for pt to [facility], [facility]  PE: (reviewed) and labs (see medical H&PE)  Labs:    Admission on 08/11/2020, Discharged on 08/25/2020   Component Date Value Ref Range Status    POC Glucose 08/12/2020 113* 70 - 99 mg/dl Final    Performed on 08/12/2020 ACCU-CHEK   Final    POC Glucose 08/12/2020 >600* 70 - 99 mg/dl Final    Performed on 08/12/2020 ACCU-CHEK   Final    POC Glucose 08/12/2020 >600* 70 - 99 mg/dl Final    Final    Performed on 08/15/2020 ACCU-CHEK   Final    Total Syphillis IgG/IgM 08/15/2020 Non-Reactive  Non-reactive Final    WBC 08/15/2020 9.3  4.0 - 11.0 K/uL Final    RBC 08/15/2020 4.06* 4.20 - 5.90 M/uL Final    Hemoglobin 08/15/2020 11.2* 13.5 - 17.5 g/dL Final    Hematocrit 08/15/2020 34.5* 40.5 - 52.5 % Final    MCV 08/15/2020 85.0  80.0 - 100.0 fL Final    MCH 08/15/2020 27.6  26.0 - 34.0 pg Final    MCHC 08/15/2020 32.5  31.0 - 36.0 g/dL Final    RDW 08/15/2020 16.7* 12.4 - 15.4 % Final    Platelets 75/91/4773 254  135 - 450 K/uL Final    MPV 08/15/2020 8.3  5.0 - 10.5 fL Final    Sodium 08/15/2020 138  136 - 145 mmol/L Final    Potassium 08/15/2020 4.4  3.5 - 5.1 mmol/L Final    Chloride 08/15/2020 100  99 - 110 mmol/L Final    CO2 08/15/2020 23  21 - 32 mmol/L Final    Anion Gap 08/15/2020 15  3 - 16 Final    Glucose 08/15/2020 317* 70 - 99 mg/dL Final    BUN 08/15/2020 15  7 - 20 mg/dL Final    CREATININE 08/15/2020 0.8* 0.9 - 1.3 mg/dL Final    GFR Non- 08/15/2020 >60  >60 Final    Comment: >60 mL/min/1.73m2 EGFR, calc. for ages 25 and older using the  MDRD formula (not corrected for weight), is valid for stable  renal function.  GFR  08/15/2020 >60  >60 Final    Comment: Chronic Kidney Disease: less than 60 ml/min/1.73 sq.m. Kidney Failure: less than 15 ml/min/1.73 sq.m. Results valid for patients 18 years and older.  Calcium 08/15/2020 9.7  8.3 - 10.6 mg/dL Final    Total Protein 08/15/2020 7.5  6.4 - 8.2 g/dL Final    Alb 08/15/2020 4.7  3.4 - 5.0 g/dL Final    Albumin/Globulin Ratio 08/15/2020 1.7  1.1 - 2.2 Final    Total Bilirubin 08/15/2020 <0.2  0.0 - 1.0 mg/dL Final    Alkaline Phosphatase 08/15/2020 94  40 - 129 U/L Final    ALT 08/15/2020 23  10 - 40 U/L Final    AST 08/15/2020 38* 15 - 37 U/L Final    Comment: Specimen hemolysis has exceeded the interference as defined by Roche. Value may be falsely increased. Suggest reorder and recollection if  clinically indicated.  Globulin 08/15/2020 2.8  g/dL Final    Total CK 08/15/2020 942* 39 - 308 U/L Final    POC Glucose 08/15/2020 406* 70 - 99 mg/dl Final    Performed on 08/15/2020 ACCU-CHEK   Final    POC Glucose 08/15/2020 257* 70 - 99 mg/dl Final    Performed on 08/15/2020 ACCU-CHEK   Final    POC Glucose 08/15/2020 453* 70 - 99 mg/dl Final    Performed on 08/15/2020 ACCU-CHEK   Final    Ventricular Rate 08/16/2020 88  BPM Final    Atrial Rate 08/16/2020 88  BPM Final    P-R Interval 08/16/2020 150  ms Final    QRS Duration 08/16/2020 82  ms Final    Q-T Interval 08/16/2020 392  ms Final    QTc Calculation (Bazett) 08/16/2020 474  ms Final    P Axis 08/16/2020 78  degrees Final    R Axis 08/16/2020 50  degrees Final    T Axis 08/16/2020 64  degrees Final    Diagnosis 08/16/2020 Normal sinus rhythmNormal ECGWhen compared with ECG of 11-MAY-2020 23:59,Vent.  rate has increased BY  35 BPMConfirmed by ACACIA LUCAS MD ([id number]) on 8/16/2020 11:02:40 AM   Final    POC Glucose 08/16/2020 209* 70 - 99 mg/dl Final    Performed on 08/16/2020 ACCU-CHEK   Final    POC Glucose 08/16/2020 267* 70 - 99 mg/dl Final    Performed on 08/16/2020 ACCU-CHEK   Final    POC Glucose 08/16/2020 190* 70 - 99 mg/dl Final    Performed on 08/16/2020 ACCU-CHEK   Final    POC Glucose 08/16/2020 119* 70 - 99 mg/dl Final    Performed on 08/16/2020 ACCU-CHEK   Final    POC Glucose 08/16/2020 319* 70 - 99 mg/dl Final    Performed on 08/16/2020 ACCU-CHEK   Final    POC Glucose 08/16/2020 279* 70 - 99 mg/dl Final    Performed on 08/16/2020 ACCU-CHEK   Final    POC Glucose 08/17/2020 228* 70 - 99 mg/dl Final    Performed on 08/17/2020 ACCU-CHEK   Final    POC Glucose 08/17/2020 311* 70 - 99 mg/dl Final    Performed on 08/17/2020 ACCU-CHEK   Final    POC Glucose 08/17/2020 45* 70 - 99 mg/dl Final    Performed on 08/17/2020 ACCU-CHEK   Final    POC Glucose 08/17/2020 107* 70 - 99 mg/dl Final    Performed on 08/17/2020 ACCU-CHEK   Final    POC Glucose 08/17/2020 302* 70 - 99 mg/dl Final    Performed on 08/17/2020 ACCU-CHEK   Final    POC Glucose 08/17/2020 368* 70 - 99 mg/dl Final    Performed on 08/17/2020 ACCU-CHEK   Final    POC Glucose 08/17/2020 221* 70 - 99 mg/dl Final    Performed on 08/17/2020 ACCU-CHEK   Final    POC Glucose 08/18/2020 203* 70 - 99 mg/dl Final    Performed on 08/18/2020 ACCU-CHEK   Final    POC Glucose 08/18/2020 164* 70 - 99 mg/dl Final    Performed on 08/18/2020 ACCU-CHEK   Final    POC Glucose 08/18/2020 457* 70 - 99 mg/dl Final    Performed on 08/18/2020 ACCU-CHEK   Final    POC Glucose 08/18/2020 135* 70 - 99 mg/dl Final    Performed on 08/18/2020 ACCU-CHEK   Final    POC Glucose 08/18/2020 120* 70 - 99 mg/dl Final    Performed on 08/18/2020 ACCU-CHEK   Final    POC Glucose 08/19/2020 262* 70 - 99 mg/dl Final    Performed on 08/19/2020 ACCU-CHEK   Final    POC Glucose 08/19/2020 72  70 - 99 mg/dl Final    Performed on 08/19/2020 ACCU-CHEK   Final    POC Glucose 08/19/2020 465* 70 - 99 mg/dl Final    Performed on 08/19/2020 ACCU-CHEK   Final    POC Glucose 08/19/2020 387* 70 - 99 mg/dl Final    Performed on 08/19/2020 ACCU-CHEK   Final    POC Glucose 08/20/2020 241* 70 - 99 mg/dl Final    Performed on 08/20/2020 ACCU-CHEK   Final    POC Glucose 08/20/2020 442* 70 - 99 mg/dl Final    Performed on 08/20/2020 ACCU-CHEK   Final    POC Glucose 08/20/2020 496* 70 - 99 mg/dl Final    Performed on 08/20/2020 ACCU-CHEK   Final    POC Glucose 08/20/2020 308* 70 - 99 mg/dl Final    Performed on 08/20/2020 ACCU-CHEK   Final    POC Glucose 08/21/2020 115* 70 - 99 mg/dl Final    Performed on 08/21/2020 ACCU-CHEK   Final    POC Glucose 08/21/2020 295* 70 - 99 mg/dl Final    Performed on 08/21/2020 ACCU-CHEK   Final    POC Glucose 08/21/2020 351* 70 - 99 mg/dl Final    Performed on 08/21/2020 ACCU-CHEK   Final    POC Glucose 08/21/2020 289* 70 - 99 mg/dl Final    Performed on 08/21/2020 ACCU-CHEK   Final    POC Glucose 08/22/2020 285* 70 - 99 mg/dl Final    Performed on 08/22/2020 ACCU-CHEK   Final    POC Glucose 08/22/2020 320* 70 - 99 mg/dl Final    Performed on 08/22/2020 ACCU-CHEK   Final    POC Glucose 08/22/2020 229* 70 - 99 mg/dl Final    Performed on 08/22/2020 ACCU-CHEK   Final    POC Glucose 08/22/2020 173* 70 - 99 mg/dl Final    Performed on 08/22/2020 ACCU-CHEK   Final    POC Glucose 08/23/2020 127* 70 - 99 mg/dl Final    Performed on 08/23/2020 ACCU-CHEK   Final    POC Glucose 08/23/2020 204* 70 - 99 mg/dl Final    Performed on 08/23/2020 ACCU-CHEK   Final    POC Glucose 08/23/2020 296* 70 - 99 mg/dl Final    Performed on 08/23/2020 ACCU-CHEK   Final    POC Glucose 08/23/2020 349* 70 - 99 mg/dl Final    Performed on 08/23/2020 ACCU-CHEK   Final    POC Glucose 08/24/2020 289* 70 - 99 mg/dl Final    Performed on 08/24/2020 ACCU-CHEK   Final    POC Glucose 08/24/2020 239* 70 - 99 mg/dl Final    Performed on 08/24/2020 ACCU-CHEK   Final    POC Glucose 08/24/2020 247* 70 - 99 mg/dl Final    Performed on 08/24/2020 ACCU-CHEK   Final    POC Glucose 08/24/2020 142* 70 - 99 mg/dl Final    Performed on 08/24/2020 ACCU-CHEK   Final    POC Glucose 08/25/2020 232* 70 - 99 mg/dl Final    Performed on 08/25/2020 ACCU-CHEK   Final    POC Glucose 08/25/2020 298* 70 - 99 mg/dl Final    Performed on 08/25/2020 ACCU-CHEK   Final    POC Glucose 08/25/2020 348* 70 - 99 mg/dl Final    Performed on 08/25/2020 ACCU-CHEK   Final        Mental Status Exam at Discharge:  Level of consciousness:  awake  Appearance:  well-appearing, in chair, good grooming and good hygiene well-developed, well-nourished  Behavior/Motor:  no abnormalities noted normal gait and station AIMS: 0  Attitude toward examiner:  cooperative, attentive and good eye contact  Speech:  spontaneous, normal rate, normal volume and well articulated  Mood: dysthymic  Affect:  mood congruent Anxiety: mild  Hallucinations: Absent  Thought processes:  coherent Attention span, Concentration & Attention:  attention span and concentration were age appropriate  Thought content:    evidence of delusions OCD: none    Insight: normal insight and judgment Cognition:  oriented to person, place, and time  Fund of Knowledge: average  IQ:average Memory: intact  Suicide:  No specific plan to harm self  Sleep: sleeps through the night  Appetite: ok   Reassess Nena Risk:  no specific plan to harm self Pt has phone numbers to contact if suicidal thoughts recur and states pt will return to the hospital if suicidal feelings return.      Discharge Meds:    Discharge Medication List as of 8/25/2020  3:40 PM           Details   haloperidol (HALDOL) 10 MG tablet Take 1 tablet by mouth 2 times daily, Disp-60 tablet,R-0Normal      diphenhydrAMINE (BENADRYL) 50 MG tablet Take 1 tablet by mouth 2 times daily, Disp-60 tablet,R-0Normal      divalproex (DEPAKOTE ER) 500 MG extended release tablet Take 2 tablets by mouth 2 times daily, Disp-60 tablet,R-0Normal              Details   insulin glargine (LANTUS) 100 UNIT/ML injection vial Inject 15 Units into the skin nightly, Disp-1 vial,R-0Normal      pantoprazole (PROTONIX) 40 MG tablet Take 1 tablet by mouth daily, Disp-30 tablet,R-0Normal      amLODIPine (NORVASC) 2.5 MG tablet Take 1 tablet by mouth daily, Disp-30 tablet,R-0Normal      atorvastatin (LIPITOR) 40 MG tablet Take 1 tablet by mouth daily, Disp-30 tablet,R-0Normal      lactobacillus (CULTURELLE) capsule Take 1 capsule by mouth 2 times daily (with meals), Disp-60 capsule,R-0Normal      aspirin 81 MG chewable tablet Take 1 tablet by mouth daily, Disp-30 tablet,R-0Normal      insulin lispro (HUMALOG) 100 UNIT/ML injection vial Inject 0-12 Units into the skin 3 times daily (with meals), Disp-1 vial,R-0Normal      blood glucose monitor strips Check blood sugars 4-5 times per day, Disp-150 strip,R-0, Normal      Insulin Syringe-Needle U-100 (ULTRA-THIN II INS SYR SHORT) 31G X 5/16\" 1 ML MISC 3 TIMES DAILY Starting Tue 8/25/2020, Disp-100 each,R-0, Normal      Lancets MISC DAILY Starting Tue 8/25/2020, Disp-150 each,R-0, NormalCheck blood sugars 4-5 times per day                   Disposition - [facility]     Follow Up:  See Discharge Instructions

## 2020-12-08 ENCOUNTER — HOSPITAL ENCOUNTER (INPATIENT)
Age: 45
LOS: 3 days | Discharge: HOME | DRG: 420 | End: 2020-12-11
Attending: EMERGENCY MEDICINE | Admitting: INTERNAL MEDICINE
Payer: MEDICAID

## 2020-12-08 ENCOUNTER — APPOINTMENT (OUTPATIENT)
Dept: GENERAL RADIOLOGY | Age: 45
DRG: 420 | End: 2020-12-08
Payer: MEDICAID

## 2020-12-08 PROBLEM — E11.10 KETOACIDOSIS, DIABETIC, TYPE 2, NO COMA (HCC): Status: ACTIVE | Noted: 2020-12-08

## 2020-12-08 LAB
A/G RATIO: 0.9 (ref 1.1–2.2)
ALBUMIN SERPL-MCNC: 4.8 G/DL (ref 3.4–5)
ALP BLD-CCNC: 167 U/L (ref 40–129)
ALT SERPL-CCNC: 19 U/L (ref 10–40)
AMPHETAMINE SCREEN, URINE: NORMAL
ANION GAP SERPL CALCULATED.3IONS-SCNC: 39 MMOL/L (ref 3–16)
AST SERPL-CCNC: 24 U/L (ref 15–37)
BARBITURATE SCREEN URINE: NORMAL
BASE EXCESS VENOUS: -24.1 MMOL/L
BASOPHILS ABSOLUTE: 0.1 K/UL (ref 0–0.2)
BASOPHILS RELATIVE PERCENT: 0.3 %
BENZODIAZEPINE SCREEN, URINE: NORMAL
BILIRUB SERPL-MCNC: <0.2 MG/DL (ref 0–1)
BILIRUBIN URINE: NEGATIVE
BLOOD, URINE: NEGATIVE
BUN BLDV-MCNC: 34 MG/DL (ref 7–20)
CALCIUM SERPL-MCNC: 11 MG/DL (ref 8.3–10.6)
CANNABINOID SCREEN URINE: NORMAL
CARBOXYHEMOGLOBIN: 1.1 %
CHLORIDE BLD-SCNC: 88 MMOL/L (ref 99–110)
CLARITY: CLEAR
CO2: 7 MMOL/L (ref 21–32)
COCAINE METABOLITE SCREEN URINE: NORMAL
COLOR: YELLOW
CREAT SERPL-MCNC: 1.7 MG/DL (ref 0.9–1.3)
EOSINOPHILS ABSOLUTE: 0 K/UL (ref 0–0.6)
EOSINOPHILS RELATIVE PERCENT: 0 %
EPITHELIAL CELLS, UA: 0 /HPF (ref 0–5)
GFR AFRICAN AMERICAN: 52
GFR NON-AFRICAN AMERICAN: 43
GLOBULIN: 5.1 G/DL
GLUCOSE BLD-MCNC: 594 MG/DL (ref 70–99)
GLUCOSE BLD-MCNC: 715 MG/DL (ref 70–99)
GLUCOSE BLD-MCNC: >600 MG/DL (ref 70–99)
GLUCOSE URINE: >=1000 MG/DL
HCO3 VENOUS: 7 MMOL/L (ref 23–29)
HCT VFR BLD CALC: 41.8 % (ref 40.5–52.5)
HEMOGLOBIN: 12.2 G/DL (ref 13.5–17.5)
HYALINE CASTS: 0 /LPF (ref 0–8)
KETONES, URINE: >=80 MG/DL
LACTIC ACID: 4.6 MMOL/L (ref 0.4–2)
LEUKOCYTE ESTERASE, URINE: NEGATIVE
LIPASE: 8 U/L (ref 13–60)
LYMPHOCYTES ABSOLUTE: 0.8 K/UL (ref 1–5.1)
LYMPHOCYTES RELATIVE PERCENT: 4.1 %
Lab: NORMAL
MCH RBC QN AUTO: 24.7 PG (ref 26–34)
MCHC RBC AUTO-ENTMCNC: 29.1 G/DL (ref 31–36)
MCV RBC AUTO: 84.7 FL (ref 80–100)
METHADONE SCREEN, URINE: NORMAL
METHEMOGLOBIN VENOUS: 0.7 %
MICROSCOPIC EXAMINATION: YES
MONOCYTES ABSOLUTE: 0.4 K/UL (ref 0–1.3)
MONOCYTES RELATIVE PERCENT: 2.2 %
NEUTROPHILS ABSOLUTE: 19 K/UL (ref 1.7–7.7)
NEUTROPHILS RELATIVE PERCENT: 93.4 %
NITRITE, URINE: NEGATIVE
O2 CONTENT, VEN: 7 ML/DL
O2 SAT, VEN: 42 %
O2 THERAPY: ABNORMAL
OPIATE SCREEN URINE: NORMAL
OXYCODONE URINE: NORMAL
PCO2, VEN: 31.7 MMHG (ref 40–50)
PDW BLD-RTO: 18.6 % (ref 12.4–15.4)
PERFORMED ON: ABNORMAL
PERFORMED ON: ABNORMAL
PH UA: 5
PH UA: 5 (ref 5–8)
PH VENOUS: 6.95 (ref 7.35–7.45)
PHENCYCLIDINE SCREEN URINE: NORMAL
PLATELET # BLD: 461 K/UL (ref 135–450)
PMV BLD AUTO: 8.1 FL (ref 5–10.5)
PO2, VEN: 33 MMHG
POTASSIUM REFLEX MAGNESIUM: 5.9 MMOL/L (ref 3.5–5.1)
PROPOXYPHENE SCREEN: NORMAL
PROTEIN UA: 30 MG/DL
RBC # BLD: 4.94 M/UL (ref 4.2–5.9)
RBC UA: 0 /HPF (ref 0–4)
SODIUM BLD-SCNC: 134 MMOL/L (ref 136–145)
SPECIFIC GRAVITY UA: 1.02 (ref 1–1.03)
TCO2 CALC VENOUS: 8 MMOL/L
TOTAL PROTEIN: 9.9 G/DL (ref 6.4–8.2)
URINE REFLEX TO CULTURE: ABNORMAL
URINE TYPE: ABNORMAL
UROBILINOGEN, URINE: 0.2 E.U./DL
WBC # BLD: 20.4 K/UL (ref 4–11)
WBC UA: 0 /HPF (ref 0–5)

## 2020-12-08 PROCEDURE — 83690 ASSAY OF LIPASE: CPT

## 2020-12-08 PROCEDURE — 93005 ELECTROCARDIOGRAM TRACING: CPT | Performed by: NURSE PRACTITIONER

## 2020-12-08 PROCEDURE — 80053 COMPREHEN METABOLIC PANEL: CPT

## 2020-12-08 PROCEDURE — 99284 EMERGENCY DEPT VISIT MOD MDM: CPT

## 2020-12-08 PROCEDURE — 83036 HEMOGLOBIN GLYCOSYLATED A1C: CPT

## 2020-12-08 PROCEDURE — 85025 COMPLETE CBC W/AUTO DIFF WBC: CPT

## 2020-12-08 PROCEDURE — 81001 URINALYSIS AUTO W/SCOPE: CPT

## 2020-12-08 PROCEDURE — 71045 X-RAY EXAM CHEST 1 VIEW: CPT

## 2020-12-08 PROCEDURE — 80307 DRUG TEST PRSMV CHEM ANLYZR: CPT

## 2020-12-08 PROCEDURE — 36415 COLL VENOUS BLD VENIPUNCTURE: CPT

## 2020-12-08 PROCEDURE — 2580000003 HC RX 258: Performed by: NURSE PRACTITIONER

## 2020-12-08 PROCEDURE — 83605 ASSAY OF LACTIC ACID: CPT

## 2020-12-08 PROCEDURE — 6370000000 HC RX 637 (ALT 250 FOR IP): Performed by: NURSE PRACTITIONER

## 2020-12-08 PROCEDURE — 2000000000 HC ICU R&B

## 2020-12-08 PROCEDURE — 82803 BLOOD GASES ANY COMBINATION: CPT

## 2020-12-08 RX ORDER — NICOTINE POLACRILEX 4 MG
15 LOZENGE BUCCAL PRN
Status: DISCONTINUED | OUTPATIENT
Start: 2020-12-08 | End: 2020-12-09

## 2020-12-08 RX ORDER — DEXTROSE MONOHYDRATE 25 G/50ML
12.5 INJECTION, SOLUTION INTRAVENOUS PRN
Status: DISCONTINUED | OUTPATIENT
Start: 2020-12-08 | End: 2020-12-09

## 2020-12-08 RX ORDER — 0.9 % SODIUM CHLORIDE 0.9 %
1000 INTRAVENOUS SOLUTION INTRAVENOUS ONCE
Status: COMPLETED | OUTPATIENT
Start: 2020-12-08 | End: 2020-12-08

## 2020-12-08 RX ORDER — DEXTROSE MONOHYDRATE 50 MG/ML
100 INJECTION, SOLUTION INTRAVENOUS PRN
Status: DISCONTINUED | OUTPATIENT
Start: 2020-12-08 | End: 2020-12-09

## 2020-12-08 RX ORDER — 0.9 % SODIUM CHLORIDE 0.9 %
2000 INTRAVENOUS SOLUTION INTRAVENOUS ONCE
Status: COMPLETED | OUTPATIENT
Start: 2020-12-08 | End: 2020-12-08

## 2020-12-08 RX ADMIN — SODIUM CHLORIDE 1000 ML: 9 INJECTION, SOLUTION INTRAVENOUS at 23:37

## 2020-12-08 RX ADMIN — INSULIN HUMAN 10 UNITS: 100 INJECTION, SOLUTION PARENTERAL at 23:55

## 2020-12-08 RX ADMIN — SODIUM CHLORIDE 0.1 UNITS/KG/HR: 9 INJECTION, SOLUTION INTRAVENOUS at 23:55

## 2020-12-08 RX ADMIN — SODIUM CHLORIDE 2000 ML: 9 INJECTION, SOLUTION INTRAVENOUS at 21:56

## 2020-12-08 ASSESSMENT — ENCOUNTER SYMPTOMS
COUGH: 0
ABDOMINAL DISTENTION: 0
CONSTIPATION: 0
VOMITING: 1
BACK PAIN: 0
DIARRHEA: 0
COLOR CHANGE: 0
SHORTNESS OF BREATH: 0
BLOOD IN STOOL: 0
NAUSEA: 1
ABDOMINAL PAIN: 0

## 2020-12-09 PROBLEM — N17.9 ACUTE RENAL FAILURE (ARF) (HCC): Status: ACTIVE | Noted: 2020-12-09

## 2020-12-09 PROBLEM — I10 ESSENTIAL HYPERTENSION: Status: ACTIVE | Noted: 2020-12-09

## 2020-12-09 LAB
ANION GAP SERPL CALCULATED.3IONS-SCNC: 13 MMOL/L (ref 3–16)
ANION GAP SERPL CALCULATED.3IONS-SCNC: 14 MMOL/L (ref 3–16)
ANION GAP SERPL CALCULATED.3IONS-SCNC: 25 MMOL/L (ref 3–16)
ANION GAP SERPL CALCULATED.3IONS-SCNC: 26 MMOL/L (ref 3–16)
BUN BLDV-MCNC: 18 MG/DL (ref 7–20)
BUN BLDV-MCNC: 20 MG/DL (ref 7–20)
BUN BLDV-MCNC: 25 MG/DL (ref 7–20)
BUN BLDV-MCNC: 28 MG/DL (ref 7–20)
CALCIUM SERPL-MCNC: 10.1 MG/DL (ref 8.3–10.6)
CALCIUM SERPL-MCNC: 9.2 MG/DL (ref 8.3–10.6)
CALCIUM SERPL-MCNC: 9.3 MG/DL (ref 8.3–10.6)
CALCIUM SERPL-MCNC: 9.4 MG/DL (ref 8.3–10.6)
CHLORIDE BLD-SCNC: 108 MMOL/L (ref 99–110)
CHLORIDE BLD-SCNC: 109 MMOL/L (ref 99–110)
CHLORIDE BLD-SCNC: 111 MMOL/L (ref 99–110)
CHLORIDE BLD-SCNC: 114 MMOL/L (ref 99–110)
CO2: 17 MMOL/L (ref 21–32)
CO2: 17 MMOL/L (ref 21–32)
CO2: 7 MMOL/L (ref 21–32)
CO2: 9 MMOL/L (ref 21–32)
CREAT SERPL-MCNC: 1.1 MG/DL (ref 0.9–1.3)
CREAT SERPL-MCNC: 1.2 MG/DL (ref 0.9–1.3)
CREAT SERPL-MCNC: 1.3 MG/DL (ref 0.9–1.3)
CREAT SERPL-MCNC: 1.3 MG/DL (ref 0.9–1.3)
EKG ATRIAL RATE: 103 BPM
EKG DIAGNOSIS: NORMAL
EKG P AXIS: 81 DEGREES
EKG P-R INTERVAL: 144 MS
EKG Q-T INTERVAL: 354 MS
EKG QRS DURATION: 70 MS
EKG QTC CALCULATION (BAZETT): 463 MS
EKG R AXIS: 65 DEGREES
EKG T AXIS: 45 DEGREES
EKG VENTRICULAR RATE: 103 BPM
ESTIMATED AVERAGE GLUCOSE: 329.3 MG/DL
GFR AFRICAN AMERICAN: >60
GFR NON-AFRICAN AMERICAN: 59
GFR NON-AFRICAN AMERICAN: 59
GFR NON-AFRICAN AMERICAN: >60
GFR NON-AFRICAN AMERICAN: >60
GLUCOSE BLD-MCNC: 104 MG/DL (ref 70–99)
GLUCOSE BLD-MCNC: 117 MG/DL (ref 70–99)
GLUCOSE BLD-MCNC: 126 MG/DL (ref 70–99)
GLUCOSE BLD-MCNC: 139 MG/DL (ref 70–99)
GLUCOSE BLD-MCNC: 183 MG/DL (ref 70–99)
GLUCOSE BLD-MCNC: 213 MG/DL (ref 70–99)
GLUCOSE BLD-MCNC: 235 MG/DL (ref 70–99)
GLUCOSE BLD-MCNC: 237 MG/DL (ref 70–99)
GLUCOSE BLD-MCNC: 260 MG/DL (ref 70–99)
GLUCOSE BLD-MCNC: 266 MG/DL (ref 70–99)
GLUCOSE BLD-MCNC: 271 MG/DL (ref 70–99)
GLUCOSE BLD-MCNC: 284 MG/DL (ref 70–99)
GLUCOSE BLD-MCNC: 284 MG/DL (ref 70–99)
GLUCOSE BLD-MCNC: 313 MG/DL (ref 70–99)
GLUCOSE BLD-MCNC: 322 MG/DL (ref 70–99)
GLUCOSE BLD-MCNC: 328 MG/DL (ref 70–99)
GLUCOSE BLD-MCNC: 336 MG/DL (ref 70–99)
GLUCOSE BLD-MCNC: 380 MG/DL (ref 70–99)
GLUCOSE BLD-MCNC: 382 MG/DL (ref 70–99)
GLUCOSE BLD-MCNC: 571 MG/DL (ref 70–99)
GLUCOSE BLD-MCNC: 74 MG/DL (ref 70–99)
GLUCOSE BLD-MCNC: 89 MG/DL (ref 70–99)
GLUCOSE BLD-MCNC: >600 MG/DL (ref 70–99)
HBA1C MFR BLD: 13.1 %
HCT VFR BLD CALC: 33.1 % (ref 40.5–52.5)
HEMOGLOBIN: 10.9 G/DL (ref 13.5–17.5)
MAGNESIUM: 2.2 MG/DL (ref 1.8–2.4)
MAGNESIUM: 2.4 MG/DL (ref 1.8–2.4)
MAGNESIUM: 2.5 MG/DL (ref 1.8–2.4)
PERFORMED ON: ABNORMAL
PERFORMED ON: NORMAL
PERFORMED ON: NORMAL
PHOSPHORUS: 0.5 MG/DL (ref 2.5–4.9)
PHOSPHORUS: 1.9 MG/DL (ref 2.5–4.9)
PHOSPHORUS: 3.7 MG/DL (ref 2.5–4.9)
POTASSIUM SERPL-SCNC: 3.5 MMOL/L (ref 3.5–5.1)
POTASSIUM SERPL-SCNC: 3.7 MMOL/L (ref 3.5–5.1)
POTASSIUM SERPL-SCNC: 4.9 MMOL/L (ref 3.5–5.1)
POTASSIUM SERPL-SCNC: 5 MMOL/L (ref 3.5–5.1)
SODIUM BLD-SCNC: 139 MMOL/L (ref 136–145)
SODIUM BLD-SCNC: 141 MMOL/L (ref 136–145)
SODIUM BLD-SCNC: 145 MMOL/L (ref 136–145)
SODIUM BLD-SCNC: 145 MMOL/L (ref 136–145)

## 2020-12-09 PROCEDURE — 76937 US GUIDE VASCULAR ACCESS: CPT

## 2020-12-09 PROCEDURE — 6370000000 HC RX 637 (ALT 250 FOR IP): Performed by: NURSE PRACTITIONER

## 2020-12-09 PROCEDURE — 93010 ELECTROCARDIOGRAM REPORT: CPT | Performed by: INTERNAL MEDICINE

## 2020-12-09 PROCEDURE — 36592 COLLECT BLOOD FROM PICC: CPT

## 2020-12-09 PROCEDURE — 2580000003 HC RX 258: Performed by: NURSE PRACTITIONER

## 2020-12-09 PROCEDURE — 85014 HEMATOCRIT: CPT

## 2020-12-09 PROCEDURE — 94760 N-INVAS EAR/PLS OXIMETRY 1: CPT

## 2020-12-09 PROCEDURE — 36569 INSJ PICC 5 YR+ W/O IMAGING: CPT

## 2020-12-09 PROCEDURE — 83735 ASSAY OF MAGNESIUM: CPT

## 2020-12-09 PROCEDURE — 6370000000 HC RX 637 (ALT 250 FOR IP): Performed by: PHYSICIAN ASSISTANT

## 2020-12-09 PROCEDURE — 2500000003 HC RX 250 WO HCPCS: Performed by: INTERNAL MEDICINE

## 2020-12-09 PROCEDURE — 2580000003 HC RX 258: Performed by: INTERNAL MEDICINE

## 2020-12-09 PROCEDURE — 80048 BASIC METABOLIC PNL TOTAL CA: CPT

## 2020-12-09 PROCEDURE — C1751 CATH, INF, PER/CENT/MIDLINE: HCPCS

## 2020-12-09 PROCEDURE — 84100 ASSAY OF PHOSPHORUS: CPT

## 2020-12-09 PROCEDURE — 6360000002 HC RX W HCPCS: Performed by: INTERNAL MEDICINE

## 2020-12-09 PROCEDURE — 85018 HEMOGLOBIN: CPT

## 2020-12-09 PROCEDURE — 36415 COLL VENOUS BLD VENIPUNCTURE: CPT

## 2020-12-09 PROCEDURE — 6370000000 HC RX 637 (ALT 250 FOR IP): Performed by: INTERNAL MEDICINE

## 2020-12-09 PROCEDURE — 1200000000 HC SEMI PRIVATE

## 2020-12-09 RX ORDER — INSULIN GLARGINE 100 [IU]/ML
18 INJECTION, SOLUTION SUBCUTANEOUS NIGHTLY
Status: DISCONTINUED | OUTPATIENT
Start: 2020-12-09 | End: 2020-12-11 | Stop reason: HOSPADM

## 2020-12-09 RX ORDER — ASPIRIN 81 MG/1
81 TABLET, CHEWABLE ORAL DAILY
Status: DISCONTINUED | OUTPATIENT
Start: 2020-12-09 | End: 2020-12-11 | Stop reason: HOSPADM

## 2020-12-09 RX ORDER — PANTOPRAZOLE SODIUM 40 MG/1
40 TABLET, DELAYED RELEASE ORAL
Status: DISCONTINUED | OUTPATIENT
Start: 2020-12-09 | End: 2020-12-11 | Stop reason: HOSPADM

## 2020-12-09 RX ORDER — LIDOCAINE HYDROCHLORIDE 10 MG/ML
5 INJECTION, SOLUTION EPIDURAL; INFILTRATION; INTRACAUDAL; PERINEURAL ONCE
Status: COMPLETED | OUTPATIENT
Start: 2020-12-09 | End: 2020-12-09

## 2020-12-09 RX ORDER — INSULIN GLARGINE 100 [IU]/ML
5 INJECTION, SOLUTION SUBCUTANEOUS ONCE
Status: DISCONTINUED | OUTPATIENT
Start: 2020-12-09 | End: 2020-12-09

## 2020-12-09 RX ORDER — SODIUM CHLORIDE 450 MG/100ML
INJECTION, SOLUTION INTRAVENOUS CONTINUOUS
Status: DISCONTINUED | OUTPATIENT
Start: 2020-12-09 | End: 2020-12-09

## 2020-12-09 RX ORDER — RISPERIDONE 1 MG/1
2 TABLET, FILM COATED ORAL NIGHTLY
Status: DISCONTINUED | OUTPATIENT
Start: 2020-12-09 | End: 2020-12-11 | Stop reason: HOSPADM

## 2020-12-09 RX ORDER — HALOPERIDOL 5 MG
10 TABLET ORAL 2 TIMES DAILY
Status: DISCONTINUED | OUTPATIENT
Start: 2020-12-09 | End: 2020-12-09

## 2020-12-09 RX ORDER — SODIUM CHLORIDE 0.9 % (FLUSH) 0.9 %
10 SYRINGE (ML) INJECTION PRN
Status: DISCONTINUED | OUTPATIENT
Start: 2020-12-09 | End: 2020-12-11 | Stop reason: HOSPADM

## 2020-12-09 RX ORDER — GABAPENTIN 300 MG/1
600 CAPSULE ORAL 3 TIMES DAILY
COMMUNITY
Start: 2020-10-20

## 2020-12-09 RX ORDER — PANTOPRAZOLE SODIUM 40 MG/1
40 TABLET, DELAYED RELEASE ORAL DAILY
Status: DISCONTINUED | OUTPATIENT
Start: 2020-12-09 | End: 2020-12-09

## 2020-12-09 RX ORDER — ATORVASTATIN CALCIUM 40 MG/1
40 TABLET, FILM COATED ORAL DAILY
Status: DISCONTINUED | OUTPATIENT
Start: 2020-12-09 | End: 2020-12-11 | Stop reason: HOSPADM

## 2020-12-09 RX ORDER — SODIUM CHLORIDE 0.9 % (FLUSH) 0.9 %
10 SYRINGE (ML) INJECTION EVERY 12 HOURS SCHEDULED
Status: DISCONTINUED | OUTPATIENT
Start: 2020-12-09 | End: 2020-12-11 | Stop reason: HOSPADM

## 2020-12-09 RX ORDER — POTASSIUM CHLORIDE 7.45 MG/ML
10 INJECTION INTRAVENOUS PRN
Status: DISCONTINUED | OUTPATIENT
Start: 2020-12-09 | End: 2020-12-09

## 2020-12-09 RX ORDER — DIVALPROEX SODIUM 500 MG/1
1000 TABLET, EXTENDED RELEASE ORAL 2 TIMES DAILY
Status: DISCONTINUED | OUTPATIENT
Start: 2020-12-09 | End: 2020-12-09

## 2020-12-09 RX ORDER — DEXTROSE MONOHYDRATE 50 MG/ML
100 INJECTION, SOLUTION INTRAVENOUS PRN
Status: DISCONTINUED | OUTPATIENT
Start: 2020-12-09 | End: 2020-12-11 | Stop reason: HOSPADM

## 2020-12-09 RX ORDER — DEXTROSE MONOHYDRATE 25 G/50ML
12.5 INJECTION, SOLUTION INTRAVENOUS PRN
Status: DISCONTINUED | OUTPATIENT
Start: 2020-12-09 | End: 2020-12-11 | Stop reason: HOSPADM

## 2020-12-09 RX ORDER — AMLODIPINE BESYLATE 5 MG/1
2.5 TABLET ORAL DAILY
Status: DISCONTINUED | OUTPATIENT
Start: 2020-12-09 | End: 2020-12-11 | Stop reason: HOSPADM

## 2020-12-09 RX ORDER — RISPERIDONE 2 MG/1
2 TABLET, FILM COATED ORAL NIGHTLY
COMMUNITY
Start: 2020-10-20

## 2020-12-09 RX ORDER — NICOTINE POLACRILEX 4 MG
15 LOZENGE BUCCAL PRN
Status: DISCONTINUED | OUTPATIENT
Start: 2020-12-09 | End: 2020-12-11 | Stop reason: HOSPADM

## 2020-12-09 RX ORDER — DEXTROSE AND SODIUM CHLORIDE 5; .45 G/100ML; G/100ML
INJECTION, SOLUTION INTRAVENOUS CONTINUOUS PRN
Status: DISCONTINUED | OUTPATIENT
Start: 2020-12-09 | End: 2020-12-09

## 2020-12-09 RX ORDER — 0.9 % SODIUM CHLORIDE 0.9 %
15 INTRAVENOUS SOLUTION INTRAVENOUS ONCE
Status: COMPLETED | OUTPATIENT
Start: 2020-12-09 | End: 2020-12-09

## 2020-12-09 RX ORDER — MAGNESIUM SULFATE 1 G/100ML
1 INJECTION INTRAVENOUS PRN
Status: DISCONTINUED | OUTPATIENT
Start: 2020-12-09 | End: 2020-12-09

## 2020-12-09 RX ORDER — DEXTROSE MONOHYDRATE 25 G/50ML
12.5 INJECTION, SOLUTION INTRAVENOUS PRN
Status: DISCONTINUED | OUTPATIENT
Start: 2020-12-09 | End: 2020-12-09

## 2020-12-09 RX ADMIN — SODIUM PHOSPHATE, MONOBASIC, MONOHYDRATE 20 MMOL: 276; 142 INJECTION, SOLUTION INTRAVENOUS at 16:37

## 2020-12-09 RX ADMIN — POTASSIUM CHLORIDE 10 MEQ: 7.46 INJECTION, SOLUTION INTRAVENOUS at 16:38

## 2020-12-09 RX ADMIN — SODIUM CHLORIDE 1035 ML: 9 INJECTION, SOLUTION INTRAVENOUS at 01:09

## 2020-12-09 RX ADMIN — Medication 10 ML: at 21:37

## 2020-12-09 RX ADMIN — DEXTROSE AND SODIUM CHLORIDE: 5; 450 INJECTION, SOLUTION INTRAVENOUS at 04:57

## 2020-12-09 RX ADMIN — INSULIN LISPRO 2 UNITS: 100 INJECTION, SOLUTION INTRAVENOUS; SUBCUTANEOUS at 20:49

## 2020-12-09 RX ADMIN — INSULIN GLARGINE 18 UNITS: 100 INJECTION, SOLUTION SUBCUTANEOUS at 20:48

## 2020-12-09 RX ADMIN — PANTOPRAZOLE SODIUM 40 MG: 40 TABLET, DELAYED RELEASE ORAL at 11:30

## 2020-12-09 RX ADMIN — ATORVASTATIN CALCIUM 40 MG: 40 TABLET, FILM COATED ORAL at 11:29

## 2020-12-09 RX ADMIN — RISPERIDONE 2 MG: 1 TABLET ORAL at 20:48

## 2020-12-09 RX ADMIN — SODIUM CHLORIDE 18 UNITS/HR: 9 INJECTION, SOLUTION INTRAVENOUS at 12:25

## 2020-12-09 RX ADMIN — AMLODIPINE BESYLATE 2.5 MG: 5 TABLET ORAL at 11:29

## 2020-12-09 RX ADMIN — SODIUM CHLORIDE: 4.5 INJECTION, SOLUTION INTRAVENOUS at 01:58

## 2020-12-09 RX ADMIN — LIDOCAINE HYDROCHLORIDE 5 ML: 10 INJECTION, SOLUTION EPIDURAL; INFILTRATION; INTRACAUDAL; PERINEURAL at 12:00

## 2020-12-09 RX ADMIN — DEXTROSE AND SODIUM CHLORIDE: 5; 450 INJECTION, SOLUTION INTRAVENOUS at 14:10

## 2020-12-09 RX ADMIN — PANTOPRAZOLE SODIUM 40 MG: 40 TABLET, DELAYED RELEASE ORAL at 16:38

## 2020-12-09 ASSESSMENT — PAIN SCALES - GENERAL
PAINLEVEL_OUTOF10: 0

## 2020-12-09 NOTE — CARE COORDINATION
Sw attempted to contact patient to complete initial assessment. Pt unable to answer at this time.      Electronically signed by PHOEBE Kramer, VIET on 12/9/2020 at 4:22 PM

## 2020-12-09 NOTE — PLAN OF CARE
Problem: Nutrition  Goal: Optimal nutrition therapy  Outcome: Ongoing   Nutrition Problem #1: Inadequate oral intake  Intervention: Food and/or Nutrient Delivery: (Monitor for start of nutrition)  Nutritional Goals: tolerate most appropriate form of nutrition per MD

## 2020-12-09 NOTE — ED NOTES
Bed: A-16  Expected date:   Expected time:   Means of arrival:   Comments:  diana 44yo blood sugar [address], RN  12/08/20 2110

## 2020-12-09 NOTE — PROGRESS NOTES
5- Patient admitted to room 2123 from ED.   4971- Emergency contact list updated per pt request. OK to call \"wife\", Sean Person, for updates and to confirm medication list. This RN called to update Ernestopamelajonathan on plan of care. This nurse was told that she is not legally  to the pt at this time. Pt confirmed that she is to be the primary contact. Consult placed to spiritual care to assist with HPOA paperwork. All questions answered and Sean Sandoval informed of visitor policy. 12- Med rec completed and Dr. Donell Allison made aware of changes via perfect serve. 4610- Shift handoff completed with Bird Bocanegra RN at bedside.

## 2020-12-09 NOTE — H&P
Hospital Medicine  History and Physical    PCP: Andrew Gilliam MD  Patient Name: Mikaela Auguste    Date of Service: Pt seen/examined on 12/08/2020 and admitted to Inpatient with expected LOS greater than two midnights due to medical therapy    CHIEF COMPLAINT:  Pt c/o high glucose levels  HISTORY OF PRESENT ILLNESS: Pt is an 50y.o. year-old male with a history of hypertension, hyperlipidemia and diabetes mellitus. Patient is noncompliant with his diabetic regimen. He began to feel ill today and had nausea and vomiting and checked his glucose level and it was [place]. \"  EMS was called and they found him to have a glucose level of 553. In the emergency room he was found to have diabetic ketoacidosis. He is being admitted for further evaluation and treatment. Associated signs and symptoms do not include fever or chills, abdominal pain, hemoptysis, hematochezia, diarrhea, constipation or urinary symptoms. Past Medical History:        Diagnosis Date    Diabetes mellitus ([address]     Gastroparesis     Hypertension        Past Surgical History:        Procedure Laterality Date    BONY PELVIS SURGERY      FOOT AMPUTATION Right 5/13/2020    EMERGENCY; RIGHT INCISION AND DEBRIDEMENT; HALUX AMPUTATION performed by Lieutenant Dong DPM at [address] 2006    pins and rods- plate    UPPER GASTROINTESTINAL ENDOSCOPY N/A 12/2/2019    EGD BIOPSY performed by Candice Fields MD at [facility]:  Ketorolac; Morphine; Morphine and related; Tramadol; Lisinopril; Haloperidol lactate; Toradol [ketorolac tromethamine]; and Vicodin [hydrocodone-acetaminophen]    Medications Prior to Admission:    Prior to Admission medications    Medication Sig Start Date End Date Taking? Authorizing Provider   risperiDONE (RISPERDAL) 2 MG tablet Take 2 mg by mouth nightly 10/20/20  Yes Historical Provider, MD   gabapentin (NEURONTIN) 300 MG capsule Take 600 mg by mouth 3 times daily.  10/20/20  Yes Historical Provider, MD   insulin glargine (LANTUS) 100 UNIT/ML injection vial Inject 15 Units into the skin nightly  Patient taking differently: Inject 18 Units into the skin nightly  8/25/20   Dino Ruff MD   haloperidol (HALDOL) 10 MG tablet Take 1 tablet by mouth 2 times daily 8/25/20   Dino Ruff MD   pantoprazole (PROTONIX) 40 MG tablet Take 1 tablet by mouth daily 8/25/20   Dino Ruff MD   amLODIPine (NORVASC) 2.5 MG tablet Take 1 tablet by mouth daily 8/25/20   Dino Ruff MD   atorvastatin (LIPITOR) 40 MG tablet Take 1 tablet by mouth daily 8/25/20 9/24/20  Dino Ruff MD   lactobacillus (CULTURELLE) capsule Take 1 capsule by mouth 2 times daily (with meals) 8/25/20   Dino Ruff MD   aspirin 81 MG chewable tablet Take 1 tablet by mouth daily 8/25/20   Dino Ruff MD   insulin lispro (HUMALOG) 100 UNIT/ML injection vial Inject 0-12 Units into the skin 3 times daily (with meals) 8/25/20   Dino Ruff MD   divalproex (DEPAKOTE ER) 500 MG extended release tablet Take 2 tablets by mouth 2 times daily 8/25/20   Dino Ruff MD   blood glucose monitor strips Check blood sugars 4-5 times per day 8/25/20   Dino Ruff MD   Insulin Syringe-Needle U-100 (ULTRA-THIN II INS SYR SHORT) 31G X 5/16\" 1 ML MISC 1 each by Does not apply route 3 times daily 8/25/20   Dino Ruff MD   Lancets MISC 1 each by Does not apply route daily Check blood sugars 4-5 times per day 8/25/20   Dino Ruff MD       Family History:       Problem Relation Age of Onset    Diabetes Mother     Heart Disease Mother     High Blood Pressure Mother     Asthma Brother     Other Father         kidney disease    No Known Problems Maternal Grandmother     No Known Problems Maternal Grandfather     No Known Problems Paternal Grandmother     No Known Problems Paternal Grandfather      Social History:   TOBACCO:   reports that he has been smoking cigars.  He started smoking about 11 years ago. He has a 22.00 pack-year smoking history. He has never used smokeless tobacco.  ETOH:   reports previous alcohol use. OCCUPATION:      REVIEW OF SYSTEMS:  A full review of systems was performed and is negative except for that which appears in the HPI    Physical Exam:    Vitals: /64   Pulse 100   Temp 97.8 °F (36.6 °C) (Axillary)   Resp 10   Ht 6' 2\" (1.88 m)   Wt 141 lb 15.6 oz (64.4 kg)   SpO2 100%   BMI 18.23 kg/m²   General appearance: WD/WN 50y.o. year-old AA male who is alert, appears stated age and is cooperative  HEENT: Head: Normocephalic, no lesions, without obvious abnormality. Eye: Normal external eye, conjunctiva, lids cornea, PEERL. Ears: Normal external ears. Non-tender. Nose: Normal external nose, mucus membranes and septum. Pharynx: Dental Hygiene adequate. Normal buccal mucosa. Normal pharynx. Neck: no adenopathy, no carotid bruit, no JVD, supple, symmetrical, trachea midline and thyroid not enlarged, symmetric, no tenderness/mass/nodules  Lungs: clear to auscultation bilaterally and no use of accessory muscles. Heart: regular rate and rhythm, S1, S2 normal, no murmur, click, rub or gallop and normal apical impulse  Abdomen: soft, non-tender; bowel sounds normal; no masses, no organomegaly  Extremities: extremities atraumatic, no cyanosis or edema and Homans sign is negative, no sign of DVT. Capillary Refill: Acceptable < 3 seconds   Peripheral Pulses: +3 easily felt, not easily obliterated with pressures   Skin: Skin color, texture, turgor normal. No rashes or lesions on exposed skin  Neurologic: Neurovascularly intact without any focal sensory/motor deficits. Cranial nerves: II-XII intact, grossly non-focal. Gait was not tested.   Psychiatric: Alert and oriented, thought content appropriate, normal insight    CBC:   Recent Labs     12/08/20 2145   WBC 20.4*   HGB 12.2*   *     BMP:    Recent Labs     12/08/20 2145 12/09/20  024 * 141   K 5.9* 4.9   CL 88* 108   CO2 7* 7*   BUN 34* 28*   CREATININE 1.7* 1.3   GLUCOSE 715* 380*     Troponin: No results for input(s): TROPONINI in the last 72 hours. PT/INR:  No results found for: PTINR  U/A:    Lab Results   Component Value Date    LEUKOCYTESUR Negative 12/08/2020    RBCUA 0 12/08/2020    SPECGRAV 1.022 12/08/2020    UROBILINOGEN 0.2 12/08/2020    BILIRUBINUR Negative 12/08/2020    BILIRUBINUR NEGATIVE 12/15/2010    BLOODU Negative 12/08/2020    GLUCOSEU >=1000 12/08/2020    GLUCOSEU >=1000 12/15/2010    PROTEINU 30 12/08/2020         RAD:   I have independently reviewed and interpreted the imaging studies below and based my recommendations to the patient on those findings. Xr Chest Portable    Result Date: 12/8/2020  EXAMINATION: ONE XRAY VIEW OF THE CHEST 12/8/2020 6:32 pm COMPARISON: 07/20/2020 HISTORY: ORDERING SYSTEM PROVIDED HISTORY: SOB TECHNOLOGIST PROVIDED HISTORY: Reason for exam:->SOB Reason for Exam: Hyperglycemia, sob Acuity: Acute Type of Exam: Initial FINDINGS: The cardial-pericardial silhouette is unremarkable in appearance. The lungs are clear. No pneumothorax is found. No free air is seen. No acute bony abnormality. Unremarkable portable chest radiograph. Assessment:   Principal Problem:    Ketoacidosis, diabetic, type 2, no coma (HCC)  Active Problems:    Nausea and vomiting    Hyperlipidemia    Acute renal failure (ARF) ([facility]    Essential hypertension  Resolved Problems:    * No resolved hospital problems. *      Plan:       DKA (diabetic ketoacidoses) - Pt will be admitted to the ICU and will be placed on the DKA protocol. He will be on an insulin gtt initially, and glucose will be monitored closely. He will be given IV Fluids per protocol and electrolytes will be monitored regularly and will be replaced as needed.      Non-Intractable vomiting with nausea - Will provide symptomatic treatment with Zofran and/or Phenergan as needed, maintenance of fluids and electrolytes. Acute renal failure (ARF) ([facility]) - monitor for improvement with IV fluids    Essential (primary) hypertension - continue home meds and monitor blood pressure    Hyperlipidemia - No current evidence of Rhabdomyolysis or other adverse effects. Continue statin therapy while in the hospital            DVT Prophylaxis: Lovenox  Diet: Diet NPO Effective Now Exceptions are: Ice Chips, Sips with Meds  Code Status: Full Code  (Advanced care planning has been discussed with patient and/or responsible family member and is reflected in the code status.  Further orders associated with this have been entered if appropriate)    Disposition: Anticipate that patient will remain in the hospital for 2 to 3 days depending on further evaluation and clinical course    Please note that over 50 minutes was spent in evaluating the patient, review of records and results, discussion with staff/family, etc.    Trenton Reese MD

## 2020-12-09 NOTE — PROGRESS NOTES
0710: pt resting in bed w/eyes closed, no facial grimace. Respirations easy/even. Call light within reach. Will continue to monitor. 0725: FSBS 322, see emar for rate change on insulin gtt. Call light within reach, bed alarm intact. 0745: PT yelling out from room, \"NURSE, NURS!!\" Nurse enters, pt sitting up in bed w/emesis bag in hand. Pt has vomited 400cc dark red/brown vomit. Pt provided additional emesis containers, rolls over to side away from nurse and rests head on pillow. Pt denies additional needs at this time. 8647: Sent message to Commissioner regarding dark red/brown emesis. Awaiting further orders. 4229: stat H&H ordered, GI consult ordered for coffee ground emesis. 1015: nurse enters for hours FSBS, finds that patient has pulled out IV access with insulin drip infusing into bed. X2 attempts at IV access made, charge nurse made aware, no available access to be seen. Insulin drip is off at this time. 1042: [address] made aware and an order for PICC line to be placed entered and called to PICC line service. [phone]: awaiting further orders for insulin coverage while insulin drip is off.    1135: PICC nurse to patient room at this time. This nurse obtains PICC line consent with patient, witnessed by myself and PICC nurse. 1226: PICC line ok to use per PICC nurse w/x-ray verification. Insulin drip restarted at this time at 18 units/hr. 1622: MD made aware that Insulin drip is on hold awaiting further orders as pt FSBS 74.     1720: Next FSBS 89, per MD hold insulin gtt, check BNP at 1800 along with another FSBS.      Electronically signed by Alex Diallo RN on 12/9/2020 at 7:14 PM

## 2020-12-09 NOTE — ED PROVIDER NOTES
dizziness, syncope and headaches. Psychiatric/Behavioral: Negative for confusion. All other systems reviewed and are negative. Positives and Pertinent negatives as per HPI. Except as noted above in the ROS, all other systems were reviewed and negative.        PAST MEDICAL HISTORY     Past Medical History:   Diagnosis Date    Diabetes mellitus ([facility]     Gastroparesis     Hypertension          SURGICAL HISTORY     Past Surgical History:   Procedure Laterality Date    BONY PELVIS SURGERY      FOOT AMPUTATION Right 5/13/2020    EMERGENCY; RIGHT INCISION AND DEBRIDEMENT; HALUX AMPUTATION performed by Jori Fisher DPM at [address] 2006    pins and rods- plate    UPPER GASTROINTESTINAL ENDOSCOPY N/A 12/2/2019    EGD BIOPSY performed by Lissett Sosa MD at \[facility]\""       Previous Medications    AMLODIPINE (NORVASC) 2.5 MG TABLET    Take 1 tablet by mouth daily    ASPIRIN 81 MG CHEWABLE TABLET    Take 1 tablet by mouth daily    ATORVASTATIN (LIPITOR) 40 MG TABLET    Take 1 tablet by mouth daily    BLOOD GLUCOSE MONITOR STRIPS    Check blood sugars 4-5 times per day    DIVALPROEX (DEPAKOTE ER) 500 MG EXTENDED RELEASE TABLET    Take 2 tablets by mouth 2 times daily    HALOPERIDOL (HALDOL) 10 MG TABLET    Take 1 tablet by mouth 2 times daily    INSULIN GLARGINE (LANTUS) 100 UNIT/ML INJECTION VIAL    Inject 15 Units into the skin nightly    INSULIN LISPRO (HUMALOG) 100 UNIT/ML INJECTION VIAL    Inject 0-12 Units into the skin 3 times daily (with meals)    INSULIN SYRINGE-NEEDLE U-100 (ULTRA-THIN II INS SYR SHORT) 31G X 5/16\" 1 ML MISC    1 each by Does not apply route 3 times daily    LACTOBACILLUS (CULTURELLE) CAPSULE    Take 1 capsule by mouth 2 times daily (with meals)    LANCETS MISC    1 each by Does not apply route daily Check blood sugars 4-5 times per day    PANTOPRAZOLE (PROTONIX) 40 MG TABLET    Take 1 tablet by mouth daily         ALLERGIES Ketorolac; Morphine; Morphine and related; Tramadol; Lisinopril; Haloperidol lactate; Toradol [ketorolac tromethamine]; and Vicodin [hydrocodone-acetaminophen]    FAMILYHISTORY       Family History   Problem Relation Age of Onset    Diabetes Mother     Heart Disease Mother     High Blood Pressure Mother     Asthma Brother     Other Father         kidney disease    No Known Problems Maternal Grandmother     No Known Problems Maternal Grandfather     No Known Problems Paternal Grandmother     No Known Problems Paternal Grandfather           SOCIAL HISTORY       Social History     Tobacco Use    Smoking status: Current Every Day Smoker     Packs/day: 2.00     Years: 11.00     Pack years: 22.00     Types: Cigars     Start date: 3/26/2009    Smokeless tobacco: Never Used    Tobacco comment: black and mild 2  x daily   Substance Use Topics    Alcohol use: Not Currently     Frequency: Never    Drug use: Yes     Frequency: 3.0 times per week     Types: Marijuana       SCREENINGS             PHYSICAL EXAM    (up to 7 for level 4, 8 or more for level 5)     ED Triage Vitals [12/08/20 2123]   BP Temp Temp Source Pulse Resp SpO2 Height Weight   (!) 170/88 98 °F (36.7 °C) Oral 104 24 100 % 6' 2\" (1.88 m) 125 lb 11.2 oz (57 kg)       Physical Exam  Vitals signs and nursing note reviewed. Constitutional:       General: He is not in acute distress. Appearance: Normal appearance. He is well-developed. He is ill-appearing. He is not toxic-appearing. HENT:      Head: Normocephalic and atraumatic. Eyes:      General: No scleral icterus. Conjunctiva/sclera: Conjunctivae normal.   Neck:      Musculoskeletal: Full passive range of motion without pain and neck supple. Vascular: No JVD. Cardiovascular:      Rate and Rhythm: Regular rhythm. Tachycardia present. Heart sounds: Normal heart sounds. Pulmonary:      Effort: Pulmonary effort is normal. No respiratory distress.       Breath sounds: Normal breath sounds. Abdominal:      General: Bowel sounds are normal. There is no distension. Palpations: Abdomen is soft. Abdomen is not rigid. Tenderness: There is no abdominal tenderness. There is no right CVA tenderness, left CVA tenderness, guarding or rebound. Musculoskeletal: Normal range of motion. Skin:     General: Skin is warm and dry. Capillary Refill: Capillary refill takes less than 2 seconds. Findings: No rash. Neurological:      General: No focal deficit present. Mental Status: He is alert and oriented to person, place, and time. Cranial Nerves: Cranial nerves are intact.    Psychiatric:         Mood and Affect: Mood normal.         DIAGNOSTIC RESULTS   LABS:    Labs Reviewed   CBC WITH AUTO DIFFERENTIAL - Abnormal; Notable for the following components:       Result Value    WBC 20.4 (*)     Hemoglobin 12.2 (*)     MCH 24.7 (*)     MCHC 29.1 (*)     RDW 18.6 (*)     Platelets 454 (*)     Neutrophils Absolute 19.0 (*)     Lymphocytes Absolute 0.8 (*)     All other components within normal limits    Narrative:     Performed at:  [address] [address]   Phone [phone]   COMPREHENSIVE METABOLIC PANEL W/ REFLEX TO MG FOR LOW K - Abnormal; Notable for the following components:    Sodium 134 (*)     Potassium reflex Magnesium 5.9 (*)     Chloride 88 (*)     CO2 7 (*)     Anion Gap 39 (*)     Glucose 715 (*)     BUN 34 (*)     CREATININE 1.7 (*)     GFR Non- 43 (*)     GFR  52 (*)     Calcium 11.0 (*)     Total Protein 9.9 (*)     Albumin/Globulin Ratio 0.9 (*)     Alkaline Phosphatase 167 (*)     All other components within normal limits    Narrative:     Elizabeth Morales tel. [phone],  Chemistry results called to and read back by olivia casey, 12/08/2020 22:30, by  [facility]  Chemistry results called to and read back by olivia casey, 12/08/2020 22:29, by  [facility]  Performed at:  [facility]  [address] [facility], [place] [address]   Phone [phone]   LACTIC ACID, PLASMA - Abnormal; Notable for the following components:    Lactic Acid 4.6 (*)     All other components within normal limits    Narrative:     Jonathan Vanegas tel. [phone],  Chemistry results called to and read back by olivia becerra rn, 12/08/2020  22:15, by [facility]  Performed at:  [facility]  [address] [place], [place] [address]   Phone [phone]   BLOOD GAS, VENOUS - Abnormal; Notable for the following components:    pH, Ronan 6.954 (*)     pCO2, Ronan 31.7 (*)     HCO3, Venous 7 (*)     All other components within normal limits    Narrative:     Jonathan Vanegas tel. [phone],  Chemistry results called to and read back by [facility] RN. , 12/08/2020 22:02,  by [facility]  Performed at:  [facility]  [address] [place], [place] [address]   Phone [phone]   URINE RT REFLEX TO CULTURE - Abnormal; Notable for the following components:    Glucose, Ur >=1000 (*)     Ketones, Urine >=80 (*)     Protein, UA 30 (*)     All other components within normal limits    Narrative:     Performed at:  [facility]  [address] [facility] [place] [employer] [address]   Phone [phone]   LIPASE - Abnormal; Notable for the following components:    Lipase 8.0 (*)     All other components within normal limits    Narrative:     Jonathan Vanegas tel. [phone],  Chemistry results called to and read back by olivia casey, 12/08/2020 22:30, by  [facility]  Chemistry results called to and read back by olivia casey, 12/08/2020 22:29, by  [facility]  Performed at:  [facility]  [address] [facility] [place] [employer] [address]   Phone [phone]   POCT GLUCOSE - Abnormal; Notable for the following components:    POC Glucose >600 (*)     All other components within normal limits    Narrative:     Performed at:  [facility]  [address] [place] [facility] [address]   Phone [phone]   POCT GLUCOSE - Abnormal; Notable for the following components:    POC Glucose 594 (*)     All other components within normal limits    Narrative:     Performed at:  [facility]  [address] [place] [facility] [address]   Phone [phone]   URINE DRUG SCREEN    Narrative:     Performed at:  [facility]  [address] [facility] [address]   Phone [phone]   MICROSCOPIC URINALYSIS    Narrative:     Performed at:  [facility]  [address] [facility] [place] [facility] [address]   Phone [phone]   HEMOGLOBIN A1C   POCT GLUCOSE   POCT GLUCOSE   POCT GLUCOSE   POCT GLUCOSE   POCT GLUCOSE   POCT GLUCOSE   POCT GLUCOSE   POCT GLUCOSE   POCT GLUCOSE   POCT GLUCOSE   POCT GLUCOSE   POCT GLUCOSE   POCT GLUCOSE   POCT GLUCOSE   POCT GLUCOSE       All other labs were within normal range or not returned as of this dictation. EKG: All EKG's are interpreted by the Emergency Department Physician in the absence of a cardiologist.  Please see their note for interpretation of EKG. RADIOLOGY:   Non-plain film images such as CT, Ultrasound and MRI are read by the radiologist. Plain radiographic images are visualized and preliminarily interpreted by the ED Provider with the below findings:        Interpretation per the Radiologist below, if available at the time of this note:    XR CHEST PORTABLE   Final Result   Unremarkable portable chest radiograph. No results found. PROCEDURES   Unless otherwise noted below, none     Procedures    CRITICAL CARE TIME   CRITICAL CARE NOTE:  There was a high probability of clinically significant life-threatening deterioration of the patient's condition requiring my urgent intervention. Total critical care time was at least 45 minutes.     This includes vital sign monitoring, pulse oximetry monitoring, telemetry monitoring, clinical response to the IV medications, reviewing the nursing notes, consultation time, dictation/documentation time, and interpretation of the labwork. This excludes any separately billable procedures performed. CONSULTS:  IP CONSULT TO HOSPITALIST      EMERGENCY DEPARTMENT COURSE and DIFFERENTIAL DIAGNOSIS/MDM:   Vitals:    Vitals:    12/08/20 2123 12/08/20 2157   BP: (!) 170/88 (!) 158/80   Pulse: 104 92   Resp: 24 20   Temp: 98 °F (36.7 °C)    TempSrc: Oral    SpO2: 100% 100%   Weight: 125 lb 11.2 oz (57 kg)    Height: 6' 2\" (1.88 m)        Patient was given the following medications:  Medications   glucose (GLUTOSE) 40 % oral gel 15 g (has no administration in time range)   dextrose 50 % IV solution (has no administration in time range)   glucagon (rDNA) injection 1 mg (has no administration in time range)   dextrose 5 % solution (has no administration in time range)   insulin regular (HUMULIN R;NOVOLIN R) 100 Units in sodium chloride 0.9 % 100 mL infusion (has no administration in time range)   insulin regular (HUMULIN R;NOVOLIN R) injection 10 Units (has no administration in time range)   0.9 % sodium chloride bolus (has no administration in time range)   0.9 % sodium chloride bolus (2,000 mLs Intravenous New Bag 12/8/20 2156)           Differential Diagnosis: Diabetic ketoacidosis, nonketotic hyperosmolar coma, dehydration, acute renal failure, electrolyte abnormalities, infection, GI emergency, cardiac emergency, pulmonary emergency, other    Patient presents with high blood sugar and vomiting. See HPI for full presentation. Physical exam as above. 50year-old lying in bed in no acute distress. He does appear ill. Abdomen soft with no focal tenderness. Glucose 715 with an anion gap of 39 and bicarb of 7. Potassium 5.9. MEHRAN with a creatinine of 1.7 and GFR 52. VBG shows a pH of 6.9.  >80 ketones in urine. Lactic 4.6.   He has leukocytosis with a white count of 20 and left shift of 19 with no bandemia. Emergency department course included 3 L of fluid, and insulin bolus for the hyperkalemia, and an insulin infusion. Patient resting comfortably. He will be admitted for further work-up. He was given all test results and given an opportunity to ask and have any questions answered. He was agreeable to admission. The hospitalist, Dr. Sally Jackson, was consulted and agreed to admit patient and write orders. The patient tolerated their visit well. They were seen and evaluated by the attending physician, Carmelina Pappas MD who agreed with the assessment and plan. The patient and / or the family were informed of the results of any tests, a time was given to answer questions, a plan was proposed and they agreed with plan. FINAL IMPRESSION      1. Diabetic ketoacidosis without coma associated with type 1 diabetes mellitus ([facility] [address]    2. Hyperkalemia    3. MEHRAN (acute kidney injury) ([facility] [address]    4. Non-intractable vomiting with nausea, unspecified vomiting type    5. Elevated lactic acid level          DISPOSITION/PLAN   DISPOSITION Admitted 12/08/2020 11:25:49 PM      PATIENT REFERREDTO:  No follow-up provider specified.     DISCHARGE MEDICATIONS:  New Prescriptions    No medications on file       DISCONTINUED MEDICATIONS:  Discontinued Medications    No medications on file              (Please note that portions of this note were completed with a voice recognition program.  Efforts were made to edit the dictations but occasionally words are mis-transcribed.)    ESTEPHANIA Pena CNP (electronically signed)           ESTEPHANIA Pena CNP  12/08/20 1623

## 2020-12-09 NOTE — PROGRESS NOTES
Comprehensive Nutrition Assessment    Type and Reason for Visit:  Wound, Positive Nutrition Screen    Nutrition Recommendations/Plan:   Monitor for start of nutrition. May need supplement. Nutrition Assessment:  Pt presented with hyperglycemia and in DKA. Noted pt with diabetic foot wound. Pt also was experiencing new onset of N/V , coffee ground emesis and GI consulted. Pt also with hx of gastroparesis and has been on Reglan in the past. Noted poor compliance with diabetes. When diet able to advance, Carb control diet. No recent wt loss. Will continue to monitor. Malnutrition Assessment:  Malnutrition Status:  No malnutrition      Estimated Daily Nutrient Needs:  Energy (kcal):  7016-9279 kcal (30-35 kcal/kg CBW)  Protein (g):  77-96 gm (1.2-1.5 gm/kg CBW)    Fluid (ml/day):  1 mL/kcal    Nutrition Related Findings:  BM 12/9      Wounds:  Diabetic Ulcer(foot)       Current Nutrition Therapies:    Diet NPO Effective Now Exceptions are: Ice Chips, Sips with Meds    Anthropometric Measures:  · Height: 6' 2\" (188 cm)  · Current Body Weight: 141 lb (64 kg)   · Ideal Body Weight: 190 lbs; % Ideal Body Weight 74.2 %   · BMI: 18.1  · BMI Categories: Underweight (BMI less than 18.5)       Nutrition Diagnosis:   · Inadequate oral intake related to (DKA, N/V) as evidenced by NPO or clear liquid status due to medical condition      Nutrition Interventions:   Food and/or Nutrient Delivery:  (Monitor for start of nutrition)  Nutrition Education/Counseling:  Education needed(provided in D/C instructions)   Coordination of Nutrition Care:  No recommendation at this time    Goals:  tolerate most appropriate form of nutrition per MD       Nutrition Monitoring and Evaluation:   Food/Nutrient Intake Outcomes:  Diet Advancement/Tolerance  Physical Signs/Symptoms Outcomes:  Biochemical Data, Weight, Skin, Nutrition Focused Physical Findings, Meal Time Behavior, Nausea or Vomiting, GI Status     Discharge Planning:     Too soon to determine     Electronically signed by Roopa Drew RD, LD on 12/9/20 at 12:51 PM EST    Contact: [phone]

## 2020-12-09 NOTE — CONSULTS
GASTROENTEROLOGY INPATIENT CONSULTATION      IDENTIFYING DATA/REASON FOR CONSULTATION   PATIENT:  Semaj Rosales  MRN:  [id number]  ADMIT DATE: 12/8/2020  TIME OF EVALUATION: 12/9/2020 11:07 AM  HOSPITAL STAY:   LOS: 1 day     REASON FOR CONSULTATION:  Coffee-ground emesis     HISTORY OF PRESENT ILLNESS   Semaj Rosales is a 50 y.o. male with a PMH of HTN, gastroparesis, and DM1 who presented on 12/8/2020 with hyperglycemia and vomiting. Patient has been having episodes of red/brown vomit, for which we have been consulted. Patient is a poor historian. When asked specific question about his history, he replies with \"I'm sorry. I do not know. I'm just sick\". Majority of the history is obtained per chart review. State he has a history of DKA and he has experienced his current symptoms in the past.  State he is unsure of how often this happens or when the last time this happened. Patient states he began to feel bad yesterday. Had multiple episodes of nausea and vomiting. Reports his last episode of vomiting was last night. States his vomit has been red/brown since his symptoms began. RN reports episode this AM, although patient reports he did not vomit this morning. Patient has a history of poorly controlled diabetes and gastroparesis. Patient has been on Reglan in the past but is not currently taking it. States he stopped taking it recently but is unsure why. Patient's glucose 715 on admission with anion gap of 39 and bicarb of 7. Potassium of 5.9 and VBG with pH of 6.9.  >80 ketones in urine and lactic acid of 4.6. States he is not sure if he takes any NSAIDs at home. Report he has a history of heartburn/reflux. Unsure if he takes any acid reducing medications. Denies abdominal pain, melena, or hematochezia. Prior Endoscopic Evaluations:  EGD 12/02/19 with Dr. Sam Covington- Indication: esophageal thickening  Postoperative Diagnosis:  1. LA Grade D Esophagitis.  2. Erythema in the Cardia 2/2 Sachi 3. Duodenitis     EGD 3/27/10 with Dr. Ching Rehman DIAGNOSIS:  Nausea, hematemesis. POSTOPERATIVE DIAGNOSIS:  Normal esophageal, gastric and duodenal mucosa.    PAST MEDICAL, SURGICAL, FAMILY, and SOCIAL HISTORY     Past Medical History:   Diagnosis Date    Diabetes mellitus ([address]     Gastroparesis     Hypertension      Past Surgical History:   Procedure Laterality Date    BONY PELVIS SURGERY      FOOT AMPUTATION Right 5/13/2020    EMERGENCY; RIGHT INCISION AND DEBRIDEMENT; HALUX AMPUTATION performed by Jose Diego DPM at [facility] Left 2006    pins and rods- plate    UPPER GASTROINTESTINAL ENDOSCOPY N/A 12/2/2019    EGD BIOPSY performed by Víctor Greco MD at [address] History   Problem Relation Age of Onset    Diabetes Mother     Heart Disease Mother     High Blood Pressure Mother     Asthma Brother     Other Father         kidney disease    No Known Problems Maternal Grandmother     No Known Problems Maternal Grandfather     No Known Problems Paternal Grandmother     No Known Problems Paternal Grandfather      Social History     Socioeconomic History    Marital status: Legally      Spouse name: None    Number of children: 1    Years of education: None    Highest education level: None   Occupational History    Occupation: unemployed   Social Needs    Financial resource strain: None    Food insecurity     Worry: None     Inability: None    Transportation needs     Medical: None     Non-medical: None   Tobacco Use    Smoking status: Current Every Day Smoker     Packs/day: 2.00     Years: 11.00     Pack years: 22.00     Types: Cigars     Start date: 3/26/2009    Smokeless tobacco: Never Used    Tobacco comment: black and mild 2  x daily   Substance and Sexual Activity    Alcohol use: Not Currently     Frequency: Never    Drug use: Yes     Frequency: 3.0 times per week     Types: Marijuana    Sexual activity: Yes HPI    PHYSICAL EXAM     Vitals:    12/09/20 0800 12/09/20 0822 12/09/20 0900 12/09/20 1029   BP: (!) 145/71  (!) 141/62 (!) 147/83   Pulse: 98  93 96   Resp: 16 23 17 18   Temp: 98.3 °F (36.8 °C)      TempSrc: Oral      SpO2: 100% 100% 100% 100%   Weight:       Height:           I/O last 3 completed shifts:  In: -   Out: 1000 [Urine:1000]      Physical Exam:  General appearance: alert, cooperative, uncomfortable, appears stated age  Eyes: Anicteric  Head: Normocephalic, without obvious abnormality  Lungs: clear to auscultation bilaterally, Normal Effort  Heart: tachycardic rate, regular rhythm, normal S1 and S2, no murmurs or rubs  Abdomen: soft, non-distended, epigastric TTP. Bowel sounds normal. No masses,  no organomegaly. Extremities: atraumatic, no cyanosis or edema  Skin: warm and dry, no jaundice  Neuro: Grossly intact, A&OX3      LABS AND IMAGING   Laboratory   Recent Labs     12/08/20  2145   WBC 20.4*   HGB 12.2*   HCT 41.8   MCV 84.7   *     Recent Labs     12/08/20  2145 12/09/20  0246 12/09/20  0449   * 141 145   K 5.9* 4.9 5.0   CL 88* 108 111*   CO2 7* 7* 9*   PHOS  --  3.7 1.9*   BUN 34* 28* 25*   CREATININE 1.7* 1.3 1.2     Recent Labs     12/08/20  2145   AST 24   ALT 19   BILITOT <0.2   ALKPHOS 167*     Recent Labs     12/08/20  2145   LIPASE 8.0*     No results for input(s): PROTIME, INR in the last 72 hours. Imaging  XR CHEST PORTABLE   Final Result   Unremarkable portable chest radiograph. ASSESSMENT AND RECOMMENDATIONS   50 y.o. male with a PMH of HTN, gastroparesis, and DM1 who presented on 12/8/2020 with hyperglycemia and vomiting. Patient has been having episodes of red/brown vomit, for which we have been consulted. IMPRESSION:    1. Coffee-ground emesis  -Patient with poorly controlled diabetes and hx of gastroparesis. Has been on Reglan in the past but states it was stopped recently. Patient unsure of when/why it was stopped.    -Patient unsure if he takes NSAIDs. None listed per chart review aside from daily ASA. -Denies melena or hematochezia  -Hgb 12.2 last night. Repeat H/H ordered. Elevated BUN consistent with elevation in creatinine.  -Last EGD 12/2/19 with Dr. Leila Dakins d/t imaging findings of esophageal thickening. EGD remarkable for grade D esophagitis, erythema in the cardia 2/2 retching, and duodenitis   -Differential includes reflux esophagitis vs gastritis vs leti lama tear  2. DKA  -Glucose 715 on admission with anion gap of 39 and bicarb of 7. Potassium of 5.9 and VBG with pH of 6.9.  >80 ketones in urine and lactic acid of 4.6.    -Patient noncompliant with diabetic regimen    RECOMMENDATIONS:      1) Repeat H/H ordered this AM after reported episode of red/brown emesis. Awaiting collection. Antiemetics per primary team.  EGD 12/2/2019 d/t imaging findings of esophageal thickening remarkable for grade D esophagitis, erythema in the cardia 2/2 vomiting, and duodenitis. Could consider repeat EGD. Patient with hx of gastroparesis and has been on Reglan 10mg q6h in the past.  Not currently on home med list.  Per chart review, was still taking it in September. ? Restarting? [facility] Ultimately, patient will need to have tighter glycemic control. Continue BID PPI. Will discuss with Dr. Ameena Vaz. If you have any questions or need any further information, please feel free to contact our consult team.  Thank you for allowing us to participate in the care of Raquel Rodrigues. The note was completed using Dragon voice recognition transcription. Every effort was made to ensure accuracy; however, inadvertent transcription errors may be present despite my best efforts to edit errors.       Ingrid Clemente PA-C

## 2020-12-09 NOTE — FLOWSHEET NOTE
responded to consult for advance directives. Patient's nurse Mango Wyatt said patient is awake and alert but she would be uncomfortable witnessing a legal document today.  gave patient and his fiance the AD forms, with explanation of living will and healthcare power of . Patient indicated that he is not interested in living will but wants to complete the healthcare power of , naming his fiance as POA.  told nurse to contact spiritual care when she is comfortable with his mental status.

## 2020-12-09 NOTE — PROGRESS NOTES
Hospitalist Progress Note      PCP: Luisa Reinoso MD    Date of Admission: 12/8/2020    Interval history:   States he feels hungry and wants to drink water    Medications:  Reviewed    Infusion Medications    sodium chloride Stopped (12/09/20 0457)    dextrose 5 % and 0.45 % NaCl 150 mL/hr at 12/09/20 1410    insulin 3.52 Units/kg/hr (12/09/20 1509)     Scheduled Medications    atorvastatin  40 mg Oral Daily    aspirin  81 mg Oral Daily    enoxaparin  40 mg Subcutaneous Daily    risperiDONE  2 mg Oral Nightly    amLODIPine  2.5 mg Oral Daily    pantoprazole  40 mg Oral BID AC    sodium chloride flush  10 mL Intravenous 2 times per day     PRN Meds: dextrose, potassium chloride, magnesium sulfate, sodium phosphate IVPB **OR** sodium phosphate IVPB **OR** sodium phosphate IVPB, dextrose 5 % and 0.45 % NaCl, sodium chloride flush      Intake/Output Summary (Last 24 hours) at 12/9/2020 1724  Last data filed at 12/9/2020 1122  Gross per 24 hour   Intake 50 ml   Output 2275 ml   Net -2225 ml       Physical Exam Performed:    BP (!) 140/75   Pulse 83   Temp 98.1 °F (36.7 °C) (Axillary)   Resp 15   Ht 6' 2\" (1.88 m)   Wt 141 lb 15.6 oz (64.4 kg)   SpO2 100%   BMI 18.23 kg/m²     General appearance: NAD  Lungs: clear to auscultation bilaterally and no use of accessory muscles. Heart: regular rate and rhythm, S1, S2 normal, no murmur, click, rub or gallop and normal apical impulse  Abdomen: soft, non-tender; bowel sounds normal; no masses, no organomegaly  Extremities: extremities atraumatic, no cyanosis or edema and Homans sign is negative, no sign of DVT. Capillary Refill: Acceptable < 3 seconds   Peripheral Pulses: +3 easily felt, not easily obliterated with pressures   Skin: Skin color, texture, turgor normal. No rashes or lesions on exposed skin  Neurologic: Neurovascularly intact without any focal sensory/motor deficits.  Cranial nerves: II-XII intact, grossly non-focal. Gait was not tested. Psychiatric: Alert and oriented, thought content appropriate, normal insight       Labs:   Recent Labs     12/08/20  2145 12/09/20  1423   WBC 20.4*  --    HGB 12.2* 10.9*   HCT 41.8 33.1*   *  --      Recent Labs     12/09/20  0246 12/09/20  0449 12/09/20  1423    145 145   K 4.9 5.0 3.5    111* 114*   CO2 7* 9* 17*   BUN 28* 25* 20   CREATININE 1.3 1.2 1.3   CALCIUM 9.3 9.2 10.1   PHOS 3.7 1.9* 0.5*     Recent Labs     12/08/20  2145   AST 24   ALT 19   BILITOT <0.2   ALKPHOS 167*     No results for input(s): INR in the last 72 hours. No results for input(s): Ag Tubbs in the last 72 hours. Urinalysis:      Lab Results   Component Value Date    NITRU Negative 12/08/2020    WBCUA 0 12/08/2020    BACTERIA 1+ 12/15/2010    RBCUA 0 12/08/2020    BLOODU Negative 12/08/2020    SPECGRAV 1.022 12/08/2020    GLUCOSEU >=1000 12/08/2020    GLUCOSEU >=1000 12/15/2010       Radiology:  XR CHEST PORTABLE   Final Result   Unremarkable portable chest radiograph.                  Assessment/Plan:    Active Hospital Problems    Diagnosis    Acute renal failure (ARF) ([facility] [N17.9]    Essential hypertension [I10]    Ketoacidosis, diabetic, type 2, no coma (HCC) [E11.10]    Hyperlipidemia [E78.5]    Nausea and vomiting [R11.2]         DKA (diabetic ketoacidoses) - started on insulin gtt, gap improving bicarb slowly improving, cont to keep on insulin gtt, NPO     Non-Intractable vomiting with nausea - Will provide symptomatic treatment with Zofran and/or Phenergan as needed, maintenance of fluids and electrolytes.      Acute renal failure (ARF) (HCC) - creat improved with IV fluids     Essential (primary) hypertension - continue home meds and monitor blood pressure     Hyperlipidemia - Continue statin therapy while in the hospital     Hypophos - replete            DVT Prophylaxis: Lovenox  Diet: Diet NPO Effective Now Exceptions are: Ice Chips, Sips with Meds  Code Status: Full Code    PT/OT Eval Status: ordered    Miriam Coelho MD

## 2020-12-09 NOTE — ED PROVIDER NOTES
I independently evaluated and obtained a history and physical on Colby Petersen. All diagnostic, treatment, and disposition assistants were made to myself in conjunction the advanced practice provider. For further details of this patient's emergency department encounter, please see the advanced practice provider's documentation. History: Patient is a 49-year-old diabetic presents to the emergency department with high blood sugar. Patient cannot remember if he took his insulin. He was brought in by EMS which shows a high blood sugar on their monitor. On his arrival our monitor also read high. Patient has been complain of some nausea and vomiting. Suspect patient is in DKA. Patient seen in conjunction with SUGEY. Sinus tachycardia bilateral atrial enlargement ventricular rate 103 otherwise unremarkable    Physician Exam: This is a 49-year-old chronically ill looking male in mild distress. Patient had a normal vital signs on arrival.  Patient is alert and awake respiration was not labored. Lungs were clear to auscultation heart regular rate and rhythm with no evidence of a tachycardia at this time. Abdomen is soft nontender.     Labs Reviewed   CBC WITH AUTO DIFFERENTIAL - Abnormal; Notable for the following components:       Result Value    WBC 20.4 (*)     Hemoglobin 12.2 (*)     MCH 24.7 (*)     MCHC 29.1 (*)     RDW 18.6 (*)     Platelets 187 (*)     Neutrophils Absolute 19.0 (*)     Lymphocytes Absolute 0.8 (*)     All other components within normal limits    Narrative:     Performed at:  [address] [address]   Phone [phone]   COMPREHENSIVE METABOLIC PANEL W/ REFLEX TO MG FOR LOW K - Abnormal; Notable for the following components:    Sodium 134 (*)     Potassium reflex Magnesium 5.9 (*)     Chloride 88 (*)     CO2 7 (*)     Anion Gap 39 (*)     Glucose 715 (*)     BUN 34 (*)     CREATININE 1.7 (*)     GFR Non- American 43 (*)     GFR  52 (*)     Calcium 11.0 (*)     Total Protein 9.9 (*)     Albumin/Globulin Ratio 0.9 (*)     Alkaline Phosphatase 167 (*)     All other components within normal limits    Narrative:     Brock Alcantar tel. [phone],  Chemistry results called to and read back by olivia casey, 12/08/2020 22:30, by  [facility]  Chemistry results called to and read back by olivia casey, 12/08/2020 22:29, by  [facility]  Performed at:  [facility]  [address], [facility] [employer]   Phone [phone]   LACTIC ACID, PLASMA - Abnormal; Notable for the following components:    Lactic Acid 4.6 (*)     All other components within normal limits    Narrative:     Brock Alcantar tel. [phone],  Chemistry results called to and read back by olivia becerra rn, 12/08/2020  22:15, by [facility]  Performed at:  [facility]  [address], [place]   Phone [phone]   BLOOD GAS, VENOUS - Abnormal; Notable for the following components:    pH, Ronan 6.954 (*)     pCO2, Ronan 31.7 (*)     HCO3, Venous 7 (*)     All other components within normal limits    Narrative:     Brock Alcantar tel. [phone],  Chemistry results called to and read back by [facility] RN. , 12/08/2020 22:02,  by [facility]  Performed at:  [facility]  [address], [place]   Phone [phone]   URINE RT REFLEX TO CULTURE - Abnormal; Notable for the following components:    Glucose, Ur >=1000 (*)     Ketones, Urine >=80 (*)     Protein, UA 30 (*)     All other components within normal limits    Narrative:     Performed at:  [facility]  [address], [place]   Phone [phone]   LIPASE - Abnormal; Notable for the following components:    Lipase 8.0 (*)     All other components within normal limits    Narrative:     Brock Alcantar tel. [phone],  Chemistry results called to and read back by olivia casey, 12/08/2020 22:30, by  [facility]  Chemistry results called to and read back by olivia casey, 12/08/2020 22:29, by  [facility]  Performed at:  [address] [employer] [address]   Phone [phone]   BASIC METABOLIC PANEL - Abnormal; Notable for the following components:    CO2 7 (*)     Anion Gap 26 (*)     Glucose 380 (*)     BUN 28 (*)     GFR Non- 59 (*)     All other components within normal limits    Narrative:     CALL  [facility]  [employer] tel. [phone],  Previous panic on this admission - call not needed per SOP, 12/09/2020 03:21,  by PALDA  Performed at:  [address] [employer] [address]   Phone [phone]   BASIC METABOLIC PANEL - Abnormal; Notable for the following components:    Chloride 111 (*)     CO2 9 (*)     Anion Gap 25 (*)     Glucose 266 (*)     BUN 25 (*)     All other components within normal limits    Narrative:     [employer] SKK[employer]W tel. [phone],  Previous panic on this admission - call not needed per SOP, 12/09/2020 08:23,  by [facility]  Performed at:  [address] [employer] [address]   Phone [phone]   MAGNESIUM - Abnormal; Notable for the following components:    Magnesium 2.50 (*)     All other components within normal limits    Narrative:     [employer] SKK[employer]W tel. [phone],  Previous panic on this admission - call not needed per SOP, 12/09/2020 03:21,  by PALDA  Performed at:  [address] [facility] [employer] [address]   Phone [phone]   PHOSPHORUS - Abnormal; Notable for the following components:    Phosphorus 1.9 (*)     All other components within normal limits    Narrative:     CALL  [facility]  [employer] tel. [phone],  Previous panic on this admission - call not needed per SOP, 12/09/2020 08:23,  by [facility]  Performed at:  [facility] [address] [address] Laboratory  [address] [facility] [employer] [address]   Phone [phone]   BASIC METABOLIC PANEL - Abnormal; Notable for the following components:    Chloride 114 (*)     CO2 17 (*)     Glucose 139 (*)     GFR Non- 59 (*)     All other components within normal limits    Narrative:     Snu BOBBYW tel. [phone],  Chemistry results called to and read back by Luis Enrique Orta RN, 12/09/2020  15:19, by Manish Zepeda has been rescheduled by [facility] at 12/09/2020 11:06 Reason: No   suitable vein for venipuncture  Collection has been rescheduled by Charlie Michelle at 12/09/2020 11:32 Reason:   Getting pic line now  Performed at:  [facility]  [address] [facility] [employer] [address]   Phone [phone]   PHOSPHORUS - Abnormal; Notable for the following components:    Phosphorus 0.5 (*)     All other components within normal limits    Narrative:     Sun BOBBYW tel. [phone],  Chemistry results called to and read back by Luis Enrique Orta RN, 12/09/2020  15:19, by Manish Zepeda has been rescheduled by [facility] at 12/09/2020 11:06 Reason: No   suitable vein for venipuncture  Collection has been rescheduled by Charlie Michelle at 12/09/2020 11:32 Reason:   Getting pic line now  Performed at:  [facility]  [address] [facility] [employer] [address]   Phone [phone]   HEMOGLOBIN AND HEMATOCRIT, BLOOD - Abnormal; Notable for the following components:    Hemoglobin 10.9 (*)     Hematocrit 33.1 (*)     All other components within normal limits    Narrative:     Collection has been rescheduled by [facility] at 12/09/2020 11:06 Reason: No   suitable vein for venipuncture  Collection has been rescheduled by Charlie Michelle at 12/09/2020 11:32 Reason:   Getting pic line now  Performed at:  [facility]  [address] [facility] [employer] [address]   Phone [phone]   BASIC METABOLIC PANEL - Abnormal; Notable for the following components:    CO2 17 (*)     Glucose 126 (*)     All other components within normal limits    Narrative:     Performed at:  [address] [facility] [place]   Phone [phone]   CBC WITH AUTO DIFFERENTIAL - Abnormal; Notable for the following components:    WBC 15.4 (*)     RBC 4.03 (*)     Hemoglobin 9.9 (*)     Hematocrit 30.8 (*)     MCV 76.4 (*)     MCH 24.5 (*)     RDW 18.1 (*)     Neutrophils Absolute 12.1 (*)     All other components within normal limits    Narrative:     Performed at:  [address] [facility] [employer]   Phone [phone]   PHOSPHORUS - Abnormal; Notable for the following components:    Phosphorus 1.6 (*)     All other components within normal limits    Narrative:     Performed at:  [address] [facility] [place]   Phone [phone]   BASIC METABOLIC PANEL W/ REFLEX TO MG FOR LOW K - Abnormal; Notable for the following components:    CO2 18 (*)     Glucose 268 (*)     All other components within normal limits    Narrative:     Performed at:  [address] [facility] [employer]   Phone [phone]   POCT GLUCOSE - Abnormal; Notable for the following components:    POC Glucose >600 (*)     All other components within normal limits    Narrative:     Performed at:  [address] [employer]   Phone [phone]   POCT GLUCOSE - Abnormal; Notable for the following components:    POC Glucose 594 (*)     All other components within normal limits    Narrative:     Performed at:  [address] [employer] [address]   Phone [phone]   POCT GLUCOSE - Abnormal; Notable for the following components:    POC Glucose >600 (*)     All other components within normal limits    Narrative:     Performed at:  [facility]  [address] [facility] [employer] [address]   Phone [phone]   POCT GLUCOSE - Abnormal; Notable for the following components:    POC Glucose 284 (*)     All other components within normal limits    Narrative:     Performed at:  [facility]  [address] [facility] [employer] [address]   Phone [phone]   POCT GLUCOSE - Abnormal; Notable for the following components:    POC Glucose 284 (*)     All other components within normal limits    Narrative:     Performed at:  [facility]  [address] [facility] [employer] [address]   Phone [phone]   POCT GLUCOSE - Abnormal; Notable for the following components:    POC Glucose 313 (*)     All other components within normal limits    Narrative:     Performed at:  [facility]  [address] [facility] [employer] [address]   Phone [phone]   POCT GLUCOSE - Abnormal; Notable for the following components:    POC Glucose 235 (*)     All other components within normal limits    Narrative:     Performed at:  [facility]  [address] [facility] [employer] [address]   Phone [phone]   POCT GLUCOSE - Abnormal; Notable for the following components:    POC Glucose 260 (*)     All other components within normal limits    Narrative:     Performed at:  [facility]  [address] [facility] [employer] [address]   Phone [phone]   POCT GLUCOSE - Abnormal; Notable for the following components:    POC Glucose 183 (*)     All other components within normal limits    Narrative:     Performed at:  [facility]  [address] [facility] [employer] [address]   Phone [phone]   POCT GLUCOSE - Abnormal; Notable for the following components:    POC Glucose 104 (*)     All other components within normal limits    Narrative:     Performed at:  [facility]  [address] [facility] [employer] [address]   Phone [phone]   POCT GLUCOSE - Abnormal; Notable for the following components:    POC Glucose 117 (*)     All other components within normal limits    Narrative:     Performed at:  [facility]  [address] [facility] [employer] [address]   Phone [phone]   POCT GLUCOSE - Abnormal; Notable for the following components:    POC Glucose 213 (*)     All other components within normal limits    Narrative:     Performed at:  [facility]  [address] [facility] [employer] [address]   Phone [phone]   POCT GLUCOSE - Abnormal; Notable for the following components:    POC Glucose 250 (*)     All other components within normal limits    Narrative:     Performed at:  [facility]  [address] [facility] [employer] [address]   Phone [phone]   POCT GLUCOSE - Abnormal; Notable for the following components:    POC Glucose 280 (*)     All other components within normal limits    Narrative:     Performed at:  [facility]  [address] [facility] [employer] [address]   Phone [phone]   POCT GLUCOSE - Abnormal; Notable for the following components:    POC Glucose 282 (*)     All other components within normal limits    Narrative:     Performed at:  [facility]  [address] [facility] [employer] [address]   Phone [phone]   POCT GLUCOSE - Abnormal; Notable for the following components:    POC Glucose 298 (*)     All other components within normal limits    Narrative:     Performed at:  [facility]  [address] [facility] [employer] [address]   Phone [phone]   POCT GLUCOSE - Abnormal; Notable for the following components:    POC Glucose 441 (*)     All other components within normal limits    Narrative:     Performed at:  [facility]  [address], [place] [address]   Phone [phone]   POCT GLUCOSE - Abnormal; Notable for the following components:    POC Glucose 387 (*)     All other components within normal limits    Narrative:     Performed at:  [facility]  [address], [place] [address]   Phone [phone]   POCT GLUCOSE - Abnormal; Notable for the following components:    POC Glucose 262 (*)     All other components within normal limits    Narrative:     Performed at:  [address] [facility] [place] [address]   Phone [phone]   URINE DRUG SCREEN    Narrative:     Performed at:  [address] [facility] [place] [address]   Phone [phone]   HEMOGLOBIN A1C    Narrative:     Performed at:  [facility]  [address] [place] [address] [facility] [place] [address]   Phone [phone]   MICROSCOPIC URINALYSIS    Narrative:     Performed at:  [facility]  [address] [place] [address] [facility] [place] [address]   Phone [phone]   MAGNESIUM    Narrative:     Nidia Hernandez  SKK[employer]W tel. [phone],  Previous panic on this admission - call not needed per SOP, 12/09/2020 08:23,  by [facility]  Performed at:  [address] [place] [address]   Phone [phone]   PHOSPHORUS    Narrative:     Nidia Hernandez  SKK2W tel. [phone],  Previous panic on this admission - call not needed per SOP, 12/09/2020 03:21,  by [facility]  Performed at:  [address] [facility] [place] [address]   Phone [phone]   MAGNESIUM    Narrative:     Jose Cat tel. [phone],  Chemistry results called to and read back by Robbie Jackson RN, 12/09/2020  15:19, by Katie Lebron has been rescheduled by [facility] at 12/09/2020 11:06 Reason: No   suitable vein for venipuncture  Collection has been rescheduled by [facility] at 12/09/2020 11:32 Reason:   Getting pic line now  Performed at:  [facility]  [address], [facility] [place] [address]   Phone [phone]    Narrative:     Performed at:  [facility]  [address] [facility] [place] [address]   Phone [phone]   CBC WITH AUTO DIFFERENTIAL   BASIC METABOLIC PANEL W/ REFLEX TO MG FOR LOW K   POCT GLUCOSE    Narrative:     Performed at:  [facility]  [address], [facility] [employer] [address]   Phone [phone]   POCT GLUCOSE    Narrative:     Performed at:  [facility]  [address] [facility] [place] [address]   Phone [phone]   POCT GLUCOSE   POCT GLUCOSE   POCT GLUCOSE   POCT GLUCOSE   POCT GLUCOSE   POCT GLUCOSE   POCT GLUCOSE   POCT GLUCOSE     XR CHEST PORTABLE   Final Result   Unremarkable portable chest radiograph. Patient is diabetic noncompliance with his insulin comes in presenting with DKA. Patient was treated accordingly and will need admission. Hospitalist notified.         Monie Auguste MD  12/11/20 6240

## 2020-12-10 ENCOUNTER — ANESTHESIA (OUTPATIENT)
Dept: ENDOSCOPY | Age: 45
End: 2020-12-10

## 2020-12-10 ENCOUNTER — ANESTHESIA EVENT (OUTPATIENT)
Dept: ENDOSCOPY | Age: 45
End: 2020-12-10

## 2020-12-10 VITALS
DIASTOLIC BLOOD PRESSURE: 60 MMHG | SYSTOLIC BLOOD PRESSURE: 109 MMHG | RESPIRATION RATE: 26 BRPM | OXYGEN SATURATION: 100 %

## 2020-12-10 LAB
ANION GAP SERPL CALCULATED.3IONS-SCNC: 11 MMOL/L (ref 3–16)
BASOPHILS ABSOLUTE: 0.1 K/UL (ref 0–0.2)
BASOPHILS RELATIVE PERCENT: 0.7 %
BUN BLDV-MCNC: 11 MG/DL (ref 7–20)
CALCIUM SERPL-MCNC: 9.1 MG/DL (ref 8.3–10.6)
CHLORIDE BLD-SCNC: 110 MMOL/L (ref 99–110)
CO2: 18 MMOL/L (ref 21–32)
CREAT SERPL-MCNC: 0.9 MG/DL (ref 0.9–1.3)
EOSINOPHILS ABSOLUTE: 0.2 K/UL (ref 0–0.6)
EOSINOPHILS RELATIVE PERCENT: 1.2 %
GFR AFRICAN AMERICAN: >60
GFR NON-AFRICAN AMERICAN: >60
GLUCOSE BLD-MCNC: 250 MG/DL (ref 70–99)
GLUCOSE BLD-MCNC: 262 MG/DL (ref 70–99)
GLUCOSE BLD-MCNC: 268 MG/DL (ref 70–99)
GLUCOSE BLD-MCNC: 280 MG/DL (ref 70–99)
GLUCOSE BLD-MCNC: 282 MG/DL (ref 70–99)
GLUCOSE BLD-MCNC: 298 MG/DL (ref 70–99)
GLUCOSE BLD-MCNC: 387 MG/DL (ref 70–99)
GLUCOSE BLD-MCNC: 441 MG/DL (ref 70–99)
HCT VFR BLD CALC: 30.8 % (ref 40.5–52.5)
HEMOGLOBIN: 9.9 G/DL (ref 13.5–17.5)
LYMPHOCYTES ABSOLUTE: 2.2 K/UL (ref 1–5.1)
LYMPHOCYTES RELATIVE PERCENT: 14.5 %
MCH RBC QN AUTO: 24.5 PG (ref 26–34)
MCHC RBC AUTO-ENTMCNC: 32.1 G/DL (ref 31–36)
MCV RBC AUTO: 76.4 FL (ref 80–100)
MONOCYTES ABSOLUTE: 0.7 K/UL (ref 0–1.3)
MONOCYTES RELATIVE PERCENT: 4.7 %
NEUTROPHILS ABSOLUTE: 12.1 K/UL (ref 1.7–7.7)
NEUTROPHILS RELATIVE PERCENT: 78.9 %
PDW BLD-RTO: 18.1 % (ref 12.4–15.4)
PERFORMED ON: ABNORMAL
PHOSPHORUS: 1.6 MG/DL (ref 2.5–4.9)
PLATELET # BLD: 357 K/UL (ref 135–450)
PMV BLD AUTO: 7.1 FL (ref 5–10.5)
POTASSIUM REFLEX MAGNESIUM: 3.8 MMOL/L (ref 3.5–5.1)
RBC # BLD: 4.03 M/UL (ref 4.2–5.9)
SARS-COV-2, NAAT: NOT DETECTED
SODIUM BLD-SCNC: 139 MMOL/L (ref 136–145)
WBC # BLD: 15.4 K/UL (ref 4–11)

## 2020-12-10 PROCEDURE — 2709999900 HC NON-CHARGEABLE SUPPLY: Performed by: INTERNAL MEDICINE

## 2020-12-10 PROCEDURE — 2500000003 HC RX 250 WO HCPCS: Performed by: NURSE ANESTHETIST, CERTIFIED REGISTERED

## 2020-12-10 PROCEDURE — 80048 BASIC METABOLIC PNL TOTAL CA: CPT

## 2020-12-10 PROCEDURE — 6370000000 HC RX 637 (ALT 250 FOR IP): Performed by: INTERNAL MEDICINE

## 2020-12-10 PROCEDURE — 6370000000 HC RX 637 (ALT 250 FOR IP): Performed by: NURSE PRACTITIONER

## 2020-12-10 PROCEDURE — 2500000003 HC RX 250 WO HCPCS: Performed by: INTERNAL MEDICINE

## 2020-12-10 PROCEDURE — 2580000003 HC RX 258: Performed by: INTERNAL MEDICINE

## 2020-12-10 PROCEDURE — 0DJ08ZZ INSPECTION OF UPPER INTESTINAL TRACT, VIA NATURAL OR ARTIFICIAL OPENING ENDOSCOPIC: ICD-10-PCS | Performed by: INTERNAL MEDICINE

## 2020-12-10 PROCEDURE — 1200000000 HC SEMI PRIVATE

## 2020-12-10 PROCEDURE — 94760 N-INVAS EAR/PLS OXIMETRY 1: CPT

## 2020-12-10 PROCEDURE — U0002 COVID-19 LAB TEST NON-CDC: HCPCS

## 2020-12-10 PROCEDURE — 7100000001 HC PACU RECOVERY - ADDTL 15 MIN: Performed by: INTERNAL MEDICINE

## 2020-12-10 PROCEDURE — 85025 COMPLETE CBC W/AUTO DIFF WBC: CPT

## 2020-12-10 PROCEDURE — 7100000000 HC PACU RECOVERY - FIRST 15 MIN: Performed by: INTERNAL MEDICINE

## 2020-12-10 PROCEDURE — 2580000003 HC RX 258: Performed by: NURSE ANESTHETIST, CERTIFIED REGISTERED

## 2020-12-10 PROCEDURE — 6360000002 HC RX W HCPCS: Performed by: NURSE ANESTHETIST, CERTIFIED REGISTERED

## 2020-12-10 PROCEDURE — 3609017100 HC EGD: Performed by: INTERNAL MEDICINE

## 2020-12-10 PROCEDURE — 3700000000 HC ANESTHESIA ATTENDED CARE: Performed by: INTERNAL MEDICINE

## 2020-12-10 PROCEDURE — 6370000000 HC RX 637 (ALT 250 FOR IP): Performed by: PHYSICIAN ASSISTANT

## 2020-12-10 PROCEDURE — 3700000001 HC ADD 15 MINUTES (ANESTHESIA): Performed by: INTERNAL MEDICINE

## 2020-12-10 RX ORDER — PROPOFOL 10 MG/ML
INJECTION, EMULSION INTRAVENOUS PRN
Status: DISCONTINUED | OUTPATIENT
Start: 2020-12-10 | End: 2020-12-10 | Stop reason: SDUPTHER

## 2020-12-10 RX ORDER — PROPOFOL 10 MG/ML
INJECTION, EMULSION INTRAVENOUS CONTINUOUS PRN
Status: DISCONTINUED | OUTPATIENT
Start: 2020-12-10 | End: 2020-12-10 | Stop reason: SDUPTHER

## 2020-12-10 RX ORDER — LIDOCAINE HYDROCHLORIDE 20 MG/ML
INJECTION, SOLUTION INFILTRATION; PERINEURAL PRN
Status: DISCONTINUED | OUTPATIENT
Start: 2020-12-10 | End: 2020-12-10 | Stop reason: SDUPTHER

## 2020-12-10 RX ORDER — ACETAMINOPHEN 325 MG/1
650 TABLET ORAL EVERY 6 HOURS PRN
Status: DISCONTINUED | OUTPATIENT
Start: 2020-12-10 | End: 2020-12-11 | Stop reason: HOSPADM

## 2020-12-10 RX ORDER — SODIUM CHLORIDE 450 MG/100ML
INJECTION, SOLUTION INTRAVENOUS CONTINUOUS PRN
Status: DISCONTINUED | OUTPATIENT
Start: 2020-12-10 | End: 2020-12-10 | Stop reason: SDUPTHER

## 2020-12-10 RX ADMIN — INSULIN GLARGINE 18 UNITS: 100 INJECTION, SOLUTION SUBCUTANEOUS at 21:09

## 2020-12-10 RX ADMIN — INSULIN LISPRO 5 UNITS: 100 INJECTION, SOLUTION INTRAVENOUS; SUBCUTANEOUS at 21:09

## 2020-12-10 RX ADMIN — AMLODIPINE BESYLATE 2.5 MG: 5 TABLET ORAL at 15:29

## 2020-12-10 RX ADMIN — ATORVASTATIN CALCIUM 40 MG: 40 TABLET, FILM COATED ORAL at 15:29

## 2020-12-10 RX ADMIN — ASPIRIN 81 MG: 81 TABLET, CHEWABLE ORAL at 15:29

## 2020-12-10 RX ADMIN — LIDOCAINE HYDROCHLORIDE 60 MG: 20 INJECTION, SOLUTION INFILTRATION; PERINEURAL at 13:57

## 2020-12-10 RX ADMIN — Medication 10 ML: at 00:26

## 2020-12-10 RX ADMIN — PANTOPRAZOLE SODIUM 40 MG: 40 TABLET, DELAYED RELEASE ORAL at 15:34

## 2020-12-10 RX ADMIN — Medication 10 ML: at 22:02

## 2020-12-10 RX ADMIN — ACETAMINOPHEN 650 MG: 325 TABLET ORAL at 22:02

## 2020-12-10 RX ADMIN — PROPOFOL 300 MCG/KG/MIN: 10 INJECTION, EMULSION INTRAVENOUS at 13:57

## 2020-12-10 RX ADMIN — SODIUM BICARBONATE: 84 INJECTION, SOLUTION INTRAVENOUS at 00:26

## 2020-12-10 RX ADMIN — PANTOPRAZOLE SODIUM 40 MG: 40 TABLET, DELAYED RELEASE ORAL at 05:24

## 2020-12-10 RX ADMIN — SODIUM CHLORIDE: 4.5 INJECTION, SOLUTION INTRAVENOUS at 13:40

## 2020-12-10 RX ADMIN — INSULIN LISPRO 6 UNITS: 100 INJECTION, SOLUTION INTRAVENOUS; SUBCUTANEOUS at 15:58

## 2020-12-10 RX ADMIN — INSULIN LISPRO 12 UNITS: 100 INJECTION, SOLUTION INTRAVENOUS; SUBCUTANEOUS at 17:01

## 2020-12-10 RX ADMIN — PROPOFOL 50 MG: 10 INJECTION, EMULSION INTRAVENOUS at 13:57

## 2020-12-10 RX ADMIN — RISPERIDONE 2 MG: 1 TABLET ORAL at 21:09

## 2020-12-10 ASSESSMENT — PAIN SCALES - GENERAL
PAINLEVEL_OUTOF10: 0
PAINLEVEL_OUTOF10: 10
PAINLEVEL_OUTOF10: 0

## 2020-12-10 ASSESSMENT — PULMONARY FUNCTION TESTS
PIF_VALUE: 0

## 2020-12-10 ASSESSMENT — ENCOUNTER SYMPTOMS: SHORTNESS OF BREATH: 0

## 2020-12-10 ASSESSMENT — PAIN - FUNCTIONAL ASSESSMENT: PAIN_FUNCTIONAL_ASSESSMENT: 0-10

## 2020-12-10 ASSESSMENT — PAIN DESCRIPTION - PAIN TYPE: TYPE: NEUROPATHIC PAIN

## 2020-12-10 ASSESSMENT — PAIN DESCRIPTION - LOCATION: LOCATION: FOOT

## 2020-12-10 ASSESSMENT — PAIN DESCRIPTION - ORIENTATION: ORIENTATION: RIGHT;LEFT

## 2020-12-10 NOTE — PLAN OF CARE
Problem: Falls - Risk of:  Goal: Will remain free from falls  Description: Will remain free from falls. Balance good. 12/10/2020 0444 by Deanna Pinto RN  Outcome: Ongoing  Note: Admitted with diabetic ketoacidosis. Free of fall. Yellow socks on. Wheels locked. Bed lowest position. Alarm on. Call light, over the bed table, and personal belonging in reach. Hourly rounding. Patient instructed to call for needs or changes. Problem: Skin Integrity:  Goal: Absence of new skin breakdown  Description: Absence of new skin breakdown, has diabetic wound on foot, states podiatry put hole in foot??  12/10/2020 0444 by Сергей Huddleston RN  Outcome: Ongoing  Note: Hx of multiple toes amputated. Diabetic ulcer to right foot. PICC to left upper arm. No new skin breakdown. Able to reposition self. Pillow support. Problem: Infection - Central Venous Catheter-Associated Bloodstream Infection:  Goal: Will show no infection signs and symptoms  Description: Will show no infection signs and symptoms, monitor, use wipes.   Note: Monitor, use wipes

## 2020-12-10 NOTE — PROGRESS NOTES
4 Eyes Skin Assessment     NAME:  Michael Lilly  YOB: 1975  MEDICAL RECORD NUMBER:  [id number]    The patient is being assess for  Admission    I agree that 2 RN's have performed a thorough Head to Toe Skin Assessment on the patient. ALL assessment sites listed below have been assessed. Areas assessed by both nurses:    Head, Face, Ears, Shoulders, Back, Chest, Arms, Elbows, Hands, Sacrum. Buttock, Coccyx, Ischium and Legs. Feet and Heels        Does the Patient have a Wound? Yes wound(s) were present on assessment. LDA wound assessment was Initiated and completed        Elmer Prevention initiated:  Yes   Wound Care Orders initiated:  Yes,  Patient had multiple toes amputated. Diabetic ulcer noted to right foot.      Pressure Injury (Stage 3,4, Unstageable, DTI, NWPT, and Complex wounds) if present place consult order under [de-identified] Yes    New and Established Ostomies if present place consult order under : No      Nurse 1 eSignature: Electronically signed by Zia Summers RN on 12/10/20 at 4:34 AM EST    **SHARE this note so that the co-signing nurse is able to place an eSignature**    Nurse 2 eSignature: {Esignature:[id number]}

## 2020-12-10 NOTE — PROGRESS NOTES
Hospitalist Progress Note      PCP: Tejal Wasserman MD    Date of Admission: 12/8/2020    Chief Complaint:   Chief Complaint   Patient presents with    Hyperglycemia     Can't remember the last time he took insulin, EMS glucose was 553. POCT at hospital reads HIGH. Hospital Course: 50y.o. year-old male with a history of hypertension, hyperlipidemia and diabetes mellitus. Patient is noncompliant with his diabetic regimen. He began to feel ill today and had nausea and vomiting and checked his glucose level and it was Costa Nidia. \"  EMS was called and they found him to have a glucose level of 553. In the emergency room he was found to have diabetic ketoacidosis. Subjective: Hungry, NPO for EGD. No further vomiting. Denies chest pain, shortness of breath, nausea, vomiting, fevers, abdominal pain. Medications:  Reviewed    Infusion Medications    dextrose      sodium bicarbonate infusion 50 mL/hr at 12/10/20 0026     Scheduled Medications    atorvastatin  40 mg Oral Daily    aspirin  81 mg Oral Daily    enoxaparin  40 mg Subcutaneous Daily    risperiDONE  2 mg Oral Nightly    amLODIPine  2.5 mg Oral Daily    pantoprazole  40 mg Oral BID AC    sodium chloride flush  10 mL Intravenous 2 times per day    insulin lispro  0-12 Units Subcutaneous TID WC    insulin lispro  0-6 Units Subcutaneous Nightly    insulin glargine  18 Units Subcutaneous Nightly     PRN Meds: sodium chloride flush, glucose, dextrose, glucagon (rDNA), dextrose      Intake/Output Summary (Last 24 hours) at 12/10/2020 0919  Last data filed at 12/10/2020 0525  Gross per 24 hour   Intake 265 ml   Output 700 ml   Net -435 ml       Physical Exam Performed:    /71   Pulse 80   Temp 98 °F (36.7 °C) (Oral)   Resp 16   Ht 6' 2\" (1.88 m)   Wt 141 lb 15.6 oz (64.4 kg)   SpO2 99%   BMI 18.23 kg/m²     General appearance: No apparent distress, appears stated age and cooperative.   HEENT: Pupils equal, round, and reactive to light. Conjunctivae/corneas clear. Neck: Supple, with full range of motion. Trachea midline. Respiratory:  Normal respiratory effort. Clear to auscultation, bilaterally without Rales/Wheezes/Rhonchi. Cardiovascular: Regular rate and rhythm with normal S1/S2 without murmurs, rubs or gallops. Abdomen: Soft, non-tender, non-distended with normal bowel sounds. Musculoskeletal: No clubbing, cyanosis or edema bilaterally. Full range of motion without deformity. Skin: Skin color, texture, turgor normal.  Open wound under R big toe  Neurologic:  Neurovascularly intact without any focal sensory/motor deficits. Cranial nerves: II-XII intact, grossly non-focal.  Psychiatric: Alert and oriented, thought content appropriate, normal insight  Capillary Refill: Brisk,< 3 seconds   Peripheral Pulses: +2 palpable, equal bilaterally       Labs:   Recent Labs     12/08/20  2145 12/09/20  1423 12/10/20  0520   WBC 20.4*  --  15.4*   HGB 12.2* 10.9* 9.9*   HCT 41.8 33.1* 30.8*   *  --  357     Recent Labs     12/09/20  0449 12/09/20  1423 12/09/20  1809 12/09/20  2342 12/10/20  0520    145 139  --  139   K 5.0 3.5 3.7  --  3.8   * 114* 109  --  110   CO2 9* 17* 17*  --  18*   BUN 25* 20 18  --  11   CREATININE 1.2 1.3 1.1  --  0.9   CALCIUM 9.2 10.1 9.4  --  9.1   PHOS 1.9* 0.5*  --  1.6*  --      Recent Labs     12/08/20  2145   AST 24   ALT 19   BILITOT <0.2   ALKPHOS 167*     No results for input(s): INR in the last 72 hours. No results for input(s): Ronak Ian in the last 72 hours. Urinalysis:      Lab Results   Component Value Date    NITRU Negative 12/08/2020    WBCUA 0 12/08/2020    BACTERIA 1+ 12/15/2010    RBCUA 0 12/08/2020    BLOODU Negative 12/08/2020    SPECGRAV 1.022 12/08/2020    GLUCOSEU >=1000 12/08/2020    GLUCOSEU >=1000 12/15/2010       Radiology:  XR CHEST PORTABLE   Final Result   Unremarkable portable chest radiograph.                Assessment/Plan:    Active Hospital Problems Diagnosis    Acute renal failure (ARF) (HCC) [N17.9]    Essential hypertension [I10]    Ketoacidosis, diabetic, type 2, no coma (HCC) [E11.10]    Hyperlipidemia [E78.5]    Nausea and vomiting [R11.2]     DKA  Presented with AG 39, glucose 715, pH 6.9. Multiple prior admissions for DKA  - completed DKA protocol, gap closed    T1DM  Poorly controlled, A1c 13. Multiple admissions for DKA  - hold home oral hypoglycemic agents  - basal bolus insulin  - FSBG qac and qhs; hypoglycemic protocol  - DM education    Coffee ground emesis, acute blood loss anemia  GI consulted for coffee ground emesis. Hg dropped 12--> 9  - EGD unremarkable    Toe Wound  R big toe, hx DM foot wounds  - podiatry consult    Acute Kidney Injury, improved  Cr up to 1.7 from baseline 1. Likely pre-renal in setting of DKA. - daily renal  - avoid nephrotoxic agents    Hypertension  Stable. Continue home meds. DVT Prophylaxis: lovenox  Diet: Diet NPO Effective Now  Code Status: Full Code    PT/OT Eval Status: ordered    Dispo - pending    Leonie Sosa MD    This note was transcribed using [id number]. Please disregard any translational errors.

## 2020-12-10 NOTE — PROGRESS NOTES
With patient's drop in hemoglobin from 12.2 on admission to 9.9 this AM, decision was made to proceed with an EGD today for further evaluation. Patient to remain NPO until after procedure. Rapid COVID test ordered.       Ryan August PA-C

## 2020-12-10 NOTE — PLAN OF CARE
Problem: Falls - Risk of:  Goal: Will remain free from falls  Description: Will remain free from falls. Balance good. 12/10/2020 0444 by Tosha Meza RN  Outcome: Ongoing  Note: Admitted with diabetic ketoacidosis. Free of fall. Yellow socks on. Wheels locked. Bed lowest position. Alarm on. Call light, over the bed table, and personal belonging in reach. Hourly rounding. Patient instructed to call for needs or changes. Problem: Skin Integrity:  Goal: Absence of new skin breakdown  Description: Absence of new skin breakdown, has diabetic wound on foot, states podiatry put hole in foot??  12/10/2020 0444 by Сергей Huddleston RN  Outcome: Ongoing  Note: Hx of multiple toes amputated. Diabetic ulcer to right foot. PICC to left upper arm. No new skin breakdown. Able to reposition self. Pillow support.

## 2020-12-10 NOTE — PROGRESS NOTES
Right foot diabetic wound bleeding, Dr. Indira Castaneda aware, patient reports the podiatrist put a hole in his foot?

## 2020-12-10 NOTE — ANESTHESIA POSTPROCEDURE EVALUATION
Department of Anesthesiology  Postprocedure Note    Patient: Citlaly Moore  MRN: [id number]  [place]: 1975  Date of evaluation: 12/10/2020  Time:  3:21 PM     Procedure Summary     Date:  12/10/20 Room / Location:  [address] [place] [address]    Anesthesia Start:  6795 Anesthesia Stop:  5560    Procedure:  EGD DIAGNOSTIC ONLY (N/A ) Diagnosis:  (ANEMIA)    Surgeon:  Juan José Gilliam MD Responsible Provider:  Digna Horta MD    Anesthesia Type:  MAC ASA Status:  3          Anesthesia Type: MAC    Portillo Phase I: Portillo Score: 8    Portillo Phase II:      Last vitals: Reviewed and per EMR flowsheets.        Anesthesia Post Evaluation    Patient location during evaluation: PACU  Level of consciousness: awake and alert  Airway patency: patent  Nausea & Vomiting: no nausea and no vomiting  Complications: no  Cardiovascular status: blood pressure returned to baseline  Respiratory status: acceptable  Hydration status: euvolemic  Comments: Postoperative Anesthesia Note    Name:    Citlaly Moore  MRN:      [id number]    Patient Vitals in the past 12 hrs:  12/10/20 1435, BP:133/80, Temp:98 °F (36.7 °C), Pulse:73, Resp:24  12/10/20 1430, BP:126/78, Pulse:73, Resp:21, SpO2:100 %  12/10/20 1425, BP:124/76, Pulse:75, Resp:21, SpO2:100 %  12/10/20 1420, BP:107/71, Pulse:82, Resp:19, SpO2:100 %  12/10/20 1416, BP:107/71, Temp:98.3 °F (36.8 °C), Temp src:Temporal, Pulse:83, Resp:20, SpO2:100 %  12/10/20 1259, BP:134/87, Temp:97.7 °F (36.5 °C), Temp src:Oral, Pulse:76, Resp:16, SpO2:100 %, Height:6' 2\" (1.88 m), Weight:141 lb (64 kg)  12/10/20 1157, BP:(!) 150/84, Temp:98.2 °F (36.8 °C), Temp src:Oral, Pulse:90, Resp:18, SpO2:100 %  12/10/20 0916, SpO2:99 %  12/10/20 0842, BP:124/71, Temp:98 °F (36.7 °C), Temp src:Oral, Pulse:80, Resp:16, SpO2:100 %  12/10/20 0522, BP:138/81, Temp:98.3 °F (36.8 °C), Temp src:Oral, Pulse:85, Resp:16, SpO2:99 %, Weight:141 lb 15.6 oz (64.4 kg)     LABS:    CBC  Lab Results       Component                Value               Date/Time                  WBC                      15.4 (H)            12/10/2020 05:20 AM        HGB                      9.9 (L)             12/10/2020 05:20 AM        HCT                      30.8 (L)            12/10/2020 05:20 AM        PLT                      357                 12/10/2020 05:20 AM   RENAL  Lab Results       Component                Value               Date/Time                  NA                       139                 12/10/2020 05:20 AM        K                        3.8                 12/10/2020 05:20 AM        CL                       110                 12/10/2020 05:20 AM        CO2                      18 (L)              12/10/2020 05:20 AM        BUN                      11                  12/10/2020 05:20 AM        CREATININE               0.9                 12/10/2020 05:20 AM        GLUCOSE                  268 (H)             12/10/2020 05:20 AM   COAGS  Lab Results       Component                Value               Date/Time                  PROTIME                  11.2                07/16/2020 06:02 AM        INR                      0.97                07/16/2020 06:02 AM        APTT                     29.6                04/10/2010 04:23 PM     Intake & Output:  @25AHTX@    Nausea & Vomiting:  No    Level of Consciousness:  Awake    Pain Assessment:  Adequate analgesia    Anesthesia Complications:  No apparent anesthetic complications    SUMMARY      Vital signs stable  OK to discharge from Stage I post anesthesia care.   Care transferred from Anesthesiology department on discharge from perioperative area

## 2020-12-10 NOTE — CARE COORDINATION
[facility]  Diabetes Education   Progress Note       NAME:  Raquel Rodrigues  MEDICAL RECORD NUMBER:  [id number]  AGE: 50 y.o. GENDER: male  : 1975  TODAY'S DATE:  12/10/2020    Subjective   Reason for Diabetic Education Evaluation and Assessment: general diabetes support    Colby agrees to meet with me for diabetes education. He states \"I need to do better with my eating. \"  \"I don't know what makes it go up and down. \"      Visit Type: evaluation      Raquel Rodrigues is a 50 y.o. male referred by:     [x] Physician  [] Nursing  [] Chart Review   [] Other:     PAST MEDICAL HISTORY        Diagnosis Date    Diabetes mellitus ([facility]     Gastroparesis     Hypertension        PAST SURGICAL HISTORY    Past Surgical History:   Procedure Laterality Date    BONY PELVIS SURGERY      FOOT AMPUTATION Right 2020    EMERGENCY; RIGHT INCISION AND DEBRIDEMENT; HALUX AMPUTATION performed by Jamey Archer DPM at [address] 2006    pins and rods- plate    UPPER GASTROINTESTINAL ENDOSCOPY N/A 2019    EGD BIOPSY performed by Jose M Washington MD at [address] [facility] History   Problem Relation Age of Onset    Diabetes Mother     Heart Disease Mother     High Blood Pressure Mother     Asthma Brother     Other Father         kidney disease    No Known Problems Maternal Grandmother     No Known Problems Maternal Grandfather     No Known Problems Paternal Grandmother     No Known Problems Paternal Grandfather        SOCIAL HISTORY    Social History     Tobacco Use    Smoking status: Current Every Day Smoker     Packs/day: 2.00     Years: 11.00     Pack years: 22.00     Types: Cigars     Start date: 3/26/2009    Smokeless tobacco: Never Used    Tobacco comment: black and mild 2  x daily   Substance Use Topics    Alcohol use: Not Currently     Frequency: Never    Drug use: Yes     Frequency: 3.0 times per week     Types: Marijuana ALLERGIES    Allergies   Allergen Reactions    Ketorolac Shortness Of Breath and Itching    Morphine Shortness Of Breath, Hives and Itching     Tolerates hydromorphone    Morphine And Related Hives and Shortness Of Breath    Tramadol Shortness Of Breath     resp failure   resp failure     Lisinopril Swelling     When he takes lisinopril he gets large lips which go away when he doesn't take lisinopril    Haloperidol Lactate Itching    Toradol [Ketorolac Tromethamine]      resp failure    Vicodin [Hydrocodone-Acetaminophen]        MEDICATIONS     atorvastatin  40 mg Oral Daily    aspirin  81 mg Oral Daily    [Held by provider] enoxaparin  40 mg Subcutaneous Daily    risperiDONE  2 mg Oral Nightly    amLODIPine  2.5 mg Oral Daily    pantoprazole  40 mg Oral BID AC    sodium chloride flush  10 mL Intravenous 2 times per day    insulin lispro  0-12 Units Subcutaneous TID WC    insulin lispro  0-6 Units Subcutaneous Nightly    insulin glargine  18 Units Subcutaneous Nightly       Objective        Patient Active Problem List   Diagnosis Code    Diabetic ketoacidosis without coma associated with diabetes mellitus due to underlying condition ([facility]) E08.10    Chronic abdominal pain R10.9, G89.29    Gastroparesis K31.84    GERD (gastroesophageal reflux disease) K21.9    Seizure ([facility]) R56.9    Toe deformity M20.60    Uncontrolled type 1 diabetes mellitus with diabetic peripheral neuropathy ([facility]) E10.42, E10.65    Type 1 diabetes mellitus with background diabetic retinopathy ([facility] T39.7921    Hypoglycemia unawareness in type 1 diabetes mellitus ([facility]) E10.649    Type 1 diabetes mellitus with hypercholesterolemia ([facility]) E10.69, E78.00    Accelerated hypertension I10    Acute blood loss anemia D62    Acute respiratory failure ([facility]) J96.00    Candida esophagitis ([facility]) B37.81    Cholelithiasis K80.20    Cocaine abuse, episodic ([facility]) F14.10    Cerebral infarction ([facility] I63.9    Diabetic polyneuropathy ([facility] E11.42    Duodenal ulcer K26.9    Heart failure, diastolic, acute ([facility]) L20.74    Leg weakness, bilateral R29.898    Cannabis abuse F12.10    Nausea and vomiting R11.2    Numbness in both legs R20.0    Polysubstance abuse ([facility]) F19.10    Pulmonary edema J81.1    Diabetic foot ulcer ([facility]) E11.621, L97.509    Heart murmur R01.1    Hyperlipidemia E78.5    Osteomyelitis of great toe of right foot ([facility]) M86.9    Epigastric abdominal pain R10.13    Hypertensive urgency I16.0    Diabetic gastroparesis associated with type 1 diabetes mellitus ([facility]) E10.43, K31.84    Acute respiratory failure with hypoxia ([facility]) J96.01    Hypoglycemia E16.2    Altered mental status R41.82    Severe malnutrition ([facility]) E43    Psychosis ([facility]) F29    Intentional drug overdose ([facility] T50.902A    Major neurocognitive disorder, due to another medical condition, with behavioral disturbance, mild ([facility]) F02.81    Ketoacidosis, diabetic, type 2, no coma ([facility]) E11.10    Acute renal failure (ARF) ([facility]) N17.9    Essential hypertension I10        /71   Pulse 80   Temp 98 °F (36.7 °C) (Oral)   Resp 16   Ht 6' 2\" (1.88 m)   Wt 141 lb 15.6 oz (64.4 kg)   SpO2 99%   BMI 18.23 kg/m²     HgBA1c:    Lab Results   Component Value Date    LABA1C 13.1 12/08/2020       Recent Labs     12/09/20  1721 12/09/20  1804 12/09/20 2025 12/10/20  0718   POCGLU 89 117* 213* 250*       BUN/Creatinine:    Lab Results   Component Value Date    BUN 11 12/10/2020    CREATININE 0.9 12/10/2020       Assessment        Diabetes Management and Education    Does the patient have a Primary Care Physician? Yes, Ran Otoole MD       Does the patient require new medication instruction? Yes - prefers to use vial and syringe insulin. Reviewed current insulin plan and basal and prandial insulin concepts.       Person responsible for administration of Insulin/Medication:        [x] Self     [] Caregiver       [] Spouse       [] Other Family Member   []  Other    Level of patient/caregiver understanding able to:       [] Verbalized Understanding   [] Demonstrated Understanding       [] Teach Back       [x] Needs Reinforcement     []  Other:        Does the patient/caregiver monitor Blood Glucoses? Yes - states that he has meter and supplies. Recommend testing before meals and bedtime. Reviewed glycemic control targets, testing frequency and when to call PCP. Level of patient/caregiver understanding able to:        [] Verbalized Understanding   [] Demonstrated Understanding       [] Teach Back       [x] Needs Reinforcement     []  Other:      Does the patient/caregiver follow a Meal Plan? No: Drinks mostly diet soda. Recommend making water, unsweetened tea or coffee primary drinks. Reviewed importance of eating three meals per day and plate method for consistent carb intake. Level of patient/caregiver understanding able to:       [] Verbalized Understanding   [] Demonstrated Understanding       [] Teach Back       [x] Needs Reinforcement     []  Other:      Does the patient/caregiver understand S/S of Hypoglycemia? No: No recent episodes. Reviewed symptoms, prevention and treatment. Level of patient/caregiver understanding able to:       [] Verbalized Understanding   [] Demonstrated Understanding       [] Teach Back       [x] Needs Reinforcement     []  Other:                    Does the patient/caregiver understand S/S of Hyperglycemia? No: Concerned that A1c was elevated. Reviewed symptoms, prevention and treatment. Level of patient/caregiver understanding able to:        [] Verbalized Understanding   [] Demonstrated Understanding       [] Teach Back       [x] Needs Reinforcement     []  Other:           Plan        Ongoing diabetes education and blood glucose monitoring.       Dietary Consult Made:  No  Social Service Consult Made:  No    The following educational and support materials were provided:  · My contact information  · ADA booklet - Where Do I Begin?    · My Healthy Plate   · Blood Glucose Log Book                                           Discharge Plan:  Discharge needs: no prescription needs identified       Teaching Time Diabetes Education:  30 minutes     Electronically signed by Vickie Braswell on 12/10/2020 at 11:09 AM

## 2020-12-10 NOTE — ANESTHESIA PRE PROCEDURE
[facility] Department of Anesthesiology  Pre-Anesthesia Evaluation/Consultation       Name:  Brianna Frazier  : 1975  Age:  50 y.o.                                            MRN:  [id number]  Date: 12/10/2020           Surgeon: Surgeon(s):  Saad De MD    Procedure: Procedure(s):  EGD DIAGNOSTIC ONLY     Allergies   Allergen Reactions    Ketorolac Shortness Of Breath and Itching    Morphine Shortness Of Breath, Hives and Itching     Tolerates hydromorphone    Morphine And Related Hives and Shortness Of Breath    Tramadol Shortness Of Breath     resp failure   resp failure     Lisinopril Swelling     When he takes lisinopril he gets large lips which go away when he doesn't take lisinopril    Haloperidol Lactate Itching    Toradol [Ketorolac Tromethamine]      resp failure    Vicodin [Hydrocodone-Acetaminophen]      Patient Active Problem List   Diagnosis    Diabetic ketoacidosis without coma associated with diabetes mellitus due to underlying condition (HCC)    Chronic abdominal pain    Gastroparesis    GERD (gastroesophageal reflux disease)    Seizure (HCC)    Toe deformity    Uncontrolled type 1 diabetes mellitus with diabetic peripheral neuropathy (HCC)    Type 1 diabetes mellitus with background diabetic retinopathy (HCC)    Hypoglycemia unawareness in type 1 diabetes mellitus (HCC)    Type 1 diabetes mellitus with hypercholesterolemia (HCC)    Accelerated hypertension    Acute blood loss anemia    Acute respiratory failure (HCC)    Candida esophagitis (HCC)    Cholelithiasis    Cocaine abuse, episodic (HCC)    Cerebral infarction ([address]    Diabetic polyneuropathy (HCC)    Duodenal ulcer    Heart failure, diastolic, acute (HCC)    Leg weakness, bilateral    Cannabis abuse    Nausea and vomiting    Numbness in both legs    Polysubstance abuse ([address]    Pulmonary edema    Diabetic foot ulcer (HCC)    Heart murmur    Hyperlipidemia    Osteomyelitis of great toe of right foot ([facility]    Epigastric abdominal pain    Hypertensive urgency    Diabetic gastroparesis associated with type 1 diabetes mellitus ([facility]    Acute respiratory failure with hypoxia (HCC)    Hypoglycemia    Altered mental status    Severe malnutrition (HCC)    Psychosis ([facility]    Intentional drug overdose ([facility]    Major neurocognitive disorder, due to another medical condition, with behavioral disturbance, mild (HCC)    Ketoacidosis, diabetic, type 2, no coma ([facility]    Acute renal failure (ARF) ([facility]    Essential hypertension     Past Medical History:   Diagnosis Date    Diabetes mellitus ([facility]     Gastroparesis     Hypertension      Past Surgical History:   Procedure Laterality Date    BONY PELVIS SURGERY      FOOT AMPUTATION Right 5/13/2020    EMERGENCY; RIGHT INCISION AND DEBRIDEMENT; HALUX AMPUTATION performed by Shahram Hopper DPM at [address] 2006    pins and rods- plate    UPPER GASTROINTESTINAL ENDOSCOPY N/A 12/2/2019    EGD BIOPSY performed by Antonia Barbosa MD at [address] [facility] History     Tobacco Use    Smoking status: Current Every Day Smoker     Packs/day: 2.00     Years: 11.00     Pack years: 22.00     Types: Cigars     Start date: 3/26/2009    Smokeless tobacco: Never Used    Tobacco comment: black and mild 2  x daily   Substance Use Topics    Alcohol use: Not Currently     Frequency: Never    Drug use: Yes     Frequency: 3.0 times per week     Types: Marijuana     Medications  No current facility-administered medications on file prior to encounter. Current Outpatient Medications on File Prior to Encounter   Medication Sig Dispense Refill    risperiDONE (RISPERDAL) 2 MG tablet Take 2 mg by mouth nightly      gabapentin (NEURONTIN) 300 MG capsule Take 600 mg by mouth 3 times daily.       insulin glargine (LANTUS) 100 UNIT/ML injection vial Inject 15 Units into the skin nightly (Patient taking differently: Inject 18 Units into the skin nightly ) 1 vial 0    haloperidol (HALDOL) 10 MG tablet Take 1 tablet by mouth 2 times daily 60 tablet 0    pantoprazole (PROTONIX) 40 MG tablet Take 1 tablet by mouth daily 30 tablet 0    amLODIPine (NORVASC) 2.5 MG tablet Take 1 tablet by mouth daily 30 tablet 0    atorvastatin (LIPITOR) 40 MG tablet Take 1 tablet by mouth daily 30 tablet 0    lactobacillus (CULTURELLE) capsule Take 1 capsule by mouth 2 times daily (with meals) 60 capsule 0    aspirin 81 MG chewable tablet Take 1 tablet by mouth daily 30 tablet 0    insulin lispro (HUMALOG) 100 UNIT/ML injection vial Inject 0-12 Units into the skin 3 times daily (with meals) 1 vial 0    divalproex (DEPAKOTE ER) 500 MG extended release tablet Take 2 tablets by mouth 2 times daily 60 tablet 0    blood glucose monitor strips Check blood sugars 4-5 times per day 150 strip 0    Insulin Syringe-Needle U-100 (ULTRA-THIN II INS SYR SHORT) 31G X 5/16\" 1 ML MISC 1 each by Does not apply route 3 times daily 100 each 0    Lancets MISC 1 each by Does not apply route daily Check blood sugars 4-5 times per day 150 each 0     Current Facility-Administered Medications   Medication Dose Route Frequency Provider Last Rate Last Dose    atorvastatin (LIPITOR) tablet 40 mg  40 mg Oral Daily Deidra Bullard MD   40 mg at 12/09/20 1129    aspirin chewable tablet 81 mg  81 mg Oral Daily Deidra Bullard MD   Stopped at 12/09/20 0954    [Held by provider] enoxaparin (LOVENOX) injection 40 mg  40 mg Subcutaneous Daily Deidra Bullard MD   Stopped at 12/09/20 0955    risperiDONE (RISPERDAL) tablet 2 mg  2 mg Oral Nightly Deidra Bullard MD   2 mg at 12/09/20 2048    amLODIPine (NORVASC) tablet 2.5 mg  2.5 mg Oral Daily Deidra Bullard MD   2.5 mg at 12/09/20 1129    pantoprazole (PROTONIX) tablet 40 mg  40 mg Oral BID AC Perry Kendall, [address]   40 mg at 12/10/20 0524    sodium chloride flush 0.9 % injection 10 mL  10 mL Intravenous 2 times per day Yuly Quevedo MD 10 mL at 207    sodium chloride flush 0.9 % injection 10 mL  10 mL Intravenous PRN José Miguel Boggs MD   10 mL at 12/10/20 0026    insulin lispro (HUMALOG) injection vial 0-12 Units  0-12 Units Subcutaneous TID WC Evangelina Rodriguez MD        insulin lispro (HUMALOG) injection vial 0-6 Units  0-6 Units Subcutaneous Nightly Evangelina Rodriguez MD   2 Units at 20    glucose (GLUTOSE) 40 % oral gel 15 g  15 g Oral PRN Evangelina Rodriguez MD        dextrose 50 % IV solution  12.5 g Intravenous PRN Evangelina Rodriguez MD        glucagon (rDNA) injection 1 mg  1 mg Intramuscular PRN Evangelina Rodriguez MD        dextrose 5 % solution  100 mL/hr Intravenous PRN Evangelina Rodriguez MD        insulin glargine (LANTUS) injection vial 18 Units  18 Units Subcutaneous Nightly Evangelina Rodriguez MD   18 Units at 20     Vital Signs (Current)   Vitals:    12/10/20 0842 12/10/20 0916 12/10/20 1157 12/10/20 1259   BP: 124/71  (!) 150/84 134/87   Pulse: 80  90 76   Resp:    Temp: 98 °F (36.7 °C)  98.2 °F (36.8 °C) 97.7 °F (36.5 °C)   TempSrc: Oral  Oral Oral   SpO2: 100% 99% 100% 100%   Weight:    141 lb (64 kg)   Height:    6' 2\" (1.88 m)                                          BP Readings from Last 3 Encounters:   12/10/20 134/87   20 123/75   20 (!) 140/88     Vital Signs Statistics (for past 48 hrs)     Temp  Av °F (36.7 °C)  Min: 97.7 °F (36.5 °C)   Min taken time: 12/10/20 1259  Max: 98.3 °F (36.8 °C)   Max taken time: 12/10/20 0522  Pulse  Av  Min: 76   Min taken time: 12/10/20 1259  Max: 119   Max taken time: 20 0036  Resp  Av.2  Min: 10   Min taken time: 20 0300  Max: 25   Max taken time: 20 1100  BP  Min: 123/64   Min taken time: 20 0300  Max: 170/88   Max taken time: 20 2123  MAP (mmHg)  Av.2  Min: 68   Min taken time: 20 0300  Max: 112   Max taken time: 20 2141  SpO2  Av.8 %  Min: 98 %   Min taken time: BMI: Wt Readings from Last 3 Encounters:       NPO Status:   Date of last liquid consumption: 12/09/20   Time of last liquid consumption: 2000   Date of last solid food consumption: 12/09/20      Time of last solid consumption: 2000       Anesthesia Evaluation  Patient summary reviewed  Airway: Mallampati: II  TM distance: >3 FB   Neck ROM: full  Mouth opening: > = 3 FB Dental:          Pulmonary:       (-) COPD, asthma and shortness of breath                           Cardiovascular:    (+) hypertension:,     (-) valvular problems/murmurs, past MI, CAD, CABG/stent, dysrhythmias and  angina                Neuro/Psych:   (+) neuromuscular disease:, psychiatric history:   (-) seizures, TIA and CVA           GI/Hepatic/Renal:   (+) GERD:, PUD,      (-) liver disease and no renal disease       Endo/Other:    (+) DiabetesType II DM, , .                 Abdominal:           Vascular: negative vascular ROS. Anesthesia Plan      MAC     ASA 3     (I discussed intravenous sedation to the patient's satisfaction including risks and alternatives. The patient agreed with the plan and has no further questions. ROBERT HARVEY )  Induction: intravenous. Anesthetic plan and risks discussed with patient. Plan discussed with CRNA. This pre-anesthesia assessment may be used as a history and physical.    DOS STAFF ADDENDUM:    Pt seen and examined, chart reviewed (including anesthesia, drug and allergy history). No interval changes to history and physical examination. Anesthetic plan, risks, benefits, alternatives, and personnel involved discussed with patient. Patient verbalized an understanding and agrees to proceed.       Rickie Lehman MD  December 10, 2020  1:19 PM

## 2020-12-10 NOTE — H&P
Pre-operative History and Physical    Patient: [place] Si  : 1975  Acct#:     Intended Procedure:  EGD    HISTORY OF PRESENT ILLNESS:  The patient is a 50 y.o. male  who presents for EGD due to coffee ground emesis, acute anemia. Past Medical History:        Diagnosis Date    Diabetes mellitus ([address]     Gastroparesis     Hypertension      Past Surgical History:        Procedure Laterality Date    BONY PELVIS SURGERY      FOOT AMPUTATION Right 2020    EMERGENCY; RIGHT INCISION AND DEBRIDEMENT; HALUX AMPUTATION performed by Susan Aiken DPM at [address] [facility] [address]     pins and rods- plate    UPPER GASTROINTESTINAL ENDOSCOPY N/A 2019    EGD BIOPSY performed by Cheryle Brunson MD at [facility]     Medications Prior to Admission:   No current facility-administered medications on file prior to encounter. Current Outpatient Medications on File Prior to Encounter   Medication Sig Dispense Refill    risperiDONE (RISPERDAL) 2 MG tablet Take 2 mg by mouth nightly      gabapentin (NEURONTIN) 300 MG capsule Take 600 mg by mouth 3 times daily.       insulin glargine (LANTUS) 100 UNIT/ML injection vial Inject 15 Units into the skin nightly (Patient taking differently: Inject 18 Units into the skin nightly ) 1 vial 0    haloperidol (HALDOL) 10 MG tablet Take 1 tablet by mouth 2 times daily 60 tablet 0    pantoprazole (PROTONIX) 40 MG tablet Take 1 tablet by mouth daily 30 tablet 0    amLODIPine (NORVASC) 2.5 MG tablet Take 1 tablet by mouth daily 30 tablet 0    atorvastatin (LIPITOR) 40 MG tablet Take 1 tablet by mouth daily 30 tablet 0    lactobacillus (CULTURELLE) capsule Take 1 capsule by mouth 2 times daily (with meals) 60 capsule 0    aspirin 81 MG chewable tablet Take 1 tablet by mouth daily 30 tablet 0    insulin lispro (HUMALOG) 100 UNIT/ML injection vial Inject 0-12 Units into the skin 3 times daily (with meals) 1 vial 0    divalproex (DEPAKOTE ER) 500 MG extended release tablet Take 2 tablets by mouth 2 times daily 60 tablet 0    blood glucose monitor strips Check blood sugars 4-5 times per day 150 strip 0    Insulin Syringe-Needle U-100 (ULTRA-THIN II INS SYR SHORT) 31G X 5/16\" 1 ML MISC 1 each by Does not apply route 3 times daily 100 each 0    Lancets MISC 1 each by Does not apply route daily Check blood sugars 4-5 times per day 150 each 0        Allergies:  Ketorolac; Morphine; Morphine and related; Tramadol; Lisinopril; Haloperidol lactate; Toradol [ketorolac tromethamine]; and Vicodin [hydrocodone-acetaminophen]    Social History:   TOBACCO:   reports that he has been smoking cigars. He started smoking about 11 years ago. He has a 22.00 pack-year smoking history. He has never used smokeless tobacco.  ETOH:   reports previous alcohol use. DRUGS:   reports current drug use. Frequency: 3.00 times per week. Drug: Marijuana. PHYSICAL EXAM:      Vital Signs: /87   Pulse 76   Temp 97.7 °F (36.5 °C) (Oral)   Resp 16   Ht 6' 2\" (1.88 m)   Wt 141 lb (64 kg)   SpO2 100%   BMI 18.10 kg/m²    Airway: No stridor or wheezing noted. Good air movement  Pulmonary: without wheezes. Clear to auscultation  Cardiac:regular rate and rhythm without loud murmurs  Abdomen:soft, nontender,  Bowel sounds present    Pre-Procedure Assessment / Plan:  1) EGD    ASA Grade:  ASA 3 - Patient with moderate systemic disease with functional limitations  Mallampati Classification:  Class III    Level of Sedation Plan:MAC    Post Procedure plan: Return to same level of care    I assessed the patient and find that the patient is in satisfactory condition to proceed with the planned procedure and sedation plan. I have explained the risk, benefits, and alternatives to the procedure; the patient understands and agrees to proceed.        Luis Enrique Ruelas MD  12/10/2020

## 2020-12-10 NOTE — OP NOTE
Endoscopy Note    Patient: Leila Dang  : 1975  Acct#:     Procedure: Esophagogastroduodenoscopy                          Date:  12/10/2020     Surgeon:   Parveen Harris MD    Referring Physician:  Adela Alvarez MD    Indications: This is a 50 y.o. male  who presents for EGD due to coffee ground emesis, acute anemia. Postoperative Diagnosis:    1. Gastric erythema in the gastric cardia from retching and vomiting. No active bleeding or ulceration noted. Anesthesia:  MAC    Consent:  The patient or their legal guardian has signed an informed consent, and is aware of the potential risks, benefits, alternatives, and potential complications of this procedure. These include, but are not limited to hemorrhage, bleeding, post procedural pain, perforation, phlebitis, aspiration, hypotension, hypoxia, cardiovascular events such as arryhthmia, and possibly death. Description of Procedure: The patient was then taken to the endoscopy suite, placed in the left lateral decubitus position and the above IV sedation was administrered. The Olympus video endoscope was placed through the patient's oropharynx without difficulty to the extent of the 2nd portion of the duodenum. Both forward and retroflexed views of the stomach were obtained. Findings:    Esophagus: The esophagus appeared normal without evidence of Davis's esophagus or reflux esophagitis. The Z line was located 40 cm from the incisors. Stomach: A 5 cm patch of mucosal erythema and irregularity was seen in the proximal gastric cardia on the lesser curvature, consistent with retching and vomiting. The stomach otherwise appeared normal on forward and retroflexed views  Duodenum: The first and 2nd portions of the duodenum appeared normal with normal villous pattern    The scope was then withdrawn back into the stomach, it was decompressed, and the scope was completely withdrawn.     The patient tolerated the procedure well and was taken to the post anesthesia care unit in good condition. Estimated Blood Loss: None     Impression:   1) See post procedure diagnoses    Recommendations:   - Follow-up pathology results in 7 days, by calling the office at [phone].  - Resume regular medications. - Resume diet as tolerated. - Continue PPI. Thank you for allowing me to participate in this patient's care. No further GI work-up needed at this time. We will sign off, however please contact us with any additional questions at [phone].       WILLY [address] [facility]

## 2020-12-10 NOTE — PROGRESS NOTES
Patient seen for bedside PICC placement. Screening and consent verified. Procedure explained to patient, all questions answered. Timeout performed at bedside with SKYLAR Cuellar. PICC placed per protocol successfully and uneventfully. Care instructions provided and remaining questions answered. Patient left with call light and safety measures in place. Copies of ultrasound and ECG waveform images printed and placed in chart. Handoff complete to SKYLAR Cuellar.

## 2020-12-10 NOTE — PROGRESS NOTES
Patient admitted to room 3274. Transferred to bed with CGA. Pt A&O X4. Admitted on 12/8/2020 with diabetic ketoacidosis. Numbness and tingling to BLE and BUE. HR regular. Lungs sounds clear. Abd soft and nontender. Active bowel sounds. Continent of bowel & bladder. Denies any abd discomfort. Multiple toes amputated. Diabetic ulcer noted to right leg. PICC to LUE. Patient was oriented to the Call Light, Phone, TV, Thermostat, Bed Controls, Bathroom and Emergency Cord. Patient verbalized and demonstrated understanding of all. Education provided on pain management, skin care, fall precaution, and medication. Call light in reach. Patient instructed to call if there is any needs or changes.  Electronically signed by Cameron Ferreira RN on 12/10/2020 at 4:42 AM

## 2020-12-11 VITALS
WEIGHT: 134.04 LBS | RESPIRATION RATE: 18 BRPM | HEIGHT: 74 IN | SYSTOLIC BLOOD PRESSURE: 153 MMHG | BODY MASS INDEX: 17.2 KG/M2 | TEMPERATURE: 97.4 F | DIASTOLIC BLOOD PRESSURE: 84 MMHG | HEART RATE: 96 BPM | OXYGEN SATURATION: 100 %

## 2020-12-11 LAB
GLUCOSE BLD-MCNC: 220 MG/DL (ref 70–99)
PERFORMED ON: ABNORMAL

## 2020-12-11 PROCEDURE — 94760 N-INVAS EAR/PLS OXIMETRY 1: CPT

## 2020-12-11 PROCEDURE — 6370000000 HC RX 637 (ALT 250 FOR IP): Performed by: INTERNAL MEDICINE

## 2020-12-11 PROCEDURE — 6370000000 HC RX 637 (ALT 250 FOR IP): Performed by: PHYSICIAN ASSISTANT

## 2020-12-11 PROCEDURE — 6360000002 HC RX W HCPCS: Performed by: INTERNAL MEDICINE

## 2020-12-11 RX ORDER — BLOOD SUGAR DIAGNOSTIC
1 STRIP MISCELLANEOUS 3 TIMES DAILY
Qty: 100 EACH | Refills: 0 | Status: SHIPPED | OUTPATIENT
Start: 2020-12-11

## 2020-12-11 RX ORDER — LANCETS 30 GAUGE
1 EACH MISCELLANEOUS DAILY
Qty: 150 EACH | Refills: 0 | Status: SHIPPED | OUTPATIENT
Start: 2020-12-11

## 2020-12-11 RX ADMIN — PANTOPRAZOLE SODIUM 40 MG: 40 TABLET, DELAYED RELEASE ORAL at 05:54

## 2020-12-11 RX ADMIN — AMLODIPINE BESYLATE 2.5 MG: 5 TABLET ORAL at 09:04

## 2020-12-11 RX ADMIN — ENOXAPARIN SODIUM 40 MG: 40 INJECTION SUBCUTANEOUS at 09:09

## 2020-12-11 RX ADMIN — ATORVASTATIN CALCIUM 40 MG: 40 TABLET, FILM COATED ORAL at 09:04

## 2020-12-11 RX ADMIN — INSULIN LISPRO 4 UNITS: 100 INJECTION, SOLUTION INTRAVENOUS; SUBCUTANEOUS at 09:03

## 2020-12-11 RX ADMIN — INSULIN LISPRO 3 UNITS: 100 INJECTION, SOLUTION INTRAVENOUS; SUBCUTANEOUS at 09:04

## 2020-12-11 RX ADMIN — ASPIRIN 81 MG: 81 TABLET, CHEWABLE ORAL at 09:05

## 2020-12-11 NOTE — PROGRESS NOTES
Pt admitted for DKA. A&O x4, OOB ad yani. On CC diet. Lungs clear, on RA. Denies SOB or chest pain. On tele. BS active x4, LBM 12/10/2020. Denies pain at this time. Wanting to go home asap this AM, contacted MD. Sitting in chair resting comfortably at this time. Will continue to monitor.

## 2020-12-11 NOTE — DISCHARGE SUMMARY
Hospitalist Discharge Summary    Patient ID:  Sierra Rose  [id number]  50 y.o.  1975    Admit date: 12/8/2020    Discharge date: 12/11/2020    Disposition: home    Admission Diagnoses:   Patient Active Problem List   Diagnosis    Diabetic ketoacidosis without coma associated with diabetes mellitus due to underlying condition ([address]    Chronic abdominal pain    Gastroparesis    GERD (gastroesophageal reflux disease)    Seizure (HCC)    Toe deformity    Uncontrolled type 1 diabetes mellitus with diabetic peripheral neuropathy (HCC)    Type 1 diabetes mellitus with background diabetic retinopathy (HCC)    Hypoglycemia unawareness in type 1 diabetes mellitus (HCC)    Type 1 diabetes mellitus with hypercholesterolemia (HCC)    Accelerated hypertension    Acute blood loss anemia    Acute respiratory failure (HCC)    Candida esophagitis (HCC)    Cholelithiasis    Cocaine abuse, episodic (HCC)    Cerebral infarction ([address]    Diabetic polyneuropathy (HCC)    Duodenal ulcer    Heart failure, diastolic, acute (HCC)    Leg weakness, bilateral    Cannabis abuse    Nausea and vomiting    Numbness in both legs    Polysubstance abuse ([address]    Pulmonary edema    Diabetic foot ulcer (HCC)    Heart murmur    Hyperlipidemia    Osteomyelitis of great toe of right foot ([address]    Epigastric abdominal pain    Hypertensive urgency    Diabetic gastroparesis associated with type 1 diabetes mellitus ([address]    Acute respiratory failure with hypoxia (HCC)    Hypoglycemia    Altered mental status    Severe malnutrition (HCC)    Psychosis ([address]    Intentional drug overdose ([address]    Major neurocognitive disorder, due to another medical condition, with behavioral disturbance, mild (HCC)    Ketoacidosis, diabetic, type 2, no coma (HCC)    Acute renal failure (ARF) ([address]    Essential hypertension       Discharge Diagnoses: Principal Problem:    Ketoacidosis, diabetic, type 2, no coma [facility])  Active Problems:    Nausea and vomiting    Hyperlipidemia    Acute renal failure (ARF) [facility]    Essential hypertension  Resolved Problems:    * No resolved hospital problems. *      Code Status:  Prior    Condition:  Stable    Discharge Diet: Diet:  No diet orders on file    PCP to do list:  DM management, monitor foot wound    Hospital Course:     T1DM, DKA  50y.o. year-old male with a history of hypertension, hyperlipidemia and diabetes mellitus. Hemoglobin A1c 13.  Patient is noncompliant with his diabetic regimen.  He began to feel ill today and had nausea and vomiting and checked his glucose level and it was [place]. \"  EMS was called and they found him to have a glucose level of 553.  In the emergency room he was found to have diabetic ketoacidosis. Presented with AG 39, glucose 715, pH 6.9. Multiple prior admissions for DKA. Differing story as to whether or not he was taking his insulin. Does tell me he has it at home, declined refills but did ask for lancets and test strips. Also seemed to not be aware that he has a primary care doctor, asking for some when he could see to \"refill his insulin\". Discussed with social work. Day of discharge patient was very adamant to leave the hospital earlier in the day, not sure that he stayed to  his prescriptions or to meet with social work about ensuring follow-up with her primary care doctor. Coffee ground emesis, acute blood loss anemia  GI consulted for coffee ground emesis. Hg dropped 12--> 9. EGD unremarkable. Toe Wound  R big toe, hx DM foot wounds. Wound appears clean. Says he has been seeing podiatry and wound care. Podiatry consult placed here but patient left before he could be seen. He says he has follow-up with podiatry in a few days.     Acute Kidney Injury  Cr up to 1.7 from baseline 1. Likely pre-renal in setting of DKA. Improved with fluids.     Hypertension  Stable. Continue home meds.     Discharge Medications:   Discharge Medication List as of 12/11/2020 10:33 AM        Discharge Medication List as of 12/11/2020 10:33 AM      CONTINUE these medications which have CHANGED    Details   Insulin Syringe-Needle U-100 (ULTRA-THIN II INS SYR SHORT) 31G X 5/16\" 1 ML MISC 3 TIMES DAILY Starting Fri 12/11/2020, Disp-100 each,R-0, Normal      Lancets MISC DAILY Starting Fri 12/11/2020, Disp-150 each,R-0, NormalCheck blood sugars 4-5 times per day           Discharge Medication List as of 12/11/2020 10:33 AM      CONTINUE these medications which have NOT CHANGED    Details   risperiDONE (RISPERDAL) 2 MG tablet Take 2 mg by mouth nightlyHistorical Med      gabapentin (NEURONTIN) 300 MG capsule Take 600 mg by mouth 3 times daily. Historical Med      insulin glargine (LANTUS) 100 UNIT/ML injection vial Inject 15 Units into the skin nightly, Disp-1 vial,R-0Normal      haloperidol (HALDOL) 10 MG tablet Take 1 tablet by mouth 2 times daily, Disp-60 tablet,R-0Normal      pantoprazole (PROTONIX) 40 MG tablet Take 1 tablet by mouth daily, Disp-30 tablet,R-0Normal      amLODIPine (NORVASC) 2.5 MG tablet Take 1 tablet by mouth daily, Disp-30 tablet,R-0Normal      atorvastatin (LIPITOR) 40 MG tablet Take 1 tablet by mouth daily, Disp-30 tablet,R-0Normal      lactobacillus (CULTURELLE) capsule Take 1 capsule by mouth 2 times daily (with meals), Disp-60 capsule,R-0Normal      aspirin 81 MG chewable tablet Take 1 tablet by mouth daily, Disp-30 tablet,R-0Normal      insulin lispro (HUMALOG) 100 UNIT/ML injection vial Inject 0-12 Units into the skin 3 times daily (with meals), Disp-1 vial,R-0Normal      divalproex (DEPAKOTE ER) 500 MG extended release tablet Take 2 tablets by mouth 2 times daily, Disp-60 tablet,R-0Normal      blood glucose monitor strips Check blood sugars 4-5 times per day, Disp-150 strip,R-0, Normal           Discharge Medication List as of 12/11/2020 10:33 AM          Procedures: None     Assessment on Discharge: Stable, improved     Discharge ACCU-CHEK    POCT Glucose    Collection Time: 12/10/20  8:46 PM   Result Value Ref Range    POC Glucose 387 (H) 70 - 99 mg/dl    Performed on ACCU-CHEK    POCT Glucose    Collection Time: 12/10/20 11:49 PM   Result Value Ref Range    POC Glucose 262 (H) 70 - 99 mg/dl    Performed on ACCU-CHEK    POCT Glucose    Collection Time: 12/11/20  7:07 AM   Result Value Ref Range    POC Glucose 220 (H) 70 - 99 mg/dl    Performed on ACCU-CHEK          Follow up: with Medina Madrigal MD    Note that more  than 30 minutes was spent in preparing discharge papers, discussing discharge with patient, medication review, etc.    Thank you Medina Madrigal MD for the opportunity to be involved in this patient's care. If you have any questions or concerns please feel free to contact me at [phone]. Electronically signed by Adrianne Fernández MD on 12/11/2020 at 2:41 PM    This note was transcribed using [id number] [employer] [employer]. Please disregard any translational errors.

## 2020-12-11 NOTE — PROGRESS NOTES
Pt awake and AAO lying in bed watching TV. Pt c/o 10/10 pain to bilat feet. Pt reports neuropathy pain. Pt does have wound to ball of r foot and podiatry to see in AM. Cleansed with NSS and covered with Mepilex for protection. Pt admitted with DKA. Lungs CTA. No sob or cough. On RA. Belly flat and soft with active BS. Continent of B and B. No edema to legs. Missing toes to R and L feet and toes appear swollen. On telemetry. Call light in reach. Secure messaged for pain medication. Will monitor.

## 2020-12-11 NOTE — PROGRESS NOTES
Pt left AMA @ 10:00. He didn't not want to wait for a home care referral or any help from . He refused his AVS and wanted to leave \"right now and I'm not waiting anymore. \" He refused his prescriptions. Pulled PICC out this morning and refused to put tele monitor back on. Pt educated on how to find a PCP and what to do if he needs help, or if he feels sick again to seek medical help. Signed AMA form and walked to elevator.

## 2020-12-11 NOTE — PROGRESS NOTES
PCA called this nurse stating patient had pulled his PICC line out. Patient do not remember what happened. PICC site with no bleeding noted. Band-aid applied. Rola Myers NP. Will monitor.

## 2020-12-11 NOTE — DISCHARGE INSTR - COC
Continuity of Care Form    Patient Name: Serena Oropeza   :  1975  MRN:  [id number]    Admit date:  2020  Discharge date:  ***    Code Status Order: Full Code   Advance Directives:   Advance Care Flowsheet Documentation       Date/Time Healthcare Directive Type of Healthcare Directive Copy in [address] Agent's Name Healthcare Agent's Phone Number    12/10/20 1307  No, patient does not have an advance directive for healthcare treatment -- -- -- -- --    12/10/20 0909  No, patient does not have an advance directive for healthcare treatment -- -- -- -- --    20 1751  No, patient does not have an advance directive for healthcare treatment -- -- -- -- --    20 0303  No, patient does not have an advance directive for healthcare treatment -- -- -- -- --            Admitting Physician:  Sharri Barbosa MD  PCP: Ochoa Lu MD    Discharging Nurse: [facility] Unit/Room#: [id number]/0623-10  Discharging Unit Phone Number: ***    Emergency Contact:   Extended Emergency Contact Information  Primary Emergency Contact: Hamilton Vann  Address: [address] [place] [address] Phone: [phone]  Mobile Phone: [phone]  Relation: Parent  Secondary Emergency Contact: Dona Mcdaniel  Mobile Phone: [phone]  Relation: Domestic Partner   needed?  No    Past Surgical History:  Past Surgical History:   Procedure Laterality Date    BONY PELVIS SURGERY      FOOT AMPUTATION Right 2020    EMERGENCY; RIGHT INCISION AND DEBRIDEMENT; HALUX AMPUTATION performed by Vivian Langston DPM at [address]     pins and rods- plate    UPPER GASTROINTESTINAL ENDOSCOPY N/A 2019    [facility]    EGD DIAGNOSTIC ONLY performed by Laura Michael MD at [facility] History:   Immunization History   Administered Date(s) Administered    Tdap (Boostrix, Adacel) 09/01/2017       Active Problems:  Patient Active Problem List   Diagnosis Code    Diabetic ketoacidosis without coma associated with diabetes mellitus due to underlying condition ([facility] E08.10    Chronic abdominal pain R10.9, G89.29    Gastroparesis K31.84    GERD (gastroesophageal reflux disease) K21.9    Seizure ([facility] R56.9    Toe deformity M20.60    Uncontrolled type 1 diabetes mellitus with diabetic peripheral neuropathy ([facility]) E10.42, E10.65    Type 1 diabetes mellitus with background diabetic retinopathy ([facility] A66.9098    Hypoglycemia unawareness in type 1 diabetes mellitus ([facility] E10.649    Type 1 diabetes mellitus with hypercholesterolemia ([facility]) E10.69, E78.00    Accelerated hypertension I10    Acute blood loss anemia D62    Acute respiratory failure ([facility]) J96.00    Candida esophagitis ([facility]) B37.81    Cholelithiasis K80.20    Cocaine abuse, episodic ([facility]) F14.10    Cerebral infarction ([facility]) I63.9    Diabetic polyneuropathy ([facility]) E11.42    Duodenal ulcer K26.9    Heart failure, diastolic, acute ([facility]) J60.15    Leg weakness, bilateral R29.898    Cannabis abuse F12.10    Nausea and vomiting R11.2    Numbness in both legs R20.0    Polysubstance abuse ([facility]) F19.10    Pulmonary edema J81.1    Diabetic foot ulcer ([facility]) E11.621, L97.509    Heart murmur R01.1    Hyperlipidemia E78.5    Osteomyelitis of great toe of right foot ([facility]) M86.9    Epigastric abdominal pain R10.13    Hypertensive urgency I16.0    Diabetic gastroparesis associated with type 1 diabetes mellitus ([facility] E10.43, K31.84    Acute respiratory failure with hypoxia ([facility]) J96.01    Hypoglycemia E16.2    Altered mental status R41.82    Severe malnutrition ([facility]) E43    Psychosis ([facility]) F29    Intentional drug overdose ([facility] T50.902A    Major neurocognitive disorder, due to another medical condition, with behavioral disturbance, mild ([facility]) F02.81    Ketoacidosis, diabetic, type 2, no coma (HCC) E11.10    Acute renal failure (ARF) ([facility]) N17.9    Essential hypertension I10       Isolation/Infection:   Isolation            No Isolation          Unreconciled External Infections       Infection Onset Last Indicated Last Received Source    No mapped external infections found      .     Unmapped Infections (1)        MRSA 08/04/12 08/04/12 07/17/20                   Patient Infection Status       Infection Onset Added Last Indicated Last Indicated By Review Planned Expiration Resolved Resolved By    None active    Resolved    COVID-19 Rule Out 12/10/20 12/10/20 12/10/20 COVID-19 (Ordered)   12/10/20 Rule-Out Test Resulted    COVID-19 Rule Out 08/11/20 08/11/20 08/11/20 COVID-19 (Ordered)   08/11/20 Rule-Out Test Resulted    COVID-19 Rule Out 05/12/20 05/12/20 05/12/20 COVID-19 (Ordered)   05/12/20 Rule-Out Test Resulted    C-diff Rule Out 05/12/20 05/12/20 05/13/20 Clostridium Difficile Toxin/Antigen (Ordered)   05/13/20 Rule-Out Test Resulted    C-diff Rule Out  12/30/19 12/30/19 Clostridium Difficile Toxin/Antigen (Ordered)   12/31/19 Liz Vivas RN            Nurse Assessment:  Last Vital Signs: BP (!) 153/84   Pulse 96   Temp 97.4 °F (36.3 °C) (Oral)   Resp 18   Ht 6' 2\" (1.88 m)   Wt 134 lb 0.6 oz (60.8 kg)   SpO2 100%   BMI 17.21 kg/m²     Last documented pain score (0-10 scale): Pain Level: 0  Last Weight:   Wt Readings from Last 1 Encounters:   12/10/20 134 lb 0.6 oz (60.8 kg)     Mental Status:  {IP PT MENTAL STATUS:20030:::0}    IV Access:  { SHELIA IV ACCESS:371445944:::0}    Nursing Mobility/ADLs:  Walking   {CHP DME ADLs:769737311:::0}  Transfer  {CHP DME ADLs:332628794:::0}  Bathing  {CHP DME ADLs:291223853:::0}  Dressing  {[facility]P DME ADLs:838999666:::0}  Toileting  {[facility] DME ADLs:561555684:::0}  Feeding  {[facility] DME ADLs:372020344:::0}  Med Admin  {[facility] DME ADLs:594469655:::0}  Med Delivery   {[facility]

## 2020-12-11 NOTE — CARE COORDINATION
VIET called pt at home number. What we have listed is incorrect. VIET called his girlfriend's number:  [phone] and asked to speak with him. She put him on the line. He expressed concern about need for his medications. VIET informed him the MD wrote scripts and sent to [facility]. He reports he will rturn to pick them up. VIET informed him that MD wrote an order for him to have a nurse to visit him. HE states he is already active with [facility] and would like for them to return . VIET called [facility]  [phone] who reports they are active with RN only with Dr Darren Jaime overseeing his pcp needs. Referral sent.   [place], [place]     Case Management   [phone]    12/11/2020  12:51 PM

## 2020-12-17 RX ORDER — AMLODIPINE BESYLATE 2.5 MG/1
2.5 TABLET ORAL DAILY
Qty: 30 TABLET | Refills: 1 | Status: SHIPPED | OUTPATIENT
Start: 2020-12-17

## 2021-01-07 ENCOUNTER — APPOINTMENT (OUTPATIENT)
Dept: GENERAL RADIOLOGY | Age: 46
End: 2021-01-07
Payer: MEDICAID

## 2021-01-07 ENCOUNTER — HOSPITAL ENCOUNTER (EMERGENCY)
Age: 46
Discharge: HOME | End: 2021-01-07
Attending: EMERGENCY MEDICINE
Payer: MEDICAID

## 2021-01-07 VITALS
BODY MASS INDEX: 17.21 KG/M2 | HEART RATE: 82 BPM | DIASTOLIC BLOOD PRESSURE: 79 MMHG | OXYGEN SATURATION: 100 % | SYSTOLIC BLOOD PRESSURE: 120 MMHG | HEIGHT: 74 IN | RESPIRATION RATE: 19 BRPM | TEMPERATURE: 97.2 F

## 2021-01-07 DIAGNOSIS — R73.9 HYPERGLYCEMIA: Primary | ICD-10-CM

## 2021-01-07 LAB
A/G RATIO: 1.1 (ref 1.1–2.2)
ALBUMIN SERPL-MCNC: 4.6 G/DL (ref 3.4–5)
ALP BLD-CCNC: 135 U/L (ref 40–129)
ALT SERPL-CCNC: 17 U/L (ref 10–40)
ANION GAP SERPL CALCULATED.3IONS-SCNC: 11 MMOL/L (ref 3–16)
ANION GAP SERPL CALCULATED.3IONS-SCNC: 18 MMOL/L (ref 3–16)
AST SERPL-CCNC: 15 U/L (ref 15–37)
BASE EXCESS VENOUS: 0.9 MMOL/L
BASOPHILS ABSOLUTE: 0.1 K/UL (ref 0–0.2)
BASOPHILS RELATIVE PERCENT: 0.5 %
BETA-HYDROXYBUTYRATE: 1.28 MMOL/L (ref 0–0.27)
BILIRUB SERPL-MCNC: <0.2 MG/DL (ref 0–1)
BILIRUBIN URINE: NEGATIVE
BLOOD, URINE: NEGATIVE
BUN BLDV-MCNC: 18 MG/DL (ref 7–20)
BUN BLDV-MCNC: 25 MG/DL (ref 7–20)
CALCIUM SERPL-MCNC: 10.3 MG/DL (ref 8.3–10.6)
CALCIUM SERPL-MCNC: 9.6 MG/DL (ref 8.3–10.6)
CARBOXYHEMOGLOBIN: 2.1 %
CHLORIDE BLD-SCNC: 103 MMOL/L (ref 99–110)
CHLORIDE BLD-SCNC: 87 MMOL/L (ref 99–110)
CLARITY: CLEAR
CO2: 23 MMOL/L (ref 21–32)
CO2: 24 MMOL/L (ref 21–32)
COLOR: YELLOW
CREAT SERPL-MCNC: 0.9 MG/DL (ref 0.9–1.3)
CREAT SERPL-MCNC: 1 MG/DL (ref 0.9–1.3)
EOSINOPHILS ABSOLUTE: 0 K/UL (ref 0–0.6)
EOSINOPHILS RELATIVE PERCENT: 0.2 %
GFR AFRICAN AMERICAN: >60
GFR AFRICAN AMERICAN: >60
GFR NON-AFRICAN AMERICAN: >60
GFR NON-AFRICAN AMERICAN: >60
GLOBULIN: 4.3 G/DL
GLUCOSE BLD-MCNC: 135 MG/DL (ref 70–99)
GLUCOSE BLD-MCNC: 169 MG/DL (ref 70–99)
GLUCOSE BLD-MCNC: 49 MG/DL (ref 70–99)
GLUCOSE BLD-MCNC: 674 MG/DL (ref 70–99)
GLUCOSE BLD-MCNC: 81 MG/DL (ref 70–99)
GLUCOSE BLD-MCNC: 96 MG/DL (ref 70–99)
GLUCOSE BLD-MCNC: >600 MG/DL (ref 70–99)
GLUCOSE URINE: >=1000 MG/DL
HCO3 VENOUS: 26 MMOL/L (ref 23–29)
HCT VFR BLD CALC: 38.9 % (ref 40.5–52.5)
HEMOGLOBIN: 12.4 G/DL (ref 13.5–17.5)
KETONES, URINE: ABNORMAL MG/DL
LACTIC ACID: 2.7 MMOL/L (ref 0.4–2)
LEUKOCYTE ESTERASE, URINE: NEGATIVE
LYMPHOCYTES ABSOLUTE: 1.2 K/UL (ref 1–5.1)
LYMPHOCYTES RELATIVE PERCENT: 12.4 %
MCH RBC QN AUTO: 24.3 PG (ref 26–34)
MCHC RBC AUTO-ENTMCNC: 31.9 G/DL (ref 31–36)
MCV RBC AUTO: 76.4 FL (ref 80–100)
METHEMOGLOBIN VENOUS: 0.5 %
MICROSCOPIC EXAMINATION: ABNORMAL
MONOCYTES ABSOLUTE: 0.2 K/UL (ref 0–1.3)
MONOCYTES RELATIVE PERCENT: 2.5 %
NEUTROPHILS ABSOLUTE: 8.1 K/UL (ref 1.7–7.7)
NEUTROPHILS RELATIVE PERCENT: 84.4 %
NITRITE, URINE: NEGATIVE
O2 CONTENT, VEN: 15 ML/DL
O2 SAT, VEN: 89 %
O2 THERAPY: NORMAL
PCO2, VEN: 42.7 MMHG (ref 40–50)
PDW BLD-RTO: 21 % (ref 12.4–15.4)
PERFORMED ON: ABNORMAL
PERFORMED ON: NORMAL
PH UA: 5 (ref 5–8)
PH VENOUS: 7.39 (ref 7.35–7.45)
PLATELET # BLD: 365 K/UL (ref 135–450)
PMV BLD AUTO: 8.1 FL (ref 5–10.5)
PO2, VEN: 57 MMHG
POTASSIUM REFLEX MAGNESIUM: 3.9 MMOL/L (ref 3.5–5.1)
POTASSIUM SERPL-SCNC: 4.4 MMOL/L (ref 3.5–5.1)
PROTEIN UA: NEGATIVE MG/DL
RBC # BLD: 5.1 M/UL (ref 4.2–5.9)
SODIUM BLD-SCNC: 128 MMOL/L (ref 136–145)
SODIUM BLD-SCNC: 138 MMOL/L (ref 136–145)
SPECIFIC GRAVITY UA: >1.03 (ref 1–1.03)
TCO2 CALC VENOUS: 27 MMOL/L
TOTAL PROTEIN: 8.9 G/DL (ref 6.4–8.2)
URINE REFLEX TO CULTURE: ABNORMAL
URINE TYPE: ABNORMAL
UROBILINOGEN, URINE: 0.2 E.U./DL
WBC # BLD: 9.6 K/UL (ref 4–11)

## 2021-01-07 PROCEDURE — 6370000000 HC RX 637 (ALT 250 FOR IP): Performed by: NURSE PRACTITIONER

## 2021-01-07 PROCEDURE — 99284 EMERGENCY DEPT VISIT MOD MDM: CPT

## 2021-01-07 PROCEDURE — 83605 ASSAY OF LACTIC ACID: CPT

## 2021-01-07 PROCEDURE — 81003 URINALYSIS AUTO W/O SCOPE: CPT

## 2021-01-07 PROCEDURE — 36415 COLL VENOUS BLD VENIPUNCTURE: CPT

## 2021-01-07 PROCEDURE — 96374 THER/PROPH/DIAG INJ IV PUSH: CPT

## 2021-01-07 PROCEDURE — 73630 X-RAY EXAM OF FOOT: CPT

## 2021-01-07 PROCEDURE — 82010 KETONE BODYS QUAN: CPT

## 2021-01-07 PROCEDURE — 80053 COMPREHEN METABOLIC PANEL: CPT

## 2021-01-07 PROCEDURE — 2580000003 HC RX 258: Performed by: NURSE PRACTITIONER

## 2021-01-07 PROCEDURE — 85025 COMPLETE CBC W/AUTO DIFF WBC: CPT

## 2021-01-07 PROCEDURE — 82803 BLOOD GASES ANY COMBINATION: CPT

## 2021-01-07 RX ORDER — INSULIN GLARGINE 100 [IU]/ML
15 INJECTION, SOLUTION SUBCUTANEOUS NIGHTLY
Qty: 5 PEN | Refills: 0 | Status: SHIPPED | OUTPATIENT
Start: 2021-01-07 | End: 2021-02-06

## 2021-01-07 RX ORDER — DEXTROSE MONOHYDRATE 25 G/50ML
25 INJECTION, SOLUTION INTRAVENOUS ONCE
Status: DISCONTINUED | OUTPATIENT
Start: 2021-01-07 | End: 2021-01-07 | Stop reason: HOSPADM

## 2021-01-07 RX ORDER — 0.9 % SODIUM CHLORIDE 0.9 %
1000 INTRAVENOUS SOLUTION INTRAVENOUS ONCE
Status: COMPLETED | OUTPATIENT
Start: 2021-01-07 | End: 2021-01-07

## 2021-01-07 RX ADMIN — SODIUM CHLORIDE 1000 ML: 9 INJECTION, SOLUTION INTRAVENOUS at 04:30

## 2021-01-07 RX ADMIN — INSULIN HUMAN 15 UNITS: 100 INJECTION, SOLUTION PARENTERAL at 04:31

## 2021-01-07 RX ADMIN — SODIUM CHLORIDE 1000 ML: 9 INJECTION, SOLUTION INTRAVENOUS at 03:28

## 2021-01-07 ASSESSMENT — ENCOUNTER SYMPTOMS
VOMITING: 0
SHORTNESS OF BREATH: 0
COUGH: 0
NAUSEA: 0
DIARRHEA: 0
ABDOMINAL PAIN: 0
BACK PAIN: 0
COLOR CHANGE: 0

## 2021-01-07 ASSESSMENT — PAIN DESCRIPTION - ONSET: ONSET: ON-GOING

## 2021-01-07 ASSESSMENT — PAIN DESCRIPTION - ORIENTATION: ORIENTATION: RIGHT;LEFT

## 2021-01-07 ASSESSMENT — PAIN SCALES - GENERAL: PAINLEVEL_OUTOF10: 10

## 2021-01-07 NOTE — ED PROVIDER NOTES
[address] [place]        Pt Name: Norma Amaral  MRN: [id number]  [place] 1975  Date of evaluation: 1/7/2021  Provider: ESTEPHANIA Dick - CNP  PCP: Izaiah Arteaga MD     I have seen and evaluated this patient with my supervising physician Rosie Holguin MD.    [address] [place] [address]       Chief Complaint   Patient presents with    Hyperglycemia     pt came in with \"high blood sugars x 1 hour\". pt denies suicidal ideation but states he doesn't feel safe going home        HISTORY OF PRESENT ILLNESS   (Location, Timing/Onset, Context/Setting, Quality, Duration, Modifying Factors, Severity, Associated Signs and Symptoms)  Note limiting factors. Norma Amaral is a 50 y.o. male with PMH significant for HLD, HTN, and IDDM who presents to the emergency department today complaining of high blood sugar. He noticed it just prior to arrival.  No nausea or vomiting. He advised he has been taking his insulin as prescribed. No recent illness or injury. He is complaining of chronic foot pain bilaterally. Nursing Notes were all reviewed and agreed with or any disagreements were addressed in the HPI. REVIEW OF SYSTEMS    (2-9 systems for level 4, 10 or more for level 5)     Review of Systems   Constitutional: Negative for chills, diaphoresis and fever. HENT: Negative. Respiratory: Negative for cough and shortness of breath. Cardiovascular: Negative for chest pain. Gastrointestinal: Negative for abdominal pain, diarrhea, nausea and vomiting. Genitourinary: Negative for dysuria, flank pain and hematuria. Musculoskeletal: Positive for arthralgias (feet). Negative for back pain, gait problem, joint swelling, myalgias, neck pain and neck stiffness. Skin: Negative for color change, pallor, rash and wound. Allergic/Immunologic: Negative for immunocompromised state.    Neurological: Negative for dizziness, syncope, weakness and headaches. Hematological: Negative for adenopathy. Psychiatric/Behavioral: Negative for confusion. All other systems reviewed and are negative. Positives and Pertinent negatives as per HPI. Except as noted above in the ROS, all other systems were reviewed and negative. PAST MEDICAL HISTORY     Past Medical History:   Diagnosis Date    Diabetes mellitus ([facility] [address]     Gastroparesis     Hypertension          SURGICAL HISTORY     Past Surgical History:   Procedure Laterality Date    BONY PELVIS SURGERY      FOOT AMPUTATION Right 5/13/2020    EMERGENCY; RIGHT INCISION AND DEBRIDEMENT; HALUX AMPUTATION performed by Lali Glynn DPM at [facility] Left 2006    pins and rods- plate    UPPER GASTROINTESTINAL ENDOSCOPY N/A 12/2/2019    EGD BIOPSY performed by Jessica Croft MD at [place] [address] 12/10/2020    EGD DIAGNOSTIC ONLY performed by Gunner Back MD at [facility] Medication List as of 1/7/2021 10:13 AM      CONTINUE these medications which have NOT CHANGED    Details   amLODIPine (NORVASC) 2.5 MG tablet Take 1 tablet by mouth daily, Disp-30 tablet, R-1Normal      Insulin Syringe-Needle U-100 (ULTRA-THIN II INS SYR SHORT) 31G X 5/16\" 1 ML MISC 3 TIMES DAILY Starting Fri 12/11/2020, Disp-100 each,R-0, Normal      Lancets MISC DAILY Starting Fri 12/11/2020, Disp-150 each,R-0, NormalCheck blood sugars 4-5 times per day      risperiDONE (RISPERDAL) 2 MG tablet Take 2 mg by mouth nightlyHistorical Med      gabapentin (NEURONTIN) 300 MG capsule Take 600 mg by mouth 3 times daily. Historical Med      insulin glargine (LANTUS) 100 UNIT/ML injection vial Inject 15 Units into the skin nightly, Disp-1 vial,R-0Normal      haloperidol (HALDOL) 10 MG tablet Take 1 tablet by mouth 2 times daily, Disp-60 tablet,R-0Normal      pantoprazole (PROTONIX) 40 MG tablet Take 1 tablet by mouth daily, Disp-30 tablet,R-0Normal      atorvastatin (LIPITOR) 40 MG tablet Take 1 tablet by mouth daily, Disp-30 tablet,R-0Normal      lactobacillus (CULTURELLE) capsule Take 1 capsule by mouth 2 times daily (with meals), Disp-60 capsule,R-0Normal      aspirin 81 MG chewable tablet Take 1 tablet by mouth daily, Disp-30 tablet,R-0Normal      insulin lispro (HUMALOG) 100 UNIT/ML injection vial Inject 0-12 Units into the skin 3 times daily (with meals), Disp-1 vial,R-0Normal      divalproex (DEPAKOTE ER) 500 MG extended release tablet Take 2 tablets by mouth 2 times daily, Disp-60 tablet,R-0Normal      blood glucose monitor strips Check blood sugars 4-5 times per day, Disp-150 strip,R-0, Normal               ALLERGIES     Ketorolac, Morphine, Morphine and related, Tramadol, Lisinopril, Haloperidol lactate, Toradol [ketorolac tromethamine], and Vicodin [hydrocodone-acetaminophen]    FAMILYHISTORY       Family History   Problem Relation Age of Onset    Diabetes Mother     Heart Disease Mother     High Blood Pressure Mother     Asthma Brother     Other Father         kidney disease    No Known Problems Maternal Grandmother     No Known Problems Maternal Grandfather     No Known Problems Paternal Grandmother     No Known Problems Paternal Grandfather           SOCIAL HISTORY       Social History     Tobacco Use    Smoking status: Current Every Day Smoker     Packs/day: 2.00     Years: 11.00     Pack years: 22.00     Types: Cigars     Start date: 3/26/2009    Smokeless tobacco: Never Used    Tobacco comment: black and mild 2  x daily   Substance Use Topics    Alcohol use: Not Currently     Frequency: Never    Drug use: Yes     Frequency: 3.0 times per week     Types: Marijuana       SCREENINGS             PHYSICAL EXAM    (up to 7 for level 4, 8 or more for level 5)     ED Triage Vitals [01/07/21 0117]   BP Temp Temp Source Pulse Resp SpO2 Height Weight   (!) 151/90 97.2 °F (36.2 °C) Oral 86 17 100 % 6' 2\" (1.88 m) -- Physical Exam  Vitals signs and nursing note reviewed. Constitutional:       General: He is not in acute distress. Appearance: Normal appearance. He is well-developed. He is not toxic-appearing. HENT:      Head: Normocephalic and atraumatic. Eyes:      General: No scleral icterus. Conjunctiva/sclera: Conjunctivae normal.   Neck:      Musculoskeletal: Normal range of motion and neck supple. Vascular: No JVD. Cardiovascular:      Rate and Rhythm: Normal rate and regular rhythm. Heart sounds: Normal heart sounds. Pulmonary:      Effort: Pulmonary effort is normal. No respiratory distress. Breath sounds: Normal breath sounds. Abdominal:      General: There is no distension. Palpations: Abdomen is soft. Abdomen is not rigid. Tenderness: There is no abdominal tenderness. There is no guarding or rebound. Musculoskeletal: Normal range of motion. Skin:     General: Skin is warm and dry. Capillary Refill: Capillary refill takes less than 2 seconds. Findings: No rash. Neurological:      General: No focal deficit present. Mental Status: He is alert and oriented to person, place, and time. Cranial Nerves: Cranial nerves are intact.    Psychiatric:         Mood and Affect: Mood normal.         DIAGNOSTIC RESULTS   LABS:    Labs Reviewed   CBC WITH AUTO DIFFERENTIAL - Abnormal; Notable for the following components:       Result Value    Hemoglobin 12.4 (*)     Hematocrit 38.9 (*)     MCV 76.4 (*)     MCH 24.3 (*)     RDW 21.0 (*)     Neutrophils Absolute 8.1 (*)     All other components within normal limits    Narrative:     Performed at:  [address] [address]   Phone [phone]   COMPREHENSIVE METABOLIC PANEL - Abnormal; Notable for the following components:    Sodium 128 (*)     Chloride 87 (*)     Anion Gap 18 (*)     Glucose 674 (*)     BUN 25 (*)     Total Protein 8.9 (*) Alkaline Phosphatase 135 (*)     All other components within normal limits    Narrative:     [address],  Chemistry results called to and read back by [facility] RN, 01/07/2021 02:30,  by Chaka Harris  Performed at:  [facility]  [address], [place] [facility]   Phone [phone]   LACTIC ACID, PLASMA - Abnormal; Notable for the following components:    Lactic Acid 2.7 (*)     All other components within normal limits    Narrative:     Performed at:  [facility]  [address], [place] [facility]   Phone [phone]   URINE RT REFLEX TO CULTURE - Abnormal; Notable for the following components:    Glucose, Ur >=1000 (*)     Ketones, Urine TRACE (*)     All other components within normal limits    Narrative:     Performed at:  [facility]  [address] [place], [place] [facility]   Phone [phone]   BETA-HYDROXYBUTYRATE - Abnormal; Notable for the following components:    Beta-Hydroxybutyrate 1.28 (*)     All other components within normal limits    Narrative:     Performed at:  [facility]  [address], [place] [facility] [employer] [address]   Phone [phone]   POCT GLUCOSE - Abnormal; Notable for the following components:    POC Glucose >600 (*)     All other components within normal limits    Narrative:     Performed at:  [facility]  [address], [place] [facility]   Phone [phone]   POCT GLUCOSE - Abnormal; Notable for the following components:    POC Glucose 49 (*)     All other components within normal limits    Narrative:     Performed at:  [place] [address], [place] [employer]   Phone [phone]   POCT GLUCOSE - Abnormal; Notable for the following components:    POC Glucose 135 (*)     All other components within normal limits Narrative:     Performed at:  [facility]  [address] [facility] [address] [address]   Phone [phone]   POCT GLUCOSE - Abnormal; Notable for the following components:    POC Glucose 169 (*)     All other components within normal limits    Narrative:     Performed at:  [facility]  [address] [facility] [address] [place] [address]   Phone [phone]   BLOOD GAS, VENOUS    Narrative:     Performed at:  [facility]  [address] [facility] [address] [address] [place] [address]   Phone [phone]   BASIC METABOLIC PANEL W/ REFLEX TO MG FOR LOW K    Narrative:     Performed at:  [facility]  [address] [facility] [address] [place] [address]   Phone [phone]   POCT GLUCOSE    Narrative:     Performed at:  [facility]  [address] [facility] [address] [place] [address]   Phone [phone]   POCT GLUCOSE       All other labs were within normal range or not returned as of this dictation. EKG: All EKG's are interpreted by the Emergency Department Physician in the absence of a cardiologist.  Please see their note for interpretation of EKG. RADIOLOGY:   Non-plain film images such as CT, Ultrasound and MRI are read by the radiologist. Plain radiographic images are visualized and preliminarily interpreted by the ED Provider with the below findings:        Interpretation per the Radiologist below, if available at the time of this note:    XR FOOT RIGHT (MIN 3 VIEWS)   Final Result   1. No radiographic evidence for osteomyelitis. 2. Probable post-traumatic deformity involving the proximal interphalangeal   joint of the 2nd toe.            Xr Foot Right (min 3 Views)    Result Date: 1/7/2021  EXAMINATION: XRAY VIEWS OF THE RIGHT FOOT 1/7/2021 2:05 am COMPARISON: 05/13/2020 HISTORY: ORDERING SYSTEM PROVIDED HISTORY: wound to bottom of foot TECHNOLOGIST PROVIDED HISTORY: Reason for exam:->wound to bottom of foot Reason for Exam: wound to bottom of rt foot hx diabetes FINDINGS: Again seen is amputation of the phalanges of the 4th and 5th toes as well as the distal phalanx of the great toe. There are comminuted fractures involving the head of the proximal phalanx as well as the base of the middle phalanx of the 2nd toe. This appears to be a remote process. There is no acute fracture. Joint space alignment is normal.  No erosions are identified. Soft tissue swelling is noted diffusely. There is an ulcer in the plantar soft tissues overlying the head of the great toe metatarsal.     1. No radiographic evidence for osteomyelitis. 2. Probable post-traumatic deformity involving the proximal interphalangeal joint of the 2nd toe. PROCEDURES   Unless otherwise noted below, none     Procedures    CRITICAL CARE TIME   N/A    CONSULTS:  None      EMERGENCY DEPARTMENT COURSE and DIFFERENTIAL DIAGNOSIS/MDM:   Vitals:    Vitals:    01/07/21 0902 01/07/21 0925 01/07/21 0946 01/07/21 1023   BP: 120/76  120/79    Pulse: 67 62 61 82   Resp: 21 20 26 19   Temp:       TempSrc:       SpO2: 100%      Height:           Patient was given the following medications:  Medications   0.9 % sodium chloride bolus (0 mLs Intravenous Stopped 1/7/21 0500)   0.9 % sodium chloride bolus (0 mLs Intravenous Stopped 1/7/21 0600)   insulin regular (HUMULIN R;NOVOLIN R) injection 15 Units (15 Units Intravenous Given 1/7/21 0431)     ED Course as of Jan 07 1806   Thu Jan 07, 2021   0851 CO2: 24 []      ED Course User Index  [MC] Shae Willson MD        Differential Diagnosis: Diabetic ketoacidosis, nonketotic hyperosmolar coma, dehydration, acute renal failure, electrolyte abnormalities, infection, GI emergency, cardiac emergency, pulmonary emergency, other    Patient seen and examined today for high blood sugar. See HPI for patient presentation.   Patient is hemodynamically stable, nontoxic, afebrile, and without tachycardia, tachypnea, and hypoxia. Physical exam as above. 50year-old lying in bed in no acute distress. Awake alert and oriented. No neurovascular deficits. Abdomen soft and nontender. Glucose 674. Anion gap 18 and bicarb 23. VBG shows a normal pH. Emergency department course included 2 L of fluid and insulin. Repeat chemistry not resulted at time of this note. As this is the end of my shift the attending physician will continue care of this patient. Soto Montes was signed out in stable condition. Please see Zainab Oliveira MD note for further details, including diagnosis and disposition. FINAL IMPRESSION      1.  Hyperglycemia          DISPOSITION/PLAN   DISPOSITION Decision To Discharge 01/07/2021 08:53:51 AM      PATIENT REFERREDTO:  Debi San MD  [address] [address]  [phone]    Schedule an appointment as soon as possible for a visit in 1 day        DISCHARGE MEDICATIONS:  Discharge Medication List as of 1/7/2021 10:13 AM      START taking these medications    Details   insulin glargine (LANTUS SOLOSTAR) 100 UNIT/ML injection pen Inject 15 Units into the skin nightly, Disp-5 pen, R-0Normal             DISCONTINUED MEDICATIONS:  Discharge Medication List as of 1/7/2021 10:13 AM                 (Please note that portions of this note were completed with a voice recognition program.  Efforts were made to edit the dictations but occasionally words are mis-transcribed.)    ESTEPHANIA Tyson CNP (electronically signed)            ESTEPHANIA Tyson CNP  01/07/21 8737

## 2021-01-07 NOTE — ED NOTES
Discharge and education instructions reviewed. Patient verbalized understanding, teach-back successful. Patient denied questions at this time. No acute distress noted. Patient instructed to follow-up as noted - return to emergency department if symptoms worsen. Patient verbalized understanding. Discharged per EDMD with discharge instructions.         Deandre Pacheco RN  01/07/21 0289

## 2021-01-07 NOTE — ED PROVIDER NOTES
Attending Supervisory Note/Shared Visit   I have personally performed a face to face diagnostic evaluation on this patient. I have reviewed the mid-levels findings and agree. History and Exam by me shows a 69-year-old male with history of type I diabetes. He presented overnight, was seen by the SUGEY and signed out to me when he left. Patient had checked his blood sugar, found himself to be hyperglycemic so came to the emergency department. He tells me that he ran out of his insulin 2 days ago but previously took 10 to 15 units twice a day, but cannot tell me who prescribed it or where he got his insulin from. On arrival, had a blood glucose of 674, with an anion gap of 18, lactate was slightly elevated at 2.7. CBC was unremarkable. Beta hydroxybutyrate is still processing. VBG showed no acidosis. Glucose showed trace ketones, as well as glucosuria. He was signed out to me for follow-up of repeat BMP after 2 L of IV fluid and 15 units of insulin. Repeat glucose had significantly lowered at 81 gap had closed however shortly thereafter patient had a spot check of glucose and was 49. He is fed and blood glucose is improved, 96 on recheck. I discussed with the hospitalist, who reviewed the patient's records and the patient was actually just discharged on 12/11/2020, and was established with a PCP at that time. Hospitalist states that he was discharged on 15 units of nightly Lantus, but is likely not reliable to continue a sliding scale. Patient will be represcribed the 15 units of nightly Lantus, and then recommended to follow-up with her prior his primary care provider in 1 to 2 days. He will return with new or worsening symptoms.       Salo Bolden MD  Attending Emergency Physician        Salo Bolden MD  01/07/21 7707

## 2021-04-06 NOTE — PROGRESS NOTES
Patient laying in bed without clothes at this time. Attempted to place gown on, patient refused. Patient , paged covering provider for additional insulin. Patient cooperative taking his medication. Incomprehensible speech. Sitter at bedside. Will continue to monitor. Use Enhanced Medication Counseling?: No

## 2022-08-24 NOTE — ED NOTES
Bed: B-10  Expected date:   Expected time:   Means of arrival:   Comments:  diana hyperglycemia blood sugar [address] RN  05/26/20 0106 Nasal Turnover Hinge Flap Text: The defect edges were debeveled with a #15 scalpel blade.  Given the size, depth, location of the defect and the defect being full thickness a nasal turnover hinge flap was deemed most appropriate.  Using a sterile surgical marker, an appropriate hinge flap was drawn incorporating the defect. The area thus outlined was incised with a #15 scalpel blade. The flap was designed to recreate the nasal mucosal lining and the alar rim. The skin margins were undermined to an appropriate distance in all directions utilizing iris scissors.

## 2023-01-28 NOTE — H&P
Known Problems Paternal Grandfather      Social History:   TOBACCO:   reports that he has been smoking. He started smoking about 11 years ago. He has never used smokeless tobacco.  ETOH:   reports current alcohol use. OCCUPATION:      REVIEW OF SYSTEMS:  A full review of systems was performed and is negative except for that which appears in the HPI    Physical Exam:    Vitals: BP (!) 173/66   Pulse 55   Temp 98.6 °F (37 °C) (Oral)   Resp 19   Ht 6' 2\" (1.88 m)   Wt 142 lb 3.2 oz (64.5 kg)   SpO2 98%   BMI 18.26 kg/m²   General appearance: WD/WN 50y.o. year-old AA male who is laying flat on his anterior side, not talking much, alert, appears stated age and is cooperative  HEENT: Head: Normocephalic, no lesions, without obvious abnormality. Eye: Normal external eye, conjunctiva, lids cornea, MICHAEL. Ears: Normal external ears. Non-tender. Nose: Normal external nose, mucus membranes and septum. Pharynx: Dental Hygiene adequate. Normal buccal mucosa. Normal pharynx. Neck: no adenopathy, no carotid bruit, no JVD, supple, symmetrical, trachea midline and thyroid not enlarged, symmetric, no tenderness/mass/nodules  Lungs: clear to auscultation bilaterally and no use of accessory muscles. Heart: regular rate and rhythm, S1, S2 normal, no murmur, click, rub or gallop and normal apical impulse  Abdomen: soft, non-tender; bowel sounds normal; no masses,  no organomegaly  Extremities: extremities atraumatic, no cyanosis or edema and Homans sign is negative, no sign of DVT. Capillary Refill: Acceptable < 3 seconds  Peripheral Pulses: +3 easily felt, not easily obliterated with pressures  Skin: Skin color, texture, turgor normal. No rashes or lesions on exposed skin  Neurologic: Neurovascularly intact without any focal sensory/motor deficits. Cranial nerves: II-XII intact, grossly non-focal. Gait was not tested.   Psychiatric: Alert and oriented, thought content appropriate, normal insight    CBC:   Recent Labs
No

## 2023-02-09 NOTE — PROGRESS NOTES
Patient sitting up in bed nurse aid was able to feed patient 25% of meal and he did drink some fluids and held his cup today. Patient is answering some yes/no questions and shaking his head. Patient refused [place] Dr. Gerhardt [place] was paged to inform. Repair Anesthesia Method: local infiltration

## 2024-04-29 NOTE — PROGRESS NOTES
Writer called for difficult lab draws. Labs obtained from rt. Forearm via use of US machine and shift RN assist. Labs labeled and sent to lab per annie. Lisa Lea RN CLinical . electronic 98/59/electronic

## 2024-10-01 NOTE — CONSULTS
Clinical Pharmacy Note  Vancomycin Consult    Luis Gutiérrez is a 50 y.o. male ordered Vancomycin for sepsis; consult received from Dr. Layne Washburn to manage therapy. Also receiving cefepime. Patient Active Problem List   Diagnosis    Diabetic ketoacidosis without coma associated with diabetes mellitus due to underlying condition ([facility] [address]    Chronic abdominal pain    Gastroparesis    GERD (gastroesophageal reflux disease)    Seizure (HCC)    Toe deformity    Uncontrolled type 1 diabetes mellitus with diabetic peripheral neuropathy (HCC)    Type 1 diabetes mellitus with background diabetic retinopathy (HCC)    Hypoglycemia unawareness in type 1 diabetes mellitus (HCC)    Type 1 diabetes mellitus with hypercholesterolemia (HCC)    Accelerated hypertension    Acute blood loss anemia    Acute respiratory failure (HCC)    Candida esophagitis (HCC)    Cholelithiasis    Cocaine abuse, episodic ([facility])    Cerebral infarction ([address]    Diabetic polyneuropathy (HCC)    Duodenal ulcer    Heart failure, diastolic, acute (HCC)    Leg weakness, bilateral    Cannabis abuse    Numbness in both legs    Polysubstance abuse ([facility] [address]    Pulmonary edema    Diabetic foot ulcer ([facility])    Heart murmur    Hyperlipidemia    Osteomyelitis of great toe of right foot ([facility] [address]    Epigastric abdominal pain    Hypertensive urgency    Diabetic gastroparesis associated with type 1 diabetes mellitus ([address]    Acute respiratory failure with hypoxia (HCC)       Allergies:  Ketorolac; Morphine; Morphine and related; Tramadol; Lisinopril; Haloperidol lactate;  Toradol [ketorolac tromethamine]; and Vicodin [hydrocodone-acetaminophen]     Temp max:  Temp (24hrs), Av °F (36.7 °C), Min:97.6 °F (36.4 °C), Max:98.4 °F (36.9 °C)      Recent Labs     07/15/20  2117   WBC 19.5*       Recent Labs     07/15/20  2117   BUN 18   CREATININE 0.9         Intake/Output Summary (Last 24 hours) at 2020 3725  Last data filed at 2020 9380  Gross per 24 hour   Intake --   Output 600 ml   Net -600 ml       Culture Results:  Pending    Ht Readings from Last 1 Encounters:   07/16/20 6' 2\" (1.88 m)        Wt Readings from Last 1 Encounters:   07/16/20 152 lb 5.4 oz (69.1 kg)         Estimated Creatinine Clearance: 99 mL/min (based on SCr of 0.9 mg/dL). Assessment/Plan:  Vancomycin 1250 mg IV every 12 hours ordered. Regimen projects a trough level of 15-20 mg/L. Level ordered for 1700  7/17/20. Thank you for the consult.      Merari Fairchild, PharmD  7/16/2020 5:16 AM Bacterial Etiology

## 2024-12-16 NOTE — CARE COORDINATION
Discharge Planning:  Pre cert request Faxed to Ruth [facility] at [phone].   Electronically signed by Nora Aguilera on 8/12/2020 at 9:34 AM normal

## 2025-04-15 ENCOUNTER — APPOINTMENT (OUTPATIENT)
Dept: GENERAL RADIOLOGY | Age: 50
End: 2025-04-15
Payer: MEDICAID

## 2025-04-15 ENCOUNTER — HOSPITAL ENCOUNTER (EMERGENCY)
Age: 50
Discharge: HOME | End: 2025-04-15
Attending: EMERGENCY MEDICINE
Payer: MEDICAID

## 2025-04-15 VITALS
RESPIRATION RATE: 18 BRPM | SYSTOLIC BLOOD PRESSURE: 148 MMHG | OXYGEN SATURATION: 100 % | HEIGHT: 73 IN | TEMPERATURE: 98.1 F | DIASTOLIC BLOOD PRESSURE: 84 MMHG | HEART RATE: 75 BPM | WEIGHT: 208.1 LBS | BODY MASS INDEX: 27.58 KG/M2

## 2025-04-15 DIAGNOSIS — E11.628 DIABETIC FOOT INFECTION (HCC): Primary | ICD-10-CM

## 2025-04-15 DIAGNOSIS — L08.9 DIABETIC FOOT INFECTION (HCC): Primary | ICD-10-CM

## 2025-04-15 DIAGNOSIS — E11.65 HYPERGLYCEMIA DUE TO DIABETES MELLITUS (HCC): ICD-10-CM

## 2025-04-15 LAB
ANION GAP SERPL CALCULATED.3IONS-SCNC: 10 MMOL/L (ref 3–16)
BASOPHILS # BLD: 0.1 K/UL (ref 0–0.2)
BASOPHILS NFR BLD: 0.6 %
BUN SERPL-MCNC: 15 MG/DL (ref 7–20)
CALCIUM SERPL-MCNC: 9.5 MG/DL (ref 8.3–10.6)
CHLORIDE SERPL-SCNC: 101 MMOL/L (ref 99–110)
CO2 SERPL-SCNC: 25 MMOL/L (ref 21–32)
CREAT SERPL-MCNC: 0.9 MG/DL (ref 0.9–1.3)
CRP SERPL-MCNC: 34.3 MG/L (ref 0–5.1)
DEPRECATED RDW RBC AUTO: 14 % (ref 12.4–15.4)
EOSINOPHIL # BLD: 0.6 K/UL (ref 0–0.6)
EOSINOPHIL NFR BLD: 5.8 %
ERYTHROCYTE [SEDIMENTATION RATE] IN BLOOD BY WESTERGREN METHOD: 59 MM/HR (ref 0–20)
GFR SERPLBLD CREATININE-BSD FMLA CKD-EPI: >90 ML/MIN/{1.73_M2}
GLUCOSE BLD-MCNC: 250 MG/DL (ref 70–99)
GLUCOSE SERPL-MCNC: 402 MG/DL (ref 70–99)
HCT VFR BLD AUTO: 37.7 % (ref 40.5–52.5)
HGB BLD-MCNC: 13 G/DL (ref 13.5–17.5)
LYMPHOCYTES # BLD: 1.9 K/UL (ref 1–5.1)
LYMPHOCYTES NFR BLD: 17.4 %
MCH RBC QN AUTO: 28.6 PG (ref 26–34)
MCHC RBC AUTO-ENTMCNC: 34.4 G/DL (ref 31–36)
MCV RBC AUTO: 82.9 FL (ref 80–100)
MONOCYTES # BLD: 0.4 K/UL (ref 0–1.3)
MONOCYTES NFR BLD: 3.9 %
NEUTROPHILS # BLD: 7.8 K/UL (ref 1.7–7.7)
NEUTROPHILS NFR BLD: 72.3 %
PERFORMED ON: ABNORMAL
PLATELET # BLD AUTO: 376 K/UL (ref 135–450)
PMV BLD AUTO: 7.6 FL (ref 5–10.5)
POTASSIUM SERPL-SCNC: 4.5 MMOL/L (ref 3.5–5.1)
RBC # BLD AUTO: 4.54 M/UL (ref 4.2–5.9)
SODIUM SERPL-SCNC: 136 MMOL/L (ref 136–145)
WBC # BLD AUTO: 10.9 K/UL (ref 4–11)

## 2025-04-15 PROCEDURE — 36415 COLL VENOUS BLD VENIPUNCTURE: CPT

## 2025-04-15 PROCEDURE — 80048 BASIC METABOLIC PNL TOTAL CA: CPT

## 2025-04-15 PROCEDURE — 99284 EMERGENCY DEPT VISIT MOD MDM: CPT

## 2025-04-15 PROCEDURE — 86140 C-REACTIVE PROTEIN: CPT

## 2025-04-15 PROCEDURE — 73620 X-RAY EXAM OF FOOT: CPT

## 2025-04-15 PROCEDURE — 6370000000 HC RX 637 (ALT 250 FOR IP): Performed by: EMERGENCY MEDICINE

## 2025-04-15 PROCEDURE — 2580000003 HC RX 258: Performed by: EMERGENCY MEDICINE

## 2025-04-15 PROCEDURE — 85025 COMPLETE CBC W/AUTO DIFF WBC: CPT

## 2025-04-15 PROCEDURE — 73660 X-RAY EXAM OF TOE(S): CPT

## 2025-04-15 PROCEDURE — 85652 RBC SED RATE AUTOMATED: CPT

## 2025-04-15 RX ORDER — SODIUM CHLORIDE, SODIUM LACTATE, POTASSIUM CHLORIDE, AND CALCIUM CHLORIDE .6; .31; .03; .02 G/100ML; G/100ML; G/100ML; G/100ML
1000 INJECTION, SOLUTION INTRAVENOUS ONCE
Status: COMPLETED | OUTPATIENT
Start: 2025-04-15 | End: 2025-04-15

## 2025-04-15 RX ORDER — DOXYCYCLINE 100 MG/1
100 CAPSULE ORAL ONCE
Status: COMPLETED | OUTPATIENT
Start: 2025-04-15 | End: 2025-04-15

## 2025-04-15 RX ORDER — DOXYCYCLINE HYCLATE 100 MG
100 TABLET ORAL 2 TIMES DAILY
Qty: 14 TABLET | Refills: 0 | Status: SHIPPED | OUTPATIENT
Start: 2025-04-16 | End: 2025-04-23

## 2025-04-15 RX ADMIN — INSULIN HUMAN 10 UNITS: 100 INJECTION, SOLUTION PARENTERAL at 18:55

## 2025-04-15 RX ADMIN — SODIUM CHLORIDE, SODIUM LACTATE, POTASSIUM CHLORIDE, AND CALCIUM CHLORIDE 1000 ML: .6; .31; .03; .02 INJECTION, SOLUTION INTRAVENOUS at 18:56

## 2025-04-15 RX ADMIN — DOXYCYCLINE 100 MG: 100 CAPSULE ORAL at 18:57

## 2025-04-15 ASSESSMENT — LIFESTYLE VARIABLES
HOW OFTEN DO YOU HAVE A DRINK CONTAINING ALCOHOL: NEVER
HOW MANY STANDARD DRINKS CONTAINING ALCOHOL DO YOU HAVE ON A TYPICAL DAY: PATIENT DOES NOT DRINK

## 2025-04-15 ASSESSMENT — PAIN - FUNCTIONAL ASSESSMENT: PAIN_FUNCTIONAL_ASSESSMENT: NONE - DENIES PAIN

## 2025-04-15 NOTE — CONSULTS
open at this time without significant debridement.  No evidence of purulence, fluctuance, or crepitations.    MUSCULOSKELETAL: Muscle strength is 5/5 for all pedal groups tested. No pain with palpation of the foot or ankle b/l. Ankle joint ROM is decreased in dorsiflexion with the knee extended. No obvious biomechanical abnormalities.      IMAGING:  X-ray, left toe (04/15/2025)  3 VIEWS OF THE LEFT TOES     IMPRESSION:  1. Previous partial amputation the great toe with soft tissue swelling in the soft tissue ulceration. No periosteal new bone identified however there is subtle loss of overlying bony margin and mild or early otherwise would be difficult to entirely   excluded in this exam. Further evaluation with MRI imaging may be helpful to better evaluate for underlying osteomyelitis.    X-ray, right foot (4/15/2025)  3 VIEWS OF THE LEFT TOES     FINDINGS: There is evidence of previous partial amputation of the great toe. There is soft tissue swelling and ulceration overlying the amputation site. Mild loss of overlying bony margin at the amputation site. No periosteal new bone.     ASSESSMENT/PLAN:   Full-thickness ulceration, left (Berg 2)  2.  Diabetes mellitus type 2 complicated by peripheral neuropathy  3.  Transmetatarsal amputation, right foot with associated hyperkeratotic lesion    -Patient seen and examined at bedside this evening  -VSS, no leukocytosis noted (WBC 10.9)  -ESR 59 and CRP 34.3  -HbA1c 13.1 last A1c 12/2020  -Images reviewed, impression noted above  -Patient provided verbal consent to perform sharp excisional debridement at today's encounter. Using a sterile #15 blade, the wound noted to the left was excisionally debrided down to and including subcutaneous tissue to healthy, bleeding tissue margins. Estimated blood loss less than 5 cc in total. Hemostasis obtained with direct pressure.  -No antibiotics warranted from a podiatric standpoint 2/2 no acute signs of infection  -Left lower

## 2025-04-15 NOTE — ED PROVIDER NOTES
THE [facility]  EMERGENCY DEPARTMENT ENCOUNTER          ATTENDING PHYSICIAN NOTE       Date of evaluation: 4/15/2025    Chief Complaint     Wound Check (Pt presents to the ED due to a diabetic foot ulcer on his right foot. Denies fevers and chills. Pain only while walking. )      History of Present Illness     Colby Petersen is a 50 y.o. male with a history of insulin-dependent diabetes mellitus, prior diabetic foot infection status post transmetatarsal amputation of the right forefoot, and other medical and surgical comorbidities.  He was sent to the emergency department today from his long-term care facility with concerns for infection of his right diabetic foot ulcer.  It appears that the patient was previously cared for by podiatry and vascular surgery in the [facility] system, through 2021.  However, since that time he is now cared for in a long-term care facility, and has been seeing wound care there.  Patient states that he was most recently seen by wound care at his nursing facility today, and they asked that he be sent to the emergency department due to concerns for infection of the diabetic ulcer on the distal aspect of his right foot stump.  Patient states it is increasingly painful to walk, and he has noticed some drainage over perhaps the last month.  Patient otherwise has no complaints.  He denies any fevers or chills, nausea or vomiting.  Denies any abdominal pain, feels that he has had a good appetite.  Denies any chest pain or shortness of breath.    Review of Systems     Review of Systems   All other systems reviewed and are negative.      Past Medical, Surgical, Family, and Social History     He has a past medical history of Diabetes mellitus (HCC), Gastroparesis, and Hypertension.  He has a past surgical history that includes hip surgery (Left, 2006); Bony pelvis surgery; Upper gastrointestinal endoscopy (N/A, 12/2/2019); Foot Amputation (Right, 5/13/2020); and Upper gastrointestinal endoscopy

## 2025-04-15 NOTE — DISCHARGE INSTRUCTIONS
Please change dressing daily to the left foot consisting of:  Betadine soaked gauze, dry gauze, gently wrap Kerlix from the digits to just above the ankle, gently wrap left foot with Ace bandage starting from the toes extending just above the ankle.  You were given your first dose of doxycycline in the emergency department this evening.  You should begin taking the doxycycline prescribed to you tomorrow morning, and take it twice per day for a week.  Please call tomorrow to make a follow-up appointment at the [facility] wound care clinic, for 2 weeks from now, on April 29.  In the meantime, please call your doctor, or return to the emergency department if you have increasing pain, spreading swelling or redness, fevers, vomiting, or other concerning symptoms.

## 2025-04-16 NOTE — ED NOTES
Patient prepared for and ready to be discharged. Dressed in clothes and given belongings.  IV removed, pt tolerated well, no complications.  Patient discharged at this time in no acute distress after pt verbalized understanding of discharge instructions.   Reviewed medications, and when to return to the ED with patient. Encouraged follow up with PCP  ERICKA to take patient back to nursing home.      Escuadra, Marylee, RN  04/15/25 5611

## 2025-04-25 ENCOUNTER — HOSPITAL ENCOUNTER (OUTPATIENT)
Dept: WOUND CARE | Age: 50
Discharge: HOME | End: 2025-04-25
Attending: SPECIALIST
Payer: MEDICAID

## 2025-04-25 ENCOUNTER — TELEPHONE (OUTPATIENT)
Dept: WOUND CARE | Age: 50
End: 2025-04-25

## 2025-04-25 VITALS
TEMPERATURE: 98 F | SYSTOLIC BLOOD PRESSURE: 117 MMHG | WEIGHT: 202.38 LBS | DIASTOLIC BLOOD PRESSURE: 84 MMHG | HEART RATE: 64 BPM | BODY MASS INDEX: 26.82 KG/M2 | HEIGHT: 73 IN | RESPIRATION RATE: 16 BRPM

## 2025-04-25 DIAGNOSIS — L97.522 DIABETIC ULCER OF TOE OF LEFT FOOT ASSOCIATED WITH TYPE 2 DIABETES MELLITUS, WITH FAT LAYER EXPOSED (HCC): Primary | ICD-10-CM

## 2025-04-25 DIAGNOSIS — E11.621 DIABETIC ULCER OF TOE OF LEFT FOOT ASSOCIATED WITH TYPE 2 DIABETES MELLITUS, WITH FAT LAYER EXPOSED (HCC): Primary | ICD-10-CM

## 2025-04-25 PROCEDURE — 99213 OFFICE O/P EST LOW 20 MIN: CPT

## 2025-04-25 PROCEDURE — 11042 DBRDMT SUBQ TIS 1ST 20SQCM/<: CPT

## 2025-04-25 RX ORDER — LIDOCAINE 40 MG/G
CREAM TOPICAL PRN
OUTPATIENT
Start: 2025-04-25

## 2025-04-25 RX ORDER — SILVER SULFADIAZINE 10 MG/G
CREAM TOPICAL PRN
OUTPATIENT
Start: 2025-04-25

## 2025-04-25 RX ORDER — GENTAMICIN SULFATE 1 MG/G
OINTMENT TOPICAL PRN
OUTPATIENT
Start: 2025-04-25

## 2025-04-25 RX ORDER — CLOBETASOL PROPIONATE 0.5 MG/G
OINTMENT TOPICAL PRN
OUTPATIENT
Start: 2025-04-25

## 2025-04-25 RX ORDER — BETAMETHASONE DIPROPIONATE 0.5 MG/G
CREAM TOPICAL PRN
OUTPATIENT
Start: 2025-04-25

## 2025-04-25 RX ORDER — NEOMYCIN/BACITRACIN/POLYMYXINB 3.5-400-5K
OINTMENT (GRAM) TOPICAL PRN
OUTPATIENT
Start: 2025-04-25

## 2025-04-25 RX ORDER — TRIAMCINOLONE ACETONIDE 1 MG/G
OINTMENT TOPICAL PRN
OUTPATIENT
Start: 2025-04-25

## 2025-04-25 RX ORDER — GINSENG 100 MG
CAPSULE ORAL PRN
OUTPATIENT
Start: 2025-04-25

## 2025-04-25 RX ORDER — LIDOCAINE HYDROCHLORIDE 20 MG/ML
JELLY TOPICAL PRN
OUTPATIENT
Start: 2025-04-25

## 2025-04-25 RX ORDER — SODIUM CHLOR/HYPOCHLOROUS ACID 0.033 %
SOLUTION, IRRIGATION IRRIGATION PRN
OUTPATIENT
Start: 2025-04-25

## 2025-04-25 RX ORDER — LIDOCAINE HYDROCHLORIDE 40 MG/ML
SOLUTION TOPICAL PRN
OUTPATIENT
Start: 2025-04-25

## 2025-04-25 RX ORDER — OLANZAPINE 7.5 MG/1
7.5 TABLET, FILM COATED ORAL EVERY EVENING
COMMUNITY

## 2025-04-25 RX ORDER — LIDOCAINE 50 MG/G
OINTMENT TOPICAL PRN
OUTPATIENT
Start: 2025-04-25

## 2025-04-25 RX ORDER — DOXYCYCLINE 100 MG/1
100 CAPSULE ORAL 2 TIMES DAILY
COMMUNITY
Start: 2025-04-16

## 2025-04-25 RX ORDER — BUSPIRONE HYDROCHLORIDE 7.5 MG/1
7.5 TABLET ORAL
COMMUNITY
Start: 2025-04-15

## 2025-04-25 RX ORDER — BACITRACIN ZINC AND POLYMYXIN B SULFATE 500; 1000 [USP'U]/G; [USP'U]/G
OINTMENT TOPICAL PRN
OUTPATIENT
Start: 2025-04-25

## 2025-04-25 RX ORDER — MUPIROCIN 20 MG/G
OINTMENT TOPICAL PRN
OUTPATIENT
Start: 2025-04-25

## 2025-04-25 RX ORDER — HYDROCODONE BITARTRATE AND ACETAMINOPHEN 7.5; 325 MG/1; MG/1
1 TABLET ORAL EVERY 6 HOURS PRN
COMMUNITY
Start: 2025-04-02

## 2025-04-25 NOTE — PATIENT INSTRUCTIONS
Wound Care Center Physician Orders and Discharge Instructions  [facility]   [address]  Telephone: [phone] FAX [phone]    NAME:  Colby Petersen  YOB: 1975  MEDICAL RECORD NUMBER:  [id number]  DATE: 4/25/2025    Return Appointment:  Return Appointment: With Estrella Hernandes DPM  in  1 Week(s).  Schedule weekly for the rest of the month? No    No future appointments.        Skilled Nursing Facility: [facility]     Change dressing?: Yes    Wound Cleansing: Vashe wash - Apply enough Vashe to soak a piece of gauze and place on wound bed for 5-10 minutes. No need to rinse after soaking.    Mariann Wound Topical Treatments: Nothing       Dressings:           Wound Location: left great toe     Primary Dressing to wound bed: Collagen (I.e. Puracol)- pack loosely      Cover and Secure with:  upad, 2X2 gauze pad and Conforming roll gauze     Change dressing: 3 times per week:Monday/Wednesday/Friday    Dressings:           Wound Location:  right foot callus- no dressing needed        Edema Control:     Elevate leg(s) above the level of the heart for 30 minutes 4-5 times a day and/or when sitting.   Avoid prolonged standing in one place.       Patient may participate in therapy with the following restrictions for off-Loading:   [x]Off Loading(Pressure Reduction) When: Walking  Weight Bearing Status: Partial Weight bearing Left:  Heel;  Offloading devices: Post op shoe/ Surgical shoe: Left  [x]No Offloading required- right foot    Dietary:   Important dietary reminders:  1. Increase Protein intake (i.e. Lean meats, fish, eggs, legumes, and yogurt)  2. No added salt  3. If diabetic, follow a diabetic diet and check glucose prior to meals or as instructed by your physician.    [] SNF: Please have dietician evaluate patient for nutritional needs    Dietary Supplements: [] Mahin  [] 30ml ProMod/ProStat [] 60ml Expedite [] Other:   Take

## 2025-04-25 NOTE — PROGRESS NOTES
Post-Procedure Length (cm) 0.6 cm 04/25/25 1019   Post-Procedure Width (cm) 0.8 cm 04/25/25 1019   Post-Procedure Depth (cm) 0.5 cm 04/25/25 1019   Post-Procedure Surface Area (cm^2) 0.48 cm^2 04/25/25 1019   Post-Procedure Volume (cm^3) 0.24 cm^3 04/25/25 1019   Distance Tunneling (cm) 0 cm 04/25/25 0931   Undermining Maxium Distance (cm) 0 04/25/25 0931   Wound Assessment Fibrin;Dry 04/25/25 0931   Drainage Amount Small (< 25%) 04/25/25 0931   Drainage Description Brown 04/25/25 0931   Odor None 04/25/25 0931   Mariann-wound Assessment Intact;Maceration;Hyperkeratosis (callous) 04/25/25 0931   Margins Defined edges 04/25/25 0931   Wound Thickness Description not for Pressure Injury Full thickness 04/25/25 0931   Number of days: 0          Percent of Wound Debrided: 100%    Total Surface Area Debrided:  0.5 sq cm     Bleeding:  Minimal    Hemostasis Achieved:  by pressure    Procedural Pain:  0  / 10     Post Procedural Pain:  0 / 10     Response to treatment:  Well tolerated by patient.       Plan:   Will give patient surgical shoe to be worn on left foot.  Also discussed offloading.  Will try to obtain more recent A1c to document better glycemic control.  Patient to see Dr. Hernandes next week and ongoing follow-up for these chronic wounds    Treatment Note please see attached Discharge Instructions    New Medication(s) at this visit:   New Prescriptions    No medications on file       Other orders at this visit: No orders of the defined types were placed in this encounter.      Weight Management: No. N/A    Smoking Cessation: Ready to quit: No  Counseling given: Yes  Tobacco comments: black and mild 2  x daily          Patient Instructions   Wound Care Center Physician Orders and Discharge Instructions  [facility]   [facility] [address]  Telephone: [phone] FAX [phone]    NAME:  Colby Petersen  YOB: 1975  MEDICAL RECORD NUMBER:  [id number]  DATE: 
75

## 2025-05-05 ENCOUNTER — TELEPHONE (OUTPATIENT)
Dept: WOUND CARE | Age: 50
End: 2025-05-05

## 2025-05-05 NOTE — TELEPHONE ENCOUNTER
Made call out to [place] at [place] [phone]  Fax: [phone] and spoke with pts nurse. Requested pts most recent A1c and stated they will send with the pt to tomorrows appt.

## 2025-06-25 ENCOUNTER — TELEPHONE (OUTPATIENT)
Dept: WOUND CARE | Age: 50
End: 2025-06-25

## 2025-06-25 NOTE — TELEPHONE ENCOUNTER
Left message with Nusrat asking if patient needs a f/u appt.  She will have a nurse return my call.

## (undated) DEVICE — SOLUTION IV IRRIG POUR BRL 0.9% SODIUM CHL 2F7124

## (undated) DEVICE — MASTISOL ADHESIVE LIQ 2/3ML

## (undated) DEVICE — ZIMMER® STERILE DISPOSABLE TOURNIQUET CUFF WITH PLC, DUAL PORT, SINGLE BLADDER, 18 IN. (46 CM)

## (undated) DEVICE — NEEDLE HYPO 25GA L1.5IN BVL ORIENTED ECLIPSE

## (undated) DEVICE — BANDAGE GZ W2XL75IN ST RAYON POLY CNFRM STRTCH LTWT

## (undated) DEVICE — STATION ISOLATN W1775XH205IN D675IN ICU WALL OR GLS MT P2

## (undated) DEVICE — SYRINGE IRRIG 60ML SFT PLIABLE BLB EZ TO GRP 1 HND USE W/

## (undated) DEVICE — MERCY HEALTH WEST TURNOVER: Brand: MEDLINE INDUSTRIES, INC.

## (undated) DEVICE — SHEET,DRAPE,53X77,STERILE: Brand: MEDLINE

## (undated) DEVICE — GLOVE SURG SZ 75 CRM LTX FREE POLYISOPRENE POLYMER BEAD ANTI

## (undated) DEVICE — BITE BLK 60FR GRN ENDOSCP AD W STRP SLD DISPOSABLE

## (undated) DEVICE — PENCIL ES L3M BTTN SWCH S STL HEX LOK BLDE ELECTRD HOLSTER

## (undated) DEVICE — Z DISCONTINUED BY MEDLINE USE 2711682 TRAY SKIN PREP DRY W/ PREM GLV

## (undated) DEVICE — GOWN SIRUS NONREIN XL W/TWL: Brand: MEDLINE INDUSTRIES, INC.

## (undated) DEVICE — INTENDED FOR TISSUE SEPARATION, AND OTHER PROCEDURES THAT REQUIRE A SHARP SURGICAL BLADE TO PUNCTURE OR CUT.: Brand: BARD-PARKER ® STAINLESS STEEL BLADES

## (undated) DEVICE — ELECTRODE PT RET AD L9FT HI MOIST COND ADH HYDRGEL CORDED

## (undated) DEVICE — MINOR SET UP PK

## (undated) DEVICE — TOWEL,OR,DSP,ST,BLUE,STD,4/PK,20PK/CS: Brand: MEDLINE

## (undated) DEVICE — ENDOSCOPY KIT: Brand: MEDLINE INDUSTRIES, INC.

## (undated) DEVICE — SPONGE GZ W4XL4IN COT 12 PLY TYP VII WVN C FLD DSGN

## (undated) DEVICE — FORCEPS BX 240CM 2.4MM L NDL RAD JAW 4 M00513334

## (undated) DEVICE — BITE BLOCK ENDOSCP AD 60 FR W/ ADJ STRP PLAS GRN BLOX

## (undated) DEVICE — BANDAGE COMPR W4INXL12FT E DISP ESMARCH EVEN

## (undated) DEVICE — PADDING UNDERCAST W4INXL4YD 100% COT CRIMPED FINISH WBRL II

## (undated) DEVICE — COVER LT HNDL BLU PLAS

## (undated) DEVICE — TUBING, SUCTION, 1/4" X 12', STRAIGHT: Brand: MEDLINE

## (undated) DEVICE — SYRINGE MED 10ML LUERLOCK TIP W/O SFTY DISP

## (undated) DEVICE — SOLUTION SCRB 4OZ 10% POVIDONE IOD ANTIMIC BTL

## (undated) DEVICE — T-DRAPE,EXTREMITY,STERILE: Brand: MEDLINE

## (undated) DEVICE — CONTAINER SPEC 480ML CLR POLYSTYR 10% NEUT BUFF FRMLN ZN

## (undated) DEVICE — DRESSING PETRO W3XL3IN OIL EMUL N ADH GZ KNIT IMPREG CELOS